# Patient Record
Sex: FEMALE | Race: WHITE | Employment: OTHER | URBAN - METROPOLITAN AREA
[De-identification: names, ages, dates, MRNs, and addresses within clinical notes are randomized per-mention and may not be internally consistent; named-entity substitution may affect disease eponyms.]

---

## 2017-01-05 ENCOUNTER — OFFICE VISIT (OUTPATIENT)
Dept: PHARMACY | Facility: CLINIC | Age: 82
End: 2017-01-05
Payer: COMMERCIAL

## 2017-01-05 VITALS
DIASTOLIC BLOOD PRESSURE: 70 MMHG | BODY MASS INDEX: 30.45 KG/M2 | WEIGHT: 185.9 LBS | SYSTOLIC BLOOD PRESSURE: 154 MMHG | OXYGEN SATURATION: 94 % | HEART RATE: 71 BPM

## 2017-01-05 DIAGNOSIS — E55.9 VITAMIN D DEFICIENCY: ICD-10-CM

## 2017-01-05 DIAGNOSIS — Z23 ENCOUNTER FOR IMMUNIZATION: ICD-10-CM

## 2017-01-05 DIAGNOSIS — E53.8 VITAMIN B12 DEFICIENCY (NON ANEMIC): ICD-10-CM

## 2017-01-05 DIAGNOSIS — J44.9 CHRONIC OBSTRUCTIVE PULMONARY DISEASE, UNSPECIFIED COPD TYPE (H): ICD-10-CM

## 2017-01-05 DIAGNOSIS — I10 ESSENTIAL HYPERTENSION: ICD-10-CM

## 2017-01-05 DIAGNOSIS — E03.9 HYPOTHYROIDISM, UNSPECIFIED TYPE: ICD-10-CM

## 2017-01-05 DIAGNOSIS — E11.65 TYPE 2 DIABETES MELLITUS WITH HYPERGLYCEMIA, WITHOUT LONG-TERM CURRENT USE OF INSULIN (H): ICD-10-CM

## 2017-01-05 DIAGNOSIS — D50.9 IRON DEFICIENCY ANEMIA, UNSPECIFIED IRON DEFICIENCY ANEMIA TYPE: ICD-10-CM

## 2017-01-05 DIAGNOSIS — I10 ESSENTIAL HYPERTENSION: Primary | ICD-10-CM

## 2017-01-05 PROCEDURE — 99607 MTMS BY PHARM ADDL 15 MIN: CPT | Performed by: PHARMACIST

## 2017-01-05 PROCEDURE — 80048 BASIC METABOLIC PNL TOTAL CA: CPT | Performed by: PHYSICIAN ASSISTANT

## 2017-01-05 PROCEDURE — 99605 MTMS BY PHARM NP 15 MIN: CPT | Performed by: PHARMACIST

## 2017-01-05 PROCEDURE — 36415 COLL VENOUS BLD VENIPUNCTURE: CPT | Performed by: PHYSICIAN ASSISTANT

## 2017-01-05 RX ORDER — HYDROCHLOROTHIAZIDE 12.5 MG/1
12.5 TABLET ORAL DAILY
Qty: 30 TABLET | Refills: 1 | Status: SHIPPED | OUTPATIENT
Start: 2017-01-05 | End: 2017-01-19 | Stop reason: DRUGHIGH

## 2017-01-05 NOTE — PROGRESS NOTES
SUBJECTIVE/OBJECTIVE:                                                    Maryalice Rebecca Wachter is a 83 year old female coming in for a follow up visit (12-19-16)  for Medication Therapy Management.  She was referred to me from her PCP- Venu Maria.     Chief Complaint: here for BP recheck -and bmp lab .   States still has swollen ankles --scared a bit to take a water pill like lasix --see allergies.     She still feels cold daily -we had previously discussed --most likely due to her blood thinner warfarin. She uses a nice double blanket to try and keep her warm. Temp at 72 in the house .       Personal Healthcare Goals: fix diarrhea issues, timing of meds    Allergies/ADRs: Reviewed in Epic  Tobacco: No tobacco use   Alcohol: not currently using--very rare   Caffeine: decaff in am 1-2 cups/day of coffee; pm -tea a little bit.   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 Cannot exercise due to diarrhea issues --would like to go on walks with her hubby but has to have BM within 1 block of leaving.   PMH: Reviewed in [employer]; [facility] stated she has sarcoidosis?    Medication Adherence: No issues found today    IBS-diarrhea: patient uses prn loperamide --works well. .     GERD:  Taking omeprazole 20 mg daily. Knows to take it 30-60 minutes BEFORE first meal of the day. Patient states it is working well for her.     Diabetes:  Pt currently taking Glipizide er 10mg qam, actos 15mg qday, Metformin-1 x500mg bid. Pt is experiencing the following side effects: None  SMBG: Readings from Glucometer--she forgot bs meter today at home (11- 28-16).      7 day ipvywsv=590 n=7; 14 day average=160, n=13; 30 day tovytkr=214, n=29  She notes any food after 7pm --results in elevated AM fasting bs's.   Date FBG/ 2hours post Lunch/2hours post Dinner /2hours post    11-28-16 177 644am -ate chocolate cake after dinner last night       11-27-16 167      11-26 147       11-25 176--ate pie night before      11-24-16 138 645am       11-23 166      11-22 119 643am.         Symptoms of low blood sugar? none. Frequency of hypoglycemia? never.  Recent symptoms of high blood sugar? fatigue  Eye exam: up to date  Foot exam: up to date  Microalbumin is < 30 mg/g. Pt is taking an ACEi/ARB.  Aspirin: Taking 81mg daily and denies side effects  Diet/Exercise: diet is ok but likes carbs , no exercise.     Vitamin D3: level was 21 on  --she takes otc daily dose of 2,000 iu's now.        Vitamin B12: level was 493 on 10-10-16. She takes daily otc dose now.       Hypothyroidism: Patient is taking levothyroxine 100 mcg daily. She knows to take it on an empty stomach with her omeprazole in the morning.    Hypertension: Current medications include : spironolactone 12.5mg bid-am and dinner , lisinopril 40mg-qam. And metoprolol 50mg bid-am and dinnertime, and she stopped 2.5mg hs amlodipine due to very swollen ankles. Left ankle was hot to touch.   Patient does  self-monitor BP.  Patient reports :  Red, swollen , painful ankles --feels like her skin is going to tear.     Home BP's       1-5-17 142/67 7am p=61      1-4-17 156/67 p-59 7am  156/70 p=68 10;15pm    1-3-17 155/79 p=64 7am  166/55 p=71 10;45pm    1-2-17 167/62 p=64 7am  148/59 p=105                                 Date Home bp's      12-19-16 140/66mhg p=68       12-18 153/81  Forgot bedtime bp    12-17 148/71 p=66  158/75 p=59    12-16 155/51 p=73  147/71 p=64    12-15 156/64 p=76  142/74 p=61    12-14 146/60 p=70  Forgot bedtime number    12-13 143/61 p=70  145/58 p=72            Date Home BP's.      11-28-16 158/69 rt. Arm 650am pulse 77 141-59 left arm 930am.     11-27-16 147/59 L arm 7am  157/59 L arm      11-26-16 139-62 L 745am  160-64 8pm Left arm    11-25-16 146-61 L p=72  7am    172-58 L 9pm  147-69 L pulse =85    11-24-16 160-55 L 7am  153-68 R arm 7am                        She did bring her home BP meter in today (11-28-16)--reading at 2:40pm = 137/56 p=74.   mtm checked own bp  120/82 p=71.   Very accurate reading.     COPD: Patient states she feels well controlled. Patient uses her inhalers more often when she gets sick and she always carries her rescue inhaler with her. Patient states that when she forgets it she becomes very worried and panicked.     Insomnia: Current medications include: trazodone 25-50mg. Pt reports trouble staying asleep and can only sleep 4-6 hours. She admits to a boring life right now -she gets sleepy early Pm --naps for a few hours , then goes to bed -gets up at 4-5 am - perhaps she is sleeping enough already.   She sleeps better she states with 1/2 tab =25mg .      Immunizations: Patient is up to date .      Current labs include:  BP Readings from Last 3 Encounters:   01/05/17 154/70   12/19/16 150/80   11/28/16 146/68     A1C      7.7   10/10/2016  A1C      8.3   7/1/2016  A1C      7.9   3/28/2016  A1C      7.1   11/19/2015  A1C      7.9   6/30/2015      CHOLESTEROL   Date Value Ref Range Status   07/01/2016 117 <200 mg/dL Final   06/30/2015 109 <200 mg/dL Final     Comment:     LDL Cholesterol is the primary guide to therapy.   The NCEP recommends further evaluation of: patients with cholesterol greater   than 200 mg/dL if additional risk factors are present, cholesterol greater   than   240 mg/dL, triglycerides greater than 150 mg/dL, or HDL less than 40 mg/dL.       HDL CHOLESTEROL   Date Value Ref Range Status   07/01/2016 40* >49 mg/dL Final   06/30/2015 36* >50 mg/dL Final     LDL CHOLESTEROL CALCULATED   Date Value Ref Range Status   07/01/2016 39 <100 mg/dL Final     Comment:     Desirable:       <100 mg/dl   06/30/2015 25 0 - 129 mg/dL Final     Comment:     LDL Cholesterol is the primary guide to therapy: LDL-cholesterol goal in high   risk patients is <100 mg/dL and in very high risk patients is <70 mg/dL.       LDL CHOLESTEROL DIRECT   Date Value Ref Range Status   12/12/2014 60 0 - 129 mg/dL Final     Comment:     Optimal:         <100 mg/dL   Near  Optimal:     100-129 mg/dL   Borderline High:  130-159 mg/dL   High:             160-189 mg/dL   Very high:  greater than or equal to 190 mg/dL   Cannot estimate LDL when triglyceride exceeds 400 mg/dL       TRIGLYCERIDES   Date Value Ref Range Status   07/01/2016 190* <150 mg/dL Final     Comment:     Borderline high:  150-199 mg/dl   High:             200-499 mg/dl   Very high:       >499 mg/dl   Fasting specimen     06/30/2015 240* 0 - 150 mg/dL Final     CHOLESTEROL/HDL RATIO   Date Value Ref Range Status   06/30/2015 3.0 0.0 - 5.0 Final     MICROL       33   10/27/2016  MICROALBUMIN    33.03   10/27/2016      Last Basic Metabolic Panel:  NA      138   10/21/2016   POTASSIUM      3.9   10/21/2016  CHLORIDE      103   10/21/2016  VASHTI      8.3   10/21/2016  CO2       27   10/21/2016  BUN       17   10/21/2016  CR     0.87   10/21/2016  GLC      144   10/21/2016        GFR ESTIMATE   Date Value Ref Range Status   10/21/2016 63 >60 mL/min/1.7m2 Final     Comment:     Non  GFR Calc   10/20/2016 63 >60 mL/min/1.7m2 Final     Comment:     Non  GFR Calc   11/19/2015 72 >60 mL/min/1.7m2 Final     Comment:     Non  GFR Calc     GFR ESTIMATE IF BLACK   Date Value Ref Range Status   10/21/2016 76 >60 mL/min/1.7m2 Final     Comment:      GFR Calc   10/20/2016 76 >60 mL/min/1.7m2 Final     Comment:      GFR Calc   11/19/2015 87 >60 mL/min/1.7m2 Final     Comment:      GFR Calc     TSH   Date Value Ref Range Status   12/19/2016 0.55 0.40 - 4.00 mU/L Final   ]  /70 mmHg  Pulse 71  Wt 185 lb 14.4 oz (84.324 kg)  SpO2 94%    Most Recent Immunizations   Administered Date(s) Administered     DTAP (<7y) 01/01/2010     Influenza (High Dose) 3 valent vaccine 10/10/2016     Influenza (IIV3) 09/01/2014     Pneumococcal (PCV 13) 10/10/2016     Pneumococcal (PCV 7) 09/09/2014     Zoster vaccine, live 01/01/2012     ASSESSMENT:                                                        Current medications were reviewed with her today.     Medication Adherence: No problems identified    IBS-Diarrhea: Stable.     GERD: Stable. Current treatment is effective. Chronic PPI use places patient at an increased risk of pneumonia, C. Diff, hypomagnesemia and lower bone mineral density, these risks  were  reviewed with the patient. Pt is taking the medication in the proper fashion (In AM on empty stomach before breakfast).     Diabetes: Stable. Patient is meeting A1c goal of < 8%.    Vitamin B12: is not at goal of 600-1000pg/mL. Pt would benefit from vitamin B12 lab recheck in 12 months.    Vitamin D3: is not at goal of 50-75ng/mL. Pt would benefit from vitamin D3 lab recheck in 12 months.    Hypothyroidism: recheck today --result is pending.    Hypertension: Needs Improvement. Patient is not meeting BP goal of < 140/90mmHg.  Pt would benefit from the following changes - addition of hctz, discontinuation of spironolactone, continued BP monitoring, watching diet, increasing exercise and weight loss and sodium restriction      COPD/Asthma: Stable.     Insomnia: Improved. Pt may benefit from no changes.      Immunizations: stable -up to date now.     PLAN:                                                          1. BP = 154/70mmhg. Today .  Goal BP is <140/90mmhg if possible.    Lets stop the spironolactone --it doesn't seem to be doing anything.     Lets  ADD in a small water pill (diuretic)  Called HCTZ 12.5mg./day -take in AM  Daily.   You may feel like urinating more frequently in morning while on this medication.     Keep checking blood pressure AM and PM  at home.       Call me if you get too lightheaded and feel faint or fall down.       We can consider NORY-compression socks if needed.       Next MTM visit: 2 weeks for BP recheck -- Thursday January 19th at 1pm.         I spent 30 minutes with the patient during this encounter.     To schedule another MTM  appointment, please call the clinic directly or you may call the MTM scheduling line at [phone] or toll-free at [phone].     My Clinical Pharmacist's contact information:                                                      It was a pleasure seeing you today!  Please feel free to contact me with any questions or concerns you have.      Janey Romero Rph.  Medication Therapy Management Provider  [phone]        You may receive a survey about the [facility] services you received.  I would appreciate your feedback to help me serve you better in the future. Please fill it out and return it when you can. Your comments will be anonymous.

## 2017-01-05 NOTE — MR AVS SNAPSHOT
After Visit Summary   1/5/2017    Maryalice Rebecca Wachter    MRN: [id number]           Patient Information     Date Of Birth          11/11/1933        Visit Information        Provider Department      1/5/2017 2:30 PM Janey Romero RPH [facility]        Today's Diagnoses     Essential hypertension    -  1       Care Instructions    Recommendations from today's MT visit:                                                        1. BP = 154/70mmhg. Today .  Goal BP is <140/90mmhg if possible.    Lets stop the spironolactone --it doesn't seem to be doing anything.     Lets  ADD in a small water pill (diuretic)  Called HCTZ 12.5mg./day -take in AM  Daily.   You may feel like urinating more frequently in morning while on this medication.     Keep checking blood pressure AM and PM  at home.       Call me if you get too lightheaded and feel faint or fall down.       We can consider NORY-compression socks if needed.       Next MTM visit: 2 weeks for BP recheck -- Thursday January 19th at 1pm.     To schedule another MTM appointment, please call the clinic directly or you may call the MTM scheduling line at [phone] or toll-free at [phone].     My Clinical Pharmacist's contact information:                                                      It was a pleasure seeing you today!  Please feel free to contact me with any questions or concerns you have.      Janey Romero Rph.  Medication Therapy Management Provider  [phone]      You may receive a survey about the MT services you received.  I would appreciate your feedback to help me serve you better in the future. Please fill it out and return it when you can. Your comments will be anonymous.      My healthcare goals:                                                                    Follow-ups after your visit        Your next 10 appointments already scheduled     Jan 06, 2017  9:00 AM   Anticoagulation Visit with FM RN  [facility]ITS   [facility]    [address], [address]  [place] [address] [phone]            Jan 19, 2017  1:00 PM   SHORT with Janey Romero RPH   [facility]    [address]  [place] [address] [phone]              Future tests that were ordered for you today     Open Future Orders        Priority Expected Expires Ordered    Basic metabolic panel Routine  9/5/2017 1/5/2017            Who to contact     If you have questions or need follow up information about today's clinic visit or your schedule please contact [facility] directly at [phone].  Normal or non-critical lab and imaging results will be communicated to you by MyChart, letter or phone within 4 business days after the clinic has received the results. If you do not hear from us within 7 days, please contact the clinic through MyChart or phone. If you have a critical or abnormal lab result, we will notify you by phone as soon as possible.  Submit refill requests through [employer] or call your pharmacy and they will forward the refill request to us. Please allow 3 business days for your refill to be completed.          Additional Information About Your Visit        [employer] Information     [employer] gives you secure access to your electronic health record. If you see a primary care provider, you can also send messages to your care team and make appointments. If you have questions, please call your primary care clinic.  If you do not have a primary care provider, please call [phone] and they will assist you.        Care EveryWhere ID     This is your Care EveryWhere ID. This could be used by other organizations to access your [place] medical records  [id number]        Your Vitals Were     Pulse Pulse Oximetry                71 94%           Blood Pressure from Last 3 Encounters:   01/05/17 154/70    12/19/16 150/80   11/28/16 146/68    Weight from Last 3 Encounters:   01/05/17 185 lb 14.4 oz (84.324 kg)   12/19/16 184 lb 4.8 oz (83.598 kg)   11/28/16 183 lb 9.6 oz (83.28 kg)                 Today's Medication Changes          These changes are accurate as of: 1/5/17  3:10 PM.  If you have any questions, ask your nurse or doctor.               Start taking these medicines.        Dose/Directions    hydrochlorothiazide 12.5 MG Tabs tablet   Used for:  Essential hypertension   Started by:  Janey Romero RPH        Dose:  12.5 mg   Take 1 tablet (12.5 mg) by mouth daily   Quantity:  30 tablet   Refills:  1         These medicines have changed or have updated prescriptions.        Dose/Directions    traZODone 50 MG tablet   Commonly known as:  DESYREL   This may have changed:  how much to take   Used for:  Insomnia, unspecified type        Dose:   mg   Take 1-2 tablets ( mg) by mouth At Bedtime   Quantity:  60 tablet   Refills:  5            Where to get your medicines      These medications were sent to [facility] - [place] [address]     Phone:  [phone]    - hydrochlorothiazide 12.5 MG Tabs tablet             Primary Care Provider Office Phone # Fax #    Venu Maria PA-C [phone]       [facility] [address]  [place] [address]        Thank you!     Thank you for choosing [facility]  for your care. Our goal is always to provide you with excellent care. Hearing back from our patients is one way we can continue to improve our services. Please take a few minutes to complete the written survey that you may receive in the mail after your visit with us. Thank you!             Your Updated Medication List - Protect others around you: Learn how to safely use, store and throw away your medicines at www.disposemymeds.org.          This list is accurate as  of: 1/5/17  3:10 PM.  Always use your most recent med list.                   Brand Name Dispense Instructions for use    * albuterol (2.5 MG/3ML) 0.083% neb solution      Take 1 vial by nebulization every 6 hours as needed for shortness of breath / dyspnea or wheezing       * albuterol 108 (90 BASE) MCG/ACT Inhaler   Generic drug:  albuterol      Inhale 2 puffs into the lungs every 6 hours       ascorbic acid 500 MG tablet    VITAMIN C     Take 500 mg by mouth daily.       ASPIRIN EC PO      Take 81 mg by mouth.       B-12 1000 MCG Caps      Take 1 capsule by mouth 2 times daily       * BLOOD GLUCOSE TEST STRIPS Strp     100 strip    Test blood glucose 1 time a day       * blood glucose monitoring test strip    ONE TOUCH ULTRA    100 each    Use to test blood sugar 1 time daily or as directed.       calcium carb 1250 mg (500 mg [place])/vitamin D 200 units 500-200 MG-UNIT per tablet    OSCAL with D     Take 1 tablet by mouth daily.       ferrous sulfate 325 (65 FE) MG tablet    IRON     Take 1 tablet by mouth daily (with breakfast).       FIBERCON 625 MG tablet   Generic drug:  calcium polycarbophil      Take 1 tablet by mouth 2 times daily.       glipiZIDE 10 MG 24 hr tablet    glipiZIDE XL    180 tablet    Take 1 tablet (10 mg) by mouth 2 times daily       hydrochlorothiazide 12.5 MG Tabs tablet     30 tablet    Take 1 tablet (12.5 mg) by mouth daily       * Lancets 30G Misc     100 lancet    Test blood glucose 1 time a day--Any generic lancet that insurance covers.       * TRUEPLUS LANCETS 30G Misc     100 each    1 strip daily       levothyroxine 100 MCG tablet    SYNTHROID/LEVOTHROID    30 tablet    Take 1 tablet (100 mcg) by mouth daily       lisinopril 40 MG tablet    PRINIVIL/ZESTRIL    90 tablet    Take 1 tablet (40 mg) by mouth daily       LOPERAMIDE HCL PO      1/2 to 1 tablet by mouth as needed.       metFORMIN 500 MG tablet    GLUCOPHAGE    360 tablet    Take 2 tablets (1,000 mg) by mouth 2 times daily  (with meals)       metoprolol 50 MG tablet    LOPRESSOR    180 tablet    Take 1 tablet (50 mg) by mouth 2 times daily       mometasone 220 MCG/INH Inhaler    ASMANEX     Inhale 2 puffs into the lungs daily.       omeprazole 20 MG CR capsule    priLOSEC    30 capsule    Take 1 capsule (20 mg) by mouth daily       pioglitazone 15 MG tablet    ACTOS    90 tablet    Take 1 tablet (15 mg) by mouth daily       simvastatin 20 MG tablet    ZOCOR    90 tablet    Take 1 tablet (20 mg) by mouth At Bedtime       spironolactone 25 MG tablet    ALDACTONE    90 tablet    Take 1 tablet (25 mg) by mouth daily       tiotropium 18 MCG capsule    SPIRIVA     Inhale 18 mcg into the lungs daily.       traZODone 50 MG tablet    DESYREL    60 tablet    Take 1-2 tablets ( mg) by mouth At Bedtime       VITAMIN D (CHOLECALCIFEROL) PO      Take 2,000 Units by mouth daily       warfarin 3 MG tablet    COUMADIN    90 tablet    Take 1 tablet (3mg) daily except 1/2 tablet (1.5mg) on Fridays       * Notice:  This list has 6 medication(s) that are the same as other medications prescribed for you. Read the directions carefully, and ask your doctor or other care provider to review them with you.

## 2017-01-05 NOTE — PATIENT INSTRUCTIONS
Recommendations from today's MTM visit:                                                        1. BP = 154/70mmhg. Today .  Goal BP is <140/90mmhg if possible.    Lets stop the spironolactone --it doesn't seem to be doing anything.     Lets  ADD in a small water pill (diuretic)  Called HCTZ 12.5mg./day -take in AM  Daily.   You may feel like urinating more frequently in morning while on this medication.     Keep checking blood pressure AM and PM  at home.       Call me if you get too lightheaded and feel faint or fall down.       We can consider NORY-compression socks if needed.       Next MTM visit: 2 weeks for BP recheck -- Thursday January 19th at 1pm.     To schedule another MTM appointment, please call the clinic directly or you may call the MTM scheduling line at [phone] or toll-free at [phone].     My Clinical Pharmacist's contact information:                                                      It was a pleasure seeing you today!  Please feel free to contact me with any questions or concerns you have.      Janey Romero Rph.  Medication Therapy Management Provider  [phone]      You may receive a survey about the MTM services you received.  I would appreciate your feedback to help me serve you better in the future. Please fill it out and return it when you can. Your comments will be anonymous.      My healthcare goals:

## 2017-01-05 NOTE — Clinical Note
Venu--erik--bp still too high and she has ankle swelling --i changed spironolactone to hctz --lets see how that goes in next 2 weeks.-hannah

## 2017-01-06 ENCOUNTER — ANTICOAGULATION THERAPY VISIT (OUTPATIENT)
Dept: NURSING | Facility: CLINIC | Age: 82
End: 2017-01-06
Payer: COMMERCIAL

## 2017-01-06 DIAGNOSIS — I25.2 PAST MYOCARDIAL INFARCTION: Primary | ICD-10-CM

## 2017-01-06 DIAGNOSIS — I48.91 ATRIAL FIBRILLATION (H): ICD-10-CM

## 2017-01-06 DIAGNOSIS — E03.9 HYPOTHYROIDISM, UNSPECIFIED TYPE: ICD-10-CM

## 2017-01-06 DIAGNOSIS — I47.19 ATRIAL TACHYCARDIA, PAROXYSMAL (H): ICD-10-CM

## 2017-01-06 DIAGNOSIS — Z79.01 LONG-TERM (CURRENT) USE OF ANTICOAGULANTS: Primary | ICD-10-CM

## 2017-01-06 DIAGNOSIS — I51.9 SYSTOLIC DYSFUNCTION, LEFT VENTRICLE: ICD-10-CM

## 2017-01-06 LAB
ANION GAP SERPL CALCULATED.3IONS-SCNC: 8 MMOL/L (ref 3–14)
BUN SERPL-MCNC: 17 MG/DL (ref 7–30)
CALCIUM SERPL-MCNC: 9.2 MG/DL (ref 8.5–10.1)
CHLORIDE SERPL-SCNC: 106 MMOL/L (ref 94–109)
CO2 SERPL-SCNC: 28 MMOL/L (ref 20–32)
CREAT SERPL-MCNC: 0.94 MG/DL (ref 0.52–1.04)
GFR SERPL CREATININE-BSD FRML MDRD: 57 ML/MIN/1.7M2
GLUCOSE SERPL-MCNC: 166 MG/DL (ref 70–99)
INR POINT OF CARE: 2.3 (ref 0.86–1.14)
POTASSIUM SERPL-SCNC: 4.3 MMOL/L (ref 3.4–5.3)
SODIUM SERPL-SCNC: 142 MMOL/L (ref 133–144)

## 2017-01-06 PROCEDURE — 36416 COLLJ CAPILLARY BLOOD SPEC: CPT

## 2017-01-06 PROCEDURE — 99207 ZZC NO CHARGE NURSE ONLY: CPT

## 2017-01-06 PROCEDURE — 85610 PROTHROMBIN TIME: CPT | Mod: QW

## 2017-01-06 NOTE — PROGRESS NOTES
ANTICOAGULATION FOLLOW-UP CLINIC VISIT    Patient Name:  Maryalice Rebecca Wachter  Date:  1/6/2017  Contact Type:  Face to Face    SUBJECTIVE:     Patient Findings     Positives No Problem Findings           OBJECTIVE    INR PROTIME   Date Value Ref Range Status   01/06/2017 2.3* 0.86 - 1.14 Final       ASSESSMENT / PLAN  INR assessment THER    Recheck INR In: 6 WEEKS    INR Location Clinic      Anticoagulation Summary as of 1/6/2017     INR goal 2.0-3.0   Selected INR 2.3 (1/6/2017)   Maintenance plan 4.5 mg (3 mg x 1.5) on Tue, Fri; 3 mg (3 mg x 1) all other days   Full instructions 4.5 mg on Tue, Fri; 3 mg all other days   Weekly total 24 mg   No change documented Cyndi Cabello RN   Plan last modified Cyndi Cabello RN (9/23/2016)   Next INR check 2/17/2017   Priority INR   Target end date     Indications   Long-term (current) use of anticoagulants [Z79.01] [Z79.01]  Atrial fibrillation (H) [I48.91]         Anticoagulation Episode Summary     INR check location     Preferred lab     Send INR reminders to  [employer] POOL    Comments       Anticoagulation Care Providers     Provider Role Specialty Phone number    Venu Maria PA-C Responsible Physician Assistant - Medical [phone]            See the Encounter Report to view Anticoagulation Flowsheet and Dosing Calendar (Go to Encounters tab in chart review, and find the Anticoagulation Therapy Visit)    Cyndi Cabello RN

## 2017-01-06 NOTE — MR AVS SNAPSHOT
Sindi Beachchter   1/6/2017 9:00 AM   Anticoagulation Therapy Visit    Description:  83 year old female   Provider:  FM RN VISITS   Department:  Fm Nurse           INR as of 1/6/2017     Selected INR 2.3 (1/6/2017)      Anticoagulation Summary as of 1/6/2017     INR goal 2.0-3.0   Selected INR 2.3 (1/6/2017)   Full instructions 4.5 mg on Tue, Fri; 3 mg all other days   Next INR check 2/17/2017    Indications   Long-term (current) use of anticoagulants [Z79.01] [Z79.01]  Atrial fibrillation (H) [I48.91]         Your next Anticoagulation Clinic appointment(s)     Feb 17, 2017  9:00 AM   Anticoagulation Visit with FM RN VISITS   [facility] ([facility]    [address] [place] [address], [facility] [address]  [facility] [address] [phone]              Contact Numbers     Clinic Number:         January 2017 Details    Sun Mon Tue Wed Thu Fri Sat     1               2               3               4               5               6      4.5 mg   See details      7      3 mg           8      3 mg         9      3 mg         10      4.5 mg         11      3 mg         12      3 mg         13      4.5 mg         14      3 mg           15      3 mg         16      3 mg         17      4.5 mg         18      3 mg         19      3 mg         20      4.5 mg         21      3 mg           22      3 mg         23      3 mg         24      4.5 mg         25      3 mg         26      3 mg         27      4.5 mg         28      3 mg           29      3 mg         30      3 mg         31      4.5 mg              Date Details   01/06 This INR check               How to take your warfarin dose     To take:  3 mg Take 1 of the 3 mg tablets.    To take:  4.5 mg Take 1.5 of the 3 mg tablets.           February 2017 Details    Sun Mon Tue Wed Thu Fri Sat        1      3 mg         2      3 mg         3      4.5 mg         4      3 mg           5      3 mg         6      3 mg         7      4.5 mg          8      3 mg         9      3 mg         10      4.5 mg         11      3 mg           12      3 mg         13      3 mg         14      4.5 mg         15      3 mg         16      3 mg         17            18                 19               20               21               22               23               24               25                 26               27               28                    Date Details   No additional details    Date of next INR:  2/17/2017         How to take your warfarin dose     To take:  3 mg Take 1 of the 3 mg tablets.    To take:  4.5 mg Take 1.5 of the 3 mg tablets.

## 2017-01-09 ENCOUNTER — TELEPHONE (OUTPATIENT)
Dept: PHARMACY | Facility: CLINIC | Age: 82
End: 2017-01-09

## 2017-01-09 RX ORDER — METOPROLOL TARTRATE 50 MG
50 TABLET ORAL 2 TIMES DAILY
Qty: 180 TABLET | Refills: 1 | Status: SHIPPED | OUTPATIENT
Start: 2017-01-09 | End: 2017-06-19

## 2017-01-09 RX ORDER — LEVOTHYROXINE SODIUM 100 UG/1
100 TABLET ORAL DAILY
Qty: 90 TABLET | Refills: 1 | Status: SHIPPED | OUTPATIENT
Start: 2017-01-09 | End: 2017-06-23

## 2017-01-09 NOTE — TELEPHONE ENCOUNTER
levothyroxine (SYNTHROID/LEVOTHROID) 100 MCG tablet     Last Written Prescription Date: 12/5/16  Last Quantity: 30, # refills: 0  Last Office Visit with DONALD, CHASTITY or  Health prescribing provider: 10/27/2016     Next 5 appointments (look out 90 days)     Jan 19, 2017  1:00 PM   SHORT with Janey Romero RPH   [facility] ([facility]    [address] [facility] [id number] [phone]                   TSH   Date Value Ref Range Status   12/19/2016 0.55 0.40 - 4.00 mU/L Final     ________________________________________________________________________________  metoprolol (LOPRESSOR) 50 MG tablet      Last Written Prescription Date: 7/13/16  Last Fill Quantity: 180, # refills: 1    Last Office Visit with DONALD, CHASTITY or [facility]ENN[facility] prescribing provider:  10/27/2016         BP Readings from Last 3 Encounters:   01/05/17 154/70   12/19/16 150/80   11/28/16 146/68       JOE Michelle

## 2017-01-09 NOTE — TELEPHONE ENCOUNTER
Prescription approved per [facility] Refill Protocol.  Cyndi Cabello RN

## 2017-01-09 NOTE — TELEPHONE ENCOUNTER
1-9-17      She states hctz -- 12.5mg ./day -- she reports redness in lower legs is disappearing , she reports no urge to go to bathroom -- during day --but during night q 1.5 hrs nocturia which is normal for her.    Her home BP's -- last night 148/63mmhg,  133/65 that morning.    1-7 -17 152/65 , night that day 153/74.  1-5-17 = 145/63 , night 159/79.      Plan:    1. Lets increase dose to 2 pills of 12.5mg qam hctz qam.    Check home bp's --see me in 10 days .    Janey Romero [facility].  Medication Therapy Management Provider  [phone]

## 2017-01-17 DIAGNOSIS — I25.2 PAST MYOCARDIAL INFARCTION: ICD-10-CM

## 2017-01-17 DIAGNOSIS — I51.9 SYSTOLIC DYSFUNCTION, LEFT VENTRICLE: Primary | ICD-10-CM

## 2017-01-17 RX ORDER — LISINOPRIL 40 MG/1
40 TABLET ORAL DAILY
Qty: 90 TABLET | Refills: 0 | Status: SHIPPED | OUTPATIENT
Start: 2017-01-17 | End: 2017-04-12

## 2017-01-17 NOTE — TELEPHONE ENCOUNTER
Prescription approved per [facility] Refill Protocol.  Cyndi Cabello RN

## 2017-01-17 NOTE — TELEPHONE ENCOUNTER
Pending Prescriptions:                       Disp   Refills    lisinopril (PRINIVIL/ZESTRIL) 40 MG echcfr25 tab*0            Sig: Take 1 tablet (40 mg) by mouth daily              Last Written Prescription Date: 10/10/2016  Last Fill Quantity: 90, # refills: 0  Last Office Visit with [facility], [facility] or [facility] prescribing provider: 10/27/2016   Next 5 appointments (look out 90 days)     Jan 19, 2017  1:00 PM   SHORT with Janey Romero RPH   [facility] ([facility])    [address] [facility] [id number] [phone]                   POTASSIUM   Date Value Ref Range Status   01/05/2017 4.3 3.4 - 5.3 mmol/L Final     CREATININE   Date Value Ref Range Status   01/05/2017 0.94 0.52 - 1.04 mg/dL Final     BP Readings from Last 3 Encounters:   01/05/17 154/70   12/19/16 150/80   11/28/16 146/68

## 2017-01-19 ENCOUNTER — OFFICE VISIT (OUTPATIENT)
Dept: PHARMACY | Facility: CLINIC | Age: 82
End: 2017-01-19
Payer: COMMERCIAL

## 2017-01-19 VITALS
SYSTOLIC BLOOD PRESSURE: 124 MMHG | WEIGHT: 182.2 LBS | DIASTOLIC BLOOD PRESSURE: 64 MMHG | HEART RATE: 65 BPM | BODY MASS INDEX: 29.85 KG/M2 | OXYGEN SATURATION: 97 %

## 2017-01-19 DIAGNOSIS — E53.8 VITAMIN B12 DEFICIENCY (NON ANEMIC): ICD-10-CM

## 2017-01-19 DIAGNOSIS — E55.9 VITAMIN D DEFICIENCY: ICD-10-CM

## 2017-01-19 DIAGNOSIS — I10 ESSENTIAL HYPERTENSION: Primary | ICD-10-CM

## 2017-01-19 DIAGNOSIS — D50.9 IRON DEFICIENCY ANEMIA, UNSPECIFIED IRON DEFICIENCY ANEMIA TYPE: ICD-10-CM

## 2017-01-19 DIAGNOSIS — K21.9 GASTROESOPHAGEAL REFLUX DISEASE WITHOUT ESOPHAGITIS: ICD-10-CM

## 2017-01-19 DIAGNOSIS — J44.9 CHRONIC OBSTRUCTIVE PULMONARY DISEASE, UNSPECIFIED COPD TYPE (H): ICD-10-CM

## 2017-01-19 DIAGNOSIS — R19.7 DIARRHEA, UNSPECIFIED TYPE: ICD-10-CM

## 2017-01-19 DIAGNOSIS — E11.65 TYPE 2 DIABETES MELLITUS WITH HYPERGLYCEMIA, WITHOUT LONG-TERM CURRENT USE OF INSULIN (H): ICD-10-CM

## 2017-01-19 DIAGNOSIS — Z23 ENCOUNTER FOR IMMUNIZATION: ICD-10-CM

## 2017-01-19 DIAGNOSIS — E03.9 HYPOTHYROIDISM, UNSPECIFIED TYPE: ICD-10-CM

## 2017-01-19 DIAGNOSIS — G47.00 INSOMNIA, UNSPECIFIED TYPE: ICD-10-CM

## 2017-01-19 DIAGNOSIS — K58.0 IRRITABLE BOWEL SYNDROME WITH DIARRHEA: ICD-10-CM

## 2017-01-19 PROCEDURE — 99606 MTMS BY PHARM EST 15 MIN: CPT | Performed by: PHARMACIST

## 2017-01-19 PROCEDURE — 99607 MTMS BY PHARM ADDL 15 MIN: CPT | Performed by: PHARMACIST

## 2017-01-19 RX ORDER — HYDROCHLOROTHIAZIDE 25 MG/1
25 TABLET ORAL DAILY
Qty: 90 TABLET | Refills: 1 | Status: SHIPPED | OUTPATIENT
Start: 2017-01-19 | End: 2017-06-19

## 2017-01-19 NOTE — PROGRESS NOTES
SUBJECTIVE/OBJECTIVE:                                                    Maryalice Rebecca Wachter is a 83 year old female coming in for a follow up visit (1-5-17)  for Medication Therapy Management.  She was referred to me from her PCP- Venu Maria.     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  She thinks about 5 days week --2 /day =25mg. Still has lower leg redness issues but much improved.       Personal Healthcare Goals: fix diarrhea issues, timing of meds, get BP wnl.     Allergies/ADRs: Reviewed in Epic  Tobacco: No tobacco use   Alcohol: not currently using--very rare   Caffeine: decaff in am 1-2 cups/day of coffee; pm -tea a little bit.   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 Cannot exercise due to diarrhea issues --would like to go on walks with her hubby but has to have BM within 1 block of leaving.   PMH: Reviewed in [employer]; [facility] stated she has sarcoidosis?    Medication Adherence: No issues found today    IBS-diarrhea: patient uses prn loperamide --works well.     GERD:  Taking omeprazole 20 mg daily. Knows to take it 30-60 minutes BEFORE first meal of the day. Patient states it is working well for her.     Diabetes:  Pt currently taking Glipizide er 10mg qam, actos 15mg qday, Metformin-1 x500mg bid. Pt is experiencing the following side effects: None  SMBG: Readings from Glucometer--she forgot bs meter today at home (11- 28-16).     30 days bs avg. On 1-19-17=147.       Symptoms of low blood sugar? none. Frequency of hypoglycemia? never.  Recent symptoms of high blood sugar? fatigue  Eye exam: up to date  Foot exam: up to date  Microalbumin is < 30 mg/g. Pt is taking an ACEi/ARB.  Aspirin: Taking 81mg daily and denies side effects  Diet/Exercise: diet is ok but likes carbs , no exercise.     Vitamin D3: level was 21 on  --she  takes otc daily dose of 2,000 iu's now.        Vitamin B12: level was 493 on 10-10-16. She takes daily otc dose now.       Hypothyroidism: Patient is taking levothyroxine 100 mcg daily. She knows to take it on an empty stomach with her omeprazole in the morning.    Hypertension: Current medications include : HCTZ 25mg. qam-5 days /week, 12.5mg 2 days /week  , lisinopril 40mg-qam. And metoprolol 50mg bid-am and dinnertime.   Patient does  self-monitor BP.  Yes see below:    Date Home bp's      1-19-17 137/65 p=66      1-18-17 148/69  139/57 p=69    1-17-17 144/65 p=65  132/62 p=71    1-16-17 143/67 p=59  136/67 p=67    1-15-17 132/68 p=66  128/89 p=69    1-14-17 133/70 p=64  112/67 p=65    1-13-17 133/70 p=68  145/61 p=65              Home BP's       1-5-17 142/67 7am p=61      1-4-17 156/67 p-59 7am  156/70 p=68 10;15pm    1-3-17 155/79 p=64 7am  166/55 p=71 10;45pm    1-2-17 167/62 p=64 7am  148/59 p=105                             She did bring her home BP meter in today (11-28-16)--reading at 2:40pm = 137/56 p=74.   mtm checked own bp 120/82 p=71.   Very accurate reading.     COPD: Patient states she feels well controlled. Patient uses her inhalers more often when she gets sick and she always carries her rescue inhaler with her. Patient states that when she forgets it she becomes very worried and panicked.     Insomnia: Current medications include: trazodone 25-50mg. Pt reports trouble staying asleep and can only sleep 4-6 hours. She admits to a boring life right now -she gets sleepy early Pm --naps for a few hours , then goes to bed -gets up at 4-5 am - perhaps she is sleeping enough already.   She sleeps better she states with 1/2 tab =25mg .      Immunizations: Patient is up to date .      Current labs include:  BP Readings from Last 3 Encounters:   01/19/17 124/64   01/05/17 154/70   12/19/16 150/80     A1C      7.7   10/10/2016  A1C      8.3   7/1/2016  A1C      7.9   3/28/2016  A1C      7.1    11/19/2015  A1C      7.9   6/30/2015      CHOLESTEROL   Date Value Ref Range Status   07/01/2016 117 <200 mg/dL Final   06/30/2015 109 <200 mg/dL Final     Comment:     LDL Cholesterol is the primary guide to therapy.   The NCEP recommends further evaluation of: patients with cholesterol greater   than 200 mg/dL if additional risk factors are present, cholesterol greater   than   240 mg/dL, triglycerides greater than 150 mg/dL, or HDL less than 40 mg/dL.       HDL CHOLESTEROL   Date Value Ref Range Status   07/01/2016 40* >49 mg/dL Final   06/30/2015 36* >50 mg/dL Final     LDL CHOLESTEROL CALCULATED   Date Value Ref Range Status   07/01/2016 39 <100 mg/dL Final     Comment:     Desirable:       <100 mg/dl   06/30/2015 25 0 - 129 mg/dL Final     Comment:     LDL Cholesterol is the primary guide to therapy: LDL-cholesterol goal in high   risk patients is <100 mg/dL and in very high risk patients is <70 mg/dL.       LDL CHOLESTEROL DIRECT   Date Value Ref Range Status   12/12/2014 60 0 - 129 mg/dL Final     Comment:     Optimal:         <100 mg/dL   Near Optimal:     100-129 mg/dL   Borderline High:  130-159 mg/dL   High:             160-189 mg/dL   Very high:  greater than or equal to 190 mg/dL   Cannot estimate LDL when triglyceride exceeds 400 mg/dL       TRIGLYCERIDES   Date Value Ref Range Status   07/01/2016 190* <150 mg/dL Final     Comment:     Borderline high:  150-199 mg/dl   High:             200-499 mg/dl   Very high:       >499 mg/dl   Fasting specimen     06/30/2015 240* 0 - 150 mg/dL Final     CHOLESTEROL/HDL RATIO   Date Value Ref Range Status   06/30/2015 3.0 0.0 - 5.0 Final     MICROL       33   10/27/2016  MICROALBUMIN    33.03   10/27/2016    Last Basic Metabolic Panel:  NA      142   1/5/2017   POTASSIUM      4.3   1/5/2017  CHLORIDE      106   1/5/2017  VASHTI      9.2   1/5/2017  CO2       28   1/5/2017  BUN       17   1/5/2017  CR     0.94   1/5/2017  GLC      166   1/5/2017          GFR ESTIMATE    Date Value Ref Range Status   01/05/2017 57* >60 mL/min/1.7m2 Final     Comment:     Non  GFR Calc   10/21/2016 63 >60 mL/min/1.7m2 Final     Comment:     Non  GFR Calc   10/20/2016 63 >60 mL/min/1.7m2 Final     Comment:     Non  GFR Calc     GFR ESTIMATE IF BLACK   Date Value Ref Range Status   01/05/2017 69 >60 mL/min/1.7m2 Final     Comment:      GFR Calc   10/21/2016 76 >60 mL/min/1.7m2 Final     Comment:      GFR Calc   10/20/2016 76 >60 mL/min/1.7m2 Final     Comment:      GFR Calc     TSH   Date Value Ref Range Status   12/19/2016 0.55 0.40 - 4.00 mU/L Final   ]  /64 mmHg  Pulse 65  Wt 182 lb 3.2 oz (82.645 kg)  SpO2 97%    Most Recent Immunizations   Administered Date(s) Administered     DTAP (<7y) 01/01/2010     Influenza (High Dose) 3 valent vaccine 10/10/2016     Influenza (IIV3) 09/01/2014     Pneumococcal (PCV 13) 10/10/2016     Pneumococcal (PCV 7) 09/09/2014     Zoster vaccine, live 01/01/2012     ASSESSMENT:                                                       Current medications were reviewed with her today.     Medication Adherence: No problems identified    IBS-Diarrhea: Stable.     GERD: Stable. Current treatment is effective. Chronic PPI use places patient at an increased risk of pneumonia, C. Diff, hypomagnesemia and lower bone mineral density, these risks  were  reviewed with the patient. Pt is taking the medication in the proper fashion (In AM on empty stomach before breakfast).     Diabetes: Stable. Patient is meeting A1c goal of < 8%.    Vitamin B12: is not at goal of 600-1000pg/mL. Pt would benefit from vitamin B12 lab recheck in 12 months.    Vitamin D3: is not at goal of 50-75ng/mL. Pt would benefit from vitamin D3 lab recheck in 12 months.    Hypothyroidism: TSH wnl.     Hypertension: Stable. Patient is not meeting BP goal of < 140/90mmHg.  Pt would benefit from the following  changes - continued BP monitoring and watching diet, increasing exercise and weight loss      COPD/Asthma: Stable.     Insomnia: Improved. Pt may benefit from no changes.      Immunizations: stable -up to date now.     PLAN:                                                        1. FYI--clinic BP today = 124/64mmhg --excellent.    Lets order you a 25mg. Tablet HCTZ  --take 1 tablet daily most days of week --can take 1/2 tablet daily if needed, also if going out to lunch etc. -ok to wait until your home later in afternoon to take this medication.         Next MTM visit:  Thursday march 2nd at 2pm.      I spent 30 minutes with this patient today .     To schedule another MTM appointment, please call the clinic directly or you may call the MTM scheduling line at [phone] or toll-free at [phone].     My Clinical Pharmacist's contact information:                                                      It was a pleasure seeing you today!  Please feel free to contact me with any questions or concerns you have.      Janey Romero [facility].  Medication Therapy Management Provider  [phone]        You may receive a survey about the MTM services you received.  I would appreciate your feedback to help me serve you better in the future. Please fill it out and return it when you can. Your comments will be anonymous.

## 2017-01-19 NOTE — MR AVS SNAPSHOT
After Visit Summary   1/19/2017    Maryalice Rebecca Wachter    MRN: [id number]           Patient Information     Date Of Birth          11/11/1933        Visit Information        Provider Department      1/19/2017 1:00 PM Janey Romero RPH [facility]        Today's Diagnoses     Essential hypertension    -  1       Care Instructions    Recommendations from today's MT visit:                                                        1. FYI--clinic BP today = 124/64mmhg --excellent.    Lets order you a 25mg. Tablet HCTZ  --take 1 tablet daily most days of week --can take 1/2 tablet daily if needed, also if going out to lunch etc. -ok to wait until your home later in afternoon to take this medication.         Next MTM visit:  Thursday march 2nd at 2pm.      To schedule another MT appointment, please call the clinic directly or you may call the MTM scheduling line at [phone] or toll-free at [phone].     My Clinical Pharmacist's contact information:                                                      It was a pleasure seeing you today!  Please feel free to contact me with any questions or concerns you have.      Janey Romero [facility].  Medication Therapy Management Provider  [phone]      You may receive a survey about the MT services you received.  I would appreciate your feedback to help me serve you better in the future. Please fill it out and return it when you can. Your comments will be anonymous.      My healthcare goals:                                                                  Follow-ups after your visit        Your next 10 appointments already scheduled     Feb 17, 2017  9:00 AM   Anticoagulation Visit with FM RN VISITS   [facility]    [address] [place] [address], [address]  [facility] [id number] [phone]            Mar 02, 2017  2:00 PM   SHORT with Janey Romero RPH   [facility]  ([facility])    [address]  [facility] [address] [phone]              Who to contact     If you have questions or need follow up information about today's clinic visit or your schedule please contact [facility] MTM directly at [phone].  Normal or non-critical lab and imaging results will be communicated to you by MyChart, letter or phone within 4 business days after the clinic has received the results. If you do not hear from us within 7 days, please contact the clinic through MyChart or phone. If you have a critical or abnormal lab result, we will notify you by phone as soon as possible.  Submit refill requests through [employer] or call your pharmacy and they will forward the refill request to us. Please allow 3 business days for your refill to be completed.          Additional Information About Your Visit        MyChart Information     [employer] gives you secure access to your electronic health record. If you see a primary care provider, you can also send messages to your care team and make appointments. If you have questions, please call your primary care clinic.  If you do not have a primary care provider, please call [phone] and they will assist you.        Care EveryWhere ID     This is your Care EveryWhere ID. This could be used by other organizations to access your [place] medical records  [id number]        Your Vitals Were     Pulse Pulse Oximetry                65 97%           Blood Pressure from Last 3 Encounters:   01/19/17 124/64   01/05/17 154/70   12/19/16 150/80    Weight from Last 3 Encounters:   01/19/17 182 lb 3.2 oz (82.645 kg)   01/05/17 185 lb 14.4 oz (84.324 kg)   12/19/16 184 lb 4.8 oz (83.598 kg)              Today, you had the following     No orders found for display         Today's Medication Changes          These changes are accurate as of: 1/19/17  1:31 PM.  If you have any questions, ask your nurse or doctor.                These medicines have changed or have updated prescriptions.        Dose/Directions    hydrochlorothiazide 25 MG tablet   Commonly known as:  HYDRODIURIL   This may have changed:    - medication strength  - how much to take   Used for:  Essential hypertension        Dose:  25 mg   Take 1 tablet (25 mg) by mouth daily   Quantity:  90 tablet   Refills:  1       traZODone 50 MG tablet   Commonly known as:  DESYREL   This may have changed:  how much to take   Used for:  Insomnia, unspecified type        Dose:   mg   Take 1-2 tablets ( mg) by mouth At Bedtime   Quantity:  60 tablet   Refills:  5            Where to get your medicines      These medications were sent to [facility] [address] [facility]  [address] [facility] [address]     Phone:  [phone]    - hydrochlorothiazide 25 MG tablet             Primary Care Provider Office Phone # Fax #    Venu Maria PA-C [phone]       [facility] [address]  [facility] [address]        Thank you!     Thank you for choosing [facility]  for your care. Our goal is always to provide you with excellent care. Hearing back from our patients is one way we can continue to improve our services. Please take a few minutes to complete the written survey that you may receive in the mail after your visit with us. Thank you!             Your Updated Medication List - Protect others around you: Learn how to safely use, store and throw away your medicines at www.disposemymeds.org.          This list is accurate as of: 1/19/17  1:31 PM.  Always use your most recent med list.                   Brand Name Dispense Instructions for use    * albuterol (2.5 MG/3ML) 0.083% neb solution      Take 1 vial by nebulization every 6 hours as needed for shortness of breath / dyspnea or wheezing       * albuterol 108 (90 BASE) MCG/ACT Inhaler   Generic drug:  albuterol       Inhale 2 puffs into the lungs every 6 hours       ascorbic acid 500 MG tablet    VITAMIN C     Take 500 mg by mouth daily.       ASPIRIN EC PO      Take 81 mg by mouth.       B-12 1000 MCG Caps      Take 1 capsule by mouth 2 times daily       * BLOOD GLUCOSE TEST STRIPS Strp     100 strip    Test blood glucose 1 time a day       * blood glucose monitoring test strip    ONE TOUCH ULTRA    100 each    Use to test blood sugar 1 time daily or as directed.       calcium carb 1250 mg (500 mg [place])/vitamin D 200 units 500-200 MG-UNIT per tablet    OSCAL with D     Take 1 tablet by mouth daily.       ferrous sulfate 325 (65 FE) MG tablet    IRON     Take 1 tablet by mouth daily (with breakfast).       FIBERCON 625 MG tablet   Generic drug:  calcium polycarbophil      Take 1 tablet by mouth 2 times daily.       glipiZIDE 10 MG 24 hr tablet    glipiZIDE XL    180 tablet    Take 1 tablet (10 mg) by mouth 2 times daily       hydrochlorothiazide 25 MG tablet    HYDRODIURIL    90 tablet    Take 1 tablet (25 mg) by mouth daily       * Lancets 30G Misc     100 lancet    Test blood glucose 1 time a day--Any generic lancet that insurance covers.       * TRUEPLUS LANCETS 30G Misc     100 each    1 strip daily       levothyroxine 100 MCG tablet    SYNTHROID/LEVOTHROID    90 tablet    Take 1 tablet (100 mcg) by mouth daily       lisinopril 40 MG tablet    PRINIVIL/ZESTRIL    90 tablet    Take 1 tablet (40 mg) by mouth daily       LOPERAMIDE HCL PO      1/2 to 1 tablet by mouth as needed.       metFORMIN 500 MG tablet    GLUCOPHAGE    360 tablet    Take 2 tablets (1,000 mg) by mouth 2 times daily (with meals)       metoprolol 50 MG tablet    LOPRESSOR    180 tablet    Take 1 tablet (50 mg) by mouth 2 times daily       mometasone 220 MCG/INH Inhaler    ASMANEX     Inhale 2 puffs into the lungs daily.       omeprazole 20 MG CR capsule    priLOSEC    30 capsule    Take 1 capsule (20 mg) by mouth daily       pioglitazone 15 MG tablet     ACTOS    90 tablet    Take 1 tablet (15 mg) by mouth daily       simvastatin 20 MG tablet    ZOCOR    90 tablet    Take 1 tablet (20 mg) by mouth At Bedtime       tiotropium 18 MCG capsule    SPIRIVA     Inhale 18 mcg into the lungs daily.       traZODone 50 MG tablet    DESYREL    60 tablet    Take 1-2 tablets ( mg) by mouth At Bedtime       VITAMIN D (CHOLECALCIFEROL) PO      Take 2,000 Units by mouth daily       warfarin 3 MG tablet    COUMADIN    90 tablet    Take 1 tablet (3mg) daily except 1/2 tablet (1.5mg) on Fridays       * Notice:  This list has 6 medication(s) that are the same as other medications prescribed for you. Read the directions carefully, and ask your doctor or other care provider to review them with you.

## 2017-01-19 NOTE — PATIENT INSTRUCTIONS
Recommendations from today's MTM visit:                                                        1. FYI--clinic BP today = 124/64mmhg --excellent.    Lets order you a 25mg. Tablet HCTZ  --take 1 tablet daily most days of week --can take 1/2 tablet daily if needed, also if going out to lunch etc. -ok to wait until your home later in afternoon to take this medication.         Next MTM visit:  Thursday march 2nd at 2pm.      To schedule another MTM appointment, please call the clinic directly or you may call the MTM scheduling line at [phone] or toll-free at [phone].     My Clinical Pharmacist's contact information:                                                      It was a pleasure seeing you today!  Please feel free to contact me with any questions or concerns you have.      Janey Romero Rph.  Medication Therapy Management Provider  [phone]      You may receive a survey about the MTM services you received.  I would appreciate your feedback to help me serve you better in the future. Please fill it out and return it when you can. Your comments will be anonymous.      My healthcare goals:

## 2017-01-25 ENCOUNTER — TELEPHONE (OUTPATIENT)
Dept: FAMILY MEDICINE | Facility: CLINIC | Age: 82
End: 2017-01-25

## 2017-01-25 NOTE — TELEPHONE ENCOUNTER
Panel Management Review      Patient has the following on her problem list:     Diabetes    ASA: Passed    Last A1C  A1C      7.7   10/10/2016  A1C      8.3   7/1/2016  A1C      7.9   3/28/2016  A1C      7.1   11/19/2015  A1C      7.9   6/30/2015  A1C tested: Failed    Last LDL:    CHOL      117   7/1/2016  HDL       40   7/1/2016  LDL       39   7/1/2016  LDL       60   12/12/2014  TRIG      190   7/1/2016  CHOLHDLRATIO      3.0   6/30/2015  NHDL       77   7/1/2016    Is the patient on a Statin? YES             Is the patient on Aspirin? YES    Medications     HMG CoA Reductase Inhibitors    simvastatin (ZOCOR) 20 MG tablet    Salicylates    ASPIRIN EC PO          Last three blood pressure readings:  BP Readings from Last 3 Encounters:   01/19/17 124/64   01/05/17 154/70   12/19/16 150/80       Date of last diabetes office visit: 10/27/16     Tobacco History:     History   Smoking status     Never Smoker    Smokeless tobacco     Never Used             Composite cancer screening  Chart review shows that this patient is due/due soon for the following None  Summary:    Patient is due/failing the following:   BP CHECK    Action needed:   NONE - patient is being seen by Pharm D for medication management    Type of outreach:    NONE    Questions for provider review:    None                                                                                                                                    Laisha Hernández MA

## 2017-02-06 ENCOUNTER — TRANSFERRED RECORDS (OUTPATIENT)
Dept: HEALTH INFORMATION MANAGEMENT | Facility: CLINIC | Age: 82
End: 2017-02-06

## 2017-02-15 ENCOUNTER — TRANSFERRED RECORDS (OUTPATIENT)
Dept: HEALTH INFORMATION MANAGEMENT | Facility: CLINIC | Age: 82
End: 2017-02-15

## 2017-02-16 ENCOUNTER — ANTICOAGULATION THERAPY VISIT (OUTPATIENT)
Dept: NURSING | Facility: CLINIC | Age: 82
End: 2017-02-16
Payer: COMMERCIAL

## 2017-02-16 DIAGNOSIS — Z79.01 LONG-TERM (CURRENT) USE OF ANTICOAGULANTS: ICD-10-CM

## 2017-02-16 DIAGNOSIS — I48.91 ATRIAL FIBRILLATION (H): ICD-10-CM

## 2017-02-16 LAB — INR POINT OF CARE: 3.2 (ref 0.86–1.14)

## 2017-02-16 PROCEDURE — 36416 COLLJ CAPILLARY BLOOD SPEC: CPT

## 2017-02-16 PROCEDURE — 85610 PROTHROMBIN TIME: CPT | Mod: QW

## 2017-02-16 PROCEDURE — 99207 ZZC NO CHARGE NURSE ONLY: CPT

## 2017-02-16 NOTE — MR AVS SNAPSHOT
Maryalice Rebecca Wachter   2/16/2017 8:45 AM   Anticoagulation Therapy Visit    Description:  83 year old female   Provider:  FM RN VISITS   Department:  Fm Nurse           INR as of 2/16/2017     Today's INR 3.2!      Anticoagulation Summary as of 2/16/2017     INR goal 2.0-3.0   Today's INR 3.2!   Full instructions 4.5 mg on Tue, Fri; 3 mg all other days   Next INR check 3/31/2017    Indications   Long-term (current) use of anticoagulants [Z79.01] [Z79.01]  Atrial fibrillation (H) [I48.91]         Your next Anticoagulation Clinic appointment(s)     Mar 31, 2017  9:00 AM CDT   Anticoagulation Visit with FM RN VISITS   [facility]    [address] [place] [address], [facility] [address]  [facility] [id number] [phone]              Contact Numbers     Clinic Number:         February 2017 Details    Sun Mon Tue Wed Thu Fri Sat        1               2               3               4                 5               6               7               8               9               10               11                 12               13               14               15               16      3 mg   See details      17      4.5 mg         18      3 mg           19      3 mg         20      3 mg         21      4.5 mg         22      3 mg         23      3 mg         24      4.5 mg         25      3 mg           26      3 mg         27      3 mg         28      4.5 mg              Date Details   02/16 This INR check               How to take your warfarin dose     To take:  3 mg Take 1 of the 3 mg tablets.    To take:  4.5 mg Take 1.5 of the 3 mg tablets.           March 2017 Details    Sun Mon Tue Wed Thu Fri Sat        1      3 mg         2      3 mg         3      4.5 mg         4      3 mg           5      3 mg         6      3 mg         7      4.5 mg         8      3 mg         9      3 mg         10      4.5 mg         11      3 mg           12      3 mg         13      3 mg          14      4.5 mg         15      3 mg         16      3 mg         17      4.5 mg         18      3 mg           19      3 mg         20      3 mg         21      4.5 mg         22      3 mg         23      3 mg         24      4.5 mg         25      3 mg           26      3 mg         27      3 mg         28      4.5 mg         29      3 mg         30      3 mg         31              Date Details   No additional details    Date of next INR:  3/31/2017         How to take your warfarin dose     To take:  3 mg Take 1 of the 3 mg tablets.    To take:  4.5 mg Take 1.5 of the 3 mg tablets.

## 2017-02-16 NOTE — PROGRESS NOTES
ANTICOAGULATION FOLLOW-UP CLINIC VISIT    Patient Name:  Maryalice Rebecca Wachter  Date:  2/16/2017  Contact Type:  Face to Face    SUBJECTIVE:     Patient Findings     Positives No Problem Findings           OBJECTIVE    INR Protime   Date Value Ref Range Status   02/16/2017 3.2 (A) 0.86 - 1.14 Final       ASSESSMENT / PLAN  INR assessment SUPRA    Recheck INR In: 6 WEEKS    INR Location Clinic      Anticoagulation Summary as of 2/16/2017     INR goal 2.0-3.0   Today's INR 3.2!   Maintenance plan 4.5 mg (3 mg x 1.5) on Tue, Fri; 3 mg (3 mg x 1) all other days   Full instructions 4.5 mg on Tue, Fri; 3 mg all other days   Weekly total 24 mg   No change documented Cyndi Cabello RN   Plan last modified Cyndi Cabello RN (9/23/2016)   Next INR check 3/31/2017   Priority INR   Target end date     Indications   Long-term (current) use of anticoagulants [Z79.01] [Z79.01]  Atrial fibrillation (H) [I48.91]         Anticoagulation Episode Summary     INR check location     Preferred lab     Send INR reminders to  [facility]    Comments       Anticoagulation Care Providers     Provider Role Specialty Phone number    Venu Maria PA-C Responsible Physician Assistant - Medical [phone]            See the Encounter Report to view Anticoagulation Flowsheet and Dosing Calendar (Go to Encounters tab in chart review, and find the Anticoagulation Therapy Visit)      Cyndi Cabello RN

## 2017-03-02 ENCOUNTER — OFFICE VISIT (OUTPATIENT)
Dept: PHARMACY | Facility: CLINIC | Age: 82
End: 2017-03-02
Payer: COMMERCIAL

## 2017-03-02 VITALS
HEART RATE: 92 BPM | WEIGHT: 181.3 LBS | SYSTOLIC BLOOD PRESSURE: 138 MMHG | DIASTOLIC BLOOD PRESSURE: 70 MMHG | OXYGEN SATURATION: 97 % | BODY MASS INDEX: 29.71 KG/M2

## 2017-03-02 DIAGNOSIS — E03.9 HYPOTHYROIDISM, UNSPECIFIED TYPE: ICD-10-CM

## 2017-03-02 DIAGNOSIS — E53.8 VITAMIN B12 DEFICIENCY (NON ANEMIC): ICD-10-CM

## 2017-03-02 DIAGNOSIS — E55.9 VITAMIN D DEFICIENCY: ICD-10-CM

## 2017-03-02 DIAGNOSIS — E11.65 TYPE 2 DIABETES MELLITUS WITH HYPERGLYCEMIA, WITHOUT LONG-TERM CURRENT USE OF INSULIN (H): Primary | ICD-10-CM

## 2017-03-02 DIAGNOSIS — K58.0 IRRITABLE BOWEL SYNDROME WITH DIARRHEA: ICD-10-CM

## 2017-03-02 DIAGNOSIS — I10 ESSENTIAL HYPERTENSION: ICD-10-CM

## 2017-03-02 DIAGNOSIS — G47.00 INSOMNIA, UNSPECIFIED TYPE: ICD-10-CM

## 2017-03-02 DIAGNOSIS — K21.9 GASTROESOPHAGEAL REFLUX DISEASE WITHOUT ESOPHAGITIS: ICD-10-CM

## 2017-03-02 DIAGNOSIS — J44.9 CHRONIC OBSTRUCTIVE PULMONARY DISEASE, UNSPECIFIED COPD TYPE (H): ICD-10-CM

## 2017-03-02 DIAGNOSIS — Z23 ENCOUNTER FOR IMMUNIZATION: ICD-10-CM

## 2017-03-02 DIAGNOSIS — E11.65 TYPE 2 DIABETES MELLITUS WITH HYPERGLYCEMIA, WITHOUT LONG-TERM CURRENT USE OF INSULIN (H): ICD-10-CM

## 2017-03-02 DIAGNOSIS — D50.9 IRON DEFICIENCY ANEMIA, UNSPECIFIED IRON DEFICIENCY ANEMIA TYPE: ICD-10-CM

## 2017-03-02 LAB
ANION GAP SERPL CALCULATED.3IONS-SCNC: 11 MMOL/L (ref 3–14)
BUN SERPL-MCNC: 26 MG/DL (ref 7–30)
CALCIUM SERPL-MCNC: 9.2 MG/DL (ref 8.5–10.1)
CHLORIDE SERPL-SCNC: 104 MMOL/L (ref 94–109)
CO2 SERPL-SCNC: 26 MMOL/L (ref 20–32)
CREAT SERPL-MCNC: 1.11 MG/DL (ref 0.52–1.04)
GFR SERPL CREATININE-BSD FRML MDRD: 47 ML/MIN/1.7M2
GLUCOSE SERPL-MCNC: 212 MG/DL (ref 70–99)
HBA1C MFR BLD: 9.4 % (ref 4.3–6)
POTASSIUM SERPL-SCNC: 4.2 MMOL/L (ref 3.4–5.3)
SODIUM SERPL-SCNC: 141 MMOL/L (ref 133–144)

## 2017-03-02 PROCEDURE — 99606 MTMS BY PHARM EST 15 MIN: CPT | Performed by: PHARMACIST

## 2017-03-02 PROCEDURE — 80048 BASIC METABOLIC PNL TOTAL CA: CPT | Performed by: PHYSICIAN ASSISTANT

## 2017-03-02 PROCEDURE — 99607 MTMS BY PHARM ADDL 15 MIN: CPT | Performed by: PHARMACIST

## 2017-03-02 PROCEDURE — 83036 HEMOGLOBIN GLYCOSYLATED A1C: CPT | Performed by: PHYSICIAN ASSISTANT

## 2017-03-02 PROCEDURE — 36415 COLL VENOUS BLD VENIPUNCTURE: CPT | Performed by: PHYSICIAN ASSISTANT

## 2017-03-02 RX ORDER — PIOGLITAZONEHYDROCHLORIDE 30 MG/1
30 TABLET ORAL DAILY
Qty: 90 TABLET | Refills: 1 | Status: SHIPPED | OUTPATIENT
Start: 2017-03-02 | End: 2017-09-06

## 2017-03-02 NOTE — PATIENT INSTRUCTIONS
Recommendations from today's MTM visit:                                                        1.  BP today is excellent at 138/70mmhg.  Pulse =92.   Stay on all same blood pressures medications as is.     2.  A1c today = 9.4%. (Goal is <8%).   Please stay on glipizide 10mg -2 x day and Metformin 500mg -1 tab 2 x day , lets increase your pioglitazone to 30mg./day now.    Check blood sugars 2 x day - fasting in AM before you eat (Goal is ), then check 2 hours after any main meal -Goal is <180.     Please reduce your carbs/sugars in your diet now ---and start daily walking.      3. Your BMP lab result is pending -watch my-chart for my note with your result.       Next MTM visit:  6 weeks -bring blood sugar meter - Thursday April 13th at 1pm.     To schedule another MTM appointment, please call the clinic directly or you may call the MTM scheduling line at [phone] or toll-free at [phone].     My Clinical Pharmacist's contact information:                                                      It was a pleasure seeing you today!  Please feel free to contact me with any questions or concerns you have.      Janey Romero [facility].  Medication Therapy Management Provider  [phone]      You may receive a survey about the MTM services you received.  I would appreciate your feedback to help me serve you better in the future. Please fill it out and return it when you can. Your comments will be anonymous.      My healthcare goals:

## 2017-03-02 NOTE — PROGRESS NOTES
SUBJECTIVE/OBJECTIVE:                                                    Maryalice Rebecca Wachter is a 83 year old female coming in for a follow up visit (1-19-17)  for Medication Therapy Management.  She was referred to me from her PCP- Venu Maria.     Chief Complaint: Here for Bp recheck , a1c and BMP lab rechecks.     She just had a sleep study -results pending.  She is worried about price of c-pap or b-pap.       Personal Healthcare Goals: fix diarrhea issues, timing of meds, get BP wnl.     Allergies/ADRs: Reviewed in Epic  Tobacco: No tobacco use   Alcohol: not currently using--very rare   Caffeine: decaff in am 1-2 cups/day of coffee; pm -tea a little bit.   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 Cannot exercise due to diarrhea issues --would like to go on walks with her hubby but has to have BM within 1 block of leaving.   PMH: Reviewed in [facility]; [facility] stated she has sarcoidosis?    Medication Adherence: No issues found today    IBS-diarrhea: patient uses prn loperamide --works well.     GERD:  Taking omeprazole 20 mg daily. Knows to take it 30-60 minutes BEFORE first meal of the day. Patient states it is working well for her.     Diabetes:  Pt currently taking Glipizide er 10mg. bid, actos 15mg qday, Metformin-1 x500mg bid. Pt is experiencing the following side effects: None  SMBG: one touch ultra- 7 days avg.= 155 n=6, 14 days = 166 n=12, 30 days =166 n=27    Date FBG/ 2hours post Lunch/2hours post Dinner /2hours post    3-2-17 145      3-1-17 143      2-28-17 131      2-26-17 174      2-25-17 176      2-24-17 159      2-23-17 173            Symptoms of low blood sugar? none. Frequency of hypoglycemia? never.  Recent symptoms of high blood sugar? fatigue  Eye exam: up to date  Foot exam: up to date  Microalbumin is < 30 mg/g. Pt is taking an ACEi/ARB.  Aspirin: Taking 81mg daily and denies side effects  Diet/Exercise: diet is ok but likes carbs, rice , chinese , no exercise.      Vitamin D3: level was 21 on  --she takes otc daily dose of 2,000 iu's now.        Vitamin B12: level was 493 on 10-10-16. She takes daily otc dose now.       Hypothyroidism: Patient is taking levothyroxine 100 mcg daily. She knows to take it on an empty stomach with her omeprazole in the morning.    Hypertension: Current medications include : HCTZ 25mg. qam-5 days /week, 12.5mg 2 days /week  , lisinopril 40mg-qam. And metoprolol 50mg bid-am and dinnertime.   Patient does  self-monitor BP.  Yes see below:        Date Home bp's      3-2-17 143/60 p=64      3-1-17 141/71 p=64  135/61 p=66    2-28-17 131/59 p=65  141/64 p=60    2-27-17 133/57 p=73  120/62 p=64    2-26-17 123/62 p=71      2-25-17 134/70 p=63  139/68 p=67    2-24-17 129/61 p=66  140/68 p=63              She did bring her home BP meter in today (11-28-16)--reading at 2:40pm = 137/56 p=74.   mtm checked own bp 120/82 p=71.   Very accurate reading.     COPD: Patient states she feels well controlled. Patient uses her inhalers more often when she gets sick and she always carries her rescue inhaler with her. Patient states that when she forgets it she becomes very worried and panicked.     Insomnia: Current medications include: trazodone 25-50mg. Pt reports trouble staying asleep and can only sleep 4-6 hours. She admits to a boring life right now -she gets sleepy early Pm --naps for a few hours , then goes to bed -gets up at 4-5 am - perhaps she is sleeping enough already.   She sleeps better she states with 1/2 tab =25mg .      Immunizations: Patient is up to date .      Current labs include:  BP Readings from Last 3 Encounters:   03/02/17 138/70   01/19/17 124/64   01/05/17 154/70     Lab Results   A1c =9.4% 3-2-17.   Lab Results   Component Value Date    A1C 7.7 10/10/2016    A1C 8.3 07/01/2016    A1C 7.9 03/28/2016    A1C 7.1 11/19/2015    A1C 7.9 06/30/2015         Cholesterol   Date Value Ref Range Status   07/01/2016 117 <200 mg/dL Final    06/30/2015 109 <200 mg/dL Final     Comment:     LDL Cholesterol is the primary guide to therapy.   The NCEP recommends further evaluation of: patients with cholesterol greater   than 200 mg/dL if additional risk factors are present, cholesterol greater   than   240 mg/dL, triglycerides greater than 150 mg/dL, or HDL less than 40 mg/dL.       HDL Cholesterol   Date Value Ref Range Status   07/01/2016 40 (L) >49 mg/dL Final   06/30/2015 36 (L) >50 mg/dL Final     LDL Cholesterol Calculated   Date Value Ref Range Status   07/01/2016 39 <100 mg/dL Final     Comment:     Desirable:       <100 mg/dl   06/30/2015 25 0 - 129 mg/dL Final     Comment:     LDL Cholesterol is the primary guide to therapy: LDL-cholesterol goal in high   risk patients is <100 mg/dL and in very high risk patients is <70 mg/dL.       LDL Cholesterol Direct   Date Value Ref Range Status   12/12/2014 60 0 - 129 mg/dL Final     Comment:     Optimal:         <100 mg/dL   Near Optimal:     100-129 mg/dL   Borderline High:  130-159 mg/dL   High:             160-189 mg/dL   Very high:  greater than or equal to 190 mg/dL   Cannot estimate LDL when triglyceride exceeds 400 mg/dL       Triglycerides   Date Value Ref Range Status   07/01/2016 190 (H) <150 mg/dL Final     Comment:     Borderline high:  150-199 mg/dl   High:             200-499 mg/dl   Very high:       >499 mg/dl   Fasting specimen     06/30/2015 240 (H) 0 - 150 mg/dL Final     Cholesterol/HDL Ratio   Date Value Ref Range Status   06/30/2015 3.0 0.0 - 5.0 Final     MICROL       33   10/27/2016  MICROALBUMIN    33.03   10/27/2016    Last Basic Metabolic Panel:  NA      142   1/5/2017   POTASSIUM      4.3   1/5/2017  CHLORIDE      106   1/5/2017  VASHTI      9.2   1/5/2017  CO2       28   1/5/2017  BUN       17   1/5/2017  CR     0.94   1/5/2017  GLC      166   1/5/2017          GFR Estimate   Date Value Ref Range Status   01/05/2017 57 (L) >60 mL/min/1.7m2 Final     Comment:     Non   American GFR Calc   10/21/2016 63 >60 mL/min/1.7m2 Final     Comment:     Non  GFR Calc   10/20/2016 63 >60 mL/min/1.7m2 Final     Comment:     Non  GFR Calc     GFR Estimate If Black   Date Value Ref Range Status   01/05/2017 69 >60 mL/min/1.7m2 Final     Comment:      GFR Calc   10/21/2016 76 >60 mL/min/1.7m2 Final     Comment:      GFR Calc   10/20/2016 76 >60 mL/min/1.7m2 Final     Comment:      GFR Calc     TSH   Date Value Ref Range Status   12/19/2016 0.55 0.40 - 4.00 mU/L Final   ]  /70  Pulse 92  Wt 181 lb 4.8 oz (82.2 kg)  SpO2 97%  BMI 29.71 kg/m2    Most Recent Immunizations   Administered Date(s) Administered     DTAP (<7y) 01/01/2010     Influenza (High Dose) 3 valent vaccine 10/10/2016     Influenza (IIV3) 09/01/2014     Pneumococcal (PCV 13) 10/10/2016     Pneumococcal (PCV 7) 09/09/2014     Zoster vaccine, live 01/01/2012     ASSESSMENT:                                                       Current medications were reviewed with her today.     Medication Adherence: No problems identified    IBS-Diarrhea: Stable.     GERD: Stable. Current treatment is effective. Chronic PPI use places patient at an increased risk of pneumonia, C. Diff, hypomagnesemia and lower bone mineral density, these risks  were  reviewed with the patient. Pt is taking the medication in the proper fashion (In AM on empty stomach before breakfast).     Diabetes: Needs Improvement. Patient is not meeting A1c goal of < 8%. Self monitoring of blood glucose is not at goal of fasting  mg/dL and post prandial < 180 mg/dL. Pt would benefit from minimum SMBG: Check blood sugars fasting, and occasionally 2 hours after starting a meal.  Metformin :  stay on the same dose.  SFU (Glipizide) :  stay on the same dose.  Actos:  increase dose to 30mg . qday  Increased exercise--walk daily.  Updated Hemoglobin A1c-9.4% today .   Increase in home glucose  monitoring-see plan.   Weight loss recommended--see plan for details.       Vitamin B12: is not at goal of 600-1000pg/mL. Pt would benefit from vitamin B12 lab recheck in 12 months.    Vitamin D3: is not at goal of 50-75ng/mL. Pt would benefit from vitamin D3 lab recheck in 12 months.    Hypothyroidism: TSH wnl.     Hypertension: Stable. Patient is not meeting BP goal of < 140/90mmHg.  Pt would benefit from the following changes - continued BP monitoring and watching diet, increasing exercise and weight loss      COPD/Asthma: Stable.     Insomnia: Improved. Pt may benefit from no changes.      Immunizations: stable -up to date now.     PLAN:                                                        1.  BP today is excellent at 138/70mmhg.  Pulse =92.   Stay on all same blood pressures medications as is.     2.  A1c today = 9.4%. (Goal is <8%).   Please stay on glipizide 10mg -2 x day and Metformin 500mg -1 tab 2 x day , lets increase your pioglitazone to 30mg./day now.    Check blood sugars 2 x day - fasting in AM before you eat (Goal is ), then check 2 hours after any main meal -Goal is <180.     Please reduce your carbs/sugars in your diet now ---and start daily walking.      3. Your BMP lab result is pending -watch my-chart for my note with your result.       Next MTM visit:  6 weeks -bring blood sugar meter - Thursday April 13th at 1pm.       I spent 30 minutes with this patient today .     To schedule another MTM appointment, please call the clinic directly or you may call the MTM scheduling line at [phone] or toll-free at [phone].     My Clinical Pharmacist's contact information:                                                      It was a pleasure seeing you today!  Please feel free to contact me with any questions or concerns you have.      Janey Romero [facility].  Medication Therapy Management Provider  [phone]        You may receive a survey about the MTM services you received.  I would  appreciate your feedback to help me serve you better in the future. Please fill it out and return it when you can. Your comments will be anonymous.

## 2017-03-02 NOTE — MR AVS SNAPSHOT
After Visit Summary   3/2/2017    Maryalice Rebecca Wachter    MRN: [id number]           Patient Information     Date Of Birth          11/11/1933        Visit Information        Provider Department      3/2/2017 2:00 PM Janey Romero RPH [facility]        Today's Diagnoses     Type 2 diabetes mellitus with hyperglycemia, without long-term current use of insulin (H)    -  1      Care Instructions    Recommendations from today's MT visit:                                                        1.  BP today is excellent at 138/70mmhg.  Pulse =92.   Stay on all same blood pressures medications as is.     2.  A1c today = 9.4%. (Goal is <8%).   Please stay on glipizide 10mg -2 x day and Metformin 500mg -1 tab 2 x day , lets increase your pioglitazone to 30mg./day now.    Check blood sugars 2 x day - fasting in AM before you eat (Goal is ), then check 2 hours after any main meal -Goal is <180.     Please reduce your carbs/sugars in your diet now ---and start daily walking.      3. Your BMP lab result is pending -watch my-chart for my note with your result.       Next MTM visit:  6 weeks -bring blood sugar meter - Thursday April 13th at 1pm.     To schedule another MTM appointment, please call the clinic directly or you may call the MTM scheduling line at [phone] or toll-free at [phone].     My Clinical Pharmacist's contact information:                                                      It was a pleasure seeing you today!  Please feel free to contact me with any questions or concerns you have.      Janey Romero Rph.  Medication Therapy Management Provider  [phone]      You may receive a survey about the MT services you received.  I would appreciate your feedback to help me serve you better in the future. Please fill it out and return it when you can. Your comments will be anonymous.      My healthcare goals:                                                                     Follow-ups after your visit        Your next 10 appointments already scheduled     Mar 31, 2017  9:00 AM CDT   Anticoagulation Visit with FM RN VISITS   [facility]    [address] [place] [address], [address]  [facility] [address] [phone]            Apr 13, 2017  1:00 PM CDT   SHORT with Janey Romero RPH   [facility] ([facility])    [address]  [facility] [address] [phone]              Who to contact     If you have questions or need follow up information about today's clinic visit or your schedule please contact [facility] MTM directly at [phone].  Normal or non-critical lab and imaging results will be communicated to you by MyChart, letter or phone within 4 business days after the clinic has received the results. If you do not hear from us within 7 days, please contact the clinic through MyChart or phone. If you have a critical or abnormal lab result, we will notify you by phone as soon as possible.  Submit refill requests through [employer] or call your pharmacy and they will forward the refill request to us. Please allow 3 business days for your refill to be completed.          Additional Information About Your Visit        [employer] Information     [employer] gives you secure access to your electronic health record. If you see a primary care provider, you can also send messages to your care team and make appointments. If you have questions, please call your primary care clinic.  If you do not have a primary care provider, please call [phone] and they will assist you.        Care EveryWhere ID     This is your Care EveryWhere ID. This could be used by other organizations to access your [facility] medical records  [id number]        Your Vitals Were     Pulse Pulse Oximetry BMI (Body Mass Index)             92 97% 29.71 kg/m2          Blood Pressure from Last 3  Encounters:   03/02/17 138/70   01/19/17 124/64   01/05/17 154/70    Weight from Last 3 Encounters:   03/02/17 181 lb 4.8 oz (82.2 kg)   01/19/17 182 lb 3.2 oz (82.6 kg)   01/05/17 185 lb 14.4 oz (84.3 kg)                 Today's Medication Changes          These changes are accurate as of: 3/2/17  2:45 PM.  If you have any questions, ask your nurse or doctor.               These medicines have changed or have updated prescriptions.        Dose/Directions    metFORMIN 500 MG tablet   Commonly known as:  GLUCOPHAGE   This may have changed:  how much to take   Used for:  Type 2 diabetes mellitus with hyperglycemia, without long-term current use of insulin (H)        Dose:  1000 mg   Take 2 tablets (1,000 mg) by mouth 2 times daily (with meals)   Quantity:  360 tablet   Refills:  0       * pioglitazone 15 MG tablet   Commonly known as:  ACTOS   This may have changed:  Another medication with the same name was added. Make sure you understand how and when to take each.   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 Make sure you understand how and when to take each.   Used for:  Type 2 diabetes mellitus with hyperglycemia, without long-term current use of insulin (H)   Changed by:  Janey Romero RPH        Dose:  30 mg   Take 1 tablet (30 mg) by mouth daily   Quantity:  90 tablet   Refills:  1       traZODone 50 MG tablet   Commonly known as:  DESYREL   This may have changed:  how much to take   Used for:  Insomnia, unspecified type        Dose:   mg   Take 1-2 tablets ( mg) by mouth At Bedtime   Quantity:  60 tablet   Refills:  5       * Notice:  This list has 2  medication(s) that are the same as other medications prescribed for you. Read the directions carefully, and ask your doctor or other care provider to review them with you.         Where to get your medicines      These medications were sent to [facility] - [place], [place] - [address]  [address], [facility] [address]     Phone:  [phone]     pioglitazone 30 MG tablet                Primary Care Provider Office Phone # Fax #    Venu Maria PA-C [phone]       [facility] [address]  [facility] [address]        Thank you!     Thank you for choosing [facility]  for your care. Our goal is always to provide you with excellent care. Hearing back from our patients is one way we can continue to improve our services. Please take a few minutes to complete the written survey that you may receive in the mail after your visit with us. Thank you!             Your Updated Medication List - Protect others around you: Learn how to safely use, store and throw away your medicines at www.disposemymeds.org.          This list is accurate as of: 3/2/17  2:45 PM.  Always use your most recent med list.                   Brand Name Dispense Instructions for use    * albuterol (2.5 MG/3ML) 0.083% neb solution      Take 1 vial by nebulization every 6 hours as needed for shortness of breath / dyspnea or wheezing Reported on 3/2/2017       * albuterol 108 (90 BASE) MCG/ACT Inhaler   Generic drug:  albuterol      Inhale 2 puffs into the lungs every 6 hours Reported on 3/2/2017       ascorbic acid 500 MG tablet    VITAMIN C     Take 500 mg by mouth daily.       ASPIRIN EC PO      Take 81 mg by mouth.       B-12 1000 MCG Caps      Take 1 capsule by mouth 2 times daily       blood glucose monitoring test strip    ONE TOUCH ULTRA    100 each    Use to test blood sugar 1 time daily or as directed.       calcium carb 1250 mg (500 mg [place])/vitamin  D 200 units 500-200 MG-UNIT per tablet    OSCAL with D     Take 1 tablet by mouth daily.       ferrous sulfate 325 (65 FE) MG tablet    IRON     Take 1 tablet by mouth daily (with breakfast).       FIBERCON 625 MG tablet   Generic drug:  calcium polycarbophil      Take 1 tablet by mouth 2 times daily.       glipiZIDE 10 MG 24 hr tablet    glipiZIDE XL    180 tablet    Take 1 tablet (10 mg) by mouth 2 times daily       hydrochlorothiazide 25 MG tablet    HYDRODIURIL    90 tablet    Take 1 tablet (25 mg) by mouth daily       * Lancets 30G Misc     100 lancet    Test blood glucose 1 time a day--Any generic lancet that insurance covers.       * TRUEPLUS LANCETS 30G Misc     100 each    1 strip daily       levothyroxine 100 MCG tablet    SYNTHROID/LEVOTHROID    90 tablet    Take 1 tablet (100 mcg) by mouth daily       lisinopril 40 MG tablet    PRINIVIL/ZESTRIL    90 tablet    Take 1 tablet (40 mg) by mouth daily       LOPERAMIDE HCL PO      1/2 to 1 tablet by mouth as needed.       metFORMIN 500 MG tablet    GLUCOPHAGE    360 tablet    Take 2 tablets (1,000 mg) by mouth 2 times daily (with meals)       metoprolol 50 MG tablet    LOPRESSOR    180 tablet    Take 1 tablet (50 mg) by mouth 2 times daily       mometasone 220 MCG/INH Inhaler    ASMANEX     Inhale 2 puffs into the lungs daily.       omeprazole 20 MG CR capsule    priLOSEC    30 capsule    Take 1 capsule (20 mg) by mouth daily       * pioglitazone 15 MG tablet    ACTOS    90 tablet    Take 1 tablet (15 mg) by mouth daily       * pioglitazone 30 MG tablet    ACTOS    90 tablet    Take 1 tablet (30 mg) by mouth daily       simvastatin 20 MG tablet    ZOCOR    90 tablet    Take 1 tablet (20 mg) by mouth At Bedtime       tiotropium 18 MCG capsule    SPIRIVA     Inhale 18 mcg into the lungs daily.       traZODone 50 MG tablet    DESYREL    60 tablet    Take 1-2 tablets ( mg) by mouth At Bedtime       VITAMIN D (CHOLECALCIFEROL) PO      Take 2,000 Units by  mouth daily       warfarin 3 MG tablet    COUMADIN    90 tablet    Take 1 tablet (3mg) daily except 1/2 tablet (1.5mg) on Fridays       * Notice:  This list has 6 medication(s) that are the same as other medications prescribed for you. Read the directions carefully, and ask your doctor or other care provider to review them with you.

## 2017-03-09 DIAGNOSIS — E11.65 TYPE 2 DIABETES MELLITUS WITH HYPERGLYCEMIA, WITHOUT LONG-TERM CURRENT USE OF INSULIN (H): Primary | ICD-10-CM

## 2017-03-09 RX ORDER — GLIPIZIDE 10 MG/1
10 TABLET, FILM COATED, EXTENDED RELEASE ORAL
Qty: 180 TABLET | Refills: 0 | Status: SHIPPED | OUTPATIENT
Start: 2017-03-09 | End: 2017-05-31

## 2017-03-09 NOTE — TELEPHONE ENCOUNTER
glipiZIDE (GLIPIZIDE XL) 10 MG 24 hr tablet         Last Written Prescription Date: 9/8/16  Last Fill Quantity: 180, # refills: 1  Last Office Visit with [facility], [facility] or [facility] prescribing provider:  10/27/2016     Next 5 appointments (look out 90 days)     Apr 13, 2017  1:00 PM CDT   SHORT with Janey Romero RPH   [facility] ([facility])    [address] [facility] [address] [phone]                   BP Readings from Last 3 Encounters:   03/02/17 138/70   01/19/17 124/64   01/05/17 154/70     Lab Results   Component Value Date    MICROL 33 10/27/2016     No results found for: MICROALBUMIN  Creatinine   Date Value Ref Range Status   03/02/2017 1.11 (H) 0.52 - 1.04 mg/dL Final   ]  GFR Estimate   Date Value Ref Range Status   03/02/2017 47 (L) >60 mL/min/1.7m2 Final     Comment:     Non  GFR Calc   01/05/2017 57 (L) >60 mL/min/1.7m2 Final     Comment:     Non  GFR Calc   10/21/2016 63 >60 mL/min/1.7m2 Final     Comment:     Non  GFR Calc     GFR Estimate If Black   Date Value Ref Range Status   03/02/2017 57 (L) >60 mL/min/1.7m2 Final     Comment:      GFR Calc   01/05/2017 69 >60 mL/min/1.7m2 Final     Comment:      GFR Calc   10/21/2016 76 >60 mL/min/1.7m2 Final     Comment:      GFR Calc     Lab Results   Component Value Date    CHOL 117 07/01/2016     Lab Results   Component Value Date    HDL 40 07/01/2016     Lab Results   Component Value Date    LDL 39 07/01/2016    LDL 60 12/12/2014     Lab Results   Component Value Date    TRIG 190 07/01/2016     Lab Results   Component Value Date    CHOLHDLRATIO 3.0 06/30/2015     Lab Results   Component Value Date    AST 24 10/21/2016     Lab Results   Component Value Date    ALT 22 10/21/2016     Lab Results   Component Value Date    A1C 9.4 03/02/2017    A1C 7.7 10/10/2016    A1C 8.3 07/01/2016    A1C 7.9 03/28/2016     A1C 7.1 11/19/2015     Potassium   Date Value Ref Range Status   03/02/2017 4.2 3.4 - 5.3 mmol/L Final     Nita Pan, ASHLEIGHT

## 2017-03-15 ENCOUNTER — TELEPHONE (OUTPATIENT)
Dept: FAMILY MEDICINE | Facility: CLINIC | Age: 82
End: 2017-03-15

## 2017-03-23 DIAGNOSIS — E11.65 TYPE 2 DIABETES MELLITUS WITH HYPERGLYCEMIA, WITHOUT LONG-TERM CURRENT USE OF INSULIN (H): Primary | ICD-10-CM

## 2017-03-23 NOTE — TELEPHONE ENCOUNTER
Prescription approved per [facility] Refill Protocol.  Cyndi Cabello RN

## 2017-03-23 NOTE — TELEPHONE ENCOUNTER
Message from Pharm- Pt states that she has been testing 2 times a day now, please send new RX.      blood glucose monitoring (ONE TOUCH ULTRA) test strip  Last Written Prescription Date: 6/9/16  Last Fill Quantity: 100,  # refills: 3   Last Office Visit with [facility], [facility] or [facility] prescribing provider:  10/27/2016                                          Next 5 appointments (look out 90 days)     Apr 13, 2017  1:00 PM CDT   SHORT with Janey Romero RPH   [facility] ([facility])    [address] [facility] [address]   [phone]                Nita Pan, JOE  March 23, 2017  9:12 AM

## 2017-03-31 ENCOUNTER — ANTICOAGULATION THERAPY VISIT (OUTPATIENT)
Dept: NURSING | Facility: CLINIC | Age: 82
End: 2017-03-31
Payer: COMMERCIAL

## 2017-03-31 DIAGNOSIS — Z79.01 LONG-TERM (CURRENT) USE OF ANTICOAGULANTS: ICD-10-CM

## 2017-03-31 DIAGNOSIS — I48.91 ATRIAL FIBRILLATION (H): ICD-10-CM

## 2017-03-31 LAB — INR POINT OF CARE: 2.6 (ref 0.86–1.14)

## 2017-03-31 PROCEDURE — 85610 PROTHROMBIN TIME: CPT | Mod: QW

## 2017-03-31 PROCEDURE — 36416 COLLJ CAPILLARY BLOOD SPEC: CPT

## 2017-03-31 PROCEDURE — 99207 ZZC NO CHARGE NURSE ONLY: CPT

## 2017-03-31 NOTE — MR AVS SNAPSHOT
Maryalice Rebecca Wachter   3/31/2017 9:00 AM   Anticoagulation Therapy Visit    Description:  83 year old female   Provider:  FM RN VISITS   Department:  Fm Nurse           INR as of 3/31/2017     Today's INR 2.6      Anticoagulation Summary as of 3/31/2017     INR goal 2.0-3.0   Today's INR 2.6   Full instructions 4.5 mg on Tue, Fri; 3 mg all other days   Next INR check 5/12/2017    Indications   Long-term (current) use of anticoagulants [Z79.01] [Z79.01]  Atrial fibrillation (H) [I48.91]         Your next Anticoagulation Clinic appointment(s)     May 12, 2017  1:30 PM CDT   Anticoagulation Visit with FM RN VISITS   [facility] ([facility])    [address] [place] [address], [facility] [address]  [facility] [address] [phone]              Contact Numbers     Clinic Number:         March 2017 Details    Sun Mon Tue Wed Thu Fri Sat        1               2               3               4                 5               6               7               8               9               10               11                 12               13               14               15               16               17               18                 19               20               21               22               23               24               25                 26               27               28               29               30               31      4.5 mg   See details        Date Details   03/31 This INR check               How to take your warfarin dose     To take:  4.5 mg Take 1.5 of the 3 mg tablets.           April 2017 Details    Sun Mon Tue Wed Thu Fri Sat           1      3 mg           2      3 mg         3      3 mg         4      4.5 mg         5      3 mg         6      3 mg         7      4.5 mg         8      3 mg           9      3 mg         10      3 mg         11      4.5 mg         12      3 mg         13      3 mg         14      4.5 mg         15      3 mg           16       3 mg         17      3 mg         18      4.5 mg         19      3 mg         20      3 mg         21      4.5 mg         22      3 mg           23      3 mg         24      3 mg         25      4.5 mg         26      3 mg         27      3 mg         28      4.5 mg         29      3 mg           30      3 mg                Date Details   No additional details            How to take your warfarin dose     To take:  3 mg Take 1 of the 3 mg tablets.    To take:  4.5 mg Take 1.5 of the 3 mg tablets.           May 2017 Details    Sun Mon Tue Wed Thu Fri Sat      1      3 mg         2      4.5 mg         3      3 mg         4      3 mg         5      4.5 mg         6      3 mg           7      3 mg         8      3 mg         9      4.5 mg         10      3 mg         11      3 mg         12            13                 14               15               16               17               18               19               20                 21               22               23               24               25               26               27                 28               29               30               31                   Date Details   No additional details    Date of next INR:  5/12/2017         How to take your warfarin dose     To take:  3 mg Take 1 of the 3 mg tablets.    To take:  4.5 mg Take 1.5 of the 3 mg tablets.

## 2017-03-31 NOTE — PROGRESS NOTES
ANTICOAGULATION FOLLOW-UP CLINIC VISIT    Patient Name:  Maryalice Rebecca Wachter  Date:  3/31/2017  Contact Type:  Face to Face    SUBJECTIVE:     Patient Findings     Positives No Problem Findings           OBJECTIVE    INR Protime   Date Value Ref Range Status   03/31/2017 2.6 (A) 0.86 - 1.14 Final       ASSESSMENT / PLAN  INR assessment THER    Recheck INR In: 6 WEEKS    INR Location Clinic      Anticoagulation Summary as of 3/31/2017     INR goal 2.0-3.0   Today's INR 2.6   Maintenance plan 4.5 mg (3 mg x 1.5) on Tue, Fri; 3 mg (3 mg x 1) all other days   Full instructions 4.5 mg on Tue, Fri; 3 mg all other days   Weekly total 24 mg   No change documented Cyndi Cabello RN   Plan last modified Cyndi Cabello RN (9/23/2016)   Next INR check 5/12/2017   Priority INR   Target end date     Indications   Long-term (current) use of anticoagulants [Z79.01] [Z79.01]  Atrial fibrillation (H) [I48.91]         Anticoagulation Episode Summary     INR check location     Preferred lab     Send INR reminders to  TRIAGE POOL    Comments       Anticoagulation Care Providers     Provider Role Specialty Phone number    Venu Maria PA-C Responsible Physician Assistant - Medical [phone]            See the Encounter Report to view Anticoagulation Flowsheet and Dosing Calendar (Go to Encounters tab in chart review, and find the Anticoagulation Therapy Visit)      Cyndi Cabello RN

## 2017-04-04 ENCOUNTER — APPOINTMENT (OUTPATIENT)
Dept: CT IMAGING | Facility: CLINIC | Age: 82
End: 2017-04-04
Attending: EMERGENCY MEDICINE
Payer: COMMERCIAL

## 2017-04-04 ENCOUNTER — APPOINTMENT (OUTPATIENT)
Dept: ULTRASOUND IMAGING | Facility: CLINIC | Age: 82
End: 2017-04-04
Attending: EMERGENCY MEDICINE
Payer: COMMERCIAL

## 2017-04-04 ENCOUNTER — APPOINTMENT (OUTPATIENT)
Dept: GENERAL RADIOLOGY | Facility: CLINIC | Age: 82
End: 2017-04-04
Attending: EMERGENCY MEDICINE
Payer: COMMERCIAL

## 2017-04-04 ENCOUNTER — HOSPITAL ENCOUNTER (EMERGENCY)
Facility: CLINIC | Age: 82
Discharge: HOME | End: 2017-04-04
Attending: EMERGENCY MEDICINE | Admitting: EMERGENCY MEDICINE
Payer: COMMERCIAL

## 2017-04-04 VITALS — TEMPERATURE: 99.1 F | DIASTOLIC BLOOD PRESSURE: 63 MMHG | SYSTOLIC BLOOD PRESSURE: 115 MMHG | OXYGEN SATURATION: 93 %

## 2017-04-04 DIAGNOSIS — R07.89 ATYPICAL CHEST PAIN: ICD-10-CM

## 2017-04-04 DIAGNOSIS — K80.20 GALLSTONES: ICD-10-CM

## 2017-04-04 LAB
ALBUMIN SERPL-MCNC: 3.4 G/DL (ref 3.4–5)
ALBUMIN UR-MCNC: NEGATIVE MG/DL
ALP SERPL-CCNC: 74 U/L (ref 40–150)
ALT SERPL W P-5'-P-CCNC: 20 U/L (ref 0–50)
ANION GAP SERPL CALCULATED.3IONS-SCNC: 8 MMOL/L (ref 3–14)
APPEARANCE UR: ABNORMAL
AST SERPL W P-5'-P-CCNC: 16 U/L (ref 0–45)
BASOPHILS # BLD AUTO: 0 10E9/L (ref 0–0.2)
BASOPHILS NFR BLD AUTO: 0.3 %
BILIRUB DIRECT SERPL-MCNC: 0.1 MG/DL (ref 0–0.2)
BILIRUB SERPL-MCNC: 0.7 MG/DL (ref 0.2–1.3)
BILIRUB UR QL STRIP: NEGATIVE
BUN SERPL-MCNC: 22 MG/DL (ref 7–30)
CALCIUM SERPL-MCNC: 8.8 MG/DL (ref 8.5–10.1)
CHLORIDE SERPL-SCNC: 103 MMOL/L (ref 94–109)
CO2 SERPL-SCNC: 28 MMOL/L (ref 20–32)
COLOR UR AUTO: YELLOW
CREAT SERPL-MCNC: 0.88 MG/DL (ref 0.52–1.04)
D DIMER PPP FEU-MCNC: NORMAL UG/ML FEU (ref 0–0.5)
DIFFERENTIAL METHOD BLD: NORMAL
EOSINOPHIL # BLD AUTO: 0.1 10E9/L (ref 0–0.7)
EOSINOPHIL NFR BLD AUTO: 1.3 %
ERYTHROCYTE [DISTWIDTH] IN BLOOD BY AUTOMATED COUNT: 14.3 % (ref 10–15)
GFR SERPL CREATININE-BSD FRML MDRD: 61 ML/MIN/1.7M2
GLUCOSE SERPL-MCNC: 187 MG/DL (ref 70–99)
GLUCOSE UR STRIP-MCNC: NEGATIVE MG/DL
HCT VFR BLD AUTO: 38.1 % (ref 35–47)
HGB BLD-MCNC: 12.5 G/DL (ref 11.7–15.7)
HGB UR QL STRIP: ABNORMAL
HYALINE CASTS #/AREA URNS LPF: 1 /LPF (ref 0–2)
IMM GRANULOCYTES # BLD: 0 10E9/L (ref 0–0.4)
IMM GRANULOCYTES NFR BLD: 0.3 %
INR PPP: 2.31 (ref 0.86–1.14)
INTERPRETATION ECG - MUSE: NORMAL
KETONES UR STRIP-MCNC: NEGATIVE MG/DL
LEUKOCYTE ESTERASE UR QL STRIP: NEGATIVE
LYMPHOCYTES # BLD AUTO: 2.2 10E9/L (ref 0.8–5.3)
LYMPHOCYTES NFR BLD AUTO: 28.5 %
MCH RBC QN AUTO: 29.8 PG (ref 26.5–33)
MCHC RBC AUTO-ENTMCNC: 32.8 G/DL (ref 31.5–36.5)
MCV RBC AUTO: 91 FL (ref 78–100)
MONOCYTES # BLD AUTO: 0.6 10E9/L (ref 0–1.3)
MONOCYTES NFR BLD AUTO: 7.6 %
MUCOUS THREADS #/AREA URNS LPF: PRESENT /LPF
NEUTROPHILS # BLD AUTO: 4.9 10E9/L (ref 1.6–8.3)
NEUTROPHILS NFR BLD AUTO: 62 %
NITRATE UR QL: NEGATIVE
NRBC # BLD AUTO: 0 10*3/UL
NRBC BLD AUTO-RTO: 0 /100
NT-PROBNP SERPL-MCNC: 1242 PG/ML (ref 0–1800)
PH UR STRIP: 5 PH (ref 5–7)
PLATELET # BLD AUTO: 311 10E9/L (ref 150–450)
POTASSIUM SERPL-SCNC: 3.8 MMOL/L (ref 3.4–5.3)
PROT SERPL-MCNC: 6.4 G/DL (ref 6.8–8.8)
RBC # BLD AUTO: 4.19 10E12/L (ref 3.8–5.2)
RBC #/AREA URNS AUTO: 9 /HPF (ref 0–2)
SODIUM SERPL-SCNC: 139 MMOL/L (ref 133–144)
SP GR UR STRIP: 1.02 (ref 1–1.03)
TROPONIN I SERPL-MCNC: NORMAL UG/L (ref 0–0.04)
URN SPEC COLLECT METH UR: ABNORMAL
UROBILINOGEN UR STRIP-MCNC: 0 MG/DL (ref 0–2)
WBC # BLD AUTO: 7.9 10E9/L (ref 4–11)
WBC #/AREA URNS AUTO: 3 /HPF (ref 0–2)

## 2017-04-04 PROCEDURE — 80053 COMPREHEN METABOLIC PANEL: CPT | Performed by: EMERGENCY MEDICINE

## 2017-04-04 PROCEDURE — 85379 FIBRIN DEGRADATION QUANT: CPT | Performed by: EMERGENCY MEDICINE

## 2017-04-04 PROCEDURE — 80076 HEPATIC FUNCTION PANEL: CPT | Performed by: EMERGENCY MEDICINE

## 2017-04-04 PROCEDURE — 85610 PROTHROMBIN TIME: CPT | Performed by: EMERGENCY MEDICINE

## 2017-04-04 PROCEDURE — 25000125 ZZHC RX 250: Performed by: EMERGENCY MEDICINE

## 2017-04-04 PROCEDURE — 81001 URINALYSIS AUTO W/SCOPE: CPT | Performed by: EMERGENCY MEDICINE

## 2017-04-04 PROCEDURE — 83880 ASSAY OF NATRIURETIC PEPTIDE: CPT | Performed by: EMERGENCY MEDICINE

## 2017-04-04 PROCEDURE — 25000132 ZZH RX MED GY IP 250 OP 250 PS 637: Performed by: EMERGENCY MEDICINE

## 2017-04-04 PROCEDURE — 71020 XR CHEST 2 VW: CPT

## 2017-04-04 PROCEDURE — 99285 EMERGENCY DEPT VISIT HI MDM: CPT | Mod: 25

## 2017-04-04 PROCEDURE — 82248 BILIRUBIN DIRECT: CPT | Performed by: EMERGENCY MEDICINE

## 2017-04-04 PROCEDURE — 74177 CT ABD & PELVIS W/CONTRAST: CPT

## 2017-04-04 PROCEDURE — 84484 ASSAY OF TROPONIN QUANT: CPT | Performed by: EMERGENCY MEDICINE

## 2017-04-04 PROCEDURE — 85025 COMPLETE CBC W/AUTO DIFF WBC: CPT | Performed by: EMERGENCY MEDICINE

## 2017-04-04 PROCEDURE — 93005 ELECTROCARDIOGRAM TRACING: CPT

## 2017-04-04 PROCEDURE — 94640 AIRWAY INHALATION TREATMENT: CPT

## 2017-04-04 PROCEDURE — 76705 ECHO EXAM OF ABDOMEN: CPT

## 2017-04-04 PROCEDURE — 25500064 ZZH RX 255 OP 636: Performed by: EMERGENCY MEDICINE

## 2017-04-04 PROCEDURE — 96374 THER/PROPH/DIAG INJ IV PUSH: CPT | Mod: 59

## 2017-04-04 PROCEDURE — 40000275 ZZH STATISTIC RCP TIME EA 10 MIN

## 2017-04-04 PROCEDURE — 25000128 H RX IP 250 OP 636: Performed by: EMERGENCY MEDICINE

## 2017-04-04 RX ORDER — IOPAMIDOL 755 MG/ML
500 INJECTION, SOLUTION INTRAVASCULAR ONCE
Status: COMPLETED | OUTPATIENT
Start: 2017-04-04 | End: 2017-04-04

## 2017-04-04 RX ORDER — IPRATROPIUM BROMIDE AND ALBUTEROL SULFATE 2.5; .5 MG/3ML; MG/3ML
3 SOLUTION RESPIRATORY (INHALATION) ONCE
Status: COMPLETED | OUTPATIENT
Start: 2017-04-04 | End: 2017-04-04

## 2017-04-04 RX ORDER — MORPHINE SULFATE 2 MG/ML
2 INJECTION, SOLUTION INTRAMUSCULAR; INTRAVENOUS
Status: DISCONTINUED | OUTPATIENT
Start: 2017-04-04 | End: 2017-04-04 | Stop reason: HOSPADM

## 2017-04-04 RX ORDER — LIDOCAINE 40 MG/G
CREAM TOPICAL
Status: DISCONTINUED | OUTPATIENT
Start: 2017-04-04 | End: 2017-04-04 | Stop reason: HOSPADM

## 2017-04-04 RX ADMIN — IOPAMIDOL 91 ML: 755 INJECTION, SOLUTION INTRAVENOUS at 10:25

## 2017-04-04 RX ADMIN — LIDOCAINE HYDROCHLORIDE 30 ML: 20 SOLUTION ORAL; TOPICAL at 06:25

## 2017-04-04 RX ADMIN — MORPHINE SULFATE 2 MG: 2 INJECTION, SOLUTION INTRAMUSCULAR; INTRAVENOUS at 08:41

## 2017-04-04 RX ADMIN — SODIUM CHLORIDE 63 ML: 9 INJECTION, SOLUTION INTRAVENOUS at 10:26

## 2017-04-04 RX ADMIN — IPRATROPIUM BROMIDE AND ALBUTEROL SULFATE 3 ML: .5; 3 SOLUTION RESPIRATORY (INHALATION) at 06:32

## 2017-04-04 ASSESSMENT — ENCOUNTER SYMPTOMS
BACK PAIN: 1
CHILLS: 1
NAUSEA: 1
SHORTNESS OF BREATH: 0
DIAPHORESIS: 0
VOMITING: 0

## 2017-04-04 NOTE — ED NOTES
Patient arrives to ED via EMS due to CP . Per EMS report onset of CP developed at 4pm worse on inspiration. En route given 324mg ASA and .4mg Nitro without relief.   On arrival patient is A/O x4.   Reports pain is L side  worse when eating food and radiates towards back, increased SOB  Hx MI, DM II, AFIB,COPD

## 2017-04-04 NOTE — ED AVS SNAPSHOT
[facility] Emergency Department    [address]    [facility] [address]    Phone:  [phone]    Fax:  [phone]                                       Maryalice Rebecca Wachter   MRN: [id number]    Department:  [facility] Emergency Department   Date of Visit:  4/4/2017           After Visit Summary Signature Page     I have received my discharge instructions, and my questions have been answered. I have discussed any challenges I see with this plan with the nurse or doctor.    ..........................................................................................................................................  Patient/Patient Representative Signature      ..........................................................................................................................................  Patient Representative Print Name and Relationship to Patient    ..................................................               ................................................  Date                                            Time    ..........................................................................................................................................  Reviewed by Signature/Title    ...................................................              ..............................................  Date                                                            Time

## 2017-04-04 NOTE — ED AVS SNAPSHOT
[facility] Emergency Department    [address]    [facility] [address]    Phone:  [phone]    Fax:  [phone]                                       Maryalice Rebecca Wachter   MRN: [id number]    Department:  [facility] Emergency Department   Date of Visit:  4/4/2017           Patient Information     Date Of Birth          11/11/1933        Your diagnoses for this visit were:     Gallstones     Atypical chest pain        You were seen by Nguyen Houston MD.      Follow-up Information     Follow up with [facility]    Why:  make an appointment to discuss your gallbladder as the possible cause of your symptoms and whether or not surgery would be right for you    Contact information:    [address]  [facility] [address]  [phone]          Schedule an appointment as soon as possible for a visit with Venu Maria PA-C.    Specialty:  Physician Assistant - Medical    Contact information:    [facility]  [address]  [facility] [address]  [phone]          Follow up with [facility] Emergency Department.    Specialty:  EMERGENCY MEDICINE    Why:  As needed, If symptoms worsen    Contact information:    [address]  [facility] [address] [phone]        Discharge Instructions       Discharge Instructions  Chest Pain    You have been seen today for chest pain or discomfort.  At this time, your doctor has found no signs that your chest pain is due to a serious or life-threatening condition, (or you have declined more testing and/or admission to the hospital). However, sometimes there is a serious problem that does not show up right away. Your evaluation today may not be complete and you may need further testing and evaluation.     You need to follow-up with your regular doctor within 3 days.    Return to the Emergency Department if:    Your chest pain  changes, gets worse, starts to happen more often, or comes with less activity.    You are short of breath.    You get very weak or tired.    You pass out or faint.    You have any new symptoms, like fever, cough, numb legs, or you cough up blood.    You have anything else that worries you.    Until you follow-up with your regular doctor please do the following:    Take one aspirin daily unless you have an allergy or are told not to by your doctor.    If a stress test appointment has been made, go to the appointment.    If you have questions, contact your regular doctor.    If your doctor today has told you to follow-up with your regular doctor, it is very important that you make an appointment with your clinic and go to the appointment.  If you do not follow-up with your primary doctor, it may result in missing an important development which could result in permanent injury or disability and/or lasting pain.  If there is any problem keeping your appointment, call your doctor or return to the Emergency Department.    If you were given a prescription for medicine here today, be sure to read all of the information (including the package insert) that comes with your prescription.  This will include important information about the medicine, its side effects, and any warnings that you need to know about.  The pharmacist who fills the prescription can provide more information and answer questions you may have about the medicine.  If you have questions or concerns that the pharmacist cannot address, please call or return to the Emergency Department.         Discharge References/Attachments     GALLSTONES, DISCHARGE INSTRUCTIONS (ENGLISH)      Future Appointments        Provider Department Dept Phone Center    4/13/2017 1:00 PM Janey Romero RPH [facility] [phone] [facility]    5/12/2017 9:00 AM FM RN Visits [facility] [phone] [facility]      24 Hour Appointment Hotline       To  make an appointment at any [place] clinic, call [phone] ([phone]). If you don't have a family doctor or clinic, we will help you find one. [place] clinics are conveniently located to serve the needs of you and your family.             Review of your medicines      Our records show that you are taking the medicines listed below. If these are incorrect, please call your family doctor or clinic.        Dose / Directions Last dose taken    * albuterol (2.5 MG/3ML) 0.083% neb solution   Dose:  1 vial        Take 1 vial by nebulization every 6 hours as needed for shortness of breath / dyspnea or wheezing Reported on 3/2/2017   Refills:  0        * albuterol 108 (90 BASE) MCG/ACT Inhaler   Dose:  2 puff   Generic drug:  albuterol        Inhale 2 puffs into the lungs every 6 hours Reported on 3/2/2017   Refills:  0        ascorbic acid 500 MG tablet   Commonly known as:  VITAMIN C   Dose:  500 mg        Take 500 mg by mouth daily.   Refills:  0        ASPIRIN EC PO   Dose:  81 mg        Take 81 mg by mouth.   Refills:  0        B-12 1000 MCG Caps   Dose:  1 capsule        Take 1 capsule by mouth 2 times daily   Refills:  0        calcium carb 1250 mg (500 mg [place])/vitamin D 200 units 500-200 MG-UNIT per tablet   Commonly known as:  OSCAL with D   Dose:  1 tablet        Take 1 tablet by mouth daily.   Refills:  0        ferrous sulfate 325 (65 FE) MG tablet   Commonly known as:  IRON   Dose:  1 tablet        Take 1 tablet by mouth daily (with breakfast).   Refills:  0        FIBERCON 625 MG tablet   Dose:  1 tablet   Generic drug:  calcium polycarbophil        Take 1 tablet by mouth 2 times daily.   Refills:  0        glipiZIDE 10 MG 24 hr tablet   Commonly known as:  glipiZIDE XL   Dose:  10 mg   Quantity:  180 tablet        Take 1 tablet (10 mg) by mouth 2 times daily   Refills:  0        hydrochlorothiazide 25 MG tablet   Commonly known as:  HYDRODIURIL   Dose:  25 mg   Quantity:  90 tablet        Take 1  tablet (25 mg) by mouth daily   Refills:  1        levothyroxine 100 MCG tablet   Commonly known as:  SYNTHROID/LEVOTHROID   Dose:  100 mcg   Quantity:  90 tablet        Take 1 tablet (100 mcg) by mouth daily   Refills:  1        lisinopril 40 MG tablet   Commonly known as:  PRINIVIL/ZESTRIL   Dose:  40 mg   Quantity:  90 tablet        Take 1 tablet (40 mg) by mouth daily   Refills:  0        LOPERAMIDE HCL PO        1/2 to 1 tablet by mouth as needed.   Refills:  0        metFORMIN 500 MG tablet   Commonly known as:  GLUCOPHAGE   Dose:  1000 mg   Quantity:  360 tablet        Take 2 tablets (1,000 mg) by mouth 2 times daily (with meals)   Refills:  0        metoprolol 50 MG tablet   Commonly known as:  LOPRESSOR   Dose:  50 mg   Quantity:  180 tablet        Take 1 tablet (50 mg) by mouth 2 times daily   Refills:  1        mometasone 220 MCG/INH Inhaler   Commonly known as:  ASMANEX   Dose:  2 puff        Inhale 2 puffs into the lungs daily.   Refills:  0        omeprazole 20 MG CR capsule   Commonly known as:  priLOSEC   Dose:  20 mg   Quantity:  30 capsule        Take 1 capsule (20 mg) by mouth daily   Refills:  10        pioglitazone 30 MG tablet   Commonly known as:  ACTOS   Dose:  30 mg   Quantity:  90 tablet        Take 1 tablet (30 mg) by mouth daily   Refills:  1        simvastatin 20 MG tablet   Commonly known as:  ZOCOR   Dose:  20 mg   Quantity:  90 tablet        Take 1 tablet (20 mg) by mouth At Bedtime   Refills:  2        tiotropium 18 MCG capsule   Commonly known as:  SPIRIVA   Dose:  18 mcg        Inhale 18 mcg into the lungs daily.   Refills:  0        traZODone 50 MG tablet   Commonly known as:  DESYREL   Dose:   mg   Quantity:  60 tablet        Take 1-2 tablets ( mg) by mouth At Bedtime   Refills:  5        VITAMIN D (CHOLECALCIFEROL) PO   Dose:  2000 Units        Take 2,000 Units by mouth daily   Refills:  0        warfarin 3 MG tablet   Commonly known as:  COUMADIN   Quantity:  90  tablet        Take 1 tablet (3mg) daily except 1/2 tablet (1.5mg) on Fridays   Refills:  1        * Notice:  This list has 2 medication(s) that are the same as other medications prescribed for you. Read the directions carefully, and ask your doctor or other care provider to review them with you.            Procedures and tests performed during your visit     Bilirubin direct    CBC + differential    CT Abdomen Pelvis w Contrast    Cardiac Continuous Monitoring    Comprehensive metabolic panel    D dimer quantitative    EKG 12 lead    INR    Nt probnp inpatient    Peripheral IV: Standard    Pulse oximetry nursing    Troponin I (now)    US Abdomen Limited    XR Chest 2 Views      Orders Needing Specimen Collection     Ordered          04/04/17 0647  UA with Microscopic - STAT, Prio: STAT, Needs to be Collected     Scheduled Task Status   04/04/17 0648 Collect UA with Microscopic Open   Order Class:  PCU Collect                  Pending Results     No orders found from 4/2/2017 to 4/5/2017.            Pending Culture Results     No orders found from 4/2/2017 to 4/5/2017.            Test Results From Your Hospital Stay        4/4/2017  5:51 AM      Component Results     Component Value Ref Range & Units Status    WBC 7.9 4.0 - 11.0 10e9/L Final    RBC Count 4.19 3.8 - 5.2 10e12/L Final    Hemoglobin 12.5 11.7 - 15.7 g/dL Final    Hematocrit 38.1 35.0 - 47.0 % Final    MCV 91 78 - 100 fl Final    MCH 29.8 26.5 - 33.0 pg Final    MCHC 32.8 31.5 - 36.5 g/dL Final    RDW 14.3 10.0 - 15.0 % Final    Platelet Count 311 150 - 450 10e9/L Final    Diff Method Automated Method  Final    % Neutrophils 62.0 % Final    % Lymphocytes 28.5 % Final    % Monocytes 7.6 % Final    % Eosinophils 1.3 % Final    % Basophils 0.3 % Final    % Immature Granulocytes 0.3 % Final    Nucleated RBCs 0 0 /100 Final    Absolute Neutrophil 4.9 1.6 - 8.3 10e9/L Final    Absolute Lymphocytes 2.2 0.8 - 5.3 10e9/L Final    Absolute Monocytes 0.6 0.0 - 1.3  10e9/L Final    Absolute Eosinophils 0.1 0.0 - 0.7 10e9/L Final    Absolute Basophils 0.0 0.0 - 0.2 10e9/L Final    Abs Immature Granulocytes 0.0 0 - 0.4 10e9/L Final    Absolute Nucleated RBC 0.0  Final         4/4/2017  6:12 AM      Component Results     Component Value Ref Range & Units Status    Sodium 139 133 - 144 mmol/L Final    Potassium 3.8 3.4 - 5.3 mmol/L Final    Chloride 103 94 - 109 mmol/L Final    Carbon Dioxide 28 20 - 32 mmol/L Final    Anion Gap 8 3 - 14 mmol/L Final    Glucose 187 (H) 70 - 99 mg/dL Final    Urea Nitrogen 22 7 - 30 mg/dL Final    Creatinine 0.88 0.52 - 1.04 mg/dL Final    GFR Estimate 61 >60 mL/min/1.7m2 Final    Non  GFR Calc    GFR Estimate If Black 74 >60 mL/min/1.7m2 Final    African American GFR Calc    Calcium 8.8 8.5 - 10.1 mg/dL Final    Bilirubin Total 0.7 0.2 - 1.3 mg/dL Final    Albumin 3.4 3.4 - 5.0 g/dL Final    Protein Total 6.4 (L) 6.8 - 8.8 g/dL Final    Alkaline Phosphatase 74 40 - 150 U/L Final    ALT 20 0 - 50 U/L Final    AST 16 0 - 45 U/L Final         4/4/2017  6:12 AM      Component Results     Component Value Ref Range & Units Status    Troponin I ES  0.000 - 0.045 ug/L Final    <0.015  The 99th percentile for upper reference range is 0.045 ug/L.  Troponin values in   the range of 0.045 - 0.120 ug/L may be associated with risks of adverse   clinical events.           4/4/2017  6:40 AM      Component Results     Component Value Ref Range & Units Status    D Dimer  0.0 - 0.50 ug/ml FEU Final    <0.3  This D-dimer assay is intended for use in conjuntion with a clinical pretest   probability assessment model to exclude pulmonary embolism (PE) and as an aid   in the diagnosis of deep venous thrombosis (DVT) in outpatients suspected of PE   or DVT. The cut-off value is 0.5 g/mL FEU.           4/4/2017  6:38 AM      Component Results     Component Value Ref Range & Units Status    N-Terminal Pro BNP Inpatient 1242 0 - 1800 pg/mL Final    Reference  range shown and results flagged as abnormal are suggested inpatient   cut points for confirming diagnosis if CHF in an acute setting. Establishing   a   baseline value for each individual patient is useful for follow-up. An   inpatient or emergency department NT-proPBNP <300 pg/mL effectively rules out   acute CHF, with 99% negative predictive value.  The outpatient non-acute reference range for ruling out CHF is:   0-125 pg/mL (age 18 to less than 75)   0-450 pg/mL (age 75 yrs and older)           4/4/2017  6:40 AM      Component Results     Component Value Ref Range & Units Status    INR 2.31 (H) 0.86 - 1.14 Final         4/4/2017  7:21 AM      Narrative     CHEST TWO VIEWS 4/4/2017 7:12 AM     HISTORY: Chest Pain, Shortness of Breath    COMPARISON: 10/20/2016 chest x-ray        Impression     IMPRESSION: Redemonstrated large retrocardiac hiatal hernia and linear  fibrosis or discoid atelectasis right mid lung. Heart and pulmonary  vessels within normal limits. No acute appearing infiltrate or pleural  effusion.    DONNY FIGUEROA MD         4/4/2017  9:54 AM      Narrative     ULTRASOUND ABDOMEN LIMITED RIGHT UPPER QUADRANT   4/4/2017 9:16 AM     HISTORY: Abdominal pain.    COMPARISON: 11/29/2012 - CT chest.    FINDINGS: Moderate diffuse increased hepatic echogenicity, likely  representing fatty infiltration. No hepatic masses. A curvilinear  echogenicity with posterior acoustic shadowing is present in the  gallbladder fossa, likely relating to the gallbladder. No convincing  wall-echo-shadow sign. No focal tenderness over the region of the  gallbladder fossa. No intra or extrahepatic biliary dilatation. The  common duct measures 0.5 cm in diameter. The right kidney has normal  size and echogenicity measuring 9.8 cm in length. No right intrarenal  collecting system dilatation or calculi. A 2.4 x 2.4 x 1.9 cm cyst is  present in the interpolar region of the right kidney. No free fluid in  the upper right  hemiabdomen.        Impression     IMPRESSION:   1. A curvilinear echogenicity with posterior acoustic shadowing is  present in the gallbladder fossa. This could either represent a  calcified gallbladder wall, a gallbladder filled with gallstones, or  air within the wall of the gallbladder/emphysematous cholecystitis. If  clinically relevant, a CT scan of the abdomen could be considered for  further evaluation.  2. No biliary dilatation.  3. Moderate diffuse fatty infiltration of the liver.    PAOLA PUGH MD         4/4/2017  9:15 AM      Component Results     Component Value Ref Range & Units Status    Bilirubin Direct 0.1 0.0 - 0.2 mg/dL Final         4/4/2017 10:47 AM      Narrative     CT ABDOMEN AND PELVIS WITH CONTRAST   4/4/2017 10:30 AM     HISTORY: Abnormal right upper quadrant ultrasound.    COMPARISON: 4/4/2017 - Ultrasound right upper quadrant. The report  from this study describes a curvilinear echogenicity with posterior  acoustic shadowing in the gallbladder fossa.    TECHNIQUE: Following the uneventful administration of 91 mL Isovue-370  intravenous contrast, helical sections were acquired from the top of  the diaphragm through the pubic symphysis. Coronal reconstructions  were generated. Radiation dose for this scan was reduced using  automated exposure control, adjustment of the mA and/or kV according  to the patient's size, or iterative reconstruction technique.    FINDINGS:     Abdomen: The liver and spleen are unremarkable. Mild-to-moderate  diffuse pancreatic atrophy. The adrenal glands are unremarkable. A few  cysts in the kidneys. The largest is a 3.6 cm cyst in the inferior  pole of the left kidney. 0.3 cm nonobstructing calculus in the  inferior pole of the right kidney. A rim of dense calcification is  present in the gallbladder fundus, likely a calcified gallbladder  wall. Several gallstones are present in the gallbladder. Partial  visualization of a large hiatal hernia. No enlarged  lymph nodes or  free fluid in the upper abdomen. Atherosclerotic calcification in the  abdominal aorta.    Scan through the lower chest is unremarkable.    Pelvis: The small and large bowel are normal in caliber. Several  diverticula are present in the colon without evidence of  diverticulitis. The appendix is unremarkable. No bowel wall  thickening, pneumatosis or free intraperitoneal gas. The uterus is  present. No enlarged lymph nodes or free fluid in the pelvis.        Impression     IMPRESSION:   1. No cause of acute pain identified in the abdomen or pelvis.  2. A rim of calcification in the gallbladder fundus, likely a  calcified gallbladder wall. This accounts for the abnormality  visualized in the comparison ultrasound study. There are also several  gallstones in the gallbladder. No evidence of acute cholecystitis.  3. Colonic diverticulosis without evidence of diverticulitis.    PAOLA PUGH MD                Clinical Quality Measure: Blood Pressure Screening     Your blood pressure was checked while you were in the emergency department today. The last reading we obtained was  BP: 115/55 (Simultaneous filing. User may not have seen previous data.) . Please read the guidelines below about what these numbers mean and what you should do about them.  If your systolic blood pressure (the top number) is less than 120 and your diastolic blood pressure (the bottom number) is less than 80, then your blood pressure is normal. There is nothing more that you need to do about it.  If your systolic blood pressure (the top number) is 120-139 or your diastolic blood pressure (the bottom number) is 80-89, your blood pressure may be higher than it should be. You should have your blood pressure rechecked within a year by a primary care provider.  If your systolic blood pressure (the top number) is 140 or greater or your diastolic blood pressure (the bottom number) is 90 or greater, you may have high blood pressure. High  blood pressure is treatable, but if left untreated over time it can put you at risk for heart attack, stroke, or kidney failure. You should have your blood pressure rechecked by a primary care provider within the next 4 weeks.  If your provider in the emergency department today gave you specific instructions to follow-up with your doctor or provider even sooner than that, you should follow that instruction and not wait for up to 4 weeks for your follow-up visit.        Thank you for choosing [facility]       Thank you for choosing [facility] for your care. Our goal is always to provide you with excellent care. Hearing back from our patients is one way we can continue to improve our services. Please take a few minutes to complete the written survey that you may receive in the mail after you visit with us. Thank you!        [employer] Information     [employer] gives you secure access to your electronic health record. If you see a primary care provider, you can also send messages to your care team and make appointments. If you have questions, please call your primary care clinic.  If you do not have a primary care provider, please call [phone] and they will assist you.        Care EveryWhere ID     This is your Care EveryWhere ID. This could be used by other organizations to access your [facility] medical records  [id number]        After Visit Summary       This is your record. Keep this with you and show to your community pharmacist(s) and doctor(s) at your next visit.

## 2017-04-04 NOTE — ED PROVIDER NOTES
"  History     Chief Complaint:    Chest Pain      HPI   Maryalice Rebecca Wachter is a 83 year old female with a history of CAD, hypertension, hypercholesteremia, atrial fibrillation (on coumadin) and diabetes, who presents with chest pain. The patient states that she developed pain just below her left rib cage yesterday shortly after taking a nap while watching TV. This is a pressure type pain, and is worse with deep breathing and is worse after she eats. The patient comments that she was \"belching a lot\" and had \"horrible gas\", for this she took Gaviscon with minimal relief.  She states that she achieves a little pain relief when she is standing, but cannot find a comfortable position when sitting or lying flat. She reports that when she takes a deep breath, the pain wraps around her left side to her back. This pain has been constant since onset, and she reports to the emergency department for further evaluation. Of note, she states that she has been evaluated in the emergency department in the past for similar pain, with her last visit in October of 2016. Per chart review, her current constellation of symptoms are very similar to that time. The patient denies increased shortness of breath from her baseline, or abnormal sweating. She states that she is chilled and was briefly nauseas, but has not vomited.       Upon further evaluation, the patient corrected herself and states that she has the same pain frequently, especially after eating, but it normally does not last this long and usually is not this severe.       Cardiac Risk Factors   Sex: Female   Tobacco: Negative   Hypertension: Positive   Diabetes: Positive   Hyperlipidemia: Positive   Family History: Negative     PE/DVT Risk Factors   Personal History: Negative  Recent Travel: Negative   Recent Surgery/Hospitalization: Negative  Tobacco: Negative  Family History: Negative  Hormone Use: Negative   Cancer: Negative  Trauma: Negative  "     Allergies:  Penicillins  Amlodipine  Furosemide    Medications:    Glipizide (glipizide xl) 10 mg 24 hr tablet  Lisinopril (prinivil/zestril) 40 mg tablet  Metoprolol (lopressor) 50 mg tablet  Metformin (glucophage) 500 mg tablet  Warfarin (coumadin) 3 mg tablet  Omeprazole (prilosec) 20 mg capsule  Aspirin ec po  Ascorbic acid (vitamin c) 500 mg tablet  Pioglitazone (actos) 30 mg tablet  Hydrochlorothiazide (hydrodiuril) 25 mg tablet  Levothyroxine (synthroid/levothroid) 100 mcg tablet  [discontinued] pioglitazone (actos) 15 mg tablet  Vitamin d, cholecalciferol, po  Trazodone (desyrel) 50 mg tablet  Simvastatin (zocor) 20 mg tablet  Cyanocobalamin (b-12) 1000 mcg caps  Loperamide hcl po  Albuterol (albuterol) 108 (90 base) mcg/act inhaler  Albuterol (2.5 mg/3ml) 0.083% nebulizer solution  Tiotropium (spiriva) 18 mcg inhalation capsule  Mometasone (asmanex) 220 mcg/inh inhaler  Ferrous sulfate 325 (65 fe) mg tablet  Calcium carb 1250 mg, 500 mg [place],/vitamin d 200 units (oscal with d) 500-200 mg-unit per tablet  Polycarbophil (fibercon) 625 mg tablet    Past Medical History:    Atrial fibrillation (H)  CAD (coronary artery disease)  Congestive heart failure (CHF) (H)  COPD exacerbation (H)  Type 2 diabetes mellitus with hyperglycemia (H)  Hiatal hernia  Hypercholesterolemia  Hypertension  Hypothyroidism  Postmenopausal bleeding  Sarcoid (H)  Colonic polyps  Hypothyroidism    Past Surgical History:    Ankle surgery   Dilation and curettage, hysteroscopy diagnostic  ENT surgery      Family History:    Denies cardiac history     Social History:  Marital Status:   Presents to the ED alone  Tobacco Use: Never smoker  Alcohol Use: Yes  PCP: Venu Maria     Review of Systems   Constitutional: Positive for chills. Negative for diaphoresis.   Respiratory: Negative for shortness of breath.    Cardiovascular: Positive for chest pain.   Gastrointestinal: Positive for nausea. Negative for vomiting.    Musculoskeletal: Positive for back pain.   All other systems reviewed and are negative.        Physical Exam   First Vitals:  BP: 138/64  Heart Rate: 78  Temp: 99.1  F (37.3  C)  SpO2: 94 %    Physical Exam  General/Appearance: appears stated age, well-groomed, appears comfortable  Eyes: EOMI, no scleral injection, no icterus, L exotropion  ENT: MMM  Neck: supple, nl ROM, no stiffness  Cardiovascular: RRR, nl S1S2, no m/r/g, 2+ pulses in all 4 extremities, cap refill <2sec, pain not reproducible with palp  Respiratory: CTAB, moderate air movement throughout, no wheezes/rhonchi/rales, no increased WOB, no retractions  Back: no lesions  GI: abd soft, non-distended, nttp,  no HSM, no rebound, no guarding, nl BS  MSK: SARAVIA, good tone, no bony abnormality  Skin: warm and well-perfused, no rash, no edema, no ecchymosis, nl turgor  Neuro: GCS 15, alert and oriented, no gross focal neuro deficits  Psych: interacts appropriately  Heme: no petechia, no purpura, no active bleeding        Emergency Department Course   ECG:  @ 0534  Indication: chest pain   Vent. Rate 79 bpm. AK interval 192 ms. QRS duration 98 ms. QT/QTc 396/454 ms. P-R-T axis -23 2 -14.   Normal sinus rhythm with sinus arrhythmia. Nonspecific T wave abnormality. Abnormal ECG.  No significant change when compared to previous ECG from 10/20/16   Read @ 0605 by Dr. Houston.     Imaging:  CT Abdomen/Pelvis with contrast:   1. No cause of acute pain identified in the abdomen or pelvis.  2. A rim of calcification in the gallbladder fundus, likely a  calcified gallbladder wall. This accounts for the abnormality  visualized in the comparison ultrasound study. There are also several  gallstones in the gallbladder. No evidence of acute cholecystitis.  3. Colonic diverticulosis without evidence of diverticulitis.  Report per radiology.     Ultrasound abdomen, limited:  1. A curvilinear echogenicity with posterior acoustic shadowing is  present in the gallbladder fossa.  This could either represent a  calcified gallbladder wall, a gallbladder filled with gallstones, or  air within the wall of the gallbladder/emphysematous cholecystitis. If  clinically relevant, a CT scan of the abdomen could be considered for  further evaluation.  2. No biliary dilatation.  3. Moderate diffuse fatty infiltration of the liver.  Report per radiology.       X-ray Chest, 2 views:    Redemonstrated large retrocardiac hiatal hernia and linear  fibrosis or discoid atelectasis right mid lung. Heart and pulmonary  vessels within normal limits. No acute appearing infiltrate or pleural  effusion.  Report per radiology.   Radiographic findings were communicated with the patient who voiced understanding of the findings.    Laboratory:  CBC:  WBC 7.9, HGB 12.5, , otherwise WNL    CMP: Glucose 187 (H), Protein total 6.4 (L), otherwise WNL (Creatinine 0.88)     (0534) Troponin I: <0.015      (0534) D-dimer: < 0.3    Nt probnp inpatient (BNP): 1242      INR: 2.31 (H)    UA: Clear yellow urine, blood small, WBC 3 (H), RBC 9 (H), mucous present otherwise WNL     Bilirubin direct: 0.1    Interventions:  (0625) GI Cocktail, 30 mL suspension, PO    (0632) Duoneb, 3 mL, Inhalation    (0841) Morphine, 2 mg, IV injection     Emergency Department Course:  Nursing notes and vitals reviewed.  I performed an exam of the patient as documented above.   EKG was done, interpretation as above.  Blood was drawn from the patient. This was sent for laboratory testing, findings above.   The patient was sent for a chest x-ray, abdomen ultrasound and CT of the abdomen and pelvis while in the emergency department, findings above.    (5369) I rechecked on the patient.   (6633) She did have some relief after the EVELIN cocktail.   (2687) Updated family and patient at bedside. Patient still unable to empty bladder, we will try in and out cath.   Findings and plan explained to the patient. Patient discharged home with instructions  regarding supportive care, medications, and reasons to return. The importance of close follow-up was reviewed. I personally reviewed the laboratory results with the patient and answered all related questions prior to discharge.     Impression & Plan    Medical Decision Making:  This patient is an 83 year old female who presents with left sided lower anterior chest well versus upper abdominal pain that also wraps around to her back. Of note, she had similar symptoms back in October and was brought to the hospital with a negative cardiac workup, ultimately thought that this was secondary to possible COPD exacerbation. Although at that time she had some wheezes this is one difference that she does not have today. I have low suspicion that this is contributed by her COPD given her negative pulmonary exam. She did have pleuritic pain and therefore PE was on my differential, despite her being therapeutic on coumadin. Thankfully her D-dimer was negative. Additionally, ACS was considered, however her troponin and EKG were unremarkable. This troponin was draw over 76 hours after the onset of her symptoms. Especially as she had a negative workup last year with similar symptoms, I think this is less likely ASC. Her pain is much more atypical and less consistent with ACS. There is no evidence on physical exam, labs, imaging for pleural effusion or pulmonary edema. Abdominal exam is relatively negative. I did consider intraabdominal process such as pancreatitis, atypical gallstones or liver dysfunction. She does have gallstones, however no significant tenderness over the area and nothing to suggest a cholecystitis. Kidney stones could cause this left sided pain that wraps around to the left flank. Her urine showed a few red blood cells, however the CT scan, albeit had contrast, showed no obvious stone, hydroureter, other evidence of kidney pathology. At this time, I still think we are at a point where we don't know exactly the  "cause of her symptoms. She has had complete resolution of her pain while eating here. I have asked her to follow up with her primary care provider for further workup needed. Of note, she did develop what felt like urinary retention here, and did have 500 cc's on in an and out catheterization, however notes that she frequently has difficulty \"on demand\", as this would be a very unlikely cause of her left upper abdominal pain, I do not think this is contributory. I do not think at this point she needs an indwelling heredia unless returns. All of this was discussed with her and her family who feel comfortable with discharge.     Diagnosis:    ICD-10-CM    1. Gallstones K80.20 UA with Microscopic   2. Atypical chest pain R07.89      Disposition:  Discharge to home.     I, Alfredo West, am serving as a scribe on 4/4/2017 at 6:01 AM to personally document services performed by Dr. Houston based on my observations and the provider's statements to me.     4/4/2017   [facility] EMERGENCY DEPARTMENT       Nguyen Houston MD  04/04/17 1725    "

## 2017-04-07 ENCOUNTER — TELEPHONE (OUTPATIENT)
Dept: FAMILY MEDICINE | Facility: CLINIC | Age: 82
End: 2017-04-07

## 2017-04-07 ENCOUNTER — OFFICE VISIT (OUTPATIENT)
Dept: FAMILY MEDICINE | Facility: CLINIC | Age: 82
End: 2017-04-07
Payer: COMMERCIAL

## 2017-04-07 VITALS
BODY MASS INDEX: 29.5 KG/M2 | SYSTOLIC BLOOD PRESSURE: 147 MMHG | HEART RATE: 68 BPM | OXYGEN SATURATION: 96 % | RESPIRATION RATE: 16 BRPM | TEMPERATURE: 98.4 F | WEIGHT: 180 LBS | DIASTOLIC BLOOD PRESSURE: 70 MMHG

## 2017-04-07 DIAGNOSIS — R07.89 OTHER CHEST PAIN: Primary | ICD-10-CM

## 2017-04-07 DIAGNOSIS — K44.9 HIATAL HERNIA: ICD-10-CM

## 2017-04-07 DIAGNOSIS — K80.20 GALLSTONES: ICD-10-CM

## 2017-04-07 PROCEDURE — 99214 OFFICE O/P EST MOD 30 MIN: CPT | Performed by: PHYSICIAN ASSISTANT

## 2017-04-07 RX ORDER — TRAMADOL HYDROCHLORIDE 50 MG/1
25 TABLET ORAL EVERY 6 HOURS PRN
Qty: 15 TABLET | Refills: 0 | Status: SHIPPED | OUTPATIENT
Start: 2017-04-07 | End: 2017-09-25

## 2017-04-07 RX ORDER — SUCRALFATE ORAL 1 G/10ML
1 SUSPENSION ORAL 4 TIMES DAILY
Qty: 420 ML | Refills: 1 | Status: SHIPPED | OUTPATIENT
Start: 2017-04-07 | End: 2017-04-10

## 2017-04-07 NOTE — MR AVS SNAPSHOT
After Visit Summary   4/7/2017    Maryalice Rebecca Wachter    MRN: [id number]           Patient Information     Date Of Birth          11/11/1933        Visit Information        Provider Department      4/7/2017 11:00 AM Venu Maria PA-C [facility]        Today's Diagnoses     Other chest pain    -  1    Gallstones        Hiatal hernia           Follow-ups after your visit        Additional Services     GENERAL SURG ADULT REFERRAL       Your provider has referred you to: [facility]: [facility] [phone]   [email or web address]    Please be aware that coverage of these services is subject to the terms and limitations of your health insurance plan.  Call member services at your health plan with any benefit or coverage questions.      Please bring the following with you to your appointment:    (1) Any X-Rays, CTs or MRIs which have been performed.  Contact the facility where they were done to arrange for  prior to your scheduled appointment.   (2) List of current medications   (3) This referral request   (4) Any documents/labs given to you for this referral                  Follow-up notes from your care team     Return for surgery appt.      Your next 10 appointments already scheduled     Apr 13, 2017 10:30 AM CDT   CONSULT with Niurka Briseno MD   [facility]    [address]  [facility] [id number] [phone]            Apr 13, 2017  1:00 PM CDT   SHORT with Janey Romero RPH   [facility] ([facility])    [address] [address] [phone]            May 12, 2017  9:00 AM CDT   Anticoagulation Visit with FM RN VISITS   [facility] ([facility]    [address], [address]  [facility] [address] [phone]               Who to contact     If you have questions or need follow up information about today's clinic visit or your schedule please contact [facility] directly at [phone].  Normal or non-critical lab and imaging results will be communicated to you by Paxatahart, letter or phone within 4 business days after the clinic has received the results. If you do not hear from us within 7 days, please contact the clinic through Paxatahart or phone. If you have a critical or abnormal lab result, we will notify you by phone as soon as possible.  Submit refill requests through [employer]culator or call your pharmacy and they will forward the refill request to us. Please allow 3 business days for your refill to be completed.          Additional Information About Your Visit        DutyCalculator Information     [employer]culator gives you secure access to your electronic health record. If you see a primary care provider, you can also send messages to your care team and make appointments. If you have questions, please call your primary care clinic.  If you do not have a primary care provider, please call [phone] and they will assist you.        Care EveryWhere ID     This is your Care EveryWhere ID. This could be used by other organizations to access your [place] medical records  [id number]        Your Vitals Were     Pulse Temperature Respirations Pulse Oximetry BMI (Body Mass Index)       68 98.4  F (36.9  C) (Oral) 16 96% 29.5 kg/m2        Blood Pressure from Last 3 Encounters:   04/07/17 147/70   04/04/17 115/63   03/02/17 138/70    Weight from Last 3 Encounters:   04/07/17 180 lb (81.6 kg)   03/02/17 181 lb 4.8 oz (82.2 kg)   01/19/17 182 lb 3.2 oz (82.6 kg)              We Performed the Following     GENERAL SURG ADULT REFERRAL          Today's Medication Changes          These changes are accurate as of: 4/7/17 11:58 AM.  If you have any questions, ask your nurse or doctor.               Start taking these medicines.         Dose/Directions    sucralfate 1 GM/10ML suspension   Commonly known as:  CARAFATE   Used for:  Hiatal hernia   Started by:  Venu Maria PA-C        Dose:  1 g   Take 10 mLs (1 g) by mouth 4 times daily   Quantity:  420 mL   Refills:  1       traMADol 50 MG tablet   Commonly known as:  ULTRAM   Used for:  Other chest pain   Started by:  Venu Maria PA-C        Dose:  25 mg   Take 0.5 tablets (25 mg) by mouth every 6 hours as needed for pain   Quantity:  15 tablet   Refills:  0         These medicines have changed or have updated prescriptions.        Dose/Directions    metFORMIN 500 MG tablet   Commonly known as:  GLUCOPHAGE   This may have changed:  how much to take   Used for:  Type 2 diabetes mellitus with hyperglycemia, without long-term current use of insulin (H)        Dose:  1000 mg   Take 2 tablets (1,000 mg) by mouth 2 times daily (with meals)   Quantity:  360 tablet   Refills:  0       traZODone 50 MG tablet   Commonly known as:  DESYREL   This may have changed:  how much to take   Used for:  Insomnia, unspecified type        Dose:   mg   Take 1-2 tablets ( mg) by mouth At Bedtime   Quantity:  60 tablet   Refills:  5            Where to get your medicines      These medications were sent to [address] [address]     Phone:  [phone]     sucralfate 1 GM/10ML suspension         Some of these will need a paper prescription and others can be bought over the counter.  Ask your nurse if you have questions.     Bring a paper prescription for each of these medications     traMADol 50 MG tablet                Primary Care Provider Office Phone # Fax #    Venu Maria PA-C [phone]       [facility] [address]  [facility] [address]        Thank you!     Thank you for choosing [facility]  for your care. Our goal is always to provide  you with excellent care. Hearing back from our patients is one way we can continue to improve our services. Please take a few minutes to complete the written survey that you may receive in the mail after your visit with us. Thank you!             Your Updated Medication List - Protect others around you: Learn how to safely use, store and throw away your medicines at www.disposemymeds.org.          This list is accurate as of: 4/7/17 11:58 AM.  Always use your most recent med list.                   Brand Name Dispense Instructions for use    * albuterol (2.5 MG/3ML) 0.083% neb solution      Take 1 vial by nebulization every 6 hours as needed for shortness of breath / dyspnea or wheezing Reported on 3/2/2017       * albuterol 108 (90 BASE) MCG/ACT Inhaler   Generic drug:  albuterol      Inhale 2 puffs into the lungs every 6 hours Reported on 3/2/2017       ascorbic acid 500 MG tablet    VITAMIN C     Take 500 mg by mouth daily.       ASPIRIN EC PO      Take 81 mg by mouth.       B-12 1000 MCG Caps      Take 1 capsule by mouth 2 times daily       calcium carb 1250 mg (500 mg [place])/vitamin D 200 units 500-200 MG-UNIT per tablet    OSCAL with D     Take 1 tablet by mouth daily.       ferrous sulfate 325 (65 FE) MG tablet    IRON     Take 1 tablet by mouth daily (with breakfast).       FIBERCON 625 MG tablet   Generic drug:  calcium polycarbophil      Take 1 tablet by mouth 2 times daily.       glipiZIDE 10 MG 24 hr tablet    glipiZIDE XL    180 tablet    Take 1 tablet (10 mg) by mouth 2 times daily       hydrochlorothiazide 25 MG tablet    HYDRODIURIL    90 tablet    Take 1 tablet (25 mg) by mouth daily       levothyroxine 100 MCG tablet    SYNTHROID/LEVOTHROID    90 tablet    Take 1 tablet (100 mcg) by mouth daily       lisinopril 40 MG tablet    PRINIVIL/ZESTRIL    90 tablet    Take 1 tablet (40 mg) by mouth daily       LOPERAMIDE HCL PO      1/2 to 1 tablet by mouth as needed.       metFORMIN 500 MG tablet     GLUCOPHAGE    360 tablet    Take 2 tablets (1,000 mg) by mouth 2 times daily (with meals)       metoprolol 50 MG tablet    LOPRESSOR    180 tablet    Take 1 tablet (50 mg) by mouth 2 times daily       mometasone 220 MCG/INH Inhaler    ASMANEX     Inhale 2 puffs into the lungs daily.       omeprazole 20 MG CR capsule    priLOSEC    30 capsule    Take 1 capsule (20 mg) by mouth daily       pioglitazone 30 MG tablet    ACTOS    90 tablet    Take 1 tablet (30 mg) by mouth daily       simvastatin 20 MG tablet    ZOCOR    90 tablet    Take 1 tablet (20 mg) by mouth At Bedtime       sucralfate 1 GM/10ML suspension    CARAFATE    420 mL    Take 10 mLs (1 g) by mouth 4 times daily       tiotropium 18 MCG capsule    SPIRIVA     Inhale 18 mcg into the lungs daily.       traMADol 50 MG tablet    ULTRAM    15 tablet    Take 0.5 tablets (25 mg) by mouth every 6 hours as needed for pain       traZODone 50 MG tablet    DESYREL    60 tablet    Take 1-2 tablets ( mg) by mouth At Bedtime       VITAMIN D (CHOLECALCIFEROL) PO      Take 2,000 Units by mouth daily       warfarin 3 MG tablet    COUMADIN    90 tablet    Take 1 tablet (3mg) daily except 1/2 tablet (1.5mg) on Fridays       * Notice:  This list has 2 medication(s) that are the same as other medications prescribed for you. Read the directions carefully, and ask your doctor or other care provider to review them with you.

## 2017-04-07 NOTE — NURSING NOTE
"Chief Complaint   Patient presents with     ER F/U       Initial /70 (BP Location: Right arm, Patient Position: Chair, Cuff Size: Adult Regular)  Pulse 68  Temp 98.4  F (36.9  C) (Oral)  Resp 16  Wt 180 lb (81.6 kg)  SpO2 96%  BMI 29.5 kg/m2 Estimated body mass index is 29.5 kg/(m^2) as calculated from the following:    Height as of 10/21/16: 5' 5.5\" (1.664 m).    Weight as of this encounter: 180 lb (81.6 kg).  Medication Reconciliation: complete   Laisha Hernández MA      "

## 2017-04-07 NOTE — TELEPHONE ENCOUNTER
Message from Pharm- Insurance will not cover Sucralfate (Carafate), non formulary medication.  Please do PA or send alternative RX.      Associated Diagnoses   Hiatal hernia [K44.9]             JOE Michelle  April 7, 2017  2:31 PM    M

## 2017-04-07 NOTE — PROGRESS NOTES
SUBJECTIVE:                                                    Maryalice Rebecca Wachter is a 83 year old female who presents to clinic today for the following health issues:      ED/UC Followup:    Facility:  [facility]  Date of visit: 4/4/17  Reason for visit: chest pain, left side, center around left side  Current Status: better Wednesday, Thursday 4pm pain came back, has been taking 2 tylenol every 4 hours, it dulls the pain but does not take it away. Was nauseous yesterday evening, no vomiting but dry heaves.     Went to [facility] ED for chest and abd pain.  Yesterday started having this pain and again this AM.  Tylenol may take the edge off but does not make it go away.  Cannot sleep.  Knew she has had gallstones for years.  CT scan showed this in the ED.  Also showed a large hiatal hernia.  Takes Prilosec.  Mentions belching a lot after meals.  Wearing a bra is bothersome to her symptoms, usually takes it off due to it putting pressure on her chest.      Problem list and histories reviewed & adjusted, as indicated.  Additional history: as documented    Labs reviewed in EPIC    Reviewed and updated as needed this visit by clinical staff  Tobacco  Allergies  Meds  Problems  Soc Hx      Reviewed and updated as needed this visit by Provider         ROS:  Constitutional, HEENT, cardiovascular, pulmonary, gi and gu systems are negative, except as otherwise noted.    OBJECTIVE:                                                    /70 (BP Location: Right arm, Patient Position: Chair, Cuff Size: Adult Regular)  Pulse 68  Temp 98.4  F (36.9  C) (Oral)  Resp 16  Wt 180 lb (81.6 kg)  SpO2 96%  BMI 29.5 kg/m2  Body mass index is 29.5 kg/(m^2).  GENERAL: healthy, alert and no distress  RESP: lungs clear to auscultation - no rales, rhonchi or wheezes  CV: regular rate and rhythm, normal S1 S2, no S3 or S4, no murmur, click or rub, no peripheral edema and peripheral pulses strong  MS: tenderness to anterior  chest/sternum and right ribs anterior  SKIN: no suspicious lesions or rashes  PSYCH: mentation appears normal and affect flat    Diagnostic Test Results:  none      ASSESSMENT/PLAN:                                                    1. Other chest pain  She is wanting something stronger than Tylenol for pain.  Recommended also heating pad for discomfort.  - traMADol (ULTRAM) 50 MG tablet; Take 0.5 tablets (25 mg) by mouth every 6 hours as needed for pain  Dispense: 15 tablet; Refill: 0    2. Gallstones  Not entirely sure that this pain is from gallstones, will refer to Gen Surg to eval for hiatal hernia.  See below.  - GENERAL SURG ADULT REFERRAL    3. Hiatal hernia  See above.  - GENERAL SURG ADULT REFERRAL        Venu Maria PA-C  [facility]

## 2017-04-08 ENCOUNTER — TELEPHONE (OUTPATIENT)
Dept: NURSING | Facility: CLINIC | Age: 82
End: 2017-04-08

## 2017-04-08 ENCOUNTER — TELEPHONE (OUTPATIENT)
Dept: PHARMACY | Facility: CLINIC | Age: 82
End: 2017-04-08

## 2017-04-08 ENCOUNTER — HOSPITAL ENCOUNTER (EMERGENCY)
Facility: CLINIC | Age: 82
Discharge: HOME | End: 2017-04-08
Attending: EMERGENCY MEDICINE | Admitting: EMERGENCY MEDICINE
Payer: COMMERCIAL

## 2017-04-08 VITALS
RESPIRATION RATE: 16 BRPM | TEMPERATURE: 97.2 F | SYSTOLIC BLOOD PRESSURE: 128 MMHG | HEART RATE: 79 BPM | OXYGEN SATURATION: 95 % | DIASTOLIC BLOOD PRESSURE: 77 MMHG

## 2017-04-08 DIAGNOSIS — R07.89 ATYPICAL CHEST PAIN: ICD-10-CM

## 2017-04-08 DIAGNOSIS — K44.9 HIATAL HERNIA: ICD-10-CM

## 2017-04-08 LAB
ALBUMIN SERPL-MCNC: 3.7 G/DL (ref 3.4–5)
ALP SERPL-CCNC: 67 U/L (ref 40–150)
ALT SERPL W P-5'-P-CCNC: 19 U/L (ref 0–50)
ANION GAP SERPL CALCULATED.3IONS-SCNC: 8 MMOL/L (ref 3–14)
AST SERPL W P-5'-P-CCNC: 19 U/L (ref 0–45)
BASOPHILS # BLD AUTO: 0 10E9/L (ref 0–0.2)
BASOPHILS NFR BLD AUTO: 0.2 %
BILIRUB SERPL-MCNC: 0.8 MG/DL (ref 0.2–1.3)
BUN SERPL-MCNC: 20 MG/DL (ref 7–30)
CALCIUM SERPL-MCNC: 9.5 MG/DL (ref 8.5–10.1)
CHLORIDE SERPL-SCNC: 97 MMOL/L (ref 94–109)
CO2 SERPL-SCNC: 29 MMOL/L (ref 20–32)
CREAT SERPL-MCNC: 0.95 MG/DL (ref 0.52–1.04)
DIFFERENTIAL METHOD BLD: NORMAL
EOSINOPHIL # BLD AUTO: 0.1 10E9/L (ref 0–0.7)
EOSINOPHIL NFR BLD AUTO: 1.1 %
ERYTHROCYTE [DISTWIDTH] IN BLOOD BY AUTOMATED COUNT: 14.2 % (ref 10–15)
GFR SERPL CREATININE-BSD FRML MDRD: 56 ML/MIN/1.7M2
GLUCOSE SERPL-MCNC: 148 MG/DL (ref 70–99)
HCT VFR BLD AUTO: 42.2 % (ref 35–47)
HGB BLD-MCNC: 13.6 G/DL (ref 11.7–15.7)
IMM GRANULOCYTES # BLD: 0 10E9/L (ref 0–0.4)
IMM GRANULOCYTES NFR BLD: 0.2 %
INR PPP: 2.19 (ref 0.86–1.14)
LIPASE SERPL-CCNC: 97 U/L (ref 73–393)
LYMPHOCYTES # BLD AUTO: 1.8 10E9/L (ref 0.8–5.3)
LYMPHOCYTES NFR BLD AUTO: 19.6 %
MCH RBC QN AUTO: 29.8 PG (ref 26.5–33)
MCHC RBC AUTO-ENTMCNC: 32.2 G/DL (ref 31.5–36.5)
MCV RBC AUTO: 92 FL (ref 78–100)
MONOCYTES # BLD AUTO: 0.6 10E9/L (ref 0–1.3)
MONOCYTES NFR BLD AUTO: 7.2 %
NEUTROPHILS # BLD AUTO: 6.4 10E9/L (ref 1.6–8.3)
NEUTROPHILS NFR BLD AUTO: 71.7 %
NRBC # BLD AUTO: 0 10*3/UL
NRBC BLD AUTO-RTO: 0 /100
PLATELET # BLD AUTO: 355 10E9/L (ref 150–450)
POTASSIUM SERPL-SCNC: 3.6 MMOL/L (ref 3.4–5.3)
PROT SERPL-MCNC: 7.3 G/DL (ref 6.8–8.8)
RBC # BLD AUTO: 4.57 10E12/L (ref 3.8–5.2)
SODIUM SERPL-SCNC: 134 MMOL/L (ref 133–144)
TROPONIN I SERPL-MCNC: NORMAL UG/L (ref 0–0.04)
WBC # BLD AUTO: 8.9 10E9/L (ref 4–11)

## 2017-04-08 PROCEDURE — 25000132 ZZH RX MED GY IP 250 OP 250 PS 637: Performed by: EMERGENCY MEDICINE

## 2017-04-08 PROCEDURE — 93005 ELECTROCARDIOGRAM TRACING: CPT

## 2017-04-08 PROCEDURE — 84484 ASSAY OF TROPONIN QUANT: CPT | Performed by: EMERGENCY MEDICINE

## 2017-04-08 PROCEDURE — 25000125 ZZHC RX 250: Performed by: EMERGENCY MEDICINE

## 2017-04-08 PROCEDURE — 85025 COMPLETE CBC W/AUTO DIFF WBC: CPT | Performed by: EMERGENCY MEDICINE

## 2017-04-08 PROCEDURE — 83690 ASSAY OF LIPASE: CPT | Performed by: EMERGENCY MEDICINE

## 2017-04-08 PROCEDURE — 80053 COMPREHEN METABOLIC PANEL: CPT | Performed by: EMERGENCY MEDICINE

## 2017-04-08 PROCEDURE — 85610 PROTHROMBIN TIME: CPT | Performed by: EMERGENCY MEDICINE

## 2017-04-08 PROCEDURE — 99284 EMERGENCY DEPT VISIT MOD MDM: CPT

## 2017-04-08 RX ORDER — OXYCODONE HCL 5 MG/5 ML
2.5-5 SOLUTION, ORAL ORAL EVERY 6 HOURS PRN
Qty: 90 ML | Refills: 0 | Status: SHIPPED | OUTPATIENT
Start: 2017-04-08 | End: 2017-09-25

## 2017-04-08 RX ORDER — OXYCODONE HCL 5 MG/5 ML
3 SOLUTION, ORAL ORAL ONCE
Status: COMPLETED | OUTPATIENT
Start: 2017-04-08 | End: 2017-04-08

## 2017-04-08 RX ADMIN — OXYCODONE HYDROCHLORIDE 3 MG: 5 SOLUTION ORAL at 08:56

## 2017-04-08 RX ADMIN — LIDOCAINE HYDROCHLORIDE 30 ML: 20 SOLUTION ORAL; TOPICAL at 08:56

## 2017-04-08 ASSESSMENT — ENCOUNTER SYMPTOMS
FEVER: 0
ABDOMINAL PAIN: 0
COUGH: 0
NAUSEA: 0
VOMITING: 0
BACK PAIN: 0
SHORTNESS OF BREATH: 0
CHILLS: 0

## 2017-04-08 NOTE — ED PROVIDER NOTES
History     Chief Complaint:  Chest Pain    HPI   Maryalice Rebecca Wachter is a 83 year old female with a history of a-fib on Coumadin, COPD, hypothyroidism, hypertension, Type 2 diabetes, CHF, hiatal hernia, and CAD who presents with left rib margin pain. The patient states she has had ongoing post prandial left lower rib margin pain over the past 5 days. She was seen on 4/4 (after 1 day of pain) and had a comprehensive work up here which returned negative. She notes that while in the ED she received a dose of Morphine and following this she had improvement in her pain for one day following this but this returned the following day around 1600. She called her clinic and was seen yesterday where she was prescribed Tramadol as well as Carafate, as her pain had a post prandial component to it. Despite Tramadol and Tylenol, she has had ongoing persistent pain and this prompted her to come here for evaluation. She is unable to characterize the pain but states that it is constant. She otherwise denies the pain radiating into her back. She denies any lower abdominal pain or upper abdominal pain. She denies any fevers, chills, nausea, vomiting, shortness of breath, cough, back pain. She has no leg swelling.    Allergies:  Penicillins  Amlodipine  Furosemide      Medications:    Carafate  Tramadol  Glipizide  Hydrodiuril  Lisinopril  Lopressor  Synthroid  Metformin  Coumadin  Desyrel  Zocor  Albuterol inhaler  Fibercon    Past Medical History:    COPD   Type 2 diabetes  Hypothyroidism  Hypertension  Hyperlipidemia  CAD  Atrial fibrillation on Coumadin  Hiatal hernia  CHF  Sarcoid     Past Surgical History:    Ankle surgery  D&C   ENT surgery     Family History:    Hiatal hernia    Social History:  Smoking status: Never smoker  Alcohol use: Yes    Marital Status:        Review of Systems   Constitutional: Negative for chills and fever.   Respiratory: Negative for cough and shortness of breath.    Cardiovascular:  Positive for chest pain. Negative for leg swelling.   Gastrointestinal: Negative for abdominal pain, nausea and vomiting.   Musculoskeletal: Negative for back pain.   Skin: Negative for rash.   All other systems reviewed and are negative.      Physical Exam   Patient Vitals for the past 24 hrs:   BP Temp Temp src Pulse Resp SpO2   04/08/17 1030 133/80 - - - - 92 %   04/08/17 1000 148/78 - - - - 93 %   04/08/17 0930 143/70 - - - - 95 %   04/08/17 0900 149/63 - - - - 95 %   04/08/17 0830 143/61 - - - - 96 %   04/08/17 0807 171/87 97.2  F (36.2  C) Oral 79 18 95 %      Physical Exam   HENT:   Right Ear: External ear normal.   Left Ear: External ear normal.   Nose: Nose normal.   Eyes: Conjunctivae and lids are normal.   Neck: Neck supple. No tracheal deviation present.   Cardiovascular: Regular rhythm and intact distal pulses.    Pulmonary/Chest: Breath sounds normal. No respiratory distress. She exhibits tenderness.       Abdominal: Soft. There is no rebound and no guarding.   See above   Musculoskeletal:   No peripheral edema   Neurological:   MAEE, no gross focal motor or sensory deficit   Skin: Skin is warm and dry. She is not diaphoretic.   Psychiatric: She has a normal mood and affect.   Nursing note and vitals reviewed.        Emergency Department Course     ECG (8:11:49):  Rate 81 bpm. MD interval 198. QRS duration 96. QT/QTc 402/466. P-R-T axes -14 9 -3. Normal sinus rhythm. Normal ECG. Interpreted at 0823 by Carroll Armenta MD.     Laboratory:  0813 - Troponin: <0.015   CBC: WNL (WBC 8.9, HGB 13.6, )   CMP: Glucose 148 (H), GFR 56 (L), o/w WNL (Creatinine 0.95)   Lipase: 97  INR: 2.19 (H)    Interventions:  0856 - GI Cocktail - Maalox 15 mL, Viscous Lidocaine 15 mL, 30 mL suspension PO   0856 - Oxycodone 3 mg PO    Emergency Department Course:  Past medical records, nursing notes, and vitals reviewed.  0828: I performed an exam of the patient and obtained history, as documented above.   IV  inserted and blood drawn.     1043: I rechecked the patient and explained findings.    1125: I rechecked the patient. Findings and plan explained to the Patient. Patient discharged home with instructions regarding supportive care, medications, and reasons to return. The importance of close follow-up was reviewed.      Impression & Plan      Medical Decision Making:  Maryalice Rebecca Wachter is a 83 year old female who is here with complaints of left lower chest pain/left  upper quadrant abdominal pain. She was seen here a few days ago for similar and had a negative work up which was very well done and thorough, including ultrasound, CT-scan, and X-ray, and blood tests. Work up was unremarkable for obvious etiology at that time. Incidental note was of cholelithiasis. She had been feeling well but had return of her pain since yesterday evening. Concerns here would be for peptic ulcer disease, gastritis, esophageal spasm especially in light of her known large hiatal hernia. Differential includes ACS, AMI, less likely to be PE given that she is anticoagulated. There is no preceding trauma and it is not reproducible with chest wall contraction/stretching here. There is no respiratory tract infectious review of systems. There are no ongoing urinary symptoms suggesting pyelonephritis or ureteral stone. Plan will be to repeat her blood work here, EKG. Unless red flags by examination or blood testing, I do not need to repeat any advanced imaging as CT, ultrasound, and chest X-ray were done on 4/4/2017. She very well may benefit from GI consultation and endoscopy to see if symptoms are related to her peptic ulcer disease, reflux, and her hiatal hernia.    She remains stable here in the ED. She had some improvement in her pain with the above interventions. Work up showed no remarkable abnormalities on CBC, CMP, and lipase. Troponin is undetectable which is reassuring given the duration of her symptoms going back a few days as  well as time of symptoms. Today her INR is therapeutic. I do not think she needs advanced imaging of the chest or abdomen given that it was done recently, but we had a long discussion what is known versus unknown. There is no apparent significant cardiopulmonary or intraabdominal process at this time. It will be symptomatic related to gastritis, peptic ulcer disease, and her known hiatal hernia versus musculoskeletal. She would probably benefit from a repeat stress test. We discussed observation admission versus discharge home, and two diagnostic studies of endoscopy and stress test. She prefers to go home which I believe is reasonable, and these were ordered as discharge instructions.    Diagnosis:    ICD-10-CM   1. Hiatal hernia K44.9   2. Atypical chest pain R07.89         Discharge Medications:  New Prescriptions    OXYCODONE (ROXICODONE) 5 MG/5ML SOLUTION    Take 2.5-5 mLs (2.5-5 mg) by mouth every 6 hours as needed for moderate to severe pain or pain    RANITIDINE (ZANTAC) 150 MG TABLET    Take 1 tablet (150 mg) by mouth 2 times daily for 3 days       Maury Mcclendon  4/8/2017   [facility] EMERGENCY DEPARTMENT  IMaury, am serving as a scribe at 8:28 AM on 4/8/2017 to document services personally performed by Carroll Armenta MD based on my observations and the provider's statements to me.       Carroll Armenta MD  04/08/17 1958

## 2017-04-08 NOTE — ED AVS SNAPSHOT
[facility] Emergency Department    [address]    [facility] [address]    Phone:  [phone]    Fax:  [phone]                                       Maryalice Rebecca Wachter   MRN: [id number]    Department:  [facility] Emergency Department   Date of Visit:  4/8/2017           After Visit Summary Signature Page     I have received my discharge instructions, and my questions have been answered. I have discussed any challenges I see with this plan with the nurse or doctor.    ..........................................................................................................................................  Patient/Patient Representative Signature      ..........................................................................................................................................  Patient Representative Print Name and Relationship to Patient    ..................................................               ................................................  Date                                            Time    ..........................................................................................................................................  Reviewed by Signature/Title    ...................................................              ..............................................  Date                                                            Time

## 2017-04-08 NOTE — ED NOTES
Patient presents to the ED with ongoing intermittent chest pain. Patient seen in the ED on Tuesday for symptoms and states was diagnosed with possible gallbladder issue. Followed up with PMD yesterday and has appointment scheduled with surgeon on Thursday. Pain not managed at home. Currently rates 9/10. States pain is worse after eating.

## 2017-04-08 NOTE — ED NOTES
Discharge instructions reviewed with patient.  Patient verbalizes her understanding.   Followup care as well as discharge prescriptions also reviewed.  DC to home per order,.  All questions answered.

## 2017-04-08 NOTE — TELEPHONE ENCOUNTER
"Call Type: Triage Call    Presenting Problem:  \"I have a lot of pain.\"  Patient states she was  seen in ER on 04/04/17 for \"Chest pain\" and followed up with her  primary provider yesterday and prescribed Tramadol for pain. Patient  states pain in (L) chest area has increased and is unrelieved with  Tylenol and  prescribed Tramadol.  Declined triage. Patient  will  have her daughter  her to [facility] ER now.  Triage Note:  Guideline Title: No Guideline - Advice Per Reference (Adult)  Recommended Disposition: See ED Immediately  Original Inclination: Would have called ED  Override Disposition:  Intended Action: Go to Hospital / ED  Physician Contacted: No  SEE ED IMMEDIATELY ?  YES  ACTIVATE  ? NO  Physician Instructions:  Care Advice:  "

## 2017-04-08 NOTE — DISCHARGE INSTRUCTIONS
Please make an appointment to follow up with your primary care provider in 2-3 days .    Return to ER immediately if you develop: worsening symptoms, Fever > 101, persistent nausea or vomiting OR you have any other concerns about your health.    You will be contacted by Jeanne to setup endoscopy and Stress Test    Discharge Instructions  Chest Pain    You have been seen today for chest pain or discomfort.  At this time, your doctor has found no signs that your chest pain is due to a serious or life-threatening condition, (or you have declined more testing and/or admission to the hospital). However, sometimes there is a serious problem that does not show up right away. Your evaluation today may not be complete and you may need further testing and evaluation.     You need to follow-up with your regular doctor within 3 days.    Return to the Emergency Department if:    Your chest pain changes, gets worse, starts to happen more often, or comes with less activity.    You are short of breath.    You get very weak or tired.    You pass out or faint.    You have any new symptoms, like fever, cough, numb legs, or you cough up blood.    You have anything else that worries you.    Until you follow-up with your regular doctor please do the following:    Take one aspirin daily unless you have an allergy or are told not to by your doctor.    If a stress test appointment has been made, go to the appointment.    If you have questions, contact your regular doctor.    If your doctor today has told you to follow-up with your regular doctor, it is very important that you make an appointment with your clinic and go to the appointment.  If you do not follow-up with your primary doctor, it may result in missing an important development which could result in permanent injury or disability and/or lasting pain.  If there is any problem keeping your appointment, call your doctor or return to the Emergency Department.    If you were given a  prescription for medicine here today, be sure to read all of the information (including the package insert) that comes with your prescription.  This will include important information about the medicine, its side effects, and any warnings that you need to know about.  The pharmacist who fills the prescription can provide more information and answer questions you may have about the medicine.  If you have questions or concerns that the pharmacist cannot address, please call or return to the Emergency Department.     Opioid Medication Information    Pain medications are among the most commonly prescribed medicines, so we are including this information for all our patients. If you did not receive pain medication or get a prescription for pain medicine, you can ignore it.     You may have been given a prescription for an opioid (narcotic) pain medicine and/or have received a pain medicine while here in the Emergency Department. These medicines can make you drowsy or impaired. You must not drive, operate dangerous equipment, or engage in any other dangerous activities while taking these medications. If you drive while taking these medications, you could be arrested for DUI, or driving under the influence. Do not drink any alcohol while you are taking these medications.     Opioid pain medications can cause addiction. If you have a history of chemical dependency of any type, you are at a higher risk of becoming addicted to pain medications.  Only take these prescribed medications to treat your pain when all other options have been tried. Take it for as short a time and as few doses as possible. Store your pain pills in a secure place, as they are frequently stolen and provide a dangerous opportunity for children or visitors in your house to start abusing these powerful medications. We will not replace any lost or stolen medicine.  As soon as your pain is better, you should flush all your remaining medication.     Many  prescription pain medications contain Tylenol  (acetaminophen), including Vicodin , Tylenol #3 , Norco , Lortab , and Percocet .  You should not take any extra pills of Tylenol  if you are using these prescription medications or you can get very sick.  Do not ever take more than 3000 mg of acetaminophen in any 24 hour period.    All opioids tend to cause constipation. Drink plenty of water and eat foods that have a lot of fiber, such as fruits, vegetables, prune juice, apple juice and high fiber cereal.  Take a laxative if you don t move your bowels at least every other day. Miralax , Milk of Magnesia, Colace , or Senna  can be used to keep you regular.      Remember that you can always come back to the Emergency Department if you are not able to see your regular doctor in the amount of time listed above, if you get any new symptoms, or if there is anything that worries you.

## 2017-04-08 NOTE — ED AVS SNAPSHOT
[facility] Emergency Department    [address] [place] [address]    Phone:  [phone]    Fax:  [phone]                                       Maryalice Rebecca Wachter   MRN: [id number]    Department:  [facility] Emergency Department   Date of Visit:  4/8/2017           Patient Information     Date Of Birth          11/11/1933        Your diagnoses for this visit were:     Hiatal hernia     Atypical chest pain        You were seen by Carroll Armenta MD.        Discharge Instructions       Please make an appointment to follow up with your primary care provider in 2-3 days .    Return to ER immediately if you develop: worsening symptoms, Fever > 101, persistent nausea or vomiting OR you have any other concerns about your health.    You will be contacted by Jeanne to setup endoscopy and Stress Test    Discharge Instructions  Chest Pain    You have been seen today for chest pain or discomfort.  At this time, your doctor has found no signs that your chest pain is due to a serious or life-threatening condition, (or you have declined more testing and/or admission to the hospital). However, sometimes there is a serious problem that does not show up right away. Your evaluation today may not be complete and you may need further testing and evaluation.     You need to follow-up with your regular doctor within 3 days.    Return to the Emergency Department if:    Your chest pain changes, gets worse, starts to happen more often, or comes with less activity.    You are short of breath.    You get very weak or tired.    You pass out or faint.    You have any new symptoms, like fever, cough, numb legs, or you cough up blood.    You have anything else that worries you.    Until you follow-up with your regular doctor please do the following:    Take one aspirin daily unless you have an allergy or are told not to by your doctor.    If a stress test appointment has been made,  go to the appointment.    If you have questions, contact your regular doctor.    If your doctor today has told you to follow-up with your regular doctor, it is very important that you make an appointment with your clinic and go to the appointment.  If you do not follow-up with your primary doctor, it may result in missing an important development which could result in permanent injury or disability and/or lasting pain.  If there is any problem keeping your appointment, call your doctor or return to the Emergency Department.    If you were given a prescription for medicine here today, be sure to read all of the information (including the package insert) that comes with your prescription.  This will include important information about the medicine, its side effects, and any warnings that you need to know about.  The pharmacist who fills the prescription can provide more information and answer questions you may have about the medicine.  If you have questions or concerns that the pharmacist cannot address, please call or return to the Emergency Department.     Opioid Medication Information    Pain medications are among the most commonly prescribed medicines, so we are including this information for all our patients. If you did not receive pain medication or get a prescription for pain medicine, you can ignore it.     You may have been given a prescription for an opioid (narcotic) pain medicine and/or have received a pain medicine while here in the Emergency Department. These medicines can make you drowsy or impaired. You must not drive, operate dangerous equipment, or engage in any other dangerous activities while taking these medications. If you drive while taking these medications, you could be arrested for DUI, or driving under the influence. Do not drink any alcohol while you are taking these medications.     Opioid pain medications can cause addiction. If you have a history of chemical dependency of any type, you  are at a higher risk of becoming addicted to pain medications.  Only take these prescribed medications to treat your pain when all other options have been tried. Take it for as short a time and as few doses as possible. Store your pain pills in a secure place, as they are frequently stolen and provide a dangerous opportunity for children or visitors in your house to start abusing these powerful medications. We will not replace any lost or stolen medicine.  As soon as your pain is better, you should flush all your remaining medication.     Many prescription pain medications contain Tylenol  (acetaminophen), including Vicodin , Tylenol #3 , Norco , Lortab , and Percocet .  You should not take any extra pills of Tylenol  if you are using these prescription medications or you can get very sick.  Do not ever take more than 3000 mg of acetaminophen in any 24 hour period.    All opioids tend to cause constipation. Drink plenty of water and eat foods that have a lot of fiber, such as fruits, vegetables, prune juice, apple juice and high fiber cereal.  Take a laxative if you don t move your bowels at least every other day. Miralax , Milk of Magnesia, Colace , or Senna  can be used to keep you regular.      Remember that you can always come back to the Emergency Department if you are not able to see your regular doctor in the amount of time listed above, if you get any new symptoms, or if there is anything that worries you.            Future Appointments        Provider Department Dept Phone Center    4/13/2017 10:30 AM Niurka Briseno MD [facility] [phone] [facility]    4/13/2017 1:00 PM Janey Romero [facility] [phone] [facility]    5/12/2017 9:00 AM FM RN Visits [facility] [phone] [facility]      24 Hour Appointment Hotline       To make an appointment at any [facility], call [phone] ([phone]). If you don't have a family doctor or  clinic, we will help you find one. [facility] are conveniently located to serve the needs of you and your family.          ED Discharge Orders     GASTROENTEROLOGY ADULT REF PROCEDURE ONLY       Last Lab Result: Creatinine (mg/dL)       Date                     Value                 04/08/2017               0.95             ----------  There is no height or weight on file to calculate BMI.     Needed:  No  Language:  English    Patient will be contacted to schedule procedure.     Please be aware that coverage of these services is subject to the terms and limitations of your health insurance plan.  Call member services at your health plan with any benefit or coverage questions.  Any procedures must be performed at a [facility] facility OR coordinated by your clinic's referral office.    Please bring the following with you to your appointment:    (1) Any X-Rays, CTs or MRIs which have been performed.  Contact the facility where they were done to arrange for  prior to your scheduled appointment.    (2) List of current medications   (3) This referral request   (4) Any documents/labs given to you for this referral            NM Lexiscan stress test (nuc card)       The type of stress to be performed (pharmacologic or physical) will be at the discretion of the supervising physician as per department protocol.                     Review of your medicines      START taking        Dose / Directions Last dose taken    oxyCODONE 5 MG/5ML solution   Commonly known as:  ROXICODONE   Dose:  2.5-5 mg   Quantity:  90 mL        Take 2.5-5 mLs (2.5-5 mg) by mouth every 6 hours as needed for moderate to severe pain or pain   Refills:  0        ranitidine 150 MG tablet   Commonly known as:  ZANTAC   Dose:  150 mg   Quantity:  6 tablet        Take 1 tablet (150 mg) by mouth 2 times daily for 3 days   Refills:  0          Our records show that you are taking the medicines listed below. If these are incorrect, please  call your family doctor or clinic.        Dose / Directions Last dose taken    * albuterol (2.5 MG/3ML) 0.083% neb solution   Dose:  1 vial        Take 1 vial by nebulization every 6 hours as needed for shortness of breath / dyspnea or wheezing Reported on 3/2/2017   Refills:  0        * albuterol 108 (90 BASE) MCG/ACT Inhaler   Dose:  2 puff   Generic drug:  albuterol        Inhale 2 puffs into the lungs every 6 hours Reported on 3/2/2017   Refills:  0        ascorbic acid 500 MG tablet   Commonly known as:  VITAMIN C   Dose:  500 mg        Take 500 mg by mouth daily.   Refills:  0        ASPIRIN EC PO   Dose:  81 mg        Take 81 mg by mouth.   Refills:  0        B-12 1000 MCG Caps   Dose:  1 capsule        Take 1 capsule by mouth 2 times daily   Refills:  0        calcium carb 1250 mg (500 mg [place])/vitamin D 200 units 500-200 MG-UNIT per tablet   Commonly known as:  OSCAL with D   Dose:  1 tablet        Take 1 tablet by mouth daily.   Refills:  0        ferrous sulfate 325 (65 FE) MG tablet   Commonly known as:  IRON   Dose:  1 tablet        Take 1 tablet by mouth daily (with breakfast).   Refills:  0        FIBERCON 625 MG tablet   Dose:  1 tablet   Generic drug:  calcium polycarbophil        Take 1 tablet by mouth 2 times daily.   Refills:  0        glipiZIDE 10 MG 24 hr tablet   Commonly known as:  glipiZIDE XL   Dose:  10 mg   Quantity:  180 tablet        Take 1 tablet (10 mg) by mouth 2 times daily   Refills:  0        hydrochlorothiazide 25 MG tablet   Commonly known as:  HYDRODIURIL   Dose:  25 mg   Quantity:  90 tablet        Take 1 tablet (25 mg) by mouth daily   Refills:  1        levothyroxine 100 MCG tablet   Commonly known as:  SYNTHROID/LEVOTHROID   Dose:  100 mcg   Quantity:  90 tablet        Take 1 tablet (100 mcg) by mouth daily   Refills:  1        lisinopril 40 MG tablet   Commonly known as:  PRINIVIL/ZESTRIL   Dose:  40 mg   Quantity:  90 tablet        Take 1 tablet (40 mg) by mouth daily    Refills:  0        LOPERAMIDE HCL PO        1/2 to 1 tablet by mouth as needed.   Refills:  0        metFORMIN 500 MG tablet   Commonly known as:  GLUCOPHAGE   Dose:  1000 mg   Quantity:  360 tablet        Take 2 tablets (1,000 mg) by mouth 2 times daily (with meals)   Refills:  0        metoprolol 50 MG tablet   Commonly known as:  LOPRESSOR   Dose:  50 mg   Quantity:  180 tablet        Take 1 tablet (50 mg) by mouth 2 times daily   Refills:  1        mometasone 220 MCG/INH Inhaler   Commonly known as:  ASMANEX   Dose:  2 puff        Inhale 2 puffs into the lungs daily.   Refills:  0        omeprazole 20 MG CR capsule   Commonly known as:  priLOSEC   Dose:  20 mg   Quantity:  30 capsule        Take 1 capsule (20 mg) by mouth daily   Refills:  10        pioglitazone 30 MG tablet   Commonly known as:  ACTOS   Dose:  30 mg   Quantity:  90 tablet        Take 1 tablet (30 mg) by mouth daily   Refills:  1        simvastatin 20 MG tablet   Commonly known as:  ZOCOR   Dose:  20 mg   Quantity:  90 tablet        Take 1 tablet (20 mg) by mouth At Bedtime   Refills:  2        sucralfate 1 GM/10ML suspension   Commonly known as:  CARAFATE   Dose:  1 g   Quantity:  420 mL        Take 10 mLs (1 g) by mouth 4 times daily   Refills:  1        tiotropium 18 MCG capsule   Commonly known as:  SPIRIVA   Dose:  18 mcg        Inhale 18 mcg into the lungs daily.   Refills:  0        traMADol 50 MG tablet   Commonly known as:  ULTRAM   Dose:  25 mg   Quantity:  15 tablet        Take 0.5 tablets (25 mg) by mouth every 6 hours as needed for pain   Refills:  0        traZODone 50 MG tablet   Commonly known as:  DESYREL   Dose:   mg   Quantity:  60 tablet        Take 1-2 tablets ( mg) by mouth At Bedtime   Refills:  5        VITAMIN D (CHOLECALCIFEROL) PO   Dose:  2000 Units        Take 2,000 Units by mouth daily   Refills:  0        warfarin 3 MG tablet   Commonly known as:  COUMADIN   Quantity:  90 tablet        Take 1 tablet  (3mg) daily except 1/2 tablet (1.5mg) on Fridays   Refills:  1        * Notice:  This list has 2 medication(s) that are the same as other medications prescribed for you. Read the directions carefully, and ask your doctor or other care provider to review them with you.            Prescriptions were sent or printed at these locations (2 Prescriptions)                   Other Prescriptions                Printed at Department/Unit printer (2 of 2)         ranitidine (ZANTAC) 150 MG tablet               oxyCODONE (ROXICODONE) 5 MG/5ML solution                Procedures and tests performed during your visit     CBC with platelets differential    Comprehensive metabolic panel    EKG 12 lead    INR    Lipase    Troponin I      Orders Needing Specimen Collection     None      Pending Results     Date and Time Order Name Status Description    4/8/2017 0917 EKG 12 lead Preliminary             Pending Culture Results     No orders found from 4/6/2017 to 4/9/2017.            Test Results From Your Hospital Stay        4/8/2017  9:04 AM      Component Results     Component Value Ref Range & Units Status    WBC 8.9 4.0 - 11.0 10e9/L Final    RBC Count 4.57 3.8 - 5.2 10e12/L Final    Hemoglobin 13.6 11.7 - 15.7 g/dL Final    Hematocrit 42.2 35.0 - 47.0 % Final    MCV 92 78 - 100 fl Final    MCH 29.8 26.5 - 33.0 pg Final    MCHC 32.2 31.5 - 36.5 g/dL Final    RDW 14.2 10.0 - 15.0 % Final    Platelet Count 355 150 - 450 10e9/L Final    Diff Method Automated Method  Final    % Neutrophils 71.7 % Final    % Lymphocytes 19.6 % Final    % Monocytes 7.2 % Final    % Eosinophils 1.1 % Final    % Basophils 0.2 % Final    % Immature Granulocytes 0.2 % Final    Nucleated RBCs 0 0 /100 Final    Absolute Neutrophil 6.4 1.6 - 8.3 10e9/L Final    Absolute Lymphocytes 1.8 0.8 - 5.3 10e9/L Final    Absolute Monocytes 0.6 0.0 - 1.3 10e9/L Final    Absolute Eosinophils 0.1 0.0 - 0.7 10e9/L Final    Absolute Basophils 0.0 0.0 - 0.2 10e9/L Final     Abs Immature Granulocytes 0.0 0 - 0.4 10e9/L Final    Absolute Nucleated RBC 0.0  Final         4/8/2017  9:25 AM      Component Results     Component Value Ref Range & Units Status    Sodium 134 133 - 144 mmol/L Final    Potassium 3.6 3.4 - 5.3 mmol/L Final    Chloride 97 94 - 109 mmol/L Final    Carbon Dioxide 29 20 - 32 mmol/L Final    Anion Gap 8 3 - 14 mmol/L Final    Glucose 148 (H) 70 - 99 mg/dL Final    Urea Nitrogen 20 7 - 30 mg/dL Final    Creatinine 0.95 0.52 - 1.04 mg/dL Final    GFR Estimate 56 (L) >60 mL/min/1.7m2 Final    Non  GFR Calc    GFR Estimate If Black 68 >60 mL/min/1.7m2 Final    African American GFR Calc    Calcium 9.5 8.5 - 10.1 mg/dL Final    Bilirubin Total 0.8 0.2 - 1.3 mg/dL Final    Albumin 3.7 3.4 - 5.0 g/dL Final    Protein Total 7.3 6.8 - 8.8 g/dL Final    Alkaline Phosphatase 67 40 - 150 U/L Final    ALT 19 0 - 50 U/L Final    AST 19 0 - 45 U/L Final         4/8/2017  9:25 AM      Component Results     Component Value Ref Range & Units Status    Lipase 97 73 - 393 U/L Final         4/8/2017  9:26 AM      Component Results     Component Value Ref Range & Units Status    Troponin I ES  0.000 - 0.045 ug/L Final    <0.015  The 99th percentile for upper reference range is 0.045 ug/L.  Troponin values in   the range of 0.045 - 0.120 ug/L may be associated with risks of adverse   clinical events.           4/8/2017 10:25 AM      Component Results     Component Value Ref Range & Units Status    INR 2.19 (H) 0.86 - 1.14 Final                Clinical Quality Measure: Blood Pressure Screening     Your blood pressure was checked while you were in the emergency department today. The last reading we obtained was  BP: 133/80 . Please read the guidelines below about what these numbers mean and what you should do about them.  If your systolic blood pressure (the top number) is less than 120 and your diastolic blood pressure (the bottom number) is less than 80, then your blood  pressure is normal. There is nothing more that you need to do about it.  If your systolic blood pressure (the top number) is 120-139 or your diastolic blood pressure (the bottom number) is 80-89, your blood pressure may be higher than it should be. You should have your blood pressure rechecked within a year by a primary care provider.  If your systolic blood pressure (the top number) is 140 or greater or your diastolic blood pressure (the bottom number) is 90 or greater, you may have high blood pressure. High blood pressure is treatable, but if left untreated over time it can put you at risk for heart attack, stroke, or kidney failure. You should have your blood pressure rechecked by a primary care provider within the next 4 weeks.  If your provider in the emergency department today gave you specific instructions to follow-up with your doctor or provider even sooner than that, you should follow that instruction and not wait for up to 4 weeks for your follow-up visit.        Thank you for choosing [facility]       Thank you for choosing [facility] for your care. Our goal is always to provide you with excellent care. Hearing back from our patients is one way we can continue to improve our services. Please take a few minutes to complete the written survey that you may receive in the mail after you visit with us. Thank you!        [employer] Information     [email or web address] gives you secure access to your electronic health record. If you see a primary care provider, you can also send messages to your care team and make appointments. If you have questions, please call your primary care clinic.  If you do not have a primary care provider, please call [phone] and they will assist you.        Care EveryWhere ID     This is your Care EveryWhere ID. This could be used by other organizations to access your [facility] medical records  [id number]        After Visit Summary       This is your record. Keep this with you and show to your  community pharmacist(s) and doctor(s) at your next visit.

## 2017-04-08 NOTE — TELEPHONE ENCOUNTER
4-8-17      Patient calls --she has been ill- having issues with her large hiatal hernia --she did call 911--felt chest tightness--all her test came out normal.    Doctor things all this pain is from hiatal hernia. She is not filling the zantac because she is on omeprazole 20mg qam before food.  She had been taking tylenol and tramadol.    Last week --pain all day/all night. They dont think its gallbladder related? Though she did have gallstones decades ago.     Her main symptom is belching after eating anything and also gas.       Today from ER --gave her oxycodone pills and carafate liquid --did not receive due to ins. doesnt cover it.            Plan:    1.  mtm will have take her omeprazole 20mg. Bid --before b-fast and before supper.   Then see mtm Thursday next week.     She will try this and let me know how it goes.       Janey Romero [facility].  Medication Therapy Management Provider  [phone]

## 2017-04-09 LAB — INTERPRETATION ECG - MUSE: NORMAL

## 2017-04-10 ENCOUNTER — TELEPHONE (OUTPATIENT)
Dept: PHARMACY | Facility: CLINIC | Age: 82
End: 2017-04-10

## 2017-04-10 NOTE — TELEPHONE ENCOUNTER
mtkusum notes pt. Has endoscopy test 10 am Thursday morning .  She has been taking her ppi drug bid now --doesn't have any gerd pain.    She would like to change her Thursday appt. To the following week Monday April 17th at 2;30pm.       Janey Romero [facility].  Medication Therapy Management Provider  [phone]

## 2017-04-10 NOTE — TELEPHONE ENCOUNTER
Thanks for this.  She was still having pain when I saw her here.  Thought she could see the surgeons re: gallstones or hernia.  I didn't really think it was gallbladder related.  I thought maybe the carafate would help but looks like insurance wouldn't cover.  GI consult might not hurt as well?    Let me know what you think?    Twyla

## 2017-04-12 DIAGNOSIS — I51.9 SYSTOLIC DYSFUNCTION, LEFT VENTRICLE: ICD-10-CM

## 2017-04-12 DIAGNOSIS — R07.9 CHEST PAIN: ICD-10-CM

## 2017-04-12 DIAGNOSIS — I25.2 PAST MYOCARDIAL INFARCTION: ICD-10-CM

## 2017-04-12 RX ORDER — LISINOPRIL 40 MG/1
40 TABLET ORAL DAILY
Qty: 90 TABLET | Refills: 1 | Status: SHIPPED | OUTPATIENT
Start: 2017-04-12 | End: 2017-10-11

## 2017-04-12 NOTE — PROGRESS NOTES
SUBJECTIVE:                                                    Maryalice Rebecca Wachter is a 83 year old female who presents to clinic today for the following health issues:  ED/UC Followup:    Facility:  [facility]   Date of visit: 4/8/17  Reason for visit: left lower rib pain  Current Status: just having gas pain, fullness; had Endoscopy today      Sindi is here for a ED follow up. Notes that she went back to the ED on 4/8/17 as she was in so much pain. Was given tramadol on 4/7/17 as this does not help. Had a rough night so she went back into ED, was given oxycodone. Notes that over the weekend used oxycodone and this eventually helped with pain. The ED doctor scheduled her endoscopy and stress test. Will be having stress test tomorrow. Her endoscopy was earlier today, it went very well. Denies any pain with procedure. Results show mild gastritis and hiatal hernia.  Has been off of warfarin since Sunday. Notes that she has been taking tylenol twice a day for pain. Today she is feeling okay, some of gas and bloating. Christopher had told her to increase her omeprazole to twice a day. Thinks pain may be improving. Sindi notes that she does not want to have stress test done tomorrow. Also reports she does not want to do cardic rehab again. Does not think her issues are related to heart. Denies any increased pain with exercise or activity. Thinks it is related to GI issues due to belching and gas.          Problem list and histories reviewed & adjusted, as indicated.  Additional history: as documented    Recent Labs   Lab Test  04/08/17   0813  04/04/17   0534  03/02/17   1407   12/19/16   1343  10/21/16   0520   10/10/16   1404  07/01/16   0829   11/19/15   1011  06/30/15   0952   12/12/14   0954   A1C   --    --   9.4*   --    --    --    --   7.7*  8.3*   < >  7.1*  7.9*   < >  7.7*   LDL   --    --    --    --    --    --    --    --   39   --    --   25   --   60   HDL   --    --    --    --     --    --    --    --   40*   --    --   36*   --    --    TRIG   --    --    --    --    --    --    --    --   190*   --    --   240*   --    --    ALT  19  20   --    --    --   22   --    --    --    --    --    --    < >  19   CR  0.95  0.88  1.11*   < >   --   0.87   < >   --    --    --   0.76   --    < >  0.82   GFRESTIMATED  56*  61  47*   < >   --   63   < >   --    --    --   72   --    < >  67   GFRESTBLACK  68  74  57*   < >   --   76   < >   --    --    --   87   --    < >  81   POTASSIUM  3.6  3.8  4.2   < >   --   3.9   < >   --    --    --   4.2   --    < >  4.5   TSH   --    --    --    --   0.55   --    --    --    --    --   0.63  0.38*   < >  0.37*    < > = values in this interval not displayed.      BP Readings from Last 3 Encounters:   04/13/17 100/46   04/13/17 119/81   04/08/17 128/77    Wt Readings from Last 3 Encounters:   04/13/17 81.6 kg (180 lb)   04/13/17 81.6 kg (180 lb)   04/07/17 81.6 kg (180 lb)               Labs reviewed in EPIC    Reviewed and updated as needed this visit by clinical staff  Tobacco  Allergies  Meds  Problems  Med Hx  Surg Hx  Fam Hx  Soc Hx        Reviewed and updated as needed this visit by Provider         ROS:  CONSTITUTIONAL:NEGATIVE for fever, chills, change in weight  INTEGUMENTARY/SKIN: NEGATIVE for worrisome rashes, moles or lesions  CV: NEGATIVE for chest pain, palpitations or peripheral edema  GI: POSITIVE for abdominal pain generalized, gas or bloating and Hx GERD  MUSCULOSKELETAL: NEGATIVE for significant arthralgias or myalgia  PSYCHIATRIC: NEGATIVE for changes in mood or affect    This document serves as a record of the services and decisions personally performed and made by Venu Maria PA-C. It was created on her behalf by Madison Stratton, a trained medical scribe. The creation of this document is based the provider's statements to the medical scribe.  Madison Stratton April 13, 2017 3:28 PM    OBJECTIVE:                                                     /46 (BP Location: Right arm, Patient Position: Chair, Cuff Size: Adult Regular)  Pulse 72  Temp 98.1  F (36.7  C) (Oral)  Resp 20  Wt 81.6 kg (180 lb)  SpO2 95%  BMI 29.95 kg/m2  Body mass index is 29.95 kg/(m^2).  GENERAL: healthy, alert and no distress  MS: no gross musculoskeletal defects noted, no edema  SKIN: no suspicious lesions or rashes  NEURO: Normal strength and tone, mentation intact and speech normal  PSYCH: mentation appears normal, affect normal/bright    Diagnostic Test Results:  none      ASSESSMENT/PLAN:                                                    1. Hiatal hernia  Endoscopy completed today. Gastritis noted and biopsied. Follow up per results.      2. Atypical chest pain  Discussed importance of stress test tomorrow to make sure everything ok and strongly recommended patient attend. Will call as see which medications she can not take and call her back by tomorrow morning.  Follow up as needed per stress test.     3. Chest pain, unspecified type  See above. Continue with omeprazole twice a day as this seems to be helping with symptoms. Stress test tomorrow with cardiology. Follow up as needed.   - omeprazole (PRILOSEC) 20 MG CR capsule; Take 1 capsule (20 mg) by mouth 2 times daily  Dispense: 90 capsule; Refill: 1      The information in this document, created by the medical scribe for me, accurately reflects the services I personally performed and the decisions made by me. I have reviewed and approved this document for accuracy prior to leaving the patient care area.  3:28 PM 4/13/2017          Venu Maria PA-C  [facility]

## 2017-04-12 NOTE — TELEPHONE ENCOUNTER
lisinopril (PRINIVIL/ZESTRIL) 40 MG tablet      Last Written Prescription Date: 1/17/17  Last Fill Quantity: 90, # refills: 0  Last Office Visit with [facility] or  Health prescribing provider: 4/7/17    Next 5 appointments (look out 90 days)     Apr 13, 2017  3:00 PM CDT   SHORT with Venu Maria PA-C   [facility]    [address] [place] [address], [address]  [facility] [address] [phone]            Apr 17, 2017  2:30 PM CDT   SHORT with Janey Romero RPH   [facility]    [address] [facility] [id number] [phone]                   Potassium   Date Value Ref Range Status   04/08/2017 3.6 3.4 - 5.3 mmol/L Final     Creatinine   Date Value Ref Range Status   04/08/2017 0.95 0.52 - 1.04 mg/dL Final     BP Readings from Last 3 Encounters:   04/08/17 128/77   04/07/17 147/70   04/04/17 115/63     ________________________________________________________________________________  omeprazole (PRILOSEC) 20 MG capsule      Last Written Prescription Date: 5/9/16  Last Fill Quantity: 30,  # refills: 10   Last Office Visit with [facility] or  Health prescribing provider: 4/7/17        JOE Michelle  April 12, 2017  10:22 AM

## 2017-04-13 ENCOUNTER — OFFICE VISIT (OUTPATIENT)
Dept: FAMILY MEDICINE | Facility: CLINIC | Age: 82
End: 2017-04-13
Payer: COMMERCIAL

## 2017-04-13 ENCOUNTER — HOSPITAL ENCOUNTER (OUTPATIENT)
Facility: CLINIC | Age: 82
Discharge: HOME | End: 2017-04-13
Attending: INTERNAL MEDICINE | Admitting: INTERNAL MEDICINE
Payer: COMMERCIAL

## 2017-04-13 ENCOUNTER — TELEPHONE (OUTPATIENT)
Dept: FAMILY MEDICINE | Facility: CLINIC | Age: 82
End: 2017-04-13

## 2017-04-13 VITALS
TEMPERATURE: 98.1 F | SYSTOLIC BLOOD PRESSURE: 100 MMHG | OXYGEN SATURATION: 95 % | BODY MASS INDEX: 29.95 KG/M2 | RESPIRATION RATE: 20 BRPM | DIASTOLIC BLOOD PRESSURE: 46 MMHG | HEART RATE: 72 BPM | WEIGHT: 180 LBS

## 2017-04-13 VITALS
SYSTOLIC BLOOD PRESSURE: 119 MMHG | BODY MASS INDEX: 29.99 KG/M2 | RESPIRATION RATE: 14 BRPM | DIASTOLIC BLOOD PRESSURE: 81 MMHG | WEIGHT: 180 LBS | OXYGEN SATURATION: 97 % | HEIGHT: 65 IN

## 2017-04-13 DIAGNOSIS — R07.89 ATYPICAL CHEST PAIN: ICD-10-CM

## 2017-04-13 DIAGNOSIS — K44.9 HIATAL HERNIA: Primary | ICD-10-CM

## 2017-04-13 DIAGNOSIS — R07.9 CHEST PAIN, UNSPECIFIED TYPE: ICD-10-CM

## 2017-04-13 LAB
GLUCOSE BLDC GLUCOMTR-MCNC: 109 MG/DL (ref 70–99)
INR PPP: 1.54 (ref 0.86–1.14)
UPPER GI ENDOSCOPY: NORMAL

## 2017-04-13 PROCEDURE — 25000125 ZZHC RX 250: Performed by: INTERNAL MEDICINE

## 2017-04-13 PROCEDURE — 88305 TISSUE EXAM BY PATHOLOGIST: CPT | Mod: 26 | Performed by: INTERNAL MEDICINE

## 2017-04-13 PROCEDURE — 43239 EGD BIOPSY SINGLE/MULTIPLE: CPT | Performed by: INTERNAL MEDICINE

## 2017-04-13 PROCEDURE — 99214 OFFICE O/P EST MOD 30 MIN: CPT | Performed by: PHYSICIAN ASSISTANT

## 2017-04-13 PROCEDURE — 85610 PROTHROMBIN TIME: CPT | Performed by: INTERNAL MEDICINE

## 2017-04-13 PROCEDURE — G0500 MOD SEDAT ENDO SERVICE >5YRS: HCPCS | Performed by: INTERNAL MEDICINE

## 2017-04-13 PROCEDURE — 25000128 H RX IP 250 OP 636: Performed by: INTERNAL MEDICINE

## 2017-04-13 PROCEDURE — 43235 EGD DIAGNOSTIC BRUSH WASH: CPT | Performed by: INTERNAL MEDICINE

## 2017-04-13 PROCEDURE — 82962 GLUCOSE BLOOD TEST: CPT

## 2017-04-13 PROCEDURE — 25000132 ZZH RX MED GY IP 250 OP 250 PS 637: Performed by: INTERNAL MEDICINE

## 2017-04-13 PROCEDURE — 88305 TISSUE EXAM BY PATHOLOGIST: CPT | Performed by: INTERNAL MEDICINE

## 2017-04-13 PROCEDURE — 36415 COLL VENOUS BLD VENIPUNCTURE: CPT | Performed by: INTERNAL MEDICINE

## 2017-04-13 RX ORDER — FLUMAZENIL 0.1 MG/ML
0.2 INJECTION, SOLUTION INTRAVENOUS
Status: DISCONTINUED | OUTPATIENT
Start: 2017-04-13 | End: 2017-04-13 | Stop reason: HOSPADM

## 2017-04-13 RX ORDER — ONDANSETRON 4 MG/1
4 TABLET, ORALLY DISINTEGRATING ORAL EVERY 6 HOURS PRN
Status: DISCONTINUED | OUTPATIENT
Start: 2017-04-13 | End: 2017-04-13 | Stop reason: HOSPADM

## 2017-04-13 RX ORDER — ONDANSETRON 2 MG/ML
4 INJECTION INTRAMUSCULAR; INTRAVENOUS
Status: DISCONTINUED | OUTPATIENT
Start: 2017-04-13 | End: 2017-04-13 | Stop reason: HOSPADM

## 2017-04-13 RX ORDER — LIDOCAINE 40 MG/G
CREAM TOPICAL
Status: DISCONTINUED | OUTPATIENT
Start: 2017-04-13 | End: 2017-04-13 | Stop reason: HOSPADM

## 2017-04-13 RX ORDER — ONDANSETRON 2 MG/ML
4 INJECTION INTRAMUSCULAR; INTRAVENOUS EVERY 6 HOURS PRN
Status: DISCONTINUED | OUTPATIENT
Start: 2017-04-13 | End: 2017-04-13 | Stop reason: HOSPADM

## 2017-04-13 RX ORDER — NALOXONE HYDROCHLORIDE 0.4 MG/ML
.1-.4 INJECTION, SOLUTION INTRAMUSCULAR; INTRAVENOUS; SUBCUTANEOUS
Status: DISCONTINUED | OUTPATIENT
Start: 2017-04-13 | End: 2017-04-13 | Stop reason: HOSPADM

## 2017-04-13 RX ORDER — FENTANYL CITRATE 50 UG/ML
INJECTION, SOLUTION INTRAMUSCULAR; INTRAVENOUS PRN
Status: DISCONTINUED | OUTPATIENT
Start: 2017-04-13 | End: 2017-04-13 | Stop reason: HOSPADM

## 2017-04-13 NOTE — IP AVS SNAPSHOT
MRN:[id number]                      After Visit Summary   4/13/2017    Maryalice Rebecca Wachter    MRN: [id number]           Thank you!     Thank you for choosing [facility] for your care. Our goal is always to provide you with excellent care. Hearing back from our patients is one way we can continue to improve our services. Please take a few minutes to complete the written survey that you may receive in the mail after you visit. If you would like to speak to someone directly about your visit please contact Patient Relations at [phone]. Thank you!          Patient Information     Date Of Birth          11/11/1933        About your hospital stay     You were admitted on:  April 13, 2017 You last received care in the:  [facility]    You were discharged on:  April 13, 2017       Who to Call     For medical emergencies, please call 911.  For non-urgent questions about your medical care, please call your primary care provider or clinic, [phone]  For questions related to your surgery, please call your surgery clinic        Attending Provider     Provider Specialty    Johnny Stratton MD Gastroenterology       Primary Care Provider Office Phone # Fax #    Venu Maria PA-C [phone]       [address] [address]        Your next 10 appointments already scheduled     Apr 13, 2017  3:00 PM CDT   SHORT with Venu Maria PA-C   [facility]    [address] [place] [address], [address]  [facility] [id number] [phone]            Apr 14, 2017 10:00 AM CDT   NM MPI WITH LEXISCAN with RHNM1   [facility] ([facility])    [address] [facility] [address]   [phone]           For a ONE day exam: Allow 3-4 hours for test. For a TWO day exam: Allow 2 hours PER day for test.  You may need to stop some  medicines before the test. Follow your doctor s orders. - If you take a beta blocker: Follow your doctor s specific instructions on taking it prior to and on the day of your exam. - If you take Aggrenox or dipyridamole (Persantine, Permole), stop taking it 48 hours before your test. - If you take Viagra, Cialis or Levitra, stop taking it 48 hours before your test. - If you take theophylline or aminophylline, stop taking it 12 hours before your test.  For patients with diabetes: - If you take insulin, call your diabetes care team. Ask if you should take a 1/2 dose the morning of your test. - If you take diabetes medicine by mouth, don t take it on the morning of your test. Bring it with you to take after the test. (If you have questions, call your diabetes care team.)  Do not take nitrates on the day of your test. Do not wear your Nitro-Patch.  Stop all caffeine 12 hours before the test. This includes coffee, tea, soda pop, chocolate and certain medicines (such as Anacin, Excedrin and NoDoz). Also avoid decaf coffee and tea, as these contain small amounts of caffeine.  No alcohol, smoking or other tobacco for 12 hours before the test.  Stop eating 3 hours before the test. You may drink water.  Please wear a loose two-piece outfit. If you will have an exercise test, bring rubber-soled walking shoes.  When you arrive, please tell us if you: - Have diabetes - Are breastfeeding - May be pregnant - Have a pacemaker of ICD (implantable defibrillator).  Please call your Imaging Department at your exam site with any questions.            Apr 14, 2017 11:30 AM CDT   Ekg Stress Nm Lexiscan with RESRM3   [facility] ([facility])    [address]  [facility] [id number] [phone]            Apr 17, 2017  2:30 PM CDT   [facility]    [address] [facility] [address] [phone]        "     May 12, 2017  9:00 AM CDT   Anticoagulation Visit with FM RN VISITS   [facility]    [address] [facility], [address]  [facility] [address] [phone]              Further instructions from your care team         Tips to Control Acid Reflux  To control acid reflux, you ll need to make some basic diet and lifestyle changes. The simple steps outlined below may be all you ll need to relieve discomfort.   Watch What You Eat      Avoid fatty foods and spicy foods.    Eat fewer acidic foods, such as citrus and tomato-based foods. These can increase symptoms.     Limit drinking alcohol, caffeine, and fizzy beverages. All increase acid reflux.    Try limiting chocolate, peppermint, and spearmint. These can worsen acid reflux in some people.    Watch When You Eat    Avoid lying down for 3 hours after eating.    Do not snack before going to bed.    Raise Your Head  Raising your head and upper body by 4\" to 6\" helps limit reflux when you re lying down. Put blocks under the head of the bed frame to raise it.                    [address] [place] [address]. All rights reserved. This information is not intended as a substitute for professional medical care. Always follow your healthcare professional's instructions..rh    Pending Results     Date and Time Order Name Status Description    4/13/2017 1054 Surgical pathology exam In process             Admission Information     Date & Time Provider Department Dept. Phone    4/13/2017 Johnny Stratton MD [facility] [phone]      Your Vitals Were     Blood Pressure Respirations Height Weight Pulse Oximetry BMI (Body Mass Index)    182/99 19 1.651 m (5' 5\") 81.6 kg (180 lb) 90% 29.95 kg/m2      MyChart Information     [employer] gives you secure access to your electronic health record. If you see a primary care provider, you can also send messages to your care team and " make appointments. If you have questions, please call your primary care clinic.  If you do not have a primary care provider, please call [phone] and they will assist you.        Care EveryWhere ID     This is your Care EveryWhere ID. This could be used by other organizations to access your [facility] medical records  [id number]           Review of your medicines      CONTINUE these medicines which may have CHANGED, or have new prescriptions. If we are uncertain of the size of tablets/capsules you have at home, strength may be listed as something that might have changed.        Dose / Directions    metFORMIN 500 MG tablet   Commonly known as:  GLUCOPHAGE   This may have changed:  how much to take   Used for:  Type 2 diabetes mellitus with hyperglycemia, without long-term current use of insulin (H)        Dose:  1000 mg   Take 2 tablets (1,000 mg) by mouth 2 times daily (with meals)   Quantity:  360 tablet   Refills:  0       traZODone 50 MG tablet   Commonly known as:  DESYREL   This may have changed:  how much to take   Used for:  Insomnia, unspecified type        Dose:   mg   Take 1-2 tablets ( mg) by mouth At Bedtime   Quantity:  60 tablet   Refills:  5         CONTINUE these medicines which have NOT CHANGED        Dose / Directions    * albuterol (2.5 MG/3ML) 0.083% neb solution        Dose:  1 vial   Take 1 vial by nebulization every 6 hours as needed for shortness of breath / dyspnea or wheezing Reported on 3/2/2017   Refills:  0       * albuterol 108 (90 BASE) MCG/ACT Inhaler   Generic drug:  albuterol        Dose:  2 puff   Inhale 2 puffs into the lungs every 6 hours Reported on 3/2/2017   Refills:  0       ascorbic acid 500 MG tablet   Commonly known as:  VITAMIN C        Dose:  500 mg   Take 500 mg by mouth daily.   Refills:  0       ASPIRIN EC PO        Dose:  81 mg   Take 81 mg by mouth.   Refills:  0       B-12 1000 MCG Caps        Dose:  1 capsule   Take 1 capsule by mouth 2 times daily    Refills:  0       calcium carb 1250 mg (500 mg [place])/vitamin D 200 units 500-200 MG-UNIT per tablet   Commonly known as:  OSCAL with D        Dose:  1 tablet   Take 1 tablet by mouth daily.   Refills:  0       ferrous sulfate 325 (65 FE) MG tablet   Commonly known as:  IRON        Dose:  1 tablet   Take 1 tablet by mouth daily (with breakfast).   Refills:  0       FIBERCON 625 MG tablet   Generic drug:  calcium polycarbophil        Dose:  1 tablet   Take 1 tablet by mouth 2 times daily.   Refills:  0       glipiZIDE 10 MG 24 hr tablet   Commonly known as:  glipiZIDE XL   Used for:  Type 2 diabetes mellitus with hyperglycemia, without long-term current use of insulin (H)        Dose:  10 mg   Take 1 tablet (10 mg) by mouth 2 times daily   Quantity:  180 tablet   Refills:  0       hydrochlorothiazide 25 MG tablet   Commonly known as:  HYDRODIURIL   Used for:  Essential hypertension        Dose:  25 mg   Take 1 tablet (25 mg) by mouth daily   Quantity:  90 tablet   Refills:  1       levothyroxine 100 MCG tablet   Commonly known as:  SYNTHROID/LEVOTHROID   Used for:  Hypothyroidism, unspecified type        Dose:  100 mcg   Take 1 tablet (100 mcg) by mouth daily   Quantity:  90 tablet   Refills:  1       lisinopril 40 MG tablet   Commonly known as:  PRINIVIL/ZESTRIL   Used for:  Systolic dysfunction, left ventricle, Past myocardial infarction        Dose:  40 mg   Take 1 tablet (40 mg) by mouth daily   Quantity:  90 tablet   Refills:  1       LOPERAMIDE HCL PO        1/2 to 1 tablet by mouth as needed.   Refills:  0       metoprolol 50 MG tablet   Commonly known as:  LOPRESSOR   Used for:  Past myocardial infarction, Systolic dysfunction, left ventricle, Atrial tachycardia, paroxysmal (H)        Dose:  50 mg   Take 1 tablet (50 mg) by mouth 2 times daily   Quantity:  180 tablet   Refills:  1       mometasone 220 MCG/INH Inhaler   Commonly known as:  ASMANEX        Dose:  2 puff   Inhale 2 puffs into the lungs daily.    Refills:  0       omeprazole 20 MG CR capsule   Commonly known as:  priLOSEC   Used for:  Chest pain        Dose:  20 mg   Take 1 capsule (20 mg) by mouth daily   Quantity:  90 capsule   Refills:  1       oxyCODONE 5 MG/5ML solution   Commonly known as:  ROXICODONE        Dose:  2.5-5 mg   Take 2.5-5 mLs (2.5-5 mg) by mouth every 6 hours as needed for moderate to severe pain or pain   Quantity:  90 mL   Refills:  0       pioglitazone 30 MG tablet   Commonly known as:  ACTOS   Used for:  Type 2 diabetes mellitus with hyperglycemia, without long-term current use of insulin (H)        Dose:  30 mg   Take 1 tablet (30 mg) by mouth daily   Quantity:  90 tablet   Refills:  1       simvastatin 20 MG tablet   Commonly known as:  ZOCOR   Used for:  Hyperlipidemia LDL goal <100        Dose:  20 mg   Take 1 tablet (20 mg) by mouth At Bedtime   Quantity:  90 tablet   Refills:  2       tiotropium 18 MCG capsule   Commonly known as:  SPIRIVA        Dose:  18 mcg   Inhale 18 mcg into the lungs daily.   Refills:  0       traMADol 50 MG tablet   Commonly known as:  ULTRAM   Used for:  Other chest pain        Dose:  25 mg   Take 0.5 tablets (25 mg) by mouth every 6 hours as needed for pain   Quantity:  15 tablet   Refills:  0       VITAMIN D (CHOLECALCIFEROL) PO        Dose:  2000 Units   Take 2,000 Units by mouth daily   Refills:  0       warfarin 3 MG tablet   Commonly known as:  COUMADIN   Used for:  Chronic atrial fibrillation (H)        Take 1 tablet (3mg) daily except 1/2 tablet (1.5mg) on Fridays   Quantity:  90 tablet   Refills:  1       * Notice:  This list has 2 medication(s) that are the same as other medications prescribed for you. Read the directions carefully, and ask your doctor or other care provider to review them with you.             Protect others around you: Learn how to safely use, store and throw away your medicines at www.disposemymeds.org.             Medication List: This is a list of all your medications  and when to take them. Check marks below indicate your daily home schedule. Keep this list as a reference.      Medications           Morning Afternoon Evening Bedtime As Needed    * albuterol (2.5 MG/3ML) 0.083% neb solution   Take 1 vial by nebulization every 6 hours as needed for shortness of breath / dyspnea or wheezing Reported on 3/2/2017                                * albuterol 108 (90 BASE) MCG/ACT Inhaler   Inhale 2 puffs into the lungs every 6 hours Reported on 3/2/2017   Generic drug:  albuterol                                ascorbic acid 500 MG tablet   Commonly known as:  VITAMIN C   Take 500 mg by mouth daily.                                ASPIRIN EC PO   Take 81 mg by mouth.                                B-12 1000 MCG Caps   Take 1 capsule by mouth 2 times daily                                calcium carb 1250 mg (500 mg [place])/vitamin D 200 units 500-200 MG-UNIT per tablet   Commonly known as:  OSCAL with D   Take 1 tablet by mouth daily.                                ferrous sulfate 325 (65 FE) MG tablet   Commonly known as:  IRON   Take 1 tablet by mouth daily (with breakfast).                                FIBERCON 625 MG tablet   Take 1 tablet by mouth 2 times daily.   Generic drug:  calcium polycarbophil                                glipiZIDE 10 MG 24 hr tablet   Commonly known as:  glipiZIDE XL   Take 1 tablet (10 mg) by mouth 2 times daily                                hydrochlorothiazide 25 MG tablet   Commonly known as:  HYDRODIURIL   Take 1 tablet (25 mg) by mouth daily                                levothyroxine 100 MCG tablet   Commonly known as:  SYNTHROID/LEVOTHROID   Take 1 tablet (100 mcg) by mouth daily                                lisinopril 40 MG tablet   Commonly known as:  PRINIVIL/ZESTRIL   Take 1 tablet (40 mg) by mouth daily                                LOPERAMIDE HCL PO   1/2 to 1 tablet by mouth as needed.                                metFORMIN 500 MG tablet    Commonly known as:  GLUCOPHAGE   Take 2 tablets (1,000 mg) by mouth 2 times daily (with meals)                                metoprolol 50 MG tablet   Commonly known as:  LOPRESSOR   Take 1 tablet (50 mg) by mouth 2 times daily                                mometasone 220 MCG/INH Inhaler   Commonly known as:  ASMANEX   Inhale 2 puffs into the lungs daily.                                omeprazole 20 MG CR capsule   Commonly known as:  priLOSEC   Take 1 capsule (20 mg) by mouth daily                                oxyCODONE 5 MG/5ML solution   Commonly known as:  ROXICODONE   Take 2.5-5 mLs (2.5-5 mg) by mouth every 6 hours as needed for moderate to severe pain or pain                                pioglitazone 30 MG tablet   Commonly known as:  ACTOS   Take 1 tablet (30 mg) by mouth daily                                simvastatin 20 MG tablet   Commonly known as:  ZOCOR   Take 1 tablet (20 mg) by mouth At Bedtime                                tiotropium 18 MCG capsule   Commonly known as:  SPIRIVA   Inhale 18 mcg into the lungs daily.                                traMADol 50 MG tablet   Commonly known as:  ULTRAM   Take 0.5 tablets (25 mg) by mouth every 6 hours as needed for pain                                traZODone 50 MG tablet   Commonly known as:  DESYREL   Take 1-2 tablets ( mg) by mouth At Bedtime                                VITAMIN D (CHOLECALCIFEROL) PO   Take 2,000 Units by mouth daily                                warfarin 3 MG tablet   Commonly known as:  COUMADIN   Take 1 tablet (3mg) daily except 1/2 tablet (1.5mg) on Fridays                                * Notice:  This list has 2 medication(s) that are the same as other medications prescribed for you. Read the directions carefully, and ask your doctor or other care provider to review them with you.

## 2017-04-13 NOTE — TELEPHONE ENCOUNTER
Per [facility] imaging staff:    No beta blockers, caffeine, no eating 2 hours prior of test, can drink water.     Triage informed pt. Of this. She also reported that on her ov instructions she will not take her diabetes medication.    Breana Humphries, RN, BSN, PHN

## 2017-04-13 NOTE — OR NURSING
Pt denies pain patient instructed to hold warfarin for 3 days as per dr medrano ,pt instructed to report and call her cardiologist if she has issues with stopping warfarin for 3 days or if she get symptomatic pt and daughter verbalized understanding

## 2017-04-13 NOTE — NURSING NOTE
"Chief Complaint   Patient presents with     ER F/U       Initial /46 (BP Location: Right arm, Patient Position: Chair, Cuff Size: Adult Regular)  Pulse 72  Temp 98.1  F (36.7  C) (Oral)  Resp 20  Wt 180 lb (81.6 kg)  SpO2 95%  BMI 29.95 kg/m2 Estimated body mass index is 29.95 kg/(m^2) as calculated from the following:    Height as of an earlier encounter on 4/13/17: 5' 5\" (1.651 m).    Weight as of this encounter: 180 lb (81.6 kg).  Medication Reconciliation: complete   Laisha Hernández MA      "

## 2017-04-13 NOTE — DISCHARGE INSTRUCTIONS
"  Tips to Control Acid Reflux  To control acid reflux, you ll need to make some basic diet and lifestyle changes. The simple steps outlined below may be all you ll need to relieve discomfort.   Watch What You Eat      Avoid fatty foods and spicy foods.    Eat fewer acidic foods, such as citrus and tomato-based foods. These can increase symptoms.     Limit drinking alcohol, caffeine, and fizzy beverages. All increase acid reflux.    Try limiting chocolate, peppermint, and spearmint. These can worsen acid reflux in some people.    Watch When You Eat    Avoid lying down for 3 hours after eating.    Do not snack before going to bed.    Raise Your Head  Raising your head and upper body by 4\" to 6\" helps limit reflux when you re lying down. Put blocks under the head of the bed frame to raise it.                    6440-1286 [facility], [address] [place] [address], [place] [address]. All rights reserved. This information is not intended as a substitute for professional medical care. Always follow your healthcare professional's instructions..rh  "

## 2017-04-13 NOTE — MR AVS SNAPSHOT
After Visit Summary   4/13/2017    Maryalice Rebecca Wachter    MRN: [id number]           Patient Information     Date Of Birth          11/11/1933        Visit Information        Provider Department      4/13/2017 3:00 PM Venu Maria PA-C [facility]        Today's Diagnoses     Hiatal hernia    -  1    Atypical chest pain        Chest pain, unspecified type           Follow-ups after your visit        Follow-up notes from your care team     Return in about 1 day (around 4/14/2017) for stress test, next week for results.      Your next 10 appointments already scheduled     Apr 14, 2017 10:00 AM CDT   NM MPI WITH LEXISCAN with RHNM1   [facility] ([facility])    [address] [facility] [id number] [phone]           For a ONE day exam: Allow 3-4 hours for test. For a TWO day exam: Allow 2 hours PER day for test.  You may need to stop some medicines before the test. Follow your doctor s orders. - If you take a beta blocker: Follow your doctor s specific instructions on taking it prior to and on the day of your exam. - If you take Aggrenox or dipyridamole (Persantine, Permole), stop taking it 48 hours before your test. - If you take Viagra, Cialis or Levitra, stop taking it 48 hours before your test. - If you take theophylline or aminophylline, stop taking it 12 hours before your test.  For patients with diabetes: - If you take insulin, call your diabetes care team. Ask if you should take a 1/2 dose the morning of your test. - If you take diabetes medicine by mouth, don t take it on the morning of your test. Bring it with you to take after the test. (If you have questions, call your diabetes care team.)  Do not take nitrates on the day of your test. Do not wear your Nitro-Patch.  Stop all caffeine 12 hours before the test. This includes coffee, tea, soda pop, chocolate and certain medicines (such as Anacin, Excedrin and NoDoz).  Also avoid decaf coffee and tea, as these contain small amounts of caffeine.  No alcohol, smoking or other tobacco for 12 hours before the test.  Stop eating 3 hours before the test. You may drink water.  Please wear a loose two-piece outfit. If you will have an exercise test, bring rubber-soled walking shoes.  When you arrive, please tell us if you: - Have diabetes - Are breastfeeding - May be pregnant - Have a pacemaker of ICD (implantable defibrillator).  Please call your Imaging Department at your exam site with any questions.            Apr 14, 2017 11:30 AM CDT   Ekg Stress Nm Lexiscan with RESRM3   [facility] ([facility])    [address] [facility] [id number] [phone]            Apr 17, 2017  2:30 PM CDT   SHORT with Janey Romero RPH   [facility] ([facility])    [address] [facility] [address] [phone]            May 12, 2017  9:00 AM CDT   Anticoagulation Visit with FM RN VISITS   [facility] ([facility])    [address] [facility] [address]  [facility] [address] [phone]              Who to contact     If you have questions or need follow up information about today's clinic visit or your schedule please contact [facility] directly at [phone].  Normal or non-critical lab and imaging results will be communicated to you by MyChart, letter or phone within 4 business days after the clinic has received the results. If you do not hear from us within 7 days, please contact the clinic through [facility] or phone. If you have a critical or abnormal lab result, we will notify you by phone as soon as possible.  Submit refill requests through [employer] or call your pharmacy and they will forward the refill request to us. Please allow 3 business days for your refill to be completed.          Additional Information About Your Visit        [facility]  Information     [employer] gives you secure access to your electronic health record. If you see a primary care provider, you can also send messages to your care team and make appointments. If you have questions, please call your primary care clinic.  If you do not have a primary care provider, please call [phone] and they will assist you.        Care EveryWhere ID     This is your Care EveryWhere ID. This could be used by other organizations to access your [place] medical records  [id number]        Your Vitals Were     Pulse Temperature Respirations Pulse Oximetry BMI (Body Mass Index)       72 98.1  F (36.7  C) (Oral) 20 95% 29.95 kg/m2        Blood Pressure from Last 3 Encounters:   04/13/17 100/46   04/13/17 119/81   04/08/17 128/77    Weight from Last 3 Encounters:   04/13/17 180 lb (81.6 kg)   04/13/17 180 lb (81.6 kg)   04/07/17 180 lb (81.6 kg)              Today, you had the following     No orders found for display         Today's Medication Changes          These changes are accurate as of: 4/13/17  3:50 PM.  If you have any questions, ask your nurse or doctor.               These medicines have changed or have updated prescriptions.        Dose/Directions    metFORMIN 500 MG tablet   Commonly known as:  GLUCOPHAGE   This may have changed:  how much to take   Used for:  Type 2 diabetes mellitus with hyperglycemia, without long-term current use of insulin (H)        Dose:  1000 mg   Take 2 tablets (1,000 mg) by mouth 2 times daily (with meals)   Quantity:  360 tablet   Refills:  0       omeprazole 20 MG CR capsule   Commonly known as:  priLOSEC   This may have changed:  when to take this   Used for:  Chest pain, unspecified type        Dose:  20 mg   Take 1 capsule (20 mg) by mouth 2 times daily   Quantity:  90 capsule   Refills:  1       traZODone 50 MG tablet   Commonly known as:  DESYREL   This may have changed:  how much to take   Used for:  Insomnia, unspecified type        Dose:   mg   Take  1-2 tablets ( mg) by mouth At Bedtime   Quantity:  60 tablet   Refills:  5                Primary Care Provider Office Phone # Fax #    Venu Maria PA-C [phone]       [facility] [address]  [facility] [address]        Thank you!     Thank you for choosing [facility]  for your care. Our goal is always to provide you with excellent care. Hearing back from our patients is one way we can continue to improve our services. Please take a few minutes to complete the written survey that you may receive in the mail after your visit with us. Thank you!             Your Updated Medication List - Protect others around you: Learn how to safely use, store and throw away your medicines at www.disposemymeds.org.          This list is accurate as of: 4/13/17  3:50 PM.  Always use your most recent med list.                   Brand Name Dispense Instructions for use    * albuterol (2.5 MG/3ML) 0.083% neb solution      Take 1 vial by nebulization every 6 hours as needed for shortness of breath / dyspnea or wheezing Reported on 3/2/2017       * albuterol 108 (90 BASE) MCG/ACT Inhaler   Generic drug:  albuterol      Inhale 2 puffs into the lungs every 6 hours Reported on 3/2/2017       ascorbic acid 500 MG tablet    VITAMIN C     Take 500 mg by mouth daily.       ASPIRIN EC PO      Take 81 mg by mouth.       B-12 1000 MCG Caps      Take 1 capsule by mouth 2 times daily       calcium carb 1250 mg (500 mg Robinson)/vitamin D 200 units 500-200 MG-UNIT per tablet    OSCAL with D     Take 1 tablet by mouth daily.       ferrous sulfate 325 (65 FE) MG tablet    IRON     Take 1 tablet by mouth daily (with breakfast).       FIBERCON 625 MG tablet   Generic drug:  calcium polycarbophil      Take 1 tablet by mouth 2 times daily.       glipiZIDE 10 MG 24 hr tablet    glipiZIDE XL    180 tablet    Take 1 tablet (10 mg) by mouth 2 times daily       hydrochlorothiazide 25 MG tablet     HYDRODIURIL    90 tablet    Take 1 tablet (25 mg) by mouth daily       levothyroxine 100 MCG tablet    SYNTHROID/LEVOTHROID    90 tablet    Take 1 tablet (100 mcg) by mouth daily       lisinopril 40 MG tablet    PRINIVIL/ZESTRIL    90 tablet    Take 1 tablet (40 mg) by mouth daily       LOPERAMIDE HCL PO      1/2 to 1 tablet by mouth as needed.       metFORMIN 500 MG tablet    GLUCOPHAGE    360 tablet    Take 2 tablets (1,000 mg) by mouth 2 times daily (with meals)       metoprolol 50 MG tablet    LOPRESSOR    180 tablet    Take 1 tablet (50 mg) by mouth 2 times daily       mometasone 220 MCG/INH Inhaler    ASMANEX     Inhale 2 puffs into the lungs daily.       omeprazole 20 MG CR capsule    priLOSEC    90 capsule    Take 1 capsule (20 mg) by mouth 2 times daily       oxyCODONE 5 MG/5ML solution    ROXICODONE    90 mL    Take 2.5-5 mLs (2.5-5 mg) by mouth every 6 hours as needed for moderate to severe pain or pain       pioglitazone 30 MG tablet    ACTOS    90 tablet    Take 1 tablet (30 mg) by mouth daily       simvastatin 20 MG tablet    ZOCOR    90 tablet    Take 1 tablet (20 mg) by mouth At Bedtime       tiotropium 18 MCG capsule    SPIRIVA     Inhale 18 mcg into the lungs daily.       traMADol 50 MG tablet    ULTRAM    15 tablet    Take 0.5 tablets (25 mg) by mouth every 6 hours as needed for pain       traZODone 50 MG tablet    DESYREL    60 tablet    Take 1-2 tablets ( mg) by mouth At Bedtime       VITAMIN D (CHOLECALCIFEROL) PO      Take 2,000 Units by mouth daily       warfarin 3 MG tablet    COUMADIN    90 tablet    Take 1 tablet (3mg) daily except 1/2 tablet (1.5mg) on Fridays       * Notice:  This list has 2 medication(s) that are the same as other medications prescribed for you. Read the directions carefully, and ask your doctor or other care provider to review them with you.

## 2017-04-13 NOTE — H&P
Pre-Endoscopy History and Physical     Maryalice Rebecca Wachter MRN# [id number]   YOB: 1933 Age: 83 year old     Date of Procedure: 4/13/2017  Primary care provider: Venu Maria  Type of Endoscopy: Gastroscopy with possible biopsy, possible dilation  Reason for Procedure: pain  Type of Anesthesia Anticipated: Conscious Sedation    HPI:    Sindi is a 83 year old female who will be undergoing the above procedure.      A history and physical has been performed. The patient's medications and allergies have been reviewed. The risks and benefits of the procedure and the sedation options and risks were discussed with the patient.  All questions were answered and informed consent was obtained.      She denies a personal or family history of anesthesia complications or bleeding disorders.     Patient Active Problem List   Diagnosis     Chest pain     SOB (shortness of breath)     ACP (advance care planning)     CAD (coronary artery disease)     Atrial fibrillation (H)     Hyperlipidemia LDL goal <100     HTN (hypertension)     History of colonic polyps     Fever     Health Care Home     Hypothyroidism     Long-term (current) use of anticoagulants [Z79.01]     Type 2 diabetes mellitus with hyperglycemia (H)     COPD exacerbation (H)        Past Medical History:   Diagnosis Date     Atrial fibrillation (H)     history of atrial fibrillation     CAD (coronary artery disease)      Congestive heart failure (CHF) (H)      COPD (chronic obstructive pulmonary disease) (H)      Diabetes mellitus (H)     Oral medication treated.     Hiatal hernia     Documented quite large by CT Fall 2011.     Hypercholesterolemia     Per mention in notes, but no recent chol panel noted.  She does take simvastatin.     Hypertension      Hypothyroidism      Postmenopausal bleeding 8/23/2012     Sarcoid (H)         Past Surgical History:   Procedure Laterality Date     ANKLE SURGERY       DILATION AND CURETTAGE,  HYSTEROSCOPY DIAGNOSTIC, COMBINED  8/23/2012    Procedure: COMBINED DILATION AND CURETTAGE, HYSTEROSCOPY DIAGNOSTIC;   DILATION AND CURETTAGE, Operative  HYSTEROSCOPY ;  Surgeon: Justa Francois MD;  Location: [facility] OR     ENT SURGERY         Social History   Substance Use Topics     Smoking status: Never Smoker     Smokeless tobacco: Never Used     Alcohol use Yes      Comment: Extremely rare use in small amounts.       Family History   Problem Relation Age of Onset     Other - See Comments Other      Denies cardiac.       Prior to Admission medications    Medication Sig Start Date End Date Taking? Authorizing Provider   warfarin (COUMADIN) 3 MG tablet Take 1 tablet (3mg) daily except 1/2 tablet (1.5mg) on Fridays 11/16/16  Yes Venu Maria PA-C   lisinopril (PRINIVIL/ZESTRIL) 40 MG tablet Take 1 tablet (40 mg) by mouth daily 4/12/17   Venu Maria PA-C   omeprazole (PRILOSEC) 20 MG CR capsule Take 1 capsule (20 mg) by mouth daily 4/12/17   Venu Maria PA-C   oxyCODONE (ROXICODONE) 5 MG/5ML solution Take 2.5-5 mLs (2.5-5 mg) by mouth every 6 hours as needed for moderate to severe pain or pain 4/8/17   Carroll Armenta MD   traMADol (ULTRAM) 50 MG tablet Take 0.5 tablets (25 mg) by mouth every 6 hours as needed for pain 4/7/17   Venu Maria PA-C   glipiZIDE (GLIPIZIDE XL) 10 MG 24 hr tablet Take 1 tablet (10 mg) by mouth 2 times daily 3/9/17   Venu Maria PA-C   pioglitazone (ACTOS) 30 MG tablet Take 1 tablet (30 mg) by mouth daily 3/2/17   Venu Maria PA-C   hydrochlorothiazide (HYDRODIURIL) 25 MG tablet Take 1 tablet (25 mg) by mouth daily 1/19/17   Venu Maria PA-C   metoprolol (LOPRESSOR) 50 MG tablet Take 1 tablet (50 mg) by mouth 2 times daily 1/9/17   Venu Maria PA-C   levothyroxine (SYNTHROID/LEVOTHROID) 100 MCG tablet Take 1 tablet (100 mcg) by mouth daily 1/9/17   Venu Maria PA-C   metFORMIN  (GLUCOPHAGE) 500 MG tablet Take 2 tablets (1,000 mg) by mouth 2 times daily (with meals)  Patient taking differently: Take 500 mg by mouth 2 times daily (with meals)  11/21/16   Venu Maria PA-C   VITAMIN D, CHOLECALCIFEROL, PO Take 2,000 Units by mouth daily    Reported, Patient   traZODone (DESYREL) 50 MG tablet Take 1-2 tablets ( mg) by mouth At Bedtime  Patient taking differently: Take 25 mg by mouth At Bedtime  10/10/16   Venu Maria PA-C   simvastatin (ZOCOR) 20 MG tablet Take 1 tablet (20 mg) by mouth At Bedtime 9/21/16   Venu Maria PA-C   Cyanocobalamin (B-12) 1000 MCG CAPS Take 1 capsule by mouth 2 times daily     Reported, Patient   LOPERAMIDE HCL PO 1/2 to 1 tablet by mouth as needed.    Reported, Patient   albuterol (ALBUTEROL) 108 (90 BASE) MCG/ACT inhaler Inhale 2 puffs into the lungs every 6 hours Reported on 3/2/2017    Reported, Patient   albuterol (2.5 MG/3ML) 0.083% nebulizer solution Take 1 vial by nebulization every 6 hours as needed for shortness of breath / dyspnea or wheezing Reported on 3/2/2017    Unknown, Entered By History   ASPIRIN EC PO Take 81 mg by mouth.      Reported, Patient   tiotropium (SPIRIVA) 18 MCG inhalation capsule Inhale 18 mcg into the lungs daily.    Reported, Patient   mometasone (ASMANEX) 220 MCG/INH inhaler Inhale 2 puffs into the lungs daily.    Reported, Patient   ferrous sulfate 325 (65 FE) MG tablet Take 1 tablet by mouth daily (with breakfast).    Reported, Patient   ascorbic acid (VITAMIN C) 500 MG tablet Take 500 mg by mouth daily.    Reported, Patient   calcium carb 1250 mg, 500 mg [place],/vitamin D 200 units (OSCAL WITH D) 500-200 MG-UNIT per tablet Take 1 tablet by mouth daily.    Reported, Patient   polycarbophil (FIBERCON) 625 MG tablet Take 1 tablet by mouth 2 times daily.    Reported, Patient       Allergies   Allergen Reactions     Penicillins Hives     Amlodipine Other (See Comments)     Too much ankle edema and  "red itchy spots.      Furosemide Other (See Comments)     Fainted with first dose.         REVIEW OF SYSTEMS:   5 point ROS negative except as noted above in HPI, including Gen., Resp., CV, GI &  system review.    PHYSICAL EXAM:   There were no vitals taken for this visit. Estimated body mass index is 29.5 kg/(m^2) as calculated from the following:    Height as of 10/21/16: 1.664 m (5' 5.5\").    Weight as of 4/7/17: 81.6 kg (180 lb).   GENERAL APPEARANCE: alert, and oriented  MENTAL STATUS: alert  AIRWAY EXAM: Mallampatti Class II (visualization of the soft palate, fauces, and uvula)  RESP: lungs clear to auscultation - no rales, rhonchi or wheezes  CV: regular rates and rhythm  DIAGNOSTICS:    Not indicated    IMPRESSION   ASA Class 2 - Mild systemic disease    PLAN:   Plan for Gastroscopy with possible biopsy, possible dilation. We discussed the risks, benefits and alternatives and the patient wished to proceed.    The above has been forwarded to the consulting provider.      Signed Electronically by: Johnny Stratton  April 13, 2017          "

## 2017-04-13 NOTE — LETTER
April 10, 2017      Maryalice Rebecca Wachter  [address] [facility] [address]              Dear Maryalice Rebecca Wachter,        Thank you for choosing [facility].  You are scheduled for the following service (s).   Date:    4/13/2017 Thursday    Procedure:   EGD  (Esophagogastroduonenoscopy)  Doctor: Dr. Johnny Stratton              Arrival Time:  10:00 AM   *check in at Emergency/Endoscopy desk*  *You will have your INR blood test done upon arrival.  Procedure Time:  11:00 AM     Location:   [facility]      Endoscopy Department, First Floor (Enter through ER Doors) *       [address] [place], [place] [address]    [phone]   UPPER ENDOSCOPY         PRE-PROCEDURE CHECKLIST    If you have diabetes, ask your regular doctor for diet and medication restrictions.  If you take a medication to thin your blood (such as Coumadin or Lovenox) and have not already discussed this please call your primary doctor to advice on holding this medication  If you take aspirin or Plavix, you may continue to do so.  If you are or may be pregnant, please discuss the risks and benefits of this procedure with your doctor.  You must arrange for a ride for the day of your exam. If you fail to arrange transportation with a responsible adult, your procedure will need to be cancelled and rescheduled. Taxi ,Bus and Medical transport are not acceptable unless you have a responsible adult that you know & trust with you.  Please arrange for this  to be able to pick you up in our department, approximately one hour after your scheduled procedure, if they are not able to stay with you.  *If you must cancel or reschedule your appointment, please call Endoscopy  at [phone].*      PREPARATION  To ensure a successful exam, please follow all instructions carefully. Failure to accurately and completely prepare for your exam may result in the need for an additional procedure  .      The night before your exam:    Stop eating solid foods at 11:45 pm.     Clear liquids are okay to drink (examples: water, Gatorade, clear broth and apple juice).     Do not drink red liquids or alcoholic beverages.  The day of your exam:    Stop drinking clear liquids 4 hours before your exam     You may take your usual medications with 4 oz. of water at least 4 hours prior to your procedure.  When you leave for the procedure:    Bring a list of all of your current medications, including any allergy or over-the-counter medications.    Bring a photo ID as well as up-to-date insurance information, such as your insurance card and any referral forms that might be required by your payer.   What is an upper endoscopy?  An upper endoscopy is a test performed to evaluate symptoms of persistent upper abdominal pain, bleeding, nausea, vomiting or difficulty swallowing. During the procedure, a doctor examines the lining of your esophagus, stomach and the first part of your small intestine through a thin, flexible tube called an endoscope. If growths or other abnormalities are found during the procedure, the doctor may remove the abnormal tissue for further examination, or biopsy. An upper endoscopy may also be used to treat various conditions of the upper gastrointestinal (GI) tract, such as narrowing, abnormal growths or bleeding.  What happens during an upper endoscopy?  Plan to spend up to two hours at the endoscopy center the day of your procedure. The exam itself takes about 15 minutes to complete.   Before the exam:    You will change into a gown.    Your medical history will be reviewed with you and you will be given a consent form to sign.     A nurse will insert an intravenous (IV) line into your hand or arm.  During the exam:    Medicine will be given through the IV line to help you relax and feel drowsy.     Your heart rate and oxygen levels will be monitored. If your blood pressure is low, you may be given  fluids through the IV line.     The doctor will insert a flexible, hollow tube - called an endoscope - into your mouth and will advance it slowly through the esophagus, stomach and duodenum (the first part of your small intestine).     You may have a feeling of pressure or fullness.     If you have difficulty swallowing, and the doctor finds a narrowing in your esophagus, it may be possible for the area to be expanded during the exam.     If abnormal tissue is found, the doctor may remove it through the endoscope for closer examination, or biopsy. Tissue removal is painless.  What happens after the exam?    The doctor will talk with you about the initial results of your exam.     The doctor will prepare a full report for the physician who referred you for the upper endoscopy.     You may feel bloated after the procedure. This is normal.     Your throat may feel sore for a short time.     Medication given during the exam will prohibit you from driving for the rest of the day.     Following the exam, you may resume your normal diet. Avoid alcohol until the next day.     You may resume your regular activities the day after the procedure.     A nurse will provide you with complete discharge instructions before you leave the endoscopy center. Be sure to ask the nurse for specific instructions if you take blood thinners such as aspirin, Coumadin or Plavix.     Any tissue samples removed during the exam will be sent to a lab for evaluation. It may take 5-7 working days for you to be notified of the results.       DIRECTIONS  From the north ([facility] [place])  Take [address] south, exit to [place] [address]. Get into the left hand tawny, turn left (east), go one-half mile to [address]. Turn left (north) on [address]. Go north to first stoplight, take a right on the newly constructed road, [address] and follow it to the Emergency entrance.    From the south ([facility])  Take 35 north to  the east split, [address], and exit to [address]. Turn left (west) on [address]  to [address]. Turn right (north) on [address]. Go north to first stoplight, take a right on the newly constructed road, [address] and follow it to the Emergency entrance.    From the east via 35E ([facility])  Take [address] south to [place] [address] exit. Turn right on [address] . Go west to [address]. Turn right (north) on [address], go to the first stoplight, take a right and follow the newly constructed road, [address], to the Emergency entrance.    From the east via [address] ([facility])  Take [address] west to [address]. Turn left (south) on [address] to [address]. Turn left (east) on [address] and follow the newly constructed road to the Emergency entrance.    From the west via [address] ([place])  Take [address] east to [address]. Turn right (south) on [address] to [address].  Turn left (east) on [address] and follow the newly constructed road to the Emergency entrance.

## 2017-04-14 ENCOUNTER — HOSPITAL ENCOUNTER (OUTPATIENT)
Dept: CARDIOLOGY | Facility: CLINIC | Age: 82
Discharge: HOME | End: 2017-04-14
Attending: EMERGENCY MEDICINE | Admitting: EMERGENCY MEDICINE
Payer: COMMERCIAL

## 2017-04-14 ENCOUNTER — HOSPITAL ENCOUNTER (OUTPATIENT)
Dept: NUCLEAR MEDICINE | Facility: CLINIC | Age: 82
Setting detail: NUCLEAR MEDICINE
End: 2017-04-14
Attending: EMERGENCY MEDICINE
Payer: COMMERCIAL

## 2017-04-14 DIAGNOSIS — R07.89 ATYPICAL CHEST PAIN: ICD-10-CM

## 2017-04-14 LAB — COPATH REPORT: NORMAL

## 2017-04-14 PROCEDURE — 78452 HT MUSCLE IMAGE SPECT MULT: CPT | Mod: 26 | Performed by: INTERNAL MEDICINE

## 2017-04-14 PROCEDURE — 25000128 H RX IP 250 OP 636

## 2017-04-14 PROCEDURE — A9502 TC99M TETROFOSMIN: HCPCS | Performed by: EMERGENCY MEDICINE

## 2017-04-14 PROCEDURE — 93017 CV STRESS TEST TRACING ONLY: CPT

## 2017-04-14 PROCEDURE — 78452 HT MUSCLE IMAGE SPECT MULT: CPT

## 2017-04-14 PROCEDURE — 93018 CV STRESS TEST I&R ONLY: CPT | Performed by: INTERNAL MEDICINE

## 2017-04-14 PROCEDURE — 34300033 ZZH RX 343: Performed by: EMERGENCY MEDICINE

## 2017-04-14 PROCEDURE — 93016 CV STRESS TEST SUPVJ ONLY: CPT | Performed by: INTERNAL MEDICINE

## 2017-04-14 RX ORDER — REGADENOSON 0.08 MG/ML
INJECTION, SOLUTION INTRAVENOUS
Status: COMPLETED
Start: 2017-04-14 | End: 2017-04-14

## 2017-04-14 RX ADMIN — REGADENOSON 0.4 MG: 0.08 INJECTION, SOLUTION INTRAVENOUS at 11:37

## 2017-04-14 RX ADMIN — TETROFOSMIN 31.9 MCI.: 0.23 INJECTION, POWDER, LYOPHILIZED, FOR SOLUTION INTRAVENOUS at 11:54

## 2017-04-14 RX ADMIN — TETROFOSMIN 10.9 MCI.: 0.23 INJECTION, POWDER, LYOPHILIZED, FOR SOLUTION INTRAVENOUS at 10:16

## 2017-04-14 NOTE — PROGRESS NOTES
Pre-procedure:    Initial vital signs: /72, HR 95, RR 13  Allergies: reviewed   Rhythm:   Medications taken within 48 hours of procedure:  Last Caffeine:  Lung sounds: CTA  Health History (COPD, Asthma, etc):     Procedure: Lexiscan  Reaction/symptoms after receiving Fina injection:     Vital Signs:/70, , RR 15    Reversal agent:     Post:   Resolution of symptoms?:  Vital signs: /70, , RR 14  Walk:  Return to Radiology

## 2017-04-17 ENCOUNTER — OFFICE VISIT (OUTPATIENT)
Dept: PHARMACY | Facility: CLINIC | Age: 82
End: 2017-04-17
Payer: COMMERCIAL

## 2017-04-17 VITALS
BODY MASS INDEX: 29.87 KG/M2 | WEIGHT: 179.5 LBS | OXYGEN SATURATION: 94 % | DIASTOLIC BLOOD PRESSURE: 60 MMHG | HEART RATE: 68 BPM | SYSTOLIC BLOOD PRESSURE: 118 MMHG

## 2017-04-17 DIAGNOSIS — E55.9 VITAMIN D DEFICIENCY: ICD-10-CM

## 2017-04-17 DIAGNOSIS — E11.65 TYPE 2 DIABETES MELLITUS WITH HYPERGLYCEMIA, WITHOUT LONG-TERM CURRENT USE OF INSULIN (H): Primary | ICD-10-CM

## 2017-04-17 DIAGNOSIS — Z23 ENCOUNTER FOR IMMUNIZATION: ICD-10-CM

## 2017-04-17 DIAGNOSIS — E53.8 VITAMIN B12 DEFICIENCY (NON ANEMIC): ICD-10-CM

## 2017-04-17 DIAGNOSIS — D50.9 IRON DEFICIENCY ANEMIA, UNSPECIFIED IRON DEFICIENCY ANEMIA TYPE: ICD-10-CM

## 2017-04-17 DIAGNOSIS — G47.00 INSOMNIA, UNSPECIFIED TYPE: ICD-10-CM

## 2017-04-17 DIAGNOSIS — I10 ESSENTIAL HYPERTENSION: ICD-10-CM

## 2017-04-17 DIAGNOSIS — J44.9 CHRONIC OBSTRUCTIVE PULMONARY DISEASE, UNSPECIFIED COPD TYPE (H): ICD-10-CM

## 2017-04-17 DIAGNOSIS — E03.9 HYPOTHYROIDISM, UNSPECIFIED TYPE: ICD-10-CM

## 2017-04-17 PROCEDURE — 99606 MTMS BY PHARM EST 15 MIN: CPT | Performed by: PHARMACIST

## 2017-04-17 PROCEDURE — 99607 MTMS BY PHARM ADDL 15 MIN: CPT | Performed by: PHARMACIST

## 2017-04-17 NOTE — PATIENT INSTRUCTIONS
Recommendations from today's MTM visit:                                                        1.  BP - 118/60mmhg today - excellent BP. Stay on current medication.     2. Blood sugars improved - stay on same medications for now -- do your best with low carbohydrate diet and daily exercise.     3. Your stomach seems better --lets reduce your Omeprazole dose back to 20mg. Once daily in AM .       Next MTM visit: see me Monday June 5th at 1 pm.       To schedule another MTM appointment, please call the clinic directly or you may call the MTM scheduling line at [phone] or toll-free at [phone].     My Clinical Pharmacist's contact information:                                                      It was a pleasure seeing you today!  Please feel free to contact me with any questions or concerns you have.      Janey Romero [facility].  Medication Therapy Management Provider  [phone]      You may receive a survey about the MTM services you received.  I would appreciate your feedback to help me serve you better in the future. Please fill it out and return it when you can. Your comments will be anonymous.      My healthcare goals:

## 2017-04-17 NOTE — MR AVS SNAPSHOT
After Visit Summary   4/17/2017    Maryalice Rebecca Wachter    MRN: [id number]           Patient Information     Date Of Birth          11/11/1933        Visit Information        Provider Department      4/17/2017 2:30 PM Janey Romero RPH [facility] MTM        Care Instructions    Recommendations from today's MTM visit:                                                        1.  BP - 118/60mmhg today - excellent BP. Stay on current medication.     2. Blood sugars improved - stay on same medications for now -- do your best with low carbohydrate diet and daily exercise.     3. Your stomach seems better --lets reduce your Omeprazole dose back to 20mg. Once daily in AM .       Next MTM visit: see me Monday June 5th at 1 pm.       To schedule another MTM appointment, please call the clinic directly or you may call the MTM scheduling line at [phone] or toll-free at [phone].     My Clinical Pharmacist's contact information:                                                      It was a pleasure seeing you today!  Please feel free to contact me with any questions or concerns you have.      Janey Rmoero [facility].  Medication Therapy Management Provider  [phone]      You may receive a survey about the MTM services you received.  I would appreciate your feedback to help me serve you better in the future. Please fill it out and return it when you can. Your comments will be anonymous.      My healthcare goals:                                                                    Follow-ups after your visit        Your next 10 appointments already scheduled     May 12, 2017  9:00 AM CDT   [facility]ticoagulation [facility] with FM RN [facility]ISITS   [facility]    [address] [place] [address], [address]  [facility] [address] [phone]            Jun 05, 2017  1:00 PM CDT   SHORT with Janey Romero RPH   [facility] MTM ([facility]  [place])    [address]  [facility] [id number] [phone]              Who to contact     If you have questions or need follow up information about today's clinic visit or your schedule please contact [facility] MTM directly at [phone].  Normal or non-critical lab and imaging results will be communicated to you by MyChart, letter or phone within 4 business days after the clinic has received the results. If you do not hear from us within 7 days, please contact the clinic through [employer] or phone. If you have a critical or abnormal lab result, we will notify you by phone as soon as possible.  Submit refill requests through [email or web address] or call your pharmacy and they will forward the refill request to us. Please allow 3 business days for your refill to be completed.          Additional Information About Your Visit        MyChart Information     [email or web address] gives you secure access to your electronic health record. If you see a primary care provider, you can also send messages to your care team and make appointments. If you have questions, please call your primary care clinic.  If you do not have a primary care provider, please call [phone] and they will assist you.        Care EveryWhere ID     This is your Care EveryWhere ID. This could be used by other organizations to access your [place] medical records  [id number]        Your Vitals Were     Pulse Pulse Oximetry BMI (Body Mass Index)             68 94% 29.87 kg/m2          Blood Pressure from Last 3 Encounters:   04/17/17 118/60   04/13/17 100/46   04/13/17 119/81    Weight from Last 3 Encounters:   04/17/17 179 lb 8 oz (81.4 kg)   04/13/17 180 lb (81.6 kg)   04/13/17 180 lb (81.6 kg)              Today, you had the following     No orders found for display         Today's Medication Changes          These changes are accurate as of: 4/17/17  3:10 PM.  If you have any questions, ask your nurse or doctor.               These  medicines have changed or have updated prescriptions.        Dose/Directions    metFORMIN 500 MG tablet   Commonly known as:  GLUCOPHAGE   This may have changed:  how much to take   Used for:  Type 2 diabetes mellitus with hyperglycemia, without long-term current use of insulin (H)        Dose:  1000 mg   Take 2 tablets (1,000 mg) by mouth 2 times daily (with meals)   Quantity:  360 tablet   Refills:  0       traZODone 50 MG tablet   Commonly known as:  DESYREL   This may have changed:  how much to take   Used for:  Insomnia, unspecified type        Dose:   mg   Take 1-2 tablets ( mg) by mouth At Bedtime   Quantity:  60 tablet   Refills:  5                Primary Care Provider Office Phone # Fax #    Venu Maria PA-C [phone]       [facility] [address]  [facility] [address]        Thank you!     Thank you for choosing [facility]  for your care. Our goal is always to provide you with excellent care. Hearing back from our patients is one way we can continue to improve our services. Please take a few minutes to complete the written survey that you may receive in the mail after your visit with us. Thank you!             Your Updated Medication List - Protect others around you: Learn how to safely use, store and throw away your medicines at www.disposemymeds.org.          This list is accurate as of: 4/17/17  3:10 PM.  Always use your most recent med list.                   Brand Name Dispense Instructions for use    * albuterol (2.5 MG/3ML) 0.083% neb solution      Take 1 vial by nebulization every 6 hours as needed for shortness of breath / dyspnea or wheezing Reported on 3/2/2017       * albuterol 108 (90 BASE) MCG/ACT Inhaler   Generic drug:  albuterol      Inhale 2 puffs into the lungs every 6 hours Reported on 3/2/2017       ascorbic acid 500 MG tablet    VITAMIN C     Take 500 mg by mouth daily.       ASPIRIN EC PO      Take 81  mg by mouth.       B-12 1000 MCG Caps      Take 1 capsule by mouth 2 times daily       calcium carb 1250 mg (500 mg [place])/vitamin D 200 units 500-200 MG-UNIT per tablet    OSCAL with D     Take 1 tablet by mouth daily.       ferrous sulfate 325 (65 FE) MG tablet    IRON     Take 1 tablet by mouth daily (with breakfast).       FIBERCON 625 MG tablet   Generic drug:  calcium polycarbophil      Take 1 tablet by mouth 2 times daily.       glipiZIDE 10 MG 24 hr tablet    glipiZIDE XL    180 tablet    Take 1 tablet (10 mg) by mouth 2 times daily       hydrochlorothiazide 25 MG tablet    HYDRODIURIL    90 tablet    Take 1 tablet (25 mg) by mouth daily       levothyroxine 100 MCG tablet    SYNTHROID/LEVOTHROID    90 tablet    Take 1 tablet (100 mcg) by mouth daily       lisinopril 40 MG tablet    PRINIVIL/ZESTRIL    90 tablet    Take 1 tablet (40 mg) by mouth daily       LOPERAMIDE HCL PO      1/2 to 1 tablet by mouth as needed.       metFORMIN 500 MG tablet    GLUCOPHAGE    360 tablet    Take 2 tablets (1,000 mg) by mouth 2 times daily (with meals)       metoprolol 50 MG tablet    LOPRESSOR    180 tablet    Take 1 tablet (50 mg) by mouth 2 times daily       mometasone 220 MCG/INH Inhaler    ASMANEX     Inhale 2 puffs into the lungs daily.       omeprazole 20 MG CR capsule    priLOSEC    90 capsule    Take 1 capsule (20 mg) by mouth 2 times daily       oxyCODONE 5 MG/5ML solution    ROXICODONE    90 mL    Take 2.5-5 mLs (2.5-5 mg) by mouth every 6 hours as needed for moderate to severe pain or pain       pioglitazone 30 MG tablet    ACTOS    90 tablet    Take 1 tablet (30 mg) by mouth daily       simvastatin 20 MG tablet    ZOCOR    90 tablet    Take 1 tablet (20 mg) by mouth At Bedtime       tiotropium 18 MCG capsule    SPIRIVA     Inhale 18 mcg into the lungs daily.       traMADol 50 MG tablet    ULTRAM    15 tablet    Take 0.5 tablets (25 mg) by mouth every 6 hours as needed for pain       traZODone 50 MG tablet     DESYREL    60 tablet    Take 1-2 tablets ( mg) by mouth At Bedtime       VITAMIN D (CHOLECALCIFEROL) PO      Take 2,000 Units by mouth daily       warfarin 3 MG tablet    COUMADIN    90 tablet    Take 1 tablet (3mg) daily except 1/2 tablet (1.5mg) on Fridays       * Notice:  This list has 2 medication(s) that are the same as other medications prescribed for you. Read the directions carefully, and ask your doctor or other care provider to review them with you.

## 2017-04-17 NOTE — Clinical Note
Venu--erik--i saw michelle whitmore yesterday --bs's improved but she struggles with low carb diet --lets see how next a1c is before any more DM med changes. gerd issues better --endoscopy she reports didn't reveal anything major other than hiatal hernia --will trial her back oempraspeedy qakusum only now. BP excellent .  Janey Romero, [facility]. Medication Therapy Management Provider [phone]

## 2017-04-17 NOTE — PROGRESS NOTES
SUBJECTIVE/OBJECTIVE:                                                    Maryalice Rebecca Wachter is a 83 year old female coming in for a follow up visit (3-2-17)  for Medication Therapy Management.  She was referred to me from her PCP- Venu Maria.     Chief Complaint: Here for bs and bp review. She is frustrated with bs's with fact she has eaten little sugar and still has high bs's -why ?  She has no desire to eat anything that is good for her.       Personal Healthcare Goals: fix diarrhea issues, timing of meds, get BP wnl.     Allergies/ADRs: Reviewed in Epic  Tobacco: No tobacco use   Alcohol: not currently using--very rare   Caffeine: decaff in am 1-2 cups/day of coffee; pm -tea a little bit.   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 Cannot exercise due to diarrhea issues --would like to go on walks with her hubby but has to have BM within 1 block of leaving.   PMH: Reviewed in [facility]; [facility] stated she has sarcoidosis?    Medication Adherence: No issues found today    IBS-diarrhea: patient uses prn loperamide --works well.     GERD:  She has a hiatal hernia -surgery not recommended.  Taking omeprazole 20 mg. BID x 1 week - she is unsure if she feels any better but no pain now --does she need to take this bid ? She is unsure now?    Diabetes:  Pt currently taking Glipizide er 10mg. bid, actos 30mg qday, Metformin-1 x500mg bid. Pt is experiencing the following side effects: None    SMB days avg. = 171 n=13, 14 days = 181 n=25 , 30 days = 190 n=57     Date FBG/ 2hours post Lunch/2hours post Dinner /2hours post    17 147 614am      4-16 127 6am /189 321pm     4-15 138 706am 174 215pm     14- 140 641am /210 418pm      4-13 100 607am  164 827pm    4-12 140 711am  325 757pm     17 112 740am  256  8pm              SMBG: one touch ultra- 7 days avg.= 155 n=6, 14 days = 166 n=12, 30 days =166 n=27    Date FBG/ 2hours post Lunch/2hours post Dinner /2hours post    3-2-17 145       3-1-17 143      2-28-17 131      2-26-17 174      2-25-17 176      2-24-17 159      2-23-17 173            Symptoms of low blood sugar? none. Frequency of hypoglycemia? never.  Recent symptoms of high blood sugar? fatigue  Eye exam: up to date  Foot exam: up to date  Microalbumin is < 30 mg/g. Pt is taking an ACEi/ARB.  Aspirin: Taking 81mg daily and denies side effects  Diet/Exercise: diet is ok but likes carbs, rice , chinese , no exercise.   She is eating low carb but not enjoying foods and is struggling with lack of bs control with no sugar in diet.      Vitamin D3: level was 21 on  --she takes otc daily dose of 2,000 iu's now.        Vitamin B12: level was 493 on 10-10-16. She takes daily otc dose now.       Hypothyroidism: Patient is taking levothyroxine 100 mcg daily. She knows to take it on an empty stomach with her omeprazole in the morning.    Hypertension: Current medications include : HCTZ 25mg. qam-5 days /week, 12.5mg 2 days /week  , lisinopril 40mg-qam. And metoprolol 50mg bid-am and dinnertime.   Patient does  self-monitor BP.  Yes see below:        Date Home bp's      4-14-17 130/85 p=96  130/61 p-78    4-15-17 107/66 p=76      4-16-17 126/58 p=72  138/58 p=85    4-17-17 134/59 p=70                                    Date Home bp's      3-2-17 143/60 p=64      3-1-17 141/71 p=64  135/61 p=66    2-28-17 131/59 p=65  141/64 p=60    2-27-17 133/57 p=73  120/62 p=64    2-26-17 123/62 p=71      2-25-17 134/70 p=63  139/68 p=67    2-24-17 129/61 p=66  140/68 p=63          COPD: Patient states she feels well controlled. Patient uses her inhalers more often when she gets sick and she always carries her rescue inhaler with her. Patient states that when she forgets it she becomes very worried and panicked.     Insomnia: Current medications include: trazodone 25-50mg. Pt reports trouble staying asleep and can only sleep 4-6 hours. She admits to a boring life right now -she gets sleepy early Pm --naps  for a few hours , then goes to bed -gets up at 4-5 am - perhaps she is sleeping enough already.   She sleeps better she states with 1/2 tab =25mg .      Immunizations: Patient is up to date .      Current labs include:  BP Readings from Last 3 Encounters:   04/17/17 118/60   04/13/17 100/46   04/13/17 119/81     Lab Results   Component Value Date    A1C 9.4 03/02/2017    A1C 7.7 10/10/2016    A1C 8.3 07/01/2016    A1C 7.9 03/28/2016    A1C 7.1 11/19/2015           Cholesterol   Date Value Ref Range Status   07/01/2016 117 <200 mg/dL Final   06/30/2015 109 <200 mg/dL Final     Comment:     LDL Cholesterol is the primary guide to therapy.   The NCEP recommends further evaluation of: patients with cholesterol greater   than 200 mg/dL if additional risk factors are present, cholesterol greater   than   240 mg/dL, triglycerides greater than 150 mg/dL, or HDL less than 40 mg/dL.       HDL Cholesterol   Date Value Ref Range Status   07/01/2016 40 (L) >49 mg/dL Final   06/30/2015 36 (L) >50 mg/dL Final     LDL Cholesterol Calculated   Date Value Ref Range Status   07/01/2016 39 <100 mg/dL Final     Comment:     Desirable:       <100 mg/dl   06/30/2015 25 0 - 129 mg/dL Final     Comment:     LDL Cholesterol is the primary guide to therapy: LDL-cholesterol goal in high   risk patients is <100 mg/dL and in very high risk patients is <70 mg/dL.       Triglycerides   Date Value Ref Range Status   07/01/2016 190 (H) <150 mg/dL Final     Comment:     Borderline high:  150-199 mg/dl   High:             200-499 mg/dl   Very high:       >499 mg/dl   Fasting specimen     06/30/2015 240 (H) 0 - 150 mg/dL Final     Cholesterol/HDL Ratio   Date Value Ref Range Status   06/30/2015 3.0 0.0 - 5.0 Final     MICROL       33   10/27/2016  MICROALBUMIN    33.03   10/27/2016    Last Basic Metabolic Panel:  NA      142   1/5/2017   POTASSIUM      4.3   1/5/2017  CHLORIDE      106   1/5/2017  VASHTI      9.2   1/5/2017  CO2       28   1/5/2017  BUN        17   1/5/2017  CR     0.94   1/5/2017  GLC      166   1/5/2017          GFR Estimate   Date Value Ref Range Status   04/08/2017 56 (L) >60 mL/min/1.7m2 Final     Comment:     Non  GFR Calc   04/04/2017 61 >60 mL/min/1.7m2 Final     Comment:     Non  GFR Calc   03/02/2017 47 (L) >60 mL/min/1.7m2 Final     Comment:     Non  GFR Calc     GFR Estimate If Black   Date Value Ref Range Status   04/08/2017 68 >60 mL/min/1.7m2 Final     Comment:      GFR Calc   04/04/2017 74 >60 mL/min/1.7m2 Final     Comment:      GFR Calc   03/02/2017 57 (L) >60 mL/min/1.7m2 Final     Comment:      GFR Calc     TSH   Date Value Ref Range Status   12/19/2016 0.55 0.40 - 4.00 mU/L Final   ]  /60  Pulse 68  Wt 179 lb 8 oz (81.4 kg)  SpO2 94%  BMI 29.87 kg/m2    Most Recent Immunizations   Administered Date(s) Administered     DTAP (<7y) 01/01/2010     Influenza (High Dose) 3 valent vaccine 10/10/2016     Influenza (IIV3) 09/01/2014     Pneumococcal (PCV 13) 10/10/2016     Pneumococcal (PCV 7) 09/09/2014     Zoster vaccine, live 01/01/2012     ASSESSMENT:                                                       Current medications were reviewed with her today.     Medication Adherence: No problems identified    IBS-Diarrhea: Stable.     GERD: Improved. Current treatment is effective. Chronic PPI use places patient at an increased risk of C. Diff, hypomagnesemia and lower bone mineral density, these risks were reviewed with the patient.  Pt would benefit from the following changes: taper off proton pump inhibitor back to 1 pill/day --see if stomach pain recurs? Or not ?    Diabetes: Needs Improvement. Patient is not meeting A1c goal of < 8%. Self monitoring of blood glucose is not at goal of fasting  mg/dL and post prandial < 180 mg/dL. Pt would benefit from minimum SMBG: Check blood sugars fasting, and occasionally 2 hours after  starting a meal.  Metformin :  stay on the same dose.  SFU (Glipizide) :  stay on the same dose.  Actos: stay on 30mg . qday  Increased exercise--walk daily.    Increase in home glucose monitoring-see plan.   Weight loss recommended--see plan for details.       Vitamin B12: is not at goal of 600-1000pg/mL. Pt would benefit from vitamin B12 lab recheck in 12 months.    Vitamin D3: is not at goal of 50-75ng/mL. Pt would benefit from vitamin D3 lab recheck in 12 months.    Hypothyroidism: TSH wnl.     Hypertension: Stable. Patient is not meeting BP goal of < 140/90mmHg.  Pt would benefit from the following changes - continued BP monitoring and watching diet, increasing exercise and weight loss      COPD/Asthma: Stable.     Insomnia: Improved. Pt may benefit from no changes.      Immunizations: stable -up to date now.     PLAN:                                                        1.  BP - 118/60mmhg today - excellent BP. Stay on current medication.     2. Blood sugars improved - stay on same medications for now -- do your best with low carbohydrate diet and daily exercise.     3. Your stomach seems better --lets reduce your Omeprazole dose back to 20mg. Once daily in AM .       Next MTM visit: see me Monday June 5th at 1 pm.       I spent 30 minutes with this patient today .     To schedule another MTM appointment, please call the clinic directly or you may call the MTM scheduling line at [phone] or toll-free at [phone].     My Clinical Pharmacist's contact information:                                                      It was a pleasure seeing you today!  Please feel free to contact me with any questions or concerns you have.      Janey Romero Rp.  Medication Therapy Management Provider  [phone]        You may receive a survey about the MTM services you received.  I would appreciate your feedback to help me serve you better in the future. Please fill it out and return it when you can. Your  comments will be anonymous.

## 2017-04-29 DIAGNOSIS — I48.20 CHRONIC ATRIAL FIBRILLATION (H): ICD-10-CM

## 2017-05-01 NOTE — TELEPHONE ENCOUNTER
warfarin (COUMADIN) 3 MG tablet    Last Written Prescription Date: 11/16/16  Last Fill Qty: 90, # refills: 1  Last Office Visit with [facility], [facility] or [facility] prescribing provider: 4/13/2017    Next 5 appointments (look out 90 days)     Jun 05, 2017  1:00 PM CDT   SHORT with Janey Romero RPH   [facility] ([facility])    [address] [address]   [phone]                   Date and Result of Last PT/INR:   Lab Results   Component Value Date    INR 1.54 04/13/2017    INR 2.19 04/08/2017          JOE Michelle  May 1, 2017  10:53 AM

## 2017-05-02 RX ORDER — WARFARIN SODIUM 3 MG/1
TABLET ORAL
Qty: 90 TABLET | Refills: 1 | Status: SHIPPED | OUTPATIENT
Start: 2017-05-02 | End: 2017-10-04

## 2017-05-02 NOTE — TELEPHONE ENCOUNTER
Prescription approved per [facility] Refill Protocol.  Cyndi Cabello RN

## 2017-05-12 ENCOUNTER — ANTICOAGULATION THERAPY VISIT (OUTPATIENT)
Dept: NURSING | Facility: CLINIC | Age: 82
End: 2017-05-12
Payer: COMMERCIAL

## 2017-05-12 DIAGNOSIS — I48.91 ATRIAL FIBRILLATION (H): ICD-10-CM

## 2017-05-12 DIAGNOSIS — Z79.01 LONG-TERM (CURRENT) USE OF ANTICOAGULANTS: ICD-10-CM

## 2017-05-12 LAB — INR POINT OF CARE: 3.5 (ref 0.86–1.14)

## 2017-05-12 PROCEDURE — 36416 COLLJ CAPILLARY BLOOD SPEC: CPT

## 2017-05-12 PROCEDURE — 99207 ZZC NO CHARGE NURSE ONLY: CPT

## 2017-05-12 PROCEDURE — 85610 PROTHROMBIN TIME: CPT | Mod: QW

## 2017-05-12 NOTE — MR AVS SNAPSHOT
Maryalice Rebecca Wachter   5/12/2017 9:00 AM   Anticoagulation Therapy Visit    Description:  83 year old female   Provider:  FM RN VISITS   Department:  Fm Nurse           INR as of 5/12/2017     Today's INR 3.5!      Anticoagulation Summary as of 5/12/2017     INR goal 2.0-3.0   Today's INR 3.5!   Full instructions 5/12: 3 mg; Otherwise 4.5 mg on Tue, Fri; 3 mg all other days   Next INR check 6/23/2017    Indications   Long-term (current) use of anticoagulants [Z79.01] [Z79.01]  Atrial fibrillation (H) [I48.91]         Your next Anticoagulation Clinic appointment(s)     Jun 23, 2017  9:00 AM CDT   Anticoagulation Visit with FM RN VISITS   [facility]    [address] [place] [address], [address]  [facility] [address] [phone]              Contact Numbers     Clinic Number:         May 2017 Details    Sun Mon Tue Wed Thu Fri Sat      1               2               3               4               5               6                 7               8               9               10               11               12      3 mg   See details      13      3 mg           14      3 mg         15      3 mg         16      4.5 mg         17      3 mg         18      3 mg         19      4.5 mg         20      3 mg           21      3 mg         22      3 mg         23      4.5 mg         24      3 mg         25      3 mg         26      4.5 mg         27      3 mg           28      3 mg         29      3 mg         30      4.5 mg         31      3 mg             Date Details   05/12 This INR check               How to take your warfarin dose     To take:  3 mg Take 1 of the 3 mg tablets.    To take:  4.5 mg Take 1.5 of the 3 mg tablets.           June 2017 Details    Sun Mon Tue Wed Thu Fri Sat         1      3 mg         2      4.5 mg         3      3 mg           4      3 mg         5      3 mg         6      4.5 mg         7      3 mg         8      3 mg         9       4.5 mg         10      3 mg           11      3 mg         12      3 mg         13      4.5 mg         14      3 mg         15      3 mg         16      4.5 mg         17      3 mg           18      3 mg         19      3 mg         20      4.5 mg         21      3 mg         22      3 mg         23            24                 25               26               27               28               29               30                 Date Details   No additional details    Date of next INR:  6/23/2017         How to take your warfarin dose     To take:  3 mg Take 1 of the 3 mg tablets.    To take:  4.5 mg Take 1.5 of the 3 mg tablets.

## 2017-05-12 NOTE — PROGRESS NOTES
ANTICOAGULATION FOLLOW-UP CLINIC VISIT    Patient Name:  Maryalice Rebecca Wachter  Date:  5/12/2017  Contact Type:  Face to Face    SUBJECTIVE:     Patient Findings     Positives No Problem Findings           OBJECTIVE    INR Protime   Date Value Ref Range Status   05/12/2017 3.5 (A) 0.86 - 1.14 Final       ASSESSMENT / PLAN  INR assessment SUPRA    Recheck INR In: 6 WEEKS    INR Location Clinic      Anticoagulation Summary as of 5/12/2017     INR goal 2.0-3.0   Today's INR 3.5!   Maintenance plan 4.5 mg (3 mg x 1.5) on Tue, Fri; 3 mg (3 mg x 1) all other days   Full instructions 5/12: 3 mg; Otherwise 4.5 mg on Tue, Fri; 3 mg all other days   Weekly total 24 mg   Plan last modified Cyndi Cabello RN (9/23/2016)   Next INR check 6/23/2017   Priority INR   Target end date     Indications   Long-term (current) use of anticoagulants [Z79.01] [Z79.01]  Atrial fibrillation (H) [I48.91]         Anticoagulation Episode Summary     INR check location     Preferred lab     Send INR reminders to  TRIAGE POOL    Comments       Anticoagulation Care Providers     Provider Role Specialty Phone number    Venu Maria PA-C Responsible Physician Assistant - Medical [phone]            See the Encounter Report to view Anticoagulation Flowsheet and Dosing Calendar (Go to Encounters tab in chart review, and find the Anticoagulation Therapy Visit)      Cyndi Cabello RN

## 2017-05-15 DIAGNOSIS — E11.9 TYPE 2 DIABETES, HBA1C GOAL < 8% (H): ICD-10-CM

## 2017-05-15 RX ORDER — GLUCOSAM/CHON-MSM1/C/MANG/BOSW 500-416.6
2-3 TABLET ORAL DAILY
Qty: 100 EACH | Refills: 3 | Status: SHIPPED | OUTPATIENT
Start: 2017-05-15 | End: 2018-06-07

## 2017-05-15 NOTE — TELEPHONE ENCOUNTER
TRUEPLUS LANCETS 30G MISC    (Not on med list)  Last Written Prescription Date:  Unknown  Last Fill Quantity: Unknown,   # refills: Unknown  Last Office Visit with G, [facility] or [facility] prescribing provider: 4/13/2017    Future Office visit:    Next 5 appointments (look out 90 days)     Jun 05, 2017  1:00 PM CDT   SHORT with Janey Romero RPH   [facility] ([facility])    [address] [address]   [phone]                   Routing refill request to provider for review/approval because:  Drug not active on patient's medication list      JOE Michelle  May 15, 2017  8:44 AM

## 2017-05-18 DIAGNOSIS — E11.65 TYPE 2 DIABETES MELLITUS WITH HYPERGLYCEMIA, WITHOUT LONG-TERM CURRENT USE OF INSULIN (H): ICD-10-CM

## 2017-05-18 NOTE — TELEPHONE ENCOUNTER
metFORMIN (GLUCOPHAGE) 500 MG tablet     Sig: Take 2 tablets (1,000 mg) by mouth 2 times daily (with meals)      Last Written Prescription Date: 11/21/16  Last Fill Quantity: 360, # refills: 0  Last Office Visit with [facility], [facility] or [facility] prescribing provider:  4/13/2017     Next 5 appointments (look out 90 days)     Jun 05, 2017  1:00 PM CDT   SHORT with Janey Romero RPH   [facility] ([facility])    [address] [place] [address]   [phone]                   BP Readings from Last 3 Encounters:   04/17/17 118/60   04/13/17 100/46   04/13/17 119/81     Lab Results   Component Value Date    MICROL 33 10/27/2016     Lab Results   Component Value Date    UMALCR 33.03 10/27/2016     Creatinine   Date Value Ref Range Status   04/08/2017 0.95 0.52 - 1.04 mg/dL Final   ]  GFR Estimate   Date Value Ref Range Status   04/08/2017 56 (L) >60 mL/min/1.7m2 Final     Comment:     Non  GFR Calc   04/04/2017 61 >60 mL/min/1.7m2 Final     Comment:     Non  GFR Calc   03/02/2017 47 (L) >60 mL/min/1.7m2 Final     Comment:     Non  GFR Calc     GFR Estimate If Black   Date Value Ref Range Status   04/08/2017 68 >60 mL/min/1.7m2 Final     Comment:      GFR Calc   04/04/2017 74 >60 mL/min/1.7m2 Final     Comment:      GFR Calc   03/02/2017 57 (L) >60 mL/min/1.7m2 Final     Comment:      GFR Calc     Lab Results   Component Value Date    CHOL 117 07/01/2016     Lab Results   Component Value Date    HDL 40 07/01/2016     Lab Results   Component Value Date    LDL 39 07/01/2016     Lab Results   Component Value Date    TRIG 190 07/01/2016     Lab Results   Component Value Date    CHOLHDLRATIO 3.0 06/30/2015     Lab Results   Component Value Date    AST 19 04/08/2017     Lab Results   Component Value Date    ALT 19 04/08/2017     Lab Results   Component Value Date    A1C 9.4 03/02/2017     A1C 7.7 10/10/2016    A1C 8.3 07/01/2016    A1C 7.9 03/28/2016    A1C 7.1 11/19/2015     Potassium   Date Value Ref Range Status   04/08/2017 3.6 3.4 - 5.3 mmol/L Final     JOE Michelle  May 18, 2017  11:48 AM

## 2017-05-19 NOTE — TELEPHONE ENCOUNTER
Prescription approved per [facility] Refill Protocol.  Cyndi Cabello RN

## 2017-05-31 DIAGNOSIS — E11.65 TYPE 2 DIABETES MELLITUS WITH HYPERGLYCEMIA, WITHOUT LONG-TERM CURRENT USE OF INSULIN (H): ICD-10-CM

## 2017-05-31 RX ORDER — GLIPIZIDE 10 MG/1
TABLET, FILM COATED, EXTENDED RELEASE ORAL
Qty: 180 TABLET | Refills: 0 | Status: SHIPPED | OUTPATIENT
Start: 2017-05-31 | End: 2017-06-19

## 2017-05-31 NOTE — TELEPHONE ENCOUNTER
glipiZIDE (GLUCOTROL XL) 10 MG 24 hr tablet         Last Written Prescription Date: 3/9/17  Last Fill Quantity: 180, # refills: 0  Last Office Visit with [facility], [facility] or [facility] prescribing provider:  4/13/2017     Next 5 appointments (look out 90 days)     Jun 05, 2017  1:00 PM CDT   SHORT with Janey Romero RPH   [facility] ([facility])    [address] [address] [phone]                   BP Readings from Last 3 Encounters:   04/17/17 118/60   04/13/17 100/46   04/13/17 119/81     Lab Results   Component Value Date    MICROL 33 10/27/2016     Lab Results   Component Value Date    UMALCR 33.03 10/27/2016     Creatinine   Date Value Ref Range Status   04/08/2017 0.95 0.52 - 1.04 mg/dL Final   ]  GFR Estimate   Date Value Ref Range Status   04/08/2017 56 (L) >60 mL/min/1.7m2 Final     Comment:     Non  GFR Calc   04/04/2017 61 >60 mL/min/1.7m2 Final     Comment:     Non  GFR Calc   03/02/2017 47 (L) >60 mL/min/1.7m2 Final     Comment:     Non  GFR Calc     GFR Estimate If Black   Date Value Ref Range Status   04/08/2017 68 >60 mL/min/1.7m2 Final     Comment:      GFR Calc   04/04/2017 74 >60 mL/min/1.7m2 Final     Comment:      GFR Calc   03/02/2017 57 (L) >60 mL/min/1.7m2 Final     Comment:      GFR Calc     Lab Results   Component Value Date    CHOL 117 07/01/2016     Lab Results   Component Value Date    HDL 40 07/01/2016     Lab Results   Component Value Date    LDL 39 07/01/2016     Lab Results   Component Value Date    TRIG 190 07/01/2016     Lab Results   Component Value Date    CHOLHDLRATIO 3.0 06/30/2015     Lab Results   Component Value Date    AST 19 04/08/2017     Lab Results   Component Value Date    ALT 19 04/08/2017     Lab Results   Component Value Date    A1C 9.4 03/02/2017    A1C 7.7 10/10/2016    A1C 8.3 07/01/2016    A1C 7.9 03/28/2016     A1C 7.1 11/19/2015     Potassium   Date Value Ref Range Status   04/08/2017 3.6 3.4 - 5.3 mmol/L Final     ASHLEIGH MichelleT  May 31, 2017  10:03 AM

## 2017-06-05 ENCOUNTER — OFFICE VISIT (OUTPATIENT)
Dept: PHARMACY | Facility: CLINIC | Age: 82
End: 2017-06-05
Payer: COMMERCIAL

## 2017-06-05 VITALS
DIASTOLIC BLOOD PRESSURE: 56 MMHG | WEIGHT: 176.2 LBS | HEART RATE: 66 BPM | OXYGEN SATURATION: 95 % | BODY MASS INDEX: 29.32 KG/M2 | SYSTOLIC BLOOD PRESSURE: 134 MMHG

## 2017-06-05 DIAGNOSIS — I10 ESSENTIAL HYPERTENSION: ICD-10-CM

## 2017-06-05 DIAGNOSIS — E53.8 VITAMIN B12 DEFICIENCY (NON ANEMIC): ICD-10-CM

## 2017-06-05 DIAGNOSIS — E11.65 TYPE 2 DIABETES MELLITUS WITH HYPERGLYCEMIA, WITHOUT LONG-TERM CURRENT USE OF INSULIN (H): ICD-10-CM

## 2017-06-05 DIAGNOSIS — K58.0 IRRITABLE BOWEL SYNDROME WITH DIARRHEA: ICD-10-CM

## 2017-06-05 DIAGNOSIS — E11.65 TYPE 2 DIABETES MELLITUS WITH HYPERGLYCEMIA, WITHOUT LONG-TERM CURRENT USE OF INSULIN (H): Primary | ICD-10-CM

## 2017-06-05 DIAGNOSIS — E03.9 HYPOTHYROIDISM, UNSPECIFIED TYPE: ICD-10-CM

## 2017-06-05 DIAGNOSIS — J44.9 CHRONIC OBSTRUCTIVE PULMONARY DISEASE, UNSPECIFIED COPD TYPE (H): ICD-10-CM

## 2017-06-05 DIAGNOSIS — G47.00 INSOMNIA, UNSPECIFIED TYPE: ICD-10-CM

## 2017-06-05 DIAGNOSIS — Z23 ENCOUNTER FOR IMMUNIZATION: ICD-10-CM

## 2017-06-05 DIAGNOSIS — E55.9 VITAMIN D DEFICIENCY: ICD-10-CM

## 2017-06-05 DIAGNOSIS — K21.9 GASTROESOPHAGEAL REFLUX DISEASE WITHOUT ESOPHAGITIS: ICD-10-CM

## 2017-06-05 LAB — HBA1C MFR BLD: 7.8 % (ref 4.3–6)

## 2017-06-05 PROCEDURE — 36415 COLL VENOUS BLD VENIPUNCTURE: CPT | Performed by: PHYSICIAN ASSISTANT

## 2017-06-05 PROCEDURE — 99606 MTMS BY PHARM EST 15 MIN: CPT | Performed by: PHARMACIST

## 2017-06-05 PROCEDURE — 99607 MTMS BY PHARM ADDL 15 MIN: CPT | Performed by: PHARMACIST

## 2017-06-05 PROCEDURE — 83036 HEMOGLOBIN GLYCOSYLATED A1C: CPT | Performed by: PHYSICIAN ASSISTANT

## 2017-06-05 NOTE — MR AVS SNAPSHOT
After Visit Summary   6/5/2017    Maryalice Rebecca Wachter    MRN: [id number]           Patient Information     Date Of Birth          11/11/1933        Visit Information        Provider Department      6/5/2017 1:00 PM Janey Romero RPH [facility] MTM        Today's Diagnoses     Type 2 diabetes mellitus with hyperglycemia, without long-term current use of insulin (H)    -  1    Vitamin D deficiency        Essential hypertension        Vitamin B12 deficiency (non anemic)        Chronic obstructive pulmonary disease, unspecified COPD type (H)        Encounter for immunization        Hypothyroidism, unspecified type        Insomnia, unspecified type        Gastroesophageal reflux disease without esophagitis        Irritable bowel syndrome with diarrhea          Care Instructions    Recommendations from today's MT visit:                                                        1. A1c today = 7.8% --much improved over previous A1c of 9.4% .    PLease decrease blood sugar testing to once daily --but rotate the testing times between fasting in am one day and 2 hrs post any main meal the next .    BP was excellent --just test at home 3-4 days /week now-once day --Goal is < 140/90mmhg.     Stay on same medications as is , keep up the good work.         Next MTM visit:  Monday September 25th at 1pm.     To schedule another MTM appointment, please call the clinic directly or you may call the MTM scheduling line at [phone] or toll-free at [phone].     My Clinical Pharmacist's contact information:                                                      It was a pleasure seeing you today!  Please feel free to contact me with any questions or concerns you have.      Janey Romero Rph.  Medication Therapy Management Provider  [phone]    You may receive a survey about the MT services you received.  I would appreciate your feedback to help me serve you better in the future. Please  fill it out and return it when you can. Your comments will be anonymous.      My healthcare goals:                                                                      Follow-ups after your visit        Your next 10 appointments already scheduled     Jun 23, 2017  9:00 AM CDT   Anticoagulation Visit with FM RN VISITS   [facility]    [address] [place] [address], [address]  [facility] [id number] [phone]            Sep 25, 2017  1:00 PM CDT   SHORT with Janey Romero RPH   [facility] ([facility])    [address] [facility] [address] [phone]              Who to contact     If you have questions or need follow up information about today's clinic visit or your schedule please contact [facility] directly at [phone].  Normal or non-critical lab and imaging results will be communicated to you by MyChart, letter or phone within 4 business days after the clinic has received the results. If you do not hear from us within 7 days, please contact the clinic through MyChart or phone. If you have a critical or abnormal lab result, we will notify you by phone as soon as possible.  Submit refill requests through [employer] or call your pharmacy and they will forward the refill request to us. Please allow 3 business days for your refill to be completed.          Additional Information About Your Visit        MyChart Information     [employer] gives you secure access to your electronic health record. If you see a primary care provider, you can also send messages to your care team and make appointments. If you have questions, please call your primary care clinic.  If you do not have a primary care provider, please call [phone] and they will assist you.        Care EveryWhere ID     This is your Care EveryWhere ID. This could be used by other organizations to access your [facility]  records  [id number]        Your Vitals Were     Pulse Pulse Oximetry BMI (Body Mass Index)             66 95% 29.32 kg/m2          Blood Pressure from Last 3 Encounters:   06/05/17 134/56   04/17/17 118/60   04/13/17 100/46    Weight from Last 3 Encounters:   06/05/17 176 lb 3.2 oz (79.9 kg)   04/17/17 179 lb 8 oz (81.4 kg)   04/13/17 180 lb (81.6 kg)                 Today's Medication Changes          These changes are accurate as of: 6/5/17  4:14 PM.  If you have any questions, ask your nurse or doctor.               These medicines have changed or have updated prescriptions.        Dose/Directions    traZODone 50 MG tablet   Commonly known as:  DESYREL   This may have changed:  how much to take   Used for:  Insomnia, unspecified type        Dose:   mg   Take 1-2 tablets ( mg) by mouth At Bedtime   Quantity:  60 tablet   Refills:  5                Primary Care Provider Office Phone # Fax #    Venu Maria PA-C [phone]       [facility] [id number]  [facility] [address]        Thank you!     Thank you for choosing [facility]  for your care. Our goal is always to provide you with excellent care. Hearing back from our patients is one way we can continue to improve our services. Please take a few minutes to complete the written survey that you may receive in the mail after your visit with us. Thank you!             Your Updated Medication List - Protect others around you: Learn how to safely use, store and throw away your medicines at www.disposemymeds.org.          This list is accurate as of: 6/5/17  4:14 PM.  Always use your most recent med list.                   Brand Name Dispense Instructions for use    * albuterol (2.5 MG/3ML) 0.083% neb solution      Take 1 vial by nebulization every 6 hours as needed for shortness of breath / dyspnea or wheezing Reported on 3/2/2017       * albuterol 108 (90 BASE) MCG/ACT Inhaler   Generic  drug:  albuterol      Inhale 2 puffs into the lungs every 6 hours Reported on 3/2/2017       ascorbic acid 500 MG tablet    VITAMIN C     Take 500 mg by mouth daily.       ASPIRIN EC PO      Take 81 mg by mouth.       B-12 1000 MCG Caps      Take 1 capsule by mouth 2 times daily       calcium carb 1250 mg (500 mg [place])/vitamin D 200 units 500-200 MG-UNIT per tablet    OSCAL with D     Take 1 tablet by mouth daily.       ferrous sulfate 325 (65 FE) MG tablet    IRON     Take 1 tablet by mouth daily (with breakfast).       FIBERCON 625 MG tablet   Generic drug:  calcium polycarbophil      Take 1 tablet by mouth 2 times daily.       glipiZIDE 10 MG 24 hr tablet    GLUCOTROL XL    180 tablet    TAKE ONE TABLET BY MOUTH TWICE A DAY       hydrochlorothiazide 25 MG tablet    HYDRODIURIL    90 tablet    Take 1 tablet (25 mg) by mouth daily       levothyroxine 100 MCG tablet    SYNTHROID/LEVOTHROID    90 tablet    Take 1 tablet (100 mcg) by mouth daily       lisinopril 40 MG tablet    PRINIVIL/ZESTRIL    90 tablet    Take 1 tablet (40 mg) by mouth daily       LOPERAMIDE HCL PO      1/2 to 1 tablet by mouth as needed.       metFORMIN 500 MG tablet    GLUCOPHAGE    360 tablet    TAKE 2 TABLETS (1,000 MG) BY MOUTH 2 TIMES DAILY (WITH MEALS)       metoprolol 50 MG tablet    LOPRESSOR    180 tablet    Take 1 tablet (50 mg) by mouth 2 times daily       mometasone 220 MCG/INH Inhaler    ASMANEX     Inhale 2 puffs into the lungs daily.       omeprazole 20 MG CR capsule    priLOSEC    90 capsule    Take 1 capsule (20 mg) by mouth 2 times daily       oxyCODONE 5 MG/5ML solution    ROXICODONE    90 mL    Take 2.5-5 mLs (2.5-5 mg) by mouth every 6 hours as needed for moderate to severe pain or pain       pioglitazone 30 MG tablet    ACTOS    90 tablet    Take 1 tablet (30 mg) by mouth daily       simvastatin 20 MG tablet    ZOCOR    90 tablet    Take 1 tablet (20 mg) by mouth At Bedtime       tiotropium 18 MCG capsule    SPIRIVA      Inhale 18 mcg into the lungs daily.       traMADol 50 MG tablet    ULTRAM    15 tablet    Take 0.5 tablets (25 mg) by mouth every 6 hours as needed for pain       traZODone 50 MG tablet    DESYREL    60 tablet    Take 1-2 tablets ( mg) by mouth At Bedtime       TRUEPLUS LANCETS 30G Misc     100 each    2-3 lancets daily Use to test blood sugars 2-3 times daily.       VITAMIN D (CHOLECALCIFEROL) PO      Take 2,000 Units by mouth daily       warfarin 3 MG tablet    COUMADIN    90 tablet    TAKE ONE TABLET BY MOUTH EVERY DAY EXCEPT TAKE 1/2 TABLET (1.5MG) ON FRIDAYS.       * Notice:  This list has 2 medication(s) that are the same as other medications prescribed for you. Read the directions carefully, and ask your doctor or other care provider to review them with you.

## 2017-06-05 NOTE — PROGRESS NOTES
SUBJECTIVE/OBJECTIVE:                                                    Maryalice Rebecca Wachter is a 83 year old female coming in for a follow up visit (17)  for Medication Therapy Management.  She was referred to me from her PCP- Venu Maria.     Chief Complaint:  Here for a1c .     Personal Healthcare Goals: fix diarrhea issues, timing of meds, get BP wnl.     Allergies/ADRs: Reviewed in Epic  Tobacco: No tobacco use   Alcohol: not currently using--very rare   Caffeine: decaff in am 1-2 cups/day of coffee; pm -tea a little bit.   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 Cannot exercise due to diarrhea issues --would like to go on walks with her hubby but has to have BM within 1 block of leaving.   PMH: Reviewed in [facility]; [facility] stated she has sarcoidosis?    Medication Adherence: No issues found today    IBS-diarrhea: patient uses prn loperamide --works well.     GERD:  She has a hiatal hernia -surgery not recommended.  Taking omeprazole 20 mg. qam .     Diabetes:  Pt currently taking Glipizide er 10mg. bid, actos 30mg qday, Metformin-1 x500mg bid. Pt is experiencing the following side effects: None                    SMB days avg. = 171 n=13, 14 days = 181 n=25 , 30 days = 190 n=57     Date FBG/ 2hours post Lunch/2hours post Dinner /2hours post    17 147 614am      16 127 6am /189 321pm     15 138 706am 174 215pm      140 641am /210 418pm       100 607am  164 827pm     140 711am  325 757pm     17 112 740am  256  8pm        Symptoms of low blood sugar? none. Frequency of hypoglycemia? never.  Recent symptoms of high blood sugar? fatigue  Eye exam: up to date  Foot exam: up to date  Microalbumin is < 30 mg/g. Pt is taking an ACEi/ARB.  Aspirin: Taking 81mg daily and denies side effects  Diet/Exercise: diet is ok but likes carbs, rice , chinese , no exercise.   She is eating low carb but not enjoying foods and is struggling with lack of bs control with no  sugar in diet.But she is determined to get that a1c back down.       Vitamin D3: level was 21 on  --she takes otc daily dose of 2,000 iu's now.        Vitamin B12: level was 493 on 10-10-16. She takes daily otc dose now.       Hypothyroidism: Patient is taking levothyroxine 100 mcg daily. She knows to take it on an empty stomach with her omeprazole in the morning.    Hypertension: Current medications include : HCTZ 25mg. Qam,  lisinopril 40mg-qam. And metoprolol 50mg bid-am and dinnertime.   Patient does  self-monitor BP.  Yes see below:  Patient side effects :  She denies any ortho statin hypotension.         Date Home bp's      6-5-17 135/55   P=64      6-4-17 127/58   P=63  128/55  P=75    6-3-17 Missed am   107/62   P=68    6-2-17 131/63 p=75  104/58 p=67    6-1-17 122/50 p=63  126/52 p=77    5-31-17 132/61 p=71  119/61 p=70                       COPD: Patient states she feels well controlled. Patient uses her inhalers more often when she gets sick and she always carries her rescue inhaler with her. Patient states that when she forgets it she becomes very worried and panicked.     Insomnia: Current medications include: trazodone 25-50mg. Pt reports trouble staying asleep and can only sleep 4-6 hours. She admits to a boring life right now -she gets sleepy early Pm --naps for a few hours , then goes to bed -gets up at 4-5 am - perhaps she is sleeping enough already.   She sleeps better she states with 1/2 tab =25mg .    She takes prn tylenol-pm. Sleeps better on occasion with that but doesn't want to use it regularly.       Immunizations: Patient is up to date .      Current labs include:  BP Readings from Last 3 Encounters:   06/05/17 134/56   04/17/17 118/60   04/13/17 100/46     Lab Results   Component Value Date    A1C 7.8 06/05/2017    A1C 9.4 03/02/2017    A1C 7.7 10/10/2016    A1C 8.3 07/01/2016    A1C 7.9 03/28/2016           Cholesterol   Date Value Ref Range Status   07/01/2016 117 <200 mg/dL  Final   06/30/2015 109 <200 mg/dL Final     Comment:     LDL Cholesterol is the primary guide to therapy.   The NCEP recommends further evaluation of: patients with cholesterol greater   than 200 mg/dL if additional risk factors are present, cholesterol greater   than   240 mg/dL, triglycerides greater than 150 mg/dL, or HDL less than 40 mg/dL.       HDL Cholesterol   Date Value Ref Range Status   07/01/2016 40 (L) >49 mg/dL Final   06/30/2015 36 (L) >50 mg/dL Final     LDL Cholesterol Calculated   Date Value Ref Range Status   07/01/2016 39 <100 mg/dL Final     Comment:     Desirable:       <100 mg/dl   06/30/2015 25 0 - 129 mg/dL Final     Comment:     LDL Cholesterol is the primary guide to therapy: LDL-cholesterol goal in high   risk patients is <100 mg/dL and in very high risk patients is <70 mg/dL.       Triglycerides   Date Value Ref Range Status   07/01/2016 190 (H) <150 mg/dL Final     Comment:     Borderline high:  150-199 mg/dl   High:             200-499 mg/dl   Very high:       >499 mg/dl   Fasting specimen     06/30/2015 240 (H) 0 - 150 mg/dL Final     Cholesterol/HDL Ratio   Date Value Ref Range Status   06/30/2015 3.0 0.0 - 5.0 Final     MICROL       33   10/27/2016  MICROALBUMIN    33.03   10/27/2016    Last Basic Metabolic Panel:  NA      142   1/5/2017   POTASSIUM      4.3   1/5/2017  CHLORIDE      106   1/5/2017  VASHTI      9.2   1/5/2017  CO2       28   1/5/2017  BUN       17   1/5/2017  CR     0.94   1/5/2017  GLC      166   1/5/2017          GFR Estimate   Date Value Ref Range Status   04/08/2017 56 (L) >60 mL/min/1.7m2 Final     Comment:     Non  GFR Calc   04/04/2017 61 >60 mL/min/1.7m2 Final     Comment:     Non  GFR Calc   03/02/2017 47 (L) >60 mL/min/1.7m2 Final     Comment:     Non  GFR Calc     GFR Estimate If Black   Date Value Ref Range Status   04/08/2017 68 >60 mL/min/1.7m2 Final     Comment:      GFR Calc   04/04/2017 74  >60 mL/min/1.7m2 Final     Comment:      GFR Calc   03/02/2017 57 (L) >60 mL/min/1.7m2 Final     Comment:      GFR Calc     TSH   Date Value Ref Range Status   12/19/2016 0.55 0.40 - 4.00 mU/L Final   ]  /56  Pulse 66  Wt 176 lb 3.2 oz (79.9 kg)  SpO2 95%  BMI 29.32 kg/m2    Most Recent Immunizations   Administered Date(s) Administered     DTAP (<7y) 01/01/2010     Influenza (High Dose) 3 valent vaccine 10/10/2016     Influenza (IIV3) 09/01/2014     Pneumococcal (PCV 13) 10/10/2016     Pneumococcal (PCV 7) 09/09/2014     Zoster vaccine, live 01/01/2012     ASSESSMENT:                                                       Current medications were reviewed with her today.     Medication Adherence: No problems identified    IBS-Diarrhea: Stable.     GERD: Improved. Current treatment is effective. Chronic PPI use places patient at an increased risk of C. Diff, hypomagnesemia and lower bone mineral density, these risks were reviewed with the patient.        Diabetes: Stable. Patient is meeting A1c goal of < 8%. Self monitoring of blood glucose is at goal of fasting  mg/dL. Pt would benefit from minimum SMBG: Check blood sugars fasting, and occasionally 2 hours after starting a meal.  Metformin :  stay on the same dose.  SFU (Glipizide) :  stay on the same dose.  Actos:  stay on the same dose.  Increased exercise- as tolerated.   Updated Hemoglobin A1c- 7.8% today .  Weight loss recommended--keep low carb diet .         Vitamin B12: is not at goal of 600-1000pg/mL. Pt would benefit from vitamin B12 lab recheck in 12 months.    Vitamin D3: is not at goal of 50-75ng/mL. Pt would benefit from vitamin D3 lab recheck in 12 months.    Hypothyroidism: TSH wnl.     Hypertension: Stable. Patient is not meeting BP goal of < 140/90mmHg.  Pt would benefit from the following changes - continued BP monitoring and watching diet, increasing exercise and weight loss      COPD/Asthma: Stable.      Insomnia: Improved. Pt may benefit from no changes.      Immunizations: stable -up to date now.     PLAN:                                                      1. A1c today = 7.8% --much improved over previous A1c of 9.4% .    PLease decrease blood sugar testing to once daily --but rotate the testing times between fasting in am one day and 2 hrs post any main meal the next .    BP was excellent --just test at home 3-4 days /week now-once day --Goal is < 140/90mmhg.     Stay on same medications as is , keep up the good work.         Next MTM visit:  Monday September 25th at 1pm.       I spent 30 minutes with this patient today .     To schedule another MTM appointment, please call the clinic directly or you may call the MTM scheduling line at [phone] or toll-free at [phone].     My Clinical Pharmacist's contact information:                                                      It was a pleasure seeing you today!  Please feel free to contact me with any questions or concerns you have.      Janey Romero Rph.  Medication Therapy Management Provider  [phone]        You may receive a survey about the MTM services you received.  I would appreciate your feedback to help me serve you better in the future. Please fill it out and return it when you can. Your comments will be anonymous.

## 2017-06-05 NOTE — PATIENT INSTRUCTIONS
Recommendations from today's MTM visit:                                                        1. A1c today = 7.8% --much improved over previous A1c of 9.4% .    PLease decrease blood sugar testing to once daily --but rotate the testing times between fasting in am one day and 2 hrs post any main meal the next .    BP was excellent --just test at home 3-4 days /week now-once day --Goal is < 140/90mmhg.     Stay on same medications as is , keep up the good work.         Next MTM visit:  Monday September 25th at 1pm.     To schedule another MTM appointment, please call the clinic directly or you may call the MTM scheduling line at [phone] or toll-free at [phone].     My Clinical Pharmacist's contact information:                                                      It was a pleasure seeing you today!  Please feel free to contact me with any questions or concerns you have.      Janey Romero Rph.  Medication Therapy Management Provider  [phone]    You may receive a survey about the MTM services you received.  I would appreciate your feedback to help me serve you better in the future. Please fill it out and return it when you can. Your comments will be anonymous.      My healthcare goals:

## 2017-06-14 DIAGNOSIS — E78.5 HYPERLIPIDEMIA LDL GOAL <100: ICD-10-CM

## 2017-06-14 NOTE — LETTER
[address], [address]  [facility] [address]  Phone: [phone]  Fax: [phone]    06/15/17    Maryalice Rebecca Wachter  [address] [facility] [address]      To whom it may concern:     We recently received a call from your pharmacy requesting a refill of your medication.    A review of your chart indicates that an appointment is required with your provider for an Annual Physical with fasting labs. Please call the clinic at [phone] to schedule your appointment.    We have authorized one refill of your medication to allow time for you to schedule your appointment.    Taking care of your health is important to us, and ongoing visits with your provider are vital to your care.  We look forward to seeing you in the near future.        Sincerely,      Venu Maria PA-C/  [place], [facility] JANNETTE

## 2017-06-15 RX ORDER — SIMVASTATIN 20 MG
TABLET ORAL
Qty: 90 TABLET | Refills: 0 | Status: SHIPPED | OUTPATIENT
Start: 2017-06-15 | End: 2017-09-13

## 2017-06-15 NOTE — TELEPHONE ENCOUNTER
simvastatin (ZOCOR) 20 MG tablet     Last Written Prescription Date: 9/21/16  Last Fill Quantity: 90, # refills: 2  [place] [facility] [place] [facility] prescribing provider: 4/13/2017       Lab Results   Component Value Date    CHOL 117 07/01/2016     Lab Results   Component Value Date    HDL 40 07/01/2016     Lab Results   Component Value Date    LDL 39 07/01/2016     Lab Results   Component Value Date    TRIG 190 07/01/2016     Lab Results   Component Value Date    CHOLHDLRATIO 3.0 06/30/2015     JOE Michelle  La 15, 2017  8:10 AM

## 2017-06-15 NOTE — TELEPHONE ENCOUNTER
Medication is being filled for 1 time refill only due to:  Patient needs labs FLP.    Prescription approved per [facility] Refill Protocol.    Breana LARES RN, BSN, PHN  [place] [facility] RN

## 2017-06-16 NOTE — PROGRESS NOTES
SUBJECTIVE:                                                            Maryalice Rebecca Wachter is a 83 year old female who presents for Preventive Visit.    Are you in the first 12 months of your Medicare Part B coverage?  No     Healthy Habits:    Do you get at least three servings of calcium containing foods daily (dairy, green leafy vegetables, etc.)? no, taking calcium and/or vitamin D supplement: yes -     Amount of exercise or daily activities, outside of work: 0    Problems taking medications regularly No    Medication side effects: No    Have you had an eye exam in the past two years? yes    Do you see a dentist twice per year? NA    Do you have sleep apnea, excessive snoring or daytime drowsiness?no    COGNITIVE SCREEN  1) Repeat 3 items (Banana, Sunrise, Chair)    2) Clock draw: NORMAL  3) 3 item recall: Recalls 3 objects  Results: 3 items recalled: COGNITIVE IMPAIRMENT LESS LIKELY    Mini-CogTM Copyright S Reji. Licensed by the author for use in [facility]; reprinted with permission ([email or web address]). All rights reserved.      PROBLEMS TO ADD ON...  Mentions that her voice is not as strong as it used to be.  Just had endoscopy done recently.      Diabetes Follow-up    Patient is checking blood sugars: twice daily.    Blood sugar testing frequency justification: Adjustment of medication(s)  Results are as follows:         120-140 in AM    Diabetic concerns: None     Symptoms of hypoglycemia (low blood sugar): none     Paresthesias (numbness or burning in feet) or sores: No     Date of last diabetic eye exam: has appointment coming up July 7, 2017     Has been seeing Janey for this lately.  Last A1C was 7.4 but previous was at about 9.  Will be seeing him again in Sept.      Hyperlipidemia Follow-Up      Rate your low fat/cholesterol diet?: good    Taking statin?  Yes, no muscle aches from statin    Other lipid medications/supplements?:  none     Hypertension Follow-up      Outpatient blood  pressures are not being checked.    Low Salt Diet: no added salt       COPD Follow-Up    Symptoms are currently: stable    Current fatigue or dyspnea with ambulation: stable     Shortness of breath: stable    Increased or change in Cough/Sputum: No    Fever(s): No    Baseline ambulation without stopping to rest = 1/2  blocks.     Any ER/UC or hospital admissions since your last visit? Yes - ER/UC     History   Smoking Status     Never Smoker   Smokeless Tobacco     Never Used     No results found for: FEV1, [id number]     See's Pulmonary for COPD.  [facility], Dr. Nava.        Reviewed and updated as needed this visit by clinical staff  Tobacco  Allergies  Problems  Med Hx  Surg Hx  Fam Hx  Soc Hx        Reviewed and updated as needed this visit by Provider        Social History   Substance Use Topics     Smoking status: Never Smoker     Smokeless tobacco: Never Used     Alcohol use Yes      Comment: Extremely rare use in small amounts.       The patient does not drink >3 drinks per day nor >7 drinks per week.    Today's PHQ-2 Score:   PHQ-2 ( 1999 Pfizer) 6/19/2017 4/13/2017   Q1: Little interest or pleasure in doing things 0 0   Q2: Feeling down, depressed or hopeless 0 0   PHQ-2 Score 0 0       Do you feel safe in your environment - Yes    Do you have a Health Care Directive?: Yes: Patient states has Advance Directive and will bring in a copy to clinic.    Current providers sharing in care for this patient include:   Patient Care Team:  Venu Maria PA-C as PCP - General (Physician Assistant - Medical)      Hearing impairment: Yes, wears hearing aids    Ability to successfully perform activities of daily living: Yes, no assistance needed     Fall risk:  Fallen 2 or more times in the past year?: No  Any fall with injury in the past year?: No    Home safety:  none identified      The following health maintenance items are reviewed in Epic and correct as of today:  Health Maintenance   Topic Date Due      TETANUS IMMUNIZATION (SYSTEM ASSIGNED)  11/11/1951     DEXA SCAN SCREENING (SYSTEM ASSIGNED)  11/11/1998     EYE EXAM Q1 YEAR  05/26/2017     LIPID MONITORING Q1 YEAR  07/01/2017     ADVANCE DIRECTIVE PLANNING Q5 YRS  08/23/2017     INFLUENZA VACCINE (SYSTEM ASSIGNED)  09/01/2017     PNEUMOCOCCAL (2 of 2 - PPSV23) 10/10/2017     FOOT EXAM Q1 YEAR  10/27/2017     FALL RISK ASSESSMENT  10/27/2017     MICROALBUMIN Q1 YEAR  10/27/2017     A1C Q6 MO  12/05/2017     CREATININE Q1 YEAR  04/08/2018     TSH W/ FREE T4 REFLEX Q2 YEAR  12/19/2018         Pneumonia Vaccine: Adults age 65+ who have not received previous Pneumovax (PPSV23) or PCV13 as an adult: Should first be given PCV13 AND then should be given PPSV23 6-12 months after PCV13     ROS:  Constitutional, HEENT, cardiovascular, pulmonary, gi and gu systems are negative, except as otherwise noted.    Problem list, Medication list, Allergies, and Medical/Social/Surgical histories reviewed in [facility] and updated as appropriate.  Labs reviewed in EPIC  BP Readings from Last 3 Encounters:   06/19/17 128/65   06/05/17 134/56   04/17/17 118/60    Wt Readings from Last 3 Encounters:   06/19/17 178 lb (80.7 kg)   06/05/17 176 lb 3.2 oz (79.9 kg)   04/17/17 179 lb 8 oz (81.4 kg)                  Recent Labs   Lab Test  06/05/17   1337  04/08/17   0813  04/04/17   0534  03/02/17   1407   12/19/16   1343  10/21/16   0520   10/10/16   1404  07/01/16   0829   11/19/15   1011  06/30/15   0952   12/12/14   0954   A1C  7.8*   --    --   9.4*   --    --    --    --   7.7*  8.3*   < >  7.1*  7.9*   < >  7.7*   LDL   --    --    --    --    --    --    --    --    --   39   --    --   25   --   60   HDL   --    --    --    --    --    --    --    --    --   40*   --    --   36*   --    --    TRIG   --    --    --    --    --    --    --    --    --   190*   --    --   240*   --    --    ALT   --   19  20   --    --    --   22   --    --    --    --    --    --    < >  19   CR   --   0.95   "0.88  1.11*   < >   --   0.87   < >   --    --    --   0.76   --    < >  0.82   GFRESTIMATED   --   56*  61  47*   < >   --   63   < >   --    --    --   72   --    < >  67   GFRESTBLACK   --   68  74  57*   < >   --   76   < >   --    --    --   87   --    < >  81   POTASSIUM   --   3.6  3.8  4.2   < >   --   3.9   < >   --    --    --   4.2   --    < >  4.5   TSH   --    --    --    --    --   0.55   --    --    --    --    --   0.63  0.38*   < >  0.37*    < > = values in this interval not displayed.      OBJECTIVE:                                                            /65 (BP Location: Right arm, Patient Position: Chair, Cuff Size: Adult Regular)  Pulse 64  Temp 97.9  F (36.6  C) (Oral)  Ht 5' 4.5\" (1.638 m)  Wt 178 lb (80.7 kg)  BMI 30.08 kg/m2 Estimated body mass index is 30.08 kg/(m^2) as calculated from the following:    Height as of this encounter: 5' 4.5\" (1.638 m).    Weight as of this encounter: 178 lb (80.7 kg).  EXAM:   GENERAL: healthy, alert and no distress  EYES: Eyes grossly normal to inspection, PERRL and conjunctivae and sclerae normal  HENT: ear canals and TM's normal, nose and mouth without ulcers or lesions  NECK: no adenopathy, no asymmetry, masses, or scars and thyroid normal to palpation  RESP: lungs clear to auscultation - no rales, rhonchi or wheezes  CV: regular rate and rhythm, normal S1 S2, no S3 or S4, no murmur, click or rub, no peripheral edema and peripheral pulses strong  ABDOMEN: soft, nontender, no hepatosplenomegaly, no masses and bowel sounds normal  MS: no gross musculoskeletal defects noted, no edema  SKIN: no suspicious lesions or rashes  NEURO: Normal strength and tone, mentation intact and speech normal  PSYCH: mentation appears normal, affect normal/bright    ASSESSMENT / PLAN:                                                            1. Medicare annual wellness visit, subsequent      2. Type 2 diabetes mellitus with hyperglycemia, without long-term " "current use of insulin (H)    - Basic metabolic panel  - metFORMIN (GLUCOPHAGE) 500 MG tablet; TAKE 2 TABLETS (1,000 MG) BY MOUTH 2 TIMES DAILY (WITH MEALS)  Dispense: 360 tablet; Refill: 0  - glipiZIDE (GLUCOTROL XL) 10 MG 24 hr tablet; Take 1 tablet (10 mg) by mouth 2 times daily  Dispense: 180 tablet; Refill: 0    3. Atrial tachycardia, paroxysmal (H)    - metoprolol (LOPRESSOR) 50 MG tablet; Take 1 tablet (50 mg) by mouth 2 times daily  Dispense: 180 tablet; Refill: 1    4. Essential hypertension    - Basic metabolic panel  - CBC with platelets  - TSH with free T4 reflex  - hydrochlorothiazide (HYDRODIURIL) 25 MG tablet; Take 1 tablet (25 mg) by mouth daily  Dispense: 90 tablet; Refill: 1    5. Hyperlipidemia LDL goal <100    - Lipid Profile (Chol, Trig, HDL, LDL calc)    6. Past myocardial infarction    - metoprolol (LOPRESSOR) 50 MG tablet; Take 1 tablet (50 mg) by mouth 2 times daily  Dispense: 180 tablet; Refill: 1    7. Systolic dysfunction, left ventricle    - metoprolol (LOPRESSOR) 50 MG tablet; Take 1 tablet (50 mg) by mouth 2 times daily  Dispense: 180 tablet; Refill: 1    End of Life Planning:  Patient currently has an advanced directive: Yes.  Practitioner is supportive of decision.    COUNSELING:  Reviewed preventive health counseling, as reflected in patient instructions        Estimated body mass index is 30.08 kg/(m^2) as calculated from the following:    Height as of this encounter: 5' 4.5\" (1.638 m).    Weight as of this encounter: 178 lb (80.7 kg).  Weight management plan: Discussed healthy diet and exercise guidelines and patient will follow up in 6 months in clinic to re-evaluate.   reports that she has never smoked. She has never used smokeless tobacco.      Appropriate preventive services were discussed with this patient, including applicable screening as appropriate for cardiovascular disease, diabetes, osteopenia/osteoporosis, and glaucoma.  As appropriate for age/gender, discussed " screening for colorectal cancer, prostate cancer, breast cancer, and cervical cancer. Checklist reviewing preventive services available has been given to the patient.    Reviewed patients plan of care and provided an AVS. The Complex Care Plan (for patients with higher acuity and needing more deliberate coordination of services) for Sindi meets the Care Plan requirement. This Care Plan has been established and reviewed with the Patient.    Counseling Resources:  ATP IV Guidelines  Pooled Cohorts Equation Calculator  Breast Cancer Risk Calculator  FRAX Risk Assessment  ICSI Preventive Guidelines  Dietary Guidelines for Americans, 2010  USDA's MyPlate  ASA Prophylaxis  Lung CA Screening    Venu Maria PA-C  [facility]

## 2017-06-19 ENCOUNTER — OFFICE VISIT (OUTPATIENT)
Dept: FAMILY MEDICINE | Facility: CLINIC | Age: 82
End: 2017-06-19
Payer: COMMERCIAL

## 2017-06-19 VITALS
HEART RATE: 64 BPM | BODY MASS INDEX: 29.66 KG/M2 | DIASTOLIC BLOOD PRESSURE: 65 MMHG | TEMPERATURE: 97.9 F | WEIGHT: 178 LBS | HEIGHT: 65 IN | OXYGEN SATURATION: 94 % | SYSTOLIC BLOOD PRESSURE: 128 MMHG | RESPIRATION RATE: 16 BRPM

## 2017-06-19 DIAGNOSIS — I25.2 PAST MYOCARDIAL INFARCTION: ICD-10-CM

## 2017-06-19 DIAGNOSIS — I51.9 SYSTOLIC DYSFUNCTION, LEFT VENTRICLE: ICD-10-CM

## 2017-06-19 DIAGNOSIS — E11.65 TYPE 2 DIABETES MELLITUS WITH HYPERGLYCEMIA, WITHOUT LONG-TERM CURRENT USE OF INSULIN (H): ICD-10-CM

## 2017-06-19 DIAGNOSIS — E03.9 HYPOTHYROIDISM, UNSPECIFIED TYPE: ICD-10-CM

## 2017-06-19 DIAGNOSIS — I47.19 ATRIAL TACHYCARDIA, PAROXYSMAL (H): ICD-10-CM

## 2017-06-19 DIAGNOSIS — E78.5 HYPERLIPIDEMIA LDL GOAL <100: ICD-10-CM

## 2017-06-19 DIAGNOSIS — Z00.00 MEDICARE ANNUAL WELLNESS VISIT, SUBSEQUENT: Primary | ICD-10-CM

## 2017-06-19 DIAGNOSIS — I10 ESSENTIAL HYPERTENSION: ICD-10-CM

## 2017-06-19 LAB
ANION GAP SERPL CALCULATED.3IONS-SCNC: 14 MMOL/L (ref 3–14)
BUN SERPL-MCNC: 24 MG/DL (ref 7–30)
CALCIUM SERPL-MCNC: 9.6 MG/DL (ref 8.5–10.1)
CHLORIDE SERPL-SCNC: 102 MMOL/L (ref 94–109)
CHOLEST SERPL-MCNC: 134 MG/DL
CO2 SERPL-SCNC: 25 MMOL/L (ref 20–32)
CREAT SERPL-MCNC: 1.05 MG/DL (ref 0.52–1.04)
ERYTHROCYTE [DISTWIDTH] IN BLOOD BY AUTOMATED COUNT: 14 % (ref 10–15)
GFR SERPL CREATININE-BSD FRML MDRD: 50 ML/MIN/1.7M2
GLUCOSE SERPL-MCNC: 126 MG/DL (ref 70–99)
HCT VFR BLD AUTO: 37.6 % (ref 35–47)
HDLC SERPL-MCNC: 43 MG/DL
HGB BLD-MCNC: 12.7 G/DL (ref 11.7–15.7)
LDLC SERPL CALC-MCNC: 49 MG/DL
MCH RBC QN AUTO: 32.1 PG (ref 26.5–33)
MCHC RBC AUTO-ENTMCNC: 33.8 G/DL (ref 31.5–36.5)
MCV RBC AUTO: 95 FL (ref 78–100)
NONHDLC SERPL-MCNC: 91 MG/DL
PLATELET # BLD AUTO: 311 10E9/L (ref 150–450)
POTASSIUM SERPL-SCNC: 4 MMOL/L (ref 3.4–5.3)
RBC # BLD AUTO: 3.96 10E12/L (ref 3.8–5.2)
SODIUM SERPL-SCNC: 141 MMOL/L (ref 133–144)
T4 FREE SERPL-MCNC: 1.74 NG/DL (ref 0.76–1.46)
TRIGL SERPL-MCNC: 208 MG/DL
TSH SERPL DL<=0.005 MIU/L-ACNC: 0.09 MU/L (ref 0.4–4)
WBC # BLD AUTO: 6.9 10E9/L (ref 4–11)

## 2017-06-19 PROCEDURE — 80048 BASIC METABOLIC PNL TOTAL CA: CPT | Performed by: PHYSICIAN ASSISTANT

## 2017-06-19 PROCEDURE — 36415 COLL VENOUS BLD VENIPUNCTURE: CPT | Performed by: PHYSICIAN ASSISTANT

## 2017-06-19 PROCEDURE — 85027 COMPLETE CBC AUTOMATED: CPT | Performed by: PHYSICIAN ASSISTANT

## 2017-06-19 PROCEDURE — 84439 ASSAY OF FREE THYROXINE: CPT | Performed by: PHYSICIAN ASSISTANT

## 2017-06-19 PROCEDURE — G0439 PPPS, SUBSEQ VISIT: HCPCS | Performed by: PHYSICIAN ASSISTANT

## 2017-06-19 PROCEDURE — 80061 LIPID PANEL: CPT | Performed by: PHYSICIAN ASSISTANT

## 2017-06-19 PROCEDURE — 84443 ASSAY THYROID STIM HORMONE: CPT | Performed by: PHYSICIAN ASSISTANT

## 2017-06-19 RX ORDER — HYDROCHLOROTHIAZIDE 25 MG/1
25 TABLET ORAL DAILY
Qty: 90 TABLET | Refills: 1 | Status: SHIPPED | OUTPATIENT
Start: 2017-06-19 | End: 2018-01-18

## 2017-06-19 RX ORDER — GLIPIZIDE 10 MG/1
10 TABLET, FILM COATED, EXTENDED RELEASE ORAL 2 TIMES DAILY
Qty: 180 TABLET | Refills: 0 | Status: SHIPPED | OUTPATIENT
Start: 2017-06-19 | End: 2018-01-11

## 2017-06-19 RX ORDER — METOPROLOL TARTRATE 50 MG
50 TABLET ORAL 2 TIMES DAILY
Qty: 180 TABLET | Refills: 1 | Status: SHIPPED | OUTPATIENT
Start: 2017-06-19 | End: 2018-01-11

## 2017-06-19 NOTE — NURSING NOTE
"Chief Complaint   Patient presents with     Medicare Visit       Initial /65 (BP Location: Right arm, Patient Position: Chair, Cuff Size: Adult Regular)  Pulse 64  Temp 97.9  F (36.6  C) (Oral)  Resp 16  Ht 5' 4.5\" (1.638 m)  Wt 178 lb (80.7 kg)  SpO2 94%  BMI 30.08 kg/m2 Estimated body mass index is 30.08 kg/(m^2) as calculated from the following:    Height as of this encounter: 5' 4.5\" (1.638 m).    Weight as of this encounter: 178 lb (80.7 kg).  Medication Reconciliation: complete   Laisha Hernández MA      "

## 2017-06-19 NOTE — MR AVS SNAPSHOT
After Visit Summary   6/19/2017    Maryalice Rebecca Wachter    MRN: [id number]           Patient Information     Date Of Birth          11/11/1933        Visit Information        Provider Department      6/19/2017 8:40 AM Venu Maria PA-C [facility]        Today's Diagnoses     Medicare annual wellness visit, subsequent    -  1    Type 2 diabetes mellitus with hyperglycemia, without long-term current use of insulin (H)        Atrial tachycardia, paroxysmal (H)        Essential hypertension        Hyperlipidemia LDL goal <100        Past myocardial infarction        Systolic dysfunction, left ventricle          Care Instructions      Preventive Health Recommendations    Female Ages 65 +    Yearly exam:     See your health care provider every year in order to  o Review health changes.   o Discuss preventive care.    o Review your medicines if your doctor has prescribed any.      You no longer need a yearly Pap test unless you've had an abnormal Pap test in the past 10 years. If you have vaginal symptoms, such as bleeding or discharge, be sure to talk with your provider about a Pap test.      Every 1 to 2 years, have a mammogram.  If you are over 69, talk with your health care provider about whether or not you want to continue having screening mammograms.      Every 10 years, have a colonoscopy. Or, have a yearly FIT test (stool test). These exams will check for colon cancer.       Have a cholesterol test every 5 years, or more often if your doctor advises it.       Have a diabetes test (fasting glucose) every three years. If you are at risk for diabetes, you should have this test more often.       At age 65, have a bone density scan (DEXA) to check for osteoporosis (brittle bone disease).    Shots:    Get a flu shot each year.    Get a tetanus shot every 10 years.    Talk to your doctor about your pneumonia vaccines. There are now two you should receive - Pneumovax (PPSV 23)  and Prevnar (PCV 13).    Talk to your doctor about the shingles vaccine.    Talk to your doctor about the hepatitis B vaccine.    Nutrition:     Eat at least 5 servings of fruits and vegetables each day.      Eat whole-grain bread, whole-wheat pasta and brown rice instead of white grains and rice.      Talk to your provider about Calcium and Vitamin D.     Lifestyle    Exercise at least 150 minutes a week (30 minutes a day, 5 days a week). This will help you control your weight and prevent disease.      Limit alcohol to one drink per day.      No smoking.       Wear sunscreen to prevent skin cancer.       See your dentist twice a year for an exam and cleaning.      See your eye doctor every 1 to 2 years to screen for conditions such as glaucoma, macular degeneration and cataracts.          Follow-ups after your visit        Follow-up notes from your care team     Return in about 1 year (around 6/19/2018) for Physical Exam.      Your next 10 appointments already scheduled     Jun 23, 2017  9:00 AM CDT   Anticoagulation Visit with FM RN VISITS   [facility]    [address] [place] [address], [address]  [facility] [address] [phone]            Sep 25, 2017  1:00 PM CDT   SHORT with Janey Romreo RPH   [facility] ([facility])    [address] [facility] [address] [phone]              Who to contact     If you have questions or need follow up information about today's clinic visit or your schedule please contact [facility] directly at [phone].  Normal or non-critical lab and imaging results will be communicated to you by MyChart, letter or phone within 4 business days after the clinic has received the results. If you do not hear from us within 7 days, please contact the clinic through MyChart or phone. If you have a critical or abnormal lab result, we will notify you by phone as soon as  "possible.  Submit refill requests through [employer] or call your pharmacy and they will forward the refill request to us. Please allow 3 business days for your refill to be completed.          Additional Information About Your Visit        [facility] [employer] Information     [employer] gives you secure access to your electronic health record. If you see a primary care provider, you can also send messages to your care team and make appointments. If you have questions, please call your primary care clinic.  If you do not have a primary care provider, please call [phone] and they will assist you.        Care EveryWhere ID     This is your Care EveryWhere ID. This could be used by other organizations to access your [place] medical records  [id number]        Your Vitals Were     Pulse Temperature Respirations Height Pulse Oximetry BMI (Body Mass Index)    64 97.9  F (36.6  C) (Oral) 16 5' 4.5\" (1.638 m) 94% 30.08 kg/m2       Blood Pressure from Last 3 Encounters:   06/19/17 128/65   06/05/17 134/56   04/17/17 118/60    Weight from Last 3 Encounters:   06/19/17 178 lb (80.7 kg)   06/05/17 176 lb 3.2 oz (79.9 kg)   04/17/17 179 lb 8 oz (81.4 kg)              We Performed the Following     Basic metabolic panel     CBC with platelets     Lipid Profile (Chol, Trig, HDL, LDL calc)     TSH with free T4 reflex          Today's Medication Changes          These changes are accurate as of: 6/19/17  9:30 AM.  If you have any questions, ask your nurse or doctor.               These medicines have changed or have updated prescriptions.        Dose/Directions    glipiZIDE 10 MG 24 hr tablet   Commonly known as:  GLUCOTROL XL   This may have changed:  See the new instructions.   Used for:  Type 2 diabetes mellitus with hyperglycemia, without long-term current use of insulin (H)   Changed by:  Venu Maria PA-C        Dose:  10 mg   Take 1 tablet (10 mg) by mouth 2 times daily   Quantity:  180 tablet   Refills:  0       metFORMIN 500 " MG tablet   Commonly known as:  GLUCOPHAGE   This may have changed:  See the new instructions.   Used for:  Type 2 diabetes mellitus with hyperglycemia, without long-term current use of insulin (H)   Changed by:  Venu Maria PA-C        TAKE 2 TABLETS (1,000 MG) BY MOUTH 2 TIMES DAILY (WITH MEALS)   Quantity:  360 tablet   Refills:  0       traZODone 50 MG tablet   Commonly known as:  DESYREL   This may have changed:  how much to take   Used for:  Insomnia, unspecified type        Dose:   mg   Take 1-2 tablets ( mg) by mouth At Bedtime   Quantity:  60 tablet   Refills:  5            Where to get your medicines      These medications were sent to [address] [address]     Phone:  [phone]     glipiZIDE 10 MG 24 hr tablet    hydrochlorothiazide 25 MG tablet    metFORMIN 500 MG tablet    metoprolol 50 MG tablet                Primary Care Provider Office Phone # Fax #    Venu Maria PA-C [phone]       [facility] [address]  [facility] [address]        Thank you!     Thank you for choosing [facility]  for your care. Our goal is always to provide you with excellent care. Hearing back from our patients is one way we can continue to improve our services. Please take a few minutes to complete the written survey that you may receive in the mail after your visit with us. Thank you!             Your Updated Medication List - Protect others around you: Learn how to safely use, store and throw away your medicines at www.disposemymeds.org.          This list is accurate as of: 6/19/17  9:30 AM.  Always use your most recent med list.                   Brand Name Dispense Instructions for use    * albuterol (2.5 MG/3ML) 0.083% neb solution      Take 1 vial by nebulization every 6 hours as needed for shortness of breath / dyspnea or wheezing Reported on  3/2/2017       * albuterol 108 (90 BASE) MCG/ACT Inhaler   Generic drug:  albuterol      Inhale 2 puffs into the lungs every 6 hours Reported on 3/2/2017       ascorbic acid 500 MG tablet    VITAMIN C     Take 500 mg by mouth daily.       ASPIRIN EC PO      Take 81 mg by mouth.       B-12 1000 MCG Caps      Take 1 capsule by mouth 2 times daily       calcium carb 1250 mg (500 mg [place])/vitamin D 200 units 500-200 MG-UNIT per tablet    OSCAL with D     Take 1 tablet by mouth daily.       ferrous sulfate 325 (65 FE) MG tablet    IRON     Take 1 tablet by mouth daily (with breakfast).       FIBERCON 625 MG tablet   Generic drug:  calcium polycarbophil      Take 1 tablet by mouth 2 times daily.       glipiZIDE 10 MG 24 hr tablet    GLUCOTROL XL    180 tablet    Take 1 tablet (10 mg) by mouth 2 times daily       hydrochlorothiazide 25 MG tablet    HYDRODIURIL    90 tablet    Take 1 tablet (25 mg) by mouth daily       levothyroxine 100 MCG tablet    SYNTHROID/LEVOTHROID    90 tablet    Take 1 tablet (100 mcg) by mouth daily       lisinopril 40 MG tablet    PRINIVIL/ZESTRIL    90 tablet    Take 1 tablet (40 mg) by mouth daily       LOPERAMIDE HCL PO      1/2 to 1 tablet by mouth as needed.       metFORMIN 500 MG tablet    GLUCOPHAGE    360 tablet    TAKE 2 TABLETS (1,000 MG) BY MOUTH 2 TIMES DAILY (WITH MEALS)       metoprolol 50 MG tablet    LOPRESSOR    180 tablet    Take 1 tablet (50 mg) by mouth 2 times daily       mometasone 220 MCG/INH Inhaler    ASMANEX     Inhale 2 puffs into the lungs daily.       omeprazole 20 MG CR capsule    priLOSEC    90 capsule    Take 1 capsule (20 mg) by mouth 2 times daily       oxyCODONE 5 MG/5ML solution    ROXICODONE    90 mL    Take 2.5-5 mLs (2.5-5 mg) by mouth every 6 hours as needed for moderate to severe pain or pain       pioglitazone 30 MG tablet    ACTOS    90 tablet    Take 1 tablet (30 mg) by mouth daily       simvastatin 20 MG tablet    ZOCOR    90 tablet    TAKE ONE TABLET  BY MOUTH AT BEDTIME       tiotropium 18 MCG capsule    SPIRIVA     Inhale 18 mcg into the lungs daily.       traMADol 50 MG tablet    ULTRAM    15 tablet    Take 0.5 tablets (25 mg) by mouth every 6 hours as needed for pain       traZODone 50 MG tablet    DESYREL    60 tablet    Take 1-2 tablets ( mg) by mouth At Bedtime       TRUEPLUS LANCETS 30G Misc     100 each    2-3 lancets daily Use to test blood sugars 2-3 times daily.       VITAMIN D (CHOLECALCIFEROL) PO      Take 2,000 Units by mouth daily       warfarin 3 MG tablet    COUMADIN    90 tablet    TAKE ONE TABLET BY MOUTH EVERY DAY EXCEPT TAKE 1/2 TABLET (1.5MG) ON FRIDAYS.       * Notice:  This list has 2 medication(s) that are the same as other medications prescribed for you. Read the directions carefully, and ask your doctor or other care provider to review them with you.

## 2017-06-23 RX ORDER — LEVOTHYROXINE SODIUM 88 UG/1
88 TABLET ORAL DAILY
Qty: 30 TABLET | Refills: 1 | Status: SHIPPED | OUTPATIENT
Start: 2017-06-23 | End: 2017-08-16

## 2017-06-26 ENCOUNTER — TELEPHONE (OUTPATIENT)
Dept: FAMILY MEDICINE | Facility: CLINIC | Age: 82
End: 2017-06-26

## 2017-06-26 NOTE — TELEPHONE ENCOUNTER
Notes Recorded by Venu Maria PA-C on 6/23/2017 at 12:52 PM  Hi- we need to adjust Sindi's thyroid medication.  She is on too much right now.  I will send in a new Rx for her and then we need to recheck in about 6-8 weeks.  Please call to let her know.  I can send a [employer] message but I think we should also give her a call.  Rest of her labs are OK.  Kidney functions need to be watched closely still  I will send these off to Janey with [facility] as well.    Thanks!  Venu HIRSCH for patient to call clinic back.  Cyndi Cabello RN

## 2017-06-27 NOTE — TELEPHONE ENCOUNTER
Pt returned call, message given  Detailed explanation given  Will start new dose today    Amanda Bob RN, BS  Clinical Nurse Triage.

## 2017-06-30 ENCOUNTER — ANTICOAGULATION THERAPY VISIT (OUTPATIENT)
Dept: NURSING | Facility: CLINIC | Age: 82
End: 2017-06-30
Payer: COMMERCIAL

## 2017-06-30 DIAGNOSIS — I48.91 ATRIAL FIBRILLATION (H): ICD-10-CM

## 2017-06-30 DIAGNOSIS — Z79.01 LONG-TERM (CURRENT) USE OF ANTICOAGULANTS: ICD-10-CM

## 2017-06-30 LAB — INR POINT OF CARE: 2.2 (ref 0.86–1.14)

## 2017-06-30 PROCEDURE — 85610 PROTHROMBIN TIME: CPT | Mod: QW

## 2017-06-30 PROCEDURE — 36416 COLLJ CAPILLARY BLOOD SPEC: CPT

## 2017-06-30 PROCEDURE — 99207 ZZC NO CHARGE NURSE ONLY: CPT

## 2017-06-30 NOTE — MR AVS SNAPSHOT
Maryalice Rebecca Wachter   6/30/2017 9:00 AM   Anticoagulation Therapy Visit    Description:  83 year old female   Provider:  FM RN VISITS   Department:  Fm Nurse           INR as of 6/30/2017     Today's INR 2.2      Anticoagulation Summary as of 6/30/2017     INR goal 2.0-3.0   Today's INR 2.2   Full instructions 4.5 mg on Tue, Fri; 3 mg all other days   Next INR check 8/11/2017    Indications   Long-term (current) use of anticoagulants [Z79.01] [Z79.01]  Atrial fibrillation (H) [I48.91]         Your next Anticoagulation Clinic appointment(s)     Aug 11, 2017  9:00 AM CDT   [facility]ticoagulation Visit with FM RN VISITS   [facility]    [address] [place] [address], [facility] [address]  [facility] [address] [phone]              Contact Numbers     Clinic Number:         June 2017 Details    Sun Mon Tue Wed Thu Fri Sat         1               2               3                 4               5               6               7               8               9               10                 11               12               13               14               15               16               17                 18               19               20               21               22               23               24                 25               26               27               28               29               30      4.5 mg   See details        Date Details   06/30 This INR check               How to take your warfarin dose     To take:  4.5 mg Take 1.5 of the 3 mg tablets.           July 2017 Details    Sun Mon Tue Wed Thu Fri Sat           1      3 mg           2      3 mg         3      3 mg         4      4.5 mg         5      3 mg         6      3 mg         7      4.5 mg         8      3 mg           9      3 mg         10      3 mg         11      4.5 mg         12      3 mg         13      3 mg         14      4.5 mg         15      3 mg           16      3 mg          17      3 mg         18      4.5 mg         19      3 mg         20      3 mg         21      4.5 mg         22      3 mg           23      3 mg         24      3 mg         25      4.5 mg         26      3 mg         27      3 mg         28      4.5 mg         29      3 mg           30      3 mg         31      3 mg               Date Details   No additional details            How to take your warfarin dose     To take:  3 mg Take 1 of the 3 mg tablets.    To take:  4.5 mg Take 1.5 of the 3 mg tablets.           August 2017 Details    Sun Mon Tue Wed Thu Fri Sat       1      4.5 mg         2      3 mg         3      3 mg         4      4.5 mg         5      3 mg           6      3 mg         7      3 mg         8      4.5 mg         9      3 mg         10      3 mg         11            12                 13               14               15               16               17               18               19                 20               21               22               23               24               25               26                 27               28               29               30               31                  Date Details   No additional details    Date of next INR:  8/11/2017         How to take your warfarin dose     To take:  3 mg Take 1 of the 3 mg tablets.    To take:  4.5 mg Take 1.5 of the 3 mg tablets.

## 2017-06-30 NOTE — PROGRESS NOTES
ANTICOAGULATION FOLLOW-UP CLINIC VISIT    Patient Name:  Maryalice Rebecca Wachter  Date:  6/30/2017  Contact Type:  Face to Face    SUBJECTIVE:     Patient Findings     Positives No Problem Findings           OBJECTIVE    INR Protime   Date Value Ref Range Status   06/30/2017 2.2 (A) 0.86 - 1.14 Final       ASSESSMENT / PLAN  INR assessment THER    Recheck INR In: 6 WEEKS    INR Location Clinic      Anticoagulation Summary as of 6/30/2017     INR goal 2.0-3.0   Today's INR 2.2   Maintenance plan 4.5 mg (3 mg x 1.5) on Tue, Fri; 3 mg (3 mg x 1) all other days   Full instructions 4.5 mg on Tue, Fri; 3 mg all other days   Weekly total 24 mg   No change documented Cyndi Cabello RN   Plan last modified Cydni Cabello RN (9/23/2016)   Next INR check 8/11/2017   Priority INR   Target end date     Indications   Long-term (current) use of anticoagulants [Z79.01] [Z79.01]  Atrial fibrillation (H) [I48.91]         Anticoagulation Episode Summary     INR check location     Preferred lab     Send INR reminders to  TRIAGE POOL    Comments       Anticoagulation Care Providers     Provider Role Specialty Phone number    Venu Maria PA-C Responsible Physician Assistant - Medical [phone]            See the Encounter Report to view Anticoagulation Flowsheet and Dosing Calendar (Go to Encounters tab in chart review, and find the Anticoagulation Therapy Visit)      Cyndi Cabello RN

## 2017-07-07 ENCOUNTER — TRANSFERRED RECORDS (OUTPATIENT)
Dept: HEALTH INFORMATION MANAGEMENT | Facility: CLINIC | Age: 82
End: 2017-07-07

## 2017-07-12 DIAGNOSIS — I51.9 SYSTOLIC DYSFUNCTION, LEFT VENTRICLE: ICD-10-CM

## 2017-07-12 DIAGNOSIS — I10 ESSENTIAL HYPERTENSION: ICD-10-CM

## 2017-07-12 DIAGNOSIS — I47.19 ATRIAL TACHYCARDIA, PAROXYSMAL (H): ICD-10-CM

## 2017-07-12 DIAGNOSIS — I25.2 PAST MYOCARDIAL INFARCTION: ICD-10-CM

## 2017-07-12 RX ORDER — HYDROCHLOROTHIAZIDE 25 MG/1
TABLET ORAL
Qty: 90 TABLET | Refills: 1 | OUTPATIENT
Start: 2017-07-12

## 2017-07-12 RX ORDER — METOPROLOL TARTRATE 50 MG
TABLET ORAL
Qty: 180 TABLET | Refills: 1 | OUTPATIENT
Start: 2017-07-12

## 2017-07-12 NOTE — TELEPHONE ENCOUNTER
3 month Supply with 1 RF sent 6/19/17 for Both RX's.    hydrochlorothiazide (HYDRODIURIL) 25 MG tablet  Last Written Prescription Date: 6/19/17  Last Fill Quantity: 90,  # refills: 1   Last Office Visit with [facility] or [facility] prescribing provider:  6/5/2017                                          Next 5 appointments (look out 90 days)     Sep 25, 2017  1:00 PM CDT   SHORT with Janey Romero RPH   [facility] ([facility])    [address] [facility] [address]   [phone]                  metoprolol (LOPRESSOR) 50 MG tablet  Last Written Prescription Date: 6/19/17  Last Fill Quantity: 180,  # refills: 1   Last Office Visit with [facility] or [facility] prescribing provider:  6/5/2017            Fax sent to pharm informing above.  Please disregard request.    JOE Michelle  July 12, 2017  8:26 AM

## 2017-07-18 ENCOUNTER — TELEPHONE (OUTPATIENT)
Dept: PHARMACY | Facility: CLINIC | Age: 82
End: 2017-07-18

## 2017-07-18 NOTE — TELEPHONE ENCOUNTER
7-18-17      Pt. Calls and reports that her bs's all of a sudden have decreased -am fastings 120,107, 115, 100, 118, 108.    Only med change is a decrease in synthroid dose from 100mcg. To 88mcg. /day .      She states she is sleeping better since her dose was decreased.    Continue with same plan --call mtm if am fasting bs;s fall to <75.      Janey Romero Rph.  Medication Therapy Management Provider  [phone]

## 2017-08-11 ENCOUNTER — ANTICOAGULATION THERAPY VISIT (OUTPATIENT)
Dept: NURSING | Facility: CLINIC | Age: 82
End: 2017-08-11
Payer: COMMERCIAL

## 2017-08-11 DIAGNOSIS — Z79.01 LONG-TERM (CURRENT) USE OF ANTICOAGULANTS: ICD-10-CM

## 2017-08-11 DIAGNOSIS — I48.91 ATRIAL FIBRILLATION (H): ICD-10-CM

## 2017-08-11 LAB — INR POINT OF CARE: 2.5 (ref 0.86–1.14)

## 2017-08-11 PROCEDURE — 85610 PROTHROMBIN TIME: CPT | Mod: QW

## 2017-08-11 PROCEDURE — 36416 COLLJ CAPILLARY BLOOD SPEC: CPT

## 2017-08-11 PROCEDURE — 99207 ZZC NO CHARGE NURSE ONLY: CPT

## 2017-08-11 NOTE — MR AVS SNAPSHOT
Maryalice Rebecca Wachter   8/11/2017 9:00 AM   Anticoagulation Therapy Visit    Description:  83 year old female   Provider:  FM RN VISITS   Department:  Fm Nurse           INR as of 8/11/2017     Today's INR 2.5      Anticoagulation Summary as of 8/11/2017     INR goal 2.0-3.0   Today's INR 2.5   Full instructions 4.5 mg on Tue, Fri; 3 mg all other days   Next INR check 9/22/2017    Indications   Long-term (current) use of anticoagulants [Z79.01] [Z79.01]  Atrial fibrillation (H) [I48.91]         Your next Anticoagulation Clinic appointment(s)     Sep 22, 2017  9:00 AM CDT   Anticoagulation Visit with FM RN VISITS   [facility]    [address] [place] [address], [facility] [address]  [facility] [address] [phone]              Contact Numbers     Clinic Number:         August 2017 Details    Sun Mon Tue Wed Thu Fri Sat       1               2               3               4               5                 6               7               8               9               10               11      4.5 mg   See details      12      3 mg           13      3 mg         14      3 mg         15      4.5 mg         16      3 mg         17      3 mg         18      4.5 mg         19      3 mg           20      3 mg         21      3 mg         22      4.5 mg         23      3 mg         24      3 mg         25      4.5 mg         26      3 mg           27      3 mg         28      3 mg         29      4.5 mg         30      3 mg         31      3 mg            Date Details   08/11 This INR check               How to take your warfarin dose     To take:  3 mg Take 1 of the 3 mg tablets.    To take:  4.5 mg Take 1.5 of the 3 mg tablets.           September 2017 Details    Sun Mon Tue Wed Thu Fri Sat          1      4.5 mg         2      3 mg           3      3 mg         4      3 mg         5      4.5 mg         6      3 mg         7      3 mg         8      4.5 mg         9      3 mg            10      3 mg         11      3 mg         12      4.5 mg         13      3 mg         14      3 mg         15      4.5 mg         16      3 mg           17      3 mg         18      3 mg         19      4.5 mg         20      3 mg         21      3 mg         22            23                 24               25               26               27               28               29               30                Date Details   No additional details    Date of next INR:  9/22/2017         How to take your warfarin dose     To take:  3 mg Take 1 of the 3 mg tablets.    To take:  4.5 mg Take 1.5 of the 3 mg tablets.

## 2017-08-11 NOTE — PROGRESS NOTES
ANTICOAGULATION FOLLOW-UP CLINIC VISIT    Patient Name:  Maryalice Rebecca Wachter  Date:  8/11/2017  Contact Type:  Face to Face    SUBJECTIVE:     Patient Findings     Positives No Problem Findings           OBJECTIVE    INR Protime   Date Value Ref Range Status   08/11/2017 2.5 (A) 0.86 - 1.14 Final       ASSESSMENT / PLAN  INR assessment THER    Recheck INR In: 6 WEEKS    INR Location Clinic      Anticoagulation Summary as of 8/11/2017     INR goal 2.0-3.0   Today's INR 2.5   Maintenance plan 4.5 mg (3 mg x 1.5) on Tue, Fri; 3 mg (3 mg x 1) all other days   Full instructions 4.5 mg on Tue, Fri; 3 mg all other days   Weekly total 24 mg   No change documented Cyndi Cabello RN   Plan last modified Cyndi Cabello RN (9/23/2016)   Next INR check 9/22/2017   Priority INR   Target end date     Indications   Long-term (current) use of anticoagulants [Z79.01] [Z79.01]  Atrial fibrillation (H) [I48.91]         Anticoagulation Episode Summary     INR check location     Preferred lab     Send INR reminders to  TRIAGE POOL    Comments       Anticoagulation Care Providers     Provider Role Specialty Phone number    Venu Maria PA-C Responsible Physician Assistant - Medical [phone]            See the Encounter Report to view Anticoagulation Flowsheet and Dosing Calendar (Go to Encounters tab in chart review, and find the Anticoagulation Therapy Visit)    Cyndi Cabello RN

## 2017-08-16 DIAGNOSIS — E03.9 HYPOTHYROIDISM, UNSPECIFIED TYPE: ICD-10-CM

## 2017-08-17 RX ORDER — LEVOTHYROXINE SODIUM 88 UG/1
TABLET ORAL
Qty: 30 TABLET | Refills: 1 | Status: SHIPPED | OUTPATIENT
Start: 2017-08-17 | End: 2017-10-18

## 2017-08-17 NOTE — TELEPHONE ENCOUNTER
Prescription approved per [facility] Refill Protocol.    Patient has an upcoming appointment and can get labs drawn then.    Cyndi Cabello RN

## 2017-08-17 NOTE — TELEPHONE ENCOUNTER
levothyroxine (SYNTHROID/LEVOTHROID) 88 MCG tablet     Last Written Prescription Date: 6/23/17  Last Quantity: 30, # refills: 1  Last Office Visit with [facility], [facility] or [facility] prescribing provider: 6/19/2017     Next 5 appointments (look out 90 days)     Sep 25, 2017  1:00 PM CDT   SHORT with Janey Romero RPH   [facility] ([facility])    [address] [facility] [id number] [phone]                   TSH   Date Value Ref Range Status   06/19/2017 0.09 (L) 0.40 - 4.00 mU/L Final     From Lab:  Notes Recorded by Venu Maria PA-C on 6/23/2017 at 12:52 PM  Hi- we need to adjust Sindi's thyroid medication.  She is on too much right now.  I will send in a new Rx for her and then we need to recheck in about 6-8 weeks.     Nita Pan, JOE  August 17, 2017  8:56 AM

## 2017-09-06 DIAGNOSIS — E11.65 TYPE 2 DIABETES MELLITUS WITH HYPERGLYCEMIA, WITHOUT LONG-TERM CURRENT USE OF INSULIN (H): ICD-10-CM

## 2017-09-07 NOTE — TELEPHONE ENCOUNTER
pioglitazone (ACTOS) 30 MG tablet         Last Written Prescription Date: 3/2/17  Last Fill Quantity: 90, # refills: 1  Last Office Visit with [facility], [facility] or  [facility] prescribing provider:  6/5/2017     Next 5 appointments (look out 90 days)     Sep 25, 2017  1:00 PM CDT   SHORT with Janey Romero RPH   [facility] ([facility])    [address] [facility] [id number] [phone]                   BP Readings from Last 3 Encounters:   06/19/17 128/65   06/05/17 134/56   04/17/17 118/60     Lab Results   Component Value Date    MICROL 33 10/27/2016     Lab Results   Component Value Date    UMALCR 33.03 10/27/2016     Creatinine   Date Value Ref Range Status   06/19/2017 1.05 (H) 0.52 - 1.04 mg/dL Final   ]  GFR Estimate   Date Value Ref Range Status   06/19/2017 50 (L) >60 mL/min/1.7m2 Final     Comment:     Non  GFR Calc   04/08/2017 56 (L) >60 mL/min/1.7m2 Final     Comment:     Non  GFR Calc   04/04/2017 61 >60 mL/min/1.7m2 Final     Comment:     Non  GFR Calc     GFR Estimate If Black   Date Value Ref Range Status   06/19/2017 60 (L) >60 mL/min/1.7m2 Final     Comment:      GFR Calc   04/08/2017 68 >60 mL/min/1.7m2 Final     Comment:      GFR Calc   04/04/2017 74 >60 mL/min/1.7m2 Final     Comment:      GFR Calc     Lab Results   Component Value Date    CHOL 134 06/19/2017     Lab Results   Component Value Date    HDL 43 06/19/2017     Lab Results   Component Value Date    LDL 49 06/19/2017     Lab Results   Component Value Date    TRIG 208 06/19/2017     Lab Results   Component Value Date    CHOLHDLRATIO 3.0 06/30/2015     Lab Results   Component Value Date    AST 19 04/08/2017     Lab Results   Component Value Date    ALT 19 04/08/2017     Lab Results   Component Value Date    A1C 7.8 06/05/2017    A1C 9.4 03/02/2017    A1C 7.7 10/10/2016    A1C 8.3 07/01/2016    A1C  7.9 03/28/2016     Potassium   Date Value Ref Range Status   06/19/2017 4.0 3.4 - 5.3 mmol/L Final     Nita Pan, XRT  September 7, 2017  8:48 AM

## 2017-09-08 RX ORDER — PIOGLITAZONEHYDROCHLORIDE 30 MG/1
TABLET ORAL
Qty: 90 TABLET | Refills: 0 | Status: SHIPPED | OUTPATIENT
Start: 2017-09-08 | End: 2017-12-06

## 2017-09-08 NOTE — TELEPHONE ENCOUNTER
Prescription approved per [facility] Refill Protocol.  Cayla Ozuna RN, BSN

## 2017-09-13 DIAGNOSIS — E78.5 HYPERLIPIDEMIA LDL GOAL <100: ICD-10-CM

## 2017-09-14 NOTE — TELEPHONE ENCOUNTER
simvastatin (ZOCOR) 20 MG tablet     Last Written Prescription Date: 6/15/17  Last Fill Quantity: 90, # refills: 0  Last Office Visit with [facility], [facility] or [facility] prescribing provider: 6/19/2017    Next 5 appointments (look out 90 days)     Sep 25, 2017  1:00 PM CDT   SHORT with Janey Romero RPH   [facility] ([facility])    [address] [facility] [address]   [phone]                   Lab Results   Component Value Date    CHOL 134 06/19/2017     Lab Results   Component Value Date    HDL 43 06/19/2017     Lab Results   Component Value Date    LDL 49 06/19/2017     Lab Results   Component Value Date    TRIG 208 06/19/2017     Lab Results   Component Value Date    CHOLHDLRATIO 3.0 06/30/2015     JOE Michelle  September 14, 2017  9:21 AM

## 2017-09-15 RX ORDER — SIMVASTATIN 20 MG
TABLET ORAL
Qty: 90 TABLET | Refills: 2 | Status: SHIPPED | OUTPATIENT
Start: 2017-09-15 | End: 2018-06-07

## 2017-09-22 ENCOUNTER — ANTICOAGULATION THERAPY VISIT (OUTPATIENT)
Dept: NURSING | Facility: CLINIC | Age: 82
End: 2017-09-22
Payer: COMMERCIAL

## 2017-09-22 DIAGNOSIS — Z79.01 LONG-TERM (CURRENT) USE OF ANTICOAGULANTS: ICD-10-CM

## 2017-09-22 DIAGNOSIS — I48.91 ATRIAL FIBRILLATION (H): ICD-10-CM

## 2017-09-22 LAB — INR POINT OF CARE: 2.9 (ref 0.86–1.14)

## 2017-09-22 PROCEDURE — 99207 ZZC NO CHARGE NURSE ONLY: CPT

## 2017-09-22 PROCEDURE — 85610 PROTHROMBIN TIME: CPT | Mod: QW

## 2017-09-22 PROCEDURE — 36416 COLLJ CAPILLARY BLOOD SPEC: CPT

## 2017-09-22 NOTE — PROGRESS NOTES
ANTICOAGULATION FOLLOW-UP CLINIC VISIT    Patient Name:  Maryalice Rebecca Wachter  Date:  9/22/2017  Contact Type:  Face to Face    SUBJECTIVE:     Patient Findings     Positives No Problem Findings           OBJECTIVE    INR Protime   Date Value Ref Range Status   09/22/2017 2.9 (A) 0.86 - 1.14 Final       ASSESSMENT / PLAN  INR assessment THER    Recheck INR In: 6 WEEKS    INR Location Clinic      Anticoagulation Summary as of 9/22/2017     INR goal 2.0-3.0   Today's INR 2.9   Maintenance plan 4.5 mg (3 mg x 1.5) on Tue, Fri; 3 mg (3 mg x 1) all other days   Full instructions 4.5 mg on Tue, Fri; 3 mg all other days   Weekly total 24 mg   No change documented Cyndi Cabello RN   Plan last modified Cyndi Cabello RN (9/23/2016)   Next INR check 11/3/2017   Priority INR   Target end date     Indications   Long-term (current) use of anticoagulants [Z79.01] [Z79.01]  Atrial fibrillation (H) [I48.91]         Anticoagulation Episode Summary     INR check location     Preferred lab     Send INR reminders to  TRIAGE POOL    Comments       Anticoagulation Care Providers     Provider Role Specialty Phone number    Venu Maria PA-C Responsible Physician Assistant - Medical [phone]            See the Encounter Report to view Anticoagulation Flowsheet and Dosing Calendar (Go to Encounters tab in chart review, and find the Anticoagulation Therapy Visit)        Cyndi Cabello RN

## 2017-09-22 NOTE — MR AVS SNAPSHOT
Sindi Beachchter   9/22/2017 9:00 AM   Anticoagulation Therapy Visit    Description:  83 year old female   Provider:  FM RN VISITS   Department:  Fm Nurse           INR as of 9/22/2017     Today's INR 2.9      Anticoagulation Summary as of 9/22/2017     INR goal 2.0-3.0   Today's INR 2.9   Full instructions 4.5 mg on Tue, Fri; 3 mg all other days   Next INR check 11/3/2017    Indications   Long-term (current) use of anticoagulants [Z79.01] [Z79.01]  Atrial fibrillation (H) [I48.91]         Your next Anticoagulation Clinic appointment(s)     Nov 03, 2017  9:00 AM CDT   Anticoagulation Visit with FM RN VISITS   [facility] ([facility]    [address] [place] [address], [facility] [address]  [facility] [address] [phone]              Contact Numbers     Clinic Number:         September 2017 Details    Sun Mon Tue Wed Thu Fri Sat          1               2                 3               4               5               6               7               8               9                 10               11               12               13               14               15               16                 17               18               19               20               21               22      4.5 mg   See details      23      3 mg           24      3 mg         25      3 mg         26      4.5 mg         27      3 mg         28      3 mg         29      4.5 mg         30      3 mg          Date Details   09/22 This INR check               How to take your warfarin dose     To take:  3 mg Take 1 of the 3 mg tablets.    To take:  4.5 mg Take 1.5 of the 3 mg tablets.           October 2017 Details    Sun Mon Tue Wed Thu Fri Sat     1      3 mg         2      3 mg         3      4.5 mg         4      3 mg         5      3 mg         6      4.5 mg         7      3 mg           8      3 mg         9      3 mg         10      4.5 mg         11      3 mg         12      3 mg         13       4.5 mg         14      3 mg           15      3 mg         16      3 mg         17      4.5 mg         18      3 mg         19      3 mg         20      4.5 mg         21      3 mg           22      3 mg         23      3 mg         24      4.5 mg         25      3 mg         26      3 mg         27      4.5 mg         28      3 mg           29      3 mg         30      3 mg         31      4.5 mg              Date Details   No additional details            How to take your warfarin dose     To take:  3 mg Take 1 of the 3 mg tablets.    To take:  4.5 mg Take 1.5 of the 3 mg tablets.           November 2017 Details    Sun Mon Tue Wed Thu Fri Sat        1      3 mg         2      3 mg         3            4                 5               6               7               8               9               10               11                 12               13               14               15               16               17               18                 19               20               21               22               23               24               25                 26               27               28               29               30                  Date Details   No additional details    Date of next INR:  11/3/2017         How to take your warfarin dose     To take:  3 mg Take 1 of the 3 mg tablets.    To take:  4.5 mg Take 1.5 of the 3 mg tablets.

## 2017-09-22 NOTE — MR AVS SNAPSHOT
After Visit Summary   9/22/2017    Maryalice Rebecca Wachter    MRN: [id number]           Patient Information     Date Of Birth          11/11/1933        Visit Information        Provider Department      9/22/2017 9:00 AM FM RN VISITS [facility]        Today's Diagnoses     Long-term (current) use of anticoagulants        Atrial fibrillation (H)           Follow-ups after your visit        Your next 10 appointments already scheduled     Sep 25, 2017  1:00 PM CDT   SHORT with Janey Romero RPH   [facility] ([facility])    [address] [facility] [address]   [phone]            Oct 23, 2017 10:00 AM CDT   Pre-Op physical with Venu Maria PA-C   [facility]    [address] [place] [address] [phone]            Nov 03, 2017  9:00 AM CDT   Anticoagulation Visit with FM RN VISITS   [facility] ([address] [address] [phone]              Who to contact     If you have questions or need follow up information about today's clinic visit or your schedule please contact [facility] directly at [phone].  Normal or non-critical lab and imaging results will be communicated to you by [employer], letter or phone within 4 business days after the clinic has received the results. If you do not hear from us within 7 days, please contact the clinic through MyChart or phone. If you have a critical or abnormal lab result, we will notify you by phone as soon as possible.  Submit refill requests through [employer] or call your pharmacy and they will forward the refill request to us. Please allow 3 business days for your refill to be completed.          Additional Information About Your Visit        [employer] Information     [employer] gives you secure access to  your electronic health record. If you see a primary care provider, you can also send messages to your care team and make appointments. If you have questions, please call your primary care clinic.  If you do not have a primary care provider, please call [phone] and they will assist you.        Care EveryWhere ID     This is your Care EveryWhere ID. This could be used by other organizations to access your [place] medical records  [id number]         Blood Pressure from Last 3 Encounters:   06/19/17 128/65   06/05/17 134/56   04/17/17 118/60    Weight from Last 3 Encounters:   06/19/17 178 lb (80.7 kg)   06/05/17 176 lb 3.2 oz (79.9 kg)   04/17/17 179 lb 8 oz (81.4 kg)              We Performed the Following     INR point of care          Today's Medication Changes          These changes are accurate as of: 9/22/17  9:20 AM.  If you have any questions, ask your nurse or doctor.               These medicines have changed or have updated prescriptions.        Dose/Directions    traZODone 50 MG tablet   Commonly known as:  DESYREL   This may have changed:  how much to take   Used for:  Insomnia, unspecified type        Dose:   mg   Take 1-2 tablets ( mg) by mouth At Bedtime   Quantity:  60 tablet   Refills:  5                Primary Care Provider Office Phone # Fax #    Venu Maria PA-C [phone]       [address] [facility] [address]        Equal Access to Services     MARIA ESTHER ANDRES AH: Hadii carson asencioo Sosabine, waaxda luqadaha, qaybta kaalmada adeegyada, katrin elizabeth. So [facility] [phone].    ATENCIÓN: Si habla español, tiene a talamantes disposición servicios gratuitos de asistencia lingüística. Llame al [phone].    We comply with applicable federal civil rights laws and [place] laws. We do not discriminate on the basis of race, color, national origin, age, disability sex, sexual orientation or gender identity.            Thank you!      Thank you for choosing [facility]  for your care. Our goal is always to provide you with excellent care. Hearing back from our patients is one way we can continue to improve our services. Please take a few minutes to complete the written survey that you may receive in the mail after your visit with us. Thank you!             Your Updated Medication List - Protect others around you: Learn how to safely use, store and throw away your medicines at www.disposemymeds.org.          This list is accurate as of: 9/22/17  9:20 AM.  Always use your most recent med list.                   Brand Name Dispense Instructions for use Diagnosis    * albuterol (2.5 MG/3ML) 0.083% neb solution      Take 1 vial by nebulization every 6 hours as needed for shortness of breath / dyspnea or wheezing Reported on 3/2/2017        * PROAIR  (90 BASE) MCG/ACT Inhaler   Generic drug:  albuterol      Inhale 2 puffs into the lungs every 6 hours Reported on 3/2/2017        ascorbic acid 500 MG tablet    VITAMIN C     Take 500 mg by mouth daily.        ASPIRIN EC PO      Take 81 mg by mouth.        B-12 1000 MCG Caps      Take 1 capsule by mouth 2 times daily        calcium carb 1250 mg (500 mg [place])/vitamin D 200 units 500-200 MG-UNIT per tablet    OSCAL with D     Take 1 tablet by mouth daily.        ferrous sulfate 325 (65 FE) MG tablet    IRON     Take 1 tablet by mouth daily (with breakfast).        FIBERCON 625 MG tablet   Generic drug:  calcium polycarbophil      Take 1 tablet by mouth 2 times daily.        glipiZIDE 10 MG 24 hr tablet    GLUCOTROL XL    180 tablet    Take 1 tablet (10 mg) by mouth 2 times daily    Type 2 diabetes mellitus with hyperglycemia, without long-term current use of insulin (H)       hydrochlorothiazide 25 MG tablet    HYDRODIURIL    90 tablet    Take 1 tablet (25 mg) by mouth daily    Essential hypertension       levothyroxine 88 MCG tablet    SYNTHROID/LEVOTHROID    30 tablet    TAKE ONE  TABLET BY MOUTH EVERY DAY    Hypothyroidism, unspecified type       lisinopril 40 MG tablet    PRINIVIL/ZESTRIL    90 tablet    Take 1 tablet (40 mg) by mouth daily    Systolic dysfunction, left ventricle, Past myocardial infarction       LOPERAMIDE HCL PO      1/2 to 1 tablet by mouth as needed.        metFORMIN 500 MG tablet    GLUCOPHAGE    360 tablet    TAKE 2 TABLETS (1,000 MG) BY MOUTH 2 TIMES DAILY (WITH MEALS)    Type 2 diabetes mellitus with hyperglycemia, without long-term current use of insulin (H)       metoprolol 50 MG tablet    LOPRESSOR    180 tablet    Take 1 tablet (50 mg) by mouth 2 times daily    Past myocardial infarction, Systolic dysfunction, left ventricle, Atrial tachycardia, paroxysmal (H)       mometasone 220 MCG/INH Inhaler    ASMANEX     Inhale 2 puffs into the lungs daily.        omeprazole 20 MG CR capsule    priLOSEC    90 capsule    Take 1 capsule (20 mg) by mouth 2 times daily    Chest pain, unspecified type       oxyCODONE 5 MG/5ML solution    ROXICODONE    90 mL    Take 2.5-5 mLs (2.5-5 mg) by mouth every 6 hours as needed for moderate to severe pain or pain        pioglitazone 30 MG tablet    ACTOS    90 tablet    TAKE ONE TABLET BY MOUTH EVERY DAY    Type 2 diabetes mellitus with hyperglycemia, without long-term current use of insulin (H)       simvastatin 20 MG tablet    ZOCOR    90 tablet    TAKE ONE TABLET BY MOUTH AT BEDTIME . NEEDS APPOINTMENT    Hyperlipidemia LDL goal <100       tiotropium 18 MCG capsule    SPIRIVA     Inhale 18 mcg into the lungs daily.        traMADol 50 MG tablet    ULTRAM    15 tablet    Take 0.5 tablets (25 mg) by mouth every 6 hours as needed for pain    Other chest pain       traZODone 50 MG tablet    DESYREL    60 tablet    Take 1-2 tablets ( mg) by mouth At Bedtime    Insomnia, unspecified type       TRUEPLUS LANCETS 30G Misc     100 each    2-3 lancets daily Use to test blood sugars 2-3 times daily.    Type 2 diabetes, HbA1C goal < 8% (H)        VITAMIN D (CHOLECALCIFEROL) PO      Take 2,000 Units by mouth daily        warfarin 3 MG tablet    COUMADIN    90 tablet    TAKE ONE TABLET BY MOUTH EVERY DAY EXCEPT TAKE 1/2 TABLET (1.5MG) ON FRIDAYS.    Chronic atrial fibrillation (H)       * Notice:  This list has 2 medication(s) that are the same as other medications prescribed for you. Read the directions carefully, and ask your doctor or other care provider to review them with you.

## 2017-09-25 ENCOUNTER — OFFICE VISIT (OUTPATIENT)
Dept: PHARMACY | Facility: CLINIC | Age: 82
End: 2017-09-25
Payer: COMMERCIAL

## 2017-09-25 VITALS
WEIGHT: 182.3 LBS | BODY MASS INDEX: 30.81 KG/M2 | SYSTOLIC BLOOD PRESSURE: 134 MMHG | HEART RATE: 62 BPM | DIASTOLIC BLOOD PRESSURE: 70 MMHG | OXYGEN SATURATION: 93 %

## 2017-09-25 DIAGNOSIS — F51.01 PRIMARY INSOMNIA: ICD-10-CM

## 2017-09-25 DIAGNOSIS — J44.1 COPD EXACERBATION (H): ICD-10-CM

## 2017-09-25 DIAGNOSIS — E11.65 TYPE 2 DIABETES MELLITUS WITH HYPERGLYCEMIA, WITHOUT LONG-TERM CURRENT USE OF INSULIN (H): Primary | ICD-10-CM

## 2017-09-25 DIAGNOSIS — I48.20 CHRONIC ATRIAL FIBRILLATION (H): ICD-10-CM

## 2017-09-25 DIAGNOSIS — E78.5 HYPERLIPIDEMIA LDL GOAL <100: ICD-10-CM

## 2017-09-25 DIAGNOSIS — Z23 ENCOUNTER FOR IMMUNIZATION: ICD-10-CM

## 2017-09-25 DIAGNOSIS — E03.9 HYPOTHYROIDISM, UNSPECIFIED TYPE: ICD-10-CM

## 2017-09-25 DIAGNOSIS — E11.65 TYPE 2 DIABETES MELLITUS WITH HYPERGLYCEMIA, WITHOUT LONG-TERM CURRENT USE OF INSULIN (H): ICD-10-CM

## 2017-09-25 DIAGNOSIS — I10 ESSENTIAL HYPERTENSION: ICD-10-CM

## 2017-09-25 LAB — HBA1C MFR BLD: 7.4 % (ref 4.3–6)

## 2017-09-25 PROCEDURE — 84443 ASSAY THYROID STIM HORMONE: CPT | Performed by: PHYSICIAN ASSISTANT

## 2017-09-25 PROCEDURE — 99607 MTMS BY PHARM ADDL 15 MIN: CPT | Performed by: PHARMACIST

## 2017-09-25 PROCEDURE — 83036 HEMOGLOBIN GLYCOSYLATED A1C: CPT | Performed by: PHYSICIAN ASSISTANT

## 2017-09-25 PROCEDURE — 36415 COLL VENOUS BLD VENIPUNCTURE: CPT | Performed by: PHYSICIAN ASSISTANT

## 2017-09-25 PROCEDURE — 99606 MTMS BY PHARM EST 15 MIN: CPT | Performed by: PHARMACIST

## 2017-09-25 NOTE — MR AVS SNAPSHOT
After Visit Summary   9/25/2017    Maryalice Rebecca Wachter    MRN: [id number]           Patient Information     Date Of Birth          11/11/1933        Visit Information        Provider Department      9/25/2017 1:00 PM Janey Romero RPH [facility] MTM        Today's Diagnoses     Type 2 diabetes mellitus with hyperglycemia, without long-term current use of insulin (H)    -  1      Care Instructions    Recommendations from today's MT visit:                                                        1. A1c today = 7.4% today --congratulations --excellent result !    2. We are also repeating your thyroid lab today as well. Watch my chart for result and note from Venu or BRISEYDA .     3. Please see tala in our pharmacy today for your yearly high dose flu shot.     4., FYI--please wean off Omeprazole as follows:  1 pill every other day x 2 weeks , then 1 every 3rd day x 2 weeks , then stop if ok and only use on as needed basis.             Next MTM visit:  March 19th -2018 at 1;30pm.   Bring blood sugar meter.     To schedule another MTM appointment, please call the clinic directly or you may call the MTM scheduling line at [phone] or toll-free at [phone].     My Clinical Pharmacist's contact information:                                                      It was a pleasure seeing you today!  Please feel free to contact me with any questions or concerns you have.      Janey Romero Rph.  Medication Therapy Management Provider  [phone]      You may receive a survey about the MTM services you received.  I would appreciate your feedback to help me serve you better in the future. Please fill it out and return it when you can. Your comments will be anonymous.      My healthcare goals:                                                                      Follow-ups after your visit        Your next 10 appointments already scheduled     Oct 23, 2017 10:00 AM CDT   Pre-Op  physical with Venu Maria PA-C   [facility]    [address], [address]  [facility] [address] [phone]            Nov 03, 2017  9:00 AM CDT   Anticoagulation Visit with FM RN VISITS   [facility] ([facility])    [address] [place] [address], [address]  [facility] [address] [phone]            Mar 19, 2018  1:30 PM CDT   SHORT with Janey Romero RPH   [facility] ([facility])    [address]  [facility] [address] [phone]              Who to contact     If you have questions or need follow up information about today's clinic visit or your schedule please contact [facility] directly at [phone].  Normal or non-critical lab and imaging results will be communicated to you by MyChart, letter or phone within 4 business days after the clinic has received the results. If you do not hear from us within 7 days, please contact the clinic through [email or web address] or phone. If you have a critical or abnormal lab result, we will notify you by phone as soon as possible.  Submit refill requests through [employer] or call your pharmacy and they will forward the refill request to us. Please allow 3 business days for your refill to be completed.          Additional Information About Your Visit        MyChart Information     [employer] gives you secure access to your electronic health record. If you see a primary care provider, you can also send messages to your care team and make appointments. If you have questions, please call your primary care clinic.  If you do not have a primary care provider, please call [phone] and they will assist you.        Care EveryWhere ID     This is your Care EveryWhere ID. This could be used by other organizations to access your [place] medical records  [id number]        Your Vitals Were     Pulse Pulse Oximetry BMI  (Body Mass Index)             62 93% 30.81 kg/m2          Blood Pressure from Last 3 Encounters:   09/25/17 134/70   06/19/17 128/65   06/05/17 134/56    Weight from Last 3 Encounters:   09/25/17 182 lb 4.8 oz (82.7 kg)   06/19/17 178 lb (80.7 kg)   06/05/17 176 lb 3.2 oz (79.9 kg)                 Today's Medication Changes          These changes are accurate as of: 9/25/17  1:41 PM.  If you have any questions, ask your nurse or doctor.               These medicines have changed or have updated prescriptions.        Dose/Directions    metFORMIN 500 MG tablet   Commonly known as:  GLUCOPHAGE   This may have changed:    - how much to take  - when to take this  - additional instructions   Used for:  Type 2 diabetes mellitus with hyperglycemia, without long-term current use of insulin (H)        TAKE 2 TABLETS (1,000 MG) BY MOUTH 2 TIMES DAILY (WITH MEALS)   Quantity:  360 tablet   Refills:  0         Stop taking these medicines if you haven't already. Please contact your care team if you have questions.     traZODone 50 MG tablet   Commonly known as:  DESYREL   Stopped by:  Janey Romero [facility]                    Primary Care Provider Office Phone # Fax #    Venu Maria PA-C [phone]       [address] [facility] [address]        Equal Access to Services     MARIA ESTHER ANDRES AH: Hadii carson izaguirre hadasho Sosabine, waaxda luqadaha, qaybta kaalmada jamarcus, katrin elizabeth. So [facility] [phone].    ATENCIÓN: Si habla español, tiene a talamantes disposición servicios gratuitos de asistencia lingüística. Hansel al [phone].    We comply with applicable federal civil rights laws and [place] laws. We do not discriminate on the basis of race, color, national origin, age, disability sex, sexual orientation or gender identity.            Thank you!     Thank you for choosing [facility]  for your care. Our goal is always to provide you with excellent care.  Hearing back from our patients is one way we can continue to improve our services. Please take a few minutes to complete the written survey that you may receive in the mail after your visit with us. Thank you!             Your Updated Medication List - Protect others around you: Learn how to safely use, store and throw away your medicines at www.disposemymeds.org.          This list is accurate as of: 9/25/17  1:41 PM.  Always use your most recent med list.                   Brand Name Dispense Instructions for use Diagnosis    * albuterol (2.5 MG/3ML) 0.083% neb solution      Take 1 vial by nebulization every 6 hours as needed for shortness of breath / dyspnea or wheezing Reported on 3/2/2017        * PROAIR  (90 BASE) MCG/ACT Inhaler   Generic drug:  albuterol      Inhale 2 puffs into the lungs every 6 hours Reported on 3/2/2017        ascorbic acid 500 MG tablet    VITAMIN C     Take 500 mg by mouth daily.        ASPIRIN EC PO      Take 81 mg by mouth.        B-12 1000 MCG Caps      Take 1 capsule by mouth 2 times daily        calcium carb 1250 mg (500 mg [place])/vitamin D 200 units 500-200 MG-UNIT per tablet    OSCAL with D     Take 1 tablet by mouth daily.        ferrous sulfate 325 (65 FE) MG tablet    IRON     Take 1 tablet by mouth daily (with breakfast).        FIBERCON 625 MG tablet   Generic drug:  calcium polycarbophil      Take 1 tablet by mouth 2 times daily.        glipiZIDE 10 MG 24 hr tablet    GLUCOTROL XL    180 tablet    Take 1 tablet (10 mg) by mouth 2 times daily    Type 2 diabetes mellitus with hyperglycemia, without long-term current use of insulin (H)       hydrochlorothiazide 25 MG tablet    HYDRODIURIL    90 tablet    Take 1 tablet (25 mg) by mouth daily    Essential hypertension       levothyroxine 88 MCG tablet    SYNTHROID/LEVOTHROID    30 tablet    TAKE ONE TABLET BY MOUTH EVERY DAY    Hypothyroidism, unspecified type       lisinopril 40 MG tablet    PRINIVIL/ZESTRIL    90 tablet     Take 1 tablet (40 mg) by mouth daily    Systolic dysfunction, left ventricle, Past myocardial infarction       LOPERAMIDE HCL PO      1/2 to 1 tablet by mouth as needed.        metFORMIN 500 MG tablet    GLUCOPHAGE    360 tablet    TAKE 2 TABLETS (1,000 MG) BY MOUTH 2 TIMES DAILY (WITH MEALS)    Type 2 diabetes mellitus with hyperglycemia, without long-term current use of insulin (H)       metoprolol 50 MG tablet    LOPRESSOR    180 tablet    Take 1 tablet (50 mg) by mouth 2 times daily    Past myocardial infarction, Systolic dysfunction, left ventricle, Atrial tachycardia, paroxysmal (H)       mometasone 220 MCG/INH Inhaler    ASMANEX     Inhale 2 puffs into the lungs daily.        omeprazole 20 MG CR capsule    priLOSEC    90 capsule    Take 20 mg by mouth daily    Chest pain, unspecified type       pioglitazone 30 MG tablet    ACTOS    90 tablet    TAKE ONE TABLET BY MOUTH EVERY DAY    Type 2 diabetes mellitus with hyperglycemia, without long-term current use of insulin (H)       simvastatin 20 MG tablet    ZOCOR    90 tablet    TAKE ONE TABLET BY MOUTH AT BEDTIME . NEEDS APPOINTMENT    Hyperlipidemia LDL goal <100       tiotropium 18 MCG capsule    SPIRIVA     Inhale 18 mcg into the lungs daily.        TRUEPLUS LANCETS 30G Misc     100 each    2-3 lancets daily Use to test blood sugars 2-3 times daily.    Type 2 diabetes, HbA1C goal < 8% (H)       TYLENOL PM EXTRA STRENGTH  MG tablet   Generic drug:  diphenhydrAMINE-acetaminophen      Take 2 tablets by mouth At Bedtime        VITAMIN D (CHOLECALCIFEROL) PO      Take 2,000 Units by mouth daily        warfarin 3 MG tablet    COUMADIN    90 tablet    TAKE ONE TABLET BY MOUTH EVERY DAY EXCEPT TAKE 1/2 TABLET (1.5MG) ON FRIDAYS.    Chronic atrial fibrillation (H)       * Notice:  This list has 2 medication(s) that are the same as other medications prescribed for you. Read the directions carefully, and ask your doctor or other care provider to review them  with you.

## 2017-09-25 NOTE — PROGRESS NOTES
SUBJECTIVE/OBJECTIVE:                                                    Maryalice Rebecca Wachter is a 83 year old female coming in for a follow up visit (6-5-17)  for Medication Therapy Management.  She was referred to me from her PCP- Venu Maria.     Chief Complaint:  Here for a1c/TSH labs.      Having left eyelid surgery --and then cataract surgeries on both eyes nov 14th .        Personal Healthcare Goals: fix diarrhea issues, timing of meds, get BP wnl.     Allergies/ADRs: Reviewed in Epic  Tobacco: No tobacco use   Alcohol: not currently using--very rare   Caffeine: decaff in am 1-2 cups/day of coffee; pm -tea a little bit.   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 Cannot exercise due to diarrhea issues --would like to go on walks with her hubby but has to have BM within 1 block of leaving.   PMH: Reviewed in [facility]; [facility] stated she has sarcoidosis?    Medication Adherence: No issues found today    IBS-diarrhea: patient uses prn loperamide --works well.     GERD:  She has a hiatal hernia -surgery not recommended.  Taking omeprazole 20 mg. qam .   Has never tried going off ppi --interested in doing so.     Diabetes:  Pt currently taking Glipizide er 10mg. bid, actos 30mg qday, Metformin-1 x500mg bid. Pt is experiencing the following side effects: mild loose stools from metformin --controlled with loperamide prn quite well.                                 Symptoms of low blood sugar? none. Frequency of hypoglycemia? never.  Recent symptoms of high blood sugar? fatigue  Eye exam: up to date  Foot exam: up to date  Microalbumin is < 30 mg/g. Pt is taking an ACEi/ARB.  Aspirin: Taking 81mg daily and denies side effects  Diet/Exercise: diet is ok but likes carbs, rice , chinese , no exercise.   She is eating low carb but not enjoying foods and is struggling with lack of bs control with no sugar in diet.But she is determined to get that a1c back down.       Vitamin D3: level was 21 on   --she takes otc daily dose of 2,000 iu's now.        Vitamin B12: level was 493 on 10-10-16. She takes daily otc dose now.       Hypothyroidism: Patient is taking levothyroxine 100 mcg daily. She knows to take it on an empty stomach with her omeprazole in the morning.    Hypertension: Current medications include : HCTZ 25mg. Qam,  lisinopril 40mg-qam. And metoprolol 50mg bid-am and dinnertime.   Patient does  self-monitor BP.  Yes see below:  Patient side effects :  She denies any orthostatic hypotension.       Range of home bp's = 115-143 --95% of readings <140/70.    Look good . She recognizes when in afib--bp erratic and pulse increases to 90+ .        COPD: Patient states she feels well controlled. Patient uses her inhalers more often when she gets sick and she always carries her rescue inhaler with her. Patient states that when she forgets it she becomes very worried and panicked.   She carries inhalers albuterol with her , uses the albuterol in nebulizer bid most days of the week.   asmanex daily at hs . spiriva qam--takes everyother day due to cost.     Insomnia: Current medications include: she admits trazodone not working for her for sleep --she took acetaminophen-benadryl --2 at night sleeps better.  Pt reports trouble staying asleep and can only sleep 4-6 hours. She admits to a boring life right now -she gets sleepy early Pm --naps for a few hours , then goes to bed -gets up at 4-5 am - perhaps she is sleeping enough already.       Immunizations: Patient needs 2017 high dose flu shot.      Current labs include:  BP Readings from Last 3 Encounters:   09/25/17 134/70   06/19/17 128/65   06/05/17 134/56     Today's Vitals: /70  Pulse 62  Wt 182 lb 4.8 oz (82.7 kg)  SpO2 93%  BMI 30.81 kg/m2  Lab Results   Component Value Date    A1C 7.4 09/25/2017   .  Lab Results   Component Value Date    CHOL 134 06/19/2017     Lab Results   Component Value Date    TRIG 208 06/19/2017     Lab Results   Component  Value Date    HDL 43 06/19/2017     Lab Results   Component Value Date    LDL 49 06/19/2017       Liver Function Studies -   Recent Labs   Lab Test  04/08/17   0813   PROTTOTAL  7.3   ALBUMIN  3.7   BILITOTAL  0.8   ALKPHOS  67   AST  19   ALT  19       Lab Results   Component Value Date    UCRR 99 10/27/2016    MICROL 33 10/27/2016    UMALCR 33.03 (H) 10/27/2016       Last Basic Metabolic Panel:  Lab Results   Component Value Date     06/19/2017      Lab Results   Component Value Date    POTASSIUM 4.0 06/19/2017     Lab Results   Component Value Date    CHLORIDE 102 06/19/2017     Lab Results   Component Value Date    BUN 24 06/19/2017     Lab Results   Component Value Date    CR 1.05 06/19/2017     GFR Estimate   Date Value Ref Range Status   06/19/2017 50 (L) >60 mL/min/1.7m2 Final     Comment:     Non  GFR Calc   04/08/2017 56 (L) >60 mL/min/1.7m2 Final     Comment:     Non  GFR Calc   04/04/2017 61 >60 mL/min/1.7m2 Final     Comment:     Non  GFR Calc     GFR Estimate If Black   Date Value Ref Range Status   06/19/2017 60 (L) >60 mL/min/1.7m2 Final     Comment:      GFR Calc   04/08/2017 68 >60 mL/min/1.7m2 Final     Comment:      GFR Calc   04/04/2017 74 >60 mL/min/1.7m2 Final     Comment:      GFR Calc     TSH   Date Value Ref Range Status   06/19/2017 0.09 (L) 0.40 - 4.00 mU/L Final   ]    Most Recent Immunizations   Administered Date(s) Administered     DTAP (<7y) 01/01/2010     Influenza (High Dose) 3 valent vaccine 09/25/2017     Influenza (IIV3) 09/01/2014     Pneumococcal (PCV 13) 10/10/2016     Pneumococcal (PCV 7) 09/09/2014     Zoster vaccine, live 01/01/2012       ASSESSMENT:                                                       Current medications were reviewed with her today.     Medication Adherence: No problems identified    IBS-Diarrhea: Stable.     GERD: Stable.  Current treatment is  effective. Chronic PPI use places patient at an increased risk of C. Diff, hypomagnesemia and lower bone mineral density, these risks were reviewed with the patient.  Pt would benefit from the following changes: taper off proton pump inhibitor.(see plan for details).      Diabetes: Stable. Patient is meeting A1c goal of < 8%. Self monitoring of blood glucose is at goal of fasting  mg/dL. Pt would benefit from minimum SMBG: Check blood sugars fasting, and occasionally 2 hours after starting a meal.  Metformin :  stay on the same dose.  SFU (Glipizide) :  stay on the same dose.  Actos:  stay on the same dose.  Increased exercise- as tolerated.   Updated Hemoglobin A1c- 7.4% today .  Weight loss recommended--keep low carb diet .         Vitamin B12: is not at goal of 600-1000pg/mL. Pt would benefit from vitamin B12 lab recheck in 12 months.    Vitamin D3: is not at goal of 50-75ng/mL. Pt would benefit from vitamin D3 lab recheck in 12 months.    Hypothyroidism: TSH wnl.     Hypertension: Stable. Patient is not meeting BP goal of < 140/90mmHg.  Pt would benefit from the following changes - continued BP monitoring and watching diet, increasing exercise and weight loss      COPD/Asthma: Stable.     Insomnia: Improved. Pt may benefit from no changes.      Immunizations: due for 2017 high dose flu shot today .     PLAN:                                                        1. A1c today = 7.4% today --congratulations --excellent result !    2. We are also repeating your thyroid lab today as well. Watch my chart for result and note from Venu or BRISEYDA .     3. Please see tala in our pharmacy today for your yearly high dose flu shot.     4., FYI--please wean off Omeprazole as follows:  1 pill every other day x 2 weeks , then 1 every 3rd day x 2 weeks , then stop if ok and only use on as needed basis.             Next MTM visit:  March 19th -2018 at 1;30pm.   Bring blood sugar meter.     I spent 30 minutes with this patient  today .     To schedule another MTM appointment, please call the clinic directly or you may call the MTM scheduling line at [phone] or toll-free at [phone].     My Clinical Pharmacist's contact information:                                                      It was a pleasure seeing you today!  Please feel free to contact me with any questions or concerns you have.      Janey Romero Rph.  Medication Therapy Management Provider  [phone]        You may receive a survey about the MTM services you received.  I would appreciate your feedback to help me serve you better in the future. Please fill it out and return it when you can. Your comments will be anonymous.

## 2017-09-26 LAB — TSH SERPL DL<=0.005 MIU/L-ACNC: 0.74 MU/L (ref 0.4–4)

## 2017-10-04 DIAGNOSIS — I48.20 CHRONIC ATRIAL FIBRILLATION (H): ICD-10-CM

## 2017-10-05 NOTE — TELEPHONE ENCOUNTER
Pending Prescriptions:                       Disp   Refills    warfarin (COUMADIN) 3 MG tablet [Pharmacy*90 tab*1            Sig: TAKE ONE TABLET ONCE DAILY, EXCEPT TAKE 1/2           TABLET (1.5MG) ON FRIDAYS    warfarin (COUMADIN) 3 MG tablet    Last Written Prescription Date: 05/02/17  Last Fill Qty: 90, # refills: 1  Last Office Visit with [facility], [facility] or [facility] prescribing provider: 06/19/17    Next 5 appointments (look out 90 days)     Oct 23, 2017 10:00 AM CDT   Pre-Op physical with Venu Maria PA-C   [facility]    [address] [place] [address], [address]  [facility] [address] [phone]                   Date and Result of Last PT/INR:   Lab Results   Component Value Date    INR 2.9 09/22/2017    INR 2.5 08/11/2017    INR 1.54 04/13/2017    INR 2.19 04/08/2017

## 2017-10-06 RX ORDER — WARFARIN SODIUM 3 MG/1
TABLET ORAL
Qty: 90 TABLET | Refills: 1 | Status: SHIPPED | OUTPATIENT
Start: 2017-10-06 | End: 2018-03-15

## 2017-10-11 DIAGNOSIS — I51.9 SYSTOLIC DYSFUNCTION, LEFT VENTRICLE: ICD-10-CM

## 2017-10-11 DIAGNOSIS — I25.2 PAST MYOCARDIAL INFARCTION: ICD-10-CM

## 2017-10-12 RX ORDER — LISINOPRIL 40 MG/1
TABLET ORAL
Qty: 90 TABLET | Refills: 2 | Status: SHIPPED | OUTPATIENT
Start: 2017-10-12 | End: 2018-07-11

## 2017-10-12 NOTE — TELEPHONE ENCOUNTER
lisinopril (PRINIVIL/ZESTRIL) 40 MG tablet      Last Written Prescription Date: 4/12/17  Last Fill Quantity: 90, # refills: 1  Last Office Visit with [facility], [facility] or [facility] prescribing provider: 6/19/2017    Next 5 appointments (look out 90 days)     Oct 23, 2017 10:00 AM CDT   Pre-Op physical with Venu Maria PA-C   [facility]    [address] [place] [address], [address]  [facility] [address] [phone]                   Potassium   Date Value Ref Range Status   06/19/2017 4.0 3.4 - 5.3 mmol/L Final     Creatinine   Date Value Ref Range Status   06/19/2017 1.05 (H) 0.52 - 1.04 mg/dL Final     BP Readings from Last 3 Encounters:   09/25/17 134/70   06/19/17 128/65   06/05/17 134/56         Nita Pan, ASHLEIGHT  October 12, 2017  8:44 AM

## 2017-10-18 DIAGNOSIS — E03.9 HYPOTHYROIDISM, UNSPECIFIED TYPE: ICD-10-CM

## 2017-10-19 RX ORDER — LEVOTHYROXINE SODIUM 88 UG/1
TABLET ORAL
Qty: 90 TABLET | Refills: 3 | Status: ON HOLD | OUTPATIENT
Start: 2017-10-19 | End: 2018-07-23

## 2017-10-19 NOTE — TELEPHONE ENCOUNTER
Last Office Visit with [facility], [facility] or [facility] prescribing provider: 6/19/2017    Next 5 appointments (look out 90 days)     Oct 23, 2017 10:00 AM CDT   Pre-Op physical with Venu Maria PA-C   [facility]    [address] [place] [address], [address] [address] [phone]

## 2017-10-23 ENCOUNTER — OFFICE VISIT (OUTPATIENT)
Dept: FAMILY MEDICINE | Facility: CLINIC | Age: 82
End: 2017-10-23
Payer: COMMERCIAL

## 2017-10-23 VITALS
HEART RATE: 72 BPM | SYSTOLIC BLOOD PRESSURE: 120 MMHG | TEMPERATURE: 98.2 F | BODY MASS INDEX: 30.49 KG/M2 | RESPIRATION RATE: 12 BRPM | HEIGHT: 65 IN | WEIGHT: 183 LBS | DIASTOLIC BLOOD PRESSURE: 50 MMHG

## 2017-10-23 DIAGNOSIS — H02.109 ECTROPION OF LOWER EYELID: ICD-10-CM

## 2017-10-23 DIAGNOSIS — H25.9 SENILE CATARACT, UNSPECIFIED AGE-RELATED CATARACT TYPE, UNSPECIFIED LATERALITY: ICD-10-CM

## 2017-10-23 DIAGNOSIS — Z01.818 PREOP GENERAL PHYSICAL EXAM: Primary | ICD-10-CM

## 2017-10-23 DIAGNOSIS — E11.65 TYPE 2 DIABETES MELLITUS WITH HYPERGLYCEMIA, WITHOUT LONG-TERM CURRENT USE OF INSULIN (H): ICD-10-CM

## 2017-10-23 DIAGNOSIS — Z23 NEED FOR PROPHYLACTIC VACCINATION AGAINST STREPTOCOCCUS PNEUMONIAE (PNEUMOCOCCUS): ICD-10-CM

## 2017-10-23 DIAGNOSIS — K44.9 HIATAL HERNIA: ICD-10-CM

## 2017-10-23 DIAGNOSIS — Z79.01 LONG-TERM (CURRENT) USE OF ANTICOAGULANTS: ICD-10-CM

## 2017-10-23 LAB — INR PPP: 3.26 (ref 0.86–1.14)

## 2017-10-23 PROCEDURE — G0009 ADMIN PNEUMOCOCCAL VACCINE: HCPCS | Performed by: PHYSICIAN ASSISTANT

## 2017-10-23 PROCEDURE — 82043 UR ALBUMIN QUANTITATIVE: CPT | Performed by: PHYSICIAN ASSISTANT

## 2017-10-23 PROCEDURE — 90732 PPSV23 VACC 2 YRS+ SUBQ/IM: CPT | Performed by: PHYSICIAN ASSISTANT

## 2017-10-23 PROCEDURE — 99215 OFFICE O/P EST HI 40 MIN: CPT | Mod: 25 | Performed by: PHYSICIAN ASSISTANT

## 2017-10-23 PROCEDURE — 36415 COLL VENOUS BLD VENIPUNCTURE: CPT | Performed by: PHYSICIAN ASSISTANT

## 2017-10-23 PROCEDURE — 85610 PROTHROMBIN TIME: CPT | Performed by: PHYSICIAN ASSISTANT

## 2017-10-23 PROCEDURE — 80048 BASIC METABOLIC PNL TOTAL CA: CPT | Performed by: PHYSICIAN ASSISTANT

## 2017-10-23 NOTE — PROGRESS NOTES
[address], [address]  [facility] [address]  [phone]  Dept: [phone]    PRE-OP EVALUATION:  Today's date: 10/23/2017    Maryalice Rebecca Wachter (: 1933) presents for pre-operative evaluation assessment as requested by Dr. Spencer Freeman.  She requires evaluation and anesthesia risk assessment prior to undergoing surgery/procedure for treatment of eyelid .  Proposed procedure: Ecropion    Date of Surgery/ Procedure: 10/31/17 and 17  Time of Surgery/ Procedure: [facility]  Hospital/Surgical Facility: [facility]  Fax number for surgical facility: [phone]  Primary Physician: Venu Maria  Type of Anesthesia Anticipated: to be determined    Patient has a Health Care Directive or Living Will:  YES     1. NO - Do you have a history of heart attack, stroke, stent, bypass or surgery on an artery in the head, neck, heart or legs?  2. NO - Do you ever have any pain or discomfort in your chest?  3. NO - Do you have a history of  Heart Failure?  4. YES - ARE YOUR TROUBLED BY SHORTNESS OF BREATH WHEN WALKING ON THE LEVEL, UP A SLIGHT HILL OR AT NIGHT? COPD  5. NO - Do you currently have a cold, bronchitis or other respiratory infection?  6. NO - Do you have a cough, shortness of breath or wheezing?  7. YES - DO YOU SOMETIMES GET PAINS IN THE CALVES OF YOUR LEGS WHEN YOU WALK? At night, wake her up  8. NO - Do you or anyone in your family have previous history of blood clots?  9. NO - Do you or does anyone in your family have a serious bleeding problem such as prolonged bleeding following surgeries or cuts?  10. YES - HAVE YOU EVER HAD PROBLEMS WITH ANEMIA OR BEEN TOLD TO TAKE IRON PILLS? Currently takes daily  11. NO - Have you had any abnormal blood loss such as black, tarry or bloody stools, or abnormal vaginal bleeding?  12. NO - Have you ever had a blood transfusion?  13. NO - Have you or any of your relatives ever had problems with  anesthesia?  14. YES - DO YOU HAVE SLEEP APNEA, EXCESSIVE SNORING OR DAYTIME DROWSINESS? Daytime drowsiness, takes naps daily  15. NO - Do you have any prosthetic heart valves?  16. NO - Do you have prosthetic joints?  17. NO - Is there any chance that you may be pregnant?        HPI:                                                      Brief HPI related to upcoming procedure: Oct 31st having lower lid surgery and on Nov 14th will be having cataract surgery on the right eye.      See problem list for active medical problems.  Problems all longstanding and stable, except as noted/documented.  See ROS for pertinent symptoms related to these conditions.   Currently medical problems are under control.  Mentions specifically today that she has weaned off Omeprazole successfully and wonders about having something around for if her stomach bothers her.  She is on coumadin and will stop this 5 days prior to first surgery.  Last INR was 9/22.  Can check today.  Next due on 11/3.                                                                                               .    MEDICAL HISTORY:                                                    Patient Active Problem List    Diagnosis Date Noted     COPD exacerbation (H) 10/21/2016     Priority: Medium     Type 2 diabetes mellitus with hyperglycemia (H) 09/02/2016     Priority: Medium     Long-term (current) use of anticoagulants [Z79.01] 04/15/2016     Priority: Medium     Hypothyroidism 03/31/2015     Priority: Medium     Health Care Home 03/30/2015     Priority: Medium       Status:  NA - no active care coordination   Care Coordinator:  Grisel Martinez RN   See Letters for [facility] Emergency Care Plan  Date:  March 30, 2015           Fever 03/27/2015     Priority: Medium     HTN (hypertension) 01/09/2015     Priority: Medium     Hyperlipidemia LDL goal <100 12/15/2014     Priority: Medium     CAD (coronary artery disease) 12/12/2014     Priority: Medium     Atrial fibrillation  (H) 12/12/2014     Priority: Medium     ACP (advance care planning) 08/23/2012     Priority: Medium     Received outside advance directive.  Previously signed by patient and witnessed with two signatures (cannot be the HCA).  scanned into EMR as Advance Directive/Living Will document. View document and details in Code Status History Report. Please see advance directive for specifics.   Rev. Sangeeta Acosta M.Div.  Staff   Pager [phone]         Chest pain 05/03/2012     Priority: Medium     SOB (shortness of breath) 05/03/2012     Priority: Medium     History of colonic polyps 01/09/2009     Priority: Medium     Adenomatous polyp        Past Medical History:   Diagnosis Date     Atrial fibrillation (H)     history of atrial fibrillation     CAD (coronary artery disease)      Congestive heart failure (CHF) (H)      COPD (chronic obstructive pulmonary disease) (H)      Diabetes mellitus (H)     Oral medication treated.     Hiatal hernia     Documented quite large by CT Fall 2011.     Hypercholesterolemia     Per mention in notes, but no recent chol panel noted.  She does take simvastatin.     Hypertension      Hypothyroidism      Postmenopausal bleeding 8/23/2012     Sarcoid      Past Surgical History:   Procedure Laterality Date     ANKLE SURGERY       DILATION AND CURETTAGE, HYSTEROSCOPY DIAGNOSTIC, COMBINED  8/23/2012    Procedure: COMBINED DILATION AND CURETTAGE, HYSTEROSCOPY DIAGNOSTIC;   DILATION AND CURETTAGE, Operative  HYSTEROSCOPY ;  Surgeon: Justa Francois MD;  Location:  OR     ENT SURGERY       ESOPHAGOSCOPY, GASTROSCOPY, DUODENOSCOPY (EGD), COMBINED N/A 4/13/2017    Procedure: COMBINED ESOPHAGOSCOPY, GASTROSCOPY, DUODENOSCOPY (EGD);  Surgeon: Johnny Stratton MD;  Location:  GI     ESOPHAGOSCOPY, GASTROSCOPY, DUODENOSCOPY (EGD), COMBINED N/A 4/13/2017    Procedure: COMBINED ESOPHAGOSCOPY, GASTROSCOPY, DUODENOSCOPY (EGD), BIOPSY SINGLE OR MULTIPLE;  Surgeon: Viral  Johnny HOFFMAN MD;  Location:  GI     Current Outpatient Prescriptions   Medication Sig Dispense Refill     levothyroxine (SYNTHROID/LEVOTHROID) 88 MCG tablet TAKE ONE TABLET BY MOUTH EVERY DAY 90 tablet 3     lisinopril (PRINIVIL/ZESTRIL) 40 MG tablet TAKE ONE TABLET BY MOUTH EVERY DAY 90 tablet 2     warfarin (COUMADIN) 3 MG tablet TAKE ONE TABLET ONCE DAILY, EXCEPT TAKE 1/2 TABLET (1.5MG) ON FRIDAYS 90 tablet 1     diphenhydrAMINE-acetaminophen (TYLENOL PM EXTRA STRENGTH)  MG tablet Take 2 tablets by mouth At Bedtime       simvastatin (ZOCOR) 20 MG tablet TAKE ONE TABLET BY MOUTH AT BEDTIME . NEEDS APPOINTMENT 90 tablet 2     pioglitazone (ACTOS) 30 MG tablet TAKE ONE TABLET BY MOUTH EVERY DAY 90 tablet 0     hydrochlorothiazide (HYDRODIURIL) 25 MG tablet Take 1 tablet (25 mg) by mouth daily 90 tablet 1     metoprolol (LOPRESSOR) 50 MG tablet Take 1 tablet (50 mg) by mouth 2 times daily 180 tablet 1     metFORMIN (GLUCOPHAGE) 500 MG tablet TAKE 2 TABLETS (1,000 MG) BY MOUTH 2 TIMES DAILY (WITH MEALS) (Patient taking differently: 500 mg 2 times daily (with meals) ) 360 tablet 0     glipiZIDE (GLUCOTROL XL) 10 MG 24 hr tablet Take 1 tablet (10 mg) by mouth 2 times daily 180 tablet 0     TRUEPLUS LANCETS 30G MISC 2-3 lancets daily Use to test blood sugars 2-3 times daily. 100 each 3     omeprazole (PRILOSEC) 20 MG CR capsule Take 20 mg by mouth daily  90 capsule 1     VITAMIN D, CHOLECALCIFEROL, PO Take 2,000 Units by mouth daily       Cyanocobalamin (B-12) 1000 MCG CAPS Take 1 capsule by mouth 2 times daily        LOPERAMIDE HCL PO 1/2 to 1 tablet by mouth as needed.       albuterol (ALBUTEROL) 108 (90 BASE) MCG/ACT inhaler Inhale 2 puffs into the lungs every 6 hours Reported on 3/2/2017       albuterol (2.5 MG/3ML) 0.083% nebulizer solution Take 1 vial by nebulization every 6 hours as needed for shortness of breath / dyspnea or wheezing Reported on 3/2/2017       ASPIRIN EC PO Take 81 mg by mouth.          "tiotropium (SPIRIVA) 18 MCG inhalation capsule Inhale 18 mcg into the lungs daily.       mometasone (ASMANEX) 220 MCG/INH inhaler Inhale 2 puffs into the lungs daily.       ferrous sulfate 325 (65 FE) MG tablet Take 1 tablet by mouth daily (with breakfast).       ascorbic acid (VITAMIN C) 500 MG tablet Take 500 mg by mouth daily.       calcium carb 1250 mg, 500 mg [place],/vitamin D 200 units (OSCAL WITH D) 500-200 MG-UNIT per tablet Take 1 tablet by mouth daily.       polycarbophil (FIBERCON) 625 MG tablet Take 1 tablet by mouth 2 times daily.       OTC products: None, except as noted above    Allergies   Allergen Reactions     Penicillins Hives     Amlodipine Other (See Comments)     Too much ankle edema and red itchy spots.      Furosemide Other (See Comments)     Fainted with first dose.       Latex Allergy: NO    Social History   Substance Use Topics     Smoking status: Never Smoker     Smokeless tobacco: Never Used     Alcohol use Yes      Comment: Extremely rare use in small amounts.     History   Drug Use No       REVIEW OF SYSTEMS:                                                    Constitutional, HEENT, cardiovascular, pulmonary, gi and gu systems are negative, except as otherwise noted.      EXAM:                                                    /50 (BP Location: Right arm, Patient Position: Sitting, Cuff Size: Adult Regular)  Pulse 72  Temp 98.2  F (36.8  C) (Oral)  Resp 12  Ht 5' 4.5\" (1.638 m)  Wt 183 lb (83 kg)  BMI 30.93 kg/m2    GENERAL APPEARANCE: healthy, alert and no distress     EYES: Eyes grossly normal to inspection and eyelids- ectropion OS     HENT: ear canals and TM's normal and nose and mouth without ulcers or lesions     NECK: no adenopathy, no asymmetry, masses, or scars and thyroid normal to palpation     RESP: lungs clear to auscultation - no rales, rhonchi or wheezes     CV: regular rates and rhythm, normal S1 S2, no S3 or S4 and no murmur, click or rub     ABDOMEN:  soft, " nontender, no HSM or masses and bowel sounds normal     MS: extremities normal- no gross deformities noted, no evidence of inflammation in joints, FROM in all extremities.     SKIN: no suspicious lesions or rashes     NEURO: Normal strength and tone, sensory exam grossly normal, mentation intact and speech normal     PSYCH: mentation appears normal. and affect normal/bright     LYMPHATICS: No axillary, cervical, or supraclavicular nodes    DIAGNOSTICS:                                                      Labs Drawn and in Process:   Unresulted Labs Ordered in the Past 30 Days of this Admission     Date and Time Order Name Status Description    10/23/2017 1343 ALBUMIN RANDOM URINE QUANTITATIVE In process     10/23/2017 1103 BASIC METABOLIC PANEL In process           Recent Labs   Lab Test  09/25/17   1307 09/22/17 08/11/17 06/19/17   0935  06/05/17   1337   04/08/17   0813   HGB   --    --    --    --   12.7   --    --   13.6   PLT   --    --    --    --   311   --    --   355   INR   --   2.9*  2.5*   < >   --    --    < >  2.19*   NA   --    --    --    --   141   --    --   134   POTASSIUM   --    --    --    --   4.0   --    --   3.6   CR   --    --    --    --   1.05*   --    --   0.95   A1C  7.4*   --    --    --    --   7.8*   --    --     < > = values in this interval not displayed.        ICD-10-CM    1. Preop general physical exam Z01.818 Basic metabolic panel   2. 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 Senile cataract, unspecified age-related cataract type, unspecified laterality H25.9    4. Need for prophylactic vaccination against Streptococcus pneumoniae (pneumococcus) Z23 Pneumococcal vaccine 23 valent PPSV23  (Pneumovax) [60173]   5. Long-term (current) use of anticoagulants [Z79.01] Z79.01 INR   6. Type 2 diabetes mellitus with hyperglycemia, without long-term current use of insulin (H) E11.65 Albumin Random Urine Quantitative with Creat Ratio     Albumin Random Urine Quantitative with Creat Ratio      CANCELED: Albumin Random Urine Quantitative with Creat Ratio     CANCELED: Albumin Random Urine Quantitative with Creat Ratio     Will likely send in an Rx for zantac for use of occasional GERD like symptoms for her.  IMPRESSION:                                                    Reason for surgery/procedure: cataract/ectropion  Diagnosis/reason for consult: pre op exam    The proposed surgical procedure is considered LOW risk.    REVISED CARDIAC RISK INDEX  The patient has the following serious cardiovascular risks for perioperative complications such as (MI, PE, VFib and 3  AV Block):  No serious cardiac risks  INTERPRETATION: 0 risks: Class I (very low risk - 0.4% complication rate)    The patient has the following additional risks for perioperative complications:  No identified additional risks        RECOMMENDATIONS:                                                    --Patient is to take all scheduled medications on the day of surgery EXCEPT for modifications listed below.    Diabetes Medication Use  -----Hold usual  oral diabetic meds (e.g. Metformin, Actos, Glipizide) while NPO.       Anticoagulant or Antiplatelet Medication Use  WARFARIN: Bleeding risk is low for this procedure and warfarin should be continued in the perioperative period-- this pertains to cataract surgery.  *they have asked to have her stop her Coumadin 5 days prior to the first surgery.        ACE Inhibitor or Angiotensin Receptor Blocker (ARB) Use  Ace inhibitor or Angiotensin Receptor Blocker (ARB) and should HOLD this medication for the 24 hours prior to surgery.      APPROVAL GIVEN to proceed with proposed procedure, without further diagnostic evaluation       Signed Electronically by: Venu Maria PA-C    Copy of this evaluation report is provided to requesting physician.    [place] Preop Guidelines

## 2017-10-23 NOTE — NURSING NOTE
"Chief Complaint   Patient presents with     Pre-Op Exam     eye surgery       Initial /50 (BP Location: Right arm, Patient Position: Sitting, Cuff Size: Adult Regular)  Pulse 72  Temp 98.2  F (36.8  C) (Oral)  Resp 12  Ht 5' 4.5\" (1.638 m)  Wt 183 lb (83 kg)  BMI 30.93 kg/m2 Estimated body mass index is 30.93 kg/(m^2) as calculated from the following:    Height as of this encounter: 5' 4.5\" (1.638 m).    Weight as of this encounter: 183 lb (83 kg).  Medication Reconciliation: complete   Laisha Hernández MA      "

## 2017-10-23 NOTE — MR AVS SNAPSHOT
After Visit Summary   10/23/2017    Maryalice Rebecca Wachter    MRN: [id number]           Patient Information     Date Of Birth          11/11/1933        Visit Information        Provider Department      10/23/2017 10:00 AM Venu Maria PA-C [facility]        Today's Diagnoses     Preop general physical exam    -  1    Ectropion of lower eyelid        Senile cataract, unspecified age-related cataract type, unspecified laterality        Need for prophylactic vaccination against Streptococcus pneumoniae (pneumococcus)        Long-term (current) use of anticoagulants [Z79.01]        Type 2 diabetes mellitus with hyperglycemia, without long-term current use of insulin (H)          Care Instructions      Before Your Surgery      Call your surgeon if there is any change in your health. This includes signs of a cold or flu (such as a sore throat, runny nose, cough, rash or fever).    Do not smoke, drink alcohol or take over the counter medicine (unless your surgeon or primary care doctor tells you to) for the 24 hours before and after surgery.    If you take prescribed drugs: Follow your doctor s orders about which medicines to take and which to stop until after surgery.    Eating and drinking prior to surgery: follow the instructions from your surgeon    Take a shower or bath the night before surgery. Use the soap your surgeon gave you to gently clean your skin. If you do not have soap from your surgeon, use your regular soap. Do not shave or scrub the surgery site.  Wear clean pajamas and have clean sheets on your bed.           TAKE YOUR LAST COUMADIN DOSE ON Thursday October 26TH   (for cataract surgery you can stay on this)  ONLY TAKE YOUR METOPROLOL on the day of surgery            Follow-ups after your visit        Follow-up notes from your care team     Return if symptoms worsen or fail to improve.      Your next 10 appointments already scheduled     Nov 03, 2017  9:00 AM  "CDT   Anticoagulation Visit with FM RN VISITS   [facility]    [address], [address]  [facility] [address] [phone]            Mar 19, 2018  1:30 PM CDT   SHORT with Janey Romero RPH   [facility] ([facility])    [address]  [facility] [address] [phone]              Who to contact     If you have questions or need follow up information about today's clinic visit or your schedule please contact [facility] directly at [phone].  Normal or non-critical lab and imaging results will be communicated to you by MyChart, letter or phone within 4 business days after the clinic has received the results. If you do not hear from us within 7 days, please contact the clinic through [employer] or phone. If you have a critical or abnormal lab result, we will notify you by phone as soon as possible.  Submit refill requests through [employer] or call your pharmacy and they will forward the refill request to us. Please allow 3 business days for your refill to be completed.          Additional Information About Your Visit        MyChart Information     [employer] gives you secure access to your electronic health record. If you see a primary care provider, you can also send messages to your care team and make appointments. If you have questions, please call your primary care clinic.  If you do not have a primary care provider, please call [phone] and they will assist you.        Care EveryWhere ID     This is your Care EveryWhere ID. This could be used by other organizations to access your [place] medical records  [id number]        Your Vitals Were     Pulse Temperature Respirations Height BMI (Body Mass Index)       72 98.2  F (36.8  C) (Oral) 12 5' 4.5\" (1.638 m) 30.93 kg/m2        Blood Pressure from Last 3 Encounters:   10/23/17 120/50   09/25/17 134/70   06/19/17 128/65    Weight from " Last 3 Encounters:   10/23/17 183 lb (83 kg)   09/25/17 182 lb 4.8 oz (82.7 kg)   06/19/17 178 lb (80.7 kg)              We Performed the Following     Albumin Random Urine Quantitative with Creat Ratio     Basic metabolic panel     INR     Pneumococcal vaccine 23 valent PPSV23  (Pneumovax) [54011]          Today's Medication Changes          These changes are accurate as of: 10/23/17 11:07 AM.  If you have any questions, ask your nurse or doctor.               These medicines have changed or have updated prescriptions.        Dose/Directions    metFORMIN 500 MG tablet   Commonly known as:  GLUCOPHAGE   This may have changed:    - how much to take  - when to take this  - additional instructions   Used for:  Type 2 diabetes mellitus with hyperglycemia, without long-term current use of insulin (H)        TAKE 2 TABLETS (1,000 MG) BY MOUTH 2 TIMES DAILY (WITH MEALS)   Quantity:  360 tablet   Refills:  0                Primary Care Provider Office Phone # Fax #    Venu Maria PA-C [phone]       [address] [facility] [address]        Equal Access to Services     [facility] DMITRY : Hadii aad ku hadasho Sosabine, waaxda luqadaha, qaybta kaalmada adeblake, katrin lin . So [facility] [phone].    ATENCIÓN: Si habla español, tiene a talamantes disposición servicios gratuitos de asistencia lingüística. Llame al [phone].    We comply with applicable federal civil rights laws and [place] laws. We do not discriminate on the basis of race, color, national origin, age, disability, sex, sexual orientation, or gender identity.            Thank you!     Thank you for choosing [facility]  for your care. Our goal is always to provide you with excellent care. Hearing back from our patients is one way we can continue to improve our services. Please take a few minutes to complete the written survey that you may receive in the mail after your visit with us. Thank  you!             Your Updated Medication List - Protect others around you: Learn how to safely use, store and throw away your medicines at www.disposemymeds.org.          This list is accurate as of: 10/23/17 11:07 AM.  Always use your most recent med list.                   Brand Name Dispense Instructions for use Diagnosis    * albuterol (2.5 MG/3ML) 0.083% neb solution      Take 1 vial by nebulization every 6 hours as needed for shortness of breath / dyspnea or wheezing Reported on 3/2/2017        * PROAIR  (90 BASE) MCG/ACT Inhaler   Generic drug:  albuterol      Inhale 2 puffs into the lungs every 6 hours Reported on 3/2/2017        ascorbic acid 500 MG tablet    VITAMIN C     Take 500 mg by mouth daily.        ASPIRIN EC PO      Take 81 mg by mouth.        B-12 1000 MCG Caps      Take 1 capsule by mouth 2 times daily        calcium carb 1250 mg (500 mg [place])/vitamin D 200 units 500-200 MG-UNIT per tablet    OSCAL with D     Take 1 tablet by mouth daily.        ferrous sulfate 325 (65 FE) MG tablet    IRON     Take 1 tablet by mouth daily (with breakfast).        FIBERCON 625 MG tablet   Generic drug:  calcium polycarbophil      Take 1 tablet by mouth 2 times daily.        glipiZIDE 10 MG 24 hr tablet    GLUCOTROL XL    180 tablet    Take 1 tablet (10 mg) by mouth 2 times daily    Type 2 diabetes mellitus with hyperglycemia, without long-term current use of insulin (H)       hydrochlorothiazide 25 MG tablet    HYDRODIURIL    90 tablet    Take 1 tablet (25 mg) by mouth daily    Essential hypertension       levothyroxine 88 MCG tablet    SYNTHROID/LEVOTHROID    90 tablet    TAKE ONE TABLET BY MOUTH EVERY DAY    Hypothyroidism, unspecified type       lisinopril 40 MG tablet    PRINIVIL/ZESTRIL    90 tablet    TAKE ONE TABLET BY MOUTH EVERY DAY    Systolic dysfunction, left ventricle, Past myocardial infarction       LOPERAMIDE HCL PO      1/2 to 1 tablet by mouth as needed.        metFORMIN 500 MG tablet     GLUCOPHAGE    360 tablet    TAKE 2 TABLETS (1,000 MG) BY MOUTH 2 TIMES DAILY (WITH MEALS)    Type 2 diabetes mellitus with hyperglycemia, without long-term current use of insulin (H)       metoprolol 50 MG tablet    LOPRESSOR    180 tablet    Take 1 tablet (50 mg) by mouth 2 times daily    Past myocardial infarction, Systolic dysfunction, left ventricle, Atrial tachycardia, paroxysmal (H)       mometasone 220 MCG/INH Inhaler    ASMANEX     Inhale 2 puffs into the lungs daily.        omeprazole 20 MG CR capsule    priLOSEC    90 capsule    Take 20 mg by mouth daily    Chest pain, unspecified type       pioglitazone 30 MG tablet    ACTOS    90 tablet    TAKE ONE TABLET BY MOUTH EVERY DAY    Type 2 diabetes mellitus with hyperglycemia, without long-term current use of insulin (H)       simvastatin 20 MG tablet    ZOCOR    90 tablet    TAKE ONE TABLET BY MOUTH AT BEDTIME . NEEDS APPOINTMENT    Hyperlipidemia LDL goal <100       tiotropium 18 MCG capsule    SPIRIVA     Inhale 18 mcg into the lungs daily.        TRUEPLUS LANCETS 30G Misc     100 each    2-3 lancets daily Use to test blood sugars 2-3 times daily.    Type 2 diabetes, HbA1C goal < 8% (H)       TYLENOL PM EXTRA STRENGTH  MG tablet   Generic drug:  diphenhydrAMINE-acetaminophen      Take 2 tablets by mouth At Bedtime        VITAMIN D (CHOLECALCIFEROL) PO      Take 2,000 Units by mouth daily        warfarin 3 MG tablet    COUMADIN    90 tablet    TAKE ONE TABLET ONCE DAILY, EXCEPT TAKE 1/2 TABLET (1.5MG) ON FRIDAYS    Chronic atrial fibrillation (H)       * Notice:  This list has 2 medication(s) that are the same as other medications prescribed for you. Read the directions carefully, and ask your doctor or other care provider to review them with you.

## 2017-10-23 NOTE — PATIENT INSTRUCTIONS
Before Your Surgery      Call your surgeon if there is any change in your health. This includes signs of a cold or flu (such as a sore throat, runny nose, cough, rash or fever).    Do not smoke, drink alcohol or take over the counter medicine (unless your surgeon or primary care doctor tells you to) for the 24 hours before and after surgery.    If you take prescribed drugs: Follow your doctor s orders about which medicines to take and which to stop until after surgery.    Eating and drinking prior to surgery: follow the instructions from your surgeon    Take a shower or bath the night before surgery. Use the soap your surgeon gave you to gently clean your skin. If you do not have soap from your surgeon, use your regular soap. Do not shave or scrub the surgery site.  Wear clean pajamas and have clean sheets on your bed.           TAKE YOUR LAST COUMADIN DOSE ON Thursday October 26TH   (for cataract surgery you can stay on this)  ONLY TAKE YOUR METOPROLOL on the day of surgery

## 2017-10-24 LAB
ANION GAP SERPL CALCULATED.3IONS-SCNC: 8 MMOL/L (ref 3–14)
BUN SERPL-MCNC: 26 MG/DL (ref 7–30)
CALCIUM SERPL-MCNC: 9.2 MG/DL (ref 8.5–10.1)
CHLORIDE SERPL-SCNC: 103 MMOL/L (ref 94–109)
CO2 SERPL-SCNC: 27 MMOL/L (ref 20–32)
CREAT SERPL-MCNC: 1.02 MG/DL (ref 0.52–1.04)
GFR SERPL CREATININE-BSD FRML MDRD: 52 ML/MIN/1.7M2
GLUCOSE SERPL-MCNC: 174 MG/DL (ref 70–99)
POTASSIUM SERPL-SCNC: 4.2 MMOL/L (ref 3.4–5.3)
SODIUM SERPL-SCNC: 138 MMOL/L (ref 133–144)

## 2017-10-25 LAB
CREAT UR-MCNC: 109 MG/DL
MICROALBUMIN UR-MCNC: 22 MG/L
MICROALBUMIN/CREAT UR: 19.91 MG/G CR (ref 0–25)

## 2017-11-03 ENCOUNTER — ANTICOAGULATION THERAPY VISIT (OUTPATIENT)
Dept: NURSING | Facility: CLINIC | Age: 82
End: 2017-11-03
Payer: COMMERCIAL

## 2017-11-03 DIAGNOSIS — I48.20 CHRONIC ATRIAL FIBRILLATION (H): ICD-10-CM

## 2017-11-03 DIAGNOSIS — Z79.01 LONG-TERM (CURRENT) USE OF ANTICOAGULANTS: ICD-10-CM

## 2017-11-03 LAB — INR POINT OF CARE: 1 (ref 0.86–1.14)

## 2017-11-03 PROCEDURE — 36416 COLLJ CAPILLARY BLOOD SPEC: CPT

## 2017-11-03 PROCEDURE — 85610 PROTHROMBIN TIME: CPT | Mod: QW

## 2017-11-03 PROCEDURE — 99207 ZZC NO CHARGE NURSE ONLY: CPT

## 2017-11-03 NOTE — MR AVS SNAPSHOT
Maryalice Rebecca Wachter   11/3/2017 10:30 AM   Anticoagulation Therapy Visit    Description:  83 year old female   Provider:  FM RN VISITS   Department:  Fm Nurse           INR as of 11/3/2017     Today's INR 1.0!      Anticoagulation Summary as of 11/3/2017     INR goal 2.0-3.0   Today's INR 1.0!   Full instructions 4.5 mg on Tue, Fri; 3 mg all other days   Next INR check 11/17/2017    Indications   Long-term (current) use of anticoagulants [Z79.01] [Z79.01]  Atrial fibrillation (H) [I48.91]         Your next Anticoagulation Clinic appointment(s)     Nov 17, 2017  9:30 AM CST   [facility]    [address] [place] [address], [facility] [address]  [facility] [address]   [phone]              Contact Numbers     Clinic Number:         November 2017 Details    Sun Mon Tue Wed Thu Fri Sat        1               2               3      4.5 mg   See details      4      3 mg           5      3 mg         6      3 mg         7      4.5 mg         8      3 mg         9      3 mg         10      4.5 mg         11      3 mg           12      3 mg         13      3 mg         14      4.5 mg         15      3 mg         16      3 mg         17            18                 19               20               21               22               23               24               25                 26               27               28               29               30                  Date Details   11/03 This INR check       Date of next INR:  11/17/2017         How to take your warfarin dose     To take:  3 mg Take 1 of the 3 mg tablets.    To take:  4.5 mg Take 1.5 of the 3 mg tablets.

## 2017-11-03 NOTE — PROGRESS NOTES
ANTICOAGULATION FOLLOW-UP CLINIC VISIT    Patient Name:  Maryalice Rebecca Wachter  Date:  11/3/2017  Contact Type:  Face to Face    SUBJECTIVE:     Patient Findings     Positives Intentional hold of therapy    Comments Had eye surgery on 10/31/2017. Held coumadin x 5 days.           OBJECTIVE    INR Protime   Date Value Ref Range Status   11/03/2017 1.0 0.86 - 1.14 Final       ASSESSMENT / PLAN  INR assessment SUB    Recheck INR In: 2 WEEKS    INR Location Clinic      Anticoagulation Summary as of 11/3/2017     INR goal 2.0-3.0   Today's INR 1.0!   Maintenance plan 4.5 mg (3 mg x 1.5) on Tue, Fri; 3 mg (3 mg x 1) all other days   Full instructions 4.5 mg on Tue, Fri; 3 mg all other days   Weekly total 24 mg   No change documented Cyndi Cabello, JANNETTE   Plan last modified Cyndi Cabello RN (9/23/2016)   Next INR check 11/17/2017   Priority INR   Target end date     Indications   Long-term (current) use of anticoagulants [Z79.01] [Z79.01]  Atrial fibrillation (H) [I48.91]         Anticoagulation Episode Summary     INR check location     Preferred lab     Send INR reminders to  TRIAGE POOL    Comments       Anticoagulation Care Providers     Provider Role Specialty Phone number    Venu Maria PA-C Responsible Physician Assistant - Medical [phone]            See the Encounter Report to view Anticoagulation Flowsheet and Dosing Calendar (Go to Encounters tab in chart review, and find the Anticoagulation Therapy Visit)    Cyndi Cabello RN

## 2017-11-15 DIAGNOSIS — E11.65 TYPE 2 DIABETES MELLITUS WITH HYPERGLYCEMIA, WITHOUT LONG-TERM CURRENT USE OF INSULIN (H): ICD-10-CM

## 2017-11-16 NOTE — TELEPHONE ENCOUNTER
Medication Detail      Disp Refills Start End ROM   metoprolol (LOPRESSOR) 50 MG tablet 180 tablet 1 6/19/2017  No   Sig: Take 1 tablet (50 mg) by mouth 2 times daily   Class: E-Prescribe   Route: Oral   Order: [id number]   E-Prescribing Status: Receipt confirmed by pharmacy (6/19/2017  9:23 AM CDT)     Last Office Visit with [facility], [facility] or [facility] prescribing provider: 10/23/2017

## 2017-11-16 NOTE — TELEPHONE ENCOUNTER
Prescription approved per [facility] Refill Protocol.  Cyndi Cabello RN

## 2017-11-17 ENCOUNTER — ANTICOAGULATION THERAPY VISIT (OUTPATIENT)
Dept: NURSING | Facility: CLINIC | Age: 82
End: 2017-11-17
Payer: COMMERCIAL

## 2017-11-17 DIAGNOSIS — I48.20 CHRONIC ATRIAL FIBRILLATION (H): ICD-10-CM

## 2017-11-17 DIAGNOSIS — Z79.01 LONG-TERM (CURRENT) USE OF ANTICOAGULANTS: ICD-10-CM

## 2017-11-17 LAB — INR POINT OF CARE: 2.4 (ref 0.86–1.14)

## 2017-11-17 PROCEDURE — 85610 PROTHROMBIN TIME: CPT | Mod: QW

## 2017-11-17 PROCEDURE — 99207 ZZC NO CHARGE NURSE ONLY: CPT

## 2017-11-17 PROCEDURE — 36416 COLLJ CAPILLARY BLOOD SPEC: CPT

## 2017-11-17 NOTE — PROGRESS NOTES
ANTICOAGULATION FOLLOW-UP CLINIC VISIT    Patient Name:  Maryalice Rebecca Wachter  Date:  11/17/2017  Contact Type:  Face to Face    SUBJECTIVE:     Patient Findings     Positives No Problem Findings           OBJECTIVE    INR Protime   Date Value Ref Range Status   11/17/2017 2.4 (A) 0.86 - 1.14 Final       ASSESSMENT / PLAN  INR assessment THER    Recheck INR In: 6 WEEKS    INR Location Clinic      Anticoagulation Summary as of 11/17/2017     INR goal 2.0-3.0   Today's INR 2.4   Maintenance plan 4.5 mg (3 mg x 1.5) on Tue, Fri; 3 mg (3 mg x 1) all other days   Full instructions 4.5 mg on Tue, Fri; 3 mg all other days   Weekly total 24 mg   No change documented Cyndi Cabello RN   Plan last modified Cyndi Cabello RN (9/23/2016)   Next INR check 12/29/2017   Priority INR   Target end date     Indications   Long-term (current) use of anticoagulants [Z79.01] [Z79.01]  Atrial fibrillation (H) [I48.91]         Anticoagulation Episode Summary     INR check location     Preferred lab     Send INR reminders to  TRIAGE POOL    Comments       Anticoagulation Care Providers     Provider Role Specialty Phone number    Venu Maria PA-C Responsible Physician Assistant - Medical [phone]            See the Encounter Report to view Anticoagulation Flowsheet and Dosing Calendar (Go to Encounters tab in chart review, and find the Anticoagulation Therapy Visit)    Cyndi Cabello RN

## 2017-11-17 NOTE — MR AVS SNAPSHOT
Sindi Beachchter   11/17/2017 2:45 PM   Anticoagulation Therapy Visit    Description:  84 year old female   Provider:  FM RN VISITS   Department:  Fm Nurse           INR as of 11/17/2017     Today's INR 2.4      Anticoagulation Summary as of 11/17/2017     INR goal 2.0-3.0   Today's INR 2.4   Full instructions 4.5 mg on Tue, Fri; 3 mg all other days   Next INR check 12/29/2017    Indications   Long-term (current) use of anticoagulants [Z79.01] [Z79.01]  Atrial fibrillation (H) [I48.91]         Your next Anticoagulation Clinic appointment(s)     Dec 29, 2017  9:30 AM CST   Anticoagulation Visit with FM RN VISITS   [facility]    [address] [place] [address], [facility] [address]  [facility] [address] [phone]              Contact Numbers     Clinic Number:         November 2017 Details    Sun Mon Tue Wed Thu Fri Sat        1               2               3               4                 5               6               7               8               9               10               11                 12               13               14               15               16               17      4.5 mg   See details      18      3 mg           19      3 mg         20      3 mg         21      4.5 mg         22      3 mg         23      3 mg         24      4.5 mg         25      3 mg           26      3 mg         27      3 mg         28      4.5 mg         29      3 mg         30      3 mg            Date Details   11/17 This INR check               How to take your warfarin dose     To take:  3 mg Take 1 of the 3 mg tablets.    To take:  4.5 mg Take 1.5 of the 3 mg tablets.           December 2017 Details    Sun Mon Tue Wed Thu Fri Sat          1      4.5 mg         2      3 mg           3      3 mg         4      3 mg         5      4.5 mg         6      3 mg         7      3 mg         8      4.5 mg         9      3 mg           10      3 mg         11      3 mg          12      4.5 mg         13      3 mg         14      3 mg         15      4.5 mg         16      3 mg           17      3 mg         18      3 mg         19      4.5 mg         20      3 mg         21      3 mg         22      4.5 mg         23      3 mg           24      3 mg         25      3 mg         26      4.5 mg         27      3 mg         28      3 mg         29            30                 31                      Date Details   No additional details    Date of next INR:  12/29/2017         How to take your warfarin dose     To take:  3 mg Take 1 of the 3 mg tablets.    To take:  4.5 mg Take 1.5 of the 3 mg tablets.

## 2017-12-06 DIAGNOSIS — E11.65 TYPE 2 DIABETES MELLITUS WITH HYPERGLYCEMIA, WITHOUT LONG-TERM CURRENT USE OF INSULIN (H): ICD-10-CM

## 2017-12-06 NOTE — LETTER
[facility]  [address] [facility] [address]  Phone: [phone]    12/08/17    Maryalice Rebecca Wachter  [address] [facility] [address]      Dear Sindi:     We recently received a call from your pharmacy requesting a refill of your medication - pioglitazone (ACTOS) 30 MG tablet.    A review of your chart indicates that an appointment is required with your provider for office visit and labs. Please call the clinic at [phone] to schedule your appointment.    We have authorized one refill of your medication to allow time for you to schedule your appointment.    Taking care of your health is important to us, and ongoing visits with your provider are vital to your care.  We look forward to seeing you in the near future.          Sincerely,      Venu Maria PA-C/Amanda DICK RN

## 2017-12-08 RX ORDER — PIOGLITAZONEHYDROCHLORIDE 30 MG/1
30 TABLET ORAL DAILY
Qty: 90 TABLET | Refills: 0 | Status: SHIPPED | OUTPATIENT
Start: 2017-12-08 | End: 2018-03-08

## 2017-12-08 NOTE — TELEPHONE ENCOUNTER
Lab Results   Component Value Date    A1C 7.4 09/25/2017    A1C 7.8 06/05/2017    A1C 9.4 03/02/2017    A1C 7.7 10/10/2016    A1C 8.3 07/01/2016     Medication is being filled for 1 time refill only due to:  due for visit and labs soon, letter sent to schedule appt     Amanda Bob RN, BS  Clinical Nurse Triage.

## 2018-01-04 ENCOUNTER — ANTICOAGULATION THERAPY VISIT (OUTPATIENT)
Dept: NURSING | Facility: CLINIC | Age: 83
End: 2018-01-04
Payer: COMMERCIAL

## 2018-01-04 DIAGNOSIS — Z79.01 LONG-TERM (CURRENT) USE OF ANTICOAGULANTS: ICD-10-CM

## 2018-01-04 DIAGNOSIS — I48.20 CHRONIC ATRIAL FIBRILLATION (H): ICD-10-CM

## 2018-01-04 LAB — INR POINT OF CARE: 2.6 (ref 0.86–1.14)

## 2018-01-04 PROCEDURE — 85610 PROTHROMBIN TIME: CPT | Mod: QW

## 2018-01-04 PROCEDURE — 36416 COLLJ CAPILLARY BLOOD SPEC: CPT

## 2018-01-04 PROCEDURE — 99207 ZZC NO CHARGE NURSE ONLY: CPT

## 2018-01-04 NOTE — PROGRESS NOTES
ANTICOAGULATION FOLLOW-UP CLINIC VISIT    Patient Name:  Maryalice Rebecca Wachter  Date:  1/4/2018  Contact Type:  Face to Face    SUBJECTIVE:     Patient Findings     Positives No Problem Findings           OBJECTIVE    INR Protime   Date Value Ref Range Status   01/04/2018 2.6 (A) 0.86 - 1.14 Final       ASSESSMENT / PLAN  INR assessment THER    Recheck INR In: 6 WEEKS    INR Location Clinic      Anticoagulation Summary as of 1/4/2018     INR goal 2.0-3.0   Today's INR 2.6   Maintenance plan 4.5 mg (3 mg x 1.5) on Tue, Fri; 3 mg (3 mg x 1) all other days   Full instructions 4.5 mg on Tue, Fri; 3 mg all other days   Weekly total 24 mg   No change documented Cyndi Cabello RN   Plan last modified Cyndi Cabello RN (9/23/2016)   Next INR check 2/16/2018   Priority INR   Target end date     Indications   Long-term (current) use of anticoagulants [Z79.01] [Z79.01]  Atrial fibrillation (H) [I48.91]         Anticoagulation Episode Summary     INR check location     Preferred lab     Send INR reminders to  TRIAGE POOL    Comments       Anticoagulation Care Providers     Provider Role Specialty Phone number    Venu Maria PA-C Responsible Physician Assistant - Medical [phone]            See the Encounter Report to view Anticoagulation Flowsheet and Dosing Calendar (Go to Encounters tab in chart review, and find the Anticoagulation Therapy Visit)        Cyndi Cabello RN

## 2018-01-04 NOTE — MR AVS SNAPSHOT
Maryalice Rebecca Wachter   1/4/2018 1:15 PM   Anticoagulation Therapy Visit    Description:  84 year old female   Provider:  FM RN VISITS   Department:  Fm Nurse           INR as of 1/4/2018     Today's INR 2.6      Anticoagulation Summary as of 1/4/2018     INR goal 2.0-3.0   Today's INR 2.6   Full instructions 4.5 mg on Tue, Fri; 3 mg all other days   Next INR check 2/16/2018    Indications   Long-term (current) use of anticoagulants [Z79.01] [Z79.01]  Atrial fibrillation (H) [I48.91]         Your next Anticoagulation Clinic appointment(s)     Feb 16, 2018  9:30 AM CST   Anticoagulation Visit with FM RN VISITS   [facility]    [address] [place] [address], [facility] [address]  [facility] [address]   [phone]              Contact Numbers     Clinic Number:         January 2018 Details    Sun Mon Tue Wed Thu Fri Sat      1               2               3               4      3 mg   See details      5      4.5 mg         6      3 mg           7      3 mg         8      3 mg         9      4.5 mg         10      3 mg         11      3 mg         12      4.5 mg         13      3 mg           14      3 mg         15      3 mg         16      4.5 mg         17      3 mg         18      3 mg         19      4.5 mg         20      3 mg           21      3 mg         22      3 mg         23      4.5 mg         24      3 mg         25      3 mg         26      4.5 mg         27      3 mg           28      3 mg         29      3 mg         30      4.5 mg         31      3 mg             Date Details   01/04 This INR check               How to take your warfarin dose     To take:  3 mg Take 1 of the 3 mg tablets.    To take:  4.5 mg Take 1.5 of the 3 mg tablets.           February 2018 Details    Sun Mon Tue Wed Thu Fri Sat         1      3 mg         2      4.5 mg         3      3 mg           4      3 mg         5      3 mg         6      4.5 mg         7      3 mg         8      3  mg         9      4.5 mg         10      3 mg           11      3 mg         12      3 mg         13      4.5 mg         14      3 mg         15      3 mg         16            17                 18               19               20               21               22               23               24                 25               26               27               28                   Date Details   No additional details    Date of next INR:  2/16/2018         How to take your warfarin dose     To take:  3 mg Take 1 of the 3 mg tablets.    To take:  4.5 mg Take 1.5 of the 3 mg tablets.

## 2018-01-11 ENCOUNTER — TRANSFERRED RECORDS (OUTPATIENT)
Dept: HEALTH INFORMATION MANAGEMENT | Facility: CLINIC | Age: 83
End: 2018-01-11

## 2018-01-11 DIAGNOSIS — I51.9 SYSTOLIC DYSFUNCTION, LEFT VENTRICLE: ICD-10-CM

## 2018-01-11 DIAGNOSIS — I25.2 PAST MYOCARDIAL INFARCTION: ICD-10-CM

## 2018-01-11 DIAGNOSIS — E11.65 TYPE 2 DIABETES MELLITUS WITH HYPERGLYCEMIA, WITHOUT LONG-TERM CURRENT USE OF INSULIN (H): ICD-10-CM

## 2018-01-11 DIAGNOSIS — I47.19 ATRIAL TACHYCARDIA, PAROXYSMAL (H): ICD-10-CM

## 2018-01-11 RX ORDER — METOPROLOL TARTRATE 50 MG
TABLET ORAL
Qty: 180 TABLET | Refills: 1 | Status: SHIPPED | OUTPATIENT
Start: 2018-01-11 | End: 2018-07-05

## 2018-01-11 RX ORDER — GLIPIZIDE 10 MG/1
TABLET, FILM COATED, EXTENDED RELEASE ORAL
Qty: 180 TABLET | Refills: 0 | Status: SHIPPED | OUTPATIENT
Start: 2018-01-11 | End: 2018-04-12

## 2018-01-11 NOTE — TELEPHONE ENCOUNTER
"Requested Prescriptions   Pending Prescriptions Disp Refills     metoprolol tartrate (LOPRESSOR) 50 MG tablet [Pharmacy Med Name: METOPROLOL TARTRATE 50MG TABS] 180 tablet 1    Last Written Prescription Date:  6/19/17  Last Fill Quantity: 180,  # refills: 1   Last Office Visit with [facility], YASMIN or  Health prescribing provider:  10/23/2017   Future Office Visit:    Next 5 appointments (look out 90 days)     Mar 19, 2018  1:30 PM CDT   SHORT with Janey Romero [facility])    [address] [facility] [address] [phone]                  Sig: TAKE ONE TABLET BY MOUTH TWICE A DAY    Beta-Blockers Protocol Passed    1/11/2018  9:45 AM       Passed - Blood pressure under 140/90    BP Readings from Last 3 Encounters:   10/23/17 120/50   09/25/17 134/70   06/19/17 128/65          Passed - Patient is age 6 or older       Passed - Recent or future visit with authorizing provider's specialty    Patient had office visit in the last year or has a visit in the next 30 days with authorizing provider.  See \"Patient Info\" tab in inbasket, or \"Choose Columns\" in Meds & Orders section of the refill encounter.    ________________________________________________________________________________       glipiZIDE (GLUCOTROL XL) 10 MG 24 hr tablet [Pharmacy Med Name: GLIPIZIDE ER 10MG TB24] 180 tablet 0    Last Written Prescription Date:  6/19/17  Last Fill Quantity: 180,  # refills: 0   Last Office Visit with [facility] YASMIN or  Health prescribing provider:  10/23/2017   Future Office Visit:    Next 5 appointments (look out 90 days)     Mar 19, 2018  1:30 PM CDT   [facility]    [address] [facility] [address]   [phone]                Sig: TAKE ONE TABLET BY MOUTH TWICE A DAY    Sulfonylurea Agents Passed    1/11/2018  9:45 AM       Passed - Patient's BP is less than 140/90 " "   BP Readings from Last 3 Encounters:   10/23/17 120/50   09/25/17 134/70   06/19/17 128/65          Passed - Patient has documented LDL within the past 12 mos.    Recent Labs   Lab Test  06/19/17   0935   LDL  49          Passed - Patient has had a Microalbumin in the past 12 mos.    Recent Labs   Lab Test  10/23/17   1341   MICROL  22   UMALCR  19.91          Passed - Patient has documented A1c within the specified period of time.    Recent Labs   Lab Test  09/25/17   1307   A1C  7.4*          Passed - Patient is age 18 or older       Passed - No active pregnancy on record       Passed - Patient has a recent creatinine (normal) within the past 12 mos.    Recent Labs   Lab Test  10/23/17   1111   CR  1.02          Passed - Patient has not had a positive pregnancy test within the past 12 mos.       Passed - Patient has had an appointment with authorizing provider within the past 6 mos. or  within next 30 days    Patient had office visit in the last 6 months or has a visit in the next 30 days with authorizing provider.  See \"Patient Info\" tab in inbasket, or \"Choose Columns\" in Meds & Orders section of the refill encounter.             "

## 2018-01-11 NOTE — TELEPHONE ENCOUNTER
Prescription approved per [facility] Refill Protocol.  Cyndi Cabello RN

## 2018-01-18 DIAGNOSIS — I10 ESSENTIAL HYPERTENSION: ICD-10-CM

## 2018-01-19 NOTE — TELEPHONE ENCOUNTER
"Requested Prescriptions   Pending Prescriptions Disp Refills     hydrochlorothiazide (HYDRODIURIL) 25 MG tablet [Pharmacy Med Name: HYDROCHLOROTHIAZIDE 25MG TABS] 90 tablet 1    Last Written Prescription Date:  6/19/17  Last Fill Quantity: 90,  # refills: 1   Last Office Visit with [facility], [facility] or [facility] prescribing provider:  10/23/2017   Future Office Visit:    Next 5 appointments (look out 90 days)     Mar 19, 2018  1:30 PM CDT   SHORT with Janey Romero RPH   [facility] ([facility])    [address] [facility] [id number] [phone]                  Sig: TAKE ONE TABLET BY MOUTH EVERY DAY    Diuretics (Including Combos) Protocol Passed    1/18/2018  4:39 PM       Passed - Blood pressure under 140/90    BP Readings from Last 3 Encounters:   10/23/17 120/50   09/25/17 134/70   06/19/17 128/65          Passed - Recent or future visit with authorizing provider's specialty    Patient had office visit in the last year or has a visit in the next 30 days with authorizing provider.  See \"Patient Info\" tab in inbasket, or \"Choose Columns\" in Meds & Orders section of the refill encounter.          Passed - Patient is age 18 or older       Passed - No active pregancy on record       Passed - Normal serum creatinine on file in past 12 months    Recent Labs   Lab Test  10/23/17   1111   CR  1.02          Passed - Normal serum potassium on file in past 12 months    Recent Labs   Lab Test  10/23/17   1111   POTASSIUM  4.2          Passed - Normal serum sodium on file in past 12 months    Recent Labs   Lab Test  10/23/17   1111   NA  138          Passed - No positive pregnancy test in past 12 months          "

## 2018-01-22 RX ORDER — HYDROCHLOROTHIAZIDE 25 MG/1
TABLET ORAL
Qty: 90 TABLET | Refills: 1 | Status: SHIPPED | OUTPATIENT
Start: 2018-01-22 | End: 2018-03-19 | Stop reason: SINTOL

## 2018-01-23 DIAGNOSIS — E11.65 TYPE 2 DIABETES MELLITUS WITH HYPERGLYCEMIA, WITHOUT LONG-TERM CURRENT USE OF INSULIN (H): ICD-10-CM

## 2018-01-23 NOTE — TELEPHONE ENCOUNTER
Disp Refills Start End ROM   blood glucose monitoring (ONE TOUCH ULTRA) test strip (Discontinued) 100 each 3 3/23/2017 4/4/2017 No   Sig: Use to test blood sugar 2 times daily or as directed.     Prescription approved per [facility] Refill Protocol  Amanda Bob RN BS

## 2018-01-23 NOTE — TELEPHONE ENCOUNTER
Requested Prescriptions   Pending Prescriptions Disp Refills     ONETOUCH ULTRA test strip [Pharmacy Med Name: ONETOUCH ULTRA BLUE  STRP] 100 each 3     (Not in chart)        Last Office Visit with [facility], [facility] or [facility] prescribing provider: 10/23/2017  Future Office Visit:    Next 5 appointments (look out 90 days)     Mar 19, 2018  1:30 PM CDT   SHORT with Janey Romero RPH   [facility]    [address] [facility] [address] [phone]               Sig: USE TO TEST BLOOD SUGAR TWICE A DAY OR AS DIRECTED    Diabetic Supplies Protocol Passed    1/23/2018  2:08 PM       Passed - Patient is 18 years of age or older       Passed - Patient has had appt within past 6 mos    IV to PO - Antibiotics     None

## 2018-02-09 ENCOUNTER — OFFICE VISIT (OUTPATIENT)
Dept: FAMILY MEDICINE | Facility: CLINIC | Age: 83
End: 2018-02-09
Payer: COMMERCIAL

## 2018-02-09 VITALS
TEMPERATURE: 98.2 F | RESPIRATION RATE: 14 BRPM | WEIGHT: 188 LBS | BODY MASS INDEX: 31.32 KG/M2 | SYSTOLIC BLOOD PRESSURE: 100 MMHG | HEIGHT: 65 IN | OXYGEN SATURATION: 95 % | DIASTOLIC BLOOD PRESSURE: 58 MMHG | HEART RATE: 84 BPM

## 2018-02-09 DIAGNOSIS — I10 ESSENTIAL HYPERTENSION: ICD-10-CM

## 2018-02-09 DIAGNOSIS — E03.9 HYPOTHYROIDISM, UNSPECIFIED TYPE: ICD-10-CM

## 2018-02-09 DIAGNOSIS — E11.65 TYPE 2 DIABETES MELLITUS WITH HYPERGLYCEMIA, WITHOUT LONG-TERM CURRENT USE OF INSULIN (H): Primary | ICD-10-CM

## 2018-02-09 DIAGNOSIS — J44.1 COPD EXACERBATION (H): ICD-10-CM

## 2018-02-09 LAB — HBA1C MFR BLD: 7.4 % (ref 4.3–6)

## 2018-02-09 PROCEDURE — 99214 OFFICE O/P EST MOD 30 MIN: CPT | Performed by: PHYSICIAN ASSISTANT

## 2018-02-09 PROCEDURE — 80053 COMPREHEN METABOLIC PANEL: CPT | Performed by: PHYSICIAN ASSISTANT

## 2018-02-09 PROCEDURE — 84443 ASSAY THYROID STIM HORMONE: CPT | Performed by: PHYSICIAN ASSISTANT

## 2018-02-09 PROCEDURE — 83036 HEMOGLOBIN GLYCOSYLATED A1C: CPT | Performed by: PHYSICIAN ASSISTANT

## 2018-02-09 PROCEDURE — 36415 COLL VENOUS BLD VENIPUNCTURE: CPT | Performed by: PHYSICIAN ASSISTANT

## 2018-02-09 RX ORDER — ALBUTEROL SULFATE 0.83 MG/ML
1 SOLUTION RESPIRATORY (INHALATION) EVERY 6 HOURS PRN
Qty: 1 BOX | Refills: 0 | Status: SHIPPED | OUTPATIENT
Start: 2018-02-09 | End: 2018-07-24

## 2018-02-09 NOTE — PROGRESS NOTES
SUBJECTIVE:   Maryalice Rebecca Wachter is a 84 year old female who presents to clinic today for the following health issues:      Diabetes     Diabetes:     Frequency of checking blood sugars::  1 time a day    Diabetic concerns::  Low blood sugar, several less than 70 in the past few weeks    Hypoglycemia symptoms::  None    Paraesthesia present::  No    Eye Exam in the last year::  Yes     feb3rd    Diet:  Regular (no restrictions), Low salt, Low fat/cholesterol and Diabetic  Frequency of exercise:  None  Medication side effects:  Other  Additional concerns today:  YES  History of Present Illness     Diabetes:     Frequency of checking blood sugars::  1 time a day    Diabetic concerns::  Low blood sugar, several less than 70 in the past few weeks    Hypoglycemia symptoms::  None    Paraesthesia present::  No    Eye Exam in the last year::  Yes     feb3rd    Diet:  Regular (no restrictions), Low salt, Low fat/cholesterol and Diabetic  Frequency of exercise:  None  Medication side effects:  Other  Additional concerns today:  YES    Having some low BS in the mornings now.  Cutting out potato but mentions also that she's not watching her diet as much as usual.  Has had some sugars around 70 but did not feel symptomatic.  If she eats a good lunch out at times, then won't be hungry for dinner and does not eat as much.  A1C today is same as in Sept so things overall are stable.  Still might be having highs and lows throughout the day.  She checked one day after eating and sugar was 275.  She doesn't like doing this however to see that high number.  See's JADEN Toth in March.  Says she feeling just a little fatigued.  Maybe winter, watches too much TV, politics and stock market get her down.    Hypertension Follow-up      Outpatient blood pressures are not being checked.    Low Salt Diet: no added salt    COPD Follow-Up    Symptoms are currently: slightly worsened    Current fatigue or dyspnea with ambulation: stable -  " At night when getting ready for bed.  Also heart racing.    Shortness of breath: slightly worsened    Increased or change in Cough/Sputum: No    Fever(s): No    Able to walk up 1/2 flights of stairs without stopping to rest.    Any ER/UC or hospital admissions since your last visit? No     History   Smoking Status     Never Smoker   Smokeless Tobacco     Never Used     No results found for: FEV1, ERK7SUF    Has been feeling SOB more in the evening.    Recovered from two eye surgeries recently- and lid surgery.      Problem list and histories reviewed & adjusted, as indicated.  Additional history: as documented    BP Readings from Last 3 Encounters:   02/09/18 100/58   10/23/17 120/50   09/25/17 134/70    Wt Readings from Last 3 Encounters:   02/09/18 188 lb (85.3 kg)   10/23/17 183 lb (83 kg)   09/25/17 182 lb 4.8 oz (82.7 kg)           Labs reviewed in EPIC    ROS:  Constitutional, HEENT, cardiovascular, pulmonary, gi and gu systems are negative, except as otherwise noted.    OBJECTIVE:     /58 (BP Location: Right arm, Patient Position: Sitting, Cuff Size: Adult Regular)  Pulse 84  Temp 98.2  F (36.8  C) (Oral)  Resp 14  Ht 5' 5\" (1.651 m)  Wt 188 lb (85.3 kg)  SpO2 95%  BMI 31.28 kg/m2  Body mass index is 31.28 kg/(m^2).  GENERAL: healthy, alert and no distress  NECK: no adenopathy, no asymmetry, masses, or scars and thyroid normal to palpation  RESP: lungs clear to auscultation - no rales, rhonchi or wheezes  CV: irregularly irregular rhythm, no murmur, click or rub and no peripheral edema  MS: no gross musculoskeletal defects noted, no edema  SKIN: no suspicious lesions or rashes  PSYCH: mentation appears normal, affect normal/bright    Diagnostic Test Results:  Results for orders placed or performed in visit on 02/09/18   Hemoglobin A1c   Result Value Ref Range    Hemoglobin A1C 7.4 (H) 4.3 - 6.0 %   Comprehensive metabolic panel   Result Value Ref Range    Sodium 139 133 - 144 mmol/L    Potassium " 4.2 3.4 - 5.3 mmol/L    Chloride 103 94 - 109 mmol/L    Carbon Dioxide 28 20 - 32 mmol/L    Anion Gap 8 3 - 14 mmol/L    Glucose 147 (H) 70 - 99 mg/dL    Urea Nitrogen 38 (H) 7 - 30 mg/dL    Creatinine 1.21 (H) 0.52 - 1.04 mg/dL    GFR Estimate 42 (L) >60 mL/min/1.7m2    GFR Estimate If Black 51 (L) >60 mL/min/1.7m2    Calcium 9.4 8.5 - 10.1 mg/dL    Bilirubin Total 0.5 0.2 - 1.3 mg/dL    Albumin 3.5 3.4 - 5.0 g/dL    Protein Total 6.5 (L) 6.8 - 8.8 g/dL    Alkaline Phosphatase 61 40 - 150 U/L    ALT 17 0 - 50 U/L    AST 13 0 - 45 U/L   TSH with free T4 reflex   Result Value Ref Range    TSH 0.44 0.40 - 4.00 mU/L       ASSESSMENT/PLAN:   1. Type 2 diabetes mellitus with hyperglycemia, without long-term current use of insulin (H)  A1C is 7.4% which is same as last check.  I will not make changes now to her medications.  I did have concerns initially about her glipizide Rx possibly causing lows.  She does not have her monitor in clinic today to review her daily numbers.  She visits with MTM next month and those can be reviewed.  For now A1C is satisfactory so I don't think changes are needed.  - Hemoglobin A1c  - JUST IN CASE    2. COPD exacerbation (H)  Refilled her neb solution for her.  She has been using this a little more than usual.  Follow up prn.  - albuterol (2.5 MG/3ML) 0.083% neb solution; Take 1 vial (2.5 mg) by nebulization every 6 hours as needed for shortness of breath / dyspnea or wheezing Reported on 3/2/2017  Dispense: 1 Box; Refill: 0    3. Essential hypertension  Check labs.  - Comprehensive metabolic panel    4. Hypothyroidism, unspecified type  Check labs.  - TSH with free T4 reflex      Follow up with me in 6 months, sooner if needed.    Venu Maria PA-C  [facility]

## 2018-02-09 NOTE — MR AVS SNAPSHOT
After Visit Summary   2/9/2018    Maryalice Rebecca Wachter    MRN: [id number]           Patient Information     Date Of Birth          11/11/1933        Visit Information        Provider Department      2/9/2018 1:00 PM Venu Maria PA-C [facility]        Today's Diagnoses     Type 2 diabetes mellitus with hyperglycemia, without long-term current use of insulin (H)    -  1    COPD exacerbation (H)        Essential hypertension        Hypothyroidism, unspecified type           Follow-ups after your visit        Follow-up notes from your care team     Return in about 1 month (around 3/9/2018) for Routine Visit- with Janey .      Your next 10 appointments already scheduled     Feb 16, 2018  9:30 AM CST   Anticoagulation Visit with FM RN VISITS   [facility]    [address] [place] [address], [address]  [facility] [address] [phone]            Mar 19, 2018  1:30 PM CDT   SHORT with Janey Romero RPH   [facility] ([facility])    [id number] [place] [address] [phone]              Who to contact     If you have questions or need follow up information about today's clinic visit or your schedule please contact [facility] directly at [phone].  Normal or non-critical lab and imaging results will be communicated to you by MyChart, letter or phone within 4 business days after the clinic has received the results. If you do not hear from us within 7 days, please contact the clinic through MyChart or phone. If you have a critical or abnormal lab result, we will notify you by phone as soon as possible.  Submit refill requests through [employer] or call your pharmacy and they will forward the refill request to us. Please allow 3 business days for your refill to be completed.          Additional Information About Your Visit        MyChart Information     [employer]  "gives you secure access to your electronic health record. If you see a primary care provider, you can also send messages to your care team and make appointments. If you have questions, please call your primary care clinic.  If you do not have a primary care provider, please call [phone] and they will assist you.        Care EveryWhere ID     This is your Care EveryWhere ID. This could be used by other organizations to access your [facility] medical records  [id number]        Your Vitals Were     Pulse Temperature Respirations Height Pulse Oximetry BMI (Body Mass Index)    84 98.2  F (36.8  C) (Oral) 14 5' 5\" (1.651 m) 95% 31.28 kg/m2       Blood Pressure from Last 3 Encounters:   02/09/18 100/58   10/23/17 120/50   09/25/17 134/70    Weight from Last 3 Encounters:   02/09/18 188 lb (85.3 kg)   10/23/17 183 lb (83 kg)   09/25/17 182 lb 4.8 oz (82.7 kg)              We Performed the Following     Comprehensive metabolic panel     Hemoglobin A1c     JUST IN CASE     TSH with free T4 reflex          Where to get your medicines      These medications were sent to [place], [place] - [address] [place], [place] [address]     Phone:  [phone]     albuterol (2.5 MG/3ML) 0.083% neb solution          Primary Care Provider Office Phone # Fax #    Venu Maria PA-C [phone]       [address] [place] [address] [facility] [address]        Equal Access to Services     MARIA ESTHER ANDRES : Hadii aad ku hadasho Soduali, waaxda luqadaha, qaybta kaalmada ademeliyaperlita, katrin elizabeth. So [facility] [phone].    ATENCIÓN: Si habla español, tiene a talamantes disposición servicios gratuitos de asistencia lingüística. Llame al [phone].    We comply with applicable federal civil rights laws and [place] laws. We do not discriminate on the basis of race, color, national origin, age, disability, sex, sexual orientation, or gender identity.          "   Thank you!     Thank you for choosing [facility]  for your care. Our goal is always to provide you with excellent care. Hearing back from our patients is one way we can continue to improve our services. Please take a few minutes to complete the written survey that you may receive in the mail after your visit with us. Thank you!             Your Updated Medication List - Protect others around you: Learn how to safely use, store and throw away your medicines at www.disposemymeds.org.          This list is accurate as of 2/9/18  1:58 PM.  Always use your most recent med list.                   Brand Name Dispense Instructions for use Diagnosis    ascorbic acid 500 MG tablet    VITAMIN C     Take 500 mg by mouth daily.        ASPIRIN EC PO      Take 81 mg by mouth.        B-12 1000 MCG Caps      Take 1 capsule by mouth 2 times daily        Calcium carb-Vitamin D 500 mg [place]-200 units 500-200 MG-UNIT per tablet    OSCAL with D;Oyster Shell Calcium     Take 1 tablet by mouth daily.        ferrous sulfate 325 (65 FE) MG tablet    IRON     Take 1 tablet by mouth daily (with breakfast).        FIBERCON 625 MG tablet   Generic drug:  calcium polycarbophil      Take 1 tablet by mouth 2 times daily.        glipiZIDE 10 MG 24 hr tablet    GLUCOTROL XL    180 tablet    TAKE ONE TABLET BY MOUTH TWICE A DAY    Type 2 diabetes mellitus with hyperglycemia, without long-term current use of insulin (H)       hydrochlorothiazide 25 MG tablet    HYDRODIURIL    90 tablet    TAKE ONE TABLET BY MOUTH EVERY DAY    Essential hypertension       levothyroxine 88 MCG tablet    SYNTHROID/LEVOTHROID    90 tablet    TAKE ONE TABLET BY MOUTH EVERY DAY    Hypothyroidism, unspecified type       lisinopril 40 MG tablet    PRINIVIL/ZESTRIL    90 tablet    TAKE ONE TABLET BY MOUTH EVERY DAY    Systolic dysfunction, left ventricle, Past myocardial infarction       LOPERAMIDE HCL PO      1/2 to 1 tablet by mouth as needed.         metFORMIN 500 MG tablet    GLUCOPHAGE    360 tablet    TAKE TWO TABLETS BY MOUTH TWICE A DAY WITH MEALS    Type 2 diabetes mellitus with hyperglycemia, without long-term current use of insulin (H)       metoprolol tartrate 50 MG tablet    LOPRESSOR    180 tablet    TAKE ONE TABLET BY MOUTH TWICE A DAY    Past myocardial infarction, Systolic dysfunction, left ventricle, Atrial tachycardia, paroxysmal (H)       mometasone 220 MCG/INH Inhaler    ASMANEX     Inhale 2 puffs into the lungs daily.        ONETOUCH ULTRA test strip   Generic drug:  blood glucose monitoring     100 each    USE TO TEST BLOOD SUGAR TWICE A DAY OR AS DIRECTED    Type 2 diabetes mellitus with hyperglycemia, without long-term current use of insulin (H)       pioglitazone 30 MG tablet    ACTOS    90 tablet    Take 1 tablet (30 mg) by mouth daily    Type 2 diabetes mellitus with hyperglycemia, without long-term current use of insulin (H)       * PROAIR  (90 BASE) MCG/ACT Inhaler   Generic drug:  albuterol      Inhale 2 puffs into the lungs every 6 hours Reported on 3/2/2017        * albuterol (2.5 MG/3ML) 0.083% neb solution     1 Box    Take 1 vial (2.5 mg) by nebulization every 6 hours as needed for shortness of breath / dyspnea or wheezing Reported on 3/2/2017    COPD exacerbation (H)       ranitidine 150 MG tablet    ZANTAC    60 tablet    Take 1 tablet (150 mg) by mouth nightly as needed for heartburn    Hiatal hernia       simvastatin 20 MG tablet    ZOCOR    90 tablet    TAKE ONE TABLET BY MOUTH AT BEDTIME . NEEDS APPOINTMENT    Hyperlipidemia LDL goal <100       tiotropium 18 MCG capsule    SPIRIVA     Inhale 18 mcg into the lungs daily.        TRUEPLUS LANCETS 30G Misc     100 each    2-3 lancets daily Use to test blood sugars 2-3 times daily.    Type 2 diabetes, HbA1C goal < 8% (H)       TYLENOL PM EXTRA STRENGTH  MG tablet   Generic drug:  diphenhydrAMINE-acetaminophen      Take 2 tablets by mouth At Bedtime        VITAMIN D  (CHOLECALCIFEROL) PO      Take 2,000 Units by mouth daily        warfarin 3 MG tablet    COUMADIN    90 tablet    TAKE ONE TABLET ONCE DAILY, EXCEPT TAKE 1/2 TABLET (1.5MG) ON FRIDAYS    Chronic atrial fibrillation (H)       * Notice:  This list has 2 medication(s) that are the same as other medications prescribed for you. Read the directions carefully, and ask your doctor or other care provider to review them with you.

## 2018-02-10 LAB
ALBUMIN SERPL-MCNC: 3.5 G/DL (ref 3.4–5)
ALP SERPL-CCNC: 61 U/L (ref 40–150)
ALT SERPL W P-5'-P-CCNC: 17 U/L (ref 0–50)
ANION GAP SERPL CALCULATED.3IONS-SCNC: 8 MMOL/L (ref 3–14)
AST SERPL W P-5'-P-CCNC: 13 U/L (ref 0–45)
BILIRUB SERPL-MCNC: 0.5 MG/DL (ref 0.2–1.3)
BUN SERPL-MCNC: 38 MG/DL (ref 7–30)
CALCIUM SERPL-MCNC: 9.4 MG/DL (ref 8.5–10.1)
CHLORIDE SERPL-SCNC: 103 MMOL/L (ref 94–109)
CO2 SERPL-SCNC: 28 MMOL/L (ref 20–32)
CREAT SERPL-MCNC: 1.21 MG/DL (ref 0.52–1.04)
GFR SERPL CREATININE-BSD FRML MDRD: 42 ML/MIN/1.7M2
GLUCOSE SERPL-MCNC: 147 MG/DL (ref 70–99)
POTASSIUM SERPL-SCNC: 4.2 MMOL/L (ref 3.4–5.3)
PROT SERPL-MCNC: 6.5 G/DL (ref 6.8–8.8)
SODIUM SERPL-SCNC: 139 MMOL/L (ref 133–144)
TSH SERPL DL<=0.005 MIU/L-ACNC: 0.44 MU/L (ref 0.4–4)

## 2018-02-16 ENCOUNTER — ANTICOAGULATION THERAPY VISIT (OUTPATIENT)
Dept: NURSING | Facility: CLINIC | Age: 83
End: 2018-02-16
Payer: COMMERCIAL

## 2018-02-16 DIAGNOSIS — Z79.01 LONG-TERM (CURRENT) USE OF ANTICOAGULANTS: ICD-10-CM

## 2018-02-16 DIAGNOSIS — I48.20 CHRONIC ATRIAL FIBRILLATION (H): ICD-10-CM

## 2018-02-16 LAB — INR POINT OF CARE: 2.5 (ref 0.86–1.14)

## 2018-02-16 PROCEDURE — 36416 COLLJ CAPILLARY BLOOD SPEC: CPT

## 2018-02-16 PROCEDURE — 99207 ZZC NO CHARGE NURSE ONLY: CPT

## 2018-02-16 PROCEDURE — 85610 PROTHROMBIN TIME: CPT | Mod: QW

## 2018-02-16 NOTE — MR AVS SNAPSHOT
Maryalice Rebecca Wachter   2/16/2018 9:30 AM   Anticoagulation Therapy Visit    Description:  84 year old female   Provider:  FM RN VISITS   Department:  Fm Nurse           INR as of 2/16/2018     Today's INR 2.5      Anticoagulation Summary as of 2/16/2018     INR goal 2.0-3.0   Today's INR 2.5   Full instructions 4.5 mg on Tue, Fri; 3 mg all other days   Next INR check 3/30/2018    Indications   Long-term (current) use of anticoagulants [Z79.01] [Z79.01]  Atrial fibrillation (H) [I48.91]         Your next Anticoagulation Clinic appointment(s)     Mar 30, 2018  9:30 AM CDT   Anticoagulation Visit with FM RN VISITS   [facility] ([facility]    [address] [place] [address], [facility] [address]  [facility] [address]   [phone]              Contact Numbers     Clinic Number:         February 2018 Details    Sun Mon Tue Wed Thu Fri Sat         1               2               3                 4               5               6               7               8               9               10                 11               12               13               14               15               16      4.5 mg   See details      17      3 mg           18      3 mg         19      3 mg         20      4.5 mg         21      3 mg         22      3 mg         23      4.5 mg         24      3 mg           25      3 mg         26      3 mg         27      4.5 mg         28      3 mg             Date Details   02/16 This INR check               How to take your warfarin dose     To take:  3 mg Take 1 of the 3 mg tablets.    To take:  4.5 mg Take 1.5 of the 3 mg tablets.           March 2018 Details    Sun Mon Tue Wed Thu Fri Sat         1      3 mg         2      4.5 mg         3      3 mg           4      3 mg         5      3 mg         6      4.5 mg         7      3 mg         8      3 mg         9      4.5 mg         10      3 mg           11      3 mg         12      3 mg         13      4.5 mg          14      3 mg         15      3 mg         16      4.5 mg         17      3 mg           18      3 mg         19      3 mg         20      4.5 mg         21      3 mg         22      3 mg         23      4.5 mg         24      3 mg           25      3 mg         26      3 mg         27      4.5 mg         28      3 mg         29      3 mg         30            31                Date Details   No additional details    Date of next INR:  3/30/2018         How to take your warfarin dose     To take:  3 mg Take 1 of the 3 mg tablets.    To take:  4.5 mg Take 1.5 of the 3 mg tablets.

## 2018-02-16 NOTE — PROGRESS NOTES
ANTICOAGULATION FOLLOW-UP CLINIC VISIT    Patient Name:  Maryalice Rebecca Wachter  Date:  2/16/2018  Contact Type:  Face to Face    SUBJECTIVE:     Patient Findings     Positives No Problem Findings           OBJECTIVE    INR Protime   Date Value Ref Range Status   02/16/2018 2.5 (A) 0.86 - 1.14 Final       ASSESSMENT / PLAN  INR assessment THER    Recheck INR In: 6 WEEKS    INR Location Clinic      Anticoagulation Summary as of 2/16/2018     INR goal 2.0-3.0   Today's INR 2.5   Maintenance plan 4.5 mg (3 mg x 1.5) on Tue, Fri; 3 mg (3 mg x 1) all other days   Full instructions 4.5 mg on Tue, Fri; 3 mg all other days   Weekly total 24 mg   No change documented Cyndi Cabello RN   Plan last modified Cyndi Cabello RN (9/23/2016)   Next INR check 3/30/2018   Priority INR   Target end date     Indications   Long-term (current) use of anticoagulants [Z79.01] [Z79.01]  Atrial fibrillation (H) [I48.91]         Anticoagulation Episode Summary     INR check location     Preferred lab     Send INR reminders to  TRIAGE POOL    Comments       Anticoagulation Care Providers     Provider Role Specialty Phone number    Venu Maria PA-C Responsible Physician Assistant - Medical [phone]            See the Encounter Report to view Anticoagulation Flowsheet and Dosing Calendar (Go to Encounters tab in chart review, and find the Anticoagulation Therapy Visit)      Cyndi Cabello RN

## 2018-03-19 ENCOUNTER — OFFICE VISIT (OUTPATIENT)
Dept: PHARMACY | Facility: CLINIC | Age: 83
End: 2018-03-19
Payer: COMMERCIAL

## 2018-03-19 VITALS
OXYGEN SATURATION: 93 % | HEART RATE: 63 BPM | DIASTOLIC BLOOD PRESSURE: 60 MMHG | SYSTOLIC BLOOD PRESSURE: 138 MMHG | BODY MASS INDEX: 31.45 KG/M2 | WEIGHT: 189 LBS

## 2018-03-19 DIAGNOSIS — F51.01 PRIMARY INSOMNIA: ICD-10-CM

## 2018-03-19 DIAGNOSIS — E03.9 HYPOTHYROIDISM, UNSPECIFIED TYPE: ICD-10-CM

## 2018-03-19 DIAGNOSIS — E11.65 TYPE 2 DIABETES MELLITUS WITH HYPERGLYCEMIA, WITHOUT LONG-TERM CURRENT USE OF INSULIN (H): ICD-10-CM

## 2018-03-19 DIAGNOSIS — Z23 ENCOUNTER FOR IMMUNIZATION: ICD-10-CM

## 2018-03-19 DIAGNOSIS — F34.1 DYSTHYMIA: Primary | ICD-10-CM

## 2018-03-19 DIAGNOSIS — I48.20 CHRONIC ATRIAL FIBRILLATION (H): ICD-10-CM

## 2018-03-19 DIAGNOSIS — J44.1 COPD EXACERBATION (H): ICD-10-CM

## 2018-03-19 DIAGNOSIS — I10 ESSENTIAL HYPERTENSION: ICD-10-CM

## 2018-03-19 DIAGNOSIS — E78.5 HYPERLIPIDEMIA LDL GOAL <100: ICD-10-CM

## 2018-03-19 PROCEDURE — 99607 MTMS BY PHARM ADDL 15 MIN: CPT | Performed by: PHARMACIST

## 2018-03-19 PROCEDURE — 99606 MTMS BY PHARM EST 15 MIN: CPT | Performed by: PHARMACIST

## 2018-03-19 RX ORDER — FLUOXETINE 10 MG/1
10 CAPSULE ORAL DAILY
Qty: 30 CAPSULE | Refills: 0 | Status: SHIPPED | OUTPATIENT
Start: 2018-03-19 | End: 2018-04-03 | Stop reason: SINTOL

## 2018-03-19 NOTE — Clinical Note
Venu -- erik--bp -diastolic too low -- due to frequent urination --d/c her hctz, also added 10mg prozac today --she is depressed though she denies it .... F/up 1 month    -Janey/Coreen

## 2018-03-19 NOTE — PROGRESS NOTES
SUBJECTIVE/OBJECTIVE:                                                    Maryalice Rebecca Wachter is a 84 year old female coming in for a follow up visit (9-25-17)  for Medication Therapy Management.  She was referred to me from her PCP- Venu Maria.     Chief Complaint:  BS review  Had right eye surgery and has stiches in eye, difficult to read. Now it seems like left eye may have some issues too. Also had lower lip surgery recently. Will go on in this week to get stiches out because they have warped her vision.    She lacks motivation to do anything --seems depressed but denies it , then not sure ?    Personal Healthcare Goals: fix diarrhea issues, timing of meds, get BP wnl.     Allergies/ADRs: Reviewed in Epic  Tobacco: No tobacco use   Alcohol: not currently using--very rare   Caffeine: decaff in am 1-2 cups/day of coffee; pm -tea a little bit.   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 Cannot exercise due to diarrhea issues --would like to go on walks with her hubby but has to have BM within 1 block of leaving.   PMH: Reviewed in [facility]; [facility] stated she has sarcoidosis?    Medication Adherence: No issues found today    IBS-diarrhea: patient uses prn loperamide --works well.     GERD:  She has a hiatal hernia -surgery not recommended.  Taking omeprazole 20 mg. qam .   Has never tried going off ppi --interested in doing so.     Diabetes:  Pt currently taking Glipizide er 10mg. bid, actos 30mg qday, Metformin-1 x500mg bid. Pt is experiencing the following side effects: mild loose stools from metformin --controlled with loperamide prn quite well.             Symptoms of low blood sugar? none. Frequency of hypoglycemia? never.  Recent symptoms of high blood sugar? fatigue  Eye exam: up to date  Foot exam: up to date  Microalbumin is < 30 mg/g. Pt is taking an ACEi/ARB.  Aspirin: Taking 81mg daily and denies side effects  Diet/Exercise: diet is ok but likes carbs, rice , chinese , no exercise.    She is eating low carb but not enjoying foods and is struggling with lack of bs control with no sugar in diet.But she is determined to get that a1c back down.     Depression:  Current medications include: None. Pt reports that depression symptoms are worsened. Current depression symptoms include: lacks motivation or energy to do anything . Patient reports the following stressors: chronic health condition Therapies tried and response: Bupropion once daily-did nothing for her   Declines phq-9 today --states she doesn't want to talk about it --then became teary eyed .      Vitamin D3: level was 21 on  --she takes otc daily dose of 2,000 iu's now.      Vitamin B12: level was 493 on 10-10-16. She takes daily otc dose now.     Hypothyroidism: Patient is taking levothyroxine 100 mcg daily. She knows to take it on an empty stomach with her omeprazole in the morning.    Hypertension: Current medications include : HCTZ 25mg. Qam,  lisinopril 40mg-qam. And metoprolol 50mg bid-am and dinnertime.  Patient does  self-monitor BP.  Yes see below:  Patient side effects :  Frequent urination that affects where she goes during day --needs to know bathrooms on route.    Range of home bp's = 115-143 --95% of readings <140/60-70.    Look good . She recognizes when in afib--bp erratic and pulse increases to 90+ .      COPD: Patient states she feels well controlled. Patient uses her inhalers more often when she gets sick and she always carries her rescue inhaler with her. Patient states that when she forgets it she becomes very worried and panicked.   She carries inhalers albuterol with her , uses the albuterol in nebulizer bid most days of the week.   asmanex daily at hs . spiriva qam--takes everyother day due to cost.     Insomnia: Current medications include: she admits trazodone not working for her for sleep --she took acetaminophen-benadryl --2 at night sleeps better.  Pt reports trouble staying asleep and can only sleep 4-6  hours. She admits to a boring life right now -she gets sleepy early Pm --naps for a few hours , then goes to bed -gets up at 4-5 am - perhaps she is sleeping enough already.        Current labs include:  BP Readings from Last 3 Encounters:   03/19/18 138/60   02/09/18 100/58   10/23/17 120/50     Today's Vitals: /60  Pulse 63  Wt 189 lb (85.7 kg)  SpO2 93%  BMI 31.45 kg/m2  Lab Results   Component Value Date    A1C 7.4 09/25/2017   .  Lab Results   Component Value Date    CHOL 134 06/19/2017     Lab Results   Component Value Date    TRIG 208 06/19/2017     Lab Results   Component Value Date    HDL 43 06/19/2017     Lab Results   Component Value Date    LDL 49 06/19/2017       Liver Function Studies -   Recent Labs   Lab Test  04/08/17   0813   PROTTOTAL  7.3   ALBUMIN  3.7   BILITOTAL  0.8   ALKPHOS  67   AST  19   ALT  19       Lab Results   Component Value Date    UCRR 109 10/23/2017    MICROL 22 10/23/2017    UMALCR 19.91 10/23/2017       Last Basic Metabolic Panel:  Lab Results   Component Value Date     06/19/2017      Lab Results   Component Value Date    POTASSIUM 4.0 06/19/2017     Lab Results   Component Value Date    CHLORIDE 102 06/19/2017     Lab Results   Component Value Date    BUN 24 06/19/2017     Lab Results   Component Value Date    CR 1.05 06/19/2017     GFR Estimate   Date Value Ref Range Status   02/09/2018 42 (L) >60 mL/min/1.7m2 Final     Comment:     Non  GFR Calc   10/23/2017 52 (L) >60 mL/min/1.7m2 Final     Comment:     Non  GFR Calc   06/19/2017 50 (L) >60 mL/min/1.7m2 Final     Comment:     Non  GFR Calc     GFR Estimate If Black   Date Value Ref Range Status   02/09/2018 51 (L) >60 mL/min/1.7m2 Final     Comment:      GFR Calc   10/23/2017 62 >60 mL/min/1.7m2 Final     Comment:      GFR Calc   06/19/2017 60 (L) >60 mL/min/1.7m2 Final     Comment:      GFR Calc     TSH   Date  Value Ref Range Status   02/09/2018 0.44 0.40 - 4.00 mU/L Final   ]    Most Recent Immunizations   Administered Date(s) Administered     DTAP (<7y) 01/01/2010     Influenza (High Dose) 3 valent vaccine 09/25/2017     Influenza (IIV3) PF 09/01/2014     Pneumo Conj 13-V (2010&after) 10/10/2016     Pneumococcal (PCV 7) 09/09/2014     Pneumococcal 23 valent 10/23/2017     Zoster vaccine, live 01/01/2012       ASSESSMENT:                                                       Current medications were reviewed with her today.     Medication Adherence: No problems identified    IBS-Diarrhea: Stable.     GERD: Stable.  Current treatment is effective. Chronic PPI use places patient at an increased risk of C. Diff, hypomagnesemia and lower bone mineral density, these risks were reviewed with the patient.  Pt would benefit from the following changes: taper off proton pump inhibitor.(see plan for details).      Diabetes: Stable. Patient is meeting A1c goal of < 8%. Self monitoring of blood glucose is at goal of fasting  mg/dL. Pt would benefit from minimum SMBG: Check blood sugars fasting, and occasionally 2 hours after starting a meal.  Metformin :  stay on the same dose.  SFU (Glipizide) :  stay on the same dose.  Actos:  stay on the same dose.  Increased exercise- as tolerated.   Updated Hemoglobin A1c- 7.4% today .  Weight loss recommended--keep low carb diet .     Depression: Needs Improvement. Patient would benefit from starting prozac to pep her up. See plan .    Vitamin B12: is not at goal of 600-1000pg/mL. Pt would benefit from vitamin B12 lab recheck in 12 months.    Vitamin D3: is not at goal of 50-75ng/mL. Pt would benefit from vitamin D3 lab recheck in 12 months.    Hypothyroidism: TSH wnl.    Hypertension: Stable and Needs Improvement. Patient is meeting BP goal of < 140/90mmHg.   Pt would benefit from the following changes - discontinuation of HCTZ-see plan, continued BP monitoring and watching diet,  increasing exercise and weight loss      COPD/Asthma: Stable.     Insomnia: Improved. Pt may benefit from no changes.      PLAN:                                                        1. When you get home this afternoon, call the eye doctors office and ask them if you can wait until Thursday with your left eye issues or if they think you should go in right away so that they can look at it.    2. Start fluoxetine 10mg -- 1 tablet one time a day. This pill with help with your mood.     3. FYI--Your blood pressure today is 138/60mmg --lets STOP your water pill HCTZ for 1 month .  Check home blood pressures.       Next MTM visit:  1 month on Monday April 16th at 1pm.     I spent 30 minutes with this patient today .     To schedule another MTM appointment, please call the clinic directly or you may call the MTM scheduling line at [phone] or toll-free at [phone].     My Clinical Pharmacist's contact information:                                                      It was a pleasure seeing you today!  Please feel free to contact me with any questions or concerns you have.      Janey Romero [facility].  Medication Therapy Management Provider  [phone]  Coreen Aden, Pharm-D4 student

## 2018-03-19 NOTE — MR AVS SNAPSHOT
After Visit Summary   3/19/2018    Maryalice Rebecca Wachter    MRN: [id number]           Patient Information     Date Of Birth          11/11/1933        Visit Information        Provider Department      3/19/2018 1:30 PM Janey Romero RPH [facility] [place]        Care Instructions    Recommendations from today's MTM visit:                                                        1. When you get home this afternoon, call the eye doctors office and ask them if you can wait until Thursday with your left eye issues or if they think you should go in right away so that they can look at it.    2. Start fluoxetine 10mg -- 1 tablet one time a day. This pill with help with your mood.     3. FYI--Your blood pressure today is 138/60mmg --lets STOP your water pill HCTZ for 1 month .  Check home blood pressures.       Next MTM visit:  1 month on Monday April 16th at 1pm.     To schedule another MTM appointment, please call the clinic directly or you may call the MTM scheduling line at [phone] or toll-free at [phone].     My Clinical Pharmacist's contact information:                                                      It was a pleasure seeing you today!  Please feel free to contact me with any questions or concerns you have.      Janey Romero [facility].  Medication Therapy Management Provider  [phone]  Coreen Aden, Pharm-D4 student    You may receive a survey about the MTM services you received.  I would appreciate your feedback to help me serve you better in the future. Please fill it out and return it when you can. Your comments will be anonymous.            Follow-ups after your visit        Your next 10 appointments already scheduled     Mar 30, 2018  9:30 AM CDT   [facility] with FM RN [facility]ISITS   [facility]    [id number] [place] [address], [address]  [facility] [address] [phone]            Apr 16, 2018  1:00 PM CDT    SHORT with Janey Romero [facility] [place] ([facility])    [address]  [facility] [address] [phone]              Who to contact     If you have questions or need follow up information about today's clinic visit or your schedule please contact [facility] directly at [phone].  Normal or non-critical lab and imaging results will be communicated to you by [employer], letter or phone within 4 business days after the clinic has received the results. If you do not hear from us within 7 days, please contact the clinic through [employer] or phone. If you have a critical or abnormal lab result, we will notify you by phone as soon as possible.  Submit refill requests through [employer] or call your pharmacy and they will forward the refill request to us. Please allow 3 business days for your refill to be completed.          Additional Information About Your Visit        [employer] Information     [employer] gives you secure access to your electronic health record. If you see a primary care provider, you can also send messages to your care team and make appointments. If you have questions, please call your primary care clinic.  If you do not have a primary care provider, please call [phone] and they will assist you.        Care EveryWhere ID     This is your Care EveryWhere ID. This could be used by other organizations to access your [facility] medical records  [id number]        Your Vitals Were     Pulse Pulse Oximetry BMI (Body Mass Index)             63 93% 31.45 kg/m2          Blood Pressure from Last 3 Encounters:   03/19/18 138/60   02/09/18 100/58   10/23/17 120/50    Weight from Last 3 Encounters:   03/19/18 189 lb (85.7 kg)   02/09/18 188 lb (85.3 kg)   10/23/17 183 lb (83 kg)              Today, you had the following     No orders found for display       Primary Care Provider Office Phone # Fax #    Venu Maria PA-C [phone]  [phone]       [id number]  [facility] [address]        Equal Access to Services     MARIA ESTHER ANDRES : Hadii carson izaguirre jere Sosabine, waaxda luqadaha, qaybta kaalmada jamarcus, katrin jesus nevinalin hensleymeli brinkforest elizabeth. So [facility] [phone].    ATENCIÓN: Si habla español, tiene a tlaamantes disposición servicios gratuitos de asistencia lingüística. Llame al [phone].    We comply with applicable federal civil rights laws and [place] laws. We do not discriminate on the basis of race, color, national origin, age, disability, sex, sexual orientation, or gender identity.            Thank you!     Thank you for choosing [facility]  for your care. Our goal is always to provide you with excellent care. Hearing back from our patients is one way we can continue to improve our services. Please take a few minutes to complete the written survey that you may receive in the mail after your visit with us. Thank you!             Your Updated Medication List - Protect others around you: Learn how to safely use, store and throw away your medicines at www.disposemymeds.org.          This list is accurate as of 3/19/18  2:19 PM.  Always use your most recent med list.                   Brand Name Dispense Instructions for use Diagnosis    ascorbic acid 500 MG tablet    VITAMIN C     Take 500 mg by mouth daily.        ASPIRIN EC PO      Take 81 mg by mouth.        B-12 1000 MCG Caps      Take 1 capsule by mouth 2 times daily        Calcium carb-Vitamin D 500 mg Rampart-200 units 500-200 MG-UNIT per tablet    OSCAL with D;Oyster Shell Calcium     Take 1 tablet by mouth daily.        ferrous sulfate 325 (65 FE) MG tablet    IRON     Take 1 tablet by mouth daily (with breakfast).        FIBERCON 625 MG tablet   Generic drug:  calcium polycarbophil      Take 1 tablet by mouth 2 times daily.        glipiZIDE 10 MG 24 hr tablet    GLUCOTROL XL    180 tablet    TAKE ONE TABLET BY MOUTH TWICE A DAY    Type 2 diabetes  mellitus with hyperglycemia, without long-term current use of insulin (H)       hydrochlorothiazide 25 MG tablet    HYDRODIURIL    90 tablet    TAKE ONE TABLET BY MOUTH EVERY DAY    Essential hypertension       levothyroxine 88 MCG tablet    SYNTHROID/LEVOTHROID    90 tablet    TAKE ONE TABLET BY MOUTH EVERY DAY    Hypothyroidism, unspecified type       lisinopril 40 MG tablet    PRINIVIL/ZESTRIL    90 tablet    TAKE ONE TABLET BY MOUTH EVERY DAY    Systolic dysfunction, left ventricle, Past myocardial infarction       LOPERAMIDE HCL PO      1/2 to 1 tablet by mouth as needed.        metFORMIN 500 MG tablet    GLUCOPHAGE    360 tablet    TAKE TWO TABLETS BY MOUTH TWICE A DAY WITH MEALS    Type 2 diabetes mellitus with hyperglycemia, without long-term current use of insulin (H)       metoprolol tartrate 50 MG tablet    LOPRESSOR    180 tablet    TAKE ONE TABLET BY MOUTH TWICE A DAY    Past myocardial infarction, Systolic dysfunction, left ventricle, Atrial tachycardia, paroxysmal (H)       mometasone 220 MCG/INH Inhaler    ASMANEX     Inhale 2 puffs into the lungs daily.        ONETOUCH ULTRA test strip   Generic drug:  blood glucose monitoring     100 each    USE TO TEST BLOOD SUGAR TWICE A DAY OR AS DIRECTED    Type 2 diabetes mellitus with hyperglycemia, without long-term current use of insulin (H)       pioglitazone 30 MG tablet    ACTOS    90 tablet    TAKE ONE TABLET BY MOUTH EVERY DAY    Type 2 diabetes mellitus with hyperglycemia, without long-term current use of insulin (H)       * PROAIR  (90 BASE) MCG/ACT Inhaler   Generic drug:  albuterol      Inhale 2 puffs into the lungs every 6 hours Reported on 3/2/2017        * albuterol (2.5 MG/3ML) 0.083% neb solution     1 Box    Take 1 vial (2.5 mg) by nebulization every 6 hours as needed for shortness of breath / dyspnea or wheezing Reported on 3/2/2017    COPD exacerbation (H)       ranitidine 150 MG tablet    ZANTAC    60 tablet    Take 1 tablet (150  mg) by mouth nightly as needed for heartburn    Hiatal hernia       simvastatin 20 MG tablet    ZOCOR    90 tablet    TAKE ONE TABLET BY MOUTH AT BEDTIME . NEEDS APPOINTMENT    Hyperlipidemia LDL goal <100       tiotropium 18 MCG capsule    SPIRIVA     Inhale 18 mcg into the lungs daily.        TRUEPLUS LANCETS 30G Misc     100 each    2-3 lancets daily Use to test blood sugars 2-3 times daily.    Type 2 diabetes, HbA1C goal < 8% (H)       TYLENOL PM EXTRA STRENGTH  MG tablet   Generic drug:  diphenhydrAMINE-acetaminophen      Take 2 tablets by mouth At Bedtime        VITAMIN D (CHOLECALCIFEROL) PO      Take 2,000 Units by mouth daily        warfarin 3 MG tablet    COUMADIN    90 tablet    TAKE ONE TABLET BY MOUTH EVERY DAY, EXCEPT 1/2 TABLET ON FRIDAYS    Chronic atrial fibrillation (H)       * Notice:  This list has 2 medication(s) that are the same as other medications prescribed for you. Read the directions carefully, and ask your doctor or other care provider to review them with you.

## 2018-03-19 NOTE — PATIENT INSTRUCTIONS
Recommendations from today's MTM visit:                                                        1. When you get home this afternoon, call the eye doctors office and ask them if you can wait until Thursday with your left eye issues or if they think you should go in right away so that they can look at it.    2. Start fluoxetine 10mg -- 1 tablet one time a day. This pill with help with your mood.     3. FYI--Your blood pressure today is 138/60mmg --lets STOP your water pill HCTZ for 1 month .  Check home blood pressures.       Next MTM visit:  1 month on Monday April 16th at 1pm.     To schedule another MTM appointment, please call the clinic directly or you may call the MTM scheduling line at [phone] or toll-free at [phone].     My Clinical Pharmacist's contact information:                                                      It was a pleasure seeing you today!  Please feel free to contact me with any questions or concerns you have.      Janey Romero [facility].  Medication Therapy Management Provider  [phone]  Coreen Aden, Pharm-D4 student    You may receive a survey about the MTM services you received.  I would appreciate your feedback to help me serve you better in the future. Please fill it out and return it when you can. Your comments will be anonymous.

## 2018-03-30 ENCOUNTER — ANTICOAGULATION THERAPY VISIT (OUTPATIENT)
Dept: NURSING | Facility: CLINIC | Age: 83
End: 2018-03-30
Payer: COMMERCIAL

## 2018-03-30 DIAGNOSIS — Z79.01 LONG-TERM (CURRENT) USE OF ANTICOAGULANTS: ICD-10-CM

## 2018-03-30 DIAGNOSIS — I48.20 CHRONIC ATRIAL FIBRILLATION (H): ICD-10-CM

## 2018-03-30 LAB — INR POINT OF CARE: 3.7 (ref 0.86–1.14)

## 2018-03-30 PROCEDURE — 85610 PROTHROMBIN TIME: CPT | Mod: QW

## 2018-03-30 PROCEDURE — 36416 COLLJ CAPILLARY BLOOD SPEC: CPT

## 2018-03-30 PROCEDURE — 99207 ZZC NO CHARGE NURSE ONLY: CPT

## 2018-03-30 NOTE — MR AVS SNAPSHOT
Sindi Beachchter   3/30/2018 9:30 AM   Anticoagulation Therapy Visit    Description:  84 year old female   Provider:  FM RN VISITS   Department:  Fm Nurse           INR as of 3/30/2018     Today's INR 3.7!      Anticoagulation Summary as of 3/30/2018     INR goal 2.0-3.0   Today's INR 3.7!   Full instructions 3/30: 1.5 mg; Otherwise 4.5 mg on Tue, Fri; 3 mg all other days   Next INR check 5/11/2018    Indications   Long-term (current) use of anticoagulants [Z79.01] [Z79.01]  Atrial fibrillation (H) [I48.91]         Your next Anticoagulation Clinic appointment(s)     May 11, 2018  9:30 AM CDT   [facility] with FM RN [facility]ISITS   [facility]    [address] [place] [address], [facility] [address]  [facility] [address] [phone]              Contact Numbers     Clinic Number:         March 2018 Details    Sun Mon Tue Wed Thu Fri Sat         1               2               3                 4               5               6               7               8               9               10                 11               12               13               14               15               16               17                 18               19               20               21               22               23               24                 25               26               27               28               29               30      1.5 mg   See details      31      3 mg          Date Details   03/30 This INR check               How to take your warfarin dose     To take:  1.5 mg Take 0.5 of a 3 mg tablet.    To take:  3 mg Take 1 of the 3 mg tablets.           April 2018 Details    Sun Mon Tue Wed Thu Fri Sat     1      3 mg         2      3 mg         3      4.5 mg         4      3 mg         5      3 mg         6      4.5 mg         7      3 mg           8      3 mg         9      3 mg         10      4.5 mg         11      3 mg         12      3 mg         13       4.5 mg         14      3 mg           15      3 mg         16      3 mg         17      4.5 mg         18      3 mg         19      3 mg         20      4.5 mg         21      3 mg           22      3 mg         23      3 mg         24      4.5 mg         25      3 mg         26      3 mg         27      4.5 mg         28      3 mg           29      3 mg         30      3 mg               Date Details   No additional details            How to take your warfarin dose     To take:  3 mg Take 1 of the 3 mg tablets.    To take:  4.5 mg Take 1.5 of the 3 mg tablets.           May 2018 Details    Sun Mon Tue Wed Thu Fri Sat       1      4.5 mg         2      3 mg         3      3 mg         4      4.5 mg         5      3 mg           6      3 mg         7      3 mg         8      4.5 mg         9      3 mg         10      3 mg         11            12                 13               14               15               16               17               18               19                 20               21               22               23               24               25               26                 27               28               29               30               31                  Date Details   No additional details    Date of next INR:  5/11/2018         How to take your warfarin dose     To take:  3 mg Take 1 of the 3 mg tablets.    To take:  4.5 mg Take 1.5 of the 3 mg tablets.

## 2018-03-30 NOTE — PROGRESS NOTES
ANTICOAGULATION FOLLOW-UP CLINIC VISIT    Patient Name:  Maryalice Rebecca Wachter  Date:  3/30/2018  Contact Type:  Face to Face    SUBJECTIVE:     Patient Findings     Positives No Problem Findings        ENCOURAGE MORE GREENS.     OBJECTIVE    INR Protime   Date Value Ref Range Status   03/30/2018 3.7 (A) 0.86 - 1.14 Final       ASSESSMENT / PLAN  INR assessment SUPRA    Recheck INR In: 6 WEEKS    INR Location Clinic      Anticoagulation Summary as of 3/30/2018     INR goal 2.0-3.0   Today's INR 3.7!   Maintenance plan 4.5 mg (3 mg x 1.5) on Tue, Fri; 3 mg (3 mg x 1) all other days   Full instructions 3/30: 1.5 mg; Otherwise 4.5 mg on Tue, Fri; 3 mg all other days   Weekly total 24 mg   Plan last modified Cyndi Cabello RN (9/23/2016)   Next INR check 5/11/2018   Priority INR   Target end date     Indications   Long-term (current) use of anticoagulants [Z79.01] [Z79.01]  Atrial fibrillation (H) [I48.91]         Anticoagulation Episode Summary     INR check location     Preferred lab     Send INR reminders to  TRIAGE POOL    Comments       Anticoagulation Care Providers     Provider Role Specialty Phone number    Venu Maria PA-C Responsible Physician Assistant - Medical [phone]            See the Encounter Report to view Anticoagulation Flowsheet and Dosing Calendar (Go to Encounters tab in chart review, and find the Anticoagulation Therapy Visit)    Dosage adjustment made based on physician directed care plan.    Cyndi Cabello RN

## 2018-04-03 ENCOUNTER — TELEPHONE (OUTPATIENT)
Dept: PHARMACY | Facility: CLINIC | Age: 83
End: 2018-04-03

## 2018-04-03 DIAGNOSIS — R60.9 EDEMA, UNSPECIFIED TYPE: Primary | ICD-10-CM

## 2018-04-03 RX ORDER — HYDROCHLOROTHIAZIDE 12.5 MG/1
12.5 CAPSULE ORAL EVERY MORNING
Qty: 90 CAPSULE | Refills: 1 | Status: SHIPPED | OUTPATIENT
Start: 2018-04-03 | End: 2018-10-15 | Stop reason: DRUGHIGH

## 2018-04-03 NOTE — TELEPHONE ENCOUNTER
Pt. Calls states off hctz --and swelling up --wants them back , also --feels more anxious on prozac and wants to go off as well.      mtm called her and we discussed she can try going back on hctz --but at 12.5mg ./day .    She also threw her back out a week ago --taking tylenol arthritis pills --helps with pain -- she is concerned about how long she can take it ?          PLan;    1. Stay on tylenol arthritis daily - until back pain has resolved.     2. Stop the prozac for now. She declines another antidepressant pill trial.     3. Go back on 1/2 tablet hctz daily again qam. I will order 12.5mg pill - 1 /day per pt. Preference.           Janey Romero [facility].  Medication Therapy Management Provider  [phone]

## 2018-04-05 NOTE — TELEPHONE ENCOUNTER
Thanks for the update.  Sorry to hear about her mood.  She mentioned it when I saw her in Feb.  When do you see her next?  I think I told her August for me.  Let's keep an eye on this for her.      Thanks for all your help with her!      Venu

## 2018-04-12 DIAGNOSIS — E11.65 TYPE 2 DIABETES MELLITUS WITH HYPERGLYCEMIA, WITHOUT LONG-TERM CURRENT USE OF INSULIN (H): ICD-10-CM

## 2018-04-12 RX ORDER — GLIPIZIDE 10 MG/1
TABLET, FILM COATED, EXTENDED RELEASE ORAL
Qty: 180 TABLET | Refills: 0 | Status: SHIPPED | OUTPATIENT
Start: 2018-04-12 | End: 2018-07-05

## 2018-04-12 NOTE — TELEPHONE ENCOUNTER
Routing refill request to provider for review/approval because:  Labs out of range:  Cr, A1C    Pt saw MTM for diabetes check on 3/19/18, will be following up with them on 4/16.     Med pended.     Radha Valle RN -- [facility]

## 2018-04-12 NOTE — TELEPHONE ENCOUNTER
"Requested Prescriptions   Pending Prescriptions Disp Refills     glipiZIDE (GLUCOTROL XL) 10 MG 24 hr tablet [Pharmacy Med Name: GLIPIZIDE ER 10MG TB24] 180 tablet 0     Sig: TAKE ONE TABLET BY MOUTH TWICE A DAY    Sulfonylurea Agents Failed    4/12/2018  9:06 AM       Failed - Patient has a recent creatinine (normal) within the past 12 mos.    Recent Labs   Lab Test  02/09/18   1350   CR  1.21*            Passed - Blood pressure less than 140/90 in past 6 months    BP Readings from Last 3 Encounters:   03/19/18 138/60   02/09/18 100/58   10/23/17 120/50                Passed - Patient has documented LDL within the past 12 mos.    Recent Labs   Lab Test  06/19/17   0935   LDL  49            Passed - Patient has had a Microalbumin in the past 12 mos.    Recent Labs   Lab Test  10/23/17   1341   MICROL  22   UMALCR  19.91            Passed - Patient has documented A1c within the specified period of time.    Recent Labs   Lab Test  02/09/18   1326   A1C  7.4*            Passed - Patient is age 18 or older       Passed - No active pregnancy on record       Passed - Patient has not had a positive pregnancy test within the past 12 mos.       Passed - Recent (6 mo) or future (30 days) visit within the authorizing provider's specialty    Patient had office visit in the last 6 months or has a visit in the next 30 days with authorizing provider or within the authorizing provider's specialty.  See \"Patient Info\" tab in inbasket, or \"Choose Columns\" in Meds & Orders section of the refill encounter.            Last Written Prescription Date:  1/11/18  Last Fill Quantity: 180,  # refills: 0   Last office visit: 2/9/2018   Future Office Visit:   Next 5 appointments (look out 90 days)     Apr 16, 2018  1:00 PM CDT   SHORT with Janey Romero RPH   [facility] ([facility])    [address] [facility] [address] [phone]                   "

## 2018-04-16 ENCOUNTER — OFFICE VISIT (OUTPATIENT)
Dept: PHARMACY | Facility: CLINIC | Age: 83
End: 2018-04-16
Payer: COMMERCIAL

## 2018-04-16 VITALS
WEIGHT: 188.6 LBS | HEART RATE: 64 BPM | SYSTOLIC BLOOD PRESSURE: 138 MMHG | BODY MASS INDEX: 31.38 KG/M2 | DIASTOLIC BLOOD PRESSURE: 78 MMHG | OXYGEN SATURATION: 92 %

## 2018-04-16 DIAGNOSIS — J44.1 COPD EXACERBATION (H): ICD-10-CM

## 2018-04-16 DIAGNOSIS — R60.9 EDEMA, UNSPECIFIED TYPE: ICD-10-CM

## 2018-04-16 DIAGNOSIS — I48.20 CHRONIC ATRIAL FIBRILLATION (H): ICD-10-CM

## 2018-04-16 DIAGNOSIS — F51.01 PRIMARY INSOMNIA: ICD-10-CM

## 2018-04-16 DIAGNOSIS — E03.9 HYPOTHYROIDISM, UNSPECIFIED TYPE: ICD-10-CM

## 2018-04-16 DIAGNOSIS — F34.1 DYSTHYMIA: Primary | ICD-10-CM

## 2018-04-16 DIAGNOSIS — I10 ESSENTIAL HYPERTENSION: ICD-10-CM

## 2018-04-16 DIAGNOSIS — E11.65 TYPE 2 DIABETES MELLITUS WITH HYPERGLYCEMIA, WITHOUT LONG-TERM CURRENT USE OF INSULIN (H): ICD-10-CM

## 2018-04-16 DIAGNOSIS — E78.5 HYPERLIPIDEMIA LDL GOAL <100: ICD-10-CM

## 2018-04-16 DIAGNOSIS — K21.9 GASTROESOPHAGEAL REFLUX DISEASE WITHOUT ESOPHAGITIS: ICD-10-CM

## 2018-04-16 PROCEDURE — 99606 MTMS BY PHARM EST 15 MIN: CPT | Performed by: PHARMACIST

## 2018-04-16 PROCEDURE — 99607 MTMS BY PHARM ADDL 15 MIN: CPT | Performed by: PHARMACIST

## 2018-04-16 NOTE — Clinical Note
Venu--erik-- hadley much better after she is forthcoming about how she feels about her hubby of 60+ years. She does not need antidepressant meds today --will review again with her in the fall..-hannah

## 2018-04-16 NOTE — PATIENT INSTRUCTIONS
Recommendations from today's MTM visit:                                                        1. FYI--- all looks good ! Stay on current medications as is .       Next MTM visit:   6  Months - Monday October 15th at 1pm.     To schedule another MTM appointment, please call the clinic directly or you may call the MTM scheduling line at [phone] or toll-free at [phone].     My Clinical Pharmacist's contact information:                                                      It was a pleasure seeing you today!  Please feel free to contact me with any questions or concerns you have.      Janey Romero [facility].  Medication Therapy Management Provider  [phone]      You may receive a survey about the MTM services you received.  I would appreciate your feedback to help me serve you better in the future. Please fill it out and return it when you can. Your comments will be anonymous.

## 2018-04-16 NOTE — MR AVS SNAPSHOT
After Visit Summary   4/16/2018    Maryalice Rebecca Wachter    MRN: [id number]           Patient Information     Date Of Birth          11/11/1933        Visit Information        Provider Department      4/16/2018 1:00 PM Janey Romero RPH [facility]        Care Instructions    Recommendations from today's MTM visit:                                                        1. FYI--- all looks good ! Stay on current medications as is .       Next MTM visit:   6  Months - Monday October 15th at 1pm.     To schedule another MTM appointment, please call the clinic directly or you may call the MTM scheduling line at [phone] or toll-free at [phone].     My Clinical Pharmacist's contact information:                                                      It was a pleasure seeing you today!  Please feel free to contact me with any questions or concerns you have.      Janey Romero [facility].  Medication Therapy Management Provider  [phone]      You may receive a survey about the MT services you received.  I would appreciate your feedback to help me serve you better in the future. Please fill it out and return it when you can. Your comments will be anonymous.                  Follow-ups after your visit        Your next 10 appointments already scheduled     May 11, 2018  9:30 AM CDT   Anticoagulation Visit with FM RN VISITS   [facility]    [address] [place] [address], [address]  [facility] [address] [phone]            Oct 15, 2018  1:00 PM CDT   SHORT with Janey Romero RPH   [facility] ([facility])    [address]  [facility] [address] [phone]              Who to contact     If you have questions or need follow up information about today's clinic visit or your schedule please contact [facility] directly at [phone].  Normal or non-critical  lab and imaging results will be communicated to you by MyChart, letter or phone within 4 business days after the clinic has received the results. If you do not hear from us within 7 days, please contact the clinic through [employer] or phone. If you have a critical or abnormal lab result, we will notify you by phone as soon as possible.  Submit refill requests through [employer] or call your pharmacy and they will forward the refill request to us. Please allow 3 business days for your refill to be completed.          Additional Information About Your Visit        iconDialhart Information     [employer] gives you secure access to your electronic health record. If you see a primary care provider, you can also send messages to your care team and make appointments. If you have questions, please call your primary care clinic.  If you do not have a primary care provider, please call [phone] and they will assist you.        Care EveryWhere ID     This is your Care EveryWhere ID. This could be used by other organizations to access your [facility] medical records  [id number]        Your Vitals Were     Pulse Pulse Oximetry BMI (Body Mass Index)             64 92% 31.38 kg/m2          Blood Pressure from Last 3 Encounters:   04/16/18 138/78   03/19/18 138/60   02/09/18 100/58    Weight from Last 3 Encounters:   04/16/18 188 lb 9.6 oz (85.5 kg)   03/19/18 189 lb (85.7 kg)   02/09/18 188 lb (85.3 kg)              Today, you had the following     No orders found for display       Primary Care Provider Office Phone # Fax #    Venu Shirley Maria PA-C [phone]       [id number]  [facility] [address]        Equal Access to Services     [facility] Center[facility] AH: Hadii carson izaguirre hadasho Soduali, waaxda luqadaha, qaybta kaalmada jamarcus, katrin elizabeth. So [facility] [phone].    ATENCIÓN: Si habla español, tiene a talamantes disposición servicios gratuitos de asistencia lingüística. Hansel mclean [phone].    We  comply with applicable federal civil rights laws and [place] laws. We do not discriminate on the basis of race, color, national origin, age, disability, sex, sexual orientation, or gender identity.            Thank you!     Thank you for choosing [facility]  for your care. Our goal is always to provide you with excellent care. Hearing back from our patients is one way we can continue to improve our services. Please take a few minutes to complete the written survey that you may receive in the mail after your visit with us. Thank you!             Your Updated Medication List - Protect others around you: Learn how to safely use, store and throw away your medicines at www.disposemymeds.org.          This list is accurate as of 4/16/18  1:37 PM.  Always use your most recent med list.                   Brand Name Dispense Instructions for use Diagnosis    ascorbic acid 500 MG tablet    VITAMIN C     Take 500 mg by mouth daily.        ASPIRIN EC PO      Take 81 mg by mouth.        B-12 1000 MCG Caps      Take 1 capsule by mouth 2 times daily        Calcium carb-Vitamin D 500 mg [place]-200 units 500-200 MG-UNIT per tablet    OSCAL with D;Oyster Shell Calcium     Take 1 tablet by mouth daily.        ferrous sulfate 325 (65 Fe) MG tablet    IRON     Take 1 tablet by mouth daily (with breakfast).        FIBERCON 625 MG tablet   Generic drug:  calcium polycarbophil      Take 1 tablet by mouth 2 times daily.        glipiZIDE 10 MG 24 hr tablet    GLUCOTROL XL    180 tablet    TAKE ONE TABLET BY MOUTH TWICE A DAY    Type 2 diabetes mellitus with hyperglycemia, without long-term current use of insulin (H)       hydrochlorothiazide 12.5 MG capsule    MICROZIDE    90 capsule    Take 1 capsule (12.5 mg) by mouth every morning    Edema, unspecified type       levothyroxine 88 MCG tablet    SYNTHROID/LEVOTHROID    90 tablet    TAKE ONE TABLET BY MOUTH EVERY DAY    Hypothyroidism, unspecified type       lisinopril  40 MG tablet    PRINIVIL/ZESTRIL    90 tablet    TAKE ONE TABLET BY MOUTH EVERY DAY    Systolic dysfunction, left ventricle, Past myocardial infarction       LOPERAMIDE HCL PO      1/2 to 1 tablet by mouth as needed.        metFORMIN 500 MG tablet    GLUCOPHAGE    360 tablet    TAKE TWO TABLETS BY MOUTH TWICE A DAY WITH MEALS    Type 2 diabetes mellitus with hyperglycemia, without long-term current use of insulin (H)       metoprolol tartrate 50 MG tablet    LOPRESSOR    180 tablet    TAKE ONE TABLET BY MOUTH TWICE A DAY    Past myocardial infarction, Systolic dysfunction, left ventricle, Atrial tachycardia, paroxysmal (H)       mometasone 220 MCG/INH Inhaler    ASMANEX     Inhale 2 puffs into the lungs daily.        ONETOUCH ULTRA test strip   Generic drug:  blood glucose monitoring     100 each    USE TO TEST BLOOD SUGAR TWICE A DAY OR AS DIRECTED    Type 2 diabetes mellitus with hyperglycemia, without long-term current use of insulin (H)       pioglitazone 30 MG tablet    ACTOS    90 tablet    TAKE ONE TABLET BY MOUTH EVERY DAY    Type 2 diabetes mellitus with hyperglycemia, without long-term current use of insulin (H)       * PROAIR  (90 Base) MCG/ACT Inhaler   Generic drug:  albuterol      Inhale 2 puffs into the lungs every 6 hours Reported on 3/2/2017        * albuterol (2.5 MG/3ML) 0.083% neb solution     1 Box    Take 1 vial (2.5 mg) by nebulization every 6 hours as needed for shortness of breath / dyspnea or wheezing Reported on 3/2/2017    COPD exacerbation (H)       ranitidine 150 MG tablet    ZANTAC    60 tablet    Take 1 tablet (150 mg) by mouth nightly as needed for heartburn    Hiatal hernia       simvastatin 20 MG tablet    ZOCOR    90 tablet    TAKE ONE TABLET BY MOUTH AT BEDTIME . NEEDS APPOINTMENT    Hyperlipidemia LDL goal <100       tiotropium 18 MCG capsule    SPIRIVA     Inhale 18 mcg into the lungs daily.        TRUEPLUS LANCETS 30G Misc     100 each    2-3 lancets daily Use to test  blood sugars 2-3 times daily.    Type 2 diabetes, HbA1C goal < 8% (H)       TYLENOL PM EXTRA STRENGTH  MG tablet   Generic drug:  diphenhydrAMINE-acetaminophen      Take 2 tablets by mouth At Bedtime        VITAMIN D (CHOLECALCIFEROL) PO      Take 2,000 Units by mouth daily        warfarin 3 MG tablet    COUMADIN    90 tablet    TAKE ONE TABLET BY MOUTH EVERY DAY, EXCEPT 1/2 TABLET ON FRIDAYS    Chronic atrial fibrillation (H)       * Notice:  This list has 2 medication(s) that are the same as other medications prescribed for you. Read the directions carefully, and ask your doctor or other care provider to review them with you.

## 2018-04-16 NOTE — PROGRESS NOTES
SUBJECTIVE/OBJECTIVE:                                                    Maryalice Rebecca Wachter is a 84 year old female coming in for a follow up visit (3-19-18)  for Medication Therapy Management.  She was referred to me from her PCP- Venu Maria.     Chief Complaint:  LLE better now -back on a little hctz.  She is in a better mood today --she stopped proZac  4-3-18 --didn't like how it made her feel--made her feel antsy. She thinks she doesn't really need antidepressant anymore.   Eyes are good now --got her stitches out.         Personal Healthcare Goals: fix diarrhea issues, timing of meds, get BP wnl.     Allergies/ADRs: Reviewed in Epic  Tobacco: No tobacco use   Alcohol: not currently using--very rare   Caffeine: decaff in am 1-2 cups/day of coffee; pm -tea a little bit.   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 Cannot exercise due to diarrhea issues --would like to go on walks with her hubby but has to have BM within 1 block of leaving.   PMH: Reviewed in [employer]; [facility] stated she has sarcoidosis?    Medication Adherence: No issues found today    IBS-diarrhea: patient uses prn loperamide --works well.     GERD:  She has a hiatal hernia -surgery not recommended.  Taking omeprazole 20 mg. qam .   Has never tried going off ppi --interested in doing so.     Diabetes:  Pt currently taking Glipizide er 10mg. bid, actos 30mg qday, Metformin-1 x500mg bid. Pt is experiencing the following side effects: mild loose stools from metformin --controlled with loperamide prn quite well.             Symptoms of low blood sugar? none. Frequency of hypoglycemia? never.  Recent symptoms of high blood sugar? fatigue  Eye exam: up to date  Foot exam: up to date  Microalbumin is < 30 mg/g. Pt is taking an ACEi/ARB.  Aspirin: Taking 81mg daily and denies side effects  Diet/Exercise: diet is ok but likes carbs, rice , chinese , no exercise.   She is eating low carb but not enjoying foods and is struggling with  lack of bs control with no sugar in diet.But she is determined to get that a1c back down.     Depression:  Current medications include: None--she stopped prozac-see above  . Pt reports that depression symptoms are worsened due to winter weather . Current depression symptoms include: lacks motivation or energy to do anything . Patient reports the following stressors: chronic health condition, lack of fun hubby to travel with . Therapies tried and response: Bupropion once daily-did nothing for her , prozac made her too antsy.  Notes cannot vacation due to hubby is a stick in the mud --no fun! He only wants to visit relatives.       Vitamin D3: level was 21 on  --she takes otc daily dose of 2,000 iu's now.      Vitamin B12: level was 493 on 10-10-16. She takes daily otc dose now.     Hypothyroidism: Patient is taking levothyroxine 100 mcg daily. She knows to take it on an empty stomach with her omeprazole in the morning.    Hypertension: Current medications include : HCTZ 12.5mg. Qam,  lisinopril 40mg-qam. And metoprolol 50mg bid-am and dinnertime.  Patient does  self-monitor BP.  Yes see below:  Patient side effects :  Low level hctz helpign edema w/out causing as much frequent urination.  Range of home bp's = 115-143 --95% of readings <140/60-70.    Look good . She recognizes when in afib--bp erratic and pulse increases to 90+ .      COPD: Patient states she feels well controlled. Patient uses her inhalers more often when she gets sick and she always carries her rescue inhaler with her. Patient states that when she forgets it she becomes very worried and panicked.   She carries inhalers albuterol with her , uses the albuterol in nebulizer bid most days of the week--she feels it works better than the inhalers.   asmanex daily at hs . spiriva qam--takes everyother day due to cost.     Insomnia: Current medications include: she admits trazodone not working for her for sleep --she took acetaminophen-benadryl --2  at night sleeps better.  Pt reports trouble staying asleep and can only sleep 4-6 hours. She admits to a boring life right now -she gets sleepy early Pm --naps for a few hours , then goes to bed -gets up at 4-5 am - perhaps she is sleeping enough already.        Current labs include:  Today's Vitals: /78  Pulse 64  Wt 188 lb 9.6 oz (85.5 kg)  SpO2 92%  BMI 31.38 kg/m2  BP Readings from Last 3 Encounters:   04/16/18 138/78   03/19/18 138/60   02/09/18 100/58     Lab Results   Component Value Date    A1C 7.4 02/09/2018   .  Lab Results   Component Value Date    CHOL 134 06/19/2017     Lab Results   Component Value Date    TRIG 208 06/19/2017     Lab Results   Component Value Date    HDL 43 06/19/2017     Lab Results   Component Value Date    LDL 49 06/19/2017       Liver Function Studies -   Recent Labs   Lab Test  02/09/18   1350   PROTTOTAL  6.5*   ALBUMIN  3.5   BILITOTAL  0.5   ALKPHOS  61   AST  13   ALT  17       Lab Results   Component Value Date    UCRR 109 10/23/2017    MICROL 22 10/23/2017    UMALCR 19.91 10/23/2017       Last Basic Metabolic Panel:  Lab Results   Component Value Date     02/09/2018      Lab Results   Component Value Date    POTASSIUM 4.2 02/09/2018     Lab Results   Component Value Date    CHLORIDE 103 02/09/2018     Lab Results   Component Value Date    BUN 38 02/09/2018     Lab Results   Component Value Date    CR 1.21 02/09/2018     GFR Estimate   Date Value Ref Range Status   02/09/2018 42 (L) >60 mL/min/1.7m2 Final     Comment:     Non  GFR Calc   10/23/2017 52 (L) >60 mL/min/1.7m2 Final     Comment:     Non  GFR Calc   06/19/2017 50 (L) >60 mL/min/1.7m2 Final     Comment:     Non  GFR Calc     GFR Estimate If Black   Date Value Ref Range Status   02/09/2018 51 (L) >60 mL/min/1.7m2 Final     Comment:      GFR Calc   10/23/2017 62 >60 mL/min/1.7m2 Final     Comment:      GFR Calc   06/19/2017  60 (L) >60 mL/min/1.7m2 Final     Comment:      GFR Calc     TSH   Date Value Ref Range Status   02/09/2018 0.44 0.40 - 4.00 mU/L Final   ]    Most Recent Immunizations   Administered Date(s) Administered     DTAP (<7y) (Quadracel) 01/01/2010     Influenza (High Dose) 3 valent vaccine 09/25/2017     Influenza (IIV3) PF 09/01/2014     Pneumo Conj 13-V (2010&after) 10/10/2016     Pneumococcal (PCV 7) 09/09/2014     Pneumococcal 23 valent 10/23/2017     Zoster vaccine, live 01/01/2012         ASSESSMENT:                                                       Current medications were reviewed with her today.     Medication Adherence: No problems identified    IBS-Diarrhea: Stable.     GERD: Stable.  Current treatment is effective. Chronic PPI use places patient at an increased risk of C. Diff, hypomagnesemia and lower bone mineral density, these risks were reviewed with the patient.  Pt would benefit from the following changes: taper off proton pump inhibitor.(see plan for details).      Diabetes: Stable. Patient is meeting A1c goal of < 8%. Self monitoring of blood glucose is at goal of fasting  mg/dL. Pt would benefit from minimum SMBG: Check blood sugars fasting, and occasionally 2 hours after starting a meal.  Metformin :  stay on the same dose.  SFU (Glipizide) :  stay on the same dose.  Actos:  stay on the same dose.  Increased exercise- as tolerated.   Updated Hemoglobin A1c- 7.4% .  Weight loss recommended--keep low carb diet .     Depression: Improved. Patient would benefit from no meds today --she will work on doing things that make her feel happy.       Vitamin B12: is not at goal of 600-1000pg/mL. Pt would benefit from vitamin B12 lab recheck in 12 months.    Vitamin D3: is not at goal of 50-75ng/mL. Pt would benefit from vitamin D3 lab recheck in 12 months.    Hypothyroidism: TSH wnl.    Hypertension: Stable. Patient is meeting BP goal of < 140/90mmHg.       COPD/Asthma: Stable.      Insomnia: Improved. Pt may benefit from no changes.      PLAN:                                                        1. FYI--- all looks good ! Stay on current medications as is .       Next MTM visit:   6  Months - Monday October 15th at 1pm.       I spent 30 minutes with this patient today .     To schedule another MTM appointment, please call the clinic directly or you may call the MTM scheduling line at [phone] or toll-free at [phone].     My Clinical Pharmacist's contact information:                                                      It was a pleasure seeing you today!  Please feel free to contact me with any questions or concerns you have.      Janey Romero [facility].  Medication Therapy Management Provider  [phone]  Coreen Aden, Pharm-D4 student

## 2018-05-11 ENCOUNTER — ANTICOAGULATION THERAPY VISIT (OUTPATIENT)
Dept: NURSING | Facility: CLINIC | Age: 83
End: 2018-05-11
Payer: COMMERCIAL

## 2018-05-11 DIAGNOSIS — I48.91 ATRIAL FIBRILLATION (H): ICD-10-CM

## 2018-05-11 DIAGNOSIS — Z79.01 LONG-TERM (CURRENT) USE OF ANTICOAGULANTS: ICD-10-CM

## 2018-05-11 LAB — INR POINT OF CARE: 2.3 (ref 0.86–1.14)

## 2018-05-11 PROCEDURE — 85610 PROTHROMBIN TIME: CPT | Mod: QW

## 2018-05-11 PROCEDURE — 36416 COLLJ CAPILLARY BLOOD SPEC: CPT

## 2018-05-11 PROCEDURE — 99207 ZZC NO CHARGE NURSE ONLY: CPT

## 2018-05-11 NOTE — MR AVS SNAPSHOT
Maryalice Rebecca Wachter   5/11/2018 9:30 AM   Anticoagulation Therapy Visit    Description:  84 year old female   Provider:  MINA RN VISITS   Department:   Nurse           INR as of 5/11/2018     Today's INR 2.3      Anticoagulation Summary as of 5/11/2018     INR goal 2.0-3.0   Today's INR 2.3   Full instructions 4.5 mg on Tue, Fri; 3 mg all other days   Next INR check 6/22/2018    Indications   Long-term (current) use of anticoagulants [Z79.01] [Z79.01]  Atrial fibrillation (H) [I48.91]         Contact Numbers     Clinic Number:         May 2018 Details    Sun Mon Tue Wed Thu Fri Sat       1               2               3               4               5                 6               7               8               9               10               11      4.5 mg   See details      12      3 mg           13      3 mg         14      3 mg         15      4.5 mg         16      3 mg         17      3 mg         18      4.5 mg         19      3 mg           20      3 mg         21      3 mg         22      4.5 mg         23      3 mg         24      3 mg         25      4.5 mg         26      3 mg           27      3 mg         28      3 mg         29      4.5 mg         30      3 mg         31      3 mg            Date Details   05/11 This INR check               How to take your warfarin dose     To take:  3 mg Take 1 of the 3 mg tablets.    To take:  4.5 mg Take 1.5 of the 3 mg tablets.           June 2018 Details    Sun Mon Tue Wed Thu Fri Sat          1      4.5 mg         2      3 mg           3      3 mg         4      3 mg         5      4.5 mg         6      3 mg         7      3 mg         8      4.5 mg         9      3 mg           10      3 mg         11      3 mg         12      4.5 mg         13      3 mg         14      3 mg         15      4.5 mg         16      3 mg           17      3 mg         18      3 mg         19      4.5 mg         20      3 mg         21      3 mg         22             23                 24               25               26               27               28               29               30                Date Details   No additional details    Date of next INR:  6/22/2018         How to take your warfarin dose     To take:  3 mg Take 1 of the 3 mg tablets.    To take:  4.5 mg Take 1.5 of the 3 mg tablets.

## 2018-06-22 ENCOUNTER — ANTICOAGULATION THERAPY VISIT (OUTPATIENT)
Dept: NURSING | Facility: CLINIC | Age: 83
End: 2018-06-22
Payer: COMMERCIAL

## 2018-06-22 DIAGNOSIS — I48.91 ATRIAL FIBRILLATION (H): ICD-10-CM

## 2018-06-22 DIAGNOSIS — Z79.01 LONG-TERM (CURRENT) USE OF ANTICOAGULANTS: ICD-10-CM

## 2018-06-22 LAB — INR POINT OF CARE: 3.4 (ref 0.86–1.14)

## 2018-06-22 PROCEDURE — 85610 PROTHROMBIN TIME: CPT | Mod: QW

## 2018-06-22 PROCEDURE — 99207 ZZC NO CHARGE NURSE ONLY: CPT

## 2018-06-22 PROCEDURE — 36416 COLLJ CAPILLARY BLOOD SPEC: CPT

## 2018-06-22 NOTE — PROGRESS NOTES
ANTICOAGULATION FOLLOW-UP CLINIC VISIT    Patient Name:  Maryalice Rebecca Wachter  Date:  6/22/2018  Contact Type:  Face to Face    SUBJECTIVE:     Patient Findings     Positives No Problem Findings           OBJECTIVE    INR Protime   Date Value Ref Range Status   06/22/2018 3.4 (A) 0.86 - 1.14 Final     ENCOURAGE MORE GREENS.    ASSESSMENT / PLAN  No question data found.  Anticoagulation Summary as of 6/22/2018     INR goal 2.0-3.0   Today's INR 3.4!   Warfarin maintenance plan 4.5 mg (3 mg x 1.5) on Tue, Fri; 3 mg (3 mg x 1) all other days   Full warfarin instructions 6/22: 3 mg; Otherwise 4.5 mg on Tue, Fri; 3 mg all other days   Weekly warfarin total 24 mg   Plan last modified Cyndi Cabello RN (9/23/2016)   Next INR check 8/3/2018   Priority INR   Target end date     Indications   Long-term (current) use of anticoagulants [Z79.01] [Z79.01]  Atrial fibrillation (H) [I48.91]         Anticoagulation Episode Summary     INR check location     Preferred lab     Send INR reminders to  TRIAGE POOL    Comments       Anticoagulation Care Providers     Provider Role Specialty Phone number    Venu Maria PA-C Responsible Physician Assistant - Medical [phone]            See the Encounter Report to view Anticoagulation Flowsheet and Dosing Calendar (Go to Encounters tab in chart review, and find the Anticoagulation Therapy Visit)    Dosage adjustment made based on physician directed care plan.    Cyndi Cabello RN

## 2018-06-22 NOTE — MR AVS SNAPSHOT
Sindi Beachchter   6/22/2018 9:30 AM   Anticoagulation Therapy Visit    Description:  84 year old female   Provider:  FM RN VISITS   Department:  Fm Nurse           INR as of 6/22/2018     Today's INR 3.4!      Anticoagulation Summary as of 6/22/2018     INR goal 2.0-3.0   Today's INR 3.4!   Full warfarin instructions 6/22: 3 mg; Otherwise 4.5 mg on Tue, Fri; 3 mg all other days   Next INR check 8/3/2018    Indications   Long-term (current) use of anticoagulants [Z79.01] [Z79.01]  Atrial fibrillation (H) [I48.91]         Description     Encourage more greens.      Your next Anticoagulation Clinic appointment(s)     Aug 03, 2018  9:30 AM CDT   [facility] with [facility] RN [facility]ISITS   [facility]    [address] [place] [address], [facility] [address]  [facility] [id number] [phone]              Contact Numbers     Clinic Number:         June 2018 Details    Sun Mon Tue Wed Thu Fri Sat          1               2                 3               4               5               6               7               8               9                 10               11               12               13               14               15               16                 17               18               19               20               21               22      3 mg   See details      23      3 mg           24      3 mg         25      3 mg         26      4.5 mg         27      3 mg         28      3 mg         29      4.5 mg         30      3 mg          Date Details   06/22 This INR check               How to take your warfarin dose     To take:  3 mg Take 1 of the 3 mg tablets.    To take:  4.5 mg Take 1.5 of the 3 mg tablets.           July 2018 Details    Sun Mon Tue Wed Thu Fri Sat     1      3 mg         2      3 mg         3      4.5 mg         4      3 mg         5      3 mg         6      4.5 mg         7      3 mg           8      3 mg         9      3 mg         10       4.5 mg         11      3 mg         12      3 mg         13      4.5 mg         14      3 mg           15      3 mg         16      3 mg         17      4.5 mg         18      3 mg         19      3 mg         20      4.5 mg         21      3 mg           22      3 mg         23      3 mg         24      4.5 mg         25      3 mg         26      3 mg         27      4.5 mg         28      3 mg           29      3 mg         30      3 mg         31      4.5 mg              Date Details   No additional details            How to take your warfarin dose     To take:  3 mg Take 1 of the 3 mg tablets.    To take:  4.5 mg Take 1.5 of the 3 mg tablets.           August 2018 Details    Sun Mon Tue Wed Thu Fri Sat        1      3 mg         2      3 mg         3            4                 5               6               7               8               9               10               11                 12               13               14               15               16               17               18                 19               20               21               22               23               24               25                 26               27               28               29               30               31                 Date Details   No additional details    Date of next INR:  8/3/2018         How to take your warfarin dose     To take:  3 mg Take 1 of the 3 mg tablets.    To take:  4.5 mg Take 1.5 of the 3 mg tablets.

## 2018-06-25 ENCOUNTER — OFFICE VISIT (OUTPATIENT)
Dept: FAMILY MEDICINE | Facility: CLINIC | Age: 83
End: 2018-06-25
Payer: COMMERCIAL

## 2018-06-25 ENCOUNTER — NURSE TRIAGE (OUTPATIENT)
Dept: NURSING | Facility: CLINIC | Age: 83
End: 2018-06-25

## 2018-06-25 VITALS
OXYGEN SATURATION: 96 % | WEIGHT: 189 LBS | SYSTOLIC BLOOD PRESSURE: 110 MMHG | BODY MASS INDEX: 32.27 KG/M2 | TEMPERATURE: 98.4 F | RESPIRATION RATE: 22 BRPM | DIASTOLIC BLOOD PRESSURE: 64 MMHG | HEART RATE: 104 BPM | HEIGHT: 64 IN

## 2018-06-25 DIAGNOSIS — N30.00 ACUTE CYSTITIS WITHOUT HEMATURIA: ICD-10-CM

## 2018-06-25 DIAGNOSIS — N81.10 FEMALE BLADDER PROLAPSE: Primary | ICD-10-CM

## 2018-06-25 LAB
ALBUMIN UR-MCNC: 100 MG/DL
APPEARANCE UR: CLEAR
BACTERIA #/AREA URNS HPF: ABNORMAL /HPF
BILIRUB UR QL STRIP: NEGATIVE
COLOR UR AUTO: YELLOW
GLUCOSE UR STRIP-MCNC: NEGATIVE MG/DL
HGB UR QL STRIP: ABNORMAL
KETONES UR STRIP-MCNC: NEGATIVE MG/DL
LEUKOCYTE ESTERASE UR QL STRIP: ABNORMAL
NITRATE UR QL: NEGATIVE
PH UR STRIP: 5.5 PH (ref 5–7)
RBC #/AREA URNS AUTO: ABNORMAL /HPF
SOURCE: ABNORMAL
SP GR UR STRIP: 1.02 (ref 1–1.03)
UROBILINOGEN UR STRIP-ACNC: 0.2 EU/DL (ref 0.2–1)
WBC #/AREA URNS AUTO: >100 /HPF

## 2018-06-25 PROCEDURE — 87086 URINE CULTURE/COLONY COUNT: CPT | Performed by: PHYSICIAN ASSISTANT

## 2018-06-25 PROCEDURE — 99213 OFFICE O/P EST LOW 20 MIN: CPT | Performed by: PHYSICIAN ASSISTANT

## 2018-06-25 PROCEDURE — 81001 URINALYSIS AUTO W/SCOPE: CPT | Performed by: PHYSICIAN ASSISTANT

## 2018-06-25 ASSESSMENT — ANXIETY QUESTIONNAIRES
1. FEELING NERVOUS, ANXIOUS, OR ON EDGE: NOT AT ALL
6. BECOMING EASILY ANNOYED OR IRRITABLE: SEVERAL DAYS
IF YOU CHECKED OFF ANY PROBLEMS ON THIS QUESTIONNAIRE, HOW DIFFICULT HAVE THESE PROBLEMS MADE IT FOR YOU TO DO YOUR WORK, TAKE CARE OF THINGS AT HOME, OR GET ALONG WITH OTHER PEOPLE: SOMEWHAT DIFFICULT
5. BEING SO RESTLESS THAT IT IS HARD TO SIT STILL: NOT AT ALL
3. WORRYING TOO MUCH ABOUT DIFFERENT THINGS: NOT AT ALL
7. FEELING AFRAID AS IF SOMETHING AWFUL MIGHT HAPPEN: NOT AT ALL
2. NOT BEING ABLE TO STOP OR CONTROL WORRYING: NOT AT ALL
GAD7 TOTAL SCORE: 1

## 2018-06-25 ASSESSMENT — PATIENT HEALTH QUESTIONNAIRE - PHQ9: 5. POOR APPETITE OR OVEREATING: NOT AT ALL

## 2018-06-25 NOTE — PROGRESS NOTES
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 Randomized trials suggest a 79 to 92 percent clinical cure rate with a five- to seven-day regimen, with minimal resistance promotion [47-49]. Higher rates of failure occurred with shorter courses [50]. It has minimal propensity to select for resistant organisms. Nitrofurantoin should be avoided if there is suspicion for early pyelonephritis or if the creatinine clearance is <30 mL/minute. Observational studies have suggested that the agent is effective and safe with mild renal impairment, even in older women    Choice 2:  Fosfomycin - Dosed as 3 grams of powder mixed in water as a single oral dose. No renal dose adjustments needed.   One randomized trial reported an early clinical cure rate of 91 percent and a bacterial cure rate similar to nitrofurantoin [59], and a meta-analysis demonstrated no difference in cure rates between fosfomycin and other agents, including fluoroquinolones, trimethoprim-sulfamethoxazole, and nitrofurantoin [60]. However, in a subsequent open-label trial, a single dose of fosfomycin resulted in lower clinical (58 versus 70 percent) and microbiologic (63 versus 74 percent) success rates at 28 days compared with nitrofurantoin given three times daily for five days [61]. Lower than expected clinical success rates in both groups may be a result of the definition used in this study (complete resolution of UTI signs and symptoms rather than improvement of signs and symptoms, as used in other trials). It is not clear whether the open-label trial design influenced the findings [62]. One benefit of fosfomycin is that it appears to have minimal propensity to select for resistant organisms, although susceptibility testing for  fosfomycin is not routinely available in most clinical laboratories. Fosfomycin should be avoided if there is suspicion for early pyelonephritis.    Choice 3: third generation cephalosporin  Acceptable beta-lactam agents include amoxicillin-clavulanate (500 mg twice daily), cefpodoxime (100 mg twice daily), cefdinir (300 mg twice daily), and cefadroxil (500 mg twice daily), each given for five to seven days [57,67,68]. A shorter course is not adequate; in one trial, cefpodoxime did not meet criteria for noninferiority to ciprofloxacin for clinical cure when each was given for three days [69]. Other beta-lactams, such as cephalexin (500 mg twice daily), are less well studied but may be acceptable. Ampicillin or amoxicillin should not be used for empiric treatment given the high prevalence of resistance to these agents [16-19,58]. In general, beta-lactams are second-line agents because they are less effective and have more potential adverse effects than other UTI antimicrobials     Bactrim and quinolones require different dosing or drug interaction with warfarin.       Janey Romero Rp.  Medication Therapy Management Provider  [phone]

## 2018-06-25 NOTE — PROGRESS NOTES
"  SUBJECTIVE:   Maryalice Rebecca Wachter is a 84 year old female who presents to clinic today for the following health issues:      URINARY TRACT SYMPTOMS  Onset: Ongoing for two weeks.    Description:   Painful urination (Dysuria): YES- Slight  Blood in urine (Hematuria): no    Delay in urine (Hesitency): YES    Intensity: 8/10    Progression of Symptoms:  worsening    Accompanying Signs & Symptoms:  Fever/chills: no   Flank pain no   Nausea and vomiting: no   Any vaginal symptoms: none  Abdominal/Pelvic Pain: no     History:   History of frequent UTI's: YES- Pt states she had them when she was younger.  History of kidney stones: no   Sexually Active: no   Possibility of pregnancy: No    Precipitating factors:   See Bottom Note.    Therapies Tried and outcome: No therapies tried.      Sindi is having discomfort for the last 2-3 weeks relating to her bladder.  She feels that her bladder may be falling down.  In 2012 she saw her OBGYN, had a D&C and tried a pessary which did not work out for her.  Getting up and down from standing to sitting is uncomfortable for her.      Problem list and histories reviewed & adjusted, as indicated.  Additional history: as documented    Labs reviewed in EPIC    Reviewed and updated as needed this visit by clinical staff  Tobacco  Allergies  Meds  Med Hx  Surg Hx  Fam Hx  Soc Hx      Reviewed and updated as needed this visit by Provider         ROS:  Constitutional, HEENT, cardiovascular, pulmonary, gi and gu systems are negative, except as otherwise noted.    OBJECTIVE:     /64 (BP Location: Right arm, Patient Position: Chair, Cuff Size: Adult Regular)  Pulse 104  Temp 98.4  F (36.9  C) (Oral)  Resp 22  Ht 5' 4\" (1.626 m)  Wt 189 lb (85.7 kg)  SpO2 96%  BMI 32.44 kg/m2  Body mass index is 32.44 kg/(m^2).  GENERAL: healthy, alert and no distress  MS: no gross musculoskeletal defects noted, no edema  SKIN: no suspicious lesions or rashes  PSYCH: mentation " appears normal, affect normal/bright    Diagnostic Test Results:  UA pending    ASSESSMENT/PLAN:   1. Female bladder prolapse  She preferred no exam today, supplies sent home for her to collect for UA today.  Will refer to GYN for assessment and hope for ASAP appointment due to her discomfort.  - OB/GYN REFERRAL  - *UA reflex to Microscopic and Culture ([place] [facility] (except [place] and [place])        Venu Maria PA-C  [facility]

## 2018-06-25 NOTE — MR AVS SNAPSHOT
After Visit Summary   6/25/2018    Maryalice Rebecca Wachter    MRN: [id number]           Patient Information     Date Of Birth          11/11/1933        Visit Information        Provider Department      6/25/2018 9:40 AM Venu Maria PA-C [facility]        Today's Diagnoses     Female bladder prolapse    -  1       Follow-ups after your visit        Additional Services     OB/GYN REFERRAL       Your provider has referred you to:  [facility]: [facility] [phone]   [email or web address]   Dr. Sinha    Please be aware that coverage of these services is subject to the terms and limitations of your health insurance plan.  Call member services at your health plan with any benefit or coverage questions.      Please bring the following with you to your appointment:    (1) Any X-Rays, CTs or MRIs which have been performed.  Contact the facility where they were done to arrange for  prior to your scheduled appointment.   (2) List of current medications   (3) This referral request   (4) Any documents/labs given to you for this referral                  Follow-up notes from your care team     Return in about 1 week (around 7/2/2018) for with Specialist.      Your next 10 appointments already scheduled     Aug 03, 2018  9:30 AM CDT   Anticoagulation Visit with FM RN VISITS   [facility]    [address] [place] [address], [address]  [facility] [address] [phone]            Oct 15, 2018  1:00 PM CDT   SHORT with Janey Romero RPH   [facility] ([facility])    [address] [facility] [address] [phone]              Who to contact     If you have questions or need follow up information about today's clinic visit or your schedule please contact [facility] directly at [phone].  Normal or non-critical lab and  "imaging results will be communicated to you by MyChart, letter or phone within 4 business days after the clinic has received the results. If you do not hear from us within 7 days, please contact the clinic through amcuret or phone. If you have a critical or abnormal lab result, we will notify you by phone as soon as possible.  Submit refill requests through [employer] or call your pharmacy and they will forward the refill request to us. Please allow 3 business days for your refill to be completed.          Additional Information About Your Visit        [employer] Information     [employer] gives you secure access to your electronic health record. If you see a primary care provider, you can also send messages to your care team and make appointments. If you have questions, please call your primary care clinic.  If you do not have a primary care provider, please call [phone] and they will assist you.        Care EveryWhere ID     This is your Care EveryWhere ID. This could be used by other organizations to access your [place] medical records  [id number]        Your Vitals Were     Pulse Temperature Respirations Height Pulse Oximetry BMI (Body Mass Index)    104 98.4  F (36.9  C) (Oral) 22 5' 4\" (1.626 m) 96% 32.44 kg/m2       Blood Pressure from Last 3 Encounters:   06/25/18 110/64   04/16/18 138/78   03/19/18 138/60    Weight from Last 3 Encounters:   06/25/18 189 lb (85.7 kg)   04/16/18 188 lb 9.6 oz (85.5 kg)   03/19/18 189 lb (85.7 kg)              We Performed the Following     *UA reflex to Microscopic and Culture ([place] and [facility] (except [place] and [place])     OB/GYN REFERRAL        Primary Care Provider Office Phone # Fax #    Venu Maria PA-C [phone]       [id number]  [facility] [address]        Equal Access to Services     MARIA ESTHER ANDRES AH: Hadii aad ku hadasho Soomaali, waaxda luqadaha, qaybta kaalmada adeegyada, katrin elizabeth. So " [facility] [phone].    ATENCIÓN: Si jordon kwan, tiene a talamantes disposición servicios gratuitos de asistencia lingüística. Hansel mclean [phone].    We comply with applicable federal civil rights laws and [place] laws. We do not discriminate on the basis of race, color, national origin, age, disability, sex, sexual orientation, or gender identity.            Thank you!     Thank you for choosing [facility]  for your care. Our goal is always to provide you with excellent care. Hearing back from our patients is one way we can continue to improve our services. Please take a few minutes to complete the written survey that you may receive in the mail after your visit with us. Thank you!             Your Updated Medication List - Protect others around you: Learn how to safely use, store and throw away your medicines at www.disposemymeds.org.          This list is accurate as of 6/25/18 10:14 AM.  Always use your most recent med list.                   Brand Name Dispense Instructions for use Diagnosis    ascorbic acid 500 MG tablet    VITAMIN C     Take 500 mg by mouth daily.        ASPIRIN EC PO      Take 81 mg by mouth.        B-12 1000 MCG Caps      Take 1 capsule by mouth 2 times daily        Calcium carb-Vitamin D 500 mg [place]-200 units 500-200 MG-UNIT per tablet    OSCAL with D;Oyster Shell Calcium     Take 1 tablet by mouth daily.        ferrous sulfate 325 (65 Fe) MG tablet    IRON     Take 1 tablet by mouth daily (with breakfast).        FIBERCON 625 MG tablet   Generic drug:  calcium polycarbophil      Take 1 tablet by mouth 2 times daily.        glipiZIDE 10 MG 24 hr tablet    GLUCOTROL XL    180 tablet    TAKE ONE TABLET BY MOUTH TWICE A DAY    Type 2 diabetes mellitus with hyperglycemia, without long-term current use of insulin (H)       hydrochlorothiazide 12.5 MG capsule    MICROZIDE    90 capsule    Take 1 capsule (12.5 mg) by mouth every morning    Edema, unspecified type       levothyroxine  88 MCG tablet    SYNTHROID/LEVOTHROID    90 tablet    TAKE ONE TABLET BY MOUTH EVERY DAY    Hypothyroidism, unspecified type       lisinopril 40 MG tablet    PRINIVIL/ZESTRIL    90 tablet    TAKE ONE TABLET BY MOUTH EVERY DAY    Systolic dysfunction, left ventricle, Past myocardial infarction       LOPERAMIDE HCL PO      1/2 to 1 tablet by mouth as needed.        metFORMIN 500 MG tablet    GLUCOPHAGE    360 tablet    TAKE TWO TABLETS BY MOUTH TWICE A DAY WITH MEALS    Type 2 diabetes mellitus with hyperglycemia, without long-term current use of insulin (H)       metoprolol tartrate 50 MG tablet    LOPRESSOR    180 tablet    TAKE ONE TABLET BY MOUTH TWICE A DAY    Past myocardial infarction, Systolic dysfunction, left ventricle, Atrial tachycardia, paroxysmal (H)       mometasone 220 MCG/INH Inhaler    ASMANEX     Inhale 2 puffs into the lungs daily.        ONETOUCH ULTRA test strip   Generic drug:  blood glucose monitoring     100 each    USE TO TEST BLOOD SUGAR TWICE A DAY OR AS DIRECTED    Type 2 diabetes mellitus with hyperglycemia, without long-term current use of insulin (H)       pioglitazone 30 MG tablet    ACTOS    90 tablet    TAKE ONE TABLET BY MOUTH EVERY DAY    Type 2 diabetes mellitus with hyperglycemia, without long-term current use of insulin (H)       * PROAIR  (90 Base) MCG/ACT Inhaler   Generic drug:  albuterol      Inhale 2 puffs into the lungs every 6 hours Reported on 3/2/2017        * albuterol (2.5 MG/3ML) 0.083% neb solution     1 Box    Take 1 vial (2.5 mg) by nebulization every 6 hours as needed for shortness of breath / dyspnea or wheezing Reported on 3/2/2017    COPD exacerbation (H)       ranitidine 150 MG tablet    ZANTAC    60 tablet    Take 1 tablet (150 mg) by mouth nightly as needed for heartburn    Hiatal hernia       simvastatin 20 MG tablet    ZOCOR    90 tablet    TAKE ONE TABLET BY MOUTH AT BEDTIME. NEEDS APPOINTMENT    Hyperlipidemia LDL goal <100       tiotropium 18  MCG capsule    SPIRIVA     Inhale 18 mcg into the lungs daily.        TRUEPLUS LANCETS 30G Misc     100 each    USE TO TEST BLOOD SUGAR 2 TO 3 TIMES A DAY.    Type 2 diabetes, HbA1C goal < 8% (H)       TYLENOL PM EXTRA STRENGTH  MG tablet   Generic drug:  diphenhydrAMINE-acetaminophen      Take 2 tablets by mouth At Bedtime        VITAMIN D (CHOLECALCIFEROL) PO      Take 2,000 Units by mouth daily        warfarin 3 MG tablet    COUMADIN    90 tablet    TAKE ONE TABLET BY MOUTH EVERY DAY, EXCEPT 1/2 TABLET ON FRIDAYS    Chronic atrial fibrillation (H)       * Notice:  This list has 2 medication(s) that are the same as other medications prescribed for you. Read the directions carefully, and ask your doctor or other care provider to review them with you.

## 2018-06-25 NOTE — Clinical Note
Hi- I need someone to help call Ernestine's office and get Sindi an appointment ASAP for this bladder prolapse.  She is pretty uncomfortable.  I cannot route charts to Triage pool for some reason so if needed please forward on to RN's.  Thanks!

## 2018-06-26 ENCOUNTER — TELEPHONE (OUTPATIENT)
Dept: FAMILY MEDICINE | Facility: CLINIC | Age: 83
End: 2018-06-26

## 2018-06-26 RX ORDER — NITROFURANTOIN 25; 75 MG/1; MG/1
100 CAPSULE ORAL 2 TIMES DAILY
Qty: 14 CAPSULE | Refills: 0 | Status: SHIPPED | OUTPATIENT
Start: 2018-06-26 | End: 2018-07-12

## 2018-06-26 ASSESSMENT — PATIENT HEALTH QUESTIONNAIRE - PHQ9: SUM OF ALL RESPONSES TO PHQ QUESTIONS 1-9: 3

## 2018-06-26 ASSESSMENT — ANXIETY QUESTIONNAIRES: GAD7 TOTAL SCORE: 1

## 2018-06-26 NOTE — TELEPHONE ENCOUNTER
Additional Information    Negative: [1] Caller is not with the adult (patient) AND [2] reporting urgent symptoms    Negative: Lab result questions    Negative: Medication questions    Negative: Caller cannot be reached by phone    Negative: Caller has already spoken to PCP or another triager    Negative: RN needs further essential information from caller in order to complete triage    Negative: Requesting regular office appointment    Negative: [1] Caller requesting NON-URGENT health information AND [2] PCP's office is the best resource    Negative: Health Information question, no triage required and triager able to answer question    Negative: General information question, no triage required and triager able to answer question    Negative: Question about upcoming scheduled test, no triage required and triager able to answer question    Negative: [1] Caller is not with the adult (patient) AND [2] probable NON-URGENT symptoms    [1] Follow-up call to recent contact AND [2] information only call, no triage required    Protocols used: INFORMATION ONLY CALL-ADULT-RO Pascual (pharmacist-@ [facility] [place]) calls and says that pt. Wants to know if she had an antibiotic ordered today. RN then checked EPIC and saw that no antibiotic was ordered today. RN told this to Samara and Samara says that she will let pt. Know this.

## 2018-06-26 NOTE — TELEPHONE ENCOUNTER
Patient needing an appointment with Dr. Sinha for prolapse bladder.  Appointment scheduled for 7/12/2018 at 9:15am at [facility].  [address]  [phone].  Patient notified and information mailed to patient's home address.    Cyndi Cabello RN

## 2018-06-27 LAB
BACTERIA SPEC CULT: NO GROWTH
SPECIMEN SOURCE: NORMAL

## 2018-07-05 DIAGNOSIS — E11.65 TYPE 2 DIABETES MELLITUS WITH HYPERGLYCEMIA, WITHOUT LONG-TERM CURRENT USE OF INSULIN (H): ICD-10-CM

## 2018-07-05 DIAGNOSIS — I51.9 SYSTOLIC DYSFUNCTION, LEFT VENTRICLE: ICD-10-CM

## 2018-07-05 DIAGNOSIS — I25.2 PAST MYOCARDIAL INFARCTION: ICD-10-CM

## 2018-07-05 DIAGNOSIS — I47.19 ATRIAL TACHYCARDIA, PAROXYSMAL (H): ICD-10-CM

## 2018-07-06 RX ORDER — GLIPIZIDE 10 MG/1
10 TABLET, FILM COATED, EXTENDED RELEASE ORAL 2 TIMES DAILY
Qty: 180 TABLET | Refills: 0 | Status: SHIPPED | OUTPATIENT
Start: 2018-07-06 | End: 2018-10-11

## 2018-07-06 RX ORDER — METOPROLOL TARTRATE 50 MG
TABLET ORAL
Qty: 180 TABLET | Refills: 0 | Status: SHIPPED | OUTPATIENT
Start: 2018-07-06 | End: 2018-10-04

## 2018-07-06 NOTE — TELEPHONE ENCOUNTER
Due for visit in August, reminder sent    Prescription approved per [facility] Refill Protocol  Amanda Bob RN BS

## 2018-07-06 NOTE — TELEPHONE ENCOUNTER
Requested Prescriptions   Pending Prescriptions Disp Refills     glipiZIDE (GLUCOTROL XL) 10 MG 24 hr tablet [Pharmacy Med Name: GLIPIZIDE ER 10MG TB24] 180 tablet 0    Last Written Prescription Date:  4/12/18  Last Fill Quantity: 180,  # refills: 0   Last Office Visit: 6/25/2018   Future Office Visit:    Next 5 appointments (look out 90 days)     Jul 12, 2018  9:15 AM CDT   Office Visit with Long Sinha MD   [facility]    [address] [id number] [phone]                  Sig: TAKE ONE TABLET BY MOUTH TWICE A DAY    Sulfonylurea Agents Failed    7/5/2018  5:25 PM       Failed - Patient has documented LDL within the past 12 mos.    Recent Labs   Lab Test  06/19/17   0935   LDL  49            Failed - Patient has a recent creatinine (normal) within the past 12 mos.    Recent Labs   Lab Test  02/09/18   1350   CR  1.21*            Passed - Blood pressure less than 140/90 in past 6 months    BP Readings from Last 3 Encounters:   06/25/18 110/64   04/16/18 138/78   03/19/18 138/60                Passed - Patient has had a Microalbumin in the past 12 mos.    Recent Labs   Lab Test  10/23/17   1341   MICROL  22   UMALCR  19.91            Passed - Patient has documented A1c within the specified period of time.    If HgbA1C is 8 or greater, it needs to be on file within the past 3 months.  If less than 8, must be on file within the past 6 months.     Recent Labs   Lab Test  02/09/18   1326   A1C  7.4*            Passed - Patient is age 18 or older       Passed - No active pregnancy on record       Passed - Patient has not had a positive pregnancy test within the past 12 mos.       Passed - Recent (6 mo) or future (30 days) visit within the authorizing provider's specialty    Patient had office visit in the last 6 months or has a visit in the next 30 days with authorizing provider or within the authorizing provider's specialty.   "See \"Patient Info\" tab in inbasket, or \"Choose Columns\" in Meds & Orders section of the refill encounter.       _________________________________________________________________________________________________________________________       metoprolol tartrate (LOPRESSOR) 50 MG tablet [Pharmacy Med Name: METOPROLOL TARTRATE 50MG TABS] 180 tablet 1    Last Written Prescription Date:  1/11/18  Last Fill Quantity: 180,  # refills: 1   Last Office Visit: 6/25/2018   Future Office Visit:    Next 5 appointments (look out 90 days)     Jul 12, 2018  9:15 AM CDT   Office Visit with Long Sinha MD   [facility]    [address] [place] [address] [phone]                  Sig: TAKE ONE TABLET BY MOUTH TWICE A DAY    Beta-Blockers Protocol Passed    7/5/2018  5:25 PM       Passed - Blood pressure under 140/90 in past 12 months    BP Readings from Last 3 Encounters:   06/25/18 110/64   04/16/18 138/78   03/19/18 138/60                Passed - Patient is age 6 or older       Passed - Recent (12 mo) or future (30 days) visit within the authorizing provider's specialty    Patient had office visit in the last 12 months or has a visit in the next 30 days with authorizing provider or within the authorizing provider's specialty.  See \"Patient Info\" tab in inbasket, or \"Choose Columns\" in Meds & Orders section of the refill encounter.              "

## 2018-07-11 DIAGNOSIS — I51.9 SYSTOLIC DYSFUNCTION, LEFT VENTRICLE: ICD-10-CM

## 2018-07-11 DIAGNOSIS — I25.2 PAST MYOCARDIAL INFARCTION: ICD-10-CM

## 2018-07-12 ENCOUNTER — OFFICE VISIT (OUTPATIENT)
Dept: OBGYN | Facility: CLINIC | Age: 83
End: 2018-07-12
Payer: COMMERCIAL

## 2018-07-12 VITALS
DIASTOLIC BLOOD PRESSURE: 60 MMHG | WEIGHT: 188.8 LBS | HEART RATE: 76 BPM | BODY MASS INDEX: 32.41 KG/M2 | SYSTOLIC BLOOD PRESSURE: 128 MMHG

## 2018-07-12 DIAGNOSIS — N81.4 UTERINE PROLAPSE: ICD-10-CM

## 2018-07-12 DIAGNOSIS — N39.46 MIXED INCONTINENCE URGE AND STRESS (MALE)(FEMALE): ICD-10-CM

## 2018-07-12 DIAGNOSIS — R30.0 DYSURIA: Primary | ICD-10-CM

## 2018-07-12 DIAGNOSIS — N81.11 CYSTOCELE, MIDLINE: ICD-10-CM

## 2018-07-12 DIAGNOSIS — R33.9 INCOMPLETE BLADDER EMPTYING: ICD-10-CM

## 2018-07-12 LAB
ALBUMIN UR-MCNC: NEGATIVE MG/DL
APPEARANCE UR: CLEAR
BILIRUB UR QL STRIP: NEGATIVE
COLOR UR AUTO: YELLOW
GLUCOSE UR STRIP-MCNC: 100 MG/DL
HGB UR QL STRIP: NEGATIVE
KETONES UR STRIP-MCNC: NEGATIVE MG/DL
LEUKOCYTE ESTERASE UR QL STRIP: NEGATIVE
NITRATE UR QL: NEGATIVE
PH UR STRIP: 6 PH (ref 5–7)
SOURCE: ABNORMAL
SP GR UR STRIP: 1.01 (ref 1–1.03)
UROBILINOGEN UR STRIP-ACNC: 0.2 EU/DL (ref 0.2–1)

## 2018-07-12 PROCEDURE — 87086 URINE CULTURE/COLONY COUNT: CPT | Performed by: OBSTETRICS & GYNECOLOGY

## 2018-07-12 PROCEDURE — 99204 OFFICE O/P NEW MOD 45 MIN: CPT | Performed by: OBSTETRICS & GYNECOLOGY

## 2018-07-12 PROCEDURE — 81003 URINALYSIS AUTO W/O SCOPE: CPT | Performed by: OBSTETRICS & GYNECOLOGY

## 2018-07-12 NOTE — NURSING NOTE
"Chief Complaint   Patient presents with     Urinary Problem       Initial /60  Pulse 76  Wt 188 lb 12.8 oz (85.6 kg)  BMI 32.41 kg/m2 Estimated body mass index is 32.41 kg/(m^2) as calculated from the following:    Height as of 6/25/18: 5' 4\" (1.626 m).    Weight as of this encounter: 188 lb 12.8 oz (85.6 kg).  BP completed using cuff size: large    No obstetric history on file.    The following HM Due: NONE    Jessica Kramer CMA             "

## 2018-07-12 NOTE — TELEPHONE ENCOUNTER
"Requested Prescriptions   Pending Prescriptions Disp Refills     lisinopril (PRINIVIL/ZESTRIL) 40 MG tablet [Pharmacy Med Name: LISINOPRIL 40MG TABS] 90 tablet 2    Last Written Prescription Date:  10/12/17  Last Fill Quantity: 90,  # refills: 2   Last Office Visit: 6/25/2018   Future Office Visit:    Next 5 appointments (look out 90 days)     Jul 12, 2018  9:15 AM CDT   Office Visit with Long Sinha MD   [facility] ([facility]    [address] [address] [phone]                  Sig: TAKE ONE TABLET BY MOUTH EVERY DAY    ACE Inhibitors (Including Combos) Protocol Failed    7/11/2018 10:31 PM       Failed - Normal serum creatinine on file in past 12 months    Recent Labs   Lab Test  02/09/18   1350   CR  1.21*            Passed - Blood pressure under 140/90 in past 12 months    BP Readings from Last 3 Encounters:   06/25/18 110/64   04/16/18 138/78   03/19/18 138/60                Passed - Recent (12 mo) or future (30 days) visit within the authorizing provider's specialty    Patient had office visit in the last 12 months or has a visit in the next 30 days with authorizing provider or within the authorizing provider's specialty.  See \"Patient Info\" tab in inbasket, or \"Choose Columns\" in Meds & Orders section of the refill encounter.           Passed - Patient is age 18 or older       Passed - No active pregnancy on record       Passed - Normal serum potassium on file in past 12 months    Recent Labs   Lab Test  02/09/18   1350   POTASSIUM  4.2            Passed - No positive pregnancy test in past 12 months          "

## 2018-07-12 NOTE — TELEPHONE ENCOUNTER
"Routing refill request to provider for review/approval because:  Labs out of range:  Cr. Per 2/9/2018 lab result note: \"kidney test worsening\". Would you like to recheck this before refilling?    Breana LARES RN, BSN, PHN  Ashley Luis RN        "

## 2018-07-12 NOTE — PATIENT INSTRUCTIONS
You can reach your [facility] Team any time of the day by calling [phone]. This number will put you in touch with the 24 hour nurse line if the clinic is closed.    To contact your OB/GYN Surgery Scheduler please call [phone]. This is a direct number for your care team between 8 a.m. and 4 p.m. Monday through Friday.    [facility] is open for your convenience: [phone]  Monday through Friday 8 a.m. to 8:30 p.m.  Saturday 9 a.m. to 6 p.m.  Sunday Noon to 6 p.m.    They are closed on all major holidays.

## 2018-07-12 NOTE — MR AVS SNAPSHOT
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 This number will put you in touch with the 24 hour nurse line if the clinic is closed.    To contact your OB/GYN Surgery Scheduler please call [phone]. This is a direct number for your care team between 8 a.m. and 4 p.m. Monday through Friday.    [facility] is open for your convenience: [phone]  Monday through Friday 8 a.m. to 8:30 p.m.  Saturday 9 a.m. to 6 p.m.  Sunday Noon to 6 p.m.    They are closed on all major holidays.              Follow-ups after your visit        Follow-up notes from your care team     Return in about 1 week (around 7/19/2018).      Your next 10 appointments already scheduled     Jul 16, 2018  1:30 PM CDT   Office Visit with Long Sinha MD   [facility]    [address]  [facility] [id number] [phone]           Bring a current list of meds and any records pertaining to this visit. For Physicals, please bring immunization records and any forms needing to be filled out. Please arrive 10 minutes early to complete paperwork.            Jul 17, 2018  3:15 PM CDT   Office Visit with Long Sinha MD   [facility]    [address]  [facility] [id number] [phone]           Bring a current list of meds and any records pertaining to this visit. For Physicals, please bring  immunization records and any forms needing to be filled out. Please arrive 10 minutes early to complete paperwork.            Aug 03, 2018  9:30 AM CDT   Anticoagulation Visit with FM RN VISITS   [facility]    [address]  [facility] [address] [phone]            Oct 15, 2018  1:00 PM CDT   SHORT with Janey Romero RPH   [facility] ([facility]    [address] [facility] [address] [phone]              Who to contact     If you have questions or need follow up information about today's clinic visit or your schedule please contact [facility] directly at [phone].  Normal or non-critical lab and imaging results will be communicated to you by MyChart, letter or phone within 4 business days after the clinic has received the results. If you do not hear from us within 7 days, please contact the clinic through [employer] or phone. If you have a critical or abnormal lab result, we will notify you by phone as soon as possible.  Submit refill requests through [employer] or call your pharmacy and they will forward the refill request to us. Please allow 3 business days for your refill to be completed.          Additional Information About Your Visit        [employer] Information     [employer] gives you secure access to your electronic health record. If you see a primary care provider, you can also send messages to your care team and make appointments. If you have questions, please call your primary care clinic.  If you do not have a primary care provider, please call [phone] and they will assist you.        Care EveryWhere ID     This is your Care EveryWhere ID. This could be used by other organizations to access your [facility] medical records  [id number]        Your Vitals Were     Pulse BMI (Body Mass Index)                76 32.41 kg/m2           Blood Pressure from  Last 3 Encounters:   07/12/18 128/60   06/25/18 110/64   04/16/18 138/78    Weight from Last 3 Encounters:   07/12/18 188 lb 12.8 oz (85.6 kg)   06/25/18 189 lb (85.7 kg)   04/16/18 188 lb 9.6 oz (85.5 kg)              We Performed the Following     UA reflex to Microscopic     Urine Culture Aerobic Bacterial        Primary Care Provider Office Phone # Fax #    Venu Maria PA-C [phone]       [id number]  [facility] [id number]        Equal Access to Services     [facility]: Hadii aad ku hadasho Soomaali, waaxda luqadaha, qaybta kaalmada adeegyada, katrin lee haylatha lin . So [facility] [phone].    ATENCIÓN: Si habla español, tiene a talamantes disposición servicios gratuitos de asistencia lingüística. Ll[facility] [phone].    We comply with applicable federal civil rights laws and [place] laws. We do not discriminate on the basis of race, color, national origin, age, disability, sex, sexual orientation, or gender identity.            Thank you!     Thank you for choosing [facility]  for your care. Our goal is always to provide you with excellent care. Hearing back from our patients is one way we can continue to improve our services. Please take a few minutes to complete the written survey that you may receive in the mail after your visit with us. Thank you!             Your Updated Medication List - Protect others around you: Learn how to safely use, store and throw away your medicines at www.disposemymeds.org.          This list is accurate as of 7/12/18 12:03 PM.  Always use your most recent med list.                   Brand Name Dispense Instructions for use Diagnosis    ascorbic acid 500 MG tablet    VITAMIN C     Take 500 mg by mouth daily.        ASPIRIN EC PO      Take 81 mg by mouth.        B-12 1000 MCG Caps      Take 1 capsule by mouth 2 times daily        Calcium carb-Vitamin D 500 mg [place]k-200 units 500-200 MG-UNIT per tablet     OSCAL with D;Oyster Shell Calcium     Take 1 tablet by mouth daily.        ferrous sulfate 325 (65 Fe) MG tablet    IRON     Take 1 tablet by mouth daily (with breakfast).        FIBERCON 625 MG tablet   Generic drug:  calcium polycarbophil      Take 1 tablet by mouth 2 times daily.        glipiZIDE 10 MG 24 hr tablet    GLUCOTROL XL    180 tablet    Take 1 tablet (10 mg) by mouth 2 times daily Due to see provider in August    Type 2 diabetes mellitus with hyperglycemia, without long-term current use of insulin (H)       hydrochlorothiazide 12.5 MG capsule    MICROZIDE    90 capsule    Take 1 capsule (12.5 mg) by mouth every morning    Edema, unspecified type       levothyroxine 88 MCG tablet    SYNTHROID/LEVOTHROID    90 tablet    TAKE ONE TABLET BY MOUTH EVERY DAY    Hypothyroidism, unspecified type       lisinopril 40 MG tablet    PRINIVIL/ZESTRIL    90 tablet    TAKE ONE TABLET BY MOUTH EVERY DAY    Systolic dysfunction, left ventricle, Past myocardial infarction       LOPERAMIDE HCL PO      1/2 to 1 tablet by mouth as needed.        metFORMIN 500 MG tablet    GLUCOPHAGE    360 tablet    TAKE TWO TABLETS BY MOUTH TWICE A DAY WITH MEALS    Type 2 diabetes mellitus with hyperglycemia, without long-term current use of insulin (H)       metoprolol tartrate 50 MG tablet    LOPRESSOR    180 tablet    TAKE ONE TABLET BY MOUTH TWICE A DAY    Past myocardial infarction, Systolic dysfunction, left ventricle, Atrial tachycardia, paroxysmal (H)       mometasone 220 MCG/INH Inhaler    ASMANEX     Inhale 2 puffs into the lungs daily.        ONETOUCH ULTRA test strip   Generic drug:  blood glucose monitoring     100 each    USE TO TEST BLOOD SUGAR TWICE A DAY OR AS DIRECTED    Type 2 diabetes mellitus with hyperglycemia, without long-term current use of insulin (H)       pioglitazone 30 MG tablet    ACTOS    90 tablet    TAKE ONE TABLET BY MOUTH EVERY DAY    Type 2 diabetes mellitus with hyperglycemia, without long-term  current use of insulin (H)       * PROAIR  (90 Base) MCG/ACT Inhaler   Generic drug:  albuterol      Inhale 2 puffs into the lungs every 6 hours Reported on 3/2/2017        * albuterol (2.5 MG/3ML) 0.083% neb solution     1 Box    Take 1 vial (2.5 mg) by nebulization every 6 hours as needed for shortness of breath / dyspnea or wheezing Reported on 3/2/2017    COPD exacerbation (H)       ranitidine 150 MG tablet    ZANTAC    60 tablet    Take 1 tablet (150 mg) by mouth nightly as needed for heartburn    Hiatal hernia       simvastatin 20 MG tablet    ZOCOR    90 tablet    TAKE ONE TABLET BY MOUTH AT BEDTIME. NEEDS APPOINTMENT    Hyperlipidemia LDL goal <100       tiotropium 18 MCG capsule    SPIRIVA     Inhale 18 mcg into the lungs daily.        TRUEPLUS LANCETS 30G Misc     100 each    USE TO TEST BLOOD SUGAR 2 TO 3 TIMES A DAY.    Type 2 diabetes, HbA1C goal < 8% (H)       TYLENOL PM EXTRA STRENGTH  MG tablet   Generic drug:  diphenhydrAMINE-acetaminophen      Take 2 tablets by mouth At Bedtime        VITAMIN D (CHOLECALCIFEROL) PO      Take 2,000 Units by mouth daily        warfarin 3 MG tablet    COUMADIN    90 tablet    TAKE ONE TABLET BY MOUTH EVERY DAY, EXCEPT 1/2 TABLET ON FRIDAYS    Chronic atrial fibrillation (H)       * Notice:  This list has 2 medication(s) that are the same as other medications prescribed for you. Read the directions carefully, and ask your doctor or other care provider to review them with you.

## 2018-07-12 NOTE — PROGRESS NOTES
"The patient is a. 84 year old  white female  postmenopausal not sexually active , not on HRT whom I am asked to see by Venu TAYLOR for evaluation of pelvic floor prolapse and inability to adequately empty her bladder as well as urinary tract infection symptoms.  Pregnancy history includes vaginal delivery ×5 the largest 9 lbs. 2 oz., 1 stillborn at \"8 months gestation\" and 1 spontaneous AB, no instrumentation, no known history of bladder or bowel injury. Patient complains of occasional urinary leakage with coughing straining and sneezing as well as incontinence when she gets up from a chair.  Patient has urge incontinence in the morning on getting up from bed and sometimes does not make it to the bathroom.  The patient states that she always wears constant protection.  She was recently seen in the office for \"bladder pain\" had a urine analysis done showing pyuria with a negative culture.  The patient had been started on antibiotics and her symptoms got better.  On further questioning the patient states that her symptoms were more of an overdistended bladder rather than actual true UTI.  The patient states that she often has a sensation of not being able to completely empty particularly if she is constipated.  The patient saw Dr. Francois in the past and reportedly was treated with a pessary but that the pessary fell out.  The patient states that in the past she is only had intercourse a couple of times a year.  He states that her  had an affair with a male and has never been truly interested in intercourse.  She and her  have had separate bedrooms for years.  She is not interested in divorce or separation    Asthma: History of COPD sarcoid and bronchospasm uses an inhaler as chronic cough  Smoking: Denies until the recent UTI states that she has had  Recurrent UTIs: 1 or 2 in her lifetime  Hematuria: Denies  Diabetes: Type II diabetic treated with oral medication  Related medication usage: "   Current Outpatient Prescriptions   Medication     metFORMIN (GLUCOPHAGE) 500 MG tablet     albuterol (2.5 MG/3ML) 0.083% neb solution     albuterol (ALBUTEROL) 108 (90 BASE) MCG/ACT inhaler     ascorbic acid (VITAMIN C) 500 MG tablet     ASPIRIN EC PO     calcium carb 1250 mg, 500 mg [place],/vitamin D 200 units (OSCAL WITH D) 500-200 MG-UNIT per tablet     Cyanocobalamin (B-12) 1000 MCG CAPS     diphenhydrAMINE-acetaminophen (TYLENOL PM EXTRA STRENGTH)  MG tablet     ferrous sulfate 325 (65 FE) MG tablet     glipiZIDE (GLUCOTROL XL) 10 MG 24 hr tablet     hydrochlorothiazide (MICROZIDE) 12.5 MG capsule     levothyroxine (SYNTHROID/LEVOTHROID) 88 MCG tablet     lisinopril (PRINIVIL/ZESTRIL) 40 MG tablet     LOPERAMIDE HCL PO     metoprolol tartrate (LOPRESSOR) 50 MG tablet     mometasone (ASMANEX) 220 MCG/INH inhaler     ONETOUCH ULTRA test strip     pioglitazone (ACTOS) 30 MG tablet     polycarbophil (FIBERCON) 625 MG tablet     ranitidine (ZANTAC) 150 MG tablet     simvastatin (ZOCOR) 20 MG tablet     tiotropium (SPIRIVA) 18 MCG inhalation capsule     TRUEPLUS LANCETS 30G MISC     VITAMIN D, CHOLECALCIFEROL, PO     warfarin (COUMADIN) 3 MG tablet     No current facility-administered medications for this visit.        Incontinence of stool or flatus: Had incontinence of stool when she had diarrhea with taking metformin  Symptoms of pelvic relaxation/prolapse: Has been noted to have a grade 3 cystocele grade 2 uterine prolapse dating back at least as early as 2013.  Patient states that it has been present for many years in reviewing the records from unable to see exactly what type of pessaries were used in the past  Voiding or defacatory dysfunction: Inability to empty occasionally does use a finger and repositions.  Notices with constipation is more difficult to empty  Previous evaluation: As above  Kegel exercises: Did not help  Nocturia: 3  Fluids: 1/2 cups of coffee in the morning, 4 ounces of soda pop a  week, no alcohol, no excess water  Urgency: Yes with leakage as well as involuntary loss with getting up out of a chair    Past Medical History:   Diagnosis Date     Atrial fibrillation (H)     history of atrial fibrillation     CAD (coronary artery disease)      Congestive heart failure (CHF) (H)      COPD (chronic obstructive pulmonary disease) (H)      Diabetes mellitus (H)     Oral medication treated.     Hiatal hernia     Documented quite large by CT 2011.     Hypercholesterolemia     Per mention in notes, but no recent chol panel noted.  She does take simvastatin.     Hypertension      Hypothyroidism      Postmenopausal bleeding 2012     Sarcoid      Past Surgical History:   Procedure Laterality Date     ANKLE SURGERY       DILATION AND CURETTAGE, HYSTEROSCOPY DIAGNOSTIC, COMBINED  2012    Procedure: COMBINED DILATION AND CURETTAGE, HYSTEROSCOPY DIAGNOSTIC;   DILATION AND CURETTAGE, Operative  HYSTEROSCOPY ;  Surgeon: Justa Francois MD;  Location: [facility] OR     ENT SURGERY       ESOPHAGOSCOPY, GASTROSCOPY, DUODENOSCOPY (EGD), COMBINED N/A 2017    Procedure: COMBINED ESOPHAGOSCOPY, GASTROSCOPY, DUODENOSCOPY (EGD);  Surgeon: Johnny Stratton MD;  Location:  GI     ESOPHAGOSCOPY, GASTROSCOPY, DUODENOSCOPY (EGD), COMBINED N/A 2017    Procedure: COMBINED ESOPHAGOSCOPY, GASTROSCOPY, DUODENOSCOPY (EGD), BIOPSY SINGLE OR MULTIPLE;  Surgeon: Johnny tSratton MD;  Location:  GI     Family History   Problem Relation Age of Onset     Asthma Mother 42     smoker  age 42 of compications of asthma     Diabetes Father       in 70's     Other - See Comments Other      Denies cardiac.     Social History     Social History     Marital status:      Spouse name: N/A     Number of children: N/A     Years of education: N/A     Occupational History     retired      Social History Main Topics     Smoking status: Never Smoker     Smokeless tobacco: Never Used     Alcohol use Yes       Comment: Extremely rare use in small amounts.     Drug use: No     Sexual activity: No     Other Topics Concern     Parent/Sibling W/ Cabg, Mi Or Angioplasty Before 65f 55m? No     Social History Narrative    , lives with her .  Had 5 children, one is .  2 children live locally, one lives in [place], one lives in [place].  Volunteers actively including [employer].     Vitals: /60  Pulse 76  Wt 188 lb 12.8 oz (85.6 kg)  BMI 32.41 kg/m2  BMI= Body mass index is 32.41 kg/(m^2).    Constitutional: healthy, alert and no distress  Head: Normocephalic. No masses, lesions, tenderness or abnormalities  Neck: Neck supple. No adenopathy. Thyroid symmetric, normal size,, Carotids without bruits.  ENT: NEGATIVE for ear, mouth and throat problems  Cardiovascular: negative, PMI normal. No lifts, heaves, or thrills. RRR. No murmurs, clicks gallops or rub  Respiratory: negative, Percussion normal. Good diaphragmatic excursion. Lungs clear  Gastrointestinal: Abdomen soft, non-tender. BS normal. No masses, organomegaly truncal obesity  Genitourinary: Normal external genitalia without lesions and greater than 30  of UV angle hypermobility, grade 3 cystocele with bilateral paravaginal defect.  Grade 2 uterine descent, no leakage with coughing or straining, speculum mildly atrophic vaginal epithelium no lesions seen multipara cervix no lesions bimanual exam the uterus is parous mobile firm adnexa without masses enlargement or tenderness, normal anal wink normal sphincter tone, mild grade 1 rectocele, the patient was able to void only a few drops of urine and had a postvoid residual of 220 cc specimen was sent for urinalysis and culture  Musculoskeletalextremities normal- no gross deformities noted, gait normal, normal muscle tone and grade 2 ankle edema  Integument: no suspicious lesions or rashes  Neurologic: Gait normal. Reflexes normal and symmetric. Sensation grossly WNL.  Psychiatric: mentation  appears normal and affect normal/bright    (R30.0) Dysuria  (primary encounter diagnosis)  Comment: Urine specimen sent for analysis and culture  Plan: UA reflex to Microscopic, Urine Culture Aerobic        Bacterial        As above    (R33.9) Incomplete bladder emptying  Comment: I feel the incomplete emptying is related to her grade 3 cystocele and uterine prolapse with resulting urethral obstruction and bladder neck obstruction.  This leads to over distention and becomes a risk factor for UTIs.  I had a lengthy discussion with the patient today regarding these findings and the risks benefits and alternative forms of therapy.  We discussed medical management versus surgical management.  I reviewed her medical comorbidities including anticoagulant therapy and my hesitancy for surgical intervention  Plan: I discussed double voiding every 2 hours while awake and using a finger to reduce the cystocele and uterine prolapse as well as eliminating bladder irritants.  I did ask her to return to consider an alternative Gellhorn pessary to reduce the prolapse and hopefully improve bladder emptying.  The patient states that she is amenable to try this.  I went through a detailed written outline today with her and answered all her questions    (N39.46) Mixed incontinence urge and stress (male)(female)  Comment: I feel that if we can improve bladder emptying her urge and stress incontinence may improve to an acceptable level were no additional therapy is necessary  Plan: Patient is amenable to try this plan    (N81.11) Cystocele, midline  Comment: As above  Plan: As above    (N81.4) Uterine prolapse  Comment: If the pessary does not provide adequate relief of symptoms may need to consider colpocleisis.  In light Of her medical comorbidities I am hesitant to proceed with sacral colpopexy a more lengthy surgical intervention in light  Plan: Detailed written plan given.  All questions answered informed consent  obtained        Long Sinha M.D.

## 2018-07-13 LAB
BACTERIA SPEC CULT: NO GROWTH
SPECIMEN SOURCE: NORMAL

## 2018-07-16 ENCOUNTER — OFFICE VISIT (OUTPATIENT)
Dept: OBGYN | Facility: CLINIC | Age: 83
End: 2018-07-16
Payer: COMMERCIAL

## 2018-07-16 VITALS — WEIGHT: 190 LBS | DIASTOLIC BLOOD PRESSURE: 64 MMHG | SYSTOLIC BLOOD PRESSURE: 126 MMHG | BODY MASS INDEX: 32.61 KG/M2

## 2018-07-16 DIAGNOSIS — N81.4 UTERINE PROLAPSE: Primary | ICD-10-CM

## 2018-07-16 DIAGNOSIS — N81.11 CYSTOCELE, MIDLINE: ICD-10-CM

## 2018-07-16 LAB
ALBUMIN UR-MCNC: NEGATIVE MG/DL
APPEARANCE UR: CLEAR
BILIRUB UR QL STRIP: NEGATIVE
COLOR UR AUTO: YELLOW
GLUCOSE UR STRIP-MCNC: NEGATIVE MG/DL
HGB UR QL STRIP: ABNORMAL
KETONES UR STRIP-MCNC: NEGATIVE MG/DL
LEUKOCYTE ESTERASE UR QL STRIP: NEGATIVE
NITRATE UR QL: NEGATIVE
NON-SQ EPI CELLS #/AREA URNS LPF: NORMAL /LPF
PH UR STRIP: 7 PH (ref 5–7)
RBC #/AREA URNS AUTO: NORMAL /HPF
SOURCE: ABNORMAL
SP GR UR STRIP: 1.01 (ref 1–1.03)
UROBILINOGEN UR STRIP-ACNC: 0.2 EU/DL (ref 0.2–1)
WBC #/AREA URNS AUTO: NORMAL /HPF

## 2018-07-16 PROCEDURE — 81001 URINALYSIS AUTO W/SCOPE: CPT | Performed by: OBSTETRICS & GYNECOLOGY

## 2018-07-16 PROCEDURE — 99213 OFFICE O/P EST LOW 20 MIN: CPT | Performed by: OBSTETRICS & GYNECOLOGY

## 2018-07-16 NOTE — NURSING NOTE
"Chief Complaint   Patient presents with     Pessary Check/Fit/Insert       Initial /64  Wt 190 lb (86.2 kg)  BMI 32.61 kg/m2 Estimated body mass index is 32.61 kg/(m^2) as calculated from the following:    Height as of 18: 5' 4\" (1.626 m).    Weight as of this encounter: 190 lb (86.2 kg).  BP completed using cuff size: regular        The following HM Due: NONE      The following patient reported/Care Every where data was sent to:  P ABSTRACT QUALITY INITIATIVES [[id number]]        Roxy Calvo CMA                 "

## 2018-07-16 NOTE — MR AVS SNAPSHOT
After Visit Summary   7/16/2018    Maryalice Rebecca Wachter    MRN: [id number]           Patient Information     Date Of Birth          11/11/1933        Visit Information        Provider Department      7/16/2018 1:30 PM Long Sinha MD [facility]        Today's Diagnoses     Uterine prolapse    -  1    Cystocele, midline          Care Instructions    You can reach your [place] Care Team any time of the day by calling [phone]. This number will put you in touch with the 24 hour nurse line if the clinic is closed.    To contact your OB/GYN Station Coordinator/Surgery Scheduler please call [phone]. This is a direct number for your care team between 8 a.m. and 4 p.m. Monday through Friday.    [facility] is open for your convenience:  Monday through Friday 8 a.m. to 6 p.m.  Closed weekends and all major holidays.            Follow-ups after your visit        Your next 10 appointments already scheduled     Jul 17, 2018  4:30 PM CDT   [facility]    [address] [place] [address] [phone]           Bring a current list of meds and any records pertaining to this visit. For Physicals, please bring immunization records and any forms needing to be filled out. Please arrive 10 minutes early to complete paperwork.            Aug 03, 2018  9:30 AM CDT   Anticoagulation Visit with FM RN VISITS   [facility]    [address] [place] [address], [address]  [facility] [address] [phone]            Oct 15, 2018  1:00 PM CDT   SHORT with Janey Romero RPH   [facility])    [address] [facility] [address] [phone]              Who to contact     If you have questions or need follow up information about today's clinic visit or your schedule please contact  [facility] directly at [phone].  Normal or non-critical lab and imaging results will be communicated to you by MyChart, letter or phone within 4 business days after the clinic has received the results. If you do not hear from us within 7 days, please contact the clinic through MyChart or phone. If you have a critical or abnormal lab result, we will notify you by phone as soon as possible.  Submit refill requests through [employer] or call your pharmacy and they will forward the refill request to us. Please allow 3 business days for your refill to be completed.          Additional Information About Your Visit        [employer] Information     [employer] gives you secure access to your electronic health record. If you see a primary care provider, you can also send messages to your care team and make appointments. If you have questions, please call your primary care clinic.  If you do not have a primary care provider, please call [phone] and they will assist you.        Care EveryWhere ID     This is your Care EveryWhere ID. This could be used by other organizations to access your [place] medical records  [id number]        Your Vitals Were     BMI (Body Mass Index)                   32.61 kg/m2            Blood Pressure from Last 3 Encounters:   07/16/18 126/64   07/12/18 128/60   06/25/18 110/64    Weight from Last 3 Encounters:   07/16/18 190 lb (86.2 kg)   07/12/18 188 lb 12.8 oz (85.6 kg)   06/25/18 189 lb (85.7 kg)              We Performed the Following     UA reflex to Microscopic and Culture     Urine Microscopic        Primary Care Provider Office Phone # Fax #    Venu Maria PA-C [phone]       [id number]  [facility] [facility] [address]        Equal Access to Services     [facility] DMITRY : Hadii carson Martinez, waaxda lukarenadaha, qaybta kaalmakatrin shepherd. So [facility] [phone].    ATENCIÓN: mg Lopez  talamantes disposición servicios gratuitos de asistencia lingüística. Hansel mclean [phone].    We comply with applicable federal civil rights laws and [place] laws. We do not discriminate on the basis of race, color, national origin, age, disability, sex, sexual orientation, or gender identity.            Thank you!     Thank you for choosing [facility]  for your care. Our goal is always to provide you with excellent care. Hearing back from our patients is one way we can continue to improve our services. Please take a few minutes to complete the written survey that you may receive in the mail after your visit with us. Thank you!             Your Updated Medication List - Protect others around you: Learn how to safely use, store and throw away your medicines at www.disposemymeds.org.          This list is accurate as of 7/16/18 11:59 PM.  Always use your most recent med list.                   Brand Name Dispense Instructions for use Diagnosis    ascorbic acid 500 MG tablet    VITAMIN C     Take 500 mg by mouth daily.        ASPIRIN EC PO      Take 81 mg by mouth.        B-12 1000 MCG Caps      Take 1 capsule by mouth 2 times daily        Calcium carb-Vitamin D 500 mg Ute-200 units 500-200 MG-UNIT per tablet    OSCAL with D;Oyster Shell Calcium     Take 1 tablet by mouth daily.        ferrous sulfate 325 (65 Fe) MG tablet    IRON     Take 1 tablet by mouth daily (with breakfast).        FIBERCON 625 MG tablet   Generic drug:  calcium polycarbophil      Take 1 tablet by mouth 2 times daily.        glipiZIDE 10 MG 24 hr tablet    GLUCOTROL XL    180 tablet    Take 1 tablet (10 mg) by mouth 2 times daily Due to see provider in August    Type 2 diabetes mellitus with hyperglycemia, without long-term current use of insulin (H)       hydrochlorothiazide 12.5 MG capsule    MICROZIDE    90 capsule    Take 1 capsule (12.5 mg) by mouth every morning    Edema, unspecified type       levothyroxine 88 MCG tablet     SYNTHROID/LEVOTHROID    90 tablet    TAKE ONE TABLET BY MOUTH EVERY DAY    Hypothyroidism, unspecified type       lisinopril 40 MG tablet    PRINIVIL/ZESTRIL    90 tablet    TAKE ONE TABLET BY MOUTH EVERY DAY    Systolic dysfunction, left ventricle, Past myocardial infarction       LOPERAMIDE HCL PO      1/2 to 1 tablet by mouth as needed.        metFORMIN 500 MG tablet    GLUCOPHAGE    360 tablet    TAKE TWO TABLETS BY MOUTH TWICE A DAY WITH MEALS    Type 2 diabetes mellitus with hyperglycemia, without long-term current use of insulin (H)       metoprolol tartrate 50 MG tablet    LOPRESSOR    180 tablet    TAKE ONE TABLET BY MOUTH TWICE A DAY    Past myocardial infarction, Systolic dysfunction, left ventricle, Atrial tachycardia, paroxysmal (H)       mometasone 220 MCG/INH Inhaler    ASMANEX     Inhale 2 puffs into the lungs daily.        ONETOUCH ULTRA test strip   Generic drug:  blood glucose monitoring     100 each    USE TO TEST BLOOD SUGAR TWICE A DAY OR AS DIRECTED    Type 2 diabetes mellitus with hyperglycemia, without long-term current use of insulin (H)       pioglitazone 30 MG tablet    ACTOS    90 tablet    TAKE ONE TABLET BY MOUTH EVERY DAY    Type 2 diabetes mellitus with hyperglycemia, without long-term current use of insulin (H)       * PROAIR  (90 Base) MCG/ACT Inhaler   Generic drug:  albuterol      Inhale 2 puffs into the lungs every 6 hours Reported on 3/2/2017        * albuterol (2.5 MG/3ML) 0.083% neb solution     1 Box    Take 1 vial (2.5 mg) by nebulization every 6 hours as needed for shortness of breath / dyspnea or wheezing Reported on 3/2/2017    COPD exacerbation (H)       ranitidine 150 MG tablet    ZANTAC    60 tablet    Take 1 tablet (150 mg) by mouth nightly as needed for heartburn    Hiatal hernia       simvastatin 20 MG tablet    ZOCOR    90 tablet    TAKE ONE TABLET BY MOUTH AT BEDTIME. NEEDS APPOINTMENT    Hyperlipidemia LDL goal <100       tiotropium 18 MCG capsule     SPIRIVA     Inhale 18 mcg into the lungs daily.        TRUEPLUS LANCETS 30G Misc     100 each    USE TO TEST BLOOD SUGAR 2 TO 3 TIMES A DAY.    Type 2 diabetes, HbA1C goal < 8% (H)       TYLENOL PM EXTRA STRENGTH  MG tablet   Generic drug:  diphenhydrAMINE-acetaminophen      Take 2 tablets by mouth At Bedtime        VITAMIN D (CHOLECALCIFEROL) PO      Take 2,000 Units by mouth daily        warfarin 3 MG tablet    COUMADIN    90 tablet    TAKE ONE TABLET BY MOUTH EVERY DAY, EXCEPT 1/2 TABLET ON FRIDAYS    Chronic atrial fibrillation (H)       * Notice:  This list has 2 medication(s) that are the same as other medications prescribed for you. Read the directions carefully, and ask your doctor or other care provider to review them with you.

## 2018-07-17 NOTE — PATIENT INSTRUCTIONS
You can reach your [place] Care Team any time of the day by calling [phone]. This number will put you in touch with the 24 hour nurse line if the clinic is closed.    To contact your OB/GYN Station Coordinator/Surgery Scheduler please call [phone]. This is a direct number for your care team between 8 a.m. and 4 p.m. Monday through Friday.    [facility] is open for your convenience:  Monday through Friday 8 a.m. to 6 p.m.  Closed weekends and all major holidays.

## 2018-07-17 NOTE — PROGRESS NOTES
"The patient is a. 84 year old  white female  postmenopausal not sexually active , not on HRT whom I am asked to see by Venu TAYLOR for evaluation of pelvic floor prolapse and inability to adequately empty her bladder as well as urinary tract infection symptoms.  Pregnancy history includes vaginal delivery ×5 the largest 9 lbs. 2 oz., 1 stillborn at \"8 months gestation\" and 1 spontaneous AB, no instrumentation, no known history of bladder or bowel injury. Patient complains of occasional urinary leakage with coughing straining and sneezing as well as incontinence when she gets up from a chair.  Patient has urge incontinence in the morning on getting up from bed and sometimes does not make it to the bathroom.  The patient states that she always wears constant protection.  On 18 she was seen in the office and noted to have the following    Normal external genitalia without lesions and greater than 30  of UV angle hypermobility, grade 3 cystocele with bilateral paravaginal defect.  Grade 2 uterine descent, no leakage with coughing or straining, speculum mildly atrophic vaginal epithelium no lesions seen multipara cervix no lesions bimanual exam the uterus is parous mobile firm adnexa without masses enlargement or tenderness, normal anal wink normal sphincter tone, mild grade 1 rectocele, the patient was able to void only a few drops of urine and had a postvoid residual of 220 cc specimen was sent for urinalysis and culture which other than glucosuria was neg.  We discussed alternative Rx options and because of medical co morbities the pt would prefer a non surgical approach.  We discussed a pessary and her past hx with these.  She presented today for consideration of a pessary placement.  In the interval since her last visit she has been double voiding q 2 hrs while awake and using a finger to reduce the cystocele and uterine prolapse.  She presents today for f/u.  She states that her urinary sx have " dramatically improved.  She states that she can make it to the BR in the am now without leakage, notes that her urgency and frequency are much better and finds her related pelvic floor relaxation sx much less of a bother    Past Medical History:   Diagnosis Date     Atrial fibrillation (H)     history of atrial fibrillation     CAD (coronary artery disease)      Congestive heart failure (CHF) (H)      COPD (chronic obstructive pulmonary disease) (H)      Diabetes mellitus (H)     Oral medication treated.     Hiatal hernia     Documented quite large by CT Fall 2011.     Hypercholesterolemia     Per mention in notes, but no recent chol panel noted.  She does take simvastatin.     Hypertension      Hypothyroidism      Postmenopausal bleeding 8/23/2012     Sarcoid      Past Surgical History:   Procedure Laterality Date     ANKLE SURGERY       DILATION AND CURETTAGE, HYSTEROSCOPY DIAGNOSTIC, COMBINED  8/23/2012    Procedure: COMBINED DILATION AND CURETTAGE, HYSTEROSCOPY DIAGNOSTIC;   DILATION AND CURETTAGE, Operative  HYSTEROSCOPY ;  Surgeon: Justa Francois MD;  Location:  OR     ENT SURGERY       ESOPHAGOSCOPY, GASTROSCOPY, DUODENOSCOPY (EGD), COMBINED N/A 4/13/2017    Procedure: COMBINED ESOPHAGOSCOPY, GASTROSCOPY, DUODENOSCOPY (EGD);  Surgeon: Johnny Stratton MD;  Location:  GI     ESOPHAGOSCOPY, GASTROSCOPY, DUODENOSCOPY (EGD), COMBINED N/A 4/13/2017    Procedure: COMBINED ESOPHAGOSCOPY, GASTROSCOPY, DUODENOSCOPY (EGD), BIOPSY SINGLE OR MULTIPLE;  Surgeon: Johnny Stratton MD;  Location:  GI      ROS: 10 point ROS neg other than the symptoms noted above in the HPI.  /64  Wt 190 lb (86.2 kg)  BMI 32.61 kg/m2  Constitutional: healthy, alert and no distress  Genitourinary: Normal external genitalia without lesions and the pt voided 190 cc and had a PVR of 40 cc    (N81.4) Uterine prolapse  (primary encounter diagnosis)  Comment: pt finds that voiding q 2hrs and USING A FINGER TO REDUCE her  cystocele and uterine descent have dramatically improved her urinary sx to the point that she is declining a pessary or further Rx at this time  Plan: UA reflex to Microscopic and Culture, Urine         Microscopic        Recheck done today and is neg    (N81.11) Cystocele, midline  Comment: as above  Pt states her urinary retention is dramatically improved  Plan: UA reflex to Microscopic and Culture        Will continue with frequent voids with using a finger and repositioning to aid in emptying  RTC prn questions or concerns

## 2018-07-18 RX ORDER — LISINOPRIL 40 MG/1
TABLET ORAL
Qty: 90 TABLET | Refills: 0 | Status: SHIPPED | OUTPATIENT
Start: 2018-07-18 | End: 2018-10-11

## 2018-07-19 ENCOUNTER — OFFICE VISIT (OUTPATIENT)
Dept: URGENT CARE | Facility: URGENT CARE | Age: 83
End: 2018-07-19
Payer: COMMERCIAL

## 2018-07-19 VITALS
HEART RATE: 78 BPM | OXYGEN SATURATION: 91 % | HEIGHT: 64 IN | TEMPERATURE: 100 F | WEIGHT: 191.2 LBS | RESPIRATION RATE: 18 BRPM | BODY MASS INDEX: 32.64 KG/M2 | SYSTOLIC BLOOD PRESSURE: 128 MMHG | DIASTOLIC BLOOD PRESSURE: 68 MMHG

## 2018-07-19 DIAGNOSIS — J01.90 ACUTE SINUSITIS WITH SYMPTOMS > 10 DAYS: Primary | ICD-10-CM

## 2018-07-19 PROCEDURE — 99214 OFFICE O/P EST MOD 30 MIN: CPT | Performed by: PHYSICIAN ASSISTANT

## 2018-07-19 RX ORDER — CEFUROXIME AXETIL 250 MG/1
250 TABLET ORAL 2 TIMES DAILY
Qty: 14 TABLET | Refills: 0 | Status: ON HOLD | OUTPATIENT
Start: 2018-07-19 | End: 2018-07-23

## 2018-07-19 NOTE — MR AVS SNAPSHOT
After Visit Summary   7/19/2018    Maryalice Rebecca Wachter    MRN: [id number]           Patient Information     Date Of Birth          11/11/1933        Visit Information        Provider Department      7/19/2018 3:10 PM Mira Snachez PA-C [facility]        Today's Diagnoses     Acute sinusitis with symptoms > 10 days    -  1       Follow-ups after your visit        Your next 10 appointments already scheduled     Aug 03, 2018  9:30 AM CDT   Anticoagulation Visit with FM RN VISITS   [facility]    [address] [place] [address], [address]  [facility] [address] [phone]            Oct 15, 2018  1:00 PM CDT   SHORT with Janey Romero RPH   [facility] ([facility])    [address] [facility] [address] [phone]              Who to contact     If you have questions or need follow up information about today's clinic visit or your schedule please contact [facility] directly at [phone].  Normal or non-critical lab and imaging results will be communicated to you by Taquahart, letter or phone within 4 business days after the clinic has received the results. If you do not hear from us within 7 days, please contact the clinic through Taquahart or phone. If you have a critical or abnormal lab result, we will notify you by phone as soon as possible.  Submit refill requests through CoachUp or call your pharmacy and they will forward the refill request to us. Please allow 3 business days for your refill to be completed.          Additional Information About Your Visit        MyChart Information     CoachUp gives you secure access to your electronic health record. If you see a primary care provider, you can also send messages to your care team and make appointments. If you have questions, please call your primary care clinic.  If you do not have a primary care  "provider, please call [phone] and they will assist you.        Care EveryWhere ID     This is your Care EveryWhere ID. This could be used by other organizations to access your [facility] medical records  [id number]        Your Vitals Were     Pulse Temperature Respirations Height Pulse Oximetry BMI (Body Mass Index)    78 100  F (37.8  C) (Oral) 18 5' 4\" (1.626 m) 91% 32.82 kg/m2       Blood Pressure from Last 3 Encounters:   07/19/18 128/68   07/16/18 126/64   07/12/18 128/60    Weight from Last 3 Encounters:   07/19/18 191 lb 3.2 oz (86.7 kg)   07/16/18 190 lb (86.2 kg)   07/12/18 188 lb 12.8 oz (85.6 kg)              Today, you had the following     No orders found for display         Today's Medication Changes          These changes are accurate as of 7/19/18 11:59 PM.  If you have any questions, ask your nurse or doctor.               Start taking these medicines.        Dose/Directions    cefuroxime 250 MG tablet   Commonly known as:  CEFTIN   Used for:  Acute sinusitis with symptoms > 10 days   Started by:  Mira Sanchez PA-C        Dose:  250 mg   Take 1 tablet (250 mg) by mouth 2 times daily for 7 days   Quantity:  14 tablet   Refills:  0            Where to get your medicines      These medications were sent to [id number]     Phone:  [phone]     cefuroxime 250 MG tablet                Primary Care Provider Office Phone # Fax #    Venu Maria PA-C [phone]       [id number]  [address] [facility] [address]        Equal Access to Services     [facility] HospitalOWEN AH: Maribel Martinez, wastephenda luqadaha, qaybta kaalmada jamarcus, katrin elizabeth. So [facility] [phone].    ATENCIÓN: Si habla español, tiene a talamantes disposición servicios gratuitos de asistencia lingüística. Llame al [phone].    We comply with applicable federal civil rights laws and " [place] laws. We do not discriminate on the basis of race, color, national origin, age, disability, sex, sexual orientation, or gender identity.            Thank you!     Thank you for choosing [facility]  for your care. Our goal is always to provide you with excellent care. Hearing back from our patients is one way we can continue to improve our services. Please take a few minutes to complete the written survey that you may receive in the mail after your visit with us. Thank you!             Your Updated Medication List - Protect others around you: Learn how to safely use, store and throw away your medicines at www.disposemymeds.org.          This list is accurate as of 7/19/18 11:59 PM.  Always use your most recent med list.                   Brand Name Dispense Instructions for use Diagnosis    ascorbic acid 500 MG tablet    VITAMIN C     Take 500 mg by mouth daily.        ASPIRIN EC PO      Take 81 mg by mouth.        B-12 1000 MCG Caps      Take 1 capsule by mouth 2 times daily        Calcium carb-Vitamin D 500 mg [place]-200 units 500-200 MG-UNIT per tablet    OSCAL with D;Oyster Shell Calcium     Take 1 tablet by mouth daily.        cefuroxime 250 MG tablet    CEFTIN    14 tablet    Take 1 tablet (250 mg) by mouth 2 times daily for 7 days    Acute sinusitis with symptoms > 10 days       ferrous sulfate 325 (65 Fe) MG tablet    IRON     Take 1 tablet by mouth daily (with breakfast).        FIBERCON 625 MG tablet   Generic drug:  calcium polycarbophil      Take 1 tablet by mouth 2 times daily.        glipiZIDE 10 MG 24 hr tablet    GLUCOTROL XL    180 tablet    Take 1 tablet (10 mg) by mouth 2 times daily Due to see provider in August    Type 2 diabetes mellitus with hyperglycemia, without long-term current use of insulin (H)       hydrochlorothiazide 12.5 MG capsule    MICROZIDE    90 capsule    Take 1 capsule (12.5 mg) by mouth every morning    Edema, unspecified type       levothyroxine 88  MCG tablet    SYNTHROID/LEVOTHROID    90 tablet    TAKE ONE TABLET BY MOUTH EVERY DAY    Hypothyroidism, unspecified type       lisinopril 40 MG tablet    PRINIVIL/ZESTRIL    90 tablet    TAKE ONE TABLET BY MOUTH EVERY DAY    Systolic dysfunction, left ventricle, Past myocardial infarction       LOPERAMIDE HCL PO      1/2 to 1 tablet by mouth as needed.        metFORMIN 500 MG tablet    GLUCOPHAGE    360 tablet    TAKE TWO TABLETS BY MOUTH TWICE A DAY WITH MEALS    Type 2 diabetes mellitus with hyperglycemia, without long-term current use of insulin (H)       metoprolol tartrate 50 MG tablet    LOPRESSOR    180 tablet    TAKE ONE TABLET BY MOUTH TWICE A DAY    Past myocardial infarction, Systolic dysfunction, left ventricle, Atrial tachycardia, paroxysmal (H)       mometasone 220 MCG/INH Inhaler    ASMANEX     Inhale 2 puffs into the lungs daily.        ONETOUCH ULTRA test strip   Generic drug:  blood glucose monitoring     100 each    USE TO TEST BLOOD SUGAR TWICE A DAY OR AS DIRECTED    Type 2 diabetes mellitus with hyperglycemia, without long-term current use of insulin (H)       pioglitazone 30 MG tablet    ACTOS    90 tablet    TAKE ONE TABLET BY MOUTH EVERY DAY    Type 2 diabetes mellitus with hyperglycemia, without long-term current use of insulin (H)       * PROAIR  (90 Base) MCG/ACT Inhaler   Generic drug:  albuterol      Inhale 2 puffs into the lungs every 6 hours Reported on 3/2/2017        * albuterol (2.5 MG/3ML) 0.083% neb solution     1 Box    Take 1 vial (2.5 mg) by nebulization every 6 hours as needed for shortness of breath / dyspnea or wheezing Reported on 3/2/2017    COPD exacerbation (H)       ranitidine 150 MG tablet    ZANTAC    60 tablet    Take 1 tablet (150 mg) by mouth nightly as needed for heartburn    Hiatal hernia       simvastatin 20 MG tablet    ZOCOR    90 tablet    TAKE ONE TABLET BY MOUTH AT BEDTIME. NEEDS APPOINTMENT    Hyperlipidemia LDL goal <100       tiotropium 18 MCG  capsule    SPIRIVA     Inhale 18 mcg into the lungs daily.        TRUEPLUS LANCETS 30G Misc     100 each    USE TO TEST BLOOD SUGAR 2 TO 3 TIMES A DAY.    Type 2 diabetes, HbA1C goal < 8% (H)       TYLENOL PM EXTRA STRENGTH  MG tablet   Generic drug:  diphenhydrAMINE-acetaminophen      Take 2 tablets by mouth At Bedtime        VITAMIN D (CHOLECALCIFEROL) PO      Take 2,000 Units by mouth daily        warfarin 3 MG tablet    COUMADIN    90 tablet    TAKE ONE TABLET BY MOUTH EVERY DAY, EXCEPT 1/2 TABLET ON FRIDAYS    Chronic atrial fibrillation (H)       * Notice:  This list has 2 medication(s) that are the same as other medications prescribed for you. Read the directions carefully, and ask your doctor or other care provider to review them with you.

## 2018-07-19 NOTE — TELEPHONE ENCOUNTER
Patient has an appointment on 8/3/2018 for INR.  Will get it done then.  Note put in appointment notes to get it done.  Cyndi Cabello RN

## 2018-07-20 ASSESSMENT — ENCOUNTER SYMPTOMS
SINUS PAIN: 1
DIARRHEA: 0
SHORTNESS OF BREATH: 1
HEADACHES: 0
SORE THROAT: 0
RHINORRHEA: 0
COUGH: 1
NAUSEA: 0
CHILLS: 0
SINUS PRESSURE: 1
VOMITING: 0
FEVER: 1

## 2018-07-21 ENCOUNTER — APPOINTMENT (OUTPATIENT)
Dept: GENERAL RADIOLOGY | Facility: CLINIC | Age: 83
DRG: 190 | End: 2018-07-21
Attending: EMERGENCY MEDICINE
Payer: COMMERCIAL

## 2018-07-21 ENCOUNTER — HOSPITAL ENCOUNTER (INPATIENT)
Facility: CLINIC | Age: 83
LOS: 2 days | Discharge: HOME | DRG: 190 | End: 2018-07-23
Attending: EMERGENCY MEDICINE | Admitting: INTERNAL MEDICINE
Payer: COMMERCIAL

## 2018-07-21 DIAGNOSIS — Z79.01 CHRONIC ANTICOAGULATION: ICD-10-CM

## 2018-07-21 DIAGNOSIS — R06.02 SOB (SHORTNESS OF BREATH): ICD-10-CM

## 2018-07-21 DIAGNOSIS — R50.9 FEBRILE ILLNESS, ACUTE: ICD-10-CM

## 2018-07-21 DIAGNOSIS — J01.00 ACUTE NON-RECURRENT MAXILLARY SINUSITIS: ICD-10-CM

## 2018-07-21 DIAGNOSIS — J20.9 ACUTE BRONCHITIS, UNSPECIFIED ORGANISM: Primary | ICD-10-CM

## 2018-07-21 DIAGNOSIS — G47.00 INSOMNIA, UNSPECIFIED TYPE: ICD-10-CM

## 2018-07-21 DIAGNOSIS — E03.9 HYPOTHYROIDISM, UNSPECIFIED TYPE: ICD-10-CM

## 2018-07-21 DIAGNOSIS — J44.1 COPD EXACERBATION (H): ICD-10-CM

## 2018-07-21 PROBLEM — R06.03 RESPIRATORY DISTRESS: Status: ACTIVE | Noted: 2018-07-21

## 2018-07-21 LAB
ANION GAP SERPL CALCULATED.3IONS-SCNC: 6 MMOL/L (ref 3–14)
BACTERIA SPEC CULT: NORMAL
BASE EXCESS BLDV CALC-SCNC: 5.9 MMOL/L
BASOPHILS # BLD AUTO: 0 10E9/L (ref 0–0.2)
BASOPHILS NFR BLD AUTO: 0.1 %
BUN SERPL-MCNC: 18 MG/DL (ref 7–30)
CALCIUM SERPL-MCNC: 8.9 MG/DL (ref 8.5–10.1)
CHLORIDE SERPL-SCNC: 99 MMOL/L (ref 94–109)
CO2 SERPL-SCNC: 31 MMOL/L (ref 20–32)
CREAT SERPL-MCNC: 1.09 MG/DL (ref 0.52–1.04)
DIFFERENTIAL METHOD BLD: ABNORMAL
EOSINOPHIL # BLD AUTO: 0.1 10E9/L (ref 0–0.7)
EOSINOPHIL NFR BLD AUTO: 0.6 %
ERYTHROCYTE [DISTWIDTH] IN BLOOD BY AUTOMATED COUNT: 14.6 % (ref 10–15)
GFR SERPL CREATININE-BSD FRML MDRD: 48 ML/MIN/1.7M2
GLUCOSE BLDC GLUCOMTR-MCNC: 210 MG/DL (ref 70–99)
GLUCOSE BLDC GLUCOMTR-MCNC: 215 MG/DL (ref 70–99)
GLUCOSE SERPL-MCNC: 196 MG/DL (ref 70–99)
HBA1C MFR BLD: 7.4 % (ref 0–5.6)
HCO3 BLDV-SCNC: 31 MMOL/L (ref 21–28)
HCT VFR BLD AUTO: 31.9 % (ref 35–47)
HGB BLD-MCNC: 10.2 G/DL (ref 11.7–15.7)
IMM GRANULOCYTES # BLD: 0 10E9/L (ref 0–0.4)
IMM GRANULOCYTES NFR BLD: 0.5 %
INR PPP: 2.44 (ref 0.86–1.14)
LACTATE BLD-SCNC: 1.1 MMOL/L (ref 0.7–2)
LYMPHOCYTES # BLD AUTO: 1.6 10E9/L (ref 0.8–5.3)
LYMPHOCYTES NFR BLD AUTO: 18.3 %
Lab: NORMAL
MCH RBC QN AUTO: 30.9 PG (ref 26.5–33)
MCHC RBC AUTO-ENTMCNC: 32 G/DL (ref 31.5–36.5)
MCV RBC AUTO: 97 FL (ref 78–100)
MONOCYTES # BLD AUTO: 0.9 10E9/L (ref 0–1.3)
MONOCYTES NFR BLD AUTO: 10.9 %
NEUTROPHILS # BLD AUTO: 5.9 10E9/L (ref 1.6–8.3)
NEUTROPHILS NFR BLD AUTO: 69.6 %
NRBC # BLD AUTO: 0 10*3/UL
NRBC BLD AUTO-RTO: 0 /100
O2/TOTAL GAS SETTING VFR VENT: ABNORMAL %
OXYHGB MFR BLDV: 90 %
PCO2 BLDV: 46 MM HG (ref 40–50)
PH BLDV: 7.44 PH (ref 7.32–7.43)
PLATELET # BLD AUTO: 268 10E9/L (ref 150–450)
PO2 BLDV: 59 MM HG (ref 25–47)
POTASSIUM SERPL-SCNC: 3.8 MMOL/L (ref 3.4–5.3)
RBC # BLD AUTO: 3.3 10E12/L (ref 3.8–5.2)
SODIUM SERPL-SCNC: 136 MMOL/L (ref 133–144)
SPECIMEN SOURCE: NORMAL
WBC # BLD AUTO: 8.5 10E9/L (ref 4–11)

## 2018-07-21 PROCEDURE — 25000131 ZZH RX MED GY IP 250 OP 636 PS 637: Performed by: INTERNAL MEDICINE

## 2018-07-21 PROCEDURE — 82805 BLOOD GASES W/O2 SATURATION: CPT | Performed by: EMERGENCY MEDICINE

## 2018-07-21 PROCEDURE — 94640 AIRWAY INHALATION TREATMENT: CPT

## 2018-07-21 PROCEDURE — 96365 THER/PROPH/DIAG IV INF INIT: CPT

## 2018-07-21 PROCEDURE — 93005 ELECTROCARDIOGRAM TRACING: CPT

## 2018-07-21 PROCEDURE — 25000128 H RX IP 250 OP 636: Performed by: EMERGENCY MEDICINE

## 2018-07-21 PROCEDURE — 83605 ASSAY OF LACTIC ACID: CPT | Performed by: EMERGENCY MEDICINE

## 2018-07-21 PROCEDURE — 40000275 ZZH STATISTIC RCP TIME EA 10 MIN

## 2018-07-21 PROCEDURE — 99285 EMERGENCY DEPT VISIT HI MDM: CPT | Mod: 25

## 2018-07-21 PROCEDURE — 25000125 ZZHC RX 250: Performed by: EMERGENCY MEDICINE

## 2018-07-21 PROCEDURE — 83036 HEMOGLOBIN GLYCOSYLATED A1C: CPT | Performed by: EMERGENCY MEDICINE

## 2018-07-21 PROCEDURE — 25000132 ZZH RX MED GY IP 250 OP 250 PS 637: Performed by: EMERGENCY MEDICINE

## 2018-07-21 PROCEDURE — 96361 HYDRATE IV INFUSION ADD-ON: CPT

## 2018-07-21 PROCEDURE — 00000146 ZZHCL STATISTIC GLUCOSE BY METER IP

## 2018-07-21 PROCEDURE — 85610 PROTHROMBIN TIME: CPT | Performed by: EMERGENCY MEDICINE

## 2018-07-21 PROCEDURE — 80048 BASIC METABOLIC PNL TOTAL CA: CPT | Performed by: EMERGENCY MEDICINE

## 2018-07-21 PROCEDURE — 12000007 ZZH R&B INTERMEDIATE

## 2018-07-21 PROCEDURE — 87040 BLOOD CULTURE FOR BACTERIA: CPT | Performed by: EMERGENCY MEDICINE

## 2018-07-21 PROCEDURE — 71046 X-RAY EXAM CHEST 2 VIEWS: CPT

## 2018-07-21 PROCEDURE — 25000132 ZZH RX MED GY IP 250 OP 250 PS 637: Performed by: INTERNAL MEDICINE

## 2018-07-21 PROCEDURE — 99223 1ST HOSP IP/OBS HIGH 75: CPT | Mod: AI | Performed by: INTERNAL MEDICINE

## 2018-07-21 PROCEDURE — 85025 COMPLETE CBC W/AUTO DIFF WBC: CPT | Performed by: EMERGENCY MEDICINE

## 2018-07-21 PROCEDURE — 40000274 ZZH STATISTIC RCP CONSULT EA 30 MIN

## 2018-07-21 PROCEDURE — 96375 TX/PRO/DX INJ NEW DRUG ADDON: CPT

## 2018-07-21 PROCEDURE — 25000125 ZZHC RX 250: Performed by: INTERNAL MEDICINE

## 2018-07-21 PROCEDURE — 94640 AIRWAY INHALATION TREATMENT: CPT | Mod: 76

## 2018-07-21 RX ORDER — LIDOCAINE 40 MG/G
CREAM TOPICAL
Status: DISCONTINUED | OUTPATIENT
Start: 2018-07-21 | End: 2018-07-23 | Stop reason: HOSPADM

## 2018-07-21 RX ORDER — TRAZODONE HYDROCHLORIDE 50 MG/1
50 TABLET, FILM COATED ORAL AT BEDTIME
Status: DISCONTINUED | OUTPATIENT
Start: 2018-07-21 | End: 2018-07-22

## 2018-07-21 RX ORDER — LISINOPRIL 40 MG/1
40 TABLET ORAL DAILY
Status: DISCONTINUED | OUTPATIENT
Start: 2018-07-22 | End: 2018-07-23 | Stop reason: HOSPADM

## 2018-07-21 RX ORDER — FUROSEMIDE 10 MG/ML
10 INJECTION INTRAMUSCULAR; INTRAVENOUS
Status: COMPLETED | OUTPATIENT
Start: 2018-07-22 | End: 2018-07-23

## 2018-07-21 RX ORDER — POTASSIUM CHLORIDE 1500 MG/1
20-40 TABLET, EXTENDED RELEASE ORAL
Status: DISCONTINUED | OUTPATIENT
Start: 2018-07-21 | End: 2018-07-23 | Stop reason: HOSPADM

## 2018-07-21 RX ORDER — POTASSIUM CHLORIDE 7.45 MG/ML
10 INJECTION INTRAVENOUS
Status: DISCONTINUED | OUTPATIENT
Start: 2018-07-21 | End: 2018-07-23 | Stop reason: HOSPADM

## 2018-07-21 RX ORDER — METHYLPREDNISOLONE SODIUM SUCCINATE 125 MG/2ML
125 INJECTION, POWDER, LYOPHILIZED, FOR SOLUTION INTRAMUSCULAR; INTRAVENOUS ONCE
Status: COMPLETED | OUTPATIENT
Start: 2018-07-21 | End: 2018-07-21

## 2018-07-21 RX ORDER — METOPROLOL TARTRATE 50 MG
50 TABLET ORAL 2 TIMES DAILY
Status: DISCONTINUED | OUTPATIENT
Start: 2018-07-21 | End: 2018-07-23 | Stop reason: HOSPADM

## 2018-07-21 RX ORDER — SIMVASTATIN 20 MG
20 TABLET ORAL AT BEDTIME
Status: DISCONTINUED | OUTPATIENT
Start: 2018-07-21 | End: 2018-07-23 | Stop reason: HOSPADM

## 2018-07-21 RX ORDER — LEVOTHYROXINE SODIUM 88 UG/1
88 TABLET ORAL DAILY
Status: DISCONTINUED | OUTPATIENT
Start: 2018-07-22 | End: 2018-07-22

## 2018-07-21 RX ORDER — ACETAMINOPHEN 325 MG/1
650 TABLET ORAL ONCE
Status: DISCONTINUED | OUTPATIENT
Start: 2018-07-21 | End: 2018-07-21

## 2018-07-21 RX ORDER — DEXTROSE MONOHYDRATE 25 G/50ML
25-50 INJECTION, SOLUTION INTRAVENOUS
Status: DISCONTINUED | OUTPATIENT
Start: 2018-07-21 | End: 2018-07-23 | Stop reason: HOSPADM

## 2018-07-21 RX ORDER — WARFARIN SODIUM 3 MG/1
3 TABLET ORAL ONCE
Status: COMPLETED | OUTPATIENT
Start: 2018-07-21 | End: 2018-07-21

## 2018-07-21 RX ORDER — LANOLIN ALCOHOL/MO/W.PET/CERES
1000 CREAM (GRAM) TOPICAL DAILY
Status: DISCONTINUED | OUTPATIENT
Start: 2018-07-22 | End: 2018-07-23 | Stop reason: HOSPADM

## 2018-07-21 RX ORDER — LEVOFLOXACIN 5 MG/ML
750 INJECTION, SOLUTION INTRAVENOUS ONCE
Status: DISCONTINUED | OUTPATIENT
Start: 2018-07-21 | End: 2018-07-21

## 2018-07-21 RX ORDER — MAGNESIUM SULFATE HEPTAHYDRATE 40 MG/ML
4 INJECTION, SOLUTION INTRAVENOUS EVERY 4 HOURS PRN
Status: DISCONTINUED | OUTPATIENT
Start: 2018-07-21 | End: 2018-07-23 | Stop reason: HOSPADM

## 2018-07-21 RX ORDER — IPRATROPIUM BROMIDE AND ALBUTEROL SULFATE 2.5; .5 MG/3ML; MG/3ML
3 SOLUTION RESPIRATORY (INHALATION)
Status: DISCONTINUED | OUTPATIENT
Start: 2018-07-21 | End: 2018-07-23 | Stop reason: HOSPADM

## 2018-07-21 RX ORDER — DOXYCYCLINE HYCLATE 100 MG
100 TABLET ORAL EVERY 12 HOURS SCHEDULED
Status: DISCONTINUED | OUTPATIENT
Start: 2018-07-21 | End: 2018-07-23 | Stop reason: HOSPADM

## 2018-07-21 RX ORDER — POTASSIUM CHLORIDE 29.8 MG/ML
20 INJECTION INTRAVENOUS
Status: DISCONTINUED | OUTPATIENT
Start: 2018-07-21 | End: 2018-07-23 | Stop reason: HOSPADM

## 2018-07-21 RX ORDER — CALCIUM POLYCARBOPHIL 625 MG 625 MG/1
625 TABLET ORAL 2 TIMES DAILY
Status: DISCONTINUED | OUTPATIENT
Start: 2018-07-21 | End: 2018-07-23 | Stop reason: HOSPADM

## 2018-07-21 RX ORDER — POTASSIUM CHLORIDE 1.5 G/1.58G
20-40 POWDER, FOR SOLUTION ORAL
Status: DISCONTINUED | OUTPATIENT
Start: 2018-07-21 | End: 2018-07-23 | Stop reason: HOSPADM

## 2018-07-21 RX ORDER — PREDNISONE 20 MG/1
40 TABLET ORAL DAILY
Status: DISCONTINUED | OUTPATIENT
Start: 2018-07-22 | End: 2018-07-23 | Stop reason: HOSPADM

## 2018-07-21 RX ORDER — ACETAMINOPHEN 325 MG/1
975 TABLET ORAL EVERY 6 HOURS PRN
Status: DISCONTINUED | OUTPATIENT
Start: 2018-07-21 | End: 2018-07-23 | Stop reason: HOSPADM

## 2018-07-21 RX ORDER — POTASSIUM CL/LIDO/0.9 % NACL 10MEQ/0.1L
10 INTRAVENOUS SOLUTION, PIGGYBACK (ML) INTRAVENOUS
Status: DISCONTINUED | OUTPATIENT
Start: 2018-07-21 | End: 2018-07-23 | Stop reason: HOSPADM

## 2018-07-21 RX ORDER — ALBUTEROL SULFATE 5 MG/ML
2.5 SOLUTION RESPIRATORY (INHALATION)
Status: DISCONTINUED | OUTPATIENT
Start: 2018-07-21 | End: 2018-07-23 | Stop reason: HOSPADM

## 2018-07-21 RX ORDER — NALOXONE HYDROCHLORIDE 0.4 MG/ML
.1-.4 INJECTION, SOLUTION INTRAMUSCULAR; INTRAVENOUS; SUBCUTANEOUS
Status: DISCONTINUED | OUTPATIENT
Start: 2018-07-21 | End: 2018-07-23 | Stop reason: HOSPADM

## 2018-07-21 RX ORDER — ASPIRIN 81 MG/1
81 TABLET ORAL DAILY
Status: DISCONTINUED | OUTPATIENT
Start: 2018-07-22 | End: 2018-07-23 | Stop reason: HOSPADM

## 2018-07-21 RX ORDER — ONDANSETRON 4 MG/1
4 TABLET, ORALLY DISINTEGRATING ORAL EVERY 6 HOURS PRN
Status: DISCONTINUED | OUTPATIENT
Start: 2018-07-21 | End: 2018-07-23 | Stop reason: HOSPADM

## 2018-07-21 RX ORDER — IPRATROPIUM BROMIDE AND ALBUTEROL SULFATE 2.5; .5 MG/3ML; MG/3ML
3 SOLUTION RESPIRATORY (INHALATION) ONCE
Status: COMPLETED | OUTPATIENT
Start: 2018-07-21 | End: 2018-07-21

## 2018-07-21 RX ORDER — NICOTINE POLACRILEX 4 MG
15-30 LOZENGE BUCCAL
Status: DISCONTINUED | OUTPATIENT
Start: 2018-07-21 | End: 2018-07-23 | Stop reason: HOSPADM

## 2018-07-21 RX ORDER — ONDANSETRON 2 MG/ML
4 INJECTION INTRAMUSCULAR; INTRAVENOUS EVERY 6 HOURS PRN
Status: DISCONTINUED | OUTPATIENT
Start: 2018-07-21 | End: 2018-07-23 | Stop reason: HOSPADM

## 2018-07-21 RX ORDER — ACETAMINOPHEN 500 MG
500 TABLET ORAL ONCE
Status: COMPLETED | OUTPATIENT
Start: 2018-07-21 | End: 2018-07-21

## 2018-07-21 RX ORDER — IPRATROPIUM BROMIDE AND ALBUTEROL SULFATE 2.5; .5 MG/3ML; MG/3ML
6 SOLUTION RESPIRATORY (INHALATION) ONCE
Status: COMPLETED | OUTPATIENT
Start: 2018-07-21 | End: 2018-07-21

## 2018-07-21 RX ADMIN — CALCIUM POLYCARBOPHIL 625 MG: 625 TABLET, FILM COATED ORAL at 20:03

## 2018-07-21 RX ADMIN — DOXYCYCLINE HYCLATE 100 MG: 100 TABLET, FILM COATED ORAL at 19:56

## 2018-07-21 RX ADMIN — METHYLPREDNISOLONE SODIUM SUCCINATE 125 MG: 125 INJECTION, POWDER, FOR SOLUTION INTRAMUSCULAR; INTRAVENOUS at 14:37

## 2018-07-21 RX ADMIN — LEVOFLOXACIN 750 MG: 5 INJECTION, SOLUTION INTRAVENOUS at 17:29

## 2018-07-21 RX ADMIN — WARFARIN SODIUM 3 MG: 3 TABLET ORAL at 19:56

## 2018-07-21 RX ADMIN — IPRATROPIUM BROMIDE AND ALBUTEROL SULFATE 6 ML: .5; 3 SOLUTION RESPIRATORY (INHALATION) at 14:24

## 2018-07-21 RX ADMIN — INSULIN ASPART 2 UNITS: 100 INJECTION, SOLUTION INTRAVENOUS; SUBCUTANEOUS at 20:03

## 2018-07-21 RX ADMIN — SODIUM CHLORIDE, POTASSIUM CHLORIDE, SODIUM LACTATE AND CALCIUM CHLORIDE 1000 ML: 600; 310; 30; 20 INJECTION, SOLUTION INTRAVENOUS at 14:42

## 2018-07-21 RX ADMIN — SIMVASTATIN 20 MG: 20 TABLET, FILM COATED ORAL at 21:44

## 2018-07-21 RX ADMIN — IPRATROPIUM BROMIDE AND ALBUTEROL SULFATE 3 ML: .5; 3 SOLUTION RESPIRATORY (INHALATION) at 19:44

## 2018-07-21 RX ADMIN — TRAZODONE HYDROCHLORIDE 50 MG: 50 TABLET ORAL at 21:44

## 2018-07-21 RX ADMIN — METFORMIN HYDROCHLORIDE 500 MG: 500 TABLET, FILM COATED ORAL at 19:56

## 2018-07-21 RX ADMIN — METOPROLOL TARTRATE 50 MG: 50 TABLET, FILM COATED ORAL at 20:38

## 2018-07-21 RX ADMIN — IPRATROPIUM BROMIDE AND ALBUTEROL SULFATE 3 ML: .5; 3 SOLUTION RESPIRATORY (INHALATION) at 16:29

## 2018-07-21 RX ADMIN — ACETAMINOPHEN 500 MG: 500 TABLET, FILM COATED ORAL at 15:17

## 2018-07-21 ASSESSMENT — ENCOUNTER SYMPTOMS
SINUS PAIN: 1
COUGH: 1
SHORTNESS OF BREATH: 1
SINUS PRESSURE: 1
BRUISES/BLEEDS EASILY: 1
FEVER: 1

## 2018-07-21 ASSESSMENT — ACTIVITIES OF DAILY LIVING (ADL): ADLS_ACUITY_SCORE: 9

## 2018-07-21 NOTE — ED NOTES
[facility]  ED Nurse Handoff Report    Maryalice Rebecca Wachter is a 84 year old female   ED Chief complaint: Shortness of Breath and Sinusitis  . ED Diagnosis:   Final diagnoses:   COPD exacerbation (H)   Febrile illness, acute   SOB (shortness of breath)   Chronic anticoagulation     Allergies:   Allergies   Allergen Reactions     Penicillins Hives     Amlodipine Other (See Comments)     Too much ankle edema and red itchy spots.      Furosemide Other (See Comments)     Fainted with first dose.        Code Status: Full Code  Activity level - Baseline/Home:  Independent. Activity Level - Current:   Independent. Lift room needed: No. Bariatric: No   Needed: No   Isolation: No. Infection: Not Applicable.     Vital Signs:   Vitals:    07/21/18 1519 07/21/18 1734   BP: 124/53 134/57   Pulse: 75 97   Resp: 20 20   Temp: 99.1  F (37.3  C)    SpO2: 95%        Cardiac Rhythm:  ,         Pain level: 0-10 Pain Scale: 0  Patient confused: No. Patient Falls Risk: Yes.   Elimination Status: Has voided   Patient Report - Initial Complaint: SOB Sinus Headache. Focused Assessment: Pt here feeling SOB with HX COPD and cough. Today c/o sinus infection and headache behind eyes. Pt received 500 tylenol x1, neb treatments x3 and currently antibiotics infusing. Pt A&O temp 99.1,   Tests Performed: xray and labs. Abnormal Results: see labs.   Treatments provided: antibiotics and tylenol for fever  Family Comments: none  OBS brochure/video discussed/provided to patient:  Yes  ED Medications:   Medications   levofloxacin (LEVAQUIN) infusion 750 mg (750 mg Intravenous New Bag 7/21/18 1729)   ipratropium - albuterol 0.5 mg/2.5 mg/3 mL (DUONEB) neb solution 6 mL (6 mLs Nebulization Given 7/21/18 1424)   methylPREDNISolone sodium succinate (solu-MEDROL) injection 125 mg (125 mg Intravenous Given 7/21/18 1437)   lactated ringers BOLUS 1,000 mL (0 mLs Intravenous Stopped 7/21/18 1648)   acetaminophen (TYLENOL) tablet 500  mg (500 mg Oral Given 7/21/18 1517)   ipratropium - albuterol 0.5 mg/2.5 mg/3 mL (DUONEB) neb solution 3 mL (3 mLs Nebulization Given 7/21/18 2541)     Drips infusing:  Yes  For the majority of the shift, the patient's behavior Green. Interventions performed were none.     Severe Sepsis OR Septic Shock Diagnosis Present: No      ED Nurse Name/Phone Number: Nga Jassoeney,   5:38 PM    RECEIVING UNIT ED HANDOFF REVIEW    Above ED Nurse Handoff Report was reviewed: Yes  Reviewed by: Silvia Esteves on July 21, 2018 at 5:53 PM

## 2018-07-21 NOTE — IP AVS SNAPSHOT
[facility] [address] [facility]    [address]    [facility] [address]    Phone:  [phone]    Fax:  [phone]                                       After Visit Summary   7/21/2018    Maryalice Rebecca Wachter    MRN: [id number]           After Visit Summary Signature Page     I have received my discharge instructions, and my questions have been answered. I have discussed any challenges I see with this plan with the nurse or doctor.    ..........................................................................................................................................  Patient/Patient Representative Signature      ..........................................................................................................................................  Patient Representative Print Name and Relationship to Patient    ..................................................               ................................................  Date                                            Time    ..........................................................................................................................................  Reviewed by Signature/Title    ...................................................              ..............................................  Date                                                            Time

## 2018-07-21 NOTE — LETTER
Transition Communication Hand-off for Care Transitions to Next Level of Care Provider    Name: Maryalice Rebecca Wachter  : 1933  MRN #: [id number]  Primary Care Provider: Venu Maria  Primary Care MD Name: Venu Maria PA-C  Primary Clinic:   [facility] [address]  Primary Care Clinic Name: [facility]  Reason for Hospitalization:  SOB (shortness of breath) [R06.02]  Chronic anticoagulation [Z79.01]  COPD exacerbation (H) [J44.1]  Febrile illness, acute [R50.9]  Admit Date/Time: 2018  1:40 PM  Discharge Date: 18  Payor Source: Payor: ARE / Plan: ARE SENIORS NON FPA / Product Type: HMO /     Readmission Assessment Measure (MARSHA) Risk Score/category: Average           Reason for Communication Hand-off Referral: Admission diagnoses: COPD    Discharge Plan: Home with Assist       Concern for non-adherence with plan of care:   No  Discharge Needs Assessment:  Needs       Most Recent Value    Equipment Currently Used at Home walker, rolling [Has a walker at home]    Transportation Available car, family or friend will provide    # of Referrals Placed by CTS External Care Coordination    Other Resources Other (see comment) [COPD Action Plan]          Already enrolled in Tele-monitoring program and name of program:  NA  Follow-up specialty is recommended: No    Follow-up plan:  Future Appointments  Date Time Provider Department Center   2018 6:00 AM Vale Garcia PTA [facility]   2018 1:40 PM [facility]   8/3/2018 9:30 AM FM RN VISITS [facility]   10/15/2018 1:00 PM Janey Romero RPH [facility]       Any outstanding tests or procedures:        Referrals     Future Labs/Procedures    Med Therapy Manage Referral     Comments:    Your provider has referred you to: **[facility] Scheduling (numerous locations) [phone]    "[email or web address]  [facility]: [facility] [phone]   [email or web address]    Reason for Referral: 10 + prescription medications with recent changes while hospitalized.     The [place] department will contact you to schedule an appointment.  You may also schedule the appointment by calling [phone].  For [place] [facility] [place] patients, please call [phone] to confirm/schedule your appointment on the next business day.    This service is designed to help you get the most from your medications.  A specially trained Pharmacist will work closely with you and your providers to solve any questions, concerns, issues or problems related to your medications.    Please bring all of your prescription and non-prescription medications (such as vitamins, over-the-counter medications, and herbals) or a detailed medication list to your appointment.    If you have a glucose meter or other home monitoring information, please also bring this to your appointment (i.e. blood glucose log, blood pressure log, pain log, etc.).            Key Recommendations:   She has multiple issues while hospitalized. BLE edema, SOB w/wheezing, fevers (at home), sinus pressure. Discussed COPD Action Plan with pt at bedside. Also discussed MTM referral for a medication review after discharge. She has done a MTM before with Janey Romero at [facility].  will request \"Dr. Toth\" again for her.     Patient was admitted with mild sinusitis, bronchitis, and COPD. Patient was treated with doxycycline, nebs, and prednisione. Patient is on RA O2 at day of discharge. New medications are doxycycline, melatonin, and po prednisone X 3 days. Patient should follow up with primary as scheduled.     Aneta Malone/ Chio Tamayo    AVS/Discharge Summary is the source of truth; this is a helpful " guide for improved communication of patient story

## 2018-07-21 NOTE — H&P
[facility] History and Physical    Maryalice Rebecca Wachter MRN# [id number]   Age: 84 year old YOB: 1933     Date of Admission:  7/21/2018    Home clinic: [facility]  Primary care provider: Venu Maria          Assessment and Plan:   Assessment:   Maryalice Rebecca Wachter is a very pleasant and somewhat tangential 84 year old woman who came to attention today with complaint of SOB and fever. It seems that she is bothered by difficulty sleeping as well as urinary and stool urgency/retention/incontinence which have been going on for a long time.    Prior medical history is significant for stable but severe COPD that is managed without supplemental oxygen.  For a long time the patient has been breathless with activity and this is led to relative deconditioning.  In addition on stress testing in April 2017, she was noted to have normal heart function which was an improvement from previous study in 2012.  Overall the patient is very limited by chronic urinary incontinence and stool urgency.  She also indicates that she is unable to sleep for more than a couple of hours at a time.  She has been offered evaluation and treatment for suspected sleep apnea but has refused that.    In the ED, she was noted to be wheezing and gave a history of having had fever at home despite receiving antibiotics a couple of days ago from an urgent care to cover sinusitis.  Patient certainly does not appear ill, but has peripheral edema as well as significant wheezing.  Chest x-ray shows a very enlarged heart with normal-appearing pulmonary vasculature and no obvious infiltrate.  She evidently was at least mildly hypoxic but on 1 L by nasal cannula, oxygen saturation was 95%.    Dx:  1.  Acute on chronic respiratory failure.  Suspect multifactorial with primary chronic component of COPD and probably at least a mild degree of congestive failure.  Question remains whether the patient really  has a significant component of infection at this time.  I do not believe she has very significant pneumonia for which she needs IV Levaquin.  2.  Possible sinusitis with possible component of bronchitis contributing to COPD exacerbation.  Patient was started on steroids and nebulizers in the emergency department.  She is quite comfortable at the time of my evaluation.  3.  Suspected congestive heart failure with at least some evidence of right-sided symptoms.  Question a component of diastolic failure in addition to possible pulmonary hypertension.  4.  Type 2 diabetes.  5.  Atrial fibrillation.  6.  Deconditioning undoubtedly is contributing to the patient's difficulties.  7.  Gastroesophageal reflux in the setting of an obvious hiatal hernia.  8.  Urinary incontinence as well as stool urgency.   9.  Insomnia with suspected low level depression.  10.  Hypothyroidism.  It appears that this has been irregularly managed in the past.  Unclear if the patient is appropriately compliant with medication dosing.      Plan:   1.  Admit inpatient on telemetry.  2.  Echocardiogram in the morning.  3.  Empirically cover with Lasix 10 mg twice daily starting tomorrow.  4.  Doxycycline 100 mg p.o. twice daily.  5.  Prednisone 40 mg p.o. daily.  Will start scheduled duo nebs 4 times daily with as needed albuterol nebs.  6.  Insulin sliding scale.  Will hold the glipizide and pioglitazone.  Continue with metformin.  7.  Continue with baseline lisinopril and metoprolol.  Hold hydrochlorothiazide.  8.  Pharmacy consult for management of warfarin.  9.  Check TSH and FT4.             Chief Complaint:   Malaise, dyspnea, cough, fever.     History is obtained from the patient, EMR and ED Physician.      Ms. Wachter describes feeling generally ill and uncomfortable at baseline.  Of course is limited by urinary retention and discomfort as well as urgency for stools.  But today she is primarily focused on what sounds like a respiratory  "infection.  She is complaining of cough and increased shortness of breath.    I noted this was a saw her that she had significant ankle edema and she was unable to tell me how new that is.  She seemed to not be concerned about that.  She apparently has not been treated for congestive heart failure in the past and admits that she is not particularly controlling about salt intake.  She is unable to tell me clearly whether she has orthopnea symptoms but gets into a rather complicated explanation about her sleep disruption.  I am not sure that the sleep disruption is related to shortness of breath.    Patient is quite limited by nonpulmonary symptoms and she does not specifically tell me about chest pain, palpitations or dramatic dyspnea with activity.  She denies nausea, vomiting.  She reports that she had a fever to 102 but denies shaking chills.  She describes occasionally having drenching sweats but does not correlate that at all with her current illness.    Ms. Wachter went to the [place] urgent care (in [place] I believe) on 7/19/2018 for cough that apparently been going on for 3-4 weeks.  She described a \"sinus headache\" but describes discomfort behind her eyes.  At that point she described a fever that had occurred on the previous day, but notably she only had a very modest temperature in clinic.        Past Medical History:     Past Medical History:   Diagnosis Date     Atrial fibrillation (H)     history of atrial fibrillation     CAD (coronary artery disease)      Congestive heart failure (CHF) (H)      COPD (chronic obstructive pulmonary disease) (H)      Diabetes mellitus (H)     Oral medication treated.     Hiatal hernia     Documented quite large by CT Fall 2011.     Hypercholesterolemia     Per mention in notes, but no recent chol panel noted.  She does take simvastatin.     Hypertension      Hypothyroidism      Postmenopausal bleeding 8/23/2012     Sarcoid              Past Surgical History:    "   Past Surgical History:   Procedure Laterality Date     ANKLE SURGERY       DILATION AND CURETTAGE, HYSTEROSCOPY DIAGNOSTIC, COMBINED  2012    Procedure: COMBINED DILATION AND CURETTAGE, HYSTEROSCOPY DIAGNOSTIC;   DILATION AND CURETTAGE, Operative  HYSTEROSCOPY ;  Surgeon: Justa Francois MD;  Location: [facility] OR     ENT SURGERY       ESOPHAGOSCOPY, GASTROSCOPY, DUODENOSCOPY (EGD), COMBINED N/A 2017    Procedure: COMBINED ESOPHAGOSCOPY, GASTROSCOPY, DUODENOSCOPY (EGD);  Surgeon: Johnny Stratton MD;  Location:  GI     ESOPHAGOSCOPY, GASTROSCOPY, DUODENOSCOPY (EGD), COMBINED N/A 2017    Procedure: COMBINED ESOPHAGOSCOPY, GASTROSCOPY, DUODENOSCOPY (EGD), BIOPSY SINGLE OR MULTIPLE;  Surgeon: Johnny Stratton MD;  Location:  GI             Social History:     Social History   Substance Use Topics     Smoking status: Never Smoker     Smokeless tobacco: Never Used     Alcohol use Yes      Comment: Extremely rare use in small amounts.      Resides with her . Is limited in terms of activity by urinary and stool incontinence.         Family History:     Family History   Problem Relation Age of Onset     Asthma Mother 42     smoker  age 42 of compications of asthma     Diabetes Father       in 70's     Other - See Comments Other      Denies cardiac.     Family history reviewed and considered non-contributory.            Allergies:     Allergies   Allergen Reactions     Penicillins Hives     Amlodipine Other (See Comments)     Too much ankle edema and red itchy spots.      Furosemide Other (See Comments)     Fainted with first dose. -- I clarified this with the patient who indicated that she is not clear as to what really happened with this medication. It was not an allergic reaction from her description.              Medications:     Prescriptions Prior to Admission   Medication Sig Dispense Refill Last Dose     albuterol (2.5 MG/3ML) 0.083% neb solution Take 1 vial (2.5 mg) by  nebulization every 6 hours as needed for shortness of breath / dyspnea or wheezing Reported on 3/2/2017 1 Box 0 7/20/2018 at Unknown time     albuterol (ALBUTEROL) 108 (90 BASE) MCG/ACT inhaler Inhale 2 puffs into the lungs every 6 hours as needed Reported on 3/2/2017   7/21/2018 at Unknown time     ascorbic acid (VITAMIN C) 500 MG tablet Take 500 mg by mouth daily.   7/20/2018 at Unknown time     ASPIRIN EC PO Take 81 mg by mouth daily    7/21/2018 at am     calcium carb 1250 mg, 500 mg [place],/vitamin D 200 units (OSCAL WITH D) 500-200 MG-UNIT per tablet Take 1 tablet by mouth daily.   7/20/2018 at Unknown time     cefuroxime (CEFTIN) 250 MG tablet Take 1 tablet (250 mg) by mouth 2 times daily for 7 days (Patient taking differently: Take 250 mg by mouth 2 times daily Started 7/19/18) 14 tablet 0 7/21/2018 at am     Cyanocobalamin (B-12) 1000 MCG CAPS Take 1 capsule by mouth daily    7/20/2018 at Unknown time     diphenhydrAMINE-acetaminophen (TYLENOL PM EXTRA STRENGTH)  MG tablet Take 2 tablets by mouth At Bedtime   7/20/2018 at Unknown time     ferrous sulfate 325 (65 FE) MG tablet Take 1 tablet by mouth daily (with breakfast).   7/20/2018 at Unknown time     glipiZIDE (GLUCOTROL XL) 10 MG 24 hr tablet Take 1 tablet (10 mg) by mouth 2 times daily Due to see provider in August 180 tablet 0 7/21/2018 at am     hydrochlorothiazide (MICROZIDE) 12.5 MG capsule Take 1 capsule (12.5 mg) by mouth every morning 90 capsule 1 7/21/2018 at Unknown time     levothyroxine (SYNTHROID/LEVOTHROID) 88 MCG tablet TAKE ONE TABLET BY MOUTH EVERY DAY 90 tablet 3 7/21/2018 at am     lisinopril (PRINIVIL/ZESTRIL) 40 MG tablet TAKE ONE TABLET BY MOUTH EVERY DAY 90 tablet 0 7/21/2018 at am     LOPERAMIDE HCL PO 1/2 to 1 tablet by mouth as needed.   Taking     METFORMIN HCL PO Take 500 mg by mouth 2 times daily (with meals)   7/21/2018 at am     metoprolol tartrate (LOPRESSOR) 50 MG tablet TAKE ONE TABLET BY MOUTH TWICE A   tablet 0 7/21/2018 at am     mometasone (ASMANEX) 220 MCG/INH inhaler Inhale 2 puffs into the lungs daily as needed    7/21/2018 at Unknown time     pioglitazone (ACTOS) 30 MG tablet TAKE ONE TABLET BY MOUTH EVERY DAY 90 tablet 1 7/21/2018 at am     polycarbophil (FIBERCON) 625 MG tablet Take 1 tablet by mouth 2 times daily.   7/20/2018 at Unknown time     ranitidine (ZANTAC) 150 MG tablet Take 1 tablet (150 mg) by mouth nightly as needed for heartburn 60 tablet 1 Not Taking     simvastatin (ZOCOR) 20 MG tablet TAKE ONE TABLET BY MOUTH AT BEDTIME. NEEDS APPOINTMENT 90 tablet 0 7/20/2018 at Unknown time     tiotropium (SPIRIVA) 18 MCG inhalation capsule Inhale 18 mcg into the lungs daily.   7/20/2018 at Unknown time     VITAMIN D, CHOLECALCIFEROL, PO Take 2,000 Units by mouth daily   7/20/2018 at Unknown time     WARFARIN SODIUM PO Take by mouth daily 4.5mg=Tue and Fri, 3mg=ROW   7/20/2018 at 4.5mg             Review of Systems:   A comprehensive review of systems was performed and found to be negative except as described in this note           Physical Exam:   Vitals were reviewed  Temp: 98.2  F (36.8  C) Temp src: Oral BP: 173/80 Pulse: 103   Resp: 18 SpO2: 93 % O2 Device: None (Room air) Oxygen Delivery: 2 LPM  Constitutional: Awake, alert, cooperative, no apparent distress, and appears stated age.  Eyes: Lids and lashes normal, pupils equal, round and reactive to light, extra ocular muscles intact, sclera clear, conjunctiva normal.  ENT: Normocephalic, without obvious abnormality, atraumatic, oral pharynx with moist mucus membranes, tonsils without erythema or exudates, gums normal and good dentition.  Geographic tongue.  Neck: Supple, symmetrical, trachea midline, no adenopathy, thyroid symmetric, not enlarged and no tenderness, skin normal.  Hematologic / Lymphatic: No cervical lymphadenopathy and no supraclavicular lymphadenopathy.  Back: Symmetric, no curvature, spinous processes are non-tender on palpation,  paraspinous muscles are non-tender on palpation, no costal vertebral tenderness.  Lungs: No increased work of breathing, good air exchange, clear to auscultation bilaterally, no crackles or wheezing.  Cardiovascular: Regular rate and rhythm, normal S1 and S2, no S3 or S4, and no murmur noted.  Abdomen: Normal bowel sounds, soft, non-distended, non-tender, no masses palpated, no hepatosplenomegaly.  Musculoskeletal: Soft, pitting ankle edema to the mid-calf bilat. No redness, warmth, or swelling of the joints.  Full range of motion noted.  Motor strength is 5 out of 5 all extremities bilaterally.  Tone is normal.  Neurologic: Awake, alert, oriented to name, place and time.  Cranial nerves II-XII are grossly intact.    Neuropsychiatric: Normal affect, mood, orientation. Tangential. ?Depressed?  Skin: No rashes, erythema, pallor, petechia or purpura.          Data:     Results for orders placed or performed during the hospital encounter of 07/21/18 (from the past 24 hour(s))   EKG 12 lead   Result Value Ref Range    Interpretation ECG Click View Image link to view waveform and result    Blood culture   Result Value Ref Range    Specimen Description Blood Right Arm     Special Requests Aerobic and anaerobic bottles received     Culture Micro No growth after 2 hours    Blood culture   Result Value Ref Range    Specimen Description Blood     Special Requests Right Arm     Culture Micro       Canceled, Test credited  Test canceled by physician  BY RHONDA     CBC + differential   Result Value Ref Range    WBC 8.5 4.0 - 11.0 10e9/L    RBC Count 3.30 (L) 3.8 - 5.2 10e12/L    Hemoglobin 10.2 (L) 11.7 - 15.7 g/dL    Hematocrit 31.9 (L) 35.0 - 47.0 %    MCV 97 78 - 100 fl    MCH 30.9 26.5 - 33.0 pg    MCHC 32.0 31.5 - 36.5 g/dL    RDW 14.6 10.0 - 15.0 %    Platelet Count 268 150 - 450 10e9/L    Diff Method Automated Method     % Neutrophils 69.6 %    % Lymphocytes 18.3 %    % Monocytes 10.9 %    % Eosinophils 0.6 %    % Basophils  0.1 %    % Immature Granulocytes 0.5 %    Nucleated RBCs 0 0 /100    Absolute Neutrophil 5.9 1.6 - 8.3 10e9/L    Absolute Lymphocytes 1.6 0.8 - 5.3 10e9/L    Absolute Monocytes 0.9 0.0 - 1.3 10e9/L    Absolute Eosinophils 0.1 0.0 - 0.7 10e9/L    Absolute Basophils 0.0 0.0 - 0.2 10e9/L    Abs Immature Granulocytes 0.0 0 - 0.4 10e9/L    Absolute Nucleated RBC 0.0    Basic metabolic panel (BMP)   Result Value Ref Range    Sodium 136 133 - 144 mmol/L    Potassium 3.8 3.4 - 5.3 mmol/L    Chloride 99 94 - 109 mmol/L    Carbon Dioxide 31 20 - 32 mmol/L    Anion Gap 6 3 - 14 mmol/L    Glucose 196 (H) 70 - 99 mg/dL    Urea Nitrogen 18 7 - 30 mg/dL    Creatinine 1.09 (H) 0.52 - 1.04 mg/dL    GFR Estimate 48 (L) >60 mL/min/1.7m2    GFR Estimate If Black 58 (L) >60 mL/min/1.7m2    Calcium 8.9 8.5 - 10.1 mg/dL   Blood gas venous and oxyhgb   Result Value Ref Range    Ph Venous 7.44 (H) 7.32 - 7.43 pH    PCO2 Venous 46 40 - 50 mm Hg    PO2 Venous 59 (H) 25 - 47 mm Hg    Bicarbonate Venous 31 (H) 21 - 28 mmol/L    FIO2 NC     Oxyhemoglobin Venous 90 %    Base Excess Venous 5.9 mmol/L   Lactic acid whole blood   Result Value Ref Range    Lactic Acid 1.1 0.7 - 2.0 mmol/L   Hemoglobin A1c   Result Value Ref Range    Hemoglobin A1C 7.4 (H) 0 - 5.6 %   INR   Result Value Ref Range    INR 2.44 (H) 0.86 - 1.14   Chest XR,  PA & LAT    Narrative    XR CHEST 2 VW   7/21/2018 2:59 PM     HISTORY: cough, fever;     COMPARISON: Film dated 4/4/2017    FINDINGS: Heart is at the upper limits of normal in size There is a  large hiatal hernia present..  The lungs are clear. The pulmonary  vasculature is normal.  The bones and soft tissues are unremarkable.      Impression    IMPRESSION:   1. Cardiac silhouette upper limits of normal in size.  2. Large hiatal hernia.  3. No active areas of infiltrate are identified.        CALVIN SUBRAMANIAN MD   Glucose by meter   Result Value Ref Range    Glucose 215 (H) 70 - 99 mg/dL   Glucose by meter   Result Value  Ref Range    Glucose 210 (H) 70 - 99 mg/dL      EKG results:   Performed on admission        Atrial fibrillation  Normal axis  Normal QRS interval  Normal QT interval  No evident ischemia      All imaging studies reviewed by me.      Attestation:  I have reviewed today's vital signs, notes, medications, labs and imaging.  Total time: 35 minutes     Ashok Eldridge MD

## 2018-07-21 NOTE — PHARMACY-ADMISSION MEDICATION HISTORY
Admission medication history interview status for this patient is complete. See [facility] admission navigator for allergy information, prior to admission medications and immunization status.     Medication history interview source(s):Patient  Medication history resources (including written lists, pill bottles, clinic record):None    Changes made to PTA medication list:  Added: ASA  Deleted: none  Changed: ventolin MDI, B12 tablet, metformin, asmanex, warfarin    Actions taken by pharmacist (provider contacted, etc):None     Additional medication history information:None    Medication reconciliation/reorder completed by provider prior to medication history? No    For patients on insulin therapy: no (Yes/No)   Lantus/levemir/NPH/Mix 70/30 dose: ___ in AM/PM or twice daily   Sliding scale Novolog Y/N   If Yes, do you have a baseline novolog pre-meal dose: ______units with meals   Patients eat three meals a day: Y/N ---  How many episodes of hypoglycemia (low blood glucose) do you have weekly: ---   How many missed doses do you have a week: ---  How many times do you check your blood glucose per day: ---  Any Barriers to therapy: cost of medications/comfortable with giving injections (if applicable)/ comfortable and confident with current diabetes regimen ---      Prior to Admission medications    Medication Sig Last Dose Taking? Auth Provider   albuterol (2.5 MG/3ML) 0.083% neb solution Take 1 vial (2.5 mg) by nebulization every 6 hours as needed for shortness of breath / dyspnea or wheezing Reported on 3/2/2017 7/20/2018 at Unknown time Yes Venu Maria PA-C   albuterol (ALBUTEROL) 108 (90 BASE) MCG/ACT inhaler Inhale 2 puffs into the lungs every 6 hours as needed Reported on 3/2/2017 7/21/2018 at Unknown time Yes Reported, Patient   ascorbic acid (VITAMIN C) 500 MG tablet Take 500 mg by mouth daily. 7/20/2018 at Unknown time Yes Reported, Patient   ASPIRIN EC PO Take 81 mg by mouth daily  7/21/2018 at am Yes  Reported, Patient   calcium carb 1250 mg, 500 mg [place],/vitamin D 200 units (OSCAL WITH D) 500-200 MG-UNIT per tablet Take 1 tablet by mouth daily. 7/20/2018 at Unknown time Yes Reported, Patient   cefuroxime (CEFTIN) 250 MG tablet Take 1 tablet (250 mg) by mouth 2 times daily for 7 days  Patient taking differently: Take 250 mg by mouth 2 times daily Started 7/19/18 7/21/2018 at am Yes Mira Sanchez PA-C   Cyanocobalamin (B-12) 1000 MCG CAPS Take 1 capsule by mouth daily  7/20/2018 at Unknown time Yes Reported, Patient   diphenhydrAMINE-acetaminophen (TYLENOL PM EXTRA STRENGTH)  MG tablet Take 2 tablets by mouth At Bedtime 7/20/2018 at Unknown time Yes Reported, Patient   ferrous sulfate 325 (65 FE) MG tablet Take 1 tablet by mouth daily (with breakfast). 7/20/2018 at Unknown time Yes Reported, Patient   glipiZIDE (GLUCOTROL XL) 10 MG 24 hr tablet Take 1 tablet (10 mg) by mouth 2 times daily Due to see provider in August 7/21/2018 at am Yes Venu Maria PA-C   hydrochlorothiazide (MICROZIDE) 12.5 MG capsule Take 1 capsule (12.5 mg) by mouth every morning 7/21/2018 at Unknown time Yes Venu Maria PA-C   levothyroxine (SYNTHROID/LEVOTHROID) 88 MCG tablet TAKE ONE TABLET BY MOUTH EVERY DAY 7/21/2018 at am Yes Venu Maria PA-C   lisinopril (PRINIVIL/ZESTRIL) 40 MG tablet TAKE ONE TABLET BY MOUTH EVERY DAY 7/21/2018 at am Yes Venu Maria PA-C   LOPERAMIDE HCL PO 1/2 to 1 tablet by mouth as needed.  Yes Reported, Patient   METFORMIN HCL PO Take 500 mg by mouth 2 times daily (with meals) 7/21/2018 at am Yes Unknown, Entered By History   metoprolol tartrate (LOPRESSOR) 50 MG tablet TAKE ONE TABLET BY MOUTH TWICE A DAY 7/21/2018 at am Yes Venu Maria PA-C   mometasone (ASMANEX) 220 MCG/INH inhaler Inhale 2 puffs into the lungs daily as needed  7/21/2018 at Unknown time Yes Reported, Patient   pioglitazone (ACTOS) 30 MG tablet TAKE ONE TABLET BY MOUTH EVERY  DAY 7/21/2018 at am Yes Venu Maria PA-C   polycarbophil (FIBERCON) 625 MG tablet Take 1 tablet by mouth 2 times daily. 7/20/2018 at Unknown time Yes Reported, Patient   ranitidine (ZANTAC) 150 MG tablet Take 1 tablet (150 mg) by mouth nightly as needed for heartburn  Yes Venu Maria PA-C   simvastatin (ZOCOR) 20 MG tablet TAKE ONE TABLET BY MOUTH AT BEDTIME. NEEDS APPOINTMENT 7/20/2018 at Unknown time Yes Venu Maria PA-C   tiotropium (SPIRIVA) 18 MCG inhalation capsule Inhale 18 mcg into the lungs daily. 7/20/2018 at Unknown time Yes Reported, Patient   VITAMIN D, CHOLECALCIFEROL, PO Take 2,000 Units by mouth daily 7/20/2018 at Unknown time Yes Reported, Patient   WARFARIN SODIUM PO Take by mouth daily 4.5mg=Tue and Fri, 3mg=ROW 7/20/2018 at 4.5mg Yes Unknown, Entered By History

## 2018-07-21 NOTE — IP AVS SNAPSHOT
MRN:[id number]                      After Visit Summary   7/21/2018    Maryalice Rebecca Wachter    MRN: [id number]           Thank you!     Thank you for choosing [facility] for your care. Our goal is always to provide you with excellent care. Hearing back from our patients is one way we can continue to improve our services. Please take a few minutes to complete the written survey that you may receive in the mail after you visit. If you would like to speak to someone directly about your visit please contact Patient Relations at [phone]. Thank you!          Patient Information     Date Of Birth          11/11/1933        Designated Caregiver       Most Recent Value    Caregiver    Will someone help with your care after discharge? yes    Name of designated caregiver Allen Wachter    Phone number of caregiver [phone]    Caregiver address same as address      About your hospital stay     You were admitted on:  July 21, 2018 You last received care in the:  [place] [address] [facility]    You were discharged on:  July 23, 2018        Reason for your hospital stay       Your hospitalized for suspected bronchitis/sinusitis and mild COPD exacerbation.  This improved with doxycycline and prednisone which you will take for a few more days.  You can try melatonin at night to help with sleep.  Your thyroid levels were high so we have reduced the dose of your levothyroxine and you will need follow-up with your primary care doctor for this.                  Who to Call     For medical emergencies, please call [phone].  For non-urgent questions about your medical care, please call your primary care provider or clinic, [phone]          Attending Provider     Provider Specialty    Jose Easley MD Emergency Medicine    [facility]       Primary Care Provider Office Phone # Fax #    Venu Maria PA-C [phone]      After Care  Instructions     Activity       Your activity upon discharge: activity as tolerated            Diet       Follow this diet upon discharge: Orders Placed This Encounter      Moderate Consistent CHO Diet                  Follow-up Appointments     Follow-up and recommended labs and tests        Follow up with primary care provider, Venu Maria, within 7 days to evaluate medication change and hospital follow-up.  The following labs/tests are recommended: TSH in 4 weeks.                  Your next 10 appointments already scheduled     Jul 30, 2018  1:40 PM CDT   [facility] Visit with Venu Maria PA-C   [facility]    [address] [place] [address], [address] [address] [phone]           Bring a current list of meds and any records pertaining to this visit. For Physicals, please bring immunization records and any forms needing to be filled out. Please arrive 10 minutes early to complete paperwork.            Aug 03, 2018  9:30 AM CDT   Anticoagulation Visit with FM RN VISITS   [facility] ([address] [address] [phone]            Oct 15, 2018  1:00 PM CDT   SHORT with Janey Romero RPH   [facility] ([facility])    [address] [facility] [address] [phone]              Additional Services     Med Therapy Manage Referral       Your provider has referred you to: **[facility] Scheduling (numerous locations) [phone]   [email or web address]  [facility]: [facility] [phone]   [email or web address]    Reason for Referral: 10 + prescription medications with recent changes while hospitalized.     The [facility] department will contact  you to schedule an appointment.  You may also schedule the appointment by calling [phone].  For [facility] patients, please call [phone] to confirm/schedule your appointment on the next business day.    This service is designed to help you get the most from your medications.  A specially trained Pharmacist will work closely with you and your providers to solve any questions, concerns, issues or problems related to your medications.    Please bring all of your prescription and non-prescription medications (such as vitamins, over-the-counter medications, and herbals) or a detailed medication list to your appointment.    If you have a glucose meter or other home monitoring information, please also bring this to your appointment (i.e. blood glucose log, blood pressure log, pain log, etc.).                  Warfarin Instruction     You have started taking a medicine called warfarin. This is a blood-thinning medicine (anticoagulant). It helps prevent and treat blood clots.      Before leaving the hospital, make sure you know how much to take and how long to take it.      You will need regular blood tests to make sure your blood is clotting safely. It is very important to see your doctor for regular blood tests.    Talk to your doctor before taking any new medicine (this includes over-the-counter drugs and herbal products). Many medicines can interact with warfarin. This may cause more bleeding or too much clotting.     Eating a lot of vitamin K--found in green, leafy vegetables--can change the way warfarin works in your body. Do NOT avoid these foods. Instead, try to eat the same amount each day.     Bleeding is the most common side-effect of warfarin. You may notice bleeding gums, a bloody nose, bruises and bleeding longer when you cut yourself. See a doctor at once if:   o You cough up blood  o You find blood in your stool (poop)  o You have a deep cut, or a cut that bleeds longer than 10  "minutes   o You have a bad cut, hard fall, accident or hit your head (go to urgent care or the emergency room).    For women who can get pregnant: This medicine can harm an unborn baby. Be very careful not to get pregnant while taking this medicine. If you think you might be pregnant, call your doctor right away.    For more information, read \"Guide to Warfarin Therapy,  the booklet you received in the hospital.        Pending Results     Date and Time Order Name Status Description    7/21/2018 1352 Blood culture Preliminary             Statement of Approval     Ordered          07/23/18 1153  I have reviewed and agree with all the recommendations and orders detailed in this document.  EFFECTIVE NOW     Approved and electronically signed by:  Tarun Jj MD             Admission Information     Date & Time Provider Department Dept. Phone    7/21/2018 Ashok Eldridge MD [place] [address] [facility] [phone]      Your Vitals Were     Blood Pressure Pulse Temperature Respirations Height Weight    135/50 (BP Location: Right arm) 77 98.4  F (36.9  C) (Oral) 20 1.651 m (5' 5\") 86.7 kg (191 lb 1.6 oz)    Pulse Oximetry BMI (Body Mass Index)                94% 31.8 kg/m2          [employer] Information     [employer] gives you secure access to your electronic health record. If you see a primary care provider, you can also send messages to your care team and make appointments. If you have questions, please call your primary care clinic.  If you do not have a primary care provider, please call [phone] and they will assist you.        Care EveryWhere ID     This is your Care EveryWhere ID. This could be used by other organizations to access your [facility] medical records  [id number]        Equal Access to Services     [facility] HospitalOWEN : Maribel Martinez, billie viera, katrin king. [facility] [phone].    ATENCIÓN: Si chilangola espmarli tiene " a talamantes disposición servicios gratuitos de asistencia lingüística. Hansel mclean [phone].    We comply with applicable federal civil rights laws and [place] laws. We do not discriminate on the basis of race, color, national origin, age, disability, sex, sexual orientation, or gender identity.               Review of your medicines      START taking        Dose / Directions    doxycycline monohydrate 100 MG capsule   Used for:  Acute bronchitis, unspecified organism, Acute non-recurrent maxillary sinusitis        Dose:  100 mg   Take 1 capsule (100 mg) by mouth 2 times daily for 3 days   Quantity:  6 capsule   Refills:  0       melatonin 3 MG tablet   Used for:  Insomnia, unspecified type        Dose:  3 mg   Take 1 tablet (3 mg) by mouth nightly as needed for sleep   Quantity:  30 tablet   Refills:  0       predniSONE 20 MG tablet   Commonly known as:  DELTASONE   Used for:  COPD exacerbation (H)        Dose:  40 mg   Start taking on:  7/24/2018   Take 2 tablets (40 mg) by mouth daily for 3 days   Quantity:  6 tablet   Refills:  0         CONTINUE these medicines which may have CHANGED, or have new prescriptions. If we are uncertain of the size of tablets/capsules you have at home, strength may be listed as something that might have changed.        Dose / Directions    levothyroxine 75 MCG tablet   Commonly known as:  SYNTHROID/LEVOTHROID   This may have changed:    - medication strength  - See the new instructions.   Used for:  Hypothyroidism, unspecified type        Dose:  75 mcg   Take 1 tablet (75 mcg) by mouth daily   Quantity:  30 tablet   Refills:  0         CONTINUE these medicines which have NOT CHANGED        Dose / Directions    ascorbic acid 500 MG tablet   Commonly known as:  VITAMIN C        Dose:  500 mg   Take 500 mg by mouth daily.   Refills:  0       ASPIRIN EC PO        Dose:  81 mg   Take 81 mg by mouth daily   Refills:  0       B-12 1000 MCG Caps        Dose:  1 capsule   Take 1 capsule by mouth  daily   Refills:  0       Calcium carb-Vitamin D 500 mg [place]-200 units 500-200 MG-UNIT per tablet   Commonly known as:  OSCAL with D;Oyster Shell Calcium        Dose:  1 tablet   Take 1 tablet by mouth daily.   Refills:  0       ferrous sulfate 325 (65 Fe) MG tablet   Commonly known as:  IRON        Dose:  1 tablet   Take 1 tablet by mouth daily (with breakfast).   Refills:  0       FIBERCON 625 MG tablet   Generic drug:  calcium polycarbophil        Dose:  1 tablet   Take 1 tablet by mouth 2 times daily.   Refills:  0       glipiZIDE 10 MG 24 hr tablet   Commonly known as:  GLUCOTROL XL   Used for:  Type 2 diabetes mellitus with hyperglycemia, without long-term current use of insulin (H)        Dose:  10 mg   Take 1 tablet (10 mg) by mouth 2 times daily Due to see provider in August   Quantity:  180 tablet   Refills:  0       hydrochlorothiazide 12.5 MG capsule   Commonly known as:  MICROZIDE   Used for:  Edema, unspecified type        Dose:  12.5 mg   Take 1 capsule (12.5 mg) by mouth every morning   Quantity:  90 capsule   Refills:  1       lisinopril 40 MG tablet   Commonly known as:  PRINIVIL/ZESTRIL   Used for:  Systolic dysfunction, left ventricle, Past myocardial infarction        TAKE ONE TABLET BY MOUTH EVERY DAY   Quantity:  90 tablet   Refills:  0       LOPERAMIDE HCL PO        1/2 to 1 tablet by mouth as needed.   Refills:  0       METFORMIN HCL PO        Dose:  500 mg   Take 500 mg by mouth 2 times daily (with meals)   Refills:  0       metoprolol tartrate 50 MG tablet   Commonly known as:  LOPRESSOR   Used for:  Past myocardial infarction, Systolic dysfunction, left ventricle, Atrial tachycardia, paroxysmal (H)        TAKE ONE TABLET BY MOUTH TWICE A DAY   Quantity:  180 tablet   Refills:  0       mometasone 220 MCG/INH Inhaler   Commonly known as:  ASMANEX        Dose:  2 puff   Inhale 2 puffs into the lungs daily as needed   Refills:  0       pioglitazone 30 MG tablet   Commonly known as:  ACTOS    Used for:  Type 2 diabetes mellitus with hyperglycemia, without long-term current use of insulin (H)        TAKE ONE TABLET BY MOUTH EVERY DAY   Quantity:  90 tablet   Refills:  1       * PROAIR  (90 Base) MCG/ACT Inhaler   Generic drug:  albuterol        Dose:  2 puff   Inhale 2 puffs into the lungs every 6 hours as needed Reported on 3/2/2017   Refills:  0       * albuterol (2.5 MG/3ML) 0.083% neb solution   Used for:  COPD exacerbation (H)        Dose:  1 vial   Take 1 vial (2.5 mg) by nebulization every 6 hours as needed for shortness of breath / dyspnea or wheezing Reported on 3/2/2017   Quantity:  1 Box   Refills:  0       ranitidine 150 MG tablet   Commonly known as:  ZANTAC   Used for:  Hiatal hernia        Dose:  150 mg   Take 1 tablet (150 mg) by mouth nightly as needed for heartburn   Quantity:  60 tablet   Refills:  1       simvastatin 20 MG tablet   Commonly known as:  ZOCOR   Used for:  Hyperlipidemia LDL goal <100        TAKE ONE TABLET BY MOUTH AT BEDTIME. NEEDS APPOINTMENT   Quantity:  90 tablet   Refills:  0       tiotropium 18 MCG capsule   Commonly known as:  SPIRIVA        Dose:  18 mcg   Inhale 18 mcg into the lungs daily.   Refills:  0       TYLENOL PM EXTRA STRENGTH  MG tablet   Generic drug:  diphenhydrAMINE-acetaminophen        Dose:  2 tablet   Take 2 tablets by mouth At Bedtime   Refills:  0       VITAMIN D (CHOLECALCIFEROL) PO        Dose:  2000 Units   Take 2,000 Units by mouth daily   Refills:  0       WARFARIN SODIUM PO        Take by mouth daily 4.5mg=Tue and Fri, 3mg=ROW   Refills:  0       * Notice:  This list has 2 medication(s) that are the same as other medications prescribed for you. Read the directions carefully, and ask your doctor or other care provider to review them with you.      STOP taking     cefuroxime 250 MG tablet   Commonly known as:  CEFTIN           ONETOUCH ULTRA test strip   Generic drug:  blood glucose monitoring           TRUEPLUS LANCETS 30G  Misc                Where to get your medicines      These medications were sent to [place], [place] - [address] [facility]  [address] [facility] [address]     Phone:  [phone]     doxycycline monohydrate 100 MG capsule    levothyroxine 75 MCG tablet    melatonin 3 MG tablet    predniSONE 20 MG tablet                Protect others around you: Learn how to safely use, store and throw away your medicines at www.disposemymeds.org.        ANTIBIOTIC INSTRUCTION     You've Been Prescribed an Antibiotic - Now What?  Your healthcare team thinks that you or your loved one might have an infection. Some infections can be treated with antibiotics, which are powerful, life-saving drugs. Like all medications, antibiotics have side effects and should only be used when necessary. There are some important things you should know about your antibiotic treatment.      Your healthcare team may run tests before you start taking an antibiotic.    Your team may take samples (e.g., from your blood, urine or other areas) to run tests to look for bacteria. These test can be important to determine if you need an antibiotic at all and, if you do, which antibiotic will work best.      Within a few days, your healthcare team might change or even stop your antibiotic.    Your team may start you on an antibiotic while they are working to find out what is making you sick.    Your team might change your antibiotic because test results show that a different antibiotic would be better to treat your infection.    In some cases, once your team has more information, they learn that you do not need an antibiotic at all. They may find out that you don't have an infection, or that the antibiotic you're taking won't work against your infection. For example, an infection caused by a virus can't be treated with antibiotics. Staying on an antibiotic when you don't need it is more likely to be harmful than  helpful.      You may experience side effects from your antibiotic.    Like all medications, antibiotics have side effects. Some of these can be serious.    Let you healthcare team know if you have any known allergies when you are admitted to the hospital.    One significant side effect of nearly all antibiotics is the risk of severe and sometimes deadly diarrhea caused by Clostridium difficile (C. Difficile). This occurs when a person takes antibiotics because some good germs are destroyed. Antibiotic use allows C. diificile to take over, putting patients at high risk for this serious infection.    As a patient or caregiver, it is important to understand your or your loved one's antibiotic treatment. It is especially important for caregivers to speak up when patients can't speak for themselves. Here are some important questions to ask your healthcare team.    What infection is this antibiotic treating and how do you know I have that infection?    What side effects might occur from this antibiotic?    How long will I need to take this antibiotic?    Is it safe to take this antibiotic with other medications or supplements (e.g., vitamins) that I am taking?     Are there any special directions I need to know about taking this antibiotic? For example, should I take it with food?    How will I be monitored to know whether my infection is responding to the antibiotic?    What tests may help to make sure the right antibiotic is prescribed for me?      Information provided by:  www.cdc.gov/getsmart  U.S. Department of Health and Human Services  Centers for disease Control and Prevention  National Center for Emerging and Zoonotic Infectious Diseases  Division of Healthcare Quality Promotion             Medication List: This is a list of all your medications and when to take them. Check marks below indicate your daily home schedule. Keep this list as a reference.      Medications           Morning Afternoon Evening Bedtime  As Needed    ascorbic acid 500 MG tablet   Commonly known as:  VITAMIN C   Take 500 mg by mouth daily.                                   ASPIRIN EC PO   Take 81 mg by mouth daily   Last time this was given:  81 mg on 7/23/2018  9:22 AM                                   B-12 1000 MCG Caps   Take 1 capsule by mouth daily                                   Calcium carb-Vitamin D 500 mg [place]-200 units 500-200 MG-UNIT per tablet   Commonly known as:  OSCAL with D;Oyster Shell Calcium   Take 1 tablet by mouth daily.                                   doxycycline monohydrate 100 MG capsule   Take 1 capsule (100 mg) by mouth 2 times daily for 3 days                                   ferrous sulfate 325 (65 Fe) MG tablet   Commonly known as:  IRON   Take 1 tablet by mouth daily (with breakfast).                                   FIBERCON 625 MG tablet   Take 1 tablet by mouth 2 times daily.   Last time this was given:  625 mg on 7/23/2018  9:22 AM   Generic drug:  calcium polycarbophil                                      glipiZIDE 10 MG 24 hr tablet   Commonly known as:  GLUCOTROL XL   Take 1 tablet (10 mg) by mouth 2 times daily Due to see provider in August   Last time this was given:  10 mg on 7/23/2018  9:23 AM                                      hydrochlorothiazide 12.5 MG capsule   Commonly known as:  MICROZIDE   Take 1 capsule (12.5 mg) by mouth every morning                                   levothyroxine 75 MCG tablet   Commonly known as:  SYNTHROID/LEVOTHROID   Take 1 tablet (75 mcg) by mouth daily   Last time this was given:  88 mcg on 7/22/2018  5:39 AM                                   lisinopril 40 MG tablet   Commonly known as:  PRINIVIL/ZESTRIL   TAKE ONE TABLET BY MOUTH EVERY DAY   Last time this was given:  40 mg on 7/23/2018  9:23 AM                                   LOPERAMIDE HCL PO   1/2 to 1 tablet by mouth as needed.                                   melatonin 3 MG tablet   Take 1 tablet (3 mg) by  mouth nightly as needed for sleep   Last time this was given:  1 mg on 7/22/2018  1:13 AM                                   METFORMIN HCL PO   Take 500 mg by mouth 2 times daily (with meals)   Last time this was given:  500 mg on 7/23/2018  9:23 AM                                      metoprolol tartrate 50 MG tablet   Commonly known as:  LOPRESSOR   TAKE ONE TABLET BY MOUTH TWICE A DAY   Last time this was given:  50 mg on 7/23/2018  9:22 AM                                      mometasone 220 MCG/INH Inhaler   Commonly known as:  ASMANEX   Inhale 2 puffs into the lungs daily as needed   Last time this was given:  2 puffs on 7/23/2018  9:03 AM                                   pioglitazone 30 MG tablet   Commonly known as:  ACTOS   TAKE ONE TABLET BY MOUTH EVERY DAY                                   predniSONE 20 MG tablet   Commonly known as:  DELTASONE   Take 2 tablets (40 mg) by mouth daily for 3 days   Start taking on:  7/24/2018   Last time this was given:  40 mg on 7/23/2018  9:23 AM                                   * PROAIR  (90 Base) MCG/ACT Inhaler   Inhale 2 puffs into the lungs every 6 hours as needed Reported on 3/2/2017   Generic drug:  albuterol                                   * albuterol (2.5 MG/3ML) 0.083% neb solution   Take 1 vial (2.5 mg) by nebulization every 6 hours as needed for shortness of breath / dyspnea or wheezing Reported on 3/2/2017                                   ranitidine 150 MG tablet   Commonly known as:  ZANTAC   Take 1 tablet (150 mg) by mouth nightly as needed for heartburn                                   simvastatin 20 MG tablet   Commonly known as:  ZOCOR   TAKE ONE TABLET BY MOUTH AT BEDTIME. NEEDS APPOINTMENT   Last time this was given:  20 mg on 7/22/2018 10:04 PM                                   tiotropium 18 MCG capsule   Commonly known as:  SPIRIVA   Inhale 18 mcg into the lungs daily.                                   TYLENOL PM EXTRA STRENGTH  MG  tablet   Take 2 tablets by mouth At Bedtime   Generic drug:  diphenhydrAMINE-acetaminophen                                   VITAMIN D (CHOLECALCIFEROL) PO   Take 2,000 Units by mouth daily   Last time this was given:  2,000 Units on 7/23/2018  9:22 AM                                   WARFARIN SODIUM PO   Take by mouth daily 4.5mg=Tue and Fri, 3mg=ROW   Last time this was given:  2 mg on 7/22/2018  4:55 PM                                * Notice:  This list has 2 medication(s) that are the same as other medications prescribed for you. Read the directions carefully, and ask your doctor or other care provider to review them with you.

## 2018-07-21 NOTE — LETTER
"Transition Communication Hand-off for Care Transitions to Next Level of Care Provider    Name: Maryalice Rebecca Wachter  : 1933  MRN #: [id number]  Primary Care Provider: Venu Maria  Primary Care MD Name: Venu Maria PA-C  Primary Clinic:   [address]  [facility] [address]  Primary Care Clinic Name: [facility]    Key Recommendations:   She has multiple issues while hospitalized. BLE edema, SOB w/wheezing, fevers (at home), sinus pressure. Discussed COPD Action Plan with pt at bedside. Also discussed MTM referral for a medication review after discharge. She has done a MTM before with Janey Romero at [facility].  will request \"Dr. Toth\" again for her.     Aneta Malone    AVS/Discharge Summary is the source of truth; this is a helpful guide for improved communication of patient story          "

## 2018-07-21 NOTE — ED PROVIDER NOTES
History     Chief Complaint:  Shortness of Breath and Cough    HPI   Maryalice Rebecca Wachter is a 84 year old female with a history of COPD, atrial fibrillation on Coumadin, CAD, CHF, hypertension, and hyperlipidemia who presents with shortness of breath and cough. The patient reports she has had a productive cough for the past month that has been progressively getting worse. She states recently she has had a harder time breathing, and she was started on antibiotics for a sinus infection 2 days ago by her primary care provider due to her symptoms of postnasal drip and sinus pressure/pain lasting longer than 10 days. She states her breathing has gotten worse, and her congestion and sinus symptoms have not improved, prompting her to come to the ED for evaluation today. She also notes she has had a fever and her legs have been more swollen than usual. She denies chest pain, recent illness exposure, or oxygen dependence at baseline.    Allergies:  Penicillins  Amlodipine  Furosemide     Medications:    Albuterol  Aspirin  Ceftin  Iron  Glipizide  Hydrochlorothiazide  Synthroid  Lisinopril  Loperamide  Metformin  Metoprolol  Mometasone  Actos  Zantac  Zocor  Spiriva  Coumadin    Past Medical History:    Atrial fibrillation  CAD  CHF  COPD  Diabetes  Hyperlipidemia  Hypertension  Hypothyroidism    Past Surgical History:    Ankle surgery  D&C  ENT surgery    Family History:    Asthma  Diabetes    Social History:  Smoking status: Never smoker  Alcohol use: Yes   Marital Status:   [2]     Review of Systems   Constitutional: Positive for fever.   HENT: Positive for congestion, postnasal drip, sinus pain and sinus pressure.    Respiratory: Positive for cough and shortness of breath.    Cardiovascular: Negative for chest pain.   Hematological: Bruises/bleeds easily.   All other systems reviewed and are negative.    Physical Exam     Patient Vitals for the past 24 hrs:   BP Temp Pulse Resp SpO2   07/21/18 1519 124/53  99.1  F (37.3  C) 75 20 95 %      Physical Exam  General:                        Well-nourished                        Speaking in full sentences                        Very hard of hearing  Eyes:                        Conjunctiva without injection or scleral icterus  ENT:                        Moist mucous membranes                        Nares patent                        Pinnae normal  Neck:                        Full ROM                        No stiffness appreciated  Resp:                        Faint expiratory wheezing                        No focal crackles  CV:                                        Irregularly irregular rate and rhythm                        S1 and S2 present                        No murmur, gallop or rub  GI:                        BS present                        Abdomen soft without distention                        Non-tender to light and deep palpation                        No guarding or rebound tenderness  Skin:                        Warm, dry, well perfused                        No rashes or open wounds on exposed skin  MSK:                        Moves all extremities                        1+ LE edema bilaterally  Neuro:                        Alert                        Answers questions appropriately                        Moves all extremities equally  Psych:                        Normal affect, normal mood    Emergency Department Course   ECG (14:04:53):  Rate 92 bpm. ND interval *. QRS duration 96. QT/QTc 382/472. P-R-T axes * 14 34. Atrial fibrillation. Cannot rule out anterior infarct, age undetermined. Abnormal ECG. Interpreted at 1410 by Jose Easley MD.     Imaging:  Radiographic findings were communicated with the patient who voiced understanding of the findings.    Chest XR, PA & LAT:  1. Cardiac silhouette upper limits of normal in size.  2. Large hiatal hernia.  3. No active areas of infiltrate are identified.      As read by Radiology.       Laboratory:  CBC: HGB 10.2 (L), o/w WNL (WBC 8.5, )  BMP: Glucose 196 (H), Creatinine 1.09 (H), GFR estimate 48 (L), o/w WNL   Blood gas venous and oxyhgb: pH 7.44 (H), PCO2 46, PO2 59 (H), Bicarbonate 31 (H), FIO2 NC 1 L/min, Oxyhgb 90, Base excess 5.9   Lactic acid: 1.1  INR: 2.44 (H)    Blood cultures: in process    Interventions:  1424: DuoNeb, 5mg/6 mL, Inhalation solution, Nebulizer  1437: Solu-medrol 125 mg IV  1442: Lactated ringers 1L IV Bolus    1517: Tylenol 500 mg PO  1629: Duoneb, 3 mL, nebulization    1729: Levaquin 750 mg IV    Emergency Department Course:  Past medical records, nursing notes, and vitals reviewed.  1405: I performed an exam of the patient and obtained history, as documented above.  IV inserted and blood drawn.  The patient was sent for a chest x-ray while in the emergency department, findings above.   EKG obtained, results above.    1619: I rechecked the patient. Explained findings to the patient.    1645: I spoke to Dr. Eldridge of the hospitalist service who accepts the patient for admission.     Findings and plan explained to the patient who consents to admission.     Discussed the patient with Dr. Eldridge, who will admit the patient to a medical bed for further monitoring, evaluation, and treatment.      Impression & Plan    Medical Decision Making:  Maryalice Rebecca Wachter is a 84 year old female with a history of COPD, presenting to the ER accompanied by her  for evaluation of shortness of breath and sinusitis. VS on presentation reveal SPO2 of 95% on 1 L, although are otherwise grossly unremarkable. History and exam are most consistent with COPD exacerbation. Patient notes antecedent URI symptoms and sinusitis, carrying a diagnosis from urgent care 2 days prior for which she has been on oral antibiotics. She notes progression of symptoms since that time. Her pulmonary exam reveals diffuse expiratory wheezing and diminished air movement throughout. This improved  following nebulizer treatments and IV solu-medrol. Chest x-ray does not reveal clear infiltrate, though note is made of hiatal hernia. EKG reveals atrial fibrillation, though no findings of acute ischemia. Her troponin is within normal limits. Labs are notable for anemia with a hemoglobin of 10.2 (down from previous, though most recent labs are from 2017). Renal function is baseline at 1.09. Her lactate is presently normal at 1.1. Blood cultures x 2 obtained and are presently pending. Patient will be started on IV antibiotics for COPD exacerbation. Given persistent wheezing, diminished air movement, as well as associated fever, and worsening symptoms, she will be admitted to the hospitalist service under the care of Dr. Eldridge. Questions answered prior to admission.      Diagnosis:    ICD-10-CM   1. COPD exacerbation (H) J44.1   2. Febrile illness, acute R50.9   3. SOB (shortness of breath) R06.02   4. Chronic anticoagulation Z79.01     Disposition:  Admitted to medicine in the care of Dr. Chente Riggs  7/21/2018   [facility] EMERGENCY DEPARTMENT    Ibeth RAIN, am serving as a scribe at 2:05 PM on 7/21/2018 to document services personally performed by Jose Easley MD based on my observations and the provider's statements to me.       Jose Easley MD  07/21/18 1730

## 2018-07-22 ENCOUNTER — APPOINTMENT (OUTPATIENT)
Dept: CARDIOLOGY | Facility: CLINIC | Age: 83
DRG: 190 | End: 2018-07-22
Attending: INTERNAL MEDICINE
Payer: COMMERCIAL

## 2018-07-22 LAB
ANION GAP SERPL CALCULATED.3IONS-SCNC: 8 MMOL/L (ref 3–14)
BUN SERPL-MCNC: 16 MG/DL (ref 7–30)
CALCIUM SERPL-MCNC: 9 MG/DL (ref 8.5–10.1)
CHLORIDE SERPL-SCNC: 98 MMOL/L (ref 94–109)
CO2 SERPL-SCNC: 29 MMOL/L (ref 20–32)
CREAT SERPL-MCNC: 1.16 MG/DL (ref 0.52–1.04)
GFR SERPL CREATININE-BSD FRML MDRD: 44 ML/MIN/1.7M2
GLUCOSE BLDC GLUCOMTR-MCNC: 246 MG/DL (ref 70–99)
GLUCOSE BLDC GLUCOMTR-MCNC: 296 MG/DL (ref 70–99)
GLUCOSE BLDC GLUCOMTR-MCNC: 325 MG/DL (ref 70–99)
GLUCOSE BLDC GLUCOMTR-MCNC: 80 MG/DL (ref 70–99)
GLUCOSE BLDC GLUCOMTR-MCNC: 92 MG/DL (ref 70–99)
GLUCOSE SERPL-MCNC: 87 MG/DL (ref 70–99)
INR PPP: 2.68 (ref 0.86–1.14)
INTERPRETATION ECG - MUSE: NORMAL
POTASSIUM SERPL-SCNC: 3.9 MMOL/L (ref 3.4–5.3)
SODIUM SERPL-SCNC: 135 MMOL/L (ref 133–144)
T4 FREE SERPL-MCNC: 1.71 NG/DL (ref 0.76–1.46)
TSH SERPL DL<=0.005 MIU/L-ACNC: 0.89 MU/L (ref 0.4–4)

## 2018-07-22 PROCEDURE — 25000132 ZZH RX MED GY IP 250 OP 250 PS 637: Performed by: INTERNAL MEDICINE

## 2018-07-22 PROCEDURE — 84443 ASSAY THYROID STIM HORMONE: CPT | Performed by: INTERNAL MEDICINE

## 2018-07-22 PROCEDURE — 99232 SBSQ HOSP IP/OBS MODERATE 35: CPT | Performed by: INTERNAL MEDICINE

## 2018-07-22 PROCEDURE — 94640 AIRWAY INHALATION TREATMENT: CPT | Mod: 76

## 2018-07-22 PROCEDURE — 12000007 ZZH R&B INTERMEDIATE

## 2018-07-22 PROCEDURE — 25000128 H RX IP 250 OP 636: Performed by: INTERNAL MEDICINE

## 2018-07-22 PROCEDURE — 93306 TTE W/DOPPLER COMPLETE: CPT

## 2018-07-22 PROCEDURE — 94640 AIRWAY INHALATION TREATMENT: CPT

## 2018-07-22 PROCEDURE — 00000146 ZZHCL STATISTIC GLUCOSE BY METER IP

## 2018-07-22 PROCEDURE — 84439 ASSAY OF FREE THYROXINE: CPT | Performed by: INTERNAL MEDICINE

## 2018-07-22 PROCEDURE — 80048 BASIC METABOLIC PNL TOTAL CA: CPT | Performed by: INTERNAL MEDICINE

## 2018-07-22 PROCEDURE — 85610 PROTHROMBIN TIME: CPT | Performed by: INTERNAL MEDICINE

## 2018-07-22 PROCEDURE — 36415 COLL VENOUS BLD VENIPUNCTURE: CPT | Performed by: INTERNAL MEDICINE

## 2018-07-22 PROCEDURE — 40000275 ZZH STATISTIC RCP TIME EA 10 MIN

## 2018-07-22 PROCEDURE — 93306 TTE W/DOPPLER COMPLETE: CPT | Mod: 26 | Performed by: INTERNAL MEDICINE

## 2018-07-22 PROCEDURE — 25000125 ZZHC RX 250: Performed by: INTERNAL MEDICINE

## 2018-07-22 RX ORDER — WARFARIN SODIUM 2 MG/1
2 TABLET ORAL
Status: COMPLETED | OUTPATIENT
Start: 2018-07-22 | End: 2018-07-22

## 2018-07-22 RX ORDER — TRAZODONE HYDROCHLORIDE 100 MG/1
100 TABLET ORAL AT BEDTIME
Status: DISCONTINUED | OUTPATIENT
Start: 2018-07-22 | End: 2018-07-23 | Stop reason: HOSPADM

## 2018-07-22 RX ADMIN — CYANOCOBALAMIN TAB 1000 MCG 1000 MCG: 1000 TAB at 08:32

## 2018-07-22 RX ADMIN — METFORMIN HYDROCHLORIDE 500 MG: 500 TABLET, FILM COATED ORAL at 16:55

## 2018-07-22 RX ADMIN — LISINOPRIL 40 MG: 40 TABLET ORAL at 08:32

## 2018-07-22 RX ADMIN — Medication 1 MG: at 01:13

## 2018-07-22 RX ADMIN — VITAMIN D, TAB 1000IU (100/BT) 2000 UNITS: 25 TAB at 08:32

## 2018-07-22 RX ADMIN — PREDNISONE 40 MG: 20 TABLET ORAL at 08:32

## 2018-07-22 RX ADMIN — SIMVASTATIN 20 MG: 20 TABLET, FILM COATED ORAL at 22:04

## 2018-07-22 RX ADMIN — ASPIRIN 81 MG: 81 TABLET, COATED ORAL at 08:32

## 2018-07-22 RX ADMIN — IPRATROPIUM BROMIDE AND ALBUTEROL SULFATE 3 ML: .5; 3 SOLUTION RESPIRATORY (INHALATION) at 08:38

## 2018-07-22 RX ADMIN — LEVOTHYROXINE SODIUM 88 MCG: 88 TABLET ORAL at 05:39

## 2018-07-22 RX ADMIN — WARFARIN SODIUM 2 MG: 2 TABLET ORAL at 16:55

## 2018-07-22 RX ADMIN — IPRATROPIUM BROMIDE AND ALBUTEROL SULFATE 3 ML: .5; 3 SOLUTION RESPIRATORY (INHALATION) at 15:22

## 2018-07-22 RX ADMIN — METOPROLOL TARTRATE 50 MG: 50 TABLET, FILM COATED ORAL at 08:32

## 2018-07-22 RX ADMIN — INSULIN ASPART 4 UNITS: 100 INJECTION, SOLUTION INTRAVENOUS; SUBCUTANEOUS at 16:57

## 2018-07-22 RX ADMIN — CALCIUM POLYCARBOPHIL 625 MG: 625 TABLET, FILM COATED ORAL at 22:03

## 2018-07-22 RX ADMIN — FUROSEMIDE 10 MG: 10 INJECTION, SOLUTION INTRAVENOUS at 16:55

## 2018-07-22 RX ADMIN — IPRATROPIUM BROMIDE AND ALBUTEROL SULFATE 3 ML: .5; 3 SOLUTION RESPIRATORY (INHALATION) at 11:33

## 2018-07-22 RX ADMIN — CALCIUM POLYCARBOPHIL 625 MG: 625 TABLET, FILM COATED ORAL at 08:32

## 2018-07-22 RX ADMIN — IPRATROPIUM BROMIDE AND ALBUTEROL SULFATE 3 ML: .5; 3 SOLUTION RESPIRATORY (INHALATION) at 20:26

## 2018-07-22 RX ADMIN — METOPROLOL TARTRATE 50 MG: 50 TABLET, FILM COATED ORAL at 22:04

## 2018-07-22 RX ADMIN — FUROSEMIDE 10 MG: 10 INJECTION, SOLUTION INTRAVENOUS at 05:39

## 2018-07-22 RX ADMIN — METFORMIN HYDROCHLORIDE 500 MG: 500 TABLET, FILM COATED ORAL at 08:32

## 2018-07-22 RX ADMIN — DOXYCYCLINE HYCLATE 100 MG: 100 TABLET, FILM COATED ORAL at 08:32

## 2018-07-22 RX ADMIN — DOXYCYCLINE HYCLATE 100 MG: 100 TABLET, FILM COATED ORAL at 22:02

## 2018-07-22 RX ADMIN — INSULIN ASPART 3 UNITS: 100 INJECTION, SOLUTION INTRAVENOUS; SUBCUTANEOUS at 12:50

## 2018-07-22 RX ADMIN — TRAZODONE HYDROCHLORIDE 100 MG: 100 TABLET ORAL at 22:04

## 2018-07-22 ASSESSMENT — ACTIVITIES OF DAILY LIVING (ADL)
ADLS_ACUITY_SCORE: 12
ADLS_ACUITY_SCORE: 15
ADLS_ACUITY_SCORE: 12
ADLS_ACUITY_SCORE: 15

## 2018-07-22 NOTE — CONSULTS
"Care Transition Initial Assessment - RN    Reason For Consult: care coordination/care conference, discharge planning   Met with: Patient.  DATA   Active Problems:    Respiratory distress     COPD Action Plan discussed with pt at bedside.   Cognitive Status: awake, alert and oriented.  Primary Care Clinic Name: [facility]  Primary Care MD Name: Venu Maria PA-C  Contact information and PCP information verified: Yes  Lives With: spouse  Living Arrangements: house     Description of Support System: Supportive, Involved   Who is your support system?: , Children       Insurance concerns: No Insurance issues identified  ASSESSMENT  Patient currently receives the following services:  None. Has previously had services through [facility]. She was very frustrated d/t staff had English as a 2nd language and she couldn't understand them.         Identified issues/concerns regarding health management:  She has multiple issues while hospitalized. BLE edema, SOB w/wheezing, fevers (at home), sinus pressure. Discussed COPD Action Plan with pt at bedside. Also discussed MTM referral for a medication review after discharge. She has done a MTM before with Janey Romero at [facility]. CM will request \"Dr. Toth\" again for her.      PLAN  Financial costs for the patient were not discussed.  Patient given options and choices for discharge.  Patient/family is agreeable to the plan?  Yes  Patient anticipates discharging back to home.     Other Resources: Other (see comment) (COPD Action Plan)  Patient anticipates needs for home equipment: No  Plan/Disposition: Home   Appointments: Venu Maria PA-C on Monday, 7/30/18 at 1:40pm.     CM will continue to follow patient until discharge for any additional needs.     Nuria Malone, RN, BSN, CTS  [facility]  [phone]              "

## 2018-07-22 NOTE — PLAN OF CARE
Problem: Patient Care Overview  Goal: Plan of Care/Patient Progress Review  Outcome: No Change  Pt A&O, up w/SBA, tele ST w/ST depression.  SHUKLA, echo 55-60%.  2+ BLE edema.  BG 80/246 - diet changed to mod cho.  IV lasix.  Pt makes needs known, will continue POC.

## 2018-07-22 NOTE — PLAN OF CARE
Problem: Chronic Obstructive Pulmonary Disease (Adult)  Goal: Signs and Symptoms of Listed Potential Problems Will be Absent, Minimized or Managed (Chronic Obstructive Pulmonary Disease)  Signs and symptoms of listed potential problems will be absent, minimized or managed by discharge/transition of care (reference Chronic Obstructive Pulmonary Disease (Adult) CPG).   Outcome: No Change  Summary: Pt arrived to floor @ 1830 and orientated to room 347. Pt came to ED today with c/o SOB, fever, BLE swelling and a cough that progressively gotten worse over the last month. Pt had gone into primary doctor a few days ago for a sinus infection and received oral abx. Pt reports this has not improved. Dx: Respiratory distress r/t COPD. Pt lives at home with .   Hx: COPD, A-fib, CAD, CHF, HTN, DM II & hyperlipidemia.   Tele: SA/ ST  Pain: Pt reports headache r/t to sinusitis - improving w/ tylenol.  Assessment:  Pt AOx4. Transfers SBA. SOB noted w/ exertion. LS= diminished. Productive cough noted. IV to right arm saline locked. 2+ edema to BLE. Pt reports urinary retention at baseline and dribbling. Pt resting.

## 2018-07-22 NOTE — PLAN OF CARE
Problem: Chronic Obstructive Pulmonary Disease (Adult)  Goal: Signs and Symptoms of Listed Potential Problems Will be Absent, Minimized or Managed (Chronic Obstructive Pulmonary Disease)  Signs and symptoms of listed potential problems will be absent, minimized or managed by discharge/transition of care (reference Chronic Obstructive Pulmonary Disease (Adult) CPG).   Outcome: No Change  A&Ox4, anxious  Up SBA, SHUKLA/SOB w/activity  LDA: PIV SL, IV lasix  BP elevated post activity, Tmax 99.4, recheck 98.7  Pain: denies  BG 92  Tele: SA/ST  Edema BLE   Voiding frequent small amounts, dribbles  Cardiac diet  Plan: ECHO this am  Will continue to monitor

## 2018-07-22 NOTE — PROGRESS NOTES
[facility]  Hospitalist Progress Note  Tarun Jj MD 07/22/18    Reason for Stay (Diagnosis): multiple symptoms         Assessment and Plan:      Summary of Stay: Maryalice Rebecca Wachter is a 84 year old female with a history of COPD not on oxygen, type II DM, A. fib on warfarin, GERD, urinary incontinence, insomnia, hypothyroidism, HTN, and CKD stage III who was admitted on 7/21/2018 with multiple symptoms including shortness of breath, fatigue, fever at home, and insomnia.  Placed on oxygen initially although no hypoxia documented.  Temperature 98 to mid 99.  Mildly tachycardic with A. fib on EKG.  Chest x-ray without infiltrate.  No leukocytosis.  Started on doxycycline in case of bronchitis or sinusitis.  Given prednisone for possible COPD exacerbation.  Also given some IV Lasix in case contributing to her shortness of breath with her bilateral 2+ lower extremity edema.  TTE unchanged from previous showing some mild LVH.  Free T4 is elevated so holding levothyroxine for a few days and then restart lower dose.  PT and OT consulted for fatigue and weakness.    Problem List/Assessment and Plan:   Shortness of breath, possible COPD exacerbation, bronchitis, sinusitis: Has been more short of breath the past few days and has history of COPD not on oxygen.  Has had wheezing here and was started on prednisone.  Chest x-ray does not show any infiltrate.  Does have sinus pressure and pain along with a 1 month cough with clear sputum.  T-max here 9 9.5 also reports a fever to 102 at home.  She was just prescribed cefuroxime on 7/19.  No leukocytosis.  Some concern for CHF diagnosis with lower extremity edema although TTE does not show any sign of this.  Does have a large hiatal hernia seen on chest x-ray and wonder if this could be contributing some to the shortness of breath.  -Continue doxycycline in case of bronchitis or sinusitis  -Prednisone 40 mg daily likely 5 day course  -Duo nebs  -Home  inhaler regimen    Fatigue, weakness: Again question bronchitis versus sinusitis as above.  This may account for her increased fatigue and general malaise.  She does have hypothyroidism and her free T4 is notably high which may be contributing.  -Hold levothyroxine for 2 days and then resume a low-dose 75 mcg daily  -PT/OT consult    Hypothyroidism: PTA on 88 mcg daily.  TSH is 0.89 and free T4 is 1.7.  -Hold levothyroxine for 2 days and then resume a low-dose 75 mcg daily    A. fib: PTA on warfarin with therapeutic INR.  Also metoprolol 50 mg twice daily for rate control.  Slightly tachycardic today to the 110s.  Unclear if this secondary to nebs or with the Lasix now on board.  We will continue to monitor on telemetry.  -Continue metoprolol  -warfarin with Pharm.D. to dose    Type II DM: PTA on glipizide XL 10 mg daily, pioglitazone 30 mg daily, and metformin 500 mg twice daily.  -Continue metformin and medium dose sliding scale insulin  -Holding glipizide and pioglitazone, but can resume if having elevated blood sugars    CKD stage III: Pending appears to be a patient of 1.1-1.2.    HTN: Continue PTA lisinopril 45 daily and metoprolol 50 mg twice daily.  Is also on HCTZ that is being held for now as we are giving some Lasix for her lower extremity edema.    Chronic lower extremity edema: PTA on HCTZ for this.  2+ edema on exam.  No sign of CHF on echocardiogram.  -Continue 10 mg IV Lasix twice daily today  -Daily weight and strict intake and output    Insomnia: Chronic problem for patient.  At home she takes Tylenol PM.  She was tried on trazodone 50 mg at bedtime and melatonin here.  She says she started having visual hallucinations after melatonin given last night.  -Increase trazodone to 100 mg at bedtime    Urinary incontinence: Chronic issue for patient.    HLD: Continue PTA simvastatin.    Anemia: Hemoglobin 10.2.  Trend.  Denies any bleeding.      DVT Prophylaxis: Warfarin  Code Status: Full Code  FEN:  "consis carb med  Discharge Dispo: home with  likely.  PT and OT consult  Estimated Disch Date / # of Days until Disch: 1-2 days        Interval History (Subjective):      Assume care today.  Patient admitted yesterday evening for a variety of symptoms including shortness of breath and subjective fever.  The patient is extremely tangential and difficult to get a clear history from her.  She says she feels the same as before which is not well.  Does endorse a chronic cough for the past month productive of clear sputum.  Feels fatigued.  Does note wheezing.  At first says she has not gotten any better and then asked to go home instead.                  Physical Exam:      Last Vital Signs:  /51 (BP Location: Left arm)  Pulse 107  Temp 98.3  F (36.8  C) (Oral)  Resp 18  Ht 1.651 m (5' 5\")  Wt 87.1 kg (192 lb)  SpO2 92%  BMI 31.95 kg/m2      Intake/Output Summary (Last 24 hours) at 07/22/18 1238  Last data filed at 07/22/18 0700   Gross per 24 hour   Intake              640 ml   Output                0 ml   Net              640 ml       Constitutional: Awake, NAD  Eyes: sclera white   HEENT: atraumatic, MMM  Respiratory: mild expiratory wheeze, no crackles   Cardiovascular: irregularly irregular rhythm, mild tachycardia.  No murmur  GI: non-tender, not distended, bowel sounds present  Skin: bruise to hand  Musculoskeletal/extremities:   2+ bilateral LE edema  Neurologic: A&O   Psychiatric: calm. Tangential thought         Medications:      All current medications were reviewed with changes reflected in problem list.         Data:      All new lab and imaging data was reviewed.   Labs:    Recent Labs  Lab 07/22/18  0656 07/21/18  1436    136   POTASSIUM 3.9 3.8   CHLORIDE 98 99   CO2 29 31   ANIONGAP 8 6   GLC 87 196*   BUN 16 18   CR 1.16* 1.09*   GFRESTIMATED 44* 48*   GFRESTBLACK 54* 58*   VASHTI 9.0 8.9       Recent Labs  Lab 07/21/18  1436   WBC 8.5   HGB 10.2*   HCT 31.9*   MCV 97    "       Recent Labs  Lab 07/22/18  0656 07/21/18  1438   INR 2.68* 2.44*      TSH 0.89, free t4 1.71    Imaging:   TTE:  Interpretation Summary     Left ventricular systolic function is normal.  The visual ejection fraction is estimated at 55-60%.  EF higher than in 2012. The study was technically difficult.  _____________________________________________________________________________  __        Left Ventricle  The left ventricle is normal in size. There is concentric remodeling present.  Left ventricular systolic function is normal. The visual ejection fraction is  estimated at 55-60%. Left ventricular diastolic function is indeterminate. No  regional wall motion abnormalities noted.     Right Ventricle  The right ventricle is normal size. The right ventricular systolic function is  normal.     Atria  Normal left atrial size. The left atrium is not well visualized. Right atrial  size is normal.     Mitral Valve  There is mild mitral annular calcification. The mitral valve leaflets are  mildly thickened. There is trace mitral regurgitation.        Tricuspid Valve  There is trace tricuspid regurgitation. The right ventricular systolic  pressure is approximated at 28.9 mmHg plus the right atrial pressure. Normal  IVC (1.5-2.5cm) with >50% respiratory collapse; right atrial pressure is  estimated at 5-10mmHg.     Aortic Valve  There is mild trileaflet aortic sclerosis. No aortic stenosis is present.     Pulmonic Valve  The pulmonic valve is not well visualized.     Vessels  The aortic root is normal size.     Pericardium  There is no pericardial effusion.        Rhythm  The rhythm was atrial fibrillation.      Tarun Jj MD

## 2018-07-22 NOTE — PROGRESS NOTES
"[facility] RCAT     Date: 7/21/18  Admission Dx: Respiratory Distress  Pulmonary History: COPD, LEYDI  Home Nebulizer/MDI Use: Albuterol Q6 prn, Asmanex, Spiriva Qd  Home Oxygen: NA  Acuity Level (RCAT flow sheet): 4- Pt scores a 4 but due to her dyspnea will keep her QID  Aerosol Therapy initiated: Duoneb QID, Asmanex every day, Albuterol Q2 prn      Pulmonary Hygiene initiated:Deep breathe and cough- BID      Volume Expansion initiated: IS- per nursing protocol      Current Oxygen Requirements: Room air  Current SpO2:94%    Re-evaluation date: 7/24/18    Patient Education: Explained RCAT process to patient      See \"RT Assessments\" flow sheet for patient assessment scoring and Acuity Level Details.             "

## 2018-07-22 NOTE — PHARMACY-ANTICOAGULATION SERVICE
...  Clinical Pharmacy - Warfarin Dosing Consult     Pharmacy has been consulted to manage this patient s warfarin therapy.  Indication: Atrial Fibrillation  Therapy Goal: INR 2-3  Warfarin Prior to Admission: Yes  Warfarin PTA Regimen: 4.5 mg on Tuesday & Friday, 3 mg ROW  Significant drug interactions: Tylenol, ASA, doxycycline, Synthroid, prednisone, Zocor  Recent documented change in oral intake/nutrition: Unknown    INR   Date Value Ref Range Status   07/21/2018 2.44 (H) 0.86 - 1.14 Final     INR Protime   Date Value Ref Range Status   06/22/2018 3.4 (A) 0.86 - 1.14 Final       Recommend warfarin 3 mg today.  Pharmacy will monitor Sindi David Wachter daily and order warfarin doses to achieve specified goal.      Please contact pharmacy as soon as possible if the warfarin needs to be held for a procedure or if the warfarin goals change.

## 2018-07-23 ENCOUNTER — APPOINTMENT (OUTPATIENT)
Dept: PHYSICAL THERAPY | Facility: CLINIC | Age: 83
DRG: 190 | End: 2018-07-23
Attending: INTERNAL MEDICINE
Payer: COMMERCIAL

## 2018-07-23 VITALS
DIASTOLIC BLOOD PRESSURE: 50 MMHG | RESPIRATION RATE: 20 BRPM | SYSTOLIC BLOOD PRESSURE: 135 MMHG | HEIGHT: 65 IN | HEART RATE: 77 BPM | BODY MASS INDEX: 31.84 KG/M2 | WEIGHT: 191.1 LBS | TEMPERATURE: 98.4 F | OXYGEN SATURATION: 94 %

## 2018-07-23 LAB
ANION GAP SERPL CALCULATED.3IONS-SCNC: 7 MMOL/L (ref 3–14)
BUN SERPL-MCNC: 26 MG/DL (ref 7–30)
CALCIUM SERPL-MCNC: 8.7 MG/DL (ref 8.5–10.1)
CHLORIDE SERPL-SCNC: 97 MMOL/L (ref 94–109)
CO2 SERPL-SCNC: 29 MMOL/L (ref 20–32)
CREAT SERPL-MCNC: 1.18 MG/DL (ref 0.52–1.04)
ERYTHROCYTE [DISTWIDTH] IN BLOOD BY AUTOMATED COUNT: 14.5 % (ref 10–15)
GFR SERPL CREATININE-BSD FRML MDRD: 44 ML/MIN/1.7M2
GLUCOSE BLDC GLUCOMTR-MCNC: 159 MG/DL (ref 70–99)
GLUCOSE BLDC GLUCOMTR-MCNC: 213 MG/DL (ref 70–99)
GLUCOSE SERPL-MCNC: 106 MG/DL (ref 70–99)
HCT VFR BLD AUTO: 32.9 % (ref 35–47)
HGB BLD-MCNC: 10.2 G/DL (ref 11.7–15.7)
INR PPP: 2.69 (ref 0.86–1.14)
MCH RBC QN AUTO: 30.3 PG (ref 26.5–33)
MCHC RBC AUTO-ENTMCNC: 31 G/DL (ref 31.5–36.5)
MCV RBC AUTO: 98 FL (ref 78–100)
PLATELET # BLD AUTO: 311 10E9/L (ref 150–450)
POTASSIUM SERPL-SCNC: 4 MMOL/L (ref 3.4–5.3)
RBC # BLD AUTO: 3.37 10E12/L (ref 3.8–5.2)
SODIUM SERPL-SCNC: 133 MMOL/L (ref 133–144)
WBC # BLD AUTO: 10 10E9/L (ref 4–11)

## 2018-07-23 PROCEDURE — 40000275 ZZH STATISTIC RCP TIME EA 10 MIN

## 2018-07-23 PROCEDURE — 25000132 ZZH RX MED GY IP 250 OP 250 PS 637: Performed by: INTERNAL MEDICINE

## 2018-07-23 PROCEDURE — 00000146 ZZHCL STATISTIC GLUCOSE BY METER IP

## 2018-07-23 PROCEDURE — 25000125 ZZHC RX 250: Performed by: INTERNAL MEDICINE

## 2018-07-23 PROCEDURE — 85027 COMPLETE CBC AUTOMATED: CPT | Performed by: INTERNAL MEDICINE

## 2018-07-23 PROCEDURE — 36415 COLL VENOUS BLD VENIPUNCTURE: CPT | Performed by: INTERNAL MEDICINE

## 2018-07-23 PROCEDURE — 94640 AIRWAY INHALATION TREATMENT: CPT | Mod: 76

## 2018-07-23 PROCEDURE — 40000193 ZZH STATISTIC PT WARD VISIT

## 2018-07-23 PROCEDURE — 80048 BASIC METABOLIC PNL TOTAL CA: CPT | Performed by: INTERNAL MEDICINE

## 2018-07-23 PROCEDURE — 97161 PT EVAL LOW COMPLEX 20 MIN: CPT | Mod: GP

## 2018-07-23 PROCEDURE — 85610 PROTHROMBIN TIME: CPT | Performed by: INTERNAL MEDICINE

## 2018-07-23 PROCEDURE — 25000128 H RX IP 250 OP 636: Performed by: INTERNAL MEDICINE

## 2018-07-23 PROCEDURE — 94640 AIRWAY INHALATION TREATMENT: CPT

## 2018-07-23 PROCEDURE — 99239 HOSP IP/OBS DSCHRG MGMT >30: CPT | Performed by: INTERNAL MEDICINE

## 2018-07-23 PROCEDURE — 97116 GAIT TRAINING THERAPY: CPT | Mod: GP

## 2018-07-23 RX ORDER — PREDNISONE 20 MG/1
40 TABLET ORAL DAILY
Qty: 6 TABLET | Refills: 0 | Status: SHIPPED | OUTPATIENT
Start: 2018-07-24 | End: 2018-07-27

## 2018-07-23 RX ORDER — LEVOTHYROXINE SODIUM 75 UG/1
75 TABLET ORAL DAILY
Qty: 30 TABLET | Refills: 0 | Status: SHIPPED | OUTPATIENT
Start: 2018-07-23 | End: 2018-08-10

## 2018-07-23 RX ORDER — WARFARIN SODIUM 2 MG/1
2 TABLET ORAL
Status: DISCONTINUED | OUTPATIENT
Start: 2018-07-23 | End: 2018-07-23 | Stop reason: HOSPADM

## 2018-07-23 RX ORDER — GLIPIZIDE 10 MG/1
10 TABLET, FILM COATED, EXTENDED RELEASE ORAL
Status: DISCONTINUED | OUTPATIENT
Start: 2018-07-23 | End: 2018-07-23 | Stop reason: HOSPADM

## 2018-07-23 RX ORDER — DOXYCYCLINE 100 MG/1
100 CAPSULE ORAL 2 TIMES DAILY
Qty: 6 CAPSULE | Refills: 0 | Status: SHIPPED | OUTPATIENT
Start: 2018-07-23 | End: 2018-07-26

## 2018-07-23 RX ORDER — LANOLIN ALCOHOL/MO/W.PET/CERES
3 CREAM (GRAM) TOPICAL
Qty: 30 TABLET | Refills: 0 | Status: SHIPPED | OUTPATIENT
Start: 2018-07-23 | End: 2019-09-09

## 2018-07-23 RX ADMIN — VITAMIN D, TAB 1000IU (100/BT) 2000 UNITS: 25 TAB at 09:22

## 2018-07-23 RX ADMIN — CALCIUM POLYCARBOPHIL 625 MG: 625 TABLET, FILM COATED ORAL at 09:22

## 2018-07-23 RX ADMIN — METOPROLOL TARTRATE 50 MG: 50 TABLET, FILM COATED ORAL at 09:22

## 2018-07-23 RX ADMIN — METFORMIN HYDROCHLORIDE 500 MG: 500 TABLET, FILM COATED ORAL at 09:23

## 2018-07-23 RX ADMIN — GLIPIZIDE 10 MG: 10 TABLET, FILM COATED, EXTENDED RELEASE ORAL at 09:23

## 2018-07-23 RX ADMIN — IPRATROPIUM BROMIDE AND ALBUTEROL SULFATE 3 ML: .5; 3 SOLUTION RESPIRATORY (INHALATION) at 08:59

## 2018-07-23 RX ADMIN — FUROSEMIDE 10 MG: 10 INJECTION, SOLUTION INTRAVENOUS at 06:15

## 2018-07-23 RX ADMIN — ASPIRIN 81 MG: 81 TABLET, COATED ORAL at 09:22

## 2018-07-23 RX ADMIN — MOMETASONE FUROATE 2 PUFF: 220 INHALANT RESPIRATORY (INHALATION) at 09:03

## 2018-07-23 RX ADMIN — DOXYCYCLINE HYCLATE 100 MG: 100 TABLET, FILM COATED ORAL at 09:23

## 2018-07-23 RX ADMIN — PREDNISONE 40 MG: 20 TABLET ORAL at 09:23

## 2018-07-23 RX ADMIN — CYANOCOBALAMIN TAB 1000 MCG 1000 MCG: 1000 TAB at 09:23

## 2018-07-23 RX ADMIN — IPRATROPIUM BROMIDE AND ALBUTEROL SULFATE 3 ML: .5; 3 SOLUTION RESPIRATORY (INHALATION) at 11:34

## 2018-07-23 RX ADMIN — LISINOPRIL 40 MG: 40 TABLET ORAL at 09:23

## 2018-07-23 ASSESSMENT — ACTIVITIES OF DAILY LIVING (ADL)
ADLS_ACUITY_SCORE: 11
ADLS_ACUITY_SCORE: 15
ADLS_ACUITY_SCORE: 11
ADLS_ACUITY_SCORE: 11

## 2018-07-23 NOTE — PROGRESS NOTES
Transition Communication Hand-off for Care Transitions to Next Level of Care Provider    Name: Maryalice Rebecca Wachter  : 1933  MRN #: [id number]  Primary Care Provider: Venu Maria  Primary Care MD Name: Venu Maria PA-C  Primary Clinic:   [facility] [id number]  Primary Care Clinic Name: [facility]  Reason for Hospitalization:  SOB (shortness of breath) [R06.02]  Chronic anticoagulation [Z79.01]  COPD exacerbation (H) [J44.1]  Febrile illness, acute [R50.9]  Admit Date/Time: 2018  1:40 PM  Discharge Date: 18  Payor Source: Payor: ARE / Plan: ARE SENIORS NON FPA / Product Type: HMO /     Readmission Assessment Measure (MARSHA) Risk Score/category: Average           Reason for Communication Hand-off Referral: Admission diagnoses: COPD    Discharge Plan: Home with Assist       Concern for non-adherence with plan of care:   No  Discharge Needs Assessment:  Needs       Most Recent Value    Equipment Currently Used at Home walker, rolling [Has a walker at home]    Transportation Available car, family or friend will provide    # of Referrals Placed by CTS External Care Coordination    Other Resources Other (see comment) [COPD Action Plan]          Already enrolled in Tele-monitoring program and name of program:  NA  Follow-up specialty is recommended: No    Follow-up plan:  Future Appointments  Date Time Provider Department Center   2018 6:00 AM Vale Garcia PTA [facility]   2018 1:40 PM [facility]   8/3/2018 9:30 AM FM RN [facility]   10/15/2018 1:00 PM Janey Romero RPH [facility]       Any outstanding tests or procedures:        Referrals     Future Labs/Procedures    Med Therapy Manage Referral     Comments:    Your provider has referred you to: **[facility] Scheduling (numerous locations) [phone]    [email or web address]  [facility]: [facility] [place] [phone]   [email or web address]    Reason for Referral: 10 + prescription medications with recent changes while hospitalized.     The [facility] department will contact you to schedule an appointment.  You may also schedule the appointment by calling [phone].  For [facility] patients, please call [phone] to confirm/schedule your appointment on the next business day.    This service is designed to help you get the most from your medications.  A specially trained Pharmacist will work closely with you and your providers to solve any questions, concerns, issues or problems related to your medications.    Please bring all of your prescription and non-prescription medications (such as vitamins, over-the-counter medications, and herbals) or a detailed medication list to your appointment.    If you have a glucose meter or other home monitoring information, please also bring this to your appointment (i.e. blood glucose log, blood pressure log, pain log, etc.).            Key Recommendations:   She has multiple issues while hospitalized. BLE edema, SOB w/wheezing, fevers (at home), sinus pressure. Discussed COPD Action Plan with pt at bedside. Also discussed MTM referral for a medication review after discharge. She has done a MTM before with Janey Romero at [facility]  will request \"Dr. Toth\" again for her.     Patient was admitted with mild sinusitis, bronchitis, and COPD. Patient was treated with doxycycline, nebs, and prednisione. Patient is on RA O2 at day of discharge. New medications are doxycycline, melatonin, and po prednisone X 3 days. Patient should follow up with primary as scheduled.     Aneta Malone/ Chio Tamayo    AVS/Discharge Summary is the source of truth; this is a helpful " guide for improved communication of patient story

## 2018-07-23 NOTE — PROGRESS NOTES
Md determined this pt was adequate for discharge.  PIV and tele removed.  Writer went through all discharge instructions, including all medications and follow up appointments.  Pt and SO expressed understanding of all discharge teaching and all questions were answered.  Pt was transferred to the main entrance via WC, and family transported home.

## 2018-07-23 NOTE — PHARMACY
Clinical Pharmacy- Warfarin Discharge Note  This patient is currently on warfarin for the treatment of Atrial fibrillation.  INR Goal= 2-3  Expected length of therapy: Unknown    Warfarin PTA Regimen: 4.5 mg on Tuesday & Friday, 3 mg ROW      Anticoagulation Dose History     Recent Dosing and Labs Latest Ref Rng & Units 2/16/2018 3/30/2018 5/11/2018 6/22/2018 7/21/2018 7/22/2018 7/23/2018    Warfarin 2 mg - - - - - - 2 mg -    Warfarin 3 mg - - - - - 3 mg - -    INR 0.86 - 1.14 - - - - 2.44(H) 2.68(H) 2.69(H)    INR 0.86 - 1.14 2.5(A) 3.7(A) 2.3(A) 3.4(A) - - -    INR Point of Care 0.86 - 1.14 - - - - - - -        Agree to recommend discharging the patient on PTA warfarin regimen    The patient should have an INR checked at next clinic visit (July 30, 2018)

## 2018-07-23 NOTE — PLAN OF CARE
"Problem: Patient Care Overview  Goal: Plan of Care/Patient Progress Review  PT: Orders received, evaluation completed and treatment initiated.84 year old female admitted with SOB and a COPD exacerbation. Pt reports IND with mobility at baseline. Limited community ambulator due to COPD. Has \"bad days\" with her endurance. Denies wearing O2 at home. Reports she does not self monitor O2 levels. [facility]. Does have a FWW at home.     Discharge Planner PT   Patient plan for discharge: Home.   Current status: Pt alert and oriented. However, mild confusion is noted. Pt tangential with thoughts and needs information repeated. Sit to/from stand with supervision. Ambulates 200' with device and SBA/supervision. Pt demonstrates decreased gait speed, lateral path deviations and reaching out for wall rail. Pt endorses \"feeling off\", but also states she has not been out of the room since admission. SpO2 on RA 96-8% prior to walking. Drops to 85%-88% for ~3 minutes post ambulation. Does recover to >90% consistently on RA at rest.   Barriers to return to prior living situation: None with FWW use. Does have impaired balance compared to baseline. Cognition?  Recommendations for discharge: Home with FWW use. May benefit from OP pulmonary rehab to address baseline endurance deficits. Pt reports she is not agreeable at this time.   Rationale for recommendations: Pt demonstrating impaired balance, but would be able to return home safely at a walker level. Mild confusion noted. Note OT is ordered.        Entered by: Rina Herrera 07/23/2018 8:34 AM           "

## 2018-07-23 NOTE — PROGRESS NOTES
07/23/18 0800   Quick Adds   Type of Visit Initial PT Evaluation   Living Environment   Lives With spouse   Living Arrangements house   Home Accessibility no concerns   Number of Stairs to Enter Home 0   Transportation Available car;family or friend will provide   Living Environment Comment Denies wearing O2 at home. Reports she does not self monitor O2 levels.    Self-Care   Usual Activity Tolerance moderate  (Difficulty with community distances at times d/t COPD)   Current Activity Tolerance fair   Equipment Currently Used at Home walker, rolling  (Has a walker at home)   Functional Level Prior   Ambulation 0-->independent   Transferring 0-->independent   Toileting 0-->independent   Bathing 0-->independent   Fall history within last six months no   General Information   Onset of Illness/Injury or Date of Surgery - Date 07/21/18   Referring Physician Parviz JEFF   Patient/Family Goals Statement Return home   Pertinent History of Current Problem (include personal factors and/or comorbidities that impact the POC) 84 year old female admitted with SOB and a COPD exacerbation.    Precautions/Limitations fall precautions   Cognitive Status Examination   Orientation orientation to person, place and time   Level of Consciousness alert;confused  (Needs information repeated; tangential with thoughts)   Follows Commands and Answers Questions able to follow single-step instructions   Personal Safety and Judgment impaired  (Declines PT recs)   Pain Assessment   Patient Currently in Pain No   Range of Motion (ROM)   ROM Comment UE/LE AROM WFLs   Strength   Strength Comments Decreased generally. Fatigues with short distance mobility   Transfer Skills   Transfer Comments Sit to/from stand with supervision   Gait   Gait Comments Ambulates 5' with supervision; reaches out for external support   Balance   Balance Comments Impaired standing dynamc balance   General Therapy Interventions   Planned Therapy Interventions gait  "training;progressive activity/exercise;home program guidelines   Clinical Impression   Criteria for Skilled Therapeutic Intervention yes, treatment indicated   PT Diagnosis Decreased activity tolerance   Influenced by the following impairments SOB/SHUKLA, decreased activity tolerance, impaired balance   Functional limitations due to impairments Decreased IND with gait   Clinical Presentation Stable/Uncomplicated   Clinical Presentation Rationale Stable.    Clinical Decision Making (Complexity) Low complexity   Therapy Frequency` 5 times/week   Predicted Duration of Therapy Intervention (days/wks) One week   Anticipated Discharge Disposition Home with Assist;Home with Outpatient Therapy   Risk & Benefits of therapy have been explained Yes   Patient, Family & other staff in agreement with plan of care Yes   [facility] TM \"6 Clicks\"   2016, Trustees of [facility], under license to [employer].  All rights reserved.   6 Clicks Short Forms Basic Mobility Inpatient Short Form   [facility]  \"6 Clicks\" V.2 Basic Mobility Inpatient Short Form   1. Turning from your back to your side while in a flat bed without using bedrails? 4 - None   2. Moving from lying on your back to sitting on the side of a flat bed without using bedrails? 4 - None   3. Moving to and from a bed to a chair (including a wheelchair)? 3 - A Little   4. Standing up from a chair using your arms (e.g., wheelchair, or bedside chair)? 3 - A Little   5. To walk in hospital room? 3 - A Little   6. Climbing 3-5 steps with a railing? 3 - A Little   Basic Mobility Raw Score (Score out of 24.Lower scores equate to lower levels of function) 20   Total Evaluation Time   Total Evaluation Time (Minutes) 8     "

## 2018-07-23 NOTE — DISCHARGE SUMMARY
[facility]  Discharge Summary  Name: Maryalice Rebecca Wachter    MRN: [id number]  YOB: 1933    Age: 84 year old  Date of Discharge:  7/23/2018  Date of Admission: 7/21/2018  Primary Care Provider: Venu Maria  Discharge Physician:  Tarun Jj MD  Discharging Service:  Hospitalist      Discharge Diagnoses:  Suspected bronchitis and sinusitis  Mild COPD exacerbation  Chronic insomnia  Hypothyroidism  Fatigue  Chronic lower extremity edema  A. Fib  Type II DM  CKD stage III  HTN  Urinary incontinence  HLD  Anemia     Hospital Course:  Summary of Stay: Maryalice Rebecca Wachter is a 84 year old female with a history of COPD not on oxygen, type II DM, A. fib on warfarin, GERD, urinary incontinence, insomnia, hypothyroidism, HTN, and CKD stage III who was admitted on 7/21/2018 with multiple symptoms including shortness of breath, fatigue, fever at home, and insomnia.  Placed on oxygen initially although no hypoxia documented.  Temperature 98 to mid 99.  Mildly tachycardic with A. fib on EKG.  Chest x-ray without infiltrate.  No leukocytosis.  Started on doxycycline in case of bronchitis or sinusitis.  Given prednisone for possible COPD exacerbation.  Also given some IV Lasix in case contributing to her shortness of breath with her bilateral 2+ lower extremity edema.  TTE unchanged from previous showing some mild LVH.  Free T4 is elevated so holding levothyroxine for a few days and then restart lower dose on discharge with follow-up TSH in 4 weeks.  PT consulted for fatigue and weakness.  Recommend home with FWW.  Shortness of breath much improved and not requiring any oxygen.  We will continue 3 more days of prednisone and doxycycline at discharge.  Tried trazodone at night here, but she was having some very vivid dreams and almost visual hallucinations.  She would like to try melatonin on discharge this is been prescribed as well.  Follow with primary care doctor within 1  week.     Problem List/Assessment and Plan:   Shortness of breath, possible COPD exacerbation, bronchitis, sinusitis: Has been more short of breath the past few days and has history of COPD not on oxygen.  Has had wheezing here and was started on prednisone.  Chest x-ray does not show any infiltrate.  Does have sinus pressure and pain along with a 1 month cough with clear sputum.  T-max here 9 9.5 also reports a fever to 102 at home.  She was just prescribed cefuroxime on 7/19.  No leukocytosis.  Some concern for CHF diagnosis with lower extremity edema although TTE does not show any sign of this.  Does have a large hiatal hernia seen on chest x-ray and wonder if this could be contributing some to the shortness of breath.  Treated with doxycycline in case of bronchitis or sinusitis and 40 mg daily prednisone with improvement in shortness of breath.  Not hypoxic when ambulating in the cintron.  Discharge with 3 more days of doxycycline and prednisone.  Resume home inhaler regimen.     Fatigue, weakness: Again question bronchitis versus sinusitis as above.  This may account for her increased fatigue and general malaise.  She does have hypothyroidism and her free T4 is notably high which may be contributing.  -Reduced levothyroxine dose  -PT consulted and recommended home with FWW     Hypothyroidism: PTA on 88 mcg daily.  TSH is 0.89 and free T4 is 1.7.  -Prescribed lower dose 75 mcg daily on discharge with follow-up TSH in 4 weeks     A. fib: PTA on warfarin with therapeutic INR.  Also metoprolol 50 mg twice daily for rate control.  Slightly tachycardic today to the 110s.  Unclear if this secondary to nebs or with the Lasix now on board.  We will continue to monitor on telemetry.  -Continue metoprolol  -warfarin with Pharm.D. to dose, resume home dose on discharge     Type II DM: PTA on glipizide XL 10 mg daily, pioglitazone 30 mg daily, and metformin 500 mg twice daily.  Slightly increased blood sugars on  prednisone.  -Resume home medications on discharge     CKD stage III: Pending appears to be a patient of 1.1-1.2.     HTN: Continue PTA lisinopril 45 daily and metoprolol 50 mg twice daily.  Is also on HCTZ that was held here as we gave some Lasix for her lower extremity edema.  Resume HCTZ on discharge.     Chronic lower extremity edema: PTA on HCTZ for this.  2+ edema on exam.  No sign of CHF on echocardiogram.  Received some IV Lasix here without much improvement edema.  Resume HCTZ.     Insomnia: Chronic problem for patient.  At home she takes Tylenol PM.    Tried trazodone while here, but she says she started having visual hallucinations.  She would like to try melatonin on discharge so this is been prescribed.     Urinary incontinence: Chronic issue for patient.     HLD: Continue PTA simvastatin.     Anemia: Hemoglobin 10.2.  Trend.  Denies any bleeding.     Discharge Disposition:  Discharged to home     Allergies:  Allergies   Allergen Reactions     Penicillins Hives     Amlodipine Other (See Comments)     Too much ankle edema and red itchy spots.      Furosemide Other (See Comments)     Fainted with first dose.         Discharge Medications:   Current Discharge Medication List      START taking these medications    Details   doxycycline monohydrate 100 MG capsule Take 1 capsule (100 mg) by mouth 2 times daily for 3 days  Qty: 6 capsule, Refills: 0    Associated Diagnoses: Acute bronchitis, unspecified organism; Acute non-recurrent maxillary sinusitis      melatonin 3 MG tablet Take 1 tablet (3 mg) by mouth nightly as needed for sleep  Qty: 30 tablet, Refills: 0    Associated Diagnoses: Insomnia, unspecified type      predniSONE (DELTASONE) 20 MG tablet Take 2 tablets (40 mg) by mouth daily for 3 days  Qty: 6 tablet, Refills: 0    Associated Diagnoses: COPD exacerbation (H)         CONTINUE these medications which have CHANGED    Details   levothyroxine (SYNTHROID/LEVOTHROID) 75 MCG tablet Take 1 tablet (75  mcg) by mouth daily  Qty: 30 tablet, Refills: 0    Associated Diagnoses: Hypothyroidism, unspecified type         CONTINUE these medications which have NOT CHANGED    Details   albuterol (2.5 MG/3ML) 0.083% neb solution Take 1 vial (2.5 mg) by nebulization every 6 hours as needed for shortness of breath / dyspnea or wheezing Reported on 3/2/2017  Qty: 1 Box, Refills: 0    Associated Diagnoses: COPD exacerbation (H)      albuterol (ALBUTEROL) 108 (90 BASE) MCG/ACT inhaler Inhale 2 puffs into the lungs every 6 hours as needed Reported on 3/2/2017      ascorbic acid (VITAMIN C) 500 MG tablet Take 500 mg by mouth daily.      ASPIRIN EC PO Take 81 mg by mouth daily       calcium carb 1250 mg, 500 mg [place],/vitamin D 200 units (OSCAL WITH D) 500-200 MG-UNIT per tablet Take 1 tablet by mouth daily.      Cyanocobalamin (B-12) 1000 MCG CAPS Take 1 capsule by mouth daily       diphenhydrAMINE-acetaminophen (TYLENOL PM EXTRA STRENGTH)  MG tablet Take 2 tablets by mouth At Bedtime      ferrous sulfate 325 (65 FE) MG tablet Take 1 tablet by mouth daily (with breakfast).      glipiZIDE (GLUCOTROL XL) 10 MG 24 hr tablet Take 1 tablet (10 mg) by mouth 2 times daily Due to see provider in August  Qty: 180 tablet, Refills: 0    Associated Diagnoses: Type 2 diabetes mellitus with hyperglycemia, without long-term current use of insulin (H)      hydrochlorothiazide (MICROZIDE) 12.5 MG capsule Take 1 capsule (12.5 mg) by mouth every morning  Qty: 90 capsule, Refills: 1    Comments: New dosage today .  Associated Diagnoses: Edema, unspecified type      lisinopril (PRINIVIL/ZESTRIL) 40 MG tablet TAKE ONE TABLET BY MOUTH EVERY DAY  Qty: 90 tablet, Refills: 0    Associated Diagnoses: Systolic dysfunction, left ventricle; Past myocardial infarction      LOPERAMIDE HCL PO 1/2 to 1 tablet by mouth as needed.      METFORMIN HCL PO Take 500 mg by mouth 2 times daily (with meals)      metoprolol tartrate (LOPRESSOR) 50 MG tablet TAKE ONE  "TABLET BY MOUTH TWICE A DAY  Qty: 180 tablet, Refills: 0    Associated Diagnoses: Past myocardial infarction; Systolic dysfunction, left ventricle; Atrial tachycardia, paroxysmal (H)      mometasone (ASMANEX) 220 MCG/INH inhaler Inhale 2 puffs into the lungs daily as needed       pioglitazone (ACTOS) 30 MG tablet TAKE ONE TABLET BY MOUTH EVERY DAY  Qty: 90 tablet, Refills: 1    Associated Diagnoses: Type 2 diabetes mellitus with hyperglycemia, without long-term current use of insulin (H)      polycarbophil (FIBERCON) 625 MG tablet Take 1 tablet by mouth 2 times daily.      ranitidine (ZANTAC) 150 MG tablet Take 1 tablet (150 mg) by mouth nightly as needed for heartburn  Qty: 60 tablet, Refills: 1    Associated Diagnoses: Hiatal hernia      simvastatin (ZOCOR) 20 MG tablet TAKE ONE TABLET BY MOUTH AT BEDTIME. NEEDS APPOINTMENT  Qty: 90 tablet, Refills: 0    Associated Diagnoses: Hyperlipidemia LDL goal <100      tiotropium (SPIRIVA) 18 MCG inhalation capsule Inhale 18 mcg into the lungs daily.      VITAMIN D, CHOLECALCIFEROL, PO Take 2,000 Units by mouth daily      WARFARIN SODIUM PO Take by mouth daily 4.5mg=Tue and Fri, 3mg=ROW         STOP taking these medications       cefuroxime (CEFTIN) 250 MG tablet Comments:   Reason for Stopping:         ONETOUCH ULTRA test strip Comments:   Reason for Stopping:         TRUEPLUS LANCETS 30G MISC Comments:   Reason for Stopping:                Condition on Discharge:  Discharge condition: Stable   Discharge vitals: Blood pressure 135/50, pulse 77, temperature 98.4  F (36.9  C), temperature source Oral, resp. rate 20, height 1.651 m (5' 5\"), weight 86.7 kg (191 lb 1.6 oz), SpO2 94 %, not currently breastfeeding.   Code status on discharge: Full Code     History of Illness:  See detailed admission note for full details.    Physical Exam:  Blood pressure 135/50, pulse 77, temperature 98.4  F (36.9  C), temperature source Oral, resp. rate 20, height 1.651 m (5' 5\"), weight 86.7 " kg (191 lb 1.6 oz), SpO2 94 %, not currently breastfeeding.  Wt Readings from Last 1 Encounters:   07/23/18 86.7 kg (191 lb 1.6 oz)     Constitutional: Awake, NAD  Eyes: sclera white  HEENT: MMM  Respiratory: no respiratory distress, lungs cta bilaterally, no crackles or wheeze  Cardiovascular: Irregularly, irregular rhythm.  Regular rate..  No murmur   GI: non-tender, not distended, bowel sounds present  Skin: no rash   Musculoskeletal/extremities:  1+ bilateral lower extremity edema  Neurologic: Alert and oriented.  Speech clear.  Less tangential today  Psychiatric: calm     Procedures other than Imaging:  None     Imaging:  Results for orders placed or performed during the hospital encounter of 07/21/18   Chest XR,  PA & LAT    Narrative    XR CHEST 2 VW   7/21/2018 2:59 PM     HISTORY: cough, fever;     COMPARISON: Film dated 4/4/2017    FINDINGS: Heart is at the upper limits of normal in size There is a  large hiatal hernia present..  The lungs are clear. The pulmonary  vasculature is normal.  The bones and soft tissues are unremarkable.      Impression    IMPRESSION:   1. Cardiac silhouette upper limits of normal in size.  2. Large hiatal hernia.  3. No active areas of infiltrate are identified.        CALVIN SUBRAMANIAN MD        Consultations:  No consultations were requested during this admission.       Recent Lab Results:    Recent Labs  Lab 07/21/18  1436   PHV 7.44*   PO2V 59*   PCO2V 46   HCO3V 31*       Recent Labs  Lab 07/23/18  0701 07/21/18  1436   WBC 10.0 8.5   HGB 10.2* 10.2*   HCT 32.9* 31.9*   MCV 98 97    268       Recent Labs  Lab 07/21/18  1435   CULT No growth after 2 days  Canceled, Test creditedTest canceled by Felipa ELLIS       Recent Labs  Lab 07/23/18  0701 07/22/18  0656 07/21/18  1436    135 136   POTASSIUM 4.0 3.9 3.8   CHLORIDE 97 98 99   CO2 29 29 31   ANIONGAP 7 8 6   * 87 196*   BUN 26 16 18   CR 1.18* 1.16* 1.09*   GFRESTIMATED 44* 44* 48*   GFRESTBLACK 53*  54* 58*   VASHTI 8.7 9.0 8.9       Recent Labs  Lab 07/23/18  0701 07/22/18  0656 07/21/18  1438   INR 2.69* 2.68* 2.44*       Recent Labs  Lab 07/21/18  1436   LACT 1.1       Recent Labs  Lab 07/22/18  0656   TSH 0.89     Free T4 1.71    Recent Labs  Lab 07/16/18  1400   COLOR Yellow   APPEARANCE Clear   URINEGLC Negative   URINEBILI Negative   URINEKETONE Negative   SG 1.015   UBLD Trace*   URINEPH 7.0   PROTEIN Negative   UROBILINOGEN 0.2   NITRITE Negative   LEUKEST Negative   RBCU O - 2   WBCU 0 - 5          Pending Results:    Unresulted Labs Ordered in the Past 30 Days of this Admission     Date and Time Order Name Status Description    7/21/2018 1352 Blood culture Preliminary          These results will be followed up by patient's primary care provider.    Discharge Instructions and Follow-Up:     Discharge Procedure Orders  Med Therapy Manage Referral     Reason for your hospital stay   Order Comments: Your hospitalized for suspected bronchitis/sinusitis and mild COPD exacerbation.  This improved with doxycycline and prednisone which you will take for a few more days.  You can try melatonin at night to help with sleep.  Your thyroid levels were high so we have reduced the dose of your levothyroxine and you will need follow-up with your primary care doctor for this.     Follow-up and recommended labs and tests    Order Comments: Follow up with primary care provider, Venu Maria, within 7 days to evaluate medication change and hospital follow-up.  The following labs/tests are recommended: TSH in 4 weeks.     Activity   Order Comments: Your activity upon discharge: activity as tolerated   Order Specific Question Answer Comments   Is discharge order? Yes      Full Code     Diet   Order Comments: Follow this diet upon discharge: Orders Placed This Encounter     Moderate Consistent CHO Diet   Order Specific Question Answer Comments   Is discharge order? Yes            Recommendations:  -Additional 3 days of  doxycycline and 40 mg prednisone daily  -Melatonin 3 mg at bedtime  -Decrease levothyroxine to 75 mcg daily and follow-up TSH in 4 weeks  -Follow-up PCP within the week    I, Tarun Jj, personally saw the patient today and spent greater than 30 minutes discharging this patient.    Tarun Jj MD

## 2018-07-23 NOTE — PLAN OF CARE
Problem: Patient Care Overview  Goal: Plan of Care/Patient Progress Review  Outcome: Improving  Blood sugars elevated and covered per sliding scale. Up to bathroom with assist. Small bm. Did fall asleep and woke up confused and disoriented, reoriented and given bedtime meds and tucked in. Bed alarm activated and explained to pt. Incontinent of urine, wears brief. Ate well. Up in chair for half of the shift.

## 2018-07-23 NOTE — PLAN OF CARE
Problem: Chronic Obstructive Pulmonary Disease (Adult)  Goal: Signs and Symptoms of Listed Potential Problems Will be Absent, Minimized or Managed (Chronic Obstructive Pulmonary Disease)  Signs and symptoms of listed potential problems will be absent, minimized or managed by discharge/transition of care (reference Chronic Obstructive Pulmonary Disease (Adult) CPG).   Outcome: No Change  A&Ox4, up SBA  LDA: PIV SL, IV lasix  Desat on RA w/activity, 2L/NC, SHUKLA  Tachy, poss anxiety & activity related  Pain: denies    Tele: SR/ST w/1*AVB  Edema BLE   CMS intact  Voiding   Mod carb diet  Followed by CC  Plan: Diurese, wean O2 as able  Will continue to monitor

## 2018-07-24 ENCOUNTER — TELEPHONE (OUTPATIENT)
Dept: FAMILY MEDICINE | Facility: CLINIC | Age: 83
End: 2018-07-24

## 2018-07-24 ENCOUNTER — PATIENT OUTREACH (OUTPATIENT)
Dept: CARE COORDINATION | Facility: CLINIC | Age: 83
End: 2018-07-24

## 2018-07-24 DIAGNOSIS — J44.1 COPD EXACERBATION (H): ICD-10-CM

## 2018-07-24 RX ORDER — ALBUTEROL SULFATE 0.83 MG/ML
2.5 SOLUTION RESPIRATORY (INHALATION) EVERY 6 HOURS PRN
Qty: 1 BOX | Refills: 0 | Status: SHIPPED | OUTPATIENT
Start: 2018-07-24 | End: 2018-10-01

## 2018-07-24 NOTE — LETTER
[facility]  Complex Care Plan  About Me  Patient Name:  Maryalice Rebecca Wachter    YOB: 1933  Age:     84 year old   [place]ley MRN:   [id number] Telephone Information:    Home Phone [phone]   Mobile [phone]       Address:    [address] [facility] [address] Email address:  [email or web address]      Emergency Contact(s)  Name Relationship Lgl Grd Work Phone Home Phone Mobile Phone   1. ALISHA CHRISTIANSEN Daughter  none [phone]   2. CHLOE MATHEWSPrecious Daughter  none [phone]   3. WACHTER, ALLEN Spouse No none [phone] none           Primary language:  English     needed? No   [employer]:  [phone] op. 1  Other communication barriers: None  Preferred Method of Communication:  Mail  Current living arrangement: I live in a private home with spouse  Mobility Status/ Medical Equipment: Independent w/Device    Health Maintenance  Health Maintenance Reviewed: Due/Overdue   Health Maintenance Due   Topic Date Due     COPD ACTION PLAN Q1 YR  12/11/1933     TETANUS IMMUNIZATION (SYSTEM ASSIGNED)  11/11/1951     DEPRESSION ACTION PLAN Q1 YR  11/11/1951     ADVANCE DIRECTIVE PLANNING Q5 YRS  08/23/2017     FOOT EXAM Q1 YEAR  10/27/2017     LIPID MONITORING Q1 YEAR  06/19/2018     EYE EXAM Q1 YEAR  07/07/2018     My Access Plan  Medical Emergency 911   Primary Clinic Line [facility] - [phone]   24 Hour Appointment Line [phone] or  [phone]) (toll-free)   24 Hour Nurse Line [phone] (toll-free)   Preferred Urgent Care [facility] [place], [phone]   Preferred Hospital [facility]  [phone]   Preferred Pharmacy [facility] - [place], [place] - [address]     Behavioral Health Crisis Line The National Suicide Prevention Lifeline at 1-161.864.3780 or 911     My Care Team Members    Patient Care Team        Relationship Specialty Notifications Start End    Venu Maria PA-C PCP - General Physician Assistant - Medical  6/13/16     Phone: [phone] Fax: [phone]         [id number]  [address] [facility] [id number]    Grisel Martinez, RN Lead Care Coordinator Primary Care - CC  7/24/18     Phone: [phone] Fax: [phone]                My Care Plans  Self Management and Treatment Plan  Goals and (Comments)  Goals        General    Medical     Notes - Note created  7/25/2018  9:21 AM by Grisel Martinez, RN    Use COPD action plan to monitor symptoms, If symptoms in the YELLOW zone appear call clinic for recommendations/appointment   As of today's date 7/25/2018 goal is met at 0 - 25%.   Goal Status:  Active               Action Plans on File: COPD     Advance Care Plans/Directives Type: Health Care Directive on file   Type Advanced Care Plans/Directives: Advanced Directive - On File    My Medical and Care Information  Problem List   Patient Active Problem List   Diagnosis     Chest pain     SOB (shortness of breath)     ACP (advance care planning)     CAD (coronary artery disease)     Atrial fibrillation (H)     Hyperlipidemia LDL goal <100     HTN (hypertension)     History of colonic polyps     Fever     Health Care Home     Hypothyroidism     Long-term (current) use of anticoagulants [Z79.01]     Type 2 diabetes mellitus with hyperglycemia (H)     COPD exacerbation (H)     Hiatal hernia     Uterine prolapse     Cystocele, midline     Mixed incontinence urge and stress (male)(female)     Incomplete bladder emptying     Respiratory distress      Current Medications and Allergies:  See printed Medication Report.    Care Coordination Start Date: 7/24/2018   Frequency of Care Coordination: weekly   Form Last Updated: 07/25/2018

## 2018-07-24 NOTE — PROGRESS NOTES
"Clinic Care Coordination Contact    Situation: Patient chart reviewed by RN care coordinator.    Background: inpatient care transitions report received     Assessment: patient admitted from 7/21 - 7/23 with COPD, febrile illness    Key Recommendations:   She has multiple issues while hospitalized. BLE edema, SOB w/wheezing, fevers (at home), sinus pressure. Discussed COPD Action Plan with pt at bedside. Also discussed MTM referral for a medication review after discharge. She has done a MTM before with Janey Romero at [facility].  will request \"Dr. Toth\" again for her.      Patient was admitted with mild sinusitis, bronchitis, and COPD. Patient was treated with doxycycline, nebs, and prednisione. Patient is on RA O2 at day of discharge. New medications are doxycycline, melatonin, and po prednisone X 3 days. Patient should follow up with primary as scheduled 7/30/18    Plan/Recommendations: Patient has been contacted today by triage RN for hospital follow up     CCRN will reach out next business day     Grisel Martinez Care Coordinator RN  [facility]  [phone]  July 24, 2018           " weight loss 5% in 1 month, swallow difficulty

## 2018-07-24 NOTE — PLAN OF CARE
Problem: Patient Care Overview  Goal: Plan of Care/Patient Progress Review  Physical Therapy Discharge Summary    Reason for therapy discharge:    Discharged to home.    Progress towards therapy goal(s). See goals on Care Plan in [facility] electronic health record for goal details.  Goals not met.  Barriers to achieving goals:   discharge from facility and discharge on same date as initial evaluation.    Therapy recommendation(s):    Continued therapy is recommended.  Rationale/Recommendations:  OP pulmonary rehab was recommended as well as FWW for all mobility.

## 2018-07-24 NOTE — TELEPHONE ENCOUNTER
"Date of Discharge:  7/23/2018  Date of Admission: 7/21/2018    Suspected bronchitis and sinusitis  Mild COPD exacerbation    Hospital/TCU/ED for chronic condition Discharge Protocol    \"Hi, my name is Amanda Bob, a registered nurse, and I am calling from [facility].  I am calling to follow up and see how things are going for you after your recent hospital stay.\"    Tell me how you are doing now that you are home?\" really weak, sleeping better, appetite better, more SOB due to increased activity      Discharge Instructions    \"Let's review your discharge instructions.  What is/are the follow-up recommendations?  Pt. Response: continue abx and steroids    \"Has an appointment with your primary care provider been scheduled?\"   Yes. (confirm)    \"When you see the provider, I would recommend that you bring your medications with you.\"    Medications    \"Tell me what changed about your medicines when you discharged?\"    Changes to chronic meds?    0-1    \"What questions do you have about your medications?\"    None     New diagnoses of heart failure, COPD, diabetes, or MI?    No        On warfarin: \"Were you given any recommendations for follow-up with the anticoagulation clinic?\" Yes - Anticoagulation clinic appointment is already scheduled at appropriate interval    Medication reconciliation completed? Yes  Was MTM referral placed (*Make sure to put transitions as reason for referral)?   No    Call Summary    \"What questions or concerns do you have about your recent visit and your follow-up care?\"     none    \"If you have questions or things don't continue to improve, we encourage you contact us through the main clinic number (give number).  Even if the clinic is not open, triage nurses are available 24/7 to help you.     We would like you to know that our clinic has extended hours (provide information).  We also have urgent care (provide details on closest location and hours/contact info)\"      \"Thank you for your " "time and take care!\"           Requests refill on Albuterol neb solution  Prescription approved per [facility] Refill Protocol  Amanda Bob RN BS      "

## 2018-07-24 NOTE — LETTER
[place] CARE COORDINATION  [facility]   [address] [place] [address]    July 25, 2018    Maryalice Rebecca Wachter  [address]  [facility] [address]      Dear Sindi,    I am a clinic care coordinator who works with Venu Maria PA-C at [facility]. I wanted to introduce myself and provide you with my contact information for you to be able to call me with any questions or concerns. I wanted to thank you for spending the time to talk with me.  I wanted to introduce myself and provide you with my contact information so that you can call me with questions or concerns about your health care. Below is a description of clinic care coordination and how I can further assist you.     The clinic care coordinator is a registered nurse and/or  who understand the health care system. The goal of clinic care coordination is to help you manage your health and improve access to the [facility] system in the most efficient manner. The registered nurse can assist you in meeting your health care goals by providing education, coordinating services, and strengthening the communication among your providers. The  can assist you with financial, behavioral, psychosocial, chemical dependency, counseling, and/or psychiatric resources.    Please feel free to contact me at [phone], with any questions or concerns. We at [facility] are focused on providing you with the highest-quality healthcare experience possible and that all starts with you.     Sincerely,     Grisel Martinez Care Coordinator RN  [facility] [place][note 2]  [phone]  July 25, 2018     Enclosed: I have enclosed a copy of the Complex Care Plan. This has helpful information and goals that we have talked about. Please keep this in an easy to access place to use as needed.

## 2018-07-25 NOTE — PROGRESS NOTES
Clinic Care Coordination Contact    Clinic Care Coordination Contact  OUTREACH    Referral Information:  Referral Source: IP Handoff    Primary Diagnosis: COPD    Chief Complaint   Patient presents with     Clinic Care Coordination - [facility]     SOB, COPD, CCRN         [place] Utilization:   Clinic Utilization  Difficulty keeping appointments:: No  Utilization    Last refreshed: 7/25/2018  9:19 AM:  No Show Count (past year) 1       Last refreshed: 7/25/2018  9:19 AM:  ED visits 0       Last refreshed: 7/25/2018  9:19 AM:  Hospital admissions 1          Current as of: 7/25/2018  9:19 AM           Clinical Concerns:  Current Medical Concerns:  Patient admitted from 7/21 - 7/23 with febrile illness, bronchitis, COPD     Clinical Pathway: Clinic Care Coordination COPD Assessment    What recommendations were made for follow up after your recent hospitalization? Follow up with MTM and primary care provider   Have the follow up appointments been scheduled? Yes  If not, can I help you set up these appointments? N/A  Transportation concerns (GOAL):: No  Is there a referral for Pulmonary Rehab? No     Symptom Review:    How have you been feeling now that you are home? Still getting pooped out and needing to stop during activities at times, did not sleep last night, slept from 10-2am and then up on computer for 1 1/2 hours   Are you having any increased shortness of breath? No  Symptoms  Chills: No  Cough: Yes  Is your cough:: Dry  Fever: No  Fatigue: Yes  Increased sputum: No  Wheezing: Yes  COPD symptoms limiting activities?: No  What COPD zone are you currently in?: Green  Taking COPD medications as prescribed: : Yes  Took steroids (by mouth) for COPD: Yes  Overall your COPD symptoms are (GOAL):: Improving    Do you have a COPD Action Plan? Yes     Given at hospital discharge   Is the COPD Action Plan on refrigerator or available: Yes    What number would you call if you were in the YELLOW zones:   "[phone]    Medications:    Were you started on any new medications?  Yes,   Were any of your previous medications changed? No  Do you have all of your medications? Yes,   Have you had trouble filling your prescriptions? No  Are you medications effective in controlling your symptoms? Yes, \"I used my neb when I heard some wheezing\"   Are you currently on Prednisone  (does pt understand the tapering instructions)?  No taper 40 mg x 3 days     Medication reconciliation completed? MTM ordered by hospital staff   Was MTM or Diabetic Education referral placed (*Make sure to put transitions as reason for referral)? MTM ordered by hospital staff     Oxygen/DME:    Are you currently on oxygen?  No     Review with patient how to use/maintain nebulizers and inhalers: yes - states compliance     Activity:    How much activity can you do before you are SOB? Able to do all activities, however stopping to rest due to fatigue   Have you had to reduce your activities because of dyspnea or other symptoms? Yes, fatigue at times   Today patient plans on going to the [facility] and tomorrow to get her hair cut      Diet:    Do you weigh yourself daily? No    Are there any current diet restrictions or changes per discharge instructions? No -    Emotions/Lifestyle:    Do you smoke?  reports that she has never smoked. She has never used smokeless tobacco.  Would you like to try to quit smoking? NA     Current Behavioral Concerns: No concerns, very pleasant patient - thankful for  Follow up call   PHQ-9 SCORE 6/25/2018   Total Score 3       Education Provided to patient: continue to use inhalers, nebulizer treatments as directed, keep COPD action plan in an easy to access place and call with symptoms in the YELLOW zone     Pain  Chronic pain (GOAL):: No  Health Maintenance Reviewed: Due/Overdue   Health Maintenance Due   Topic Date Due     COPD ACTION PLAN Q1 YR  12/11/1933     TETANUS IMMUNIZATION (SYSTEM ASSIGNED)  11/11/1951     " DEPRESSION ACTION PLAN Q1 YR  11/11/1951     ADVANCE DIRECTIVE PLANNING Q5 YRS  08/23/2017     FOOT EXAM Q1 YEAR  10/27/2017     LIPID MONITORING Q1 YEAR  06/19/2018     EYE EXAM Q1 YEAR  07/07/2018     Medication Management:  Current Outpatient Prescriptions   Medication     albuterol (2.5 MG/3ML) 0.083% neb solution     albuterol (ALBUTEROL) 108 (90 BASE) MCG/ACT inhaler     ascorbic acid (VITAMIN C) 500 MG tablet     ASPIRIN EC PO     calcium carb 1250 mg, 500 mg [place],/vitamin D 200 units (OSCAL WITH D) 500-200 MG-UNIT per tablet     Cyanocobalamin (B-12) 1000 MCG CAPS     diphenhydrAMINE-acetaminophen (TYLENOL PM EXTRA STRENGTH)  MG tablet     doxycycline monohydrate 100 MG capsule     ferrous sulfate 325 (65 FE) MG tablet     glipiZIDE (GLUCOTROL XL) 10 MG 24 hr tablet     hydrochlorothiazide (MICROZIDE) 12.5 MG capsule     levothyroxine (SYNTHROID/LEVOTHROID) 75 MCG tablet     lisinopril (PRINIVIL/ZESTRIL) 40 MG tablet     LOPERAMIDE HCL PO     melatonin 3 MG tablet     METFORMIN HCL PO     metoprolol tartrate (LOPRESSOR) 50 MG tablet     mometasone (ASMANEX) 220 MCG/INH inhaler     pioglitazone (ACTOS) 30 MG tablet     polycarbophil (FIBERCON) 625 MG tablet     predniSONE (DELTASONE) 20 MG tablet     ranitidine (ZANTAC) 150 MG tablet     simvastatin (ZOCOR) 20 MG tablet     tiotropium (SPIRIVA) 18 MCG inhalation capsule     VITAMIN D, CHOLECALCIFEROL, PO     WARFARIN SODIUM PO     No current facility-administered medications for this visit.      Functional Status:  Dependent ADLs:: Ambulation-walker  Bed or wheelchair confined:: No  Mobility Status: Independent w/Device    Living Situation:  Current living arrangement:: I live in a private home with spouse  Type of residence:: Private home - stairs    Diet/Exercise/Sleep:  Diet:: Regular  Food Insecurity: No  Tube Feeding: No  Did not sleep well last night     Transportation:  Transportation concerns (GOAL):: No  Transportation means:: Regular car      Psychosocial:  Mental health DX:: No  Informal Support system:: Spouse, Family (daughter)     Financial/Insurance:   Financial/Insurance concerns (GOAL):: No     Resources and Interventions:  Current Resources:  Community Resources:  (active with the [facility] )  Supplies used at home:: Other (nebulizer )  Equipment Currently Used at Home: walker, rolling (Has a walker at home)    Advance Care Plan/Directive  Advanced Care Plans/Directives on file:: Yes  Type Advanced Care Plans/Directives: Advanced Directive - On File    Referrals Placed: None     Goals:   Goals        General    Medical     Notes - Note created  7/25/2018  9:21 AM by Grisel Martinez, RN    Use COPD action plan to monitor symptoms, If symptoms in the YELLOW zone appear call clinic for recommendations/appointment   As of today's date 7/25/2018 goal is met at 0 - 25%.   Goal Status:  Active            Patient/Caregiver understanding: yes     Outreach Frequency: weekly  Future Appointments              In 5 days Venu Maria PA-C [facility]    In 1 week  RN VISITS [facility]    In 2 months Janey Romero RPH [facility] MTM, CHRISTIAN          Plan:   Patient will continue using inhalers and neb treatments with wheezing/sob and using COPD action plan to monitor symptoms, calling with symptoms in the yellow zone or if questions/concerns arise   Patient will f/u with pcp as scheduled 7/30/18  CCRN will mail care coordination introduction letter and care plan   CCRN will f/u with patient in 1 week     Grisel Martinez Care Coordinator RN  [facility]  [phone]  July 25, 2018

## 2018-07-27 LAB
BACTERIA SPEC CULT: NO GROWTH
Lab: NORMAL
SPECIMEN SOURCE: NORMAL

## 2018-07-30 ENCOUNTER — OFFICE VISIT (OUTPATIENT)
Dept: FAMILY MEDICINE | Facility: CLINIC | Age: 83
End: 2018-07-30
Payer: COMMERCIAL

## 2018-07-30 VITALS
HEART RATE: 77 BPM | WEIGHT: 190.2 LBS | BODY MASS INDEX: 31.65 KG/M2 | RESPIRATION RATE: 18 BRPM | DIASTOLIC BLOOD PRESSURE: 56 MMHG | TEMPERATURE: 98.4 F | OXYGEN SATURATION: 94 % | SYSTOLIC BLOOD PRESSURE: 116 MMHG

## 2018-07-30 DIAGNOSIS — G47.00 INSOMNIA, UNSPECIFIED TYPE: ICD-10-CM

## 2018-07-30 DIAGNOSIS — E11.65 TYPE 2 DIABETES MELLITUS WITH HYPERGLYCEMIA, WITHOUT LONG-TERM CURRENT USE OF INSULIN (H): ICD-10-CM

## 2018-07-30 DIAGNOSIS — R60.0 EDEMA OF BOTH LEGS: ICD-10-CM

## 2018-07-30 DIAGNOSIS — J44.1 COPD EXACERBATION (H): Primary | ICD-10-CM

## 2018-07-30 PROCEDURE — 99495 TRANSJ CARE MGMT MOD F2F 14D: CPT | Performed by: PHYSICIAN ASSISTANT

## 2018-07-30 PROCEDURE — 99207 C FOOT EXAM  NO CHARGE: CPT | Performed by: PHYSICIAN ASSISTANT

## 2018-07-30 NOTE — LETTER
My Depression Action Plan  Name: Maryalice Rebecca Wachter   Date of Birth 11/11/1933  Date: 7/30/2018    My doctor: Venu Maria   My clinic: [address], [address]  [facility] [address] [phone]          GREEN    ZONE   Good Control    What it looks like:     Things are going generally well. You have normal up s and down s. You may even feel depressed from time to time, but bad moods usually last less than a day.   What you need to do:  1. Continue to care for yourself (see self care plan)  2. Check your depression survival kit and update it as needed  3. Follow your physician s recommendations including any medication.  4. Do not stop taking medication unless you consult with your physician first.           YELLOW         ZONE Getting Worse    What it looks like:     Depression is starting to interfere with your life.     It may be hard to get out of bed; you may be starting to isolate yourself from others.    Symptoms of depression are starting to last most all day and this has happened for several days.     You may have suicidal thoughts but they are not constant.   What you need to do:     1. Call your care team, your response to treatment will improve if you keep your care team informed of your progress. Yellow periods are signs an adjustment may need to be made.     2. Continue your self-care, even if you have to fake it!    3. Talk to someone in your support network    4. Open up your depression survival kit           RED    ZONE Medical Alert - Get Help    What it looks like:     Depression is seriously interfering with your life.     You may experience these or other symptoms: You can t get out of bed most days, can t work or engage in other necessary activities, you have trouble taking care of basic hygiene, or basic responsibilities, thoughts of suicide or death that will not go away, self-injurious behavior.     What you need to  do:  1. Call your care team and request a same-day appointment. If they are not available (weekends or after hours) call your local crisis line, emergency room or 911.            Depression Self Care Plan / Survival Kit    Self-Care for Depression  Here s the deal. Your body and mind are really not as separate as most people think.  What you do and think affects how you feel and how you feel influences what you do and think. This means if you do things that people who feel good do, it will help you feel better.  Sometimes this is all it takes.  There is also a place for medication and therapy depending on how severe your depression is, so be sure to consult with your medical provider and/ or Behavioral Health Consultant if your symptoms are worsening or not improving.     In order to better manage my stress, I will:    Exercise  Get some form of exercise, every day. This will help reduce pain and release endorphins, the  feel good  chemicals in your brain. This is almost as good as taking antidepressants!  This is not the same as joining a gym and then never going! (they count on that by the way ) It can be as simple as just going for a walk or doing some gardening, anything that will get you moving.      Hygiene   Maintain good hygiene (Get out of bed in the morning, Make your bed, Brush your teeth, Take a shower, and Get dressed like you were going to work, even if you are unemployed).  If your clothes don't fit try to get ones that do.    Diet  I will strive to eat foods that are good for me, drink plenty of water, and avoid excessive sugar, caffeine, alcohol, and other mood-altering substances.  Some foods that are helpful in depression are: complex carbohydrates, B vitamins, flaxseed, fish or fish oil, fresh fruits and vegetables.    Psychotherapy  I agree to participate in Individual Therapy (if recommended).    Medication  If prescribed medications, I agree to take them.  Missing doses can result in serious  side effects.  I understand that drinking alcohol, or other illicit drug use, may cause potential side effects.  I will not stop my medication abruptly without first discussing it with my provider.    Staying Connected With Others  I will stay in touch with my friends, family members, and my primary care provider/team.    Use your imagination  Be creative.  We all have a creative side; it doesn t matter if it s oil painting, sand castles, or mud pies! This will also kick up the endorphins.    Witness Beauty  (AKA stop and smell the roses) Take a look outside, even in mid-winter. Notice colors, textures. Watch the squirrels and birds.     Service to others  Be of service to others.  There is always someone else in need.  By helping others we can  get out of ourselves  and remember the really important things.  This also provides opportunities for practicing all the other parts of the program.    Humor  Laugh and be silly!  Adjust your TV habits for less news and crime-drama and more comedy.    Control your stress  Try breathing deep, massage therapy, biofeedback, and meditation. Find time to relax each day.     My support system    Clinic Contact:  Phone number:    Contact 1:  Phone number:    Contact 2:  Phone number:    Latter day/:  Phone number:    Therapist:  Phone number:    Local crisis center:    Phone number:    Other community support:  Phone number:

## 2018-07-30 NOTE — MR AVS SNAPSHOT
After Visit Summary   7/30/2018    Maryalice Rebecca Wachter    MRN: [id number]           Patient Information     Date Of Birth          11/11/1933        Visit Information        Provider Department      7/30/2018 1:40 PM Venu Maria PA-C [facility]        Today's Diagnoses     COPD exacerbation (H)    -  1    Type 2 diabetes mellitus with hyperglycemia, without long-term current use of insulin (H)        Edema of both legs          Care Instructions    Look for compression stockings at the pharmacy.  Come in fasting Friday.  We will check Thyroid lab end of Sept.- see me again then.          Follow-ups after your visit        Follow-up notes from your care team     Return in about 6 weeks (around 9/10/2018) for Follow up, Med Check.      Your next 10 appointments already scheduled     Aug 03, 2018  9:30 AM CDT   Anticoagulation Visit with FM RN VISITS   [facility]    [address] [place] [address], [facility] [address]  [facility] [address]   [phone]            Aug 06, 2018 10:00 AM CDT   TELEMEDICINE with Janey Romero RPH   [facility] ([facility])    [id number] [place] [id number] [phone]           Note: this is not an onsite visit; there is no need to come to the facility.            Oct 15, 2018  1:00 PM CDT   SHORT with Janey Romero RPH   [facility] ([facility])    [address] [facility] [id number] [phone]              Future tests that were ordered for you today     Open Future Orders        Priority Expected Expires Ordered    Lipid panel reflex to direct LDL Fasting Routine  7/30/2019 7/30/2018            Who to contact     If you have questions or need follow up information about today's clinic visit or your schedule please contact [facility] directly at [phone].  Normal or  non-critical lab and imaging results will be communicated to you by MyChart, letter or phone within 4 business days after the clinic has received the results. If you do not hear from us within 7 days, please contact the clinic through ZYBt or phone. If you have a critical or abnormal lab result, we will notify you by phone as soon as possible.  Submit refill requests through [employer] or call your pharmacy and they will forward the refill request to us. Please allow 3 business days for your refill to be completed.          Additional Information About Your Visit        Massdrop Information     [employer] gives you secure access to your electronic health record. If you see a primary care provider, you can also send messages to your care team and make appointments. If you have questions, please call your primary care clinic.  If you do not have a primary care provider, please call [phone] and they will assist you.        Care EveryWhere ID     This is your Care EveryWhere ID. This could be used by other organizations to access your [place] medical records  [id number]        Your Vitals Were     Pulse Temperature Respirations Pulse Oximetry Breastfeeding? BMI (Body Mass Index)    77 98.4  F (36.9  C) (Oral) 18 94% No 31.65 kg/m2       Blood Pressure from Last 3 Encounters:   07/30/18 116/56   07/23/18 135/50   07/19/18 128/68    Weight from Last 3 Encounters:   07/30/18 190 lb 3.2 oz (86.3 kg)   07/23/18 191 lb 1.6 oz (86.7 kg)   07/19/18 191 lb 3.2 oz (86.7 kg)              We Performed the Following     FOOT EXAM  NO CHARGE [[id number]]        Primary Care Provider Office Phone # Fax #    Venu Maria PA-C [phone]       [id number]  [facility] [address]        Goals        General    Medical     Notes - Note created  7/25/2018  9:21 AM by Grisel Martinez, RN    Use COPD action plan to monitor symptoms, If symptoms in the YELLOW zone appear call clinic for  recommendations/appointment   As of today's date 7/25/2018 goal is met at 0 - 25%.   Goal Status:  Active          Equal Access to Services     MARIA ESTHER ANDRES : Hadii aad ku hadevonleta Michelle, billie angieepifanio, supa ricketts, katrin adanin hayaan shelliemeli winston laMaldonadolatha elizabeth. [facility] [phone].    ATENCIÓN: Si habla español, tiene a talamantes disposición servicios gratuitos de asistencia lingüística. Llame al [phone].    We comply with applicable federal civil rights laws and [place] laws. We do not discriminate on the basis of race, color, national origin, age, disability, sex, sexual orientation, or gender identity.            Thank you!     Thank you for choosing [facility]  for your care. Our goal is always to provide you with excellent care. Hearing back from our patients is one way we can continue to improve our services. Please take a few minutes to complete the written survey that you may receive in the mail after your visit with us. Thank you!             Your Updated Medication List - Protect others around you: Learn how to safely use, store and throw away your medicines at www.disposemymeds.org.          This list is accurate as of 7/30/18  2:29 PM.  Always use your most recent med list.                   Brand Name Dispense Instructions for use Diagnosis    ascorbic acid 500 MG tablet    VITAMIN C     Take 500 mg by mouth daily.        ASPIRIN EC PO      Take 81 mg by mouth daily        B-12 1000 MCG Caps      Take 1 capsule by mouth daily        Calcium carb-Vitamin D 500 mg [place]-200 units 500-200 MG-UNIT per tablet    OSCAL with D;Oyster Shell Calcium     Take 1 tablet by mouth daily.        ferrous sulfate 325 (65 Fe) MG tablet    IRON     Take 1 tablet by mouth daily (with breakfast).        FIBERCON 625 MG tablet   Generic drug:  calcium polycarbophil      Take 1 tablet by mouth 2 times daily.        glipiZIDE 10 MG 24 hr tablet    GLUCOTROL XL    180 tablet    Take 1 tablet (10  mg) by mouth 2 times daily Due to see provider in August    Type 2 diabetes mellitus with hyperglycemia, without long-term current use of insulin (H)       hydrochlorothiazide 12.5 MG capsule    MICROZIDE    90 capsule    Take 1 capsule (12.5 mg) by mouth every morning    Edema, unspecified type       levothyroxine 75 MCG tablet    SYNTHROID/LEVOTHROID    30 tablet    Take 1 tablet (75 mcg) by mouth daily    Hypothyroidism, unspecified type       lisinopril 40 MG tablet    PRINIVIL/ZESTRIL    90 tablet    TAKE ONE TABLET BY MOUTH EVERY DAY    Systolic dysfunction, left ventricle, Past myocardial infarction       LOPERAMIDE HCL PO      1/2 to 1 tablet by mouth as needed.        melatonin 3 MG tablet     30 tablet    Take 1 tablet (3 mg) by mouth nightly as needed for sleep    Insomnia, unspecified type       METFORMIN HCL PO      Take 500 mg by mouth 2 times daily (with meals)        metoprolol tartrate 50 MG tablet    LOPRESSOR    180 tablet    TAKE ONE TABLET BY MOUTH TWICE A DAY    Past myocardial infarction, Systolic dysfunction, left ventricle, Atrial tachycardia, paroxysmal (H)       mometasone 220 MCG/INH Inhaler    ASMANEX     Inhale 2 puffs into the lungs daily as needed        pioglitazone 30 MG tablet    ACTOS    90 tablet    TAKE ONE TABLET BY MOUTH EVERY DAY    Type 2 diabetes mellitus with hyperglycemia, without long-term current use of insulin (H)       * PROAIR  (90 Base) MCG/ACT Inhaler   Generic drug:  albuterol      Inhale 2 puffs into the lungs every 6 hours as needed Reported on 3/2/2017        * albuterol (2.5 MG/3ML) 0.083% neb solution     1 Box    Take 1 vial (2.5 mg) by nebulization every 6 hours as needed for shortness of breath / dyspnea or wheezing    COPD exacerbation (H)       ranitidine 150 MG tablet    ZANTAC    60 tablet    Take 1 tablet (150 mg) by mouth nightly as needed for heartburn    Hiatal hernia       simvastatin 20 MG tablet    ZOCOR    90 tablet    TAKE ONE TABLET BY  MOUTH AT BEDTIME. NEEDS APPOINTMENT    Hyperlipidemia LDL goal <100       tiotropium 18 MCG capsule    SPIRIVA     Inhale 18 mcg into the lungs daily.        TYLENOL PM EXTRA STRENGTH  MG tablet   Generic drug:  diphenhydrAMINE-acetaminophen      Take 2 tablets by mouth At Bedtime        VITAMIN D (CHOLECALCIFEROL) PO      Take 2,000 Units by mouth daily        WARFARIN SODIUM PO      Take by mouth daily 4.5mg=Tue and Fri, 3mg=ROW        * Notice:  This list has 2 medication(s) that are the same as other medications prescribed for you. Read the directions carefully, and ask your doctor or other care provider to review them with you.

## 2018-07-30 NOTE — PROGRESS NOTES
SUBJECTIVE:   Maryalice Rebecca Wachter is a 84 year old female who presents to clinic today for the following health issues:      Hospital Follow-up Visit:    Hospital/Nursing Home/IP Rehab Facility: [facility]  Date of Admission: 7/21/18  Date of Discharge: 7/23/18  Reason(s) for Admission: COPD, Fatigue, SOB, AFib            Problems taking medications regularly:  None       Medication changes since discharge: Prednisone (Done)  & Doxycycline Monohydrate (done)       Problems adhering to non-medication therapy:  None    Summary of hospitalization:  [facility] discharge summary reviewed  Diagnostic Tests/Treatments reviewed.  Follow up needed:   Other Healthcare Providers Involved in Patient s Care:         MTM  Update since discharge: fluctuating course.     Post Discharge Medication Reconciliation: discharge medications reconciled, continue medications without change.  Plan of care communicated with patient     Coding guidelines for this visit:  Type of Medical   Decision Making Face-to-Face Visit       within 7 Days of discharge Face-to-Face Visit        within 14 days of discharge   Moderate Complexity [id number]   High Complexity [id number]          Sindi is here for hospital follow up.  She is feeling better since leaving the hospital.  She stayed for two nights to get her breathing under control.  Doing her nebs twice daily.  States she is able to do some very light house work without too much effort.  She felt guilty about going to the hospital and having other people care for her while she was there.  She feels that she is breathing better now than when she came home.  See's Dr. Nava in pulmonology once yearly.  Last seen Jan 2018.    She had her thyroid medication changed due to abnormal labs while in the hospital.  Will need labs rechecked.      Notes that she is not sleeping well and feeling exhausted.  Tried trazodone in the hospital which made her hallucinate.   Melatonin helps her sleep for only about 3 hours then she will be up the rest of the night.  This is making her upset and feel worse about everything.  She is worried about the injection she is due to get in her eye in mid August.  This is really bothering her.  It is painful and will also fall on the same date as a luncheon at her [employer].  This is an ongoing issue that she is followed for by an outside ophthalmologist.    She had also just seen Dr. Sinha just prior to her hospitalization for her uterine prolapse issues.  She is feeling better about this, they decided on no pessary.  She has concerns over needing to manipulate the bladder and getting yeast infections.    Leg swelling.  Was given Lasix in the hospital.  Still on 12.5mg hydrochlorothiazide.  Sugars have been higher than normal.      Problem list and histories reviewed & adjusted, as indicated.  Additional history: as documented    Recent Labs   Lab Test  07/23/18   0701  07/22/18   0656  07/21/18   1436  02/09/18   1350  02/09/18   1326   09/25/17   1307  06/19/17   0935   04/08/17   0813  04/04/17   0534   07/01/16   0829   06/30/15   0952   A1C   --    --   7.4*   --   7.4*   --   7.4*   --    < >   --    --    < >  8.3*   < >  7.9*   LDL   --    --    --    --    --    --    --   49   --    --    --    --   39   --   25   HDL   --    --    --    --    --    --    --   43*   --    --    --    --   40*   --   36*   TRIG   --    --    --    --    --    --    --   208*   --    --    --    --   190*   --   240*   ALT   --    --    --   17   --    --    --    --    --   19  20   < >   --    --    --    CR  1.18*  1.16*  1.09*  1.21*   --    < >   --   1.05*   --   0.95  0.88   < >   --    < >   --    GFRESTIMATED  44*  44*  48*  42*   --    < >   --   50*   --   56*  61   < >   --    < >   --    GFRESTBLACK  53*  54*  58*  51*   --    < >   --   60*   --   68  74   < >   --    < >   --    POTASSIUM  4.0  3.9  3.8  4.2   --    < >   --   4.0   --   3.6   3.8   < >   --    < >   --    TSH   --   0.89   --   0.44   --    --   0.74  0.09*   --    --    --    < >   --    < >  0.38*    < > = values in this interval not displayed.      BP Readings from Last 3 Encounters:   07/30/18 116/56   07/23/18 135/50   07/19/18 128/68    Wt Readings from Last 3 Encounters:   07/30/18 190 lb 3.2 oz (86.3 kg)   07/23/18 191 lb 1.6 oz (86.7 kg)   07/19/18 191 lb 3.2 oz (86.7 kg)            Labs reviewed in EPIC    Reviewed and updated as needed this visit by clinical staff       Reviewed and updated as needed this visit by Provider         ROS:  Constitutional, HEENT, cardiovascular, pulmonary, gi and gu systems are negative, except as otherwise noted.    OBJECTIVE:     /56 (BP Location: Right arm, Patient Position: Chair, Cuff Size: Adult Regular)  Pulse 77  Temp 98.4  F (36.9  C) (Oral)  Resp 18  Wt 190 lb 3.2 oz (86.3 kg)  SpO2 94%  Breastfeeding? No  BMI 31.65 kg/m2  Body mass index is 31.65 kg/(m^2).  GENERAL: healthy, alert and no distress  RESP: lungs clear to auscultation - no rales, rhonchi or wheezes  CV: irregularly irregular rhythm, no murmur, click or rub and + bilateral lower extremity pitting edema to mid shin    MS: extremities normal- no gross deformities noted  SKIN: no suspicious lesions or rashes  PSYCH: mentation appears normal, affect flat, fatigued and teary, frustrated    Diagnostic Test Results:  none    ASSESSMENT/PLAN:   1. COPD exacerbation (H)  - continue with regular medications, she is feeling better with breathing right now.  Finished medications, continue nebs as needed.    2. Type 2 diabetes mellitus with hyperglycemia, without long-term current use of insulin (H)    - FOOT EXAM  NO CHARGE [37238.114]  - Lipid panel reflex to direct LDL Fasting; Future    3. Edema of both legs  - continue to monitor, we could have her try OTC compression stockings for this if it is becoming uncomfortable for her.  BP is controlled today.    4. Insomnia,  unspecified type  - she did not like trazodone in the hospital.  Can stick with melatonin if she wants to try again.  I think that her lack of sleep is affecting her mood.      Follow up with me in 6 weeks for med check and TSH.      Venu Maria PA-C  [facility]

## 2018-08-03 DIAGNOSIS — E11.65 TYPE 2 DIABETES MELLITUS WITH HYPERGLYCEMIA, WITHOUT LONG-TERM CURRENT USE OF INSULIN (H): ICD-10-CM

## 2018-08-03 DIAGNOSIS — I51.9 SYSTOLIC DYSFUNCTION, LEFT VENTRICLE: ICD-10-CM

## 2018-08-03 LAB — INR PPP: 4.8 (ref 0.86–1.14)

## 2018-08-03 PROCEDURE — 36415 COLL VENOUS BLD VENIPUNCTURE: CPT | Performed by: PHYSICIAN ASSISTANT

## 2018-08-03 PROCEDURE — 80061 LIPID PANEL: CPT | Performed by: PHYSICIAN ASSISTANT

## 2018-08-03 PROCEDURE — 85610 PROTHROMBIN TIME: CPT | Performed by: PHYSICIAN ASSISTANT

## 2018-08-03 PROCEDURE — 80048 BASIC METABOLIC PNL TOTAL CA: CPT | Performed by: PHYSICIAN ASSISTANT

## 2018-08-04 LAB
ANION GAP SERPL CALCULATED.3IONS-SCNC: 6 MMOL/L (ref 3–14)
BUN SERPL-MCNC: 16 MG/DL (ref 7–30)
CALCIUM SERPL-MCNC: 9.3 MG/DL (ref 8.5–10.1)
CHLORIDE SERPL-SCNC: 101 MMOL/L (ref 94–109)
CHOLEST SERPL-MCNC: 105 MG/DL
CO2 SERPL-SCNC: 33 MMOL/L (ref 20–32)
CREAT SERPL-MCNC: 1.06 MG/DL (ref 0.52–1.04)
GFR SERPL CREATININE-BSD FRML MDRD: 49 ML/MIN/1.7M2
GLUCOSE SERPL-MCNC: 93 MG/DL (ref 70–99)
HDLC SERPL-MCNC: 39 MG/DL
LDLC SERPL CALC-MCNC: 27 MG/DL
NONHDLC SERPL-MCNC: 66 MG/DL
POTASSIUM SERPL-SCNC: 4.3 MMOL/L (ref 3.4–5.3)
SODIUM SERPL-SCNC: 140 MMOL/L (ref 133–144)
TRIGL SERPL-MCNC: 195 MG/DL

## 2018-08-06 ENCOUNTER — ANTICOAGULATION THERAPY VISIT (OUTPATIENT)
Dept: NURSING | Facility: CLINIC | Age: 83
End: 2018-08-06
Payer: COMMERCIAL

## 2018-08-06 ENCOUNTER — ALLIED HEALTH/NURSE VISIT (OUTPATIENT)
Dept: PHARMACY | Facility: CLINIC | Age: 83
End: 2018-08-06
Attending: INTERNAL MEDICINE
Payer: COMMERCIAL

## 2018-08-06 DIAGNOSIS — J01.90 ACUTE NON-RECURRENT SINUSITIS, UNSPECIFIED LOCATION: ICD-10-CM

## 2018-08-06 DIAGNOSIS — I48.20 CHRONIC ATRIAL FIBRILLATION (H): ICD-10-CM

## 2018-08-06 DIAGNOSIS — G47.00 INSOMNIA, UNSPECIFIED TYPE: ICD-10-CM

## 2018-08-06 DIAGNOSIS — Z79.01 LONG-TERM (CURRENT) USE OF ANTICOAGULANTS: ICD-10-CM

## 2018-08-06 DIAGNOSIS — J20.9 ACUTE BRONCHITIS, UNSPECIFIED ORGANISM: Primary | ICD-10-CM

## 2018-08-06 PROCEDURE — 99207 ZZC NO CHARGE NURSE ONLY: CPT

## 2018-08-06 PROCEDURE — 99607 MTMS BY PHARM ADDL 15 MIN: CPT | Mod: U4 | Performed by: PHARMACIST

## 2018-08-06 PROCEDURE — 99605 MTMS BY PHARM NP 15 MIN: CPT | Mod: U4 | Performed by: PHARMACIST

## 2018-08-06 NOTE — MR AVS SNAPSHOT
After Visit Summary   8/6/2018    Sindice Rebecca Wachter    MRN: [id number]           Patient Information     Date Of Birth          11/11/1933        Visit Information        Provider Department      8/6/2018 10:00 AM Janey Romero RPH [facility]        Today's Diagnoses     Acute bronchitis, unspecified organism    -  1    Acute non-recurrent sinusitis, unspecified location        Insomnia, unspecified type        Chronic atrial fibrillation (H)          Care Instructions    Dear Sindi,     It was a pleasure talking with you today regarding your medications and medical concerns. The following is a summary of what we discussed.     1. FYI--Please try taking an over the counter pill called Alteril for sleep . It contains Melatonin, valerian root and Tryptophan. Stop the tylenol pm and plain melatonin for now.     2. FYI--your INR last Friday was 4.80.  Last Friday you took 3mg.Warfarin  --lets keep you on 3mg /day dose of Warfarin for now and recheck INR this Friday August 10th .  Have INR review results and change dose if necessary .     3. FYI--Please keep in mind that Albuterol nebs at bedtime can cause you to be more wired up and make sleep more difficult --perhaps try to use your albuterol earlier -say at suppertime . Change clothes and get ready for bed about 2 hours before you want to go to sleep.       Follow-up in  2 weeks per phone--10 am on  Monday August 20th .     Thank you for your time and please feel free to call ([phone] voicemail/pager or [phone] clinic main line) or e-mail ([email or web address]) with any questions.     Janey Romero Rph.  Medication Therapy Management Provider  [phone]              Follow-ups after your visit        Your next 10 appointments already scheduled     Aug 20, 2018 10:00 AM CDT   TELEMEDICINE with Janey Romero RPH   [facility] ([facility])    [address]  [facility] [address] [phone]           Note: this is not an onsite visit; there is no need to come to the facility.            Oct 15, 2018  1:00 PM CDT   SHORT with Janey Romero RPH   [facility] ([facility])    [address]  [facility] [address] [phone]              Who to contact     If you have questions or need follow up information about today's clinic visit or your schedule please contact [facility] [place] [facility] directly at [phone].  Normal or non-critical lab and imaging results will be communicated to you by MyChart, letter or phone within 4 business days after the clinic has received the results. If you do not hear from us within 7 days, please contact the clinic through [employer] or phone. If you have a critical or abnormal lab result, we will notify you by phone as soon as possible.  Submit refill requests through [facility] or call your pharmacy and they will forward the refill request to us. Please allow 3 business days for your refill to be completed.          Additional Information About Your Visit        MyChart Information     [facility] gives you secure access to your electronic health record. If you see a primary care provider, you can also send messages to your care team and make appointments. If you have questions, please call your primary care clinic.  If you do not have a primary care provider, please call [phone] and they will assist you.        Care EveryWhere ID     This is your Care EveryWhere ID. This could be used by other organizations to access your [place] medical records  [id number]         Blood Pressure from Last 3 Encounters:   07/30/18 116/56   07/23/18 135/50   07/19/18 128/68    Weight from Last 3 Encounters:   07/30/18 190 lb 3.2 oz (86.3 kg)   07/23/18 191 lb 1.6 oz (86.7 kg)   07/19/18 191 lb 3.2 oz (86.7 kg)              Today, you had the following     No orders found for display        Primary Care Provider Office Phone # Fax #    Venu Maria PA-C [phone]       [id number]  [facility] [address]        Goals        General    Medical     Notes - Note created  7/25/2018  9:21 AM by Grisel Martinez, RN    Use COPD action plan to monitor symptoms, If symptoms in the YELLOW zone appear call clinic for recommendations/appointment   As of today's date 7/25/2018 goal is met at 0 - 25%.   Goal Status:  Active          Equal Access to Services     MARIA ESTHER ANDRES : Hadii aad ku hadasho Soomaali, waaxda luqadaha, qaybta kaalmada adeegyada, waxay idiin hayaan adeeg kharash delia . So [facility] [phone].    ATENCIÓN: Si habla español, tiene a talamantes disposición servicios gratuitos de asistencia lingüística. Llame al [phone].    We comply with applicable federal civil rights laws and [place] laws. We do not discriminate on the basis of race, color, national origin, age, disability, sex, sexual orientation, or gender identity.            Thank you!     Thank you for choosing [facility]  for your care. Our goal is always to provide you with excellent care. Hearing back from our patients is one way we can continue to improve our services. Please take a few minutes to complete the written survey that you may receive in the mail after your visit with us. Thank you!             Your Updated Medication List - Protect others around you: Learn how to safely use, store and throw away your medicines at www.disposemymeds.org.          This list is accurate as of 8/6/18 10:56 AM.  Always use your most recent med list.                   Brand Name Dispense Instructions for use Diagnosis    ascorbic acid 500 MG tablet    VITAMIN C     Take 500 mg by mouth daily.        ASPIRIN EC PO      Take 81 mg by mouth daily        B-12 1000 MCG Caps      Take 1 capsule by mouth daily        Calcium carb-Vitamin D 500 mg [place]-200 units 500-200 MG-UNIT per tablet    OSCAL with  D;Oyster Shell Calcium     Take 1 tablet by mouth daily.        ferrous sulfate 325 (65 Fe) MG tablet    IRON     Take 1 tablet by mouth daily (with breakfast).        FIBERCON 625 MG tablet   Generic drug:  calcium polycarbophil      Take 1 tablet by mouth 2 times daily.        glipiZIDE 10 MG 24 hr tablet    GLUCOTROL XL    180 tablet    Take 1 tablet (10 mg) by mouth 2 times daily Due to see provider in August    Type 2 diabetes mellitus with hyperglycemia, without long-term current use of insulin (H)       hydrochlorothiazide 12.5 MG capsule    MICROZIDE    90 capsule    Take 1 capsule (12.5 mg) by mouth every morning    Edema, unspecified type       levothyroxine 75 MCG tablet    SYNTHROID/LEVOTHROID    30 tablet    Take 1 tablet (75 mcg) by mouth daily    Hypothyroidism, unspecified type       lisinopril 40 MG tablet    PRINIVIL/ZESTRIL    90 tablet    TAKE ONE TABLET BY MOUTH EVERY DAY    Systolic dysfunction, left ventricle, Past myocardial infarction       LOPERAMIDE HCL PO      1/2 to 1 tablet by mouth as needed.        melatonin 3 MG tablet     30 tablet    Take 1 tablet (3 mg) by mouth nightly as needed for sleep    Insomnia, unspecified type       METFORMIN HCL PO      Take 500 mg by mouth 2 times daily (with meals)        metoprolol tartrate 50 MG tablet    LOPRESSOR    180 tablet    TAKE ONE TABLET BY MOUTH TWICE A DAY    Past myocardial infarction, Systolic dysfunction, left ventricle, Atrial tachycardia, paroxysmal (H)       mometasone 220 MCG/INH Inhaler    ASMANEX     Inhale 2 puffs into the lungs daily as needed        pioglitazone 30 MG tablet    ACTOS    90 tablet    TAKE ONE TABLET BY MOUTH EVERY DAY    Type 2 diabetes mellitus with hyperglycemia, without long-term current use of insulin (H)       * PROAIR  (90 Base) MCG/ACT Inhaler   Generic drug:  albuterol      Inhale 2 puffs into the lungs every 6 hours as needed Reported on 3/2/2017        * albuterol (2.5 MG/3ML) 0.083% neb  solution     1 Box    Take 1 vial (2.5 mg) by nebulization every 6 hours as needed for shortness of breath / dyspnea or wheezing    COPD exacerbation (H)       ranitidine 150 MG tablet    ZANTAC    60 tablet    Take 1 tablet (150 mg) by mouth nightly as needed for heartburn    Hiatal hernia       simvastatin 20 MG tablet    ZOCOR    90 tablet    TAKE ONE TABLET BY MOUTH AT BEDTIME. NEEDS APPOINTMENT    Hyperlipidemia LDL goal <100       tiotropium 18 MCG capsule    SPIRIVA     Inhale 18 mcg into the lungs daily.        TYLENOL PM EXTRA STRENGTH  MG tablet   Generic drug:  diphenhydrAMINE-acetaminophen      Take 2 tablets by mouth At Bedtime        VITAMIN D (CHOLECALCIFEROL) PO      Take 2,000 Units by mouth daily        WARFARIN SODIUM PO      Take by mouth daily 4.5mg=Tue and Fri, 3mg=ROW        * Notice:  This list has 2 medication(s) that are the same as other medications prescribed for you. Read the directions carefully, and ask your doctor or other care provider to review them with you.

## 2018-08-06 NOTE — PATIENT INSTRUCTIONS
Dear Sindi,     It was a pleasure talking with you today regarding your medications and medical concerns. The following is a summary of what we discussed.     1. FYI--Please try taking an over the counter pill called Alteril for sleep . It contains Melatonin, valerian root and Tryptophan. Stop the tylenol pm and plain melatonin for now.     2. FYI--your INR last Friday was 4.80.  Last Friday you took 3mg.Warfarin  --lets keep you on 3mg /day dose of Warfarin for now and recheck INR this Friday August 10th .  Have INR review results and change dose if necessary .     3. FYI--Please keep in mind that Albuterol nebs at bedtime can cause you to be more wired up and make sleep more difficult --perhaps try to use your albuterol earlier -say at suppertime . Change clothes and get ready for bed about 2 hours before you want to go to sleep.       Follow-up in  2 weeks per phone--10 am on  Monday August 20th .     Thank you for your time and please feel free to call ([phone] voicemail/pager or [phone] clinic main line) or e-mail ([email or web address]) with any questions.     Janey Romero Rph.  Medication Therapy Management Provider  [phone]

## 2018-08-06 NOTE — PROGRESS NOTES
ANTICOAGULATION FOLLOW-UP CLINIC VISIT    Patient Name:  Maryalice Rebecca Wachter  Date:  8/6/2018  Contact Type:  Telephone    SUBJECTIVE:     Patient Findings     Positives Unexplained INR or factor level change    Comments Janey, Pharm-D noticed patient had her INR checked on 08/03 and it was supra at 4.8; however, patient did not receive a phone call and/or instructions on her Warfarin dosing.  Janey had sent patient a [employer] message informing her to continue with 3mg daily of Warfarin and recheck INR on 08/10/18.  I informed Janey and patient that Warfarin should be held today, 08/06/18.  Patient states she did reduce her Warfarin dose on 08/03--see calendar.    Patient was hospitalized 07/21-23 for bronchitis/sinusitis. Patient took Doxycycline and PDN 07/23-26 per hospital discharge summary.           OBJECTIVE    INR   Date Value Ref Range Status   08/03/2018 4.80 (H) 0.86 - 1.14 Final     Comment:     This test is intended for monitoring Coumadin therapy.  Results are not   accurate in patients with prolonged INR due to factor deficiency.         ASSESSMENT / PLAN  INR assessment SUPRA    Recheck INR In: 4 DAYS    INR Location Outside lab      Anticoagulation Summary as of 8/6/2018     INR goal 2.0-3.0   Today's INR 4.80! (8/3/2018)   Warfarin maintenance plan 4.5 mg (3 mg x 1.5) on Tue, Fri; 3 mg (3 mg x 1) all other days   Full warfarin instructions 8/6: Hold; Otherwise 4.5 mg on Tue, Fri; 3 mg all other days   Weekly warfarin total 24 mg   Plan last modified Cyndi Cabello RN (9/23/2016)   Next INR check 8/10/2018   Priority INR   Target end date     Indications   Long-term (current) use of anticoagulants [Z79.01] [Z79.01]  Atrial fibrillation (H) [I48.91]         Anticoagulation Episode Summary     INR check location     Preferred lab     Send INR reminders to  [employer]    Comments       Anticoagulation Care Providers     Provider Role Specialty Phone number    Venu Maria PA-C  Responsible Physician Assistant - Medical [phone]            See the Encounter Report to view Anticoagulation Flowsheet and Dosing Calendar (Go to Encounters tab in chart review, and find the Anticoagulation Therapy Visit)        Jacquelyn Hagen RN

## 2018-08-06 NOTE — PROGRESS NOTES
SUBJECTIVE/OBJECTIVE:                Maryalice Rebecca Wachter is a 84 year old female called for a transitions of care visit.  She was discharged from [facility] on 7-23-18 for suspected bronchitis and sinusitis.     Chief Complaint: 100% better now breathing wise but more pain in her shoulders now --she feels tylenol helps .  She is concerned about another injection -eye --every 5 weeks x 6 months --she doesn't feel well enough to go --we discussed she should call them and see if she can skip this months shot or delay it ?    Personal Healthcare Goals: stay ambulatory    Allergies/ADRs: Reviewed in Epic  Tobacco: No tobacco use   Alcohol: Less than 1 beverages / month-rare use.  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 Cannot exercise due to diarrhea issues --would like to go on walks with her hubby but has to have BM within 1 block of leaving.   PMH: Reviewed in [facility]; [facility] stated she has sarcoidosis?    Medication Adherence/Access:  Patient uses pill box(es).  Patient takes medications 0 time(s) per day.  Per patient, misses medication 0 times per week.   Medication barriers: none.   The patient fills medications at [place]: NO, fills medications at [facility] .    Bronchitis/sinusitis/Insomnia:    Pt. Had cxray --for copd exacerbation - started on antibiotics - and now discharged with doxycycline 100mg bid x 3 days --she has finished that as well 40mg /day prednisone x 3 days.   She had sleep issues --disliked the trazodone they gave her --then changed her to melatonin 3mg hs - it puts her to sleep and then she wakes up after a few hours.    She is using albuterol am and hs for breathing . She didn't realize nebs of albuterol can make her have insomnia? She states just doing all it takes to go to bed at night leaves her breathless .    She tried tylenol pm - didnt help?  , sleep clinic unable to sleep long enough to get assessment , she just isnt  sleeping much - lays awake all night long-- she explains eyes are closed but see's pictures like cartoon characters every night when she is on trazodone.    She feels like mind is racing and cant shut it off.     She declines any other rx sleep meds.     A-Fib:   Stable INR upon hospital discharge but last Friday 8-3-18 INR was 4.80. Weekly warfarin dose is 24mg.  She takes warfarin 3mg /day except 4.5mg on t/fridays --she states no one from clinic called her to give her directions on what to do with 4.80 INR for she just decided on her own to take 3 mg last Friday --what should she do now?            ASSESSMENT:                 Current medications were reviewed today.      Medication Adherence: excellent, no issues identified    Bronchitis/sinusitis/Insomnia: Improved,    Patient feels breathing better but lets change her albuterol timing in pm and also try a  Different otc sleep med --see plan for details.       A-Fib:   Needs improvement: current INR 4.80 --presumably from all her antibiotics which are now finished --lets keep her daily dose at 3mg./day this week and have her INR rechecked 8-10-18.          PLAN:                1. FYI--Please try taking an over the counter pill called Alteril for sleep . It contains Melatonin, valerian root and Tryptophan. Stop the tylenol pm and plain melatonin for now.     2. FYI--your INR last Friday was 4.80.  Last Friday you took 3mg.Warfarin  --lets keep you on 3mg /day dose of Warfarin for now and recheck INR this Friday August 10th .  Have INR review results and change dose if necessary .     3. FYI--Please keep in mind that Albuterol nebs at bedtime can cause you to be more wired up and make sleep more difficult --perhaps try to use your albuterol earlier -say at suppertime . Change clothes and get ready for bed about 2 hours before you want to go to sleep.       Follow-up in  2 weeks per phone--10 am on  Monday August 20th .       I spent 50 minutes with this patient  today. All changes were made via collaborative practice agreement with Venu Maria. A copy of the visit note was provided to the patient's primary care provider.        The patient was sent via [employer] a summary of these recommendations as an after visit summary.    Janey Romero Rph.  Medication Therapy Management Provider  [phone]

## 2018-08-06 NOTE — MR AVS SNAPSHOT
Maryalice Rebecca Wachter   8/6/2018 9:15 AM   Anticoagulation Therapy Visit    Description:  84 year old female   Provider:  [facility]   Department:  Cr Nurse           INR as of 8/6/2018     Today's INR 4.80! (8/3/2018)      Anticoagulation Summary as of 8/6/2018     INR goal 2.0-3.0   Today's INR 4.80! (8/3/2018)   Full warfarin instructions 8/6: Hold; Otherwise 4.5 mg on Tue, Fri; 3 mg all other days   Next INR check 8/10/2018    Indications   Long-term (current) use of anticoagulants [Z79.01] [Z79.01]  Atrial fibrillation (H) [I48.91]         Your next Anticoagulation Clinic appointment(s)     Aug 10, 2018  9:15 AM CDT   Anticoagulation Visit with FM RN VISITS   [facility]    [address] [place] [address], [facility] [address]  [facility] [id number] [phone]              Contact Numbers     Clinic Number:         August 2018 Details    Sun Mon Tue Wed Thu Fri Sat        1               2               3               4                 5               6      Hold   See details      7      4.5 mg         8      3 mg         9      3 mg         10            11                 12               13               14               15               16               17               18                 19               20               21               22               23               24               25                 26               27               28               29               30               31                 Date Details   08/06 This INR check       Date of next INR:  8/10/2018         How to take your warfarin dose     To take:  3 mg Take 1 of the 3 mg tablets.    To take:  4.5 mg Take 1.5 of the 3 mg tablets.    Hold Do not take your warfarin dose. See the Details table to the right for additional instructions.

## 2018-08-10 ENCOUNTER — ANTICOAGULATION THERAPY VISIT (OUTPATIENT)
Dept: NURSING | Facility: CLINIC | Age: 83
End: 2018-08-10
Payer: COMMERCIAL

## 2018-08-10 DIAGNOSIS — Z79.01 LONG-TERM (CURRENT) USE OF ANTICOAGULANTS: ICD-10-CM

## 2018-08-10 DIAGNOSIS — E03.9 HYPOTHYROIDISM, UNSPECIFIED TYPE: ICD-10-CM

## 2018-08-10 DIAGNOSIS — I48.20 CHRONIC ATRIAL FIBRILLATION (H): ICD-10-CM

## 2018-08-10 LAB — INR POINT OF CARE: 3.1 (ref 0.86–1.14)

## 2018-08-10 PROCEDURE — 85610 PROTHROMBIN TIME: CPT | Mod: QW

## 2018-08-10 PROCEDURE — 99207 ZZC NO CHARGE NURSE ONLY: CPT

## 2018-08-10 PROCEDURE — 36416 COLLJ CAPILLARY BLOOD SPEC: CPT

## 2018-08-10 RX ORDER — LEVOTHYROXINE SODIUM 75 UG/1
75 TABLET ORAL DAILY
Qty: 30 TABLET | Refills: 1 | Status: SHIPPED | OUTPATIENT
Start: 2018-08-10 | End: 2018-10-19

## 2018-08-10 NOTE — MR AVS SNAPSHOT
Maryalice Rebecca Wachter   8/10/2018 9:15 AM   Anticoagulation Therapy Visit    Description:  84 year old female   Provider:  FM RN VISITS   Department:  Fm Nurse           INR as of 8/10/2018     Today's INR 3.1!      Anticoagulation Summary as of 8/10/2018     INR goal 2.0-3.0   Today's INR 3.1!   Full warfarin instructions 8/10: 3 mg; Otherwise 4.5 mg on Tue, Fri; 3 mg all other days   Next INR check 8/24/2018    Indications   Long-term (current) use of anticoagulants [Z79.01] [Z79.01]  Atrial fibrillation (H) [I48.91]         Your next Anticoagulation Clinic appointment(s)     Aug 24, 2018  9:30 AM CDT   Anticoagulation Visit with FM RN VISITS   [facility] ([facility]    [address] [place] [address], [facility] [address]  [facility] [address] [phone]              Contact Numbers     Clinic Number:         August 2018 Details    Sun Mon Tue Wed Thu Fri Sat        1               2               3               4                 5               6               7               8               9               10      3 mg   See details      11      3 mg           12      3 mg         13      3 mg         14      4.5 mg         15      3 mg         16      3 mg         17      4.5 mg         18      3 mg           19      3 mg         20      3 mg         21      4.5 mg         22      3 mg         23      3 mg         24            25                 26               27               28               29               30               31                 Date Details   08/10 This INR check       Date of next INR:  8/24/2018         How to take your warfarin dose     To take:  3 mg Take 1 of the 3 mg tablets.    To take:  4.5 mg Take 1.5 of the 3 mg tablets.

## 2018-08-10 NOTE — PROGRESS NOTES
ANTICOAGULATION FOLLOW-UP CLINIC VISIT    Patient Name:  Maryalice Rebecca Wachter  Date:  8/10/2018  Contact Type:  Face to Face    SUBJECTIVE:     Patient Findings     Positives Hospital admission           OBJECTIVE    INR Protime   Date Value Ref Range Status   08/10/2018 3.1 (A) 0.86 - 1.14 Final       ASSESSMENT / PLAN  No question data found.  Anticoagulation Summary as of 8/10/2018     INR goal 2.0-3.0   Today's INR 3.1!   Warfarin maintenance plan 4.5 mg (3 mg x 1.5) on Tue, Fri; 3 mg (3 mg x 1) all other days   Full warfarin instructions 8/10: 3 mg; Otherwise 4.5 mg on Tue, Fri; 3 mg all other days   Weekly warfarin total 24 mg   Plan last modified Cyndi Cabello RN (9/23/2016)   Next INR check 8/24/2018   Priority INR   Target end date     Indications   Long-term (current) use of anticoagulants [Z79.01] [Z79.01]  Atrial fibrillation (H) [I48.91]         Anticoagulation Episode Summary     INR check location     Preferred lab     Send INR reminders to  TRIAGE POOL    Comments       Anticoagulation Care Providers     Provider Role Specialty Phone number    Venu Maria PA-C Responsible Physician Assistant - Medical [phone]            See the Encounter Report to view Anticoagulation Flowsheet and Dosing Calendar (Go to Encounters tab in chart review, and find the Anticoagulation Therapy Visit)    Dosage adjustment made based on physician directed care plan.    Cyndi Cabello RN

## 2018-08-13 ENCOUNTER — TELEPHONE (OUTPATIENT)
Dept: PHARMACY | Facility: CLINIC | Age: 83
End: 2018-08-13

## 2018-08-13 DIAGNOSIS — E11.65 TYPE 2 DIABETES MELLITUS WITH HYPERGLYCEMIA, WITHOUT LONG-TERM CURRENT USE OF INSULIN (H): Primary | ICD-10-CM

## 2018-08-13 RX ORDER — BLOOD-GLUCOSE METER
EACH MISCELLANEOUS
Qty: 1 KIT | Refills: 0 | Status: SHIPPED | OUTPATIENT
Start: 2018-08-13

## 2018-08-13 NOTE — TELEPHONE ENCOUNTER
8-13-18      I am not sure of going for a prescription drug yet. Doctors have refused to put me on one before, but I am concerned about is my Meter Testing Machine. I have suspected it is not right for quite a while, because when I test on my husbands rico, his shows a lower number. Yesterday when I went for INR the nurse gave me a machine to test against mine. I test after lunch on Friday was 181 and on my machine 265. Today (sat) morning tested 132 on my machine and 93 on the test machine. I am concerned that I am getting too much diabetes meds, perhaps could take less actos or something else.   Sindi ([birth year])     She forgot to mention her HGB is 10.2 --godfrey - INR nurse stated take 2 pills -325mg bid . Perhaps this is why she feels weak.    -hannah rouse calls patient 8-13-18 :    Lets get her a new blood sugar machine. Her current meter is (one touch ultra-2) is several years old and she test using her hubbys --always much lower on his.     The INR nurse gave her an accu-chek chris plus --but strips not covered .    Lets order you a new one touch ultra-2 machine ---try that one going forward .    Also--sleeping is still an issue , 1 tylenol pm + 1 benadryl and didn't go to sleep until 1 am - then she slept til 5am - then toss and turn and get up . She does nap most days /week - but never on tues/wed.     [facility] notes qt/qtc is 382/470--will review and see if we can try low dose quetiapine?    Hannah Romero [facility].  Medication Therapy Management Provider  [phone]                On Thu, Aug 9, 2018 at 6:21 PM, Hannah Romero [email or web address]> wrote:  sindi --im sorry to hear that alteril is not working.  im heading out of town for a few days tonight but will be back Monday .  im have a go to drug that never fails for sleep but it is a prescription drug and I would need to see you in clinic to prescribe it for you if your interested . it is called Quetiapine.  please make appt. to see me next  week if your interested in trying it ?  thank you -Janey Romero [facility].  [place] Medication Therapy Management Provider at [facility]  [address] [place] [address]  [email or web address]  [email or web address]   Pager: [phone]  Fax [phone]  Connect with [facility] on social media.    From: Maryalice Wachter [[email or web address]]  Sent: Thursday, August 09, 2018 8:44 AM  To: Janey Romero  Subject: Alteril sleep med.    From [birth year] Sindi   I really hate to tell you, but this sleep med does not work for me. I found it at [employer].   You are supposed to take 2 big pill one hour before you go to bed.   After 3 hours I had not slept (this has happened 3 nights in a row.)   Pain in shoulders, wrist and right leg last night made me take a regular Tylenol. I fell asleep between 2.30 and 4. Tossed till 5.00 and then sleep till 6.45.   This does not seem to make anything better   Maryalice Wachter

## 2018-08-16 ENCOUNTER — TELEPHONE (OUTPATIENT)
Dept: PHARMACY | Facility: CLINIC | Age: 83
End: 2018-08-16

## 2018-08-16 DIAGNOSIS — F99 INSOMNIA DUE TO OTHER MENTAL DISORDER: Primary | ICD-10-CM

## 2018-08-16 DIAGNOSIS — F51.05 INSOMNIA DUE TO OTHER MENTAL DISORDER: Primary | ICD-10-CM

## 2018-08-16 RX ORDER — QUETIAPINE FUMARATE 25 MG/1
TABLET, FILM COATED ORAL
Qty: 60 TABLET | Refills: 5 | Status: SHIPPED | OUTPATIENT
Start: 2018-08-16 | End: 2019-08-04

## 2018-08-16 NOTE — TELEPHONE ENCOUNTER
8-16-18      Pt. Struggles with falling and staying asleep --she has anxiety --cant shut her mind off.    We have tried her on several sleep meds including trazodone with no success.    Last night up again at 3 am --just cant sleep at night --awfully tired during the day ! Falls alseep siting in her chair.     mt has suggested Quetiapine low dose --and mtm reviewed her EKG- July -2018 :    WACHTER, MARYALICE ID:[id number] 21-JUL-2018 14:04:53 [facility]-R-ED ROUTINE RECORD  Atrial fibrillation  Cannot rule out Anterior infarct (cited on or before 14-APR-2017)  Abnormal ECG  When compared with ECG of 14-APR-2017 11:30,  Atrial fibrillation has replaced Sinus rhythm  T wave inversion no longer evident in Inferior leads  Nonspecific T wave abnormality no longer evident in Anterolateral leads  25mm/s 10mm/mV 150Hz 9.0.7 12SL 241 RAMONITA: 11  Unconfirmed  Vent. rate 92 BPM  GA interval * ms  QRS duration 96 ms  QT/QTc 382/472 ms  P-R-T axes * 14 34      Per pharmacist letter :    Long QT interval syndrome (LQTS) results when ion channels and proteins responsible for ventricular repolarization fail to work normally. A long QTc interval is considered to be> 470 ms for postpubertal males and >480 ms for postpubertal females.  Her QTC is 472 -- falls in high normal range.         mtm checked all her rx meds --she has no other qt prolonging drugs on her file.  Only drug interaction is moderate int. With warfarin. Check INR closely.      Plan:    1. Discussed with patient - she is willing to try low dose Quetiapine 12.5 mg . And ok to increase by 12.5mg increments every 2-3 days up to 50mg./night if needed.    Janey Romero [facility].  Medication Therapy Management Provider  [phone]

## 2018-08-20 ENCOUNTER — ALLIED HEALTH/NURSE VISIT (OUTPATIENT)
Dept: PHARMACY | Facility: CLINIC | Age: 83
End: 2018-08-20
Payer: COMMERCIAL

## 2018-08-20 DIAGNOSIS — F51.05 INSOMNIA DUE TO OTHER MENTAL DISORDER: Primary | ICD-10-CM

## 2018-08-20 DIAGNOSIS — F99 INSOMNIA DUE TO OTHER MENTAL DISORDER: Primary | ICD-10-CM

## 2018-08-20 PROCEDURE — 99607 MTMS BY PHARM ADDL 15 MIN: CPT | Performed by: PHARMACIST

## 2018-08-20 PROCEDURE — 99606 MTMS BY PHARM EST 15 MIN: CPT | Performed by: PHARMACIST

## 2018-08-20 NOTE — PROGRESS NOTES
SUBJECTIVE/OBJECTIVE:                                                    Maryalice Rebecca Wachter is a 84 year old female called for a follow up visit (4-16-18)  for Medication Therapy Management.  She was referred to me from her PCP- Venu Maria.     Chief Complaint:  .she has just started seroquel for sleep-- she feels a little 'dragged out' -sleeps thru the night now .         Personal Healthcare Goals: fix diarrhea issues, timing of meds, get BP wnl.     Allergies/ADRs: Reviewed in Epic  Tobacco: No tobacco use   Alcohol: not currently using--very rare   Caffeine: decaff in am 1-2 cups/day of coffee; pm -tea a little bit.   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 Cannot exercise due to diarrhea issues --would like to go on walks with her hubby but has to have BM within 1 block of leaving.   PMH: Reviewed in [facility]fund; [facility] stated she has sarcoidosis?    Medication Adherence: No issues found today    Insomnia: Current medications include: she has been trying quetiapine 25mg. -she sleeps thru the night but feels groggy in AM when awakening. She tried 1.5 tabs Saturday night --too much . Too groggy in am-takes her 1-2 hrs. To get going.  She went back to 25mg at night  For now. She didn't really nap --which is a good change for her.      Pt reports trouble staying asleep and can only sleep 4-6 hours. She admits to a boring life right now -she gets sleepy early Pm --naps for a few hours , then goes to bed -gets up at 4-5 am - perhaps she is sleeping enough already.        Current labs include:  Today's Vitals:   BP Readings from Last 3 Encounters:   07/30/18 116/56   07/23/18 135/50   07/19/18 128/68     Lab Results   Component Value Date    A1C 7.4 02/09/2018   .  Lab Results   Component Value Date    CHOL 134 06/19/2017     Lab Results   Component Value Date    TRIG 208 06/19/2017     Lab Results   Component Value Date    HDL 43 06/19/2017     Lab Results   Component Value Date    LDL 49  06/19/2017       Liver Function Studies -   Recent Labs   Lab Test  02/09/18   1350   PROTTOTAL  6.5*   ALBUMIN  3.5   BILITOTAL  0.5   ALKPHOS  61   AST  13   ALT  17       Lab Results   Component Value Date    UCRR 109 10/23/2017    MICROL 22 10/23/2017    UMALCR 19.91 10/23/2017       Last Basic Metabolic Panel:  Lab Results   Component Value Date     02/09/2018      Lab Results   Component Value Date    POTASSIUM 4.2 02/09/2018     Lab Results   Component Value Date    CHLORIDE 103 02/09/2018     Lab Results   Component Value Date    BUN 38 02/09/2018     Lab Results   Component Value Date    CR 1.21 02/09/2018     GFR Estimate   Date Value Ref Range Status   08/03/2018 49 (L) >60 mL/min/1.7m2 Final     Comment:     Non  GFR Calc   07/23/2018 44 (L) >60 mL/min/1.7m2 Final     Comment:     Non  GFR Calc   07/22/2018 44 (L) >60 mL/min/1.7m2 Final     Comment:     Non  GFR Calc     GFR Estimate If Black   Date Value Ref Range Status   08/03/2018 60 (L) >60 mL/min/1.7m2 Final     Comment:      GFR Calc   07/23/2018 53 (L) >60 mL/min/1.7m2 Final     Comment:      GFR Calc   07/22/2018 54 (L) >60 mL/min/1.7m2 Final     Comment:      GFR Calc     TSH   Date Value Ref Range Status   07/22/2018 0.89 0.40 - 4.00 mU/L Final   ]    Most Recent Immunizations   Administered Date(s) Administered     DTAP (<7y) 01/01/2010     Influenza (High Dose) 3 valent vaccine 09/25/2017     Influenza (IIV3) PF 09/01/2014     Pneumo Conj 13-V (2010&after) 10/10/2016     Pneumococcal (PCV 7) 09/09/2014     Pneumococcal 23 valent 10/23/2017     Zoster vaccine, live 01/01/2012         ASSESSMENT:                                                       Current medications were reviewed with her today.     Medication Adherence: No problems identified      Insomnia: Improved. Pt may benefit from adjustment to quetiapine--she feels alittle drunk the  next morning but is sleeping better -- see plan for details going forward.          PLAN:                                                        1. FYI--sleep is better on Quetiapine -most days 25mg./day , but when you need to get up for an eye appt. --only take 12.5mg at bedtime the night before.         Next MTM visit:  See me for follow-up--October 15th .       I spent 30 minutes with this patient today .     To schedule another MTM appointment, please call the clinic directly or you may call the MTM scheduling line at [phone] or toll-free at [phone].     My Clinical Pharmacist's contact information:                                                      It was a pleasure seeing you today!  Please feel free to contact me with any questions or concerns you have.      Janey Romero Rph.  Medication Therapy Management Provider  [phone]

## 2018-08-20 NOTE — MR AVS SNAPSHOT
After Visit Summary   8/20/2018    Maryalice Rebecca Wachter    MRN: [id number]           Patient Information     Date Of Birth          11/11/1933        Visit Information        Provider Department      8/20/2018 10:00 AM Janey Romero RPH [facility]        Today's Diagnoses     Insomnia due to other mental disorder    -  1      Care Instructions    Recommendations from today's MTM visit:                                                        1. FYI--sleep is better on Quetiapine -most days 25mg./day , but when you need to get up for an eye appt. --only take 12.5mg at bedtime the night before.         Next MTM visit:  See me for follow-up--October 15th .     To schedule another MTM appointment, please call the clinic directly or you may call the MTM scheduling line at [phone] or toll-free at [phone].     My Clinical Pharmacist's contact information:                                                      It was a pleasure seeing you today!  Please feel free to contact me with any questions or concerns you have.      Janey Romero Rph.  Medication Therapy Management Provider  [phone]      You may receive a survey about the MT services you received.  I would appreciate your feedback to help me serve you better in the future. Please fill it out and return it when you can. Your comments will be anonymous.                    Follow-ups after your visit        Your next 10 appointments already scheduled     Aug 24, 2018  9:30 AM CDT   Anticoagulation Visit with FM RN VISITS   [facility]    [address] [place] [address], [address]  [facility] [address] [phone]            Oct 15, 2018  1:00 PM CDT   SHORT with Janey Romero RPH   [facility]    [address] [facility] [address] [phone]              Who to contact     If you have questions or need  follow up information about today's clinic visit or your schedule please contact [facility] [place] MTM directly at [phone].  Normal or non-critical lab and imaging results will be communicated to you by MyChart, letter or phone within 4 business days after the clinic has received the results. If you do not hear from us within 7 days, please contact the clinic through MyChart or phone. If you have a critical or abnormal lab result, we will notify you by phone as soon as possible.  Submit refill requests through [employer] or call your pharmacy and they will forward the refill request to us. Please allow 3 business days for your refill to be completed.          Additional Information About Your Visit        [facility] [employer] Information     [employer] gives you secure access to your electronic health record. If you see a primary care provider, you can also send messages to your care team and make appointments. If you have questions, please call your primary care clinic.  If you do not have a primary care provider, please call [phone] and they will assist you.        Care EveryWhere ID     This is your Care EveryWhere ID. This could be used by other organizations to access your [place] medical records  [id number]         Blood Pressure from Last 3 Encounters:   07/30/18 116/56   07/23/18 135/50   07/19/18 128/68    Weight from Last 3 Encounters:   07/30/18 190 lb 3.2 oz (86.3 kg)   07/23/18 191 lb 1.6 oz (86.7 kg)   07/19/18 191 lb 3.2 oz (86.7 kg)              Today, you had the following     No orders found for display       Primary Care Provider Office Phone # Fax #    Venu Maria PA-C [phone]       [address]  [facility] [address]        Goals        General    Medical     Notes - Note created  7/25/2018  9:21 AM by Grisel Martinez, RN    Use COPD action plan to monitor symptoms, If symptoms in the YELLOW zone appear call clinic for recommendations/appointment   As of  today's date 7/25/2018 goal is met at 0 - 25%.   Goal Status:  Active          Equal Access to Services     MARIA ESTHER DMITRY : Hadii carson izaguirre jere Martinez, wastephenda luepifanio, supa ricketts, katrin adanin hayaan shelliemeli winston laMaldonadolatha elizabeth. So [facility] [phone].    ATENCIÓN: Si habla español, tiene a talamantes disposición servicios gratuitos de asistencia lingüística. Llame al [phone].    We comply with applicable federal civil rights laws and [place] laws. We do not discriminate on the basis of race, color, national origin, age, disability, sex, sexual orientation, or gender identity.            Thank you!     Thank you for choosing [facility]  for your care. Our goal is always to provide you with excellent care. Hearing back from our patients is one way we can continue to improve our services. Please take a few minutes to complete the written survey that you may receive in the mail after your visit with us. Thank you!             Your Updated Medication List - Protect others around you: Learn how to safely use, store and throw away your medicines at www.disposemymeds.org.          This list is accurate as of 8/20/18 10:29 AM.  Always use your most recent med list.                   Brand Name Dispense Instructions for use Diagnosis    ascorbic acid 500 MG tablet    VITAMIN C     Take 500 mg by mouth daily.        ASPIRIN EC PO      Take 81 mg by mouth daily        B-12 1000 MCG Caps      Take 1 capsule by mouth daily        blood glucose monitoring meter device kit     1 kit    Use to test blood sugar 1 time daily or as directed.  Ok to substitute alternative if insurance prefers.    Type 2 diabetes mellitus with hyperglycemia, without long-term current use of insulin (H)       Calcium carb-Vitamin D 500 mg [place]-200 units 500-200 MG-UNIT per tablet    OSCAL with D;Oyster Shell Calcium     Take 1 tablet by mouth daily.        ferrous sulfate 325 (65 Fe) MG tablet    IRON     Take 1 tablet by  mouth daily (with breakfast).        FIBERCON 625 MG tablet   Generic drug:  calcium polycarbophil      Take 1 tablet by mouth 2 times daily.        glipiZIDE 10 MG 24 hr tablet    GLUCOTROL XL    180 tablet    Take 1 tablet (10 mg) by mouth 2 times daily Due to see provider in August    Type 2 diabetes mellitus with hyperglycemia, without long-term current use of insulin (H)       hydrochlorothiazide 12.5 MG capsule    MICROZIDE    90 capsule    Take 1 capsule (12.5 mg) by mouth every morning    Edema, unspecified type       levothyroxine 75 MCG tablet    SYNTHROID/LEVOTHROID    30 tablet    Take 1 tablet (75 mcg) by mouth daily    Hypothyroidism, unspecified type       lisinopril 40 MG tablet    PRINIVIL/ZESTRIL    90 tablet    TAKE ONE TABLET BY MOUTH EVERY DAY    Systolic dysfunction, left ventricle, Past myocardial infarction       LOPERAMIDE HCL PO      1/2 to 1 tablet by mouth as needed.        melatonin 3 MG tablet     30 tablet    Take 1 tablet (3 mg) by mouth nightly as needed for sleep    Insomnia, unspecified type       METFORMIN HCL PO      Take 500 mg by mouth 2 times daily (with meals)        metoprolol tartrate 50 MG tablet    LOPRESSOR    180 tablet    TAKE ONE TABLET BY MOUTH TWICE A DAY    Past myocardial infarction, Systolic dysfunction, left ventricle, Atrial tachycardia, paroxysmal (H)       mometasone 220 MCG/INH Inhaler    ASMANEX     Inhale 2 puffs into the lungs daily as needed        pioglitazone 30 MG tablet    ACTOS    90 tablet    TAKE ONE TABLET BY MOUTH EVERY DAY    Type 2 diabetes mellitus with hyperglycemia, without long-term current use of insulin (H)       * PROAIR  (90 Base) MCG/ACT inhaler   Generic drug:  albuterol      Inhale 2 puffs into the lungs every 6 hours as needed Reported on 3/2/2017        * albuterol (2.5 MG/3ML) 0.083% neb solution     1 Box    Take 1 vial (2.5 mg) by nebulization every 6 hours as needed for shortness of breath / dyspnea or wheezing    COPD  exacerbation (H)       QUEtiapine 25 MG tablet    SEROQUEL    60 tablet    Take 1/2 tab at bedtime to sleep, ok to increase by 1/2 tab every 3 nights up to 2 tabs daily if needed.    Insomnia due to other mental disorder       ranitidine 150 MG tablet    ZANTAC    60 tablet    Take 1 tablet (150 mg) by mouth nightly as needed for heartburn    Hiatal hernia       simvastatin 20 MG tablet    ZOCOR    90 tablet    TAKE ONE TABLET BY MOUTH AT BEDTIME. NEEDS APPOINTMENT    Hyperlipidemia LDL goal <100       tiotropium 18 MCG capsule    SPIRIVA     Inhale 18 mcg into the lungs daily.        TYLENOL PM EXTRA STRENGTH  MG tablet   Generic drug:  diphenhydrAMINE-acetaminophen      Take 2 tablets by mouth At Bedtime        VITAMIN D (CHOLECALCIFEROL) PO      Take 2,000 Units by mouth daily        WARFARIN SODIUM PO      Take by mouth daily 4.5mg=Tue and Fri, 3mg=ROW        * Notice:  This list has 2 medication(s) that are the same as other medications prescribed for you. Read the directions carefully, and ask your doctor or other care provider to review them with you.

## 2018-08-20 NOTE — Clinical Note
Venu--erik-- hadley reports she is sleeping better but feeling a little drunk like when she awakens in am . Will have her fiddle with dose and see how things go --overall I think so far its working to help her obtain better sleep.  -Janey

## 2018-08-20 NOTE — PATIENT INSTRUCTIONS
Recommendations from today's MTM visit:                                                        1. FYI--sleep is better on Quetiapine -most days 25mg./day , but when you need to get up for an eye appt. --only take 12.5mg at bedtime the night before.         Next MTM visit:  See me for follow-up--October 15th .     To schedule another MTM appointment, please call the clinic directly or you may call the MTM scheduling line at [phone] or toll-free at [phone].     My Clinical Pharmacist's contact information:                                                      It was a pleasure seeing you today!  Please feel free to contact me with any questions or concerns you have.      Janey Romero Rph.  Medication Therapy Management Provider  [phone]      You may receive a survey about the MTM services you received.  I would appreciate your feedback to help me serve you better in the future. Please fill it out and return it when you can. Your comments will be anonymous.

## 2018-08-24 ENCOUNTER — ANTICOAGULATION THERAPY VISIT (OUTPATIENT)
Dept: NURSING | Facility: CLINIC | Age: 83
End: 2018-08-24
Payer: COMMERCIAL

## 2018-08-24 DIAGNOSIS — I48.20 CHRONIC ATRIAL FIBRILLATION (H): ICD-10-CM

## 2018-08-24 DIAGNOSIS — Z79.01 LONG-TERM (CURRENT) USE OF ANTICOAGULANTS: ICD-10-CM

## 2018-08-24 LAB — INR POINT OF CARE: 3 (ref 0.86–1.14)

## 2018-08-24 PROCEDURE — 99207 ZZC NO CHARGE NURSE ONLY: CPT

## 2018-08-24 PROCEDURE — 36416 COLLJ CAPILLARY BLOOD SPEC: CPT

## 2018-08-24 PROCEDURE — 85610 PROTHROMBIN TIME: CPT | Mod: QW

## 2018-08-24 RX ORDER — WARFARIN SODIUM 3 MG/1
TABLET ORAL
Qty: 90 TABLET | Refills: 1 | Status: SHIPPED | OUTPATIENT
Start: 2018-08-24 | End: 2019-02-07

## 2018-08-24 NOTE — PROGRESS NOTES
ANTICOAGULATION FOLLOW-UP CLINIC VISIT    Patient Name:  Maryalice Rebecca Wachter  Date:  8/24/2018  Contact Type:  Face to Face    SUBJECTIVE:     Patient Findings     Positives Change in medications    Comments Patient started taking Seroquel 25mg at bedtime 1 week ago.           OBJECTIVE    INR Protime   Date Value Ref Range Status   08/24/2018 3.0 (A) 0.86 - 1.14 Final       ASSESSMENT / PLAN  No question data found.  Anticoagulation Summary as of 8/24/2018     INR goal 2.0-3.0   Today's INR 3.0   Warfarin maintenance plan 4.5 mg (3 mg x 1.5) on Tue; 3 mg (3 mg x 1) all other days   Full warfarin instructions 4.5 mg on Tue; 3 mg all other days   Weekly warfarin total 22.5 mg   Plan last modified Cyndi Cabello RN (8/24/2018)   Next INR check 9/7/2018   Priority INR   Target end date     Indications   Long-term (current) use of anticoagulants [Z79.01] [Z79.01]  Atrial fibrillation (H) [I48.91]         Anticoagulation Episode Summary     INR check location     Preferred lab     Send INR reminders to  TRIAGE POOL    Comments       Anticoagulation Care Providers     Provider Role Specialty Phone number    Venu Maria PA-C Responsible Physician Assistant - Medical [phone]            See the Encounter Report to view Anticoagulation Flowsheet and Dosing Calendar (Go to Encounters tab in chart review, and find the Anticoagulation Therapy Visit)    Dosage adjustment made based on physician directed care plan.    Cyndi Cabello RN

## 2018-08-24 NOTE — MR AVS SNAPSHOT
Maryalice Rebecca Wachter   8/24/2018 9:30 AM   Anticoagulation Therapy Visit    Description:  84 year old female   Provider:  FM RN VISITS   Department:  Fm Nurse           INR as of 8/24/2018     Today's INR 3.0      Anticoagulation Summary as of 8/24/2018     INR goal 2.0-3.0   Today's INR 3.0   Full warfarin instructions 4.5 mg on Tue; 3 mg all other days   Next INR check 9/7/2018    Indications   Long-term (current) use of anticoagulants [Z79.01] [Z79.01]  Atrial fibrillation (H) [I48.91]         Your next Anticoagulation Clinic appointment(s)     Sep 07, 2018  9:30 AM CDT   [facility]agulation [facility] with FM RN VISITS   [facility]    [address] [place] [address], [facility] [address]  [facility] [address] [phone]              Contact Numbers     Clinic Number:         August 2018 Details    Sun Mon Tue Wed Thu Fri Sat        1               2               3               4                 5               6               7               8               9               10               11                 12               13               14               15               16               17               18                 19               20               21               22               23               24      3 mg   See details      25      3 mg           26      3 mg         27      3 mg         28      4.5 mg         29      3 mg         30      3 mg         31      3 mg           Date Details   08/24 This INR check               How to take your warfarin dose     To take:  3 mg Take 1 of the 3 mg tablets.    To take:  4.5 mg Take 1.5 of the 3 mg tablets.           September 2018 Details    Sun Mon Tue Wed Thu Fri Sat           1      3 mg           2      3 mg         3      3 mg         4      4.5 mg         5      3 mg         6      3 mg         7            8                 9               10               11               12               13               14                15                 16               17               18               19               20               21               22                 23               24               25               26               27               28               29                 30                      Date Details   No additional details    Date of next INR:  9/7/2018         How to take your warfarin dose     To take:  3 mg Take 1 of the 3 mg tablets.    To take:  4.5 mg Take 1.5 of the 3 mg tablets.

## 2018-09-07 ENCOUNTER — ANTICOAGULATION THERAPY VISIT (OUTPATIENT)
Dept: NURSING | Facility: CLINIC | Age: 83
End: 2018-09-07
Payer: COMMERCIAL

## 2018-09-07 DIAGNOSIS — Z79.01 LONG-TERM (CURRENT) USE OF ANTICOAGULANTS: ICD-10-CM

## 2018-09-07 DIAGNOSIS — Z23 NEED FOR PROPHYLACTIC VACCINATION AND INOCULATION AGAINST INFLUENZA: Primary | ICD-10-CM

## 2018-09-07 DIAGNOSIS — I48.20 CHRONIC ATRIAL FIBRILLATION (H): ICD-10-CM

## 2018-09-07 LAB — INR POINT OF CARE: 2.6 (ref 0.86–1.14)

## 2018-09-07 PROCEDURE — G0008 ADMIN INFLUENZA VIRUS VAC: HCPCS

## 2018-09-07 PROCEDURE — 99207 ZZC NO CHARGE NURSE ONLY: CPT

## 2018-09-07 PROCEDURE — 90662 IIV NO PRSV INCREASED AG IM: CPT

## 2018-09-07 PROCEDURE — 36416 COLLJ CAPILLARY BLOOD SPEC: CPT

## 2018-09-07 PROCEDURE — 85610 PROTHROMBIN TIME: CPT | Mod: QW

## 2018-09-07 NOTE — PROGRESS NOTES
ANTICOAGULATION FOLLOW-UP CLINIC VISIT    Patient Name:  Maryalice Rebecca Wachter  Date:  9/7/2018  Contact Type:  Face to Face    SUBJECTIVE:     Patient Findings     Positives No Problem Findings           OBJECTIVE    INR Protime   Date Value Ref Range Status   09/07/2018 2.6 (A) 0.86 - 1.14 Final       ASSESSMENT / PLAN  No question data found.  Anticoagulation Summary as of 9/7/2018     INR goal 2.0-3.0   Today's INR 2.6   Warfarin maintenance plan 4.5 mg (3 mg x 1.5) on Tue; 3 mg (3 mg x 1) all other days   Full warfarin instructions 4.5 mg on Tue; 3 mg all other days   Weekly warfarin total 22.5 mg   No change documented Cyndi Cabello RN   Plan last modified Cyndi Cabello RN (8/24/2018)   Next INR check 10/5/2018   Priority INR   Target end date     Indications   Long-term (current) use of anticoagulants [Z79.01] [Z79.01]  Atrial fibrillation (H) [I48.91]         Anticoagulation Episode Summary     INR check location     Preferred lab     Send INR reminders to  TRIAGE POOL    Comments       Anticoagulation Care Providers     Provider Role Specialty Phone number    Venu Maria PA-C Responsible Physician Assistant - Medical [phone]            See the Encounter Report to view Anticoagulation Flowsheet and Dosing Calendar (Go to Encounters tab in chart review, and find the Anticoagulation Therapy Visit)      Cyndi Cabello RN                 Injectable Influenza Immunization Documentation    1.  Is the person to be vaccinated sick today?   No    2. Does the person to be vaccinated have an allergy to a component   of the vaccine?   No  Egg Allergy Algorithm Link    3. Has the person to be vaccinated ever had a serious reaction   to influenza vaccine in the past?   No    4. Has the person to be vaccinated ever had Guillain-Barré syndrome?   No    Form completed by Cyndi Cabello RN

## 2018-09-07 NOTE — MR AVS SNAPSHOT
Sindi Beachchter   9/7/2018 9:30 AM   Anticoagulation Therapy Visit    Description:  84 year old female   Provider:  FM RN VISITS   Department:  Fm Nurse           INR as of 9/7/2018     Today's INR 2.6      Anticoagulation Summary as of 9/7/2018     INR goal 2.0-3.0   Today's INR 2.6   Full warfarin instructions 4.5 mg on Tue; 3 mg all other days   Next INR check 10/5/2018    Indications   Long-term (current) use of anticoagulants [Z79.01] [Z79.01]  Atrial fibrillation (H) [I48.91]         Your next Anticoagulation Clinic appointment(s)     Oct 05, 2018  9:30 AM CDT   Anticoagulation Visit with FM RN VISITS   [facility]    [address] [place] [address], [facility] [address]  [facility] [address] [phone]              Contact Numbers     Clinic Number:         September 2018 Details    Sun Mon Tue Wed Thu Fri Sat           1                 2               3               4               5               6               7      3 mg   See details      8      3 mg           9      3 mg         10      3 mg         11      4.5 mg         12      3 mg         13      3 mg         14      3 mg         15      3 mg           16      3 mg         17      3 mg         18      4.5 mg         19      3 mg         20      3 mg         21      3 mg         22      3 mg           23      3 mg         24      3 mg         25      4.5 mg         26      3 mg         27      3 mg         28      3 mg         29      3 mg           30      3 mg                Date Details   09/07 This INR check               How to take your warfarin dose     To take:  3 mg Take 1 of the 3 mg tablets.    To take:  4.5 mg Take 1.5 of the 3 mg tablets.           October 2018 Details    Sun Mon Tue Wed Thu Fri Sat      1      3 mg         2      4.5 mg         3      3 mg         4      3 mg         5            6                 7               8               9               10               11                12               13                 14               15               16               17               18               19               20                 21               22               23               24               25               26               27                 28               29               30               31                   Date Details   No additional details    Date of next INR:  10/5/2018         How to take your warfarin dose     To take:  3 mg Take 1 of the 3 mg tablets.    To take:  4.5 mg Take 1.5 of the 3 mg tablets.

## 2018-09-13 DIAGNOSIS — E78.5 HYPERLIPIDEMIA LDL GOAL <100: ICD-10-CM

## 2018-09-13 NOTE — TELEPHONE ENCOUNTER
"Requested Prescriptions   Pending Prescriptions Disp Refills     simvastatin (ZOCOR) 20 MG tablet [Pharmacy Med Name: SIMVASTATIN 20MG TABS] 90 tablet 0    Last Written Prescription Date:  6/7/18  Last Fill Quantity: 90,  # refills: 0   Last Office Visit: 7/30/2018   Future Office Visit:    Next 5 appointments (look out 90 days)     Oct 15, 2018  1:00 PM CDT   SHORT with Janey Romero RPH   [facility] ([facility])    [address] [facility] [id number] [phone]                  Sig: TAKE ONE TABLET BY MOUTH AT BEDTIME    Statins Protocol Passed    9/13/2018  9:14 AM       Passed - LDL on file in past 12 months    Recent Labs   Lab Test  08/03/18   0913   LDL  27            Passed - No abnormal creatine kinase in past 12 months    No lab results found.            Passed - Recent (12 mo) or future (30 days) visit within the authorizing provider's specialty    Patient had office visit in the last 12 months or has a visit in the next 30 days with authorizing provider or within the authorizing provider's specialty.  See \"Patient Info\" tab in inbasket, or \"Choose Columns\" in Meds & Orders section of the refill encounter.           Passed - Patient is age 18 or older       Passed - No active pregnancy on record       Passed - No positive pregnancy test in past 12 months          "

## 2018-09-18 RX ORDER — SIMVASTATIN 20 MG
TABLET ORAL
Qty: 90 TABLET | Refills: 3 | Status: SHIPPED | OUTPATIENT
Start: 2018-09-18 | End: 2019-09-05

## 2018-09-18 NOTE — TELEPHONE ENCOUNTER
Prescription approved per [facility] Refill Protocol.    Radha Valle RN -- [facility]

## 2018-09-20 ENCOUNTER — PATIENT OUTREACH (OUTPATIENT)
Dept: CARE COORDINATION | Facility: CLINIC | Age: 83
End: 2018-09-20

## 2018-09-21 NOTE — PROGRESS NOTES
Clinic Care Coordination Contact    Situation: Patient chart reviewed by care coordinator.    Background: COPD     Assessment: patient has been lost to timely follow up - no further hospitalizations since June     Plan/Recommendations: closing care coordination at this time - please place new referral if needs arise     Griselsa Martinez Care Coordinator RN  [facility]  [phone]  September 20, 2018

## 2018-10-01 DIAGNOSIS — J44.1 COPD EXACERBATION (H): ICD-10-CM

## 2018-10-01 RX ORDER — ALBUTEROL SULFATE 0.83 MG/ML
SOLUTION RESPIRATORY (INHALATION)
Qty: 180 ML | Refills: 1 | Status: SHIPPED | OUTPATIENT
Start: 2018-10-01 | End: 2019-01-09

## 2018-10-01 NOTE — TELEPHONE ENCOUNTER
"Albuterol Neb    Last Written Prescription Date:  7/24/2018  Last Fill Quantity: 1 box,  # refills: 0   Last office visit: 7/30/2018 with prescribing provider:     Future Office Visit:   Next 5 appointments (look out 90 days)     Oct 15, 2018  1:00 PM CDT   SHORT with Janey Romero RPH   [facility] ([facility])    [address] [facility] [address] [phone]                 Requested Prescriptions   Pending Prescriptions Disp Refills     albuterol (2.5 MG/3ML) 0.083% neb solution [Pharmacy Med Name: ALBUTEROL SULFATE 2.5 MG/3ML NEBU] 180 mL 0     Sig: INHALE CONTENTS OF 1 VIAL (2.5MG) VIA NEBULIZER EVERY 6 HOURS AS NEEDED FOR SHORTNESS OF BREATH / DYSPNEA OR WHEEZING    Asthma Maintenance Inhalers - Anticholinergics Passed    10/1/2018  9:51 AM       Passed - Patient is age 12 years or older       Passed - Recent (12 mo) or future (30 days) visit within the authorizing provider's specialty    Patient had office visit in the last 12 months or has a visit in the next 30 days with authorizing provider or within the authorizing provider's specialty.  See \"Patient Info\" tab in inbasket, or \"Choose Columns\" in Meds & Orders section of the refill encounter.          Spirometry done 1/11/2018.  Cyndi Cabello RN  "

## 2018-10-04 DIAGNOSIS — I47.19 ATRIAL TACHYCARDIA, PAROXYSMAL (H): ICD-10-CM

## 2018-10-04 DIAGNOSIS — I25.2 PAST MYOCARDIAL INFARCTION: ICD-10-CM

## 2018-10-04 DIAGNOSIS — I51.9 SYSTOLIC DYSFUNCTION, LEFT VENTRICLE: ICD-10-CM

## 2018-10-04 RX ORDER — METOPROLOL TARTRATE 50 MG
TABLET ORAL
Qty: 180 TABLET | Refills: 1 | Status: SHIPPED | OUTPATIENT
Start: 2018-10-04 | End: 2019-04-04

## 2018-10-04 NOTE — TELEPHONE ENCOUNTER
"Metoprolol 50mg    Last Written Prescription Date:  7/6/2018  Last Fill Quantity: 180,  # refills: 0   Last office visit: 7/30/2018 with prescribing provider:     Future Office Visit:   Next 5 appointments (look out 90 days)     Oct 15, 2018  1:00 PM CDT   SHORT with Janey Romero RPH   [facility] ([facility])    [address] [facility] [address] [phone]                 Requested Prescriptions   Pending Prescriptions Disp Refills     metoprolol tartrate (LOPRESSOR) 50 MG tablet [Pharmacy Med Name: METOPROLOL TARTRATE 50MG TABS] 180 tablet 0     Sig: TAKE ONE TABLET BY MOUTH TWICE A DAY    Beta-Blockers Protocol Passed    10/4/2018  9:18 AM       Passed - Blood pressure under 140/90 in past 12 months    BP Readings from Last 3 Encounters:   07/30/18 116/56   07/23/18 135/50   07/19/18 128/68                Passed - Patient is age 6 or older       Passed - Recent (12 mo) or future (30 days) visit within the authorizing provider's specialty    Patient had office visit in the last 12 months or has a visit in the next 30 days with authorizing provider or within the authorizing provider's specialty.  See \"Patient Info\" tab in inbasket, or \"Choose Columns\" in Meds & Orders section of the refill encounter.            Prescription approved per [facility] Refill Protocol.  Cyndi Cabello RN  "

## 2018-10-05 ENCOUNTER — ANTICOAGULATION THERAPY VISIT (OUTPATIENT)
Dept: NURSING | Facility: CLINIC | Age: 83
End: 2018-10-05
Payer: COMMERCIAL

## 2018-10-05 DIAGNOSIS — I48.20 CHRONIC ATRIAL FIBRILLATION (H): ICD-10-CM

## 2018-10-05 LAB — INR POINT OF CARE: 2.7 (ref 0.86–1.14)

## 2018-10-05 PROCEDURE — 99207 ZZC NO CHARGE NURSE ONLY: CPT

## 2018-10-05 PROCEDURE — 85610 PROTHROMBIN TIME: CPT | Mod: QW

## 2018-10-05 PROCEDURE — 36416 COLLJ CAPILLARY BLOOD SPEC: CPT

## 2018-10-05 NOTE — MR AVS SNAPSHOT
Maryalice Rebecca Wachter   10/5/2018 9:30 AM   Anticoagulation Therapy Visit    Description:  84 year old female   Provider:  FM RN VISITS   Department:  Fm Nurse           INR as of 10/5/2018     Today's INR 2.7      Anticoagulation Summary as of 10/5/2018     INR goal 2.0-3.0   Today's INR 2.7   Full warfarin instructions 4.5 mg on Tue; 3 mg all other days   Next INR check 11/16/2018    Indications   Long-term (current) use of anticoagulants [Z79.01] [Z79.01]  Atrial fibrillation (H) [I48.91]         Your next Anticoagulation Clinic appointment(s)     Nov 16, 2018  9:30 AM CST   Anticoagulation Visit with FM RN VISITS   [facility] ([facility])    [address] [place] [address] [address]  [facility] [address] [phone]              Contact Numbers     Clinic Number:         October 2018 Details    Sun Mon Tue Wed Thu Fri Sat      1               2               3               4               5      3 mg   See details      6      3 mg           7      3 mg         8      3 mg         9      4.5 mg         10      3 mg         11      3 mg         12      3 mg         13      3 mg           14      3 mg         15      3 mg         16      4.5 mg         17      3 mg         18      3 mg         19      3 mg         20      3 mg           21      3 mg         22      3 mg         23      4.5 mg         24      3 mg         25      3 mg         26      3 mg         27      3 mg           28      3 mg         29      3 mg         30      4.5 mg         31      3 mg             Date Details   10/05 This INR check               How to take your warfarin dose     To take:  3 mg Take 1 of the 3 mg tablets.    To take:  4.5 mg Take 1.5 of the 3 mg tablets.           November 2018 Details    Sun Mon Tue Wed Thu Fri Sat         1      3 mg         2      3 mg         3      3 mg           4      3 mg         5      3 mg         6      4.5 mg         7      3 mg         8      3 mg          9      3 mg         10      3 mg           11      3 mg         12      3 mg         13      4.5 mg         14      3 mg         15      3 mg         16            17                 18               19               20               21               22               23               24                 25               26               27               28               29               30                 Date Details   No additional details    Date of next INR:  11/16/2018         How to take your warfarin dose     To take:  3 mg Take 1 of the 3 mg tablets.    To take:  4.5 mg Take 1.5 of the 3 mg tablets.

## 2018-10-05 NOTE — PROGRESS NOTES
ANTICOAGULATION FOLLOW-UP CLINIC VISIT    Patient Name:  Maryalice Rebecca Wachter  Date:  10/5/2018  Contact Type:  Face to Face    SUBJECTIVE:     Patient Findings     Positives No Problem Findings           OBJECTIVE    INR Protime   Date Value Ref Range Status   10/05/2018 2.7 (A) 0.86 - 1.14 Final       ASSESSMENT / PLAN  No question data found.  Anticoagulation Summary as of 10/5/2018     INR goal 2.0-3.0   Today's INR 2.7   Warfarin maintenance plan 4.5 mg (3 mg x 1.5) on Tue; 3 mg (3 mg x 1) all other days   Full warfarin instructions 4.5 mg on Tue; 3 mg all other days   Weekly warfarin total 22.5 mg   No change documented Cyndi Cabello, JANNETTE   Plan last modified Cyndi Cabello RN (8/24/2018)   Next INR check 11/16/2018   Priority INR   Target end date     Indications   Long-term (current) use of anticoagulants [Z79.01] [Z79.01]  Atrial fibrillation (H) [I48.91]         Anticoagulation Episode Summary     INR check location     Preferred lab     Send INR reminders to  TRIAGE POOL    Comments       Anticoagulation Care Providers     Provider Role Specialty Phone number    Venu Maria PA-C Responsible Physician Assistant - Medical [phone]            See the Encounter Report to view Anticoagulation Flowsheet and Dosing Calendar (Go to Encounters tab in chart review, and find the Anticoagulation Therapy Visit)      Cyndi Cabello RN

## 2018-10-15 ENCOUNTER — OFFICE VISIT (OUTPATIENT)
Dept: PHARMACY | Facility: CLINIC | Age: 83
End: 2018-10-15
Payer: COMMERCIAL

## 2018-10-15 VITALS
BODY MASS INDEX: 32.52 KG/M2 | WEIGHT: 195.4 LBS | HEART RATE: 97 BPM | OXYGEN SATURATION: 90 % | DIASTOLIC BLOOD PRESSURE: 64 MMHG | SYSTOLIC BLOOD PRESSURE: 104 MMHG

## 2018-10-15 DIAGNOSIS — E03.9 HYPOTHYROIDISM, UNSPECIFIED TYPE: ICD-10-CM

## 2018-10-15 DIAGNOSIS — F99 INSOMNIA DUE TO OTHER MENTAL DISORDER: ICD-10-CM

## 2018-10-15 DIAGNOSIS — G47.00 INSOMNIA, UNSPECIFIED TYPE: ICD-10-CM

## 2018-10-15 DIAGNOSIS — E11.65 TYPE 2 DIABETES MELLITUS WITH HYPERGLYCEMIA, WITHOUT LONG-TERM CURRENT USE OF INSULIN (H): ICD-10-CM

## 2018-10-15 DIAGNOSIS — F51.05 INSOMNIA DUE TO OTHER MENTAL DISORDER: ICD-10-CM

## 2018-10-15 DIAGNOSIS — E11.65 TYPE 2 DIABETES MELLITUS WITH HYPERGLYCEMIA, WITHOUT LONG-TERM CURRENT USE OF INSULIN (H): Primary | ICD-10-CM

## 2018-10-15 DIAGNOSIS — D50.9 IRON DEFICIENCY ANEMIA, UNSPECIFIED IRON DEFICIENCY ANEMIA TYPE: ICD-10-CM

## 2018-10-15 DIAGNOSIS — E78.5 HYPERLIPIDEMIA LDL GOAL <100: ICD-10-CM

## 2018-10-15 DIAGNOSIS — I10 ESSENTIAL HYPERTENSION: ICD-10-CM

## 2018-10-15 DIAGNOSIS — J44.1 COPD EXACERBATION (H): ICD-10-CM

## 2018-10-15 DIAGNOSIS — I48.20 CHRONIC ATRIAL FIBRILLATION (H): ICD-10-CM

## 2018-10-15 LAB
ERYTHROCYTE [DISTWIDTH] IN BLOOD BY AUTOMATED COUNT: 15.4 % (ref 10–15)
HBA1C MFR BLD: 7.2 % (ref 0–5.6)
HCT VFR BLD AUTO: 37.1 % (ref 35–47)
HGB BLD-MCNC: 11.5 G/DL (ref 11.7–15.7)
MCH RBC QN AUTO: 30.8 PG (ref 26.5–33)
MCHC RBC AUTO-ENTMCNC: 31 G/DL (ref 31.5–36.5)
MCV RBC AUTO: 100 FL (ref 78–100)
PLATELET # BLD AUTO: 288 10E9/L (ref 150–450)
RBC # BLD AUTO: 3.73 10E12/L (ref 3.8–5.2)
WBC # BLD AUTO: 6.8 10E9/L (ref 4–11)

## 2018-10-15 PROCEDURE — 99607 MTMS BY PHARM ADDL 15 MIN: CPT | Performed by: PHARMACIST

## 2018-10-15 PROCEDURE — 85027 COMPLETE CBC AUTOMATED: CPT | Performed by: PHYSICIAN ASSISTANT

## 2018-10-15 PROCEDURE — 36415 COLL VENOUS BLD VENIPUNCTURE: CPT | Performed by: PHYSICIAN ASSISTANT

## 2018-10-15 PROCEDURE — 99606 MTMS BY PHARM EST 15 MIN: CPT | Performed by: PHARMACIST

## 2018-10-15 PROCEDURE — 83036 HEMOGLOBIN GLYCOSYLATED A1C: CPT | Performed by: PHYSICIAN ASSISTANT

## 2018-10-15 RX ORDER — HYDROCHLOROTHIAZIDE 25 MG/1
25 TABLET ORAL DAILY
COMMUNITY
End: 2019-04-15

## 2018-10-15 NOTE — PROGRESS NOTES
SUBJECTIVE/OBJECTIVE:                                                    Maryalice Rebecca Wachter is a 84 year old female coming in for a follow up visit (4-16-18)  for Medication Therapy Management.  She was referred to me from her PCP- Venu Maria.     Chief Complaint: cold all the time, didn't sleep well last night but its actually been better lately  but feet very cold --makes sleep more difficult. She gets riled up -watched 60 minutes last night and trump was on there.   Here also for a1c and hgb lab rechecks today .       Personal Healthcare Goals: fix diarrhea issues, timing of meds, get BP wnl.     Allergies/ADRs: Reviewed in Epic  Tobacco: No tobacco use   Alcohol: not currently using--very rare   Caffeine: decaff in am 1-2 cups/day of coffee; pm -tea a little bit.   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 Cannot exercise due to diarrhea issues --would like to go on walks with her hubby but has to have BM within 1 block of leaving.   PMH: Reviewed in [facility]; [facility] stated she has sarcoidosis?    Medication Adherence: No issues found today    IBS-diarrhea: patient uses prn loperamide --works well.     GERD:  She has a hiatal hernia -surgery not recommended.  Taking omeprazole 20 mg. qam .   Has never tried going off ppi --interested in doing so.     Diabetes:  Pt currently taking Glipizide er 10mg. bid, actos 30mg qday, Metformin-1 x500mg bid. Pt is experiencing the following side effects: mild loose stools from metformin --controlled with loperamide prn quite well.             Symptoms of low blood sugar? none. Frequency of hypoglycemia? never.  Recent symptoms of high blood sugar? fatigue  Eye exam: up to date  Foot exam: up to date  Microalbumin is < 30 mg/g. Pt is taking an ACEi/ARB.  Aspirin: Taking 81mg daily and denies side effects  Diet/Exercise: diet is ok but likes carbs, rice , chinese , no exercise.   She is eating low carb but not enjoying foods and is struggling with lack of  bs control with no sugar in diet.But she is determined to get that a1c back down.     Lab Results   Component Value Date    A1C 7.2 10/15/2018    A1C 7.4 07/21/2018    A1C 7.4 02/09/2018    A1C 7.4 09/25/2017    A1C 7.8 06/05/2017         Depression:  Current medications include: None--she stopped prozac-see above  . Pt reports that depression symptoms are  . Current depression symptoms include: lacks motivation or energy to do anything . Patient reports the following stressors: chronic health condition, lack of fun hubby to travel with . Therapies tried and response: Bupropion once daily-did nothing for her , prozac made her too antsy.  Notes cannot vacation due to hubby is a stick in the mud --no fun! He only wants to visit relatives.       Vitamin D3: level was 21 on  --she takes otc daily dose of 2,000 iu's now.      Vitamin B12: level was 493 on 10-10-16. She takes daily otc dose now.     Hypothyroidism: Patient is taking levothyroxine 100 mcg daily. She knows to take it on an empty stomach with her omeprazole in the morning.    Hypertension: Current medications include : HCTZ  25mg./day(her phcy. misfilled her previous hydrochlorothiazide of 12.5mg capsule with the 25mg tabs #90-so she is taking them until they run out -but feet less LLE on the 25mg -she prefers this now and wont worry about more frequent urination. ),  lisinopril 40mg-qam. And metoprolol 50mg bid-am and dinnertime.  Patient does  self-monitor BP.  Yes see below:  Patient side effects :  None now.   Range of home bp's = 115-143 --95% of readings <140/60-70.    Look good . She recognizes when in afib--bp erratic and pulse increases to 90+ .    BP Readings from Last 3 Encounters:   10/15/18 104/64   07/30/18 116/56   07/23/18 135/50     Iron deficiency anemia:  Previous hgb was 10.2 last summer --she has been on 2 x325mg iron tabs daily --still feels cold --wants lab recheck today .  We discussed cold feet at night making it more  difficult to sleep--mtm suggested she wear nice warm socks to bed. Then feet get too warm --OMG !!      COPD: Patient states she feels well controlled. Patient uses her inhalers more often when she gets sick and she always carries her rescue inhaler with her. Patient states that when she forgets it she becomes very worried and panicked.   She carries inhalers albuterol with her , uses the albuterol in nebulizer bid most days of the week--she feels it works better than the inhalers.   asmanex daily at hs . spiriva qam--takes everyother day due to cost.   Clinic oxygen at 90% today --she states she is fine.     Insomnia: Current medications include: quetiapine 12.5-37.5mg /night --works well --she just fiddles with dose depending on how riled up trump gets her.       /64  Pulse 97  Wt 195 lb 6.4 oz (88.6 kg)  SpO2 90%  BMI 32.52 kg/m2      ASSESSMENT:                                                       Current medications were reviewed with her today.     Medication Adherence: No problems identified    IBS-Diarrhea: Stable.     GERD: Stable.  Current treatment is effective. Chronic PPI use places patient at an increased risk of C. Diff, hypomagnesemia and lower bone mineral density, these risks were reviewed with the patient.  Pt would benefit from the following changes: taper off proton pump inhibitor.(see plan for details).      Diabetes: Stable. Patient is meeting A1c goal of < 8%. Self monitoring of blood glucose is at goal of fasting  mg/dL. Pt would benefit from minimum SMBG: Check blood sugars fasting, and occasionally 2 hours after starting a meal.  Metformin :  stay on the same dose.  SFU (Glipizide) :  stay on the same dose.  Actos:  stay on the same dose.  Increased exercise- as tolerated.   Updated Hemoglobin A1c- 7.2% .  Weight loss recommended--keep low carb diet .     Depression: Improved. Patient would benefit from no meds today --she will work on doing things that make her feel  happy.       Vitamin B12: is not at goal of 600-1000pg/mL. Pt would benefit from vitamin B12 lab recheck in 12 months.    Vitamin D3: is not at goal of 50-75ng/mL. Pt would benefit from vitamin D3 lab recheck in 12 months.    Hypothyroidism: TSH wnl.    Hypertension: Stable. Patient is meeting BP goal of < 140/90mmHg.     Iron deficiency anemia:  hgb 11.5 today --improved --stay on 2 tabs for now. Cold feeling probably from asa/warfarin combo + colder weather now.     COPD/Asthma: Stable.     Insomnia: stable. Pt may benefit from no changes.      PLAN:                                                        1. FYI--Please go to lab today for iron recheck and A1c (diabetes lab recheck). Lets see where your iron level is at --may be why your cold all the time.   Congratulations--your A1c today is excellent at 7.2% and your iron level (hemoglobin) is quite normal now ay 11.5 . Stay on all your current medications --they are working well for you.     2. FYi-Please wear warmer socks to bed.     3. FYI--Your blood pressure is excellent today 104/64mmhg --ok to stay on your hydrochlorothiazide water pill at 25mg. Daily in AM.         Next MTM visit:  See me in 6 months -Monday April 15th -2019 at 1pm.     To schedule another MTM appointment, please call the clinic directly or you may call the MTM scheduling line at [phone] or toll-free at [phone].     My Clinical Pharmacist's contact information:                                                      It was a pleasure seeing you today!  Please feel free to contact me with any questions or concerns you have.      Janey Romero Rph.  Medication Therapy Management Provider  [phone]

## 2018-10-15 NOTE — PATIENT INSTRUCTIONS
Recommendations from today's MTM visit:                                                        1. FYI--Please go to lab today for iron recheck and A1c (diabetes lab recheck). Lets see where your iron level is at --may be why your cold all the time.   Congratulations--your A1c today is excellent at 7.2% and your iron level (hemoglobin) is quite normal now ay 11.5 . Stay on all your current medications --they are working well for you.     2. FYi-Please wear warmer socks to bed.     3. FYI--Your blood pressure is excellent today 104/64mmhg --ok to stay on your hydrochlorothiazide water pill at 25mg. Daily in AM.         Next MTM visit:  See me in 6 months -Monday April 15th -2019 at 1pm.     To schedule another MTM appointment, please call the clinic directly or you may call the MTM scheduling line at [phone] or toll-free at [phone].     My Clinical Pharmacist's contact information:                                                      It was a pleasure seeing you today!  Please feel free to contact me with any questions or concerns you have.      Janey Romero [facility].  Medication Therapy Management Provider  [phone]      You may receive a survey about the MTM services you received.  I would appreciate your feedback to help me serve you better in the future. Please fill it out and return it when you can. Your comments will be anonymous.

## 2018-10-15 NOTE — MR AVS SNAPSHOT
After Visit Summary   10/15/2018    Maryalice Rebecca Wachter    MRN: [id number]           Patient Information     Date Of Birth          11/11/1933        Visit Information        Provider Department      10/15/2018 1:00 PM Janey Romero RPH [facility] MTM        Today's Diagnoses     Type 2 diabetes mellitus with hyperglycemia, without long-term current use of insulin (H)    -  1    Iron deficiency anemia, unspecified iron deficiency anemia type          Care Instructions    Recommendations from today's MT visit:                                                        1. FYI--Please go to lab today for iron recheck and A1c (diabetes lab recheck). Lets see where your iron level is at --may be why your cold all the time.     2. FYi-Please wear warmer socks to bed.     3. FYI--Your blood pressure is excellent today 104/64mmhg --ok to stay on your hydrochlorothiazide water pill at 25mg. Daily in AM.         Next MTM visit:  See me in 6 months -Monday April 15th -2019 at 1pm.     To schedule another MTM appointment, please call the clinic directly or you may call the MTM scheduling line at [phone] or toll-free at [phone].     My Clinical Pharmacist's contact information:                                                      It was a pleasure seeing you today!  Please feel free to contact me with any questions or concerns you have.      Janey Romero Rph.  Medication Therapy Management Provider  [phone]      You may receive a survey about the [facility] services you received.  I would appreciate your feedback to help me serve you better in the future. Please fill it out and return it when you can. Your comments will be anonymous.                    Follow-ups after your visit        Your next 10 appointments already scheduled     Oct 15, 2018  2:00 PM CDT   LAB with CR LAB   [facility] ([facility])    [address]  [facility] [id number] [phone]           Please do not eat 10-12 hours before your appointment if you are coming in fasting for labs on lipids, cholesterol, or glucose (sugar). This does not apply to pregnant women. Water, hot tea and black coffee (with nothing added) are okay. Do not drink other fluids, diet soda or chew gum.            Nov 16, 2018  9:30 AM CST   Anticoagulation Visit with FM RN VISITS   [facility]    [address] [place] [address], [address]  [facility] [id number] [phone]            Apr 15, 2019  1:00 PM CDT   SHORT with Janey Romero RPH   [facility] ([facility])    [address] [address] [phone]              Future tests that were ordered for you today     Open Future Orders        Priority Expected Expires Ordered    CBC with platelets Routine  12/15/2018 10/15/2018    Hemoglobin A1c Routine  12/15/2018 10/15/2018            Who to contact     If you have questions or need follow up information about today's clinic visit or your schedule please contact [facility] directly at [phone].  Normal or non-critical lab and imaging results will be communicated to you by [employer], letter or phone within 4 business days after the clinic has received the results. If you do not hear from us within 7 days, please contact the clinic through [employer] or phone. If you have a critical or abnormal lab result, we will notify you by phone as soon as possible.  Submit refill requests through [employer] or call your pharmacy and they will forward the refill request to us. Please allow 3 business days for your refill to be completed.          Additional Information About Your Visit        [employer] Information     [employer] gives you secure access to your electronic health record. If you see a primary care provider, you can also send messages to your care team and make  appointments. If you have questions, please call your primary care clinic.  If you do not have a primary care provider, please call [phone] and they will assist you.        Care EveryWhere ID     This is your Care EveryWhere ID. This could be used by other organizations to access your [facility] medical records  [id number]        Your Vitals Were     Pulse Pulse Oximetry BMI (Body Mass Index)             97 90% 32.52 kg/m2          Blood Pressure from Last 3 Encounters:   10/15/18 104/64   07/30/18 116/56   07/23/18 135/50    Weight from Last 3 Encounters:   10/15/18 195 lb 6.4 oz (88.6 kg)   07/30/18 190 lb 3.2 oz (86.3 kg)   07/23/18 191 lb 1.6 oz (86.7 kg)                 Today's Medication Changes          These changes are accurate as of 10/15/18  1:41 PM.  If you have any questions, ask your nurse or doctor.               These medicines have changed or have updated prescriptions.        Dose/Directions    hydrochlorothiazide 25 MG tablet   Commonly known as:  HYDRODIURIL   Indication:  Edema   This may have changed:  Another medication with the same name was removed. Continue taking this medication, and follow the directions you see here.   Changed by:  Janey Romero RPH        Dose:  25 mg   Take 25 mg by mouth daily   Refills:  0                Primary Care Provider Office Phone # Fax #    Venu Shirley Maria PA-C [phone]       [id number]  [facility] [address]        Equal Access to Services     MARIA ESTHER ANDRES AH: Hadii carson ku hadasho Soomaali, waaxda luqadaha, qaybta kaalmada angelesyaperlita, katrin lin . So [place] [phone].    ATENCIÓN: Si habla español, tiene a talamantes disposición servicios gratuitos de asistencia lingüística. Llame al [phone].    We comply with applicable federal civil rights laws and [place] laws. We do not discriminate on the basis of race, color, national origin, age, disability, sex, sexual orientation, or gender  identity.            Thank you!     Thank you for choosing [facility]  for your care. Our goal is always to provide you with excellent care. Hearing back from our patients is one way we can continue to improve our services. Please take a few minutes to complete the written survey that you may receive in the mail after your visit with us. Thank you!             Your Updated Medication List - Protect others around you: Learn how to safely use, store and throw away your medicines at www.disposemymeds.org.          This list is accurate as of 10/15/18  1:41 PM.  Always use your most recent med list.                   Brand Name Dispense Instructions for use Diagnosis    ascorbic acid 500 MG tablet    VITAMIN C     Take 500 mg by mouth daily.        ASPIRIN EC PO      Take 81 mg by mouth daily        B-12 1000 MCG Caps      Take 1 capsule by mouth daily        blood glucose monitoring meter device kit     1 kit    Use to test blood sugar 1 time daily or as directed.  Ok to substitute alternative if insurance prefers.    Type 2 diabetes mellitus with hyperglycemia, without long-term current use of insulin (H)       calcium carbonate 500 mg-vitamin D 200 units 500-200 MG-UNIT per tablet    OSCAL with D;OYSTER SHELL CALCIUM     Take 1 tablet by mouth daily.        ferrous sulfate 325 (65 Fe) MG tablet    IRON     Take 1 tablet by mouth daily (with breakfast).        FIBERCON 625 MG tablet   Generic drug:  calcium polycarbophil      Take 1 tablet by mouth 2 times daily.        glipiZIDE 10 MG 24 hr tablet    GLUCOTROL XL    180 tablet    TAKE ONE TABLET BY MOUTH TWICE A DAY (DUE TO SEE PROVIDER IN AUGUST)    Type 2 diabetes mellitus with hyperglycemia, without long-term current use of insulin (H)       hydrochlorothiazide 25 MG tablet    HYDRODIURIL     Take 25 mg by mouth daily        levothyroxine 75 MCG tablet    SYNTHROID/LEVOTHROID    30 tablet    Take 1 tablet (75 mcg) by mouth daily     Hypothyroidism, unspecified type       lisinopril 40 MG tablet    PRINIVIL/ZESTRIL    90 tablet    TAKE ONE TABLET BY MOUTH EVERY DAY    Systolic dysfunction, left ventricle, Past myocardial infarction       LOPERAMIDE HCL PO      1/2 to 1 tablet by mouth as needed.        melatonin 3 MG tablet     30 tablet    Take 1 tablet (3 mg) by mouth nightly as needed for sleep    Insomnia, unspecified type       METFORMIN HCL PO      Take 500 mg by mouth 2 times daily (with meals)        metoprolol tartrate 50 MG tablet    LOPRESSOR    180 tablet    TAKE ONE TABLET BY MOUTH TWICE A DAY    Past myocardial infarction, Systolic dysfunction, left ventricle, Atrial tachycardia, paroxysmal (H)       mometasone 220 MCG/INH Inhaler    ASMANEX     Inhale 2 puffs into the lungs daily as needed        pioglitazone 30 MG tablet    ACTOS    90 tablet    TAKE ONE TABLET BY MOUTH EVERY DAY    Type 2 diabetes mellitus with hyperglycemia, without long-term current use of insulin (H)       * PROAIR  (90 Base) MCG/ACT inhaler   Generic drug:  albuterol      Inhale 2 puffs into the lungs every 6 hours as needed Reported on 3/2/2017        * albuterol (2.5 MG/3ML) 0.083% neb solution     180 mL    INHALE CONTENTS OF 1 VIAL (2.5MG) VIA NEBULIZER EVERY 6 HOURS AS NEEDED FOR SHORTNESS OF BREATH / DYSPNEA OR WHEEZING    COPD exacerbation (H)       QUEtiapine 25 MG tablet    SEROQUEL    60 tablet    Take 1/2 tab at bedtime to sleep, ok to increase by 1/2 tab every 3 nights up to 2 tabs daily if needed.    Insomnia due to other mental disorder       ranitidine 150 MG tablet    ZANTAC    60 tablet    Take 1 tablet (150 mg) by mouth nightly as needed for heartburn    Hiatal hernia       simvastatin 20 MG tablet    ZOCOR    90 tablet    TAKE ONE TABLET BY MOUTH AT BEDTIME    Hyperlipidemia LDL goal <100       tiotropium 18 MCG capsule    SPIRIVA     Inhale 18 mcg into the lungs daily.        VITAMIN D (CHOLECALCIFEROL) PO      Take 2,000 Units by  mouth daily        warfarin 3 MG tablet    COUMADIN    90 tablet    Take 1 tablet (3mg) daily except 1.5 tablets (4.5mg) on Tuesdays as directed by Coumadin Clinic    Chronic atrial fibrillation (H), Long-term (current) use of anticoagulants       * Notice:  This list has 2 medication(s) that are the same as other medications prescribed for you. Read the directions carefully, and ask your doctor or other care provider to review them with you.

## 2018-11-15 DIAGNOSIS — E11.65 TYPE 2 DIABETES MELLITUS WITH HYPERGLYCEMIA, WITHOUT LONG-TERM CURRENT USE OF INSULIN (H): ICD-10-CM

## 2018-11-16 ENCOUNTER — ANTICOAGULATION THERAPY VISIT (OUTPATIENT)
Dept: NURSING | Facility: CLINIC | Age: 83
End: 2018-11-16
Payer: COMMERCIAL

## 2018-11-16 DIAGNOSIS — I48.20 CHRONIC ATRIAL FIBRILLATION (H): ICD-10-CM

## 2018-11-16 DIAGNOSIS — Z79.01 LONG TERM CURRENT USE OF ANTICOAGULANTS WITH INR GOAL OF 2.0-3.0: ICD-10-CM

## 2018-11-16 LAB — INR POINT OF CARE: 3 (ref 0.86–1.14)

## 2018-11-16 PROCEDURE — 85610 PROTHROMBIN TIME: CPT | Mod: QW

## 2018-11-16 PROCEDURE — 99207 ZZC NO CHARGE NURSE ONLY: CPT

## 2018-11-16 PROCEDURE — 36416 COLLJ CAPILLARY BLOOD SPEC: CPT

## 2018-11-16 NOTE — PROGRESS NOTES
ANTICOAGULATION FOLLOW-UP CLINIC VISIT    Patient Name:  Maryalice Rebecca Wachter  Date:  11/16/2018  Contact Type:  Face to Face    SUBJECTIVE:     Patient Findings     Positives No Problem Findings           OBJECTIVE    INR Protime   Date Value Ref Range Status   11/16/2018 3.0 (A) 0.86 - 1.14 Final       ASSESSMENT / PLAN  No question data found.  Anticoagulation Summary as of 11/16/2018     INR goal 2.0-3.0   Today's INR 3.0   Warfarin maintenance plan 4.5 mg (3 mg x 1.5) on Tue; 3 mg (3 mg x 1) all other days   Full warfarin instructions 4.5 mg on Tue; 3 mg all other days   Weekly warfarin total 22.5 mg   No change documented Cyndi Cabello RN   Plan last modified Cyndi Cabello RN (8/24/2018)   Next INR check 12/28/2018   Priority INR   Target end date     Indications   Atrial fibrillation (H) [I48.91]  Long term current use of anticoagulants with INR goal of 2.0-3.0 [Z79.01]         Anticoagulation Episode Summary     INR check location     Preferred lab     Send INR reminders to  TR[employer] POOL    Comments       Anticoagulation Care Providers     Provider Role Specialty Phone number    Venu Maria PA-C Responsible Physician Assistant - Medical [phone]            See the Encounter Report to view Anticoagulation Flowsheet and Dosing Calendar (Go to Encounters tab in chart review, and find the Anticoagulation Therapy Visit)      Cyndi Cabello RN

## 2018-11-16 NOTE — MR AVS SNAPSHOT
Sindi Beachchter   11/16/2018 9:30 AM   Anticoagulation Therapy Visit    Description:  85 year old female   Provider:  FM RN VISITS   Department:  Fm Nurse           INR as of 11/16/2018     Today's INR 3.0      Anticoagulation Summary as of 11/16/2018     INR goal 2.0-3.0   Today's INR 3.0   Full warfarin instructions 4.5 mg on Tue; 3 mg all other days   Next INR check 12/28/2018    Indications   Atrial fibrillation (H) [I48.91]  Long term current use of anticoagulants with INR goal of 2.0-3.0 [Z79.01]         Your next Anticoagulation Clinic appointment(s)     Dec 28, 2018  9:30 AM CST   Anticoagulation Visit with FM RN VISITS   [facility]    [address] [place] [address], [facility] [address]  [facility] [address]   [phone]              Contact Numbers     Clinic Number:         November 2018 Details    Sun Mon Tue Wed Thu Fri Sat         1               2               3                 4               5               6               7               8               9               10                 11               12               13               14               15               16      3 mg   See details      17      3 mg           18      3 mg         19      3 mg         20      4.5 mg         21      3 mg         22      3 mg         23      3 mg         24      3 mg           25      3 mg         26      3 mg         27      4.5 mg         28      3 mg         29      3 mg         30      3 mg           Date Details   11/16 This INR check               How to take your warfarin dose     To take:  3 mg Take 1 of the 3 mg tablets.    To take:  4.5 mg Take 1.5 of the 3 mg tablets.           December 2018 Details    Sun Mon Tue Wed Thu Fri Sat           1      3 mg           2      3 mg         3      3 mg         4      4.5 mg         5      3 mg         6      3 mg         7      3 mg         8      3 mg           9      3 mg         10      3 mg          11      4.5 mg         12      3 mg         13      3 mg         14      3 mg         15      3 mg           16      3 mg         17      3 mg         18      4.5 mg         19      3 mg         20      3 mg         21      3 mg         22      3 mg           23      3 mg         24      3 mg         25      4.5 mg         26      3 mg         27      3 mg         28            29                 30               31                     Date Details   No additional details    Date of next INR:  12/28/2018         How to take your warfarin dose     To take:  3 mg Take 1 of the 3 mg tablets.    To take:  4.5 mg Take 1.5 of the 3 mg tablets.

## 2018-11-16 NOTE — TELEPHONE ENCOUNTER
"Requested Prescriptions   Pending Prescriptions Disp Refills     metFORMIN (GLUCOPHAGE) 500 MG tablet [Pharmacy Med Name: METFORMIN HCL 500MG TABS] 360 tablet 0     (Historical)        Last Office Visit with [facility], P or [facility] prescribing provider: 7/30/2018  Crow       Return in about 6 weeks (around 9/10/2018) for Follow up, Med Check.     Future Office Visit:      Sig: TAKE TWO TABLETS BY MOUTH TWICE A DAY WITH MEALS    Biguanide Agents Passed    11/15/2018  7:10 PM       Passed - Blood pressure less than 140/90 in past 6 months    BP Readings from Last 3 Encounters:   10/15/18 104/64   07/30/18 116/56   07/23/18 135/50                Passed - Patient has documented LDL within the past 12 mos.    Recent Labs   Lab Test  08/03/18   0913   LDL  27            Passed - Patient has had a Microalbumin in the past 15 mos.    Recent Labs   Lab Test  10/23/17   1341   MICROL  22   UMALCR  19.91            Passed - Patient is age 10 or older       Passed - Patient has documented A1c within the specified period of time.    If HgbA1C is 8 or greater, it needs to be on file within the past 3 months.  If less than 8, must be on file within the past 6 months.     Recent Labs   Lab Test  10/15/18   1355   A1C  7.2*            Passed - Patient's CR is NOT>1.4 OR Patient's EGFR is NOT<45 within past 12 mos.    Recent Labs   Lab Test  08/03/18 0913   GFRESTIMATED  49*   GFRESTBLACK  60*       Recent Labs   Lab Test  08/03/18 0913   CR  1.06*            Passed - Patient does NOT have a diagnosis of CHF.       Passed - Patient is not pregnant       Passed - Patient has not had a positive pregnancy test within the past 12 mos.        Passed - Recent (6 mo) or future (30 days) visit within the authorizing provider's specialty    Patient had office visit in the last 6 months or has a visit in the next 30 days with authorizing provider or within the authorizing provider's specialty.  See \"Patient Info\" tab in inbasket, or " "\"Choose Columns\" in Meds & Orders section of the refill encounter.              "

## 2018-12-28 ENCOUNTER — ANTICOAGULATION THERAPY VISIT (OUTPATIENT)
Dept: NURSING | Facility: CLINIC | Age: 83
End: 2018-12-28
Payer: COMMERCIAL

## 2018-12-28 DIAGNOSIS — I48.91 ATRIAL FIBRILLATION (H): ICD-10-CM

## 2018-12-28 DIAGNOSIS — Z79.01 LONG TERM CURRENT USE OF ANTICOAGULANTS WITH INR GOAL OF 2.0-3.0: ICD-10-CM

## 2018-12-28 LAB — INR POINT OF CARE: 3.1 (ref 0.86–1.14)

## 2018-12-28 PROCEDURE — 99207 ZZC NO CHARGE NURSE ONLY: CPT

## 2018-12-28 PROCEDURE — 36416 COLLJ CAPILLARY BLOOD SPEC: CPT

## 2018-12-28 PROCEDURE — 85610 PROTHROMBIN TIME: CPT | Mod: QW

## 2018-12-28 NOTE — PROGRESS NOTES
ANTICOAGULATION FOLLOW-UP CLINIC VISIT    Patient Name:  Maryalice Rebecca Wachter  Date:  12/28/2018  Contact Type:  Face to Face    SUBJECTIVE:     Patient Findings     Positives:   No Problem Findings           OBJECTIVE    INR Protime   Date Value Ref Range Status   12/28/2018 3.1 (A) 0.86 - 1.14 Final       ASSESSMENT / PLAN  No question data found.  Anticoagulation Summary  As of 12/28/2018    INR goal:   2.0-3.0   TTR:   66.6 % (2.6 y)   INR used for dosing:   3.1! (12/28/2018)   Warfarin maintenance plan:   4.5 mg (3 mg x 1.5) every Tue; 3 mg (3 mg x 1) all other days   Full warfarin instructions:   4.5 mg every Tue; 3 mg all other days   Weekly warfarin total:   22.5 mg   No change documented:   Cyndi Cabello RN   Plan last modified:   Cyndi Cabello RN (8/24/2018)   Next INR check:   2/8/2019   Priority:   INR   Target end date:       Indications    Atrial fibrillation (H) [I48.91]  Long term current use of anticoagulants with INR goal of 2.0-3.0 [Z79.01]             Anticoagulation Episode Summary     INR check location:       Preferred lab:       Send INR reminders to:    TRIAGE POOL    Comments:         Anticoagulation Care Providers     Provider Role Specialty Phone number    Venu Maria PA-C Responsible Physician Assistant - Medical [phone]            See the Encounter Report to view Anticoagulation Flowsheet and Dosing Calendar (Go to Encounters tab in chart review, and find the Anticoagulation Therapy Visit)      Cyndi Cabello RN

## 2018-12-31 ENCOUNTER — TRANSFERRED RECORDS (OUTPATIENT)
Dept: HEALTH INFORMATION MANAGEMENT | Facility: CLINIC | Age: 83
End: 2018-12-31

## 2019-01-14 ENCOUNTER — TRANSFERRED RECORDS (OUTPATIENT)
Dept: HEALTH INFORMATION MANAGEMENT | Facility: CLINIC | Age: 84
End: 2019-01-14

## 2019-02-08 ENCOUNTER — ANTICOAGULATION THERAPY VISIT (OUTPATIENT)
Dept: NURSING | Facility: CLINIC | Age: 84
End: 2019-02-08
Payer: COMMERCIAL

## 2019-02-08 DIAGNOSIS — I48.91 ATRIAL FIBRILLATION (H): ICD-10-CM

## 2019-02-08 DIAGNOSIS — Z79.01 LONG TERM CURRENT USE OF ANTICOAGULANTS WITH INR GOAL OF 2.0-3.0: ICD-10-CM

## 2019-02-08 LAB — INR POINT OF CARE: 2.6 (ref 0.86–1.14)

## 2019-02-08 PROCEDURE — 36416 COLLJ CAPILLARY BLOOD SPEC: CPT

## 2019-02-08 PROCEDURE — 99207 ZZC NO CHARGE NURSE ONLY: CPT

## 2019-02-08 PROCEDURE — 85610 PROTHROMBIN TIME: CPT | Mod: QW

## 2019-02-08 NOTE — PROGRESS NOTES
ANTICOAGULATION FOLLOW-UP CLINIC VISIT    Patient Name:  Maryalice Rebecca Wachter  Date:  2019  Contact Type:  Face to Face    SUBJECTIVE:     Patient Findings     Positives:   No Problem Findings           OBJECTIVE    INR Protime   Date Value Ref Range Status   2019 2.6 (A) 0.86 - 1.14 Final       ASSESSMENT / PLAN  No question data found.  Anticoagulation Summary  As of 2019    INR goal:   2.0-3.0   TTR:   67.1 % (2.8 y)   INR used for dosin.6 (2019)   Warfarin maintenance plan:   4.5 mg (3 mg x 1.5) every Tue; 3 mg (3 mg x 1) all other days   Full warfarin instructions:   4.5 mg every Tue; 3 mg all other days   Weekly warfarin total:   22.5 mg   No change documented:   Cyndi Cabello RN   Plan last modified:   Cyndi Cabello RN (2018)   Next INR check:   3/22/2019   Priority:   INR   Target end date:       Indications    Atrial fibrillation (H) [I48.91]  Long term current use of anticoagulants with INR goal of 2.0-3.0 [Z79.01]             Anticoagulation Episode Summary     INR check location:       Preferred lab:       Send INR reminders to:    TRIAGE POOL    Comments:         Anticoagulation Care Providers     Provider Role Specialty Phone number    Venu Maria PA-C Responsible Physician Assistant - Medical [phone]            See the Encounter Report to view Anticoagulation Flowsheet and Dosing Calendar (Go to Encounters tab in chart review, and find the Anticoagulation Therapy Visit)      Cyndi Cabello RN

## 2019-03-01 DIAGNOSIS — E11.65 TYPE 2 DIABETES MELLITUS WITH HYPERGLYCEMIA, WITHOUT LONG-TERM CURRENT USE OF INSULIN (H): ICD-10-CM

## 2019-03-04 NOTE — TELEPHONE ENCOUNTER
Routing refill request to provider for review/approval because:  Labs not current:  A1c, CMP, AST/ALT  Last OV with Crow 7.30.18  Last OV with Janey 10.16.18    Amanda Bob RN, BS  Clinical Nurse Triage.

## 2019-03-04 NOTE — TELEPHONE ENCOUNTER
Requested Prescriptions   Pending Prescriptions Disp Refills     pioglitazone (ACTOS) 30 MG tablet [Pharmacy Med Name: PIOGLITAZONE HCL 30MG TABS] 90 tablet 1    Last Written Prescription Date:  09/06/2018  Last Fill Quantity: 90 TABLET,  # refills: 1   Last office visit: 10/15/2018 with prescribing provider:  06/25/2018   Future Office Visit:   Next 5 appointments (look out 90 days)    Apr 15, 2019  1:00 PM CDT  SHORT with Janey Romero RPH  [facility] [address] [facility] [id number] [phone]          Sig: TAKE ONE TABLET BY MOUTH EVERY DAY    Thiazolidinedione Agents (TZDs)  Failed - 3/1/2019 12:00 PM       Failed - Patient has a normal ALT within the past 12 mos.    Recent Labs   Lab Test 02/09/18  1350   ALT 17            Failed - Patient has a normal AST within the past 12 mos.     Recent Labs   Lab Test 02/09/18  1350   AST 13            Failed - Patient has had a Microalbumin in the past 12 mos.    Recent Labs   Lab Test 10/23/17  1341   MICROL 22   UMALCR 19.91            Failed - Patient has a normal serum Creatinine in the past 12 months    Recent Labs   Lab Test 08/03/18  0913   CR 1.06*            Passed - Blood pressure less than 140/90 in past 6 months    BP Readings from Last 3 Encounters:   10/15/18 104/64   07/30/18 116/56   07/23/18 135/50                Passed - Patient has documented LDL within the past 12 mos.    Recent Labs   Lab Test 08/03/18  0913   LDL 27            Passed - Patient has documented A1c within the specified period of time.    If HgbA1C is 8 or greater, it needs to be on file within the past 3 months.  If less than 8, must be on file within the past 6 months.     Recent Labs   Lab Test 10/15/18  1355   A1C 7.2*            Passed - Diagnosis not CHF       Passed - Medication is active on med list       Passed - Patient is age 18 or older       Passed - Patient is not pregnant       Passed - Patient has not  "had a positive pregnancy test within the past 12 mos.       Passed - Recent (6 mo) or future (30 days) visit within the authorizing provider's specialty    Patient had office visit in the last 6 months or has a visit in the next 30 days with authorizing provider or within the authorizing provider's specialty.  See \"Patient Info\" tab in inbasket, or \"Choose Columns\" in Meds & Orders section of the refill encounter.            Toby Huerta XRT  "

## 2019-03-05 RX ORDER — PIOGLITAZONEHYDROCHLORIDE 30 MG/1
TABLET ORAL
Qty: 90 TABLET | Refills: 1 | Status: SHIPPED | OUTPATIENT
Start: 2019-03-05 | End: 2019-09-06

## 2019-03-05 NOTE — TELEPHONE ENCOUNTER
Sindi has an appointment with Janey in [place] in mid April.  Please call to remind her of this.  She will be due for labs and other refills.  I'd like to see her this summer some time.  She can do a Medicare Wellness visit with me.  Thanks!

## 2019-03-08 ENCOUNTER — ANCILLARY PROCEDURE (OUTPATIENT)
Dept: GENERAL RADIOLOGY | Facility: CLINIC | Age: 84
End: 2019-03-08
Attending: FAMILY MEDICINE
Payer: COMMERCIAL

## 2019-03-08 ENCOUNTER — OFFICE VISIT (OUTPATIENT)
Dept: FAMILY MEDICINE | Facility: CLINIC | Age: 84
End: 2019-03-08
Payer: COMMERCIAL

## 2019-03-08 VITALS
HEART RATE: 114 BPM | WEIGHT: 199 LBS | OXYGEN SATURATION: 100 % | RESPIRATION RATE: 24 BRPM | DIASTOLIC BLOOD PRESSURE: 60 MMHG | SYSTOLIC BLOOD PRESSURE: 120 MMHG | BODY MASS INDEX: 33.12 KG/M2 | TEMPERATURE: 98.6 F

## 2019-03-08 DIAGNOSIS — R06.02 SHORTNESS OF BREATH: ICD-10-CM

## 2019-03-08 DIAGNOSIS — J44.1 COPD WITH ACUTE EXACERBATION (H): Primary | ICD-10-CM

## 2019-03-08 LAB
BASOPHILS # BLD AUTO: 0 10E9/L (ref 0–0.2)
BASOPHILS NFR BLD AUTO: 0.3 %
DIFFERENTIAL METHOD BLD: ABNORMAL
EOSINOPHIL # BLD AUTO: 0.2 10E9/L (ref 0–0.7)
EOSINOPHIL NFR BLD AUTO: 2.5 %
ERYTHROCYTE [DISTWIDTH] IN BLOOD BY AUTOMATED COUNT: 15.9 % (ref 10–15)
HCT VFR BLD AUTO: 31.1 % (ref 35–47)
HGB BLD-MCNC: 9.7 G/DL (ref 11.7–15.7)
LYMPHOCYTES # BLD AUTO: 1.4 10E9/L (ref 0.8–5.3)
LYMPHOCYTES NFR BLD AUTO: 21.1 %
MCH RBC QN AUTO: 32.1 PG (ref 26.5–33)
MCHC RBC AUTO-ENTMCNC: 31.2 G/DL (ref 31.5–36.5)
MCV RBC AUTO: 103 FL (ref 78–100)
MONOCYTES # BLD AUTO: 0.6 10E9/L (ref 0–1.3)
MONOCYTES NFR BLD AUTO: 8.4 %
NEUTROPHILS # BLD AUTO: 4.6 10E9/L (ref 1.6–8.3)
NEUTROPHILS NFR BLD AUTO: 67.7 %
NT-PROBNP SERPL-MCNC: 543 PG/ML (ref 0–450)
PLATELET # BLD AUTO: 340 10E9/L (ref 150–450)
RBC # BLD AUTO: 3.02 10E12/L (ref 3.8–5.2)
WBC # BLD AUTO: 6.8 10E9/L (ref 4–11)

## 2019-03-08 PROCEDURE — 83880 ASSAY OF NATRIURETIC PEPTIDE: CPT | Performed by: FAMILY MEDICINE

## 2019-03-08 PROCEDURE — 36415 COLL VENOUS BLD VENIPUNCTURE: CPT | Performed by: FAMILY MEDICINE

## 2019-03-08 PROCEDURE — 71046 X-RAY EXAM CHEST 2 VIEWS: CPT | Mod: FY

## 2019-03-08 PROCEDURE — 85025 COMPLETE CBC W/AUTO DIFF WBC: CPT | Performed by: FAMILY MEDICINE

## 2019-03-08 PROCEDURE — 99214 OFFICE O/P EST MOD 30 MIN: CPT | Performed by: FAMILY MEDICINE

## 2019-03-08 RX ORDER — AZITHROMYCIN 250 MG/1
TABLET, FILM COATED ORAL
Qty: 6 TABLET | Refills: 0 | Status: SHIPPED | OUTPATIENT
Start: 2019-03-08 | End: 2019-03-28

## 2019-03-08 RX ORDER — FUROSEMIDE 80 MG
80 TABLET ORAL DAILY
Qty: 6 TABLET | Refills: 0 | Status: SHIPPED | OUTPATIENT
Start: 2019-03-08 | End: 2019-04-15

## 2019-03-08 RX ORDER — PREDNISONE 20 MG/1
20 TABLET ORAL DAILY
Qty: 40 TABLET | Refills: 0 | Status: SHIPPED | OUTPATIENT
Start: 2019-03-08 | End: 2019-04-15

## 2019-03-08 ASSESSMENT — ANXIETY QUESTIONNAIRES
1. FEELING NERVOUS, ANXIOUS, OR ON EDGE: NOT AT ALL
GAD7 TOTAL SCORE: 0
2. NOT BEING ABLE TO STOP OR CONTROL WORRYING: NOT AT ALL
6. BECOMING EASILY ANNOYED OR IRRITABLE: NOT AT ALL
3. WORRYING TOO MUCH ABOUT DIFFERENT THINGS: NOT AT ALL
IF YOU CHECKED OFF ANY PROBLEMS ON THIS QUESTIONNAIRE, HOW DIFFICULT HAVE THESE PROBLEMS MADE IT FOR YOU TO DO YOUR WORK, TAKE CARE OF THINGS AT HOME, OR GET ALONG WITH OTHER PEOPLE: NOT DIFFICULT AT ALL
5. BEING SO RESTLESS THAT IT IS HARD TO SIT STILL: NOT AT ALL
7. FEELING AFRAID AS IF SOMETHING AWFUL MIGHT HAPPEN: NOT AT ALL

## 2019-03-08 ASSESSMENT — PATIENT HEALTH QUESTIONNAIRE - PHQ9
SUM OF ALL RESPONSES TO PHQ QUESTIONS 1-9: 6
5. POOR APPETITE OR OVEREATING: NOT AT ALL

## 2019-03-08 NOTE — NURSING NOTE
"Chief Complaint   Patient presents with     Breathing Problem     Initial /60 (BP Location: Right arm, Patient Position: Sitting, Cuff Size: Adult Regular)   Pulse 114   Temp 98.6  F (37  C) (Oral)   Resp 24   Wt 90.3 kg (199 lb)   SpO2 100%   BMI 33.12 kg/m   Estimated body mass index is 33.12 kg/m  as calculated from the following:    Height as of 7/21/18: 1.651 m (5' 5\").    Weight as of this encounter: 90.3 kg (199 lb).  BP completed using cuff size regular right arm    Lisa Magill, CMA    "

## 2019-03-08 NOTE — PROGRESS NOTES
"HPI   SUBJECTIVE:   Maryalice Rebecca Wachter is a 85 year old female who presents to clinic today for the following health issues:    COPD Follow-Up    Symptoms are currently: much worse    Current fatigue or dyspnea with ambulation: worsened from baseline    Shortness of breath: much worse    Increased or change in Cough/Sputum: No    Fever(s): No, but has the chills     Baseline ambulation without stopping to rest:  50 feet. Able to walk up 0 flights of stairs without stopping to rest.    Any ER/UC or hospital admissions since your last visit? No     History   Smoking Status     Never Smoker   Smokeless Tobacco     Never Used     No results found for: FEV1, [id number]    Amount of exercise or physical activity: None    Problems taking medications regularly: No    Medication side effects: none    Diet: regular (no restrictions)    States she has had trouble breathing x3-4 days.  Uses albuterol nebulizer in the morning daily.  Recently has been requiring albuterol in the morning, around noon, and again before bed either in the form of neb or inhaler.  States Thursday around 2:00am she woke up with trouble breathing.  States she sat up and \"waited it out\".  Eventually took Melatonin and went back to sleep.  States she \"panics\" thinking about when the shortness of breath will happen next.  Does not want to go to Urgent Care, Emergency room, or use the ambulance.    Feels she is retaining fluid.  States her face, hands, and feet feel puffy.  She is wondering if lasix would be helpful today as she has used this in the past.          Problem list and histories reviewed & adjusted, as indicated.  Additional history: as documented    Current Outpatient Medications   Medication Sig Dispense Refill     albuterol (ALBUTEROL) 108 (90 BASE) MCG/ACT inhaler Inhale 2 puffs into the lungs every 6 hours as needed Reported on 3/2/2017       albuterol (PROVENTIL) (2.5 MG/3ML) 0.083% neb solution INHALE CONTENTS OF 1 VIAL (3 ML) VIA " NEBULIZER EVERY 6 HOURS AS NEEDED FOR SHORTNESS OF BREATH / DYSPNEA OR WHEEZING 180 mL 1     ascorbic acid (VITAMIN C) 500 MG tablet Take 500 mg by mouth daily.       ASPIRIN EC PO Take 81 mg by mouth daily        blood glucose monitoring (ONE TOUCH ULTRA 2) meter device kit Use to test blood sugar 1 time daily or as directed.  Ok to substitute alternative if insurance prefers. 1 kit 0     calcium carb 1250 mg, 500 mg [place],/vitamin D 200 units (OSCAL WITH D) 500-200 MG-UNIT per tablet Take 1 tablet by mouth daily.       Cyanocobalamin (B-12) 1000 MCG CAPS Take 1 capsule by mouth daily        ferrous sulfate 325 (65 FE) MG tablet Take 1 tablet by mouth daily (with breakfast).       glipiZIDE (GLUCOTROL XL) 10 MG 24 hr tablet TAKE ONE TABLET BY MOUTH TWICE A  tablet 0     hydrochlorothiazide (HYDRODIURIL) 25 MG tablet TAKE ONE TABLET BY MOUTH EVERY DAY 90 tablet 0     hydrochlorothiazide (HYDRODIURIL) 25 MG tablet Take 25 mg by mouth daily       levothyroxine (SYNTHROID/LEVOTHROID) 75 MCG tablet Take 1 tablet (75 mcg) by mouth daily 90 tablet 2     lisinopril (PRINIVIL/ZESTRIL) 40 MG tablet TAKE ONE TABLET BY MOUTH EVERY DAY 90 tablet 1     LOPERAMIDE HCL PO 1/2 to 1 tablet by mouth as needed.       melatonin 3 MG tablet Take 1 tablet (3 mg) by mouth nightly as needed for sleep 30 tablet 0     metFORMIN (GLUCOPHAGE) 500 MG tablet Take 1 tablet (500 mg) by mouth 2 times daily (with meals) 180 tablet 1     METFORMIN HCL PO Take 500 mg by mouth 2 times daily (with meals)       metoprolol tartrate (LOPRESSOR) 50 MG tablet TAKE ONE TABLET BY MOUTH TWICE A  tablet 1     mometasone (ASMANEX) 220 MCG/INH inhaler Inhale 2 puffs into the lungs daily as needed        pioglitazone (ACTOS) 30 MG tablet TAKE ONE TABLET BY MOUTH EVERY DAY 90 tablet 1     polycarbophil (FIBERCON) 625 MG tablet Take 1 tablet by mouth 2 times daily.       QUEtiapine (SEROQUEL) 25 MG tablet Take 1/2 tab at bedtime to sleep, ok to increase  by 1/2 tab every 3 nights up to 2 tabs daily if needed. 60 tablet 5     ranitidine (ZANTAC) 150 MG tablet Take 1 tablet (150 mg) by mouth nightly as needed for heartburn 60 tablet 1     simvastatin (ZOCOR) 20 MG tablet TAKE ONE TABLET BY MOUTH AT BEDTIME 90 tablet 3     tiotropium (SPIRIVA) 18 MCG inhalation capsule Inhale 18 mcg into the lungs daily.       VITAMIN D, CHOLECALCIFEROL, PO Take 2,000 Units by mouth daily       warfarin (COUMADIN) 3 MG tablet TAKE 1 1/2 TABLETS ON TUESDAY. TAKE 1 TABLET DAILY ALL OTHER DAYS AS PER COUMADIN CLINIC. 90 tablet 1     BP Readings from Last 3 Encounters:   03/08/19 120/60   10/15/18 104/64   07/30/18 116/56    Wt Readings from Last 3 Encounters:   03/08/19 90.3 kg (199 lb)   10/15/18 88.6 kg (195 lb 6.4 oz)   07/30/18 86.3 kg (190 lb 3.2 oz)        Reviewed and updated as needed this visit by clinical staff  Tobacco  Allergies  Meds  Problems  Med Hx  Surg Hx       Reviewed and updated as needed this visit by Provider         ROS:  Constitutional, HEENT, cardiovascular, pulmonary, gi and gu systems are negative, except as otherwise noted.    OBJECTIVE:     /60 (BP Location: Right arm, Patient Position: Sitting, Cuff Size: Adult Regular)   Pulse 114   Temp 98.6  F (37  C) (Oral)   Resp 24   Wt 90.3 kg (199 lb)   SpO2 100%   BMI 33.12 kg/m    Body mass index is 33.12 kg/m .  GENERAL: healthy, alert and no distress  RESP: lungs clear to auscultation - no rales, rhonchi or wheezes, distant lung sounds  CV: regular rate and rhythm, normal S1 S2, no S3 or S4, no murmur, click or rub, pitting edema at mid tibia  MS: no gross musculoskeletal defects noted  NEURO: Normal tone, mentation intact and speech normal  PSYCH: mentation appears normal, affect normal/bright    Diagnostic Test Results:  Xray - Prominent hiatal hernia    ASSESSMENT/PLAN:     (J44.1) COPD with acute exacerbation (H)  (primary encounter diagnosis)  Comment: has had some concern with HF in the  past and with edema will also include a short course of furosemide in addition to standard COPD exacerbation approach  Plan: predniSONE (DELTASONE) 20 MG tablet,         azithromycin (ZITHROMAX) 250 MG tablet,         furosemide (LASIX) 80 MG tablet            (R06.02) Shortness of breath  Comment:   Plan: XR Chest 2 Views, CBC with platelets and         differential, BNP-N terminal pro           F/u 3-4 days if not improved, ED sooner if worsening.        SUNITHA ARIAS, NP Student    Bird Suazo MD  [facility]      Physical Exam

## 2019-03-09 ASSESSMENT — ANXIETY QUESTIONNAIRES: GAD7 TOTAL SCORE: 0

## 2019-03-15 ENCOUNTER — OFFICE VISIT (OUTPATIENT)
Dept: FAMILY MEDICINE | Facility: CLINIC | Age: 84
End: 2019-03-15
Payer: COMMERCIAL

## 2019-03-15 VITALS
TEMPERATURE: 98 F | WEIGHT: 193.9 LBS | HEIGHT: 65 IN | HEART RATE: 60 BPM | SYSTOLIC BLOOD PRESSURE: 130 MMHG | DIASTOLIC BLOOD PRESSURE: 60 MMHG | BODY MASS INDEX: 32.3 KG/M2 | RESPIRATION RATE: 14 BRPM

## 2019-03-15 DIAGNOSIS — D53.9 MACROCYTIC ANEMIA: Primary | ICD-10-CM

## 2019-03-15 LAB
ANISOCYTOSIS BLD QL SMEAR: SLIGHT
DIFFERENTIAL METHOD BLD: ABNORMAL
ERYTHROCYTE [DISTWIDTH] IN BLOOD BY AUTOMATED COUNT: 15.1 % (ref 10–15)
FOLATE SERPL-MCNC: 18.9 NG/ML
HCT VFR BLD AUTO: 33.6 % (ref 35–47)
HGB BLD-MCNC: 10.6 G/DL (ref 11.7–15.7)
IRON SATN MFR SERPL: 10 % (ref 15–46)
IRON SERPL-MCNC: 48 UG/DL (ref 35–180)
LYMPHOCYTES # BLD AUTO: 2.1 10E9/L (ref 0.8–5.3)
LYMPHOCYTES NFR BLD AUTO: 31 %
MACROCYTES BLD QL SMEAR: PRESENT
MCH RBC QN AUTO: 31.9 PG (ref 26.5–33)
MCHC RBC AUTO-ENTMCNC: 31.5 G/DL (ref 31.5–36.5)
MCV RBC AUTO: 101 FL (ref 78–100)
MONOCYTES # BLD AUTO: 0.7 10E9/L (ref 0–1.3)
MONOCYTES NFR BLD AUTO: 11 %
NEUTROPHILS # BLD AUTO: 4 10E9/L (ref 1.6–8.3)
NEUTROPHILS NFR BLD AUTO: 58 %
OVALOCYTES BLD QL SMEAR: SLIGHT
PLATELET # BLD AUTO: 351 10E9/L (ref 150–450)
PLATELET # BLD EST: ABNORMAL 10*3/UL
RBC # BLD AUTO: 3.32 10E12/L (ref 3.8–5.2)
RETICS # AUTO: 121 10E9/L (ref 25–95)
RETICS/RBC NFR AUTO: 3.6 % (ref 0.5–2)
TIBC SERPL-MCNC: 472 UG/DL (ref 240–430)
VIT B12 SERPL-MCNC: >6000 PG/ML (ref 193–986)
WBC # BLD AUTO: 6.8 10E9/L (ref 4–11)

## 2019-03-15 PROCEDURE — 85025 COMPLETE CBC W/AUTO DIFF WBC: CPT | Performed by: FAMILY MEDICINE

## 2019-03-15 PROCEDURE — 82746 ASSAY OF FOLIC ACID SERUM: CPT | Performed by: FAMILY MEDICINE

## 2019-03-15 PROCEDURE — 83540 ASSAY OF IRON: CPT | Performed by: FAMILY MEDICINE

## 2019-03-15 PROCEDURE — 85060 BLOOD SMEAR INTERPRETATION: CPT | Performed by: FAMILY MEDICINE

## 2019-03-15 PROCEDURE — 83550 IRON BINDING TEST: CPT | Performed by: FAMILY MEDICINE

## 2019-03-15 PROCEDURE — 85045 AUTOMATED RETICULOCYTE COUNT: CPT | Performed by: FAMILY MEDICINE

## 2019-03-15 PROCEDURE — 99214 OFFICE O/P EST MOD 30 MIN: CPT | Performed by: FAMILY MEDICINE

## 2019-03-15 PROCEDURE — 82607 VITAMIN B-12: CPT | Performed by: FAMILY MEDICINE

## 2019-03-15 PROCEDURE — 36415 COLL VENOUS BLD VENIPUNCTURE: CPT | Performed by: FAMILY MEDICINE

## 2019-03-15 ASSESSMENT — ENCOUNTER SYMPTOMS
PALPITATIONS: 0
BLURRED VISION: 0
DOUBLE VISION: 0
SHORTNESS OF BREATH: 0
DIZZINESS: 1
HEADACHES: 0
CONSTITUTIONAL NEGATIVE: 1

## 2019-03-15 ASSESSMENT — MIFFLIN-ST. JEOR: SCORE: 1325.4

## 2019-03-15 NOTE — PROGRESS NOTES
HPI    SUBJECTIVE:   Maryalice Rebecca Wachter is a 85 year old female who presents to clinic today for the following health issues:    COPD Follow-Up    Symptoms are currently: improved    Current fatigue or dyspnea with ambulation: better than usual    Shortness of breath: improved    Increased or change in Cough/Sputum: No    Fever(s): No    Baseline ambulation without stopping to rest:  50 feet. Able to walk up 0 flights of stairs without stopping to rest.    Any ER/UC or hospital admissions since your last visit? No     History   Smoking Status     Never Smoker   Smokeless Tobacco     Never Used     No results found for: FEV1, [id number]    Amount of exercise or physical activity: None    Problems taking medications regularly: No    Medication side effects: none    Diet: regular (no restrictions)      Notes has been taking iron supplement.  Hgb has been as low as 10, but started an iron supplement and it rebounded to 11.  Surprized to see it this.  Notes some mild dizziness when standing up.  Notes that she's been anemic all her life and had an extensive w/u by a previous MD.  Does take a vitamin B12 supplement.  No palpitations.  Has rate-controlled a fib.    Breathing is much better, cough has resolved, no shortness of breath.    Review of Systems   Constitutional: Negative.    Eyes: Negative for blurred vision and double vision.   Respiratory: Negative for shortness of breath.    Cardiovascular: Negative for chest pain and palpitations.   Neurological: Positive for dizziness. Negative for headaches.         Physical Exam   Constitutional: She is oriented to person, place, and time.   Eyes: Conjunctivae and EOM are normal.   Cardiovascular: Normal rate and normal heart sounds. An irregularly irregular rhythm present.   Pulmonary/Chest: Effort normal and breath sounds normal.   Musculoskeletal: She exhibits no edema.   Neurological: She is alert and oriented to person, place, and time.   Skin: Skin is warm and  dry.   Vitals reviewed.    (D53.9) Macrocytic anemia  (primary encounter diagnosis)  Comment: per pt is well established, given macrocytosis may have pernicious anemia and oral B12 may not be helping.  Plan: Vitamin B12, Folate, Blood Morphology         Pathologist Review, CBC with platelets         differential, Reticulocyte Count, Iron and iron        binding capacity              RTC in 1m    Bird Suazo MD

## 2019-03-18 ENCOUNTER — MYC MEDICAL ADVICE (OUTPATIENT)
Dept: FAMILY MEDICINE | Facility: CLINIC | Age: 84
End: 2019-03-18

## 2019-03-18 LAB — COPATH REPORT: NORMAL

## 2019-03-18 NOTE — TELEPHONE ENCOUNTER
PCP:    Please review lab results and provider feedback. B12 is very high.     Radha Valle RN -- [facility]

## 2019-03-22 ENCOUNTER — ANTICOAGULATION THERAPY VISIT (OUTPATIENT)
Dept: NURSING | Facility: CLINIC | Age: 84
End: 2019-03-22
Payer: COMMERCIAL

## 2019-03-22 DIAGNOSIS — Z79.01 LONG TERM CURRENT USE OF ANTICOAGULANTS WITH INR GOAL OF 2.0-3.0: ICD-10-CM

## 2019-03-22 DIAGNOSIS — I48.91 ATRIAL FIBRILLATION (H): ICD-10-CM

## 2019-03-22 LAB — INR POINT OF CARE: 3.9 (ref 0.86–1.14)

## 2019-03-22 PROCEDURE — 99207 ZZC NO CHARGE NURSE ONLY: CPT

## 2019-03-22 PROCEDURE — 36416 COLLJ CAPILLARY BLOOD SPEC: CPT

## 2019-03-22 PROCEDURE — 85610 PROTHROMBIN TIME: CPT | Mod: QW

## 2019-03-22 NOTE — PROGRESS NOTES
ANTICOAGULATION FOLLOW-UP CLINIC VISIT    Patient Name:  Maryalice Rebecca Wachter  Date:  3/22/2019  Contact Type:  Face to Face    SUBJECTIVE:     Patient Findings     Positives:   Change in medications    Comments:   Patient started on Prednisone 20mg daily 3/8/2019 x 1 month.  Will monitor closely.             OBJECTIVE    INR Protime   Date Value Ref Range Status   03/22/2019 3.9 (A) 0.86 - 1.14 Final       ASSESSMENT / PLAN  INR assessment SUPRA    Recheck INR In: 2 WEEKS    INR Location Clinic      Anticoagulation Summary  As of 3/22/2019    INR goal:   2.0-3.0   TTR:   65.7 % (2.9 y)   INR used for dosing:   3.9! (3/22/2019)   Warfarin maintenance plan:   4.5 mg (3 mg x 1.5) every Tue; 3 mg (3 mg x 1) all other days   Full warfarin instructions:   3/22: Hold; 3/26: 1.5 mg; 3/29: 1.5 mg; 4/2: 1.5 mg; Otherwise 4.5 mg every Tue; 3 mg all other days   Weekly warfarin total:   22.5 mg   Plan last modified:   Cyndi Cabello RN (8/24/2018)   Next INR check:   4/5/2019   Priority:   INR   Target end date:       Indications    Atrial fibrillation (H) [I48.91]  Long term current use of anticoagulants with INR goal of 2.0-3.0 [Z79.01]             Anticoagulation Episode Summary     INR check location:       Preferred lab:       Send INR reminders to:    TRIAGE POOL    Comments:         Anticoagulation Care Providers     Provider Role Specialty Phone number    Venu Maria PA-C Responsible Physician Assistant - Medical [phone]            See the Encounter Report to view Anticoagulation Flowsheet and Dosing Calendar (Go to Encounters tab in chart review, and find the Anticoagulation Therapy Visit)    Dosage adjustment made based on physician directed care plan.    Cyndi Cabello RN

## 2019-03-24 DIAGNOSIS — E11.65 TYPE 2 DIABETES MELLITUS WITH HYPERGLYCEMIA, WITHOUT LONG-TERM CURRENT USE OF INSULIN (H): ICD-10-CM

## 2019-03-25 NOTE — TELEPHONE ENCOUNTER
"Requested Prescriptions   Pending Prescriptions Disp Refills     ONETOUCH ULTRA test strip [Pharmacy Med Name: ONETOUCH ULTRA BLUE  STRP] 100 each 3    Last Written Prescription Date:  03/23/2017  Last Fill Quantity: 100 strips,  # refills: 3   Last office visit: 3/15/2019 with prescribing provider:  03/15/2019   Future Office Visit:   Next 5 appointments (look out 90 days)    Apr 15, 2019  1:00 PM CDT  SHORT with Janey Romero RPH  [facility] ([facility] [address] [facility] [address]  [phone]          Sig: USE TO TEST BLOOD SUGAR TWICE A DAY OR AS DIRECTED    Diabetic Supplies Protocol Failed - 3/24/2019  6:51 AM       Failed - Medication is active on med list       Passed - Patient is 18 years of age or older       Passed - Recent (6 mo) or future (30 days) visit within the authorizing provider's specialty    Patient had office visit in the last 6 months or has a visit in the next 30 days with authorizing provider.  See \"Patient Info\" tab in inbasket, or \"Choose Columns\" in Meds & Orders section of the refill encounter.            Toby Huerta XRT  "

## 2019-03-27 ENCOUNTER — MYC MEDICAL ADVICE (OUTPATIENT)
Dept: FAMILY MEDICINE | Facility: CLINIC | Age: 84
End: 2019-03-27

## 2019-03-27 NOTE — TELEPHONE ENCOUNTER
Pt called, appointment with Bettina Schroeder scheduled.       Mar 28, 2019 11:20 AM CDT  SHORT with FINESSE Bravo CNP  [facility] [id number] [place] [address], [facility] [address]  [place] [address] [phone]       Ben Lebron   03/27/19 9:05 AM

## 2019-03-28 ENCOUNTER — OFFICE VISIT (OUTPATIENT)
Dept: FAMILY MEDICINE | Facility: CLINIC | Age: 84
End: 2019-03-28
Payer: COMMERCIAL

## 2019-03-28 VITALS
SYSTOLIC BLOOD PRESSURE: 132 MMHG | HEIGHT: 65 IN | HEART RATE: 61 BPM | DIASTOLIC BLOOD PRESSURE: 60 MMHG | BODY MASS INDEX: 32.82 KG/M2 | OXYGEN SATURATION: 95 % | WEIGHT: 197 LBS | RESPIRATION RATE: 14 BRPM | TEMPERATURE: 98.4 F

## 2019-03-28 DIAGNOSIS — R60.0 BILATERAL LOWER EXTREMITY EDEMA: Primary | ICD-10-CM

## 2019-03-28 DIAGNOSIS — E11.65 TYPE 2 DIABETES MELLITUS WITH HYPERGLYCEMIA, WITHOUT LONG-TERM CURRENT USE OF INSULIN (H): ICD-10-CM

## 2019-03-28 PROCEDURE — 99214 OFFICE O/P EST MOD 30 MIN: CPT | Performed by: NURSE PRACTITIONER

## 2019-03-28 RX ORDER — FUROSEMIDE 20 MG
20 TABLET ORAL DAILY
Qty: 7 TABLET | Refills: 0 | Status: SHIPPED | OUTPATIENT
Start: 2019-03-28 | End: 2019-04-15

## 2019-03-28 ASSESSMENT — MIFFLIN-ST. JEOR: SCORE: 1339.47

## 2019-03-28 NOTE — PROGRESS NOTES
"  SUBJECTIVE:   Maryalice Rebecca Wachter is a 85 year old female who presents to clinic today for the following health issues:      Concern - edema and weeping leg  Onset: 8 -10 days ago started weeping, has had swollen legs for quite some time    Description:   Left shin lower is weeping in one spot, increased edema    Intensity: moderate    Progression of Symptoms:  worsening    Accompanying Signs & Symptoms:  none    Previous history of similar problem:   History of the edema, but not the weeping    Precipitating factors:     Worsened by: not elevating  legs    Alleviating factors:Improved by: elevating the legs    Therapies Tried and outcome:     Has compression stockings, but does not wear these.  Was in the clinic 2 weeks for COPD and SOB.  BNP and CXR normal.  She completed course of abx, three days of lasix, and is still taking prednisone daily.  She reports she feels \"as puffed up as a puffer fish\" and that her blood sugars are much higher than usual.  She denies CP, SOB, cough.       Problem list and histories reviewed & adjusted, as indicated.  Additional history: as documented    Current Outpatient Medications   Medication Sig Dispense Refill     albuterol (ALBUTEROL) 108 (90 BASE) MCG/ACT inhaler Inhale 2 puffs into the lungs every 6 hours as needed Reported on 3/2/2017       albuterol (PROVENTIL) (2.5 MG/3ML) 0.083% neb solution INHALE CONTENTS OF 1 VIAL (3 ML) VIA NEBULIZER EVERY 6 HOURS AS NEEDED FOR SHORTNESS OF BREATH / DYSPNEA OR WHEEZING 180 mL 1     ascorbic acid (VITAMIN C) 500 MG tablet Take 500 mg by mouth daily.       ASPIRIN EC PO Take 81 mg by mouth daily        calcium carb 1250 mg, 500 mg [place],/vitamin D 200 units (OSCAL WITH D) 500-200 MG-UNIT per tablet Take 1 tablet by mouth daily.       ferrous sulfate 325 (65 FE) MG tablet Take 1 tablet by mouth daily (with breakfast).       glipiZIDE (GLUCOTROL XL) 10 MG 24 hr tablet TAKE ONE TABLET BY MOUTH TWICE A  tablet 0     " hydrochlorothiazide (HYDRODIURIL) 25 MG tablet Take 25 mg by mouth daily       levothyroxine (SYNTHROID/LEVOTHROID) 75 MCG tablet Take 1 tablet (75 mcg) by mouth daily 90 tablet 2     lisinopril (PRINIVIL/ZESTRIL) 40 MG tablet TAKE ONE TABLET BY MOUTH EVERY DAY 90 tablet 1     LOPERAMIDE HCL PO 1/2 to 1 tablet by mouth as needed.       metFORMIN (GLUCOPHAGE) 500 MG tablet Take 1 tablet (500 mg) by mouth 2 times daily (with meals) 180 tablet 1     metoprolol tartrate (LOPRESSOR) 50 MG tablet TAKE ONE TABLET BY MOUTH TWICE A  tablet 1     mometasone (ASMANEX) 220 MCG/INH inhaler Inhale 2 puffs into the lungs daily as needed        polycarbophil (FIBERCON) 625 MG tablet Take 1 tablet by mouth 2 times daily.       predniSONE (DELTASONE) 20 MG tablet Take 20 mg by mouth daily. 40 tablet 0     QUEtiapine (SEROQUEL) 25 MG tablet Take 1/2 tab at bedtime to sleep, ok to increase by 1/2 tab every 3 nights up to 2 tabs daily if needed. 60 tablet 5     ranitidine (ZANTAC) 150 MG tablet Take 1 tablet (150 mg) by mouth nightly as needed for heartburn 60 tablet 1     simvastatin (ZOCOR) 20 MG tablet TAKE ONE TABLET BY MOUTH AT BEDTIME 90 tablet 3     tiotropium (SPIRIVA) 18 MCG inhalation capsule Inhale 18 mcg into the lungs daily.       VITAMIN D, CHOLECALCIFEROL, PO Take 2,000 Units by mouth daily       warfarin (COUMADIN) 3 MG tablet TAKE 1 1/2 TABLETS ON TUESDAY. TAKE 1 TABLET DAILY ALL OTHER DAYS AS PER COUMADIN CLINIC. 90 tablet 1     blood glucose monitoring (ONE TOUCH ULTRA 2) meter device kit Use to test blood sugar 1 time daily or as directed.  Ok to substitute alternative if insurance prefers. 1 kit 0     Cyanocobalamin (B-12) 1000 MCG CAPS Take 1 capsule by mouth daily        furosemide (LASIX) 80 MG tablet Take 1 tablet (80 mg) by mouth daily Use for three days then stop (Patient not taking: Reported on 3/15/2019) 6 tablet 0     hydrochlorothiazide (HYDRODIURIL) 25 MG tablet TAKE ONE TABLET BY MOUTH EVERY DAY  "(Patient not taking: Reported on 3/28/2019) 90 tablet 0     melatonin 3 MG tablet Take 1 tablet (3 mg) by mouth nightly as needed for sleep (Patient not taking: Reported on 3/28/2019) 30 tablet 0     METFORMIN HCL PO Take 500 mg by mouth 2 times daily (with meals)       ONETOUCH ULTRA test strip USE TO TEST BLOOD SUGAR TWICE A DAY OR AS DIRECTED 100 each 3     pioglitazone (ACTOS) 30 MG tablet TAKE ONE TABLET BY MOUTH EVERY DAY 90 tablet 1     Allergies   Allergen Reactions     Penicillins Hives     Amlodipine Other (See Comments)     Too much ankle edema and red itchy spots.      Furosemide Other (See Comments)     Fainted with first dose.        Reviewed and updated as needed this visit by clinical staff  Tobacco  Allergies  Meds  Med Hx  Surg Hx  Fam Hx  Soc Hx      Reviewed and updated as needed this visit by Provider         ROS:  Constitutional, HEENT, cardiovascular, pulmonary, gi and gu systems are negative, except as otherwise noted.    OBJECTIVE:     /60 (BP Location: Right arm, Patient Position: Sitting, Cuff Size: Adult Regular)   Pulse 61   Temp 98.4  F (36.9  C) (Oral)   Resp 14   Ht 1.651 m (5' 5\")   Wt 89.4 kg (197 lb)   SpO2 95%   BMI 32.78 kg/m    Body mass index is 32.78 kg/m .  GENERAL: healthy, alert and no distress  RESP: lungs clear to auscultation - no rales, rhonchi or wheezes  CV: regular rate and rhythm, normal S1 S2, no S3 or S4, no murmur, click or rub, pitting edema to the mid tibia bilat  MS: no gross musculoskeletal defects noted, no edema  SKIN: warm and dry; small shallow weeping wound on the lower, anterior L leg    Diagnostic Test Results:  none     ASSESSMENT/PLAN:   1. Bilateral lower extremity edema  BNP and CXR normal 2 weeks ago.  Will have her start lasix.  Elevate legs and use compression stockings.  Follow up in 1 week.   - furosemide (LASIX) 20 MG tablet; Take 1 tablet (20 mg) by mouth daily  Dispense: 7 tablet; Refill: 0    2. Type 2 diabetes mellitus " with hyperglycemia, without long-term current use of insulin (H)  Prednisone causing elevated sugars; I do not see any reason for her to continue prednisone at this time.  Will have her stop.      FINESSE Franco [facility]

## 2019-04-05 ENCOUNTER — ANTICOAGULATION THERAPY VISIT (OUTPATIENT)
Dept: NURSING | Facility: CLINIC | Age: 84
End: 2019-04-05
Payer: COMMERCIAL

## 2019-04-05 ENCOUNTER — MYC MEDICAL ADVICE (OUTPATIENT)
Dept: PHARMACY | Facility: CLINIC | Age: 84
End: 2019-04-05

## 2019-04-05 DIAGNOSIS — I48.91 ATRIAL FIBRILLATION (H): ICD-10-CM

## 2019-04-05 DIAGNOSIS — Z79.01 LONG TERM CURRENT USE OF ANTICOAGULANTS WITH INR GOAL OF 2.0-3.0: ICD-10-CM

## 2019-04-05 LAB — INR POINT OF CARE: 2 (ref 0.86–1.14)

## 2019-04-05 PROCEDURE — 99207 ZZC NO CHARGE NURSE ONLY: CPT

## 2019-04-05 PROCEDURE — 85610 PROTHROMBIN TIME: CPT | Mod: QW

## 2019-04-05 PROCEDURE — 36416 COLLJ CAPILLARY BLOOD SPEC: CPT

## 2019-04-05 NOTE — PROGRESS NOTES
ANTICOAGULATION FOLLOW-UP CLINIC VISIT    Patient Name:  Maryalice Rebecca Wachter  Date:  2019  Contact Type:  Face to Face    SUBJECTIVE:     Patient Findings     Comments:   Patient states that she stopped taking Prednisone about 5 days ago.  She states that she has not been feeling well.  Increased SOB.  Using nebulizer and rescue inhaler.  Blood sugars still remain in the 200's.           OBJECTIVE    INR Protime   Date Value Ref Range Status   2019 2.0 (A) 0.86 - 1.14 Final       ASSESSMENT / PLAN  INR assessment THER    Recheck INR In: 4 WEEKS    INR Location Clinic      Anticoagulation Summary  As of 2019    INR goal:   2.0-3.0   TTR:   65.5 % (2.9 y)   INR used for dosin.0 (2019)   Warfarin maintenance plan:   4.5 mg (3 mg x 1.5) every Tue; 3 mg (3 mg x 1) all other days   Full warfarin instructions:   4.5 mg every Tue; 3 mg all other days   Weekly warfarin total:   22.5 mg   No change documented:   Cyndi Cabello RN   Plan last modified:   Cyndi Cabello RN (2018)   Next INR check:   5/3/2019   Priority:   INR   Target end date:       Indications    Atrial fibrillation (H) [I48.91]  Long term current use of anticoagulants with INR goal of 2.0-3.0 [Z79.01]             Anticoagulation Episode Summary     INR check location:       Preferred lab:       Send INR reminders to:    TRIAGE POOL    Comments:         Anticoagulation Care Providers     Provider Role Specialty Phone number    Venu Maria PA-C Responsible Physician Assistant - Medical [phone]            See the Encounter Report to view Anticoagulation Flowsheet and Dosing Calendar (Go to Encounters tab in chart review, and find the Anticoagulation Therapy Visit)      Cyndi Cabello RN

## 2019-04-06 NOTE — TELEPHONE ENCOUNTER
4-5-19    It has been 8 days since I stopped Prednesone, but my Blood sugar  is still ar;ound 200 every day. I still feel rotten even though I was in  to see the INR nurse today, and she couldn't see any reason why.  Sindi rouse will have her wait one more week --if bs's still >200 --make appt. To see me asap.    Janey Romero [facility].  Medication Therapy Management Provider  [phone]

## 2019-04-11 DIAGNOSIS — E11.65 TYPE 2 DIABETES MELLITUS WITH HYPERGLYCEMIA, WITHOUT LONG-TERM CURRENT USE OF INSULIN (H): ICD-10-CM

## 2019-04-12 NOTE — TELEPHONE ENCOUNTER
Requested Prescriptions   Pending Prescriptions Disp Refills     glipiZIDE (GLUCOTROL XL) 10 MG 24 hr tablet [Pharmacy Med Name: GLIPIZIDE ER 10MG TAB] 180 tablet 0     Sig: TAKE ONE TABLET BY MOUTH TWICE A DAY   Last Written Prescription Date:  1/4/19  Last Fill Quantity: 180,  # refills: 0   Last Office Visit: 3/28/2019 Strong      Return in about 1 week (around 4/4/2019) for edema follow up .     Future Office Visit:    Next 5 appointments (look out 90 days)    Apr 15, 2019  9:40 AM CDT  [facility] [address], [address]  [facility] [address] [phone]   May 30, 2019  1:00 PM CDT  SHORT with Janey Romero RPH  [facility]) [address] [address] [phone]             Sulfonylurea Agents Failed - 4/11/2019  8:15 PM        Failed - Patient has had a Microalbumin in the past 12 mos.     Recent Labs   Lab Test 10/23/17  1341   MICROL 22   UMALCR 19.91             Failed - Patient has a recent creatinine (normal) within the past 12 mos.     Recent Labs   Lab Test 08/03/18  0913   CR 1.06*             Passed - Blood pressure less than 140/90 in past 6 months     BP Readings from Last 3 Encounters:   03/28/19 132/60   03/15/19 130/60   03/08/19 120/60                 Passed - Patient has documented LDL within the past 12 mos.     Recent Labs   Lab Test 08/03/18  0913   LDL 27             Passed - Patient has documented A1c within the specified period of time.     If HgbA1C is 8 or greater, it needs to be on file within the past 3 months.  If less than 8, must be on file within the past 6 months.     Recent Labs   Lab Test 10/15/18  1355   A1C 7.2*             Passed - Medication is active on med list        Passed - Patient is age 18 or older        Passed - No active pregnancy on record        Passed - Patient has not had a positive  "pregnancy test within the past 12 mos.        Passed - Recent (6 mo) or future (30 days) visit within the authorizing provider's specialty     Patient had office visit in the last 6 months or has a visit in the next 30 days with authorizing provider or within the authorizing provider's specialty.  See \"Patient Info\" tab in inbasket, or \"Choose Columns\" in Meds & Orders section of the refill encounter.            "

## 2019-04-15 ENCOUNTER — OFFICE VISIT (OUTPATIENT)
Dept: FAMILY MEDICINE | Facility: CLINIC | Age: 84
End: 2019-04-15
Payer: COMMERCIAL

## 2019-04-15 VITALS
TEMPERATURE: 98.4 F | RESPIRATION RATE: 16 BRPM | WEIGHT: 195.3 LBS | DIASTOLIC BLOOD PRESSURE: 54 MMHG | HEART RATE: 60 BPM | SYSTOLIC BLOOD PRESSURE: 120 MMHG | OXYGEN SATURATION: 97 % | BODY MASS INDEX: 32.5 KG/M2

## 2019-04-15 DIAGNOSIS — J43.1 PANLOBULAR EMPHYSEMA (H): Primary | ICD-10-CM

## 2019-04-15 DIAGNOSIS — D64.9 ANEMIA, UNSPECIFIED TYPE: ICD-10-CM

## 2019-04-15 DIAGNOSIS — J44.1 COPD EXACERBATION (H): ICD-10-CM

## 2019-04-15 DIAGNOSIS — E11.65 TYPE 2 DIABETES MELLITUS WITH HYPERGLYCEMIA, WITHOUT LONG-TERM CURRENT USE OF INSULIN (H): ICD-10-CM

## 2019-04-15 DIAGNOSIS — I48.20 CHRONIC ATRIAL FIBRILLATION (H): ICD-10-CM

## 2019-04-15 DIAGNOSIS — Z86.2 HISTORY OF SARCOIDOSIS: ICD-10-CM

## 2019-04-15 LAB
ERYTHROCYTE [DISTWIDTH] IN BLOOD BY AUTOMATED COUNT: 14.9 % (ref 10–15)
HCT VFR BLD AUTO: 35.5 % (ref 35–47)
HGB BLD-MCNC: 11.1 G/DL (ref 11.7–15.7)
MCH RBC QN AUTO: 31.4 PG (ref 26.5–33)
MCHC RBC AUTO-ENTMCNC: 31.3 G/DL (ref 31.5–36.5)
MCV RBC AUTO: 100 FL (ref 78–100)
PLATELET # BLD AUTO: 426 10E9/L (ref 150–450)
RBC # BLD AUTO: 3.54 10E12/L (ref 3.8–5.2)
WBC # BLD AUTO: 5.2 10E9/L (ref 4–11)

## 2019-04-15 PROCEDURE — 36415 COLL VENOUS BLD VENIPUNCTURE: CPT | Performed by: FAMILY MEDICINE

## 2019-04-15 PROCEDURE — 82043 UR ALBUMIN QUANTITATIVE: CPT | Performed by: FAMILY MEDICINE

## 2019-04-15 PROCEDURE — 85027 COMPLETE CBC AUTOMATED: CPT | Performed by: FAMILY MEDICINE

## 2019-04-15 PROCEDURE — 99214 OFFICE O/P EST MOD 30 MIN: CPT | Performed by: FAMILY MEDICINE

## 2019-04-15 ASSESSMENT — ENCOUNTER SYMPTOMS
WHEEZING: 0
PALPITATIONS: 0
BLURRED VISION: 0
CARDIOVASCULAR NEGATIVE: 1
SHORTNESS OF BREATH: 1
CONSTITUTIONAL NEGATIVE: 1
COUGH: 0
DOUBLE VISION: 0
HEADACHES: 0

## 2019-04-15 NOTE — LETTER
My Depression Action Plan  Name: Maryalice Rebecca Wachter   Date of Birth 11/11/1933  Date: 4/15/2019    My doctor: Venu Maria   My clinic: [address], [address]  [facility] [address] [phone]          GREEN    ZONE   Good Control    What it looks like:     Things are going generally well. You have normal up s and down s. You may even feel depressed from time to time, but bad moods usually last less than a day.   What you need to do:  1. Continue to care for yourself (see self care plan)  2. Check your depression survival kit and update it as needed  3. Follow your physician s recommendations including any medication.  4. Do not stop taking medication unless you consult with your physician first.           YELLOW         ZONE Getting Worse    What it looks like:     Depression is starting to interfere with your life.     It may be hard to get out of bed; you may be starting to isolate yourself from others.    Symptoms of depression are starting to last most all day and this has happened for several days.     You may have suicidal thoughts but they are not constant.   What you need to do:     1. Call your care team, your response to treatment will improve if you keep your care team informed of your progress. Yellow periods are signs an adjustment may need to be made.     2. Continue your self-care, even if you have to fake it!    3. Talk to someone in your support network    4. Open up your depression survival kit           RED    ZONE Medical Alert - Get Help    What it looks like:     Depression is seriously interfering with your life.     You may experience these or other symptoms: You can t get out of bed most days, can t work or engage in other necessary activities, you have trouble taking care of basic hygiene, or basic responsibilities, thoughts of suicide or death that will not go away, self-injurious behavior.     What you need to  do:  1. Call your care team and request a same-day appointment. If they are not available (weekends or after hours) call your local crisis line, emergency room or 911.            Depression Self Care Plan / Survival Kit    Self-Care for Depression  Here s the deal. Your body and mind are really not as separate as most people think.  What you do and think affects how you feel and how you feel influences what you do and think. This means if you do things that people who feel good do, it will help you feel better.  Sometimes this is all it takes.  There is also a place for medication and therapy depending on how severe your depression is, so be sure to consult with your medical provider and/ or Behavioral Health Consultant if your symptoms are worsening or not improving.     In order to better manage my stress, I will:    Exercise  Get some form of exercise, every day. This will help reduce pain and release endorphins, the  feel good  chemicals in your brain. This is almost as good as taking antidepressants!  This is not the same as joining a gym and then never going! (they count on that by the way ) It can be as simple as just going for a walk or doing some gardening, anything that will get you moving.      Hygiene   Maintain good hygiene (Get out of bed in the morning, Make your bed, Brush your teeth, Take a shower, and Get dressed like you were going to work, even if you are unemployed).  If your clothes don't fit try to get ones that do.    Diet  I will strive to eat foods that are good for me, drink plenty of water, and avoid excessive sugar, caffeine, alcohol, and other mood-altering substances.  Some foods that are helpful in depression are: complex carbohydrates, B vitamins, flaxseed, fish or fish oil, fresh fruits and vegetables.    Psychotherapy  I agree to participate in Individual Therapy (if recommended).    Medication  If prescribed medications, I agree to take them.  Missing doses can result in serious  side effects.  I understand that drinking alcohol, or other illicit drug use, may cause potential side effects.  I will not stop my medication abruptly without first discussing it with my provider.    Staying Connected With Others  I will stay in touch with my friends, family members, and my primary care provider/team.    Use your imagination  Be creative.  We all have a creative side; it doesn t matter if it s oil painting, sand castles, or mud pies! This will also kick up the endorphins.    Witness Beauty  (AKA stop and smell the roses) Take a look outside, even in mid-winter. Notice colors, textures. Watch the squirrels and birds.     Service to others  Be of service to others.  There is always someone else in need.  By helping others we can  get out of ourselves  and remember the really important things.  This also provides opportunities for practicing all the other parts of the program.    Humor  Laugh and be silly!  Adjust your TV habits for less news and crime-drama and more comedy.    Control your stress  Try breathing deep, massage therapy, biofeedback, and meditation. Find time to relax each day.     My support system    Clinic Contact:  Phone number:    Contact 1:  Phone number:    Contact 2:  Phone number:    Restoration/:  Phone number:    Therapist:  Phone number:    Local crisis center:    Phone number:    Other community support:  Phone number:

## 2019-04-15 NOTE — PROGRESS NOTES
HPI    SUBJECTIVE:   Maryalice Rebecca Wachter is a 85 year old female who presents to clinic today for the following health issues:    Leg edema has resolved, had been weeping, this has resolved.    COPD diagnosis, started from sarcoidosis. Has a pulmonologist, last seen there 1/19.  Reports that is using albuterol inhaler 4x daily, doesn't really seem to help. Uses at least once daily.  Notes that neb seems to owrk much better than inhaler.  Does also use Spireva and Asmanex.       Did use a little bit of lasix for a week, didn't seem to make a big difference in how much she urinated.  Does wear compression hose daily.    Is working with Janey Adan on diabetes controll.     Review of Systems   Constitutional: Negative.    Eyes: Negative for blurred vision and double vision.   Respiratory: Positive for shortness of breath. Negative for cough and wheezing.    Cardiovascular: Negative.  Negative for chest pain and palpitations.   Neurological: Negative for headaches.         Physical Exam   Constitutional: She is oriented to person, place, and time.   Eyes: Conjunctivae and EOM are normal.   Cardiovascular: Normal rate, regular rhythm and normal heart sounds.   Pulmonary/Chest: Effort normal and breath sounds normal.   Musculoskeletal: She exhibits no edema.   Neurological: She is alert and oriented to person, place, and time.   Skin: Skin is warm and dry.   Vitals reviewed.    (J43.1) Panlobular emphysema (H)  (primary encounter diagnosis)  Comment: will get optichamber, but sounds as if she needs a tune up with pulm  Recommend she f/u with them soon  Plan: mometasone (ASMANEX) 110 MCG/INH inhaler,         Spacer/Aero-Holding Chambers (OPTICHAMBER         ADVANTAGE) MISC            (I48.2) Chronic atrial fibrillation (H)  Comment: chornic, on anticoag  Plan:     (Z86.2) History of sarcoidosis  Comment:   Plan: noted for PL    (D64.9) Anemia, unspecified type  Comment: rechecking today  Plan:     (E11.65) Type 2  diabetes mellitus with hyperglycemia, without long-term current use of insulin (H)  Comment:   Plan: Hemoglobin A1c, Albumin Random Urine         Quantitative with Creat Ratio              RTC in     Bird Suazo MD

## 2019-04-16 LAB
CREAT UR-MCNC: 151 MG/DL
MICROALBUMIN UR-MCNC: 30 MG/L
MICROALBUMIN/CREAT UR: 20.2 MG/G CR (ref 0–25)

## 2019-04-16 NOTE — TELEPHONE ENCOUNTER
"Requested Prescriptions   Pending Prescriptions Disp Refills     albuterol (PROVENTIL) (2.5 MG/3ML) 0.083% neb solution [Pharmacy Med Name: ALBUTEROL SULFATE 2.5 MG/3ML NEBU] 180 mL 1     Sig: INHALE CONTENTS OF 1 VIAL (3 ML) VIA NEBULIZER EVERY 6 HOURS AS NEEDED FOR SHORTNESS OF BREATH / DYSPNEA OR WHEEZING    (Historical)        Last Office Visit with [facility] or [facility] prescribing provider: 4/15/2019  Suazo      Return in about 3 months (around 7/15/2019).     Future Office Visit:    Next 5 appointments (look out 90 days)    May 30, 2019  1:00 PM CDT  SHORT with Janey Romero RPH  [facility] ([facility]) [address] [address]  [phone]             Asthma Maintenance Inhalers - Anticholinergics Passed - 4/15/2019 10:39 PM        Passed - Patient is age 12 years or older        Passed - Recent (12 mo) or future (30 days) visit within the authorizing provider's specialty     Patient had office visit in the last 12 months or has a visit in the next 30 days with authorizing provider or within the authorizing provider's specialty.  See \"Patient Info\" tab in inbasket, or \"Choose Columns\" in Meds & Orders section of the refill encounter.              Passed - Medication is active on med list        "

## 2019-04-17 LAB — HBA1C MFR BLD: 7.8 % (ref 0–5.6)

## 2019-04-17 PROCEDURE — 83036 HEMOGLOBIN GLYCOSYLATED A1C: CPT | Performed by: PHYSICIAN ASSISTANT

## 2019-04-17 PROCEDURE — 36415 COLL VENOUS BLD VENIPUNCTURE: CPT | Performed by: PHYSICIAN ASSISTANT

## 2019-04-17 RX ORDER — ALBUTEROL SULFATE 0.83 MG/ML
SOLUTION RESPIRATORY (INHALATION)
Qty: 180 ML | Refills: 1 | Status: SHIPPED | OUTPATIENT
Start: 2019-04-17 | End: 2019-08-10

## 2019-04-17 RX ORDER — GLIPIZIDE 10 MG/1
TABLET, FILM COATED, EXTENDED RELEASE ORAL
Qty: 180 TABLET | Refills: 1 | Status: SHIPPED | OUTPATIENT
Start: 2019-04-17 | End: 2019-10-03

## 2019-04-17 NOTE — TELEPHONE ENCOUNTER
4-17-19        Venu --do NOT do a1c today --she has been on prednisone and bs's have been higher --she will be seeing me on 5-31-19 for a1c lab that day .    She is just fine for now on 10mg. Er glip /day .     Thanks ,Janey Romero, [facility].  Medication Therapy Management Provider  [phone]

## 2019-04-17 NOTE — TELEPHONE ENCOUNTER
Janey- I'm going to try and add A1C from 4/15/19 visit.  Is she really on 10mg XL glipizide BID?  Is that a lot??    Just wanted to confirm this dosing.    Hopefully the lab can be added on.      Let me know if dose is correct and I can refill but we should get an A1C.  Does she have a visit coming up with you soon?    Venu

## 2019-04-17 NOTE — TELEPHONE ENCOUNTER
Routing refill request to provider for review/approval because:  Medication is reported/historical  Cayla Ozuna RN, BSN

## 2019-05-03 ENCOUNTER — ANTICOAGULATION THERAPY VISIT (OUTPATIENT)
Dept: NURSING | Facility: CLINIC | Age: 84
End: 2019-05-03
Payer: COMMERCIAL

## 2019-05-03 DIAGNOSIS — I48.91 ATRIAL FIBRILLATION (H): ICD-10-CM

## 2019-05-03 DIAGNOSIS — Z79.01 LONG TERM CURRENT USE OF ANTICOAGULANTS WITH INR GOAL OF 2.0-3.0: ICD-10-CM

## 2019-05-03 LAB — INR POINT OF CARE: 2.4 (ref 0.86–1.14)

## 2019-05-03 PROCEDURE — 99207 ZZC NO CHARGE NURSE ONLY: CPT

## 2019-05-03 PROCEDURE — 85610 PROTHROMBIN TIME: CPT | Mod: QW

## 2019-05-03 PROCEDURE — 36416 COLLJ CAPILLARY BLOOD SPEC: CPT

## 2019-05-03 NOTE — PROGRESS NOTES
ANTICOAGULATION FOLLOW-UP CLINIC VISIT    Patient Name:  Maryalice Rebecca Wachter  Date:  5/3/2019  Contact Type:  Face to Face    SUBJECTIVE:     Patient Findings            OBJECTIVE    INR Protime   Date Value Ref Range Status   2019 2.4 (A) 0.86 - 1.14 Final       ASSESSMENT / PLAN  No question data found.  Anticoagulation Summary  As of 5/3/2019    INR goal:   2.0-3.0   TTR:   66.4 % (3 y)   INR used for dosin.4 (5/3/2019)   Warfarin maintenance plan:   4.5 mg (3 mg x 1.5) every Tue; 3 mg (3 mg x 1) all other days   Full warfarin instructions:   4.5 mg every Tue; 3 mg all other days   Weekly warfarin total:   22.5 mg   No change documented:   Cyndi Cabello RN   Plan last modified:   Cyndi Cabello RN (2018)   Next INR check:   2019   Priority:   INR   Target end date:       Indications    Atrial fibrillation (H) [I48.91]  Long term current use of anticoagulants with INR goal of 2.0-3.0 [Z79.01]             Anticoagulation Episode Summary     INR check location:       Preferred lab:       Send INR reminders to:    TRIAGE POOL    Comments:         Anticoagulation Care Providers     Provider Role Specialty Phone number    Venu Maria PA-C Responsible Physician Assistant - Medical [phone]            See the Encounter Report to view Anticoagulation Flowsheet and Dosing Calendar (Go to Encounters tab in chart review, and find the Anticoagulation Therapy Visit)      Cyndi Cabello RN

## 2019-06-03 ENCOUNTER — TRANSFERRED RECORDS (OUTPATIENT)
Dept: HEALTH INFORMATION MANAGEMENT | Facility: CLINIC | Age: 84
End: 2019-06-03

## 2019-06-18 ENCOUNTER — ANTICOAGULATION THERAPY VISIT (OUTPATIENT)
Dept: NURSING | Facility: CLINIC | Age: 84
End: 2019-06-18
Payer: COMMERCIAL

## 2019-06-18 DIAGNOSIS — I48.91 ATRIAL FIBRILLATION (H): ICD-10-CM

## 2019-06-18 DIAGNOSIS — Z79.01 LONG TERM CURRENT USE OF ANTICOAGULANTS WITH INR GOAL OF 2.0-3.0: ICD-10-CM

## 2019-06-18 LAB — INR POINT OF CARE: 2.1 (ref 0.86–1.14)

## 2019-06-18 PROCEDURE — 36416 COLLJ CAPILLARY BLOOD SPEC: CPT

## 2019-06-18 PROCEDURE — 85610 PROTHROMBIN TIME: CPT | Mod: QW

## 2019-06-18 NOTE — PROGRESS NOTES
ANTICOAGULATION FOLLOW-UP CLINIC VISIT    Patient Name:  Maryalice Rebecca Wachter  Date:  2019  Contact Type:  Face to Face    SUBJECTIVE:  Patient Findings     Comments:   The patient was assessed for diet, medication, and activity level changes, missed or extra doses, bruising or bleeding, with no problem findings.          Clinical Outcomes     Negatives:   Major bleeding event, Thromboembolic event, Anticoagulation-related hospital admission, Anticoagulation-related ED visit, Anticoagulation-related fatality    Comments:   The patient was assessed for diet, medication, and activity level changes, missed or extra doses, bruising or bleeding, with no problem findings.             OBJECTIVE    INR Protime   Date Value Ref Range Status   2019 2.1 (A) 0.86 - 1.14 Final       ASSESSMENT / PLAN  INR assessment THER    Recheck INR In: 6 WEEKS    INR Location Clinic      Anticoagulation Summary  As of 2019    INR goal:   2.0-3.0   TTR:   67.8 % (3.1 y)   INR used for dosin.1 (2019)   Warfarin maintenance plan:   4.5 mg (3 mg x 1.5) every Tue; 3 mg (3 mg x 1) all other days   Full warfarin instructions:   4.5 mg every Tue; 3 mg all other days   Weekly warfarin total:   22.5 mg   No change documented:   Jacquelyn Hagen RN   Plan last modified:   Cyndi Cabello RN (2018)   Next INR check:   2019   Priority:   INR   Target end date:       Indications    Atrial fibrillation (H) [I48.91]  Long term current use of anticoagulants with INR goal of 2.0-3.0 [Z79.01]             Anticoagulation Episode Summary     INR check location:       Preferred lab:       Send INR reminders to:    TRIAGE POOL    Comments:         Anticoagulation Care Providers     Provider Role Specialty Phone number    Venu Maria PA-C Responsible Physician Assistant - Medical [phone]            See the Encounter Report to view Anticoagulation Flowsheet and Dosing Calendar (Go to Encounters tab in chart  review, and find the Anticoagulation Therapy Visit)        Jacquelyn Hagen RN

## 2019-06-25 DIAGNOSIS — I10 ESSENTIAL HYPERTENSION: ICD-10-CM

## 2019-06-26 NOTE — TELEPHONE ENCOUNTER
"Requested Prescriptions   Pending Prescriptions Disp Refills     hydrochlorothiazide (HYDRODIURIL) 25 MG tablet [Pharmacy Med Name: HYDROCHLOROTHIAZIDE 25MG TABS] 90 tablet 0     Sig: TAKE ONE TABLET BY MOUTH EVERY DAY   Last Written Prescription Date:  3/22/19  Last Fill Quantity: 90,  # refills: 0   Last Office Visit: 4/15/2019 Suazo      Return in about 3 months (around 7/15/2019).     Future Office Visit:    Next 5 appointments (look out 90 days)    Jul 30, 2019 10:30 AM CDT  Nurse Only with FM RN VISITS  [facility] [address], [address]  [place] [facility] [address]  [phone]   Aug 12, 2019  1:30 PM CDT  SHORT with Janey Romero [facility] ([facility]) [address] [address]  [phone]             Diuretics (Including Combos) Protocol Failed - 6/25/2019  8:43 PM        Failed - Normal serum creatinine on file in past 12 months     Recent Labs   Lab Test 08/03/18 0913   CR 1.06*              Passed - Blood pressure under 140/90 in past 12 months     BP Readings from Last 3 Encounters:   04/15/19 120/54   03/28/19 132/60   03/15/19 130/60                 Passed - Recent (12 mo) or future (30 days) visit within the authorizing provider's specialty     Patient had office visit in the last 12 months or has a visit in the next 30 days with authorizing provider or within the authorizing provider's specialty.  See \"Patient Info\" tab in inbasket, or \"Choose Columns\" in Meds & Orders section of the refill encounter.              Passed - Medication is active on med list        Passed - Patient is age 18 or older        Passed - No active pregancy on record        Passed - Normal serum potassium on file in past 12 months     Recent Labs   Lab Test 08/03/18 0913   POTASSIUM 4.3                    Passed - Normal serum sodium on file in past 12 months     Recent Labs   Lab Test 08/03/18 0913 "                 Passed - No positive pregnancy test in past 12 months

## 2019-06-27 RX ORDER — HYDROCHLOROTHIAZIDE 25 MG/1
TABLET ORAL
Qty: 90 TABLET | Refills: 0 | Status: SHIPPED | OUTPATIENT
Start: 2019-06-27 | End: 2019-09-19

## 2019-06-27 NOTE — TELEPHONE ENCOUNTER
Prescription approved per [facility] Refill Protocol.    Shira Graham RN Flex

## 2019-07-15 ENCOUNTER — OFFICE VISIT (OUTPATIENT)
Dept: FAMILY MEDICINE | Facility: CLINIC | Age: 84
End: 2019-07-15
Payer: COMMERCIAL

## 2019-07-15 VITALS
WEIGHT: 189 LBS | DIASTOLIC BLOOD PRESSURE: 50 MMHG | HEIGHT: 65 IN | TEMPERATURE: 98.4 F | HEART RATE: 70 BPM | RESPIRATION RATE: 16 BRPM | BODY MASS INDEX: 31.49 KG/M2 | SYSTOLIC BLOOD PRESSURE: 112 MMHG

## 2019-07-15 DIAGNOSIS — Z00.00 ENCOUNTER FOR MEDICARE ANNUAL WELLNESS EXAM: Primary | ICD-10-CM

## 2019-07-15 DIAGNOSIS — I25.119 ATHEROSCLEROSIS OF NATIVE CORONARY ARTERY OF NATIVE HEART WITH ANGINA PECTORIS (H): ICD-10-CM

## 2019-07-15 DIAGNOSIS — N18.30 CKD (CHRONIC KIDNEY DISEASE) STAGE 3, GFR 30-59 ML/MIN (H): ICD-10-CM

## 2019-07-15 DIAGNOSIS — E11.65 TYPE 2 DIABETES MELLITUS WITH HYPERGLYCEMIA, WITHOUT LONG-TERM CURRENT USE OF INSULIN (H): ICD-10-CM

## 2019-07-15 DIAGNOSIS — I10 ESSENTIAL HYPERTENSION: ICD-10-CM

## 2019-07-15 LAB
ERYTHROCYTE [DISTWIDTH] IN BLOOD BY AUTOMATED COUNT: 16 % (ref 10–15)
HCT VFR BLD AUTO: 39.3 % (ref 35–47)
HGB BLD-MCNC: 12.1 G/DL (ref 11.7–15.7)
MCH RBC QN AUTO: 29.9 PG (ref 26.5–33)
MCHC RBC AUTO-ENTMCNC: 30.8 G/DL (ref 31.5–36.5)
MCV RBC AUTO: 97 FL (ref 78–100)
PLATELET # BLD AUTO: 266 10E9/L (ref 150–450)
RBC # BLD AUTO: 4.05 10E12/L (ref 3.8–5.2)
WBC # BLD AUTO: 6.4 10E9/L (ref 4–11)

## 2019-07-15 PROCEDURE — 80061 LIPID PANEL: CPT | Performed by: PHYSICIAN ASSISTANT

## 2019-07-15 PROCEDURE — 36415 COLL VENOUS BLD VENIPUNCTURE: CPT | Performed by: PHYSICIAN ASSISTANT

## 2019-07-15 PROCEDURE — 80048 BASIC METABOLIC PNL TOTAL CA: CPT | Performed by: PHYSICIAN ASSISTANT

## 2019-07-15 PROCEDURE — 99207 C FOOT EXAM  NO CHARGE: CPT | Mod: 25 | Performed by: PHYSICIAN ASSISTANT

## 2019-07-15 PROCEDURE — 99397 PER PM REEVAL EST PAT 65+ YR: CPT | Performed by: PHYSICIAN ASSISTANT

## 2019-07-15 PROCEDURE — 84443 ASSAY THYROID STIM HORMONE: CPT | Performed by: PHYSICIAN ASSISTANT

## 2019-07-15 PROCEDURE — 85027 COMPLETE CBC AUTOMATED: CPT | Performed by: PHYSICIAN ASSISTANT

## 2019-07-15 ASSESSMENT — MIFFLIN-ST. JEOR: SCORE: 1303.18

## 2019-07-15 NOTE — PROGRESS NOTES
"  SUBJECTIVE:   Maryalice Rebecca Wachter is a 85 year old female who presents for Preventive Visit.  click delete button to remove this line now  click delete button to remove this line now  Are you in the first 12 months of your Medicare Part B coverage?  No    Physical Health:    In general, how would you rate your overall physical health? fair    Outside of work, how many days during the week do you exercise? none    Outside of work, approximately how many minutes a day do you exercise?not applicable    If you drink alcohol do you typically have >3 drinks per day or >7 drinks per week? Not Applicable    Do you usually eat at least 4 servings of fruit and vegetables a day, include whole grains & fiber and avoid regularly eating high fat or \"junk\" foods? Yes    Do you have any problems taking medications regularly?  No    Do you have any side effects from medications? none    Needs assistance for the following daily activities: no assistance needed    Which of the following safety concerns are present in your home?  none identified     Hearing impairment: Yes, wear hearting aids    In the past 6 months, have you been bothered by leaking of urine? yes    Mental Health:    In general, how would you rate your overall mental or emotional health? fair  PHQ-2 Score:      Do you feel safe in your environment? Yes    Do you have a Health Care Directive? Yes: Advance Directive has been received and scanned.    Additional concerns to address?  YES - swollen ankles. Better in the AM and     Fall risk:  Fallen 2 or more times in the past year?: No  Any fall with injury in the past year?: No  click delete button to remove this line now  Cognitive Screenin) Repeat 3 items (Leader, Season, Table)    2) Clock draw: NORMAL  3) 3 item recall: Recalls 3 objects  Results: 3 items recalled: COGNITIVE IMPAIRMENT LESS LIKELY    Mini-CogTM Copyright S Reji. Licensed by the author for use in [facility]; reprinted " with permission ([email or web address]). All rights reserved.      Do you have sleep apnea, excessive snoring or daytime drowsiness?: no  Still doing well with seroquel when needed.  Takes one at a time and this works well.    Diabetes Follow-up      How often are you checking your blood sugar? A few times a day    What time of day are you checking your blood sugars (select all that apply)?  Before meals    Have you had any blood sugars above 200?  No    Have you had any blood sugars below 70?  Yes     What symptoms do you notice when your blood sugar is low?  Shaky and Dizzy    What concerns do you have today about your diabetes? None     Do you have any of these symptoms? (Select all that apply)  No numbness or tingling in feet.  No redness, sores or blisters on feet.  No complaints of excessive thirst.  No reports of blurry vision.  No significant changes to weight.     Have you had a diabetic eye exam in the last 12 months? No       Seeing JADEN Toth Aug 12.  Having some AM sugars under 100.  Does carry OJ with her for needed lows which works well.    BP Readings from Last 2 Encounters:   07/15/19 112/50   04/15/19 120/54     Hemoglobin A1C (%)   Date Value   04/17/2019 7.8 (H)   10/15/2018 7.2 (H)     LDL Cholesterol Calculated (mg/dL)   Date Value   08/03/2018 27   06/19/2017 49       Diabetes Management Resources  Hypertension Follow-up      Do you check your blood pressure regularly outside of the clinic? No     Are you following a low salt diet? Yes    Are your blood pressures ever more than 140 on the top number (systolic) OR more   than 90 on the bottom number (diastolic), for example 140/90? No  COPD Follow-Up    Overall, how are your COPD symptoms since your last clinic visit?  Slightly worse    How much fatigue or shortness of breath do you have when you are walking?  Same as usual    How much shortness of breath do you have when you are resting?  Same as usual    How often do you cough? Sometimes    Recently  saw Pulmonary- Dr. Nava thought that her use of albuterol was appropriate.  Even walking from room to room or doing household chores gets her out of breath.  Generally uses nebulizer each morning.  And then inhaler 3-4 times daily    History   Smoking Status     Never Smoker   Smokeless Tobacco     Never Used     No results found for: FEV1, DAH2ZQM    Reviewed and updated as needed this visit by clinical staff  Tobacco  Allergies  Meds  Med Hx  Surg Hx  Fam Hx  Soc Hx        Reviewed and updated as needed this visit by Provider        Social History     Tobacco Use     Smoking status: Never Smoker     Smokeless tobacco: Never Used   Substance Use Topics     Alcohol use: Yes     Comment: Extremely rare use in small amounts.                           Current providers sharing in care for this patient include:   Patient Care Team:  Venu Maria PA-C as PCP - General (Physician Assistant - Medical)  Bird Suazo MD as Assigned PCP  Janey Romero RPH as Pharmacist (Pharmacist)    The following health maintenance items are reviewed in Epic and correct as of today:  Health Maintenance   Topic Date Due     ASTHMA ACTION PLAN  11/11/1933     ASTHMA CONTROL TEST  11/11/1933     COPD ACTION PLAN  11/11/1933     ZOSTER IMMUNIZATION (2 of 3) 02/26/2012     ADVANCE CARE PLANNING  08/23/2017     MEDICARE ANNUAL WELLNESS VISIT  06/19/2018     DIABETIC FOOT EXAM  07/30/2019     BMP  08/03/2019     LIPID  08/03/2019     INFLUENZA VACCINE (1) 09/01/2019     PHQ-9  09/08/2019     A1C  10/17/2019     EYE EXAM  12/31/2019     DTAP/TDAP/TD IMMUNIZATION (2 - Tdap) 01/01/2020     MICROALBUMIN  04/15/2020     FALL RISK ASSESSMENT  04/15/2020     TSH W/FREE T4 REFLEX  07/22/2020     SPIROMETRY  Completed     DEPRESSION ACTION PLAN  Completed     IPV IMMUNIZATION  Aged Out     MENINGITIS IMMUNIZATION  Aged Out     Lab work is in process  Labs reviewed in EPIC  Pneumonia Vaccine:Adults age 65+ who have not  "received previous Pneumovax (PPSV23) or PCV13 as an adult: Should first be given PCV13 AND then should be given PPSV23 6-12 months after PCV13  Mammogram Screening: Patient over age 75, has elected to stop mammography screening.    ROS:  Constitutional, HEENT, cardiovascular, pulmonary, GI, , musculoskeletal, neuro, skin, endocrine and psych systems are negative, except as otherwise noted.    OBJECTIVE:   /50 (BP Location: Right arm, Patient Position: Sitting, Cuff Size: Adult Regular)   Pulse 70   Temp 98.4  F (36.9  C) (Oral)   Resp 16   Ht 1.651 m (5' 5\")   Wt 85.7 kg (189 lb)   BMI 31.45 kg/m   Estimated body mass index is 31.45 kg/m  as calculated from the following:    Height as of this encounter: 1.651 m (5' 5\").    Weight as of this encounter: 85.7 kg (189 lb).  EXAM:   GENERAL APPEARANCE: healthy, alert and no distress  EYES:  conjunctivae and sclerae normal  HENT: ear canals and TM's normal, nose and mouth without ulcers or lesions, oropharynx clear and oral mucous membranes moist  NECK: no adenopathy, no asymmetry, masses, or scars and thyroid normal to palpation  RESP: lungs clear to auscultation - no rales, rhonchi or wheezes  CV:  no peripheral edema and irregularly irregular rhythm  ABDOMEN: soft, nontender, no hepatosplenomegaly, no masses and bowel sounds normal  MS: no musculoskeletal defects are noted and gait is age appropriate without ataxia  SKIN: no suspicious lesions or rashes  NEURO: Normal strength and tone, sensory exam grossly normal, mentation intact and speech normal  PSYCH: mentation appears normal and affect flat    Diagnostic Test Results:  Labs reviewed in Epic    ASSESSMENT / PLAN:   1. Encounter for Medicare annual wellness exam  Will check labs today, has appointment with MTM next month, A1C planned for then.  - BASIC METABOLIC PANEL  - Lipid panel reflex to direct LDL Fasting  - HONORING CHOICES REFERRAL  - CBC with platelets    2. CKD (chronic kidney disease) " "stage 3, GFR 30-59 ml/min (H)  Will keep monitoring this.  GFR has been in the 40's lately.  Need to keep watching carefully.  - BASIC METABOLIC PANEL    3. Atherosclerosis of native coronary artery of native heart with angina pectoris (H)    - Lipid panel reflex to direct LDL Fasting    4. Type 2 diabetes mellitus with hyperglycemia, without long-term current use of insulin (H)  Will visit with MTM also next month, did not need refills at this time.  When needed we should be able to till them.  - FOOT EXAM    5. Essential hypertension  BP controlled today.  Did not need refills yet of medications, ok to fill when she does.  - TSH with free T4 reflex  - CBC with platelets    End of Life Planning:  Patient currently has an advanced directive: Yes.  Practitioner is supportive of decision.    COUNSELING:  Reviewed preventive health counseling, as reflected in patient instructions    Estimated body mass index is 31.45 kg/m  as calculated from the following:    Height as of this encounter: 1.651 m (5' 5\").    Weight as of this encounter: 85.7 kg (189 lb).    Weight management plan: Discussed healthy diet and exercise guidelines     reports that she has never smoked. She has never used smokeless tobacco.      Appropriate preventive services were discussed with this patient, including applicable screening as appropriate for cardiovascular disease, diabetes, osteopenia/osteoporosis, and glaucoma.  As appropriate for age/gender, discussed screening for colorectal cancer, prostate cancer, breast cancer, and cervical cancer. Checklist reviewing preventive services available has been given to the patient.    Reviewed patients plan of care and provided an AVS. The Complex Care Plan (for patients with higher acuity and needing more deliberate coordination of services) for Sindi meets the Care Plan requirement. This Care Plan has been established and reviewed with the Patient.    Counseling Resources:  ATP IV " Guidelines  Pooled Cohorts Equation Calculator  Breast Cancer Risk Calculator  FRAX Risk Assessment  ICSI Preventive Guidelines  Dietary Guidelines for Americans, 2010  [employer] MyPlate  ASA Prophylaxis  Lung CA Screening    Venu Maria PA-C  [facility]

## 2019-07-15 NOTE — PATIENT INSTRUCTIONS
Patient Education   Personalized Prevention Plan  You are due for the preventive services outlined below.  Your care team is available to assist you in scheduling these services.  If you have already completed any of these items, please share that information with your care team to update in your medical record.  Health Maintenance Due   Topic Date Due     ANNUAL REVIEW OF HM ORDERS  11/11/1933     Asthma Action Plan - yearly  11/11/1933     Asthma Control Test  11/11/1933     COPD Action Plan  11/11/1933     Zoster (Shingles) Vaccine (2 of 3) 02/26/2012     Discuss Advance Directive Planning  08/23/2017     Annual Wellness Visit  06/19/2018     Diabetic Foot Exam  07/30/2019     Basic Metabolic Panel  08/03/2019     Cholesterol Lab  08/03/2019

## 2019-07-16 LAB
ANION GAP SERPL CALCULATED.3IONS-SCNC: 4 MMOL/L (ref 3–14)
BUN SERPL-MCNC: 32 MG/DL (ref 7–30)
CALCIUM SERPL-MCNC: 9.2 MG/DL (ref 8.5–10.1)
CHLORIDE SERPL-SCNC: 106 MMOL/L (ref 94–109)
CHOLEST SERPL-MCNC: 109 MG/DL
CO2 SERPL-SCNC: 31 MMOL/L (ref 20–32)
CREAT SERPL-MCNC: 1.18 MG/DL (ref 0.52–1.04)
GFR SERPL CREATININE-BSD FRML MDRD: 42 ML/MIN/{1.73_M2}
GLUCOSE SERPL-MCNC: 97 MG/DL (ref 70–99)
HDLC SERPL-MCNC: 39 MG/DL
LDLC SERPL CALC-MCNC: 34 MG/DL
NONHDLC SERPL-MCNC: 70 MG/DL
POTASSIUM SERPL-SCNC: 4.5 MMOL/L (ref 3.4–5.3)
SODIUM SERPL-SCNC: 141 MMOL/L (ref 133–144)
TRIGL SERPL-MCNC: 178 MG/DL
TSH SERPL DL<=0.005 MIU/L-ACNC: 3.08 MU/L (ref 0.4–4)

## 2019-07-28 DIAGNOSIS — Z79.01 LONG TERM CURRENT USE OF ANTICOAGULANT THERAPY: ICD-10-CM

## 2019-07-28 DIAGNOSIS — I48.20 CHRONIC ATRIAL FIBRILLATION (H): ICD-10-CM

## 2019-07-29 RX ORDER — WARFARIN SODIUM 3 MG/1
TABLET ORAL
Qty: 90 TABLET | Refills: 0 | Status: SHIPPED | OUTPATIENT
Start: 2019-07-29 | End: 2019-10-24

## 2019-07-29 NOTE — TELEPHONE ENCOUNTER
"Requested Prescriptions   Pending Prescriptions Disp Refills     warfarin (COUMADIN) 3 MG tablet [Pharmacy Med Name: WARFARIN SODIUM 3MG TABS] 90 tablet 1     Sig: TAKE ONE AND ONE-HALF TABLETS BY MOUTH EVERY TUESDAY , THEN TAKE ONE TABLET BY MOUTH EVERY DAY ON ALL OTHER DAYS AS PER COUMADIN CLINIC   Last Written Prescription Date:  2/7/19  Last Fill Quantity: 90,  # refills: 1   Last Office Visit: 7/15/2019   Future Office Visit:    Next 5 appointments (look out 90 days)    Jul 30, 2019 10:30 AM CDT  Nurse Only with FM RN VISITS  [facility] [address], [address]  [place] [address]  [phone]   Aug 12, 2019  1:30 PM CDT  SHORT with Janey Romero RPH  [facility] [address] [address]  [phone]             Vitamin K Antagonists Failed - 7/28/2019  3:42 PM        Failed - INR is within goal in the past 6 weeks     Confirm INR is within goal in the past 6 weeks.     Recent Labs   Lab Test 06/18/19   INR 2.1*   Date and Result of Last PT/INR:   Lab Results   Component Value Date    INR 2.1 06/18/2019    INR 2.4 05/03/2019    INR 4.80 08/03/2018    INR 2.69 07/23/2018              Passed - Recent (12 mo) or future (30 days) visit within the authorizing provider's specialty     Patient had office visit in the last 12 months or has a visit in the next 30 days with authorizing provider or within the authorizing provider's specialty.  See \"Patient Info\" tab in inbasket, or \"Choose Columns\" in Meds & Orders section of the refill encounter.              Passed - Medication is active on med list        Passed - Patient is 18 years of age or older        Passed - Patient is not pregnant        Passed - No positive pregnancy on file in past 12 months        "

## 2019-07-29 NOTE — TELEPHONE ENCOUNTER
Prescription approved per [facility] Refill Protocol.  Cayla Ozuna RN, BSN

## 2019-07-30 ENCOUNTER — ANTICOAGULATION THERAPY VISIT (OUTPATIENT)
Dept: FAMILY MEDICINE | Facility: CLINIC | Age: 84
End: 2019-07-30

## 2019-07-30 DIAGNOSIS — Z79.01 LONG TERM CURRENT USE OF ANTICOAGULANTS WITH INR GOAL OF 2.0-3.0: ICD-10-CM

## 2019-07-30 DIAGNOSIS — I48.20 CHRONIC ATRIAL FIBRILLATION (H): ICD-10-CM

## 2019-07-30 DIAGNOSIS — I48.91 ATRIAL FIBRILLATION (H): ICD-10-CM

## 2019-07-30 DIAGNOSIS — I48.91 ATRIAL FIBRILLATION (H): Primary | ICD-10-CM

## 2019-07-30 DIAGNOSIS — I48.20 CHRONIC ATRIAL FIBRILLATION (H): Primary | ICD-10-CM

## 2019-07-30 LAB — INR PPP: 3.3 (ref 0.86–1.14)

## 2019-07-30 PROCEDURE — 99207 ZZC NO CHARGE NURSE ONLY: CPT | Performed by: PHYSICIAN ASSISTANT

## 2019-07-30 PROCEDURE — 36415 COLL VENOUS BLD VENIPUNCTURE: CPT | Performed by: PHYSICIAN ASSISTANT

## 2019-07-30 PROCEDURE — 85610 PROTHROMBIN TIME: CPT | Performed by: PHYSICIAN ASSISTANT

## 2019-07-30 NOTE — PROGRESS NOTES
ANTICOAGULATION FOLLOW-UP CLINIC VISIT    Patient Name:  Maryalice Rebecca Wachter  Date:  7/30/2019  Contact Type:  Telephone    SUBJECTIVE:  Patient Findings     Comments:   Patient denies any identifiable changes that caused the supratherapeutic INR.           Clinical Outcomes     Comments:   Patient denies any identifiable changes that caused the supratherapeutic INR.              OBJECTIVE    INR   Date Value Ref Range Status   07/30/2019 3.30 (H) 0.86 - 1.14 Final     Comment:     This test is intended for monitoring Coumadin therapy.  Results are not   accurate in patients with prolonged INR due to factor deficiency.         ASSESSMENT / PLAN  INR assessment SUPRA    Recheck INR In: 2 WEEKS    INR Location Outside lab      Anticoagulation Summary  As of 7/30/2019    INR goal:   2.0-3.0   TTR:   68.0 % (3.2 y)   INR used for dosing:   3.30! (7/30/2019)   Warfarin maintenance plan:   4.5 mg (3 mg x 1.5) every Tue; 3 mg (3 mg x 1) all other days   Full warfarin instructions:   7/30: 3 mg; Otherwise 4.5 mg every Tue; 3 mg all other days   Weekly warfarin total:   22.5 mg   Plan last modified:   Cyndi Cabello RN (8/24/2018)   Next INR check:   8/13/2019   Priority:   INR   Target end date:       Indications    Atrial fibrillation (H) [I48.91]  Long term current use of anticoagulants with INR goal of 2.0-3.0 [Z79.01]             Anticoagulation Episode Summary     INR check location:       Preferred lab:       Send INR reminders to:    TRIAGE POOL    Comments:         Anticoagulation Care Providers     Provider Role Specialty Phone number    Venu Maria PA-C Responsible Physician Assistant - Medical [phone]            See the Encounter Report to view Anticoagulation Flowsheet and Dosing Calendar (Go to Encounters tab in chart review, and find the Anticoagulation Therapy Visit)    Dosage adjustment made based on physician directed care plan.    Jojo Ozuna, JANNETTE

## 2019-08-01 NOTE — PROGRESS NOTES
SUBJECTIVE/OBJECTIVE:                                                    Maryalice Rebecca Wachter is a 85 year old female coming in for a follow up visit (10-15-18)  for Medication Therapy Management.  She was referred to me from her PCP- Venu Maria.     Chief Complaint:    a1c recheck today .    Copd issues now --more difficult breathing --house work difficult, cant breathing in shower, drainage down back of throat is bothersome as well. She feels her health is going downhill last 90+ days .          Personal Healthcare Goals: fix diarrhea issues, timing of meds, get BP wnl.     Allergies/ADRs: Reviewed in Epic  Tobacco: No tobacco use   Alcohol: not currently using--very rare   Caffeine: decaff in am 1-2 cups/day of coffee; pm -tea a little bit.   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 Cannot exercise due to diarrhea issues --would like to go on walks with her hubby but has to have BM within 1 block of leaving.   PMH: Reviewed in [facility]; [facility] stated she has sarcoidosis?    Medication Adherence: No issues found today    IBS-diarrhea: patient uses prn loperamide --works well.     GERD:  She has a hiatal hernia -surgery not recommended.  Taking ranitidine 150mg prn now -doing ok.     Diabetes:  Pt currently taking Glipizide er 10mg. bid, actos 30mg qday, Metformin-1 x500mg bid. Pt is experiencing the following side effects: mild loose stools from metformin --controlled with loperamide prn quite well.     7days avg.- 129 n=7, 14 days =125, n=14, 30days 119 n=30.     Date FBG/ 2hours post Lunch/2hours post Dinner /2hours post    9-12-(wrong month on meter) 118 7;30am      8-11 146 725am      8-10 127 708am      8-9 145 745am      8-8 140 729am      8-7 115 709am      8-6 113 708am              Symptoms of low blood sugar? none. Frequency of hypoglycemia? never.  Recent symptoms of high blood sugar? fatigue  Eye exam: up to date  Foot exam: up to date  Microalbumin is < 30 mg/g. Pt is taking an  ACEi/ARB.  Aspirin: Taking 81mg daily and denies side effects  Diet/Exercise: diet is ok but likes carbs, rice , chinese , no exercise.   She is eating low carb but not enjoying foods and is struggling with lack of bs control with no sugar in diet.But she is determined to get that a1c back down.     Lab Results   Component Value Date    A1C 7.5 08/12/2019    A1C 7.8 04/17/2019    A1C 7.2 10/15/2018    A1C 7.4 07/21/2018    A1C 7.4 02/09/2018         Depression:  Current medications include: None--she stopped prozac-see above  . Pt reports that depression symptoms are  . Current depression symptoms include: lacks motivation or energy to do anything . Patient reports the following stressors: chronic health condition, lack of fun hubby to travel with . Therapies tried and response: Bupropion once daily-did nothing for her , prozac made her too antsy.  Notes cannot vacation due to hubby is a stick in the mud --no fun! He only wants to visit relatives.       Vitamin D3: level was 21 on  --she takes otc daily dose of 2,000 iu's now.      Vitamin B12: level was 493 on 10-10-16. She takes daily otc dose now.     Hypothyroidism: Patient is taking levothyroxine 100 mcg daily. She knows to take it on an empty stomach with her omeprazole in the morning.    Hypertension: Current medications include : HCTZ  25mg./day(her phcy. misfilled her previous hydrochlorothiazide of 12.5mg capsule with the 25mg tabs #90-so she is taking them until they run out -but feet less LLE on the 25mg -she prefers this now and wont worry about more frequent urination. ),  lisinopril 40mg-qam. And metoprolol 50mg bid-am and dinnertime.  Patient does  self-monitor BP.  Yes see below:  Patient side effects :  None now.   Range of home bp's = 115-143 --95% of readings <140/60-70.    Look good . She recognizes when in afib--bp erratic and pulse increases to 90+ .    BP Readings from Last 3 Encounters:   08/12/19 114/60   07/15/19 112/50   04/15/19  "120/54     Iron deficiency anemia:  Previous hgb was 10.2 last summer --now 12.1 on  7-15-19 -she prefers to stay on bid oral iron tabs to keep hgb  Up --she is less cold now .      COPD: Patient states she feels her breathing is struggling now --humid today .  She did see dr. coreas - did breathing tests --didn't qualify for oxygen yet .joss Robertson chnaged her to spiriva respimat but she is still struggling to breath.    Patient uses her inhalers more often when she gets sick and she always carries her rescue inhaler with her. Patient states that when she forgets it she becomes very worried and panicked.   Cant comfortably use her nebulizer(albuterol) --her throat constricts on her --difficult to breath.    Nikita sleep on her rt. Side -breathing wise.   asmanex daily at hs . spiriva qam-respimat now.   Clinic oxygen at 92% today --she states she is struggling to breath in this humid weather.    She has some recent drainage from nose but not at night and doesn't keep her awake.    Insomnia: Current medications include: quetiapine 12.5-37.5mg /night --works well --she just fiddles with dose depending on how riled up trump gets her.         BP Readings from Last 1 Encounters:   08/12/19 114/60     Pulse Readings from Last 1 Encounters:   08/12/19 100     Wt Readings from Last 1 Encounters:   08/12/19 190 lb 1.6 oz (86.2 kg)     Ht Readings from Last 1 Encounters:   07/15/19 5' 5\" (1.651 m)     Estimated body mass index is 31.63 kg/m  as calculated from the following:    Height as of 7/15/19: 5' 5\" (1.651 m).    Weight as of this encounter: 190 lb 1.6 oz (86.2 kg).    Temp Readings from Last 1 Encounters:   07/15/19 98.4  F (36.9  C) (Oral)           ASSESSMENT:                                                       Current medications were reviewed with her today.     Medication Adherence: No problems identified    IBS-Diarrhea: Stable.     GERD: Stable.  Current treatment is effective.     Diabetes: Stable. Patient is " meeting A1c goal of < 8%. Self monitoring of blood glucose is at goal of fasting  mg/dL. Pt would benefit from minimum SMBG: Check blood sugars fasting, and occasionally 2 hours after starting a meal.  Metformin :  stay on the same dose.  SFU (Glipizide) :  stay on the same dose.  Actos:  stay on the same dose.  Increased exercise- as tolerated.   Updated Hemoglobin A1c- 7.5% .  Weight loss recommended--keep low carb diet .     Depression: Improved. Patient would benefit from no meds today --she will work on doing things that make her feel happy.       Vitamin B12: is not at goal of 600-1000pg/mL. Pt would benefit from vitamin B12 lab recheck in 12 months.    Vitamin D3: is not at goal of 50-75ng/mL. Pt would benefit from vitamin D3 lab recheck in 12 months.    Hypothyroidism: TSH wnl.    Hypertension: Stable. Patient is meeting BP goal of < 140/90mmHg.     Iron deficiency anemia:  hgb 12.1 today -stable now.    COPD: Needs Improvement. Patient would benefit from the following medication changes: stop asmanex and spiriva and ADD once daily trelegy  and further education of Control vs. Quick relief medications, using controller medications regularly and the administration technique of a disk inhaler and rinsing mouth after using steroid. mtm feels she needs a laba added to her steroid and lama inhalers -we can get all3 ingredients in one now.  She successfully took first puff of Trelegy today in the clinic . Also discussed call [employer] -drug  see if she qualifies fr med part-d donut hole patient assistance ?    Insomnia: stable. Pt may benefit from no changes.      PLAN:                                                        1. A1c today = 7.5% --excellent for your age --continue with the metformin, glipizide and pioglitazone as is.    2. FYI--Your COPD/breathing is worse today --lets do a 1 month Trial of Trelegy --1 puff daily --this replaces your spiriva and your asmanex , ok to use your albuterol  inhaler or nebulizer as needed for shortness of breath.    Please call [employer] -they have a plan for donut hole medicare-part-d. [employer] Patient Assistance Program for help to identify if you qualify for free medication while in the [facility] ([phone]).              It was great to speak with you today.  I value your experience and would be very thankful for your time with providing good feedback on clinic surveys.  Kindly let me know personally if your experience today was not exceptional so that we can continue to improve your care delivery experience.  I spent 40 minutes with this patient today .   Next MTM visit: see me in 1 month for COPD f/up.  Monday September 9th -2019 at 12;50pm.     To schedule another MTM appointment, please call the clinic directly or you may call the MTM scheduling line at [phone] or toll-free at [phone].     My Clinical Pharmacist's contact information:                                                      It was a pleasure seeing you today!  Please feel free to contact me with any questions or concerns you have.      Janey Romero [facility].  Medication Therapy Management Provider  [phone]  Roselyn Munguia, Pharm-D4 (pawel)

## 2019-08-04 DIAGNOSIS — F51.05 INSOMNIA DUE TO OTHER MENTAL DISORDER: ICD-10-CM

## 2019-08-04 DIAGNOSIS — F99 INSOMNIA DUE TO OTHER MENTAL DISORDER: ICD-10-CM

## 2019-08-05 NOTE — TELEPHONE ENCOUNTER
Requested Prescriptions   Pending Prescriptions Disp Refills     QUEtiapine (SEROQUEL) 25 MG tablet [Pharmacy Med Name: QUETIAPINE FUMARATE 25MG TABS] 60 tablet 5     Sig: TAKE ONE-HALF TABLET BY MOUTH AT BEDTIME TO SLEEP . OK TO INCREASE BY 1/2 TABLET EVERY 3 NIGHTS UP TO 2 TABLETS DAILY IF NEEDED   Last Written Prescription Date:  8/16/18  Last Fill Quantity: 60,  # refills: 5   Last Office Visit: 7/15/19  Crow      Return in about 53 weeks (around 7/20/2020) for Annual Wellness Visit.     Future Office Visit:    Next 5 appointments (look out 90 days)    Aug 12, 2019  1:30 PM CDT  [facility] [address] [facility] [address] [phone]             Antipsychotic Medications Passed - 8/4/2019 10:39 PM        Passed - Blood pressure under 140/90 in past 12 months     BP Readings from Last 3 Encounters:   07/15/19 112/50   04/15/19 120/54   03/28/19 132/60                 Passed - Patient is 12 years of age or older        Passed - Lipid panel on file within the past 12 months     Recent Labs   Lab Test 07/15/19  0948  06/30/15  0952   CHOL 109   < > 109   TRIG 178*   < > 240*   HDL 39*   < > 36*   LDL 34   < > 25   NHDL 70   < >  --    VLDL  --   --  48*   CHOLHDLRATIO  --   --  3.0    < > = values in this interval not displayed.               Passed - CBC on file in past 12 months     Recent Labs   Lab Test 07/15/19  0948   WBC 6.4   RBC 4.05   HGB 12.1   HCT 39.3                    Passed - Heart Rate on file within past 12 months     Pulse Readings from Last 3 Encounters:   07/15/19 70   04/15/19 60   03/28/19 61               Passed - A1c or Glucose on file in past 12 months     Recent Labs   Lab Test 07/15/19  0948 04/17/19  1533   GLC 97  --    A1C  --  7.8*       Please review patients last 3 weights. If a weight gain of >10 lbs exists, you may refill the prescription once after instructing the  "patient to schedule an appointment within the next 30 days.    Wt Readings from Last 3 Encounters:   07/15/19 85.7 kg (189 lb)   04/15/19 88.6 kg (195 lb 4.8 oz)   03/28/19 89.4 kg (197 lb)             Passed - Medication is active on med list        Passed - Patient is not pregnant        Passed - No positve pregnancy test on file in past 12 months        Passed - Recent (6 mo) or future (30 days) visit within the authorizing provider's specialty     Patient had office visit in the last 6 months or has a visit in the next 30 days with authorizing provider or within the authorizing provider's specialty.  See \"Patient Info\" tab in inbasket, or \"Choose Columns\" in Meds & Orders section of the refill encounter.            "

## 2019-08-07 NOTE — TELEPHONE ENCOUNTER
Routing refill request to provider for review/approval because:  Pt was just seen and this was not filled-  OK for RX    Cassy Iniguez RN

## 2019-08-08 RX ORDER — QUETIAPINE FUMARATE 25 MG/1
TABLET, FILM COATED ORAL
Qty: 60 TABLET | Refills: 5 | Status: SHIPPED | OUTPATIENT
Start: 2019-08-08 | End: 2020-01-01

## 2019-08-10 DIAGNOSIS — J44.1 COPD EXACERBATION (H): ICD-10-CM

## 2019-08-12 ENCOUNTER — OFFICE VISIT (OUTPATIENT)
Dept: PHARMACY | Facility: CLINIC | Age: 84
End: 2019-08-12
Payer: COMMERCIAL

## 2019-08-12 VITALS
DIASTOLIC BLOOD PRESSURE: 60 MMHG | SYSTOLIC BLOOD PRESSURE: 114 MMHG | HEART RATE: 100 BPM | WEIGHT: 190.1 LBS | OXYGEN SATURATION: 92 % | BODY MASS INDEX: 31.63 KG/M2

## 2019-08-12 DIAGNOSIS — J43.1 PANLOBULAR EMPHYSEMA (H): Primary | ICD-10-CM

## 2019-08-12 DIAGNOSIS — D50.9 IRON DEFICIENCY ANEMIA, UNSPECIFIED IRON DEFICIENCY ANEMIA TYPE: ICD-10-CM

## 2019-08-12 DIAGNOSIS — E03.9 HYPOTHYROIDISM, UNSPECIFIED TYPE: ICD-10-CM

## 2019-08-12 DIAGNOSIS — F99 INSOMNIA DUE TO OTHER MENTAL DISORDER: ICD-10-CM

## 2019-08-12 DIAGNOSIS — F51.05 INSOMNIA DUE TO OTHER MENTAL DISORDER: ICD-10-CM

## 2019-08-12 DIAGNOSIS — E11.65 TYPE 2 DIABETES MELLITUS WITH HYPERGLYCEMIA, WITHOUT LONG-TERM CURRENT USE OF INSULIN (H): ICD-10-CM

## 2019-08-12 DIAGNOSIS — F34.1 DYSTHYMIA: ICD-10-CM

## 2019-08-12 DIAGNOSIS — I10 ESSENTIAL HYPERTENSION: ICD-10-CM

## 2019-08-12 DIAGNOSIS — E78.5 HYPERLIPIDEMIA LDL GOAL <100: ICD-10-CM

## 2019-08-12 LAB — HBA1C MFR BLD: 7.5 % (ref 0–5.6)

## 2019-08-12 PROCEDURE — 36415 COLL VENOUS BLD VENIPUNCTURE: CPT | Performed by: PHYSICIAN ASSISTANT

## 2019-08-12 PROCEDURE — 99606 MTMS BY PHARM EST 15 MIN: CPT | Performed by: PHARMACIST

## 2019-08-12 PROCEDURE — 99607 MTMS BY PHARM ADDL 15 MIN: CPT | Performed by: PHARMACIST

## 2019-08-12 PROCEDURE — 83036 HEMOGLOBIN GLYCOSYLATED A1C: CPT | Performed by: PHYSICIAN ASSISTANT

## 2019-08-12 NOTE — PATIENT INSTRUCTIONS
Recommendations from today's MTM visit:                                                      1. A1c today = 7.5% --excellent for your age --continue with the metformin, glipizide and pioglitazone as is.    2. FYI--Your COPD/breathing is worse today --lets do a 1 month Trial of Trelegy --1 puff daily --this replaces your spiriva and your asmanex , ok to use your albuterol inhaler or nebulizer as needed for shortness of breath.    Please call [email or web address] -they have a plan for donut hole medicare-part-d. [email or web address] Patient Assistance Program for help to identify if you qualify for free medication while in the [facility] ([phone]).              It was great to speak with you today.  I value your experience and would be very thankful for your time with providing good feedback on clinic surveys.  Kindly let me know personally if your experience today was not exceptional so that we can continue to improve your care delivery experience.    Next MTM visit: see me in 1 month for COPD f/up.  Monday September 9th -2019 at 12;50pm.             To schedule another MTM appointment, please call the clinic directly or you may call the MTM scheduling line at [phone] or toll-free at [phone].     My Clinical Pharmacist's contact information:                                                      It was a pleasure talking with you today!  Please feel free to contact me with any questions or concerns you have.      Janey Romero Rph.  Medication Therapy Management Provider  [phone]

## 2019-08-12 NOTE — Clinical Note
Dr. Brandy Lea--REBECCAI-- I saw Sindi today --struggling to breath more and having issues with nebbing albuterol.  She is using spiriva respimat but not getting any better. I changed her today to a 1 month trial of Trelegy inhaler --she successfully took first puff in clinic today and will hold spiriva and asmanex . I was thinking she is perhaps missing the laba drug that may help her breathing?Let me know of any concerns -she will f/up with me in 4 weeks to see how she is doing.Yohanx,. Janey Romero [facility].Medication Therapy Management [phone]

## 2019-08-12 NOTE — TELEPHONE ENCOUNTER
"Requested Prescriptions   Pending Prescriptions Disp Refills     albuterol (PROVENTIL) (2.5 MG/3ML) 0.083% neb solution [Pharmacy Med Name: ALBUTEROL SULFATE 2.5 MG/3ML NEBU] 180 mL 1     Sig: INHALE CONTENTS OF 1 VIAL (3 ML) VIA NEBULIZER EVERY 6 HOURS AS NEEDED FOR SHORTNESS OF BREATH / DYSPNEA OR WHEEZING  Last Written Prescription Date:  4/17/19  Last Fill Quantity: 180 mL,  # refills: 1   Last office visit: 7/15/2019 with prescribing provider:  Crow   Future Office Visit:   Next 5 appointments (look out 90 days)    Aug 12, 2019  1:30 PM CDT  SHORT with Janey Romero RPH  [facility] ([facility]) [address] [facility] [address] [phone]             Asthma Maintenance Inhalers - Anticholinergics Passed - 8/10/2019 12:08 PM        Passed - Patient is age 12 years or older        Passed - Recent (12 mo) or future (30 days) visit within the authorizing provider's specialty     Patient had office visit in the last 12 months or has a visit in the next 30 days with authorizing provider or within the authorizing provider's specialty.  See \"Patient Info\" tab in inbasket, or \"Choose Columns\" in Meds & Orders section of the refill encounter.              Passed - Medication is active on med list          "

## 2019-08-13 ENCOUNTER — ANTICOAGULATION THERAPY VISIT (OUTPATIENT)
Dept: FAMILY MEDICINE | Facility: CLINIC | Age: 84
End: 2019-08-13

## 2019-08-13 DIAGNOSIS — I48.91 ATRIAL FIBRILLATION (H): ICD-10-CM

## 2019-08-13 DIAGNOSIS — Z79.01 LONG TERM CURRENT USE OF ANTICOAGULANTS WITH INR GOAL OF 2.0-3.0: ICD-10-CM

## 2019-08-13 LAB — INR PPP: 2.7 (ref 0.86–1.14)

## 2019-08-13 PROCEDURE — 99207 ZZC NO CHARGE NURSE ONLY: CPT | Performed by: PHYSICIAN ASSISTANT

## 2019-08-13 PROCEDURE — 36416 COLLJ CAPILLARY BLOOD SPEC: CPT | Performed by: PHYSICIAN ASSISTANT

## 2019-08-13 PROCEDURE — 85610 PROTHROMBIN TIME: CPT | Performed by: PHYSICIAN ASSISTANT

## 2019-08-13 RX ORDER — ALBUTEROL SULFATE 0.83 MG/ML
SOLUTION RESPIRATORY (INHALATION)
Qty: 180 ML | Refills: 1 | Status: SHIPPED | OUTPATIENT
Start: 2019-08-13 | End: 2020-01-01

## 2019-08-13 NOTE — PROGRESS NOTES
ANTICOAGULATION FOLLOW-UP CLINIC VISIT    Patient Name:  Maryalice Rebecca Wachter  Date:  2019  Contact Type:  Telephone    SUBJECTIVE:  Patient Findings     Comments:   The patient was assessed for diet, medication, and activity level changes, missed or extra doses, bruising or bleeding, with no problem findings.          Clinical Outcomes     Comments:   The patient was assessed for diet, medication, and activity level changes, missed or extra doses, bruising or bleeding, with no problem findings.             OBJECTIVE    INR   Date Value Ref Range Status   2019 2.70 (H) 0.86 - 1.14 Final     Comment:     This test is intended for monitoring Coumadin therapy.  Results are not   accurate in patients with prolonged INR due to factor deficiency.         ASSESSMENT / PLAN  No question data found.  Anticoagulation Summary  As of 2019    INR goal:   2.0-3.0   TTR:   67.8 % (3.3 y)   INR used for dosin.70 (2019)   Warfarin maintenance plan:   4.5 mg (3 mg x 1.5) every Tue; 3 mg (3 mg x 1) all other days   Full warfarin instructions:   4.5 mg every Tue; 3 mg all other days   Weekly warfarin total:   22.5 mg   No change documented:   Jojo Ozuna RN   Plan last modified:   Cyndi Cabello RN (2018)   Next INR check:   2019   Priority:   INR   Target end date:       Indications    Atrial fibrillation (H) [I48.91]  Long term current use of anticoagulants with INR goal of 2.0-3.0 [Z79.01]             Anticoagulation Episode Summary     INR check location:       Preferred lab:       Send INR reminders to:   YEN THAYER    Comments:         Anticoagulation Care Providers     Provider Role Specialty Phone number    Venu Maria PA-C Responsible Physician Assistant - Medical [phone]            See the Encounter Report to view Anticoagulation Flowsheet and Dosing Calendar (Go to Encounters tab in chart review, and find the Anticoagulation Therapy  Visit)        Jojo Ozuna, RN

## 2019-08-13 NOTE — TELEPHONE ENCOUNTER
Prescription approved per [facility] Refill Protocol.  Cayla Ozuna RN, BSN

## 2019-09-03 DIAGNOSIS — E11.9 TYPE 2 DIABETES, HBA1C GOAL < 8% (H): ICD-10-CM

## 2019-09-03 DIAGNOSIS — E11.65 TYPE 2 DIABETES MELLITUS WITH HYPERGLYCEMIA, WITHOUT LONG-TERM CURRENT USE OF INSULIN (H): ICD-10-CM

## 2019-09-03 RX ORDER — PIOGLITAZONEHYDROCHLORIDE 30 MG/1
30 TABLET ORAL DAILY
Qty: 90 TABLET | Refills: 1 | Status: CANCELLED | OUTPATIENT
Start: 2019-09-03

## 2019-09-03 NOTE — TELEPHONE ENCOUNTER
"Requested Prescriptions   Pending Prescriptions Disp Refills     blood glucose monitoring (ONE TOUCH ULTRASOFT) lancets [Pharmacy Med Name: ONETOUCH ULTRASOFT LANCETS  MISC] 100 each 3     Sig: USE TO TEST BLOOD SUGAR 2 TO 3 TIMES A DAY.    (Discontinued)      Last Written Prescription Date:  7/24/15    END:4/4/17  Reason for Discontinue: None   Last Fill Quantity: 100,   # refills: 3  Last Office Visit with [facility], P or [facility] prescribing provider: 7/15/2019  Future Office Visit:    Next 5 appointments (look out 90 days)    Sep 09, 2019 12:50 PM CDT  Pharmacist visit with Janey Romero RPH, CR EXAM ROOM 32  [facility] ([facility]) [address] [place] [id number]  [phone]               Diabetic Supplies Protocol Failed - 9/3/2019 10:27 AM        Failed - Medication is active on med list        Passed - Patient is 18 years of age or older        Passed - Recent (6 mo) or future (30 days) visit within the authorizing provider's specialty     Patient had office visit in the last 6 months or has a visit in the next 30 days with authorizing provider.  See \"Patient Info\" tab in inbasket, or \"Choose Columns\" in Meds & Orders section of the refill encounter.            "

## 2019-09-05 DIAGNOSIS — E78.5 HYPERLIPIDEMIA LDL GOAL <100: ICD-10-CM

## 2019-09-05 NOTE — TELEPHONE ENCOUNTER
Requested Prescriptions   Pending Prescriptions Disp Refills     pioglitazone (ACTOS) 30 MG tablet 90 tablet 1     Sig: Take 1 tablet (30 mg) by mouth daily   Last Written Prescription Date:  3/5/19  Last Fill Quantity: 90,  # refills: 1   Last Office Visit: 7/15/2019 Crow      Return in about 53 weeks (around 7/20/2020) for Annual Wellness Visit.     Future Office Visit:    Next 5 appointments (look out 90 days)    Sep 09, 2019 12:50 PM CDT  Pharmacist visit with Janey Romero RPH, CR EXAM ROOM 32  [facility] ([facility]) [address] [facility] [id number] [phone]             Thiazolidinedione Agents (TZDs)  Failed - 9/3/2019  3:09 PM        Failed - Patient has a normal ALT within the past 12 mos.     Recent Labs   Lab Test 02/09/18  1350   ALT 17             Failed - Patient has a normal AST within the past 12 mos.      Recent Labs   Lab Test 02/09/18  1350   AST 13             Failed - Patient has a normal serum Creatinine in the past 12 months     Recent Labs   Lab Test 07/15/19  0948   CR 1.18*             Passed - Blood pressure less than 140/90 in past 6 months     BP Readings from Last 3 Encounters:   08/12/19 114/60   07/15/19 112/50   04/15/19 120/54                 Passed - Patient has documented LDL within the past 12 mos.     Recent Labs   Lab Test 07/15/19  0948   LDL 34             Passed - Patient has had a Microalbumin in the past 12 mos.     Recent Labs   Lab Test 04/15/19  1032   MICROL 30   UMALCR 20.20             Passed - Patient has documented A1c within the specified period of time.     If HgbA1C is 8 or greater, it needs to be on file within the past 3 months.  If less than 8, must be on file within the past 6 months.     Recent Labs   Lab Test 08/12/19  1335   A1C 7.5*             Passed - Diagnosis not CHF        Passed - Medication is active on med list        Passed - Patient is age 18 or older        Passed - Patient is not  "pregnant        Passed - Patient has not had a positive pregnancy test within the past 12 mos.        Passed - Recent (6 mo) or future (30 days) visit within the authorizing provider's specialty     Patient had office visit in the last 6 months or has a visit in the next 30 days with authorizing provider or within the authorizing provider's specialty.  See \"Patient Info\" tab in inbasket, or \"Choose Columns\" in Meds & Orders section of the refill encounter.            "

## 2019-09-06 RX ORDER — LANCETS
EACH MISCELLANEOUS
Qty: 100 EACH | Refills: 3 | Status: SHIPPED | OUTPATIENT
Start: 2019-09-06 | End: 2020-01-01

## 2019-09-06 NOTE — TELEPHONE ENCOUNTER
"Requested Prescriptions   Pending Prescriptions Disp Refills     simvastatin (ZOCOR) 20 MG tablet [Pharmacy Med Name: SIMVASTATIN 20MG TABS] 90 tablet 3     Sig: TAKE ONE TABLET BY MOUTH AT BEDTIME   Last Written Prescription Date:  9/18/18  Last Fill Quantity: 90,  # refills: 3   Last Office Visit: 7/15/2019 Crow      Return in about 53 weeks (around 7/20/2020) for Annual Wellness Visit.     Future Office Visit:    Next 5 appointments (look out 90 days)    Sep 09, 2019 12:50 PM CDT  Pharmacist visit with Janey Romero RPH, CR EXAM ROOM 32  [facility] ([facility]) [address] [place] [id number] [phone]             Statins Protocol Passed - 9/5/2019  8:32 PM        Passed - LDL on file in past 12 months     Recent Labs   Lab Test 07/15/19  0948   LDL 34             Passed - No abnormal creatine kinase in past 12 months     No lab results found.             Passed - Recent (12 mo) or future (30 days) visit within the authorizing provider's specialty     Patient had office visit in the last 12 months or has a visit in the next 30 days with authorizing provider or within the authorizing provider's specialty.  See \"Patient Info\" tab in inbasket, or \"Choose Columns\" in Meds & Orders section of the refill encounter.              Passed - Medication is active on med list        Passed - Patient is age 18 or older        Passed - No active pregnancy on record        Passed - No positive pregnancy test in past 12 months        "

## 2019-09-06 NOTE — TELEPHONE ENCOUNTER
Disp Refills Start End ROM   blood glucose monitoring (ONE TOUCH ULTRA) test strip (Discontinued) 100 each 3 3/23/2017 4/4/2017 No   Sig: Use to test blood sugar 2 times daily or as directed.   Class: E-Prescribe     Prescription approved per [facility] Refill Protocol  Amanda Bob RN BS

## 2019-09-08 NOTE — PROGRESS NOTES
SUBJECTIVE/OBJECTIVE:                                                    Maryalice Rebecca Wachter is a 85 year old female coming in for a follow up visit (8-12-19)  for Medication Therapy Management.  She was referred to me from her PCP- Venu Maria.     Must bill in outcomes 2019.     Chief Complaint:    trelegy helping a lot --less albuterol use , no more sudden SOB periods.            Personal Healthcare Goals: fix diarrhea issues, timing of meds, get BP wnl.     Allergies/ADRs: Reviewed in Epic  Tobacco: No tobacco use   Alcohol: not currently using--very rare   Caffeine: decaff in am 1-2 cups/day of coffee; pm -tea a little bit.   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 Cannot exercise due to diarrhea issues --would like to go on walks with her hubby but has to have BM within 1 block of leaving.   PMH: Reviewed in [facility]; [facility] stated she has sarcoidosis?    Medication Adherence: No issues found today    IBS-diarrhea: patient uses prn loperamide --works well.     GERD:  She has a hiatal hernia -surgery not recommended.  Taking ranitidine 150mg prn now -doing ok.     Diabetes:  Pt currently taking Glipizide er 10mg. bid, actos 30mg qday, Metformin-1 x500mg bid. Pt is experiencing the following side effects: mild loose stools from metformin --controlled with loperamide prn quite well.     7days avg.- 129 n=7, 14 days =125, n=14, 30days 119 n=30.     Date FBG/ 2hours post Lunch/2hours post Dinner /2hours post    9-12-(wrong month on meter) 118 7;30am      8-11 146 725am      8-10 127 708am      8-9 145 745am      8-8 140 729am      8-7 115 709am      8-6 113 708am              Symptoms of low blood sugar? none. Frequency of hypoglycemia? never.  Recent symptoms of high blood sugar? fatigue  Eye exam: up to date  Foot exam: up to date  Microalbumin is < 30 mg/g. Pt is taking an ACEi/ARB.  Aspirin: Taking 81mg daily and denies side effects  Diet/Exercise: diet is ok but likes carbs, rice ,  chinese , no exercise.   She is eating low carb but not enjoying foods and is struggling with lack of bs control with no sugar in diet.But she is determined to get that a1c back down.     Lab Results   Component Value Date    A1C 7.5 08/12/2019    A1C 7.8 04/17/2019    A1C 7.2 10/15/2018    A1C 7.4 07/21/2018    A1C 7.4 02/09/2018         Depression:  Current medications include: None--she stopped prozac-see above  . Pt reports that depression symptoms are  . Current depression symptoms include: lacks motivation or energy to do anything . Patient reports the following stressors: chronic health condition, lack of fun hubby to travel with . Therapies tried and response: Bupropion once daily-did nothing for her , prozac made her too antsy.  Notes cannot vacation due to hubby is a stick in the mud --no fun! He only wants to visit relatives.       Vitamin D3: level was 21 on  --she takes otc daily dose of 2,000 iu's now.      Vitamin B12: level was 493 on 10-10-16. She takes daily otc dose now.     Hypothyroidism: Patient is taking levothyroxine 100 mcg daily. She knows to take it on an empty stomach with her omeprazole in the morning.    Hypertension: Current medications include : HCTZ  25mg./day(her phcy. misfilled her previous hydrochlorothiazide of 12.5mg capsule with the 25mg tabs #90-so she is taking them until they run out -but feet less LLE on the 25mg -she prefers this now and wont worry about more frequent urination. ),  lisinopril 40mg-qam. And metoprolol 50mg bid-am and dinnertime.  Patient does  self-monitor BP.  Yes see below:  Patient side effects :  None now.   Range of home bp's = 115-143 --95% of readings <140/60-70.    Look good . She recognizes when in afib--bp erratic and pulse increases to 90+ .    BP Readings from Last 3 Encounters:   09/09/19 122/68   08/12/19 114/60   07/15/19 112/50     Iron deficiency anemia:  Previous hgb was 10.2 last summer --now 12.1 on  7-15-19 -she prefers to stay  "on bid oral iron tabs to keep hgb  Up --she is less cold now .      COPD: Patient states she feels her breathing is dramatically better on 1 month trial of Trelegy --much less albuterol use now - she needs new rx for 90 days to send to [employer] P[facility] --3 free inhalers she is eligible for now.   She see's pulmo md coreas . Previously on asmanex and spiriva --not working well.    Patient uses her inhalers more often when she gets sick and she always carries her rescue inhaler with her. Patient states that when she forgets it she becomes very worried and panicked.   Will use prn albuterol nebs as well.     Cant sleep on her rt. Side -breathing wise.   Oxygen 91% today --feels fine.         Insomnia: Current medications include: quetiapine 12.5-37.5mg /night --works well --she just fiddles with dose depending on how riled up trump gets her.         BP Readings from Last 1 Encounters:   09/09/19 122/68     Pulse Readings from Last 1 Encounters:   09/09/19 63     Wt Readings from Last 1 Encounters:   09/09/19 87.5 kg (193 lb)     Ht Readings from Last 1 Encounters:   07/15/19 1.651 m (5' 5\")     Estimated body mass index is 32.12 kg/m  as calculated from the following:    Height as of 7/15/19: 1.651 m (5' 5\").    Weight as of this encounter: 87.5 kg (193 lb).    Temp Readings from Last 1 Encounters:   07/15/19 98.4  F (36.9  C) (Oral)           ASSESSMENT:                                                       Current medications were reviewed with her today.     Medication Adherence: No problems identified    IBS-Diarrhea: Stable.     GERD: Stable.  Current treatment is effective.     Diabetes: Stable. Patient is meeting A1c goal of < 8%. Self monitoring of blood glucose is at goal of fasting  mg/dL. Pt would benefit from minimum SMBG: Check blood sugars fasting, and occasionally 2 hours after starting a meal.  Metformin :  stay on the same dose.  SFU (Glipizide) :  stay on the same dose.  Actos:  stay on the same " dose.  Increased exercise- as tolerated.   Updated Hemoglobin A1c- 7.5% .  Weight loss recommended--keep low carb diet .     Depression: Improved. Patient would benefit from no meds today --she will work on doing things that make her feel happy.       Vitamin B12: is not at goal of 600-1000pg/mL. Pt would benefit from vitamin B12 lab recheck in 12 months.    Vitamin D3: is not at goal of 50-75ng/mL. Pt would benefit from vitamin D3 lab recheck in 12 months.    Hypothyroidism: TSH wnl.    Hypertension: Stable. Patient is meeting BP goal of < 140/90mmHg.     Iron deficiency anemia:  hgb 12.1 today -stable now.    COPD: Stable. Patient would benefit from Pt would also benefit from no changes at this time and the following medication changes: Trelegy 1 puff daily working much better than asmanex and spiriva combo --will stay on trelegy and send in rx for PAP program.       Insomnia: stable. Pt may benefit from no changes.      PLAN:                                                        1. FYI--Glad to hear Trelegy is working well--stay on this inhaler and OFF the spiriva and asmanex.   Use your albuterol as needed.    Mail the rx in with your patient assistance forms to receive free Trelegy medication .       It was great to speak with you today.  I value your experience and would be very thankful for your time with providing feedback on our clinic survey. You may receive a survey via email or text message in the next few days.     Next MTM visit:  Monday march 9th -2020 at 1pm.            It was great to speak with you today.  I value your experience and would be very thankful for your time with providing good feedback on clinic surveys.  Kindly let me know personally if your experience today was not exceptional so that we can continue to improve your care delivery experience.  I spent 40 minutes with this patient today .       To schedule another MTM appointment, please call the clinic directly or you may call the  [facility] scheduling line at [phone] or toll-free at [phone].     My Clinical Pharmacist's contact information:                                                      It was a pleasure seeing you today!  Please feel free to contact me with any questions or concerns you have.      Janey Romero [facility].  Medication Therapy Management Provider  [phone]  Roselyn Munguia Pharm-D4 (pawel)

## 2019-09-09 ENCOUNTER — OFFICE VISIT (OUTPATIENT)
Dept: PHARMACY | Facility: CLINIC | Age: 84
End: 2019-09-09
Payer: COMMERCIAL

## 2019-09-09 VITALS
HEART RATE: 63 BPM | SYSTOLIC BLOOD PRESSURE: 122 MMHG | BODY MASS INDEX: 32.12 KG/M2 | WEIGHT: 193 LBS | DIASTOLIC BLOOD PRESSURE: 68 MMHG | OXYGEN SATURATION: 91 %

## 2019-09-09 DIAGNOSIS — F99 INSOMNIA DUE TO OTHER MENTAL DISORDER: ICD-10-CM

## 2019-09-09 DIAGNOSIS — J43.1 PANLOBULAR EMPHYSEMA (H): Primary | ICD-10-CM

## 2019-09-09 DIAGNOSIS — F51.05 INSOMNIA DUE TO OTHER MENTAL DISORDER: ICD-10-CM

## 2019-09-09 DIAGNOSIS — E11.65 TYPE 2 DIABETES MELLITUS WITH HYPERGLYCEMIA, WITHOUT LONG-TERM CURRENT USE OF INSULIN (H): ICD-10-CM

## 2019-09-09 DIAGNOSIS — E03.9 HYPOTHYROIDISM, UNSPECIFIED TYPE: ICD-10-CM

## 2019-09-09 DIAGNOSIS — D50.9 IRON DEFICIENCY ANEMIA, UNSPECIFIED IRON DEFICIENCY ANEMIA TYPE: ICD-10-CM

## 2019-09-09 DIAGNOSIS — I10 ESSENTIAL HYPERTENSION: ICD-10-CM

## 2019-09-09 DIAGNOSIS — E78.5 HYPERLIPIDEMIA LDL GOAL <100: ICD-10-CM

## 2019-09-09 DIAGNOSIS — F34.1 DYSTHYMIA: ICD-10-CM

## 2019-09-09 DIAGNOSIS — G47.00 INSOMNIA, UNSPECIFIED TYPE: ICD-10-CM

## 2019-09-09 PROCEDURE — 99207 ZZC NO CHARGE LOS: CPT | Performed by: PHARMACIST

## 2019-09-09 RX ORDER — PIOGLITAZONEHYDROCHLORIDE 30 MG/1
30 TABLET ORAL DAILY
Qty: 90 TABLET | Refills: 1 | Status: SHIPPED | OUTPATIENT
Start: 2019-09-09 | End: 2019-11-20

## 2019-09-09 RX ORDER — SIMVASTATIN 20 MG
TABLET ORAL
Qty: 90 TABLET | Refills: 2 | Status: SHIPPED | OUTPATIENT
Start: 2019-09-09 | End: 2019-11-20

## 2019-09-09 NOTE — TELEPHONE ENCOUNTER
Prescription approved per [facility], [facility] or [facility] refill protocol.  Karoline MALHOTRA RN   [facility]

## 2019-09-09 NOTE — Clinical Note
erik-- michelle whitmore doing much better on trelegy to replace her asmanex and spiriva - will keep that going and she can followup with either of you this fall-thx,. Janey Romero, [facility].Medication Therapy Management [phone]

## 2019-09-09 NOTE — PATIENT INSTRUCTIONS
Recommendations from today's MTM visit:                                                      1. FYI--Glad to hear Trelegy is working well--stay on this inhaler and OFF the spiriva and asmanex.   Use your albuterol as needed.    Mail the rx in with your patient assistance forms to receive free Trelegy medication .       It was great to speak with you today.  I value your experience and would be very thankful for your time with providing feedback on our clinic survey. You may receive a survey via email or text message in the next few days.     Next MTM visit:  Monday march 9th -2020 at 1pm.     To schedule another MTM appointment, please call the clinic directly or you may call the MTM scheduling line at [phone] or toll-free at [phone].     My Clinical Pharmacist's contact information:                                                      It was a pleasure talking with you today!  Please feel free to contact me with any questions or concerns you have.      Janey Romero Rph.  Medication Therapy Management Provider  [phone]

## 2019-09-19 DIAGNOSIS — I10 ESSENTIAL HYPERTENSION: ICD-10-CM

## 2019-09-19 NOTE — TELEPHONE ENCOUNTER
"Requested Prescriptions   Pending Prescriptions Disp Refills     hydrochlorothiazide (HYDRODIURIL) 25 MG tablet [Pharmacy Med Name: HYDROCHLOROTHIAZIDE 25MG TABS] 90 tablet 0     Sig: TAKE ONE TABLET BY MOUTH EVERY DAY   Last Written Prescription Date:  6/27/19  Last Fill Quantity: 90,  # refills: 0   Last Office Visit: 7/15/2019 [facility]      Return in about 53 weeks (around 7/20/2020) for Annual Wellness Visit.     Future Office Visit:         Diuretics (Including Combos) Protocol Failed - 9/19/2019  9:46 AM        Failed - Normal serum creatinine on file in past 12 months     Recent Labs   Lab Test 07/15/19  0948   CR 1.18*              Passed - Blood pressure under 140/90 in past 12 months     BP Readings from Last 3 Encounters:   09/09/19 122/68   08/12/19 114/60   07/15/19 112/50                 Passed - Recent (12 mo) or future (30 days) visit within the authorizing provider's specialty     Patient had office visit in the last 12 months or has a visit in the next 30 days with authorizing provider or within the authorizing provider's specialty.  See \"Patient Info\" tab in inbasket, or \"Choose Columns\" in Meds & Orders section of the refill encounter.              Passed - Medication is active on med list        Passed - Patient is age 18 or older        Passed - No active pregancy on record        Passed - Normal serum potassium on file in past 12 months     Recent Labs   Lab Test 07/15/19  0948   POTASSIUM 4.5                    Passed - Normal serum sodium on file in past 12 months     Recent Labs   Lab Test 07/15/19  0948                 Passed - No positive pregnancy test in past 12 months        "

## 2019-09-20 ENCOUNTER — ANTICOAGULATION THERAPY VISIT (OUTPATIENT)
Dept: NURSING | Facility: CLINIC | Age: 84
End: 2019-09-20
Payer: COMMERCIAL

## 2019-09-20 DIAGNOSIS — Z23 NEED FOR PROPHYLACTIC VACCINATION AND INOCULATION AGAINST INFLUENZA: Primary | ICD-10-CM

## 2019-09-20 DIAGNOSIS — Z79.01 LONG TERM CURRENT USE OF ANTICOAGULANTS WITH INR GOAL OF 2.0-3.0: ICD-10-CM

## 2019-09-20 DIAGNOSIS — I48.91 ATRIAL FIBRILLATION (H): ICD-10-CM

## 2019-09-20 LAB — INR POINT OF CARE: 3.1 (ref 0.86–1.14)

## 2019-09-20 PROCEDURE — 85610 PROTHROMBIN TIME: CPT | Mod: QW

## 2019-09-20 PROCEDURE — G0008 ADMIN INFLUENZA VIRUS VAC: HCPCS

## 2019-09-20 PROCEDURE — 36416 COLLJ CAPILLARY BLOOD SPEC: CPT

## 2019-09-20 PROCEDURE — 99207 ZZC NO CHARGE NURSE ONLY: CPT

## 2019-09-20 PROCEDURE — 90662 IIV NO PRSV INCREASED AG IM: CPT

## 2019-09-20 RX ORDER — HYDROCHLOROTHIAZIDE 25 MG/1
TABLET ORAL
Qty: 90 TABLET | Refills: 1 | Status: ON HOLD | OUTPATIENT
Start: 2019-09-20 | End: 2019-11-13

## 2019-09-20 NOTE — PROGRESS NOTES
ANTICOAGULATION FOLLOW-UP CLINIC VISIT    Patient Name:  Maryalice Rebecca Wachter  Date:  9/20/2019  Contact Type:  Face to Face    SUBJECTIVE:  Patient Findings         Clinical Outcomes     Negatives:   Major bleeding event, Thromboembolic event, Anticoagulation-related hospital admission, Anticoagulation-related ED visit, Anticoagulation-related fatality           OBJECTIVE    INR Protime   Date Value Ref Range Status   09/20/2019 3.1 (A) 0.86 - 1.14 Final       ASSESSMENT / PLAN  No question data found.  Anticoagulation Summary  As of 9/20/2019    INR goal:   2.0-3.0   TTR:   68.0 % (3.4 y)   INR used for dosing:   3.1! (9/20/2019)   Warfarin maintenance plan:   4.5 mg (3 mg x 1.5) every Tue; 3 mg (3 mg x 1) all other days   Full warfarin instructions:   9/20: 1.5 mg; Otherwise 4.5 mg every Tue; 3 mg all other days   Weekly warfarin total:   22.5 mg   Plan last modified:   Cyndi Cabello RN (8/24/2018)   Next INR check:   10/4/2019   Priority:   INR   Target end date:       Indications    Atrial fibrillation (H) [I48.91]  Long term current use of anticoagulants with INR goal of 2.0-3.0 [Z79.01]             Anticoagulation Episode Summary     INR check location:       Preferred lab:       Send INR reminders to:   YEN THAYER    Comments:         Anticoagulation Care Providers     Provider Role Specialty Phone number    Venu Maria PA-C Responsible Physician Assistant - Medical [phone]            See the Encounter Report to view Anticoagulation Flowsheet and Dosing Calendar (Go to Encounters tab in chart review, and find the Anticoagulation Therapy Visit)    Cyndi Cabello RN

## 2019-09-20 NOTE — TELEPHONE ENCOUNTER
Routing refill request to provider for review/approval because:  Labs out of range:  Creat

## 2019-10-01 ENCOUNTER — HEALTH MAINTENANCE LETTER (OUTPATIENT)
Age: 84
End: 2019-10-01

## 2019-10-03 DIAGNOSIS — E11.65 TYPE 2 DIABETES MELLITUS WITH HYPERGLYCEMIA, WITHOUT LONG-TERM CURRENT USE OF INSULIN (H): ICD-10-CM

## 2019-10-03 DIAGNOSIS — I51.9 SYSTOLIC DYSFUNCTION, LEFT VENTRICLE: ICD-10-CM

## 2019-10-03 DIAGNOSIS — I47.19 ATRIAL TACHYCARDIA, PAROXYSMAL (H): ICD-10-CM

## 2019-10-03 DIAGNOSIS — I25.2 PAST MYOCARDIAL INFARCTION: ICD-10-CM

## 2019-10-03 NOTE — TELEPHONE ENCOUNTER
"Last Written Prescription Date:  04/05/19  Last Fill Quantity: 180,  # refills: 1  Last office visit: 7/15/2019 with prescribing provider:  Venu Maria   Future Office Visit:        Last Written Prescription Date:  04/17/19  Last Fill Quantity: 180,  # refills: 1   Last office visit: 7/15/2019 with prescribing provider:  Venu Maria   Future Office Visit:      Requested Prescriptions   Pending Prescriptions Disp Refills     metoprolol tartrate (LOPRESSOR) 50 MG tablet [Pharmacy Med Name: METOPROLOL TARTRATE 50MG TABS] 180 tablet 1     Sig: TAKE ONE TABLET BY MOUTH TWICE A DAY       Beta-Blockers Protocol Passed - 10/3/2019  9:27 AM        Passed - Blood pressure under 140/90 in past 12 months     BP Readings from Last 3 Encounters:   09/09/19 122/68   08/12/19 114/60   07/15/19 112/50                 Passed - Patient is age 6 or older        Passed - Recent (12 mo) or future (30 days) visit within the authorizing provider's specialty     Patient has had an office visit with the authorizing provider or a provider within the authorizing providers department within the previous 12 mos or has a future within next 30 days. See \"Patient Info\" tab in inbasket, or \"Choose Columns\" in Meds & Orders section of the refill encounter.              Passed - Medication is active on med list        glipiZIDE (GLUCOTROL XL) 10 MG 24 hr tablet [Pharmacy Med Name: GLIPIZIDE ER 10MG TAB] 180 tablet 1     Sig: TAKE ONE TABLET BY MOUTH TWICE A DAY       Sulfonylurea Agents Failed - 10/3/2019  9:27 AM        Failed - Patient has a recent creatinine (normal) within the past 12 mos.     Recent Labs   Lab Test 07/15/19  0948   CR 1.18*             Passed - Blood pressure less than 140/90 in past 6 months     BP Readings from Last 3 Encounters:   09/09/19 122/68   08/12/19 114/60   07/15/19 112/50                 Passed - Patient has documented LDL within the past 12 mos.     Recent Labs   Lab Test 07/15/19  0948   LDL 34             " "Passed - Patient has had a Microalbumin in the past 15 mos.     Recent Labs   Lab Test 04/15/19  1032   MICROL 30   UMALCR 20.20             Passed - Patient has documented A1c within the specified period of time.     If HgbA1C is 8 or greater, it needs to be on file within the past 3 months.  If less than 8, must be on file within the past 6 months.     Recent Labs   Lab Test 08/12/19  1335   A1C 7.5*             Passed - Medication is active on med list        Passed - Patient is age 18 or older        Passed - No active pregnancy on record        Passed - Patient has not had a positive pregnancy test within the past 12 mos.        Passed - Recent (6 mo) or future (30 days) visit within the authorizing provider's specialty     Patient had office visit in the last 6 months or has a visit in the next 30 days with authorizing provider or within the authorizing provider's specialty.  See \"Patient Info\" tab in inbasket, or \"Choose Columns\" in Meds & Orders section of the refill encounter.              "

## 2019-10-04 ENCOUNTER — ANTICOAGULATION THERAPY VISIT (OUTPATIENT)
Dept: NURSING | Facility: CLINIC | Age: 84
End: 2019-10-04
Payer: COMMERCIAL

## 2019-10-04 DIAGNOSIS — Z79.01 LONG TERM CURRENT USE OF ANTICOAGULANTS WITH INR GOAL OF 2.0-3.0: ICD-10-CM

## 2019-10-04 DIAGNOSIS — I48.91 ATRIAL FIBRILLATION (H): ICD-10-CM

## 2019-10-04 LAB — INR POINT OF CARE: 3.3 (ref 0.86–1.14)

## 2019-10-04 PROCEDURE — 99207 ZZC NO CHARGE NURSE ONLY: CPT

## 2019-10-04 PROCEDURE — 85610 PROTHROMBIN TIME: CPT | Mod: QW

## 2019-10-04 PROCEDURE — 36416 COLLJ CAPILLARY BLOOD SPEC: CPT

## 2019-10-04 RX ORDER — METOPROLOL TARTRATE 50 MG
TABLET ORAL
Qty: 180 TABLET | Refills: 1 | Status: ON HOLD | OUTPATIENT
Start: 2019-10-04 | End: 2019-11-13

## 2019-10-04 RX ORDER — GLIPIZIDE 10 MG/1
TABLET, FILM COATED, EXTENDED RELEASE ORAL
Qty: 180 TABLET | Refills: 1 | Status: SHIPPED | OUTPATIENT
Start: 2019-10-04 | End: 2020-01-01

## 2019-10-04 NOTE — PROGRESS NOTES
ANTICOAGULATION FOLLOW-UP CLINIC VISIT    Patient Name:  Maryalice Rebecca Wachter  Date:  10/4/2019  Contact Type:  Face to Face    Patient has had 3 consecutive high INR readings.  Will adjust maintenance dose.  Recheck in 2 weeks.    SUBJECTIVE:  Patient Findings     Comments:   The patient was assessed for diet, medication, and activity level changes, missed or extra doses, bruising or bleeding, with no problem findings.        Clinical Outcomes     Negatives:   Major bleeding event, Thromboembolic event, Anticoagulation-related hospital admission, Anticoagulation-related ED visit, Anticoagulation-related fatality    Comments:   The patient was assessed for diet, medication, and activity level changes, missed or extra doses, bruising or bleeding, with no problem findings.           OBJECTIVE    INR Protime   Date Value Ref Range Status   10/04/2019 3.3 (A) 0.86 - 1.14 Final       ASSESSMENT / PLAN  No question data found.  Anticoagulation Summary  As of 10/4/2019    INR goal:   2.0-3.0   TTR:   67.3 % (3.4 y)   INR used for dosing:   3.3! (10/4/2019)   Warfarin maintenance plan:   3 mg (3 mg x 1) every day   Full warfarin instructions:   10/4: 1.5 mg; Otherwise 3 mg every day   Weekly warfarin total:   21 mg   Plan last modified:   Cyndi Cabello, RN (10/4/2019)   Next INR check:   10/28/2019   Priority:   INR   Target end date:       Indications    Atrial fibrillation (H) [I48.91]  Long term current use of anticoagulants with INR goal of 2.0-3.0 [Z79.01]             Anticoagulation Episode Summary     INR check location:       Preferred lab:       Send INR reminders to:   YEN THAYER    Comments:         Anticoagulation Care Providers     Provider Role Specialty Phone number    Venu Maria PA-C Responsible Physician Assistant - Medical [phone]            See the Encounter Report to view Anticoagulation Flowsheet and Dosing Calendar (Go to Encounters tab in chart review, and find the  Anticoagulation Therapy Visit)      Cyndi Cabello RN

## 2019-10-04 NOTE — TELEPHONE ENCOUNTER
Routing refill request to provider for review/approval because:  Labs out of range:  Creatinine elevated.    Cyndi Cabello RN

## 2019-10-28 ENCOUNTER — ANTICOAGULATION THERAPY VISIT (OUTPATIENT)
Dept: NURSING | Facility: CLINIC | Age: 84
End: 2019-10-28
Payer: COMMERCIAL

## 2019-10-28 ENCOUNTER — TELEPHONE (OUTPATIENT)
Dept: FAMILY MEDICINE | Facility: CLINIC | Age: 84
End: 2019-10-28

## 2019-10-28 DIAGNOSIS — I48.20 CHRONIC ATRIAL FIBRILLATION (H): ICD-10-CM

## 2019-10-28 DIAGNOSIS — Z79.01 LONG TERM CURRENT USE OF ANTICOAGULANTS WITH INR GOAL OF 2.0-3.0: Primary | ICD-10-CM

## 2019-10-28 DIAGNOSIS — Z79.01 LONG TERM CURRENT USE OF ANTICOAGULANTS WITH INR GOAL OF 2.0-3.0: ICD-10-CM

## 2019-10-28 DIAGNOSIS — I48.91 ATRIAL FIBRILLATION (H): ICD-10-CM

## 2019-10-28 LAB — INR POINT OF CARE: 2.7 (ref 0.86–1.14)

## 2019-10-28 PROCEDURE — 36416 COLLJ CAPILLARY BLOOD SPEC: CPT

## 2019-10-28 PROCEDURE — 85610 PROTHROMBIN TIME: CPT | Mod: QW

## 2019-10-28 PROCEDURE — 99207 ZZC NO CHARGE NURSE ONLY: CPT

## 2019-10-28 NOTE — TELEPHONE ENCOUNTER
Patients need annual INR referral. Please review and sign.     Karoline Cisse RN  Anticoagulation Clinic-[facility]

## 2019-10-28 NOTE — PROGRESS NOTES
ANTICOAGULATION FOLLOW-UP CLINIC VISIT    Patient Name:  Maryalice Rebecca Wachter  Date:  10/28/2019  Contact Type:  Face to Face    SUBJECTIVE:  Patient Findings     Comments:   The patient was assessed for diet, medication, and activity level changes, missed or extra doses, bruising or bleeding, with no problem findings. Patient states she has generally less appetite and does not generally eat green veggies. Doing well on new maintenance dose.   Renewal of INR referral sent to PCP for review and signature.  Karoline Cisse RN  Anticoagulation Clinic-[facility]          Clinical Outcomes     Negatives:   Major bleeding event, Thromboembolic event, Anticoagulation-related hospital admission, Anticoagulation-related ED visit, Anticoagulation-related fatality    Comments:   The patient was assessed for diet, medication, and activity level changes, missed or extra doses, bruising or bleeding, with no problem findings. Patient states she has generally less appetite and does not generally eat green veggies. Doing well on new maintenance dose.   Renewal of INR referral sent to PCP for review and signature.  Karoline Cisse RN  Anticoagulation Clinic-[facility]             OBJECTIVE    INR Protime   Date Value Ref Range Status   10/28/2019 2.7 (A) 0.86 - 1.14 Final       ASSESSMENT / PLAN  INR assessment THER    Recheck INR In: 4 WEEKS    INR Location Clinic      Anticoagulation Summary  As of 10/28/2019    INR goal:   2.0-3.0   TTR:   66.9 % (3.5 y)   INR used for dosin.7 (10/28/2019)   Warfarin maintenance plan:   3 mg (3 mg x 1) every day   Full warfarin instructions:   3 mg every day   Weekly warfarin total:   21 mg   No change documented:   Karoline Cisse RN   Plan last modified:   Cyndi Cabello RN (10/4/2019)   Next INR check:   2019   Priority:   INR   Target end date:       Indications    Atrial fibrillation (H) [I48.91]  Long term current use of  anticoagulants with INR goal of 2.0-3.0 [Z79.01]             Anticoagulation Episode Summary     INR check location:       Preferred lab:       Send INR reminders to:   YEN THAYER    Comments:   3 mg tabs      Anticoagulation Care Providers     Provider Role Specialty Phone number    Venu Maria PA-C Responsible Physician Assistant - Medical [phone]            See the Encounter Report to view Anticoagulation Flowsheet and Dosing Calendar (Go to Encounters tab in chart review, and find the Anticoagulation Therapy Visit)    Karoline Cisse RN

## 2019-11-05 ENCOUNTER — APPOINTMENT (OUTPATIENT)
Dept: GENERAL RADIOLOGY | Facility: CLINIC | Age: 84
DRG: 314 | End: 2019-11-05
Attending: EMERGENCY MEDICINE
Payer: COMMERCIAL

## 2019-11-05 ENCOUNTER — APPOINTMENT (OUTPATIENT)
Dept: CARDIOLOGY | Facility: CLINIC | Age: 84
DRG: 314 | End: 2019-11-05
Attending: EMERGENCY MEDICINE
Payer: COMMERCIAL

## 2019-11-05 ENCOUNTER — HOSPITAL ENCOUNTER (INPATIENT)
Facility: CLINIC | Age: 84
LOS: 8 days | Discharge: HOME HEALTH | DRG: 314 | End: 2019-11-13
Attending: EMERGENCY MEDICINE | Admitting: INTERNAL MEDICINE
Payer: COMMERCIAL

## 2019-11-05 DIAGNOSIS — J44.1 COPD EXACERBATION (H): ICD-10-CM

## 2019-11-05 DIAGNOSIS — I42.9 CARDIOMYOPATHY, UNSPECIFIED TYPE (H): ICD-10-CM

## 2019-11-05 DIAGNOSIS — J96.22 ACUTE ON CHRONIC RESPIRATORY FAILURE WITH HYPOXIA AND HYPERCAPNIA (H): ICD-10-CM

## 2019-11-05 DIAGNOSIS — J96.01 ACUTE RESPIRATORY FAILURE WITH HYPOXIA (H): Primary | ICD-10-CM

## 2019-11-05 DIAGNOSIS — J96.21 ACUTE ON CHRONIC RESPIRATORY FAILURE WITH HYPOXIA AND HYPERCAPNIA (H): ICD-10-CM

## 2019-11-05 DIAGNOSIS — I10 ESSENTIAL HYPERTENSION: ICD-10-CM

## 2019-11-05 DIAGNOSIS — E86.1 HYPOVOLEMIA: ICD-10-CM

## 2019-11-05 DIAGNOSIS — I48.11 LONGSTANDING PERSISTENT ATRIAL FIBRILLATION (H): ICD-10-CM

## 2019-11-05 LAB
ALBUMIN SERPL-MCNC: 3.4 G/DL (ref 3.4–5)
ALP SERPL-CCNC: 72 U/L (ref 40–150)
ALT SERPL W P-5'-P-CCNC: 30 U/L (ref 0–50)
ANION GAP SERPL CALCULATED.3IONS-SCNC: 8 MMOL/L (ref 3–14)
AST SERPL W P-5'-P-CCNC: 55 U/L (ref 0–45)
BASE DEFICIT BLDV-SCNC: 2.4 MMOL/L
BASOPHILS # BLD AUTO: 0 10E9/L (ref 0–0.2)
BASOPHILS NFR BLD AUTO: 0.4 %
BILIRUB DIRECT SERPL-MCNC: 0.2 MG/DL (ref 0–0.2)
BILIRUB SERPL-MCNC: 0.6 MG/DL (ref 0.2–1.3)
BUN SERPL-MCNC: 21 MG/DL (ref 7–30)
CALCIUM SERPL-MCNC: 9 MG/DL (ref 8.5–10.1)
CHLORIDE SERPL-SCNC: 103 MMOL/L (ref 94–109)
CO2 BLDCOV-SCNC: 27 MMOL/L (ref 21–28)
CO2 BLDCOV-SCNC: 28 MMOL/L (ref 21–28)
CO2 SERPL-SCNC: 27 MMOL/L (ref 20–32)
CREAT SERPL-MCNC: 1.15 MG/DL (ref 0.52–1.04)
D DIMER PPP FEU-MCNC: <0.3 UG/ML FEU (ref 0–0.5)
DIFFERENTIAL METHOD BLD: ABNORMAL
EOSINOPHIL # BLD AUTO: 0.1 10E9/L (ref 0–0.7)
EOSINOPHIL NFR BLD AUTO: 1.3 %
ERYTHROCYTE [DISTWIDTH] IN BLOOD BY AUTOMATED COUNT: 15.1 % (ref 10–15)
ERYTHROCYTE [DISTWIDTH] IN BLOOD BY AUTOMATED COUNT: 15.2 % (ref 10–15)
GFR SERPL CREATININE-BSD FRML MDRD: 43 ML/MIN/{1.73_M2}
GLUCOSE BLDC GLUCOMTR-MCNC: 206 MG/DL (ref 70–99)
GLUCOSE BLDC GLUCOMTR-MCNC: 267 MG/DL (ref 70–99)
GLUCOSE SERPL-MCNC: 226 MG/DL (ref 70–99)
HCO3 BLDV-SCNC: 23 MMOL/L (ref 21–28)
HCT VFR BLD AUTO: 33.5 % (ref 35–47)
HCT VFR BLD AUTO: 37.9 % (ref 35–47)
HGB BLD-MCNC: 10.5 G/DL (ref 11.7–15.7)
HGB BLD-MCNC: 11.8 G/DL (ref 11.7–15.7)
IMM GRANULOCYTES # BLD: 0 10E9/L (ref 0–0.4)
IMM GRANULOCYTES NFR BLD: 0.4 %
INR PPP: 2.04 (ref 0.86–1.14)
INTERPRETATION ECG - MUSE: NORMAL
LACTATE BLD-SCNC: 2.5 MMOL/L (ref 0.7–2.1)
LACTATE BLD-SCNC: 3.1 MMOL/L (ref 0.7–2.1)
LACTATE BLD-SCNC: 3.5 MMOL/L (ref 0.7–2)
LACTATE BLD-SCNC: 3.8 MMOL/L (ref 0.7–2)
LYMPHOCYTES # BLD AUTO: 2.8 10E9/L (ref 0.8–5.3)
LYMPHOCYTES NFR BLD AUTO: 32.9 %
MAGNESIUM SERPL-MCNC: 1.9 MG/DL (ref 1.6–2.3)
MCH RBC QN AUTO: 30.7 PG (ref 26.5–33)
MCH RBC QN AUTO: 30.8 PG (ref 26.5–33)
MCHC RBC AUTO-ENTMCNC: 31.1 G/DL (ref 31.5–36.5)
MCHC RBC AUTO-ENTMCNC: 31.3 G/DL (ref 31.5–36.5)
MCV RBC AUTO: 98 FL (ref 78–100)
MCV RBC AUTO: 99 FL (ref 78–100)
MONOCYTES # BLD AUTO: 0.5 10E9/L (ref 0–1.3)
MONOCYTES NFR BLD AUTO: 5.4 %
NEUTROPHILS # BLD AUTO: 5.1 10E9/L (ref 1.6–8.3)
NEUTROPHILS NFR BLD AUTO: 59.6 %
NRBC # BLD AUTO: 0 10*3/UL
NRBC BLD AUTO-RTO: 0 /100
NT-PROBNP SERPL-MCNC: 869 PG/ML (ref 0–1800)
O2/TOTAL GAS SETTING VFR VENT: ABNORMAL %
OXYHGB MFR BLDV: 92 %
PCO2 BLDV: 42 MM HG (ref 40–50)
PCO2 BLDV: 53 MM HG (ref 40–50)
PCO2 BLDV: 54 MM HG (ref 40–50)
PH BLDV: 7.31 PH (ref 7.32–7.43)
PH BLDV: 7.32 PH (ref 7.32–7.43)
PH BLDV: 7.35 PH (ref 7.32–7.43)
PHOSPHATE SERPL-MCNC: 4.7 MG/DL (ref 2.5–4.5)
PLATELET # BLD AUTO: 240 10E9/L (ref 150–450)
PLATELET # BLD AUTO: 302 10E9/L (ref 150–450)
PO2 BLDV: 22 MM HG (ref 25–47)
PO2 BLDV: 46 MM HG (ref 25–47)
PO2 BLDV: 69 MM HG (ref 25–47)
POTASSIUM SERPL-SCNC: 4.2 MMOL/L (ref 3.4–5.3)
PROCALCITONIN SERPL-MCNC: <0.05 NG/ML
PROT SERPL-MCNC: 6.5 G/DL (ref 6.8–8.8)
RBC # BLD AUTO: 3.41 10E12/L (ref 3.8–5.2)
RBC # BLD AUTO: 3.84 10E12/L (ref 3.8–5.2)
SAO2 % BLDV FROM PO2: 32 %
SAO2 % BLDV FROM PO2: 77 %
SODIUM SERPL-SCNC: 138 MMOL/L (ref 133–144)
TROPONIN I BLD-MCNC: 0.01 UG/L (ref 0–0.08)
TROPONIN I SERPL-MCNC: 0.33 UG/L (ref 0–0.04)
TROPONIN I SERPL-MCNC: 0.53 UG/L (ref 0–0.04)
TROPONIN I SERPL-MCNC: <0.015 UG/L (ref 0–0.04)
WBC # BLD AUTO: 10.4 10E9/L (ref 4–11)
WBC # BLD AUTO: 8.6 10E9/L (ref 4–11)

## 2019-11-05 PROCEDURE — 93321 DOPPLER ECHO F-UP/LMTD STD: CPT | Mod: 26 | Performed by: INTERNAL MEDICINE

## 2019-11-05 PROCEDURE — 25000128 H RX IP 250 OP 636: Performed by: INTERNAL MEDICINE

## 2019-11-05 PROCEDURE — 93308 TTE F-UP OR LMTD: CPT

## 2019-11-05 PROCEDURE — 96361 HYDRATE IV INFUSION ADD-ON: CPT

## 2019-11-05 PROCEDURE — 00000146 ZZHCL STATISTIC GLUCOSE BY METER IP

## 2019-11-05 PROCEDURE — 36415 COLL VENOUS BLD VENIPUNCTURE: CPT | Performed by: EMERGENCY MEDICINE

## 2019-11-05 PROCEDURE — 99223 1ST HOSP IP/OBS HIGH 75: CPT | Mod: AI | Performed by: INTERNAL MEDICINE

## 2019-11-05 PROCEDURE — 25800030 ZZH RX IP 258 OP 636: Performed by: EMERGENCY MEDICINE

## 2019-11-05 PROCEDURE — 94640 AIRWAY INHALATION TREATMENT: CPT

## 2019-11-05 PROCEDURE — 36415 COLL VENOUS BLD VENIPUNCTURE: CPT | Performed by: INTERNAL MEDICINE

## 2019-11-05 PROCEDURE — 25500064 ZZH RX 255 OP 636: Performed by: EMERGENCY MEDICINE

## 2019-11-05 PROCEDURE — 84484 ASSAY OF TROPONIN QUANT: CPT | Performed by: EMERGENCY MEDICINE

## 2019-11-05 PROCEDURE — 25000132 ZZH RX MED GY IP 250 OP 250 PS 637: Performed by: INTERNAL MEDICINE

## 2019-11-05 PROCEDURE — 25000125 ZZHC RX 250: Performed by: EMERGENCY MEDICINE

## 2019-11-05 PROCEDURE — 85610 PROTHROMBIN TIME: CPT | Performed by: EMERGENCY MEDICINE

## 2019-11-05 PROCEDURE — 84484 ASSAY OF TROPONIN QUANT: CPT

## 2019-11-05 PROCEDURE — 82803 BLOOD GASES ANY COMBINATION: CPT

## 2019-11-05 PROCEDURE — 71045 X-RAY EXAM CHEST 1 VIEW: CPT

## 2019-11-05 PROCEDURE — 3E033XZ INTRODUCTION OF VASOPRESSOR INTO PERIPHERAL VEIN, PERCUTANEOUS APPROACH: ICD-10-PCS | Performed by: INTERNAL MEDICINE

## 2019-11-05 PROCEDURE — 87040 BLOOD CULTURE FOR BACTERIA: CPT | Performed by: EMERGENCY MEDICINE

## 2019-11-05 PROCEDURE — 84145 PROCALCITONIN (PCT): CPT | Performed by: EMERGENCY MEDICINE

## 2019-11-05 PROCEDURE — 40000264 ECHOCARDIOGRAM LIMITED

## 2019-11-05 PROCEDURE — 87640 STAPH A DNA AMP PROBE: CPT | Performed by: INTERNAL MEDICINE

## 2019-11-05 PROCEDURE — 93010 ELECTROCARDIOGRAM REPORT: CPT | Performed by: INTERNAL MEDICINE

## 2019-11-05 PROCEDURE — 40000281 ZZH STATISTIC TRANSPORT TIME EA 15 MIN

## 2019-11-05 PROCEDURE — 96374 THER/PROPH/DIAG INJ IV PUSH: CPT | Mod: 59

## 2019-11-05 PROCEDURE — 96375 TX/PRO/DX INJ NEW DRUG ADDON: CPT

## 2019-11-05 PROCEDURE — 85027 COMPLETE CBC AUTOMATED: CPT | Performed by: INTERNAL MEDICINE

## 2019-11-05 PROCEDURE — 84484 ASSAY OF TROPONIN QUANT: CPT | Performed by: INTERNAL MEDICINE

## 2019-11-05 PROCEDURE — 85025 COMPLETE CBC W/AUTO DIFF WBC: CPT | Performed by: EMERGENCY MEDICINE

## 2019-11-05 PROCEDURE — 99285 EMERGENCY DEPT VISIT HI MDM: CPT | Mod: 25

## 2019-11-05 PROCEDURE — 84100 ASSAY OF PHOSPHORUS: CPT | Performed by: EMERGENCY MEDICINE

## 2019-11-05 PROCEDURE — 20000003 ZZH R&B ICU

## 2019-11-05 PROCEDURE — 93325 DOPPLER ECHO COLOR FLOW MAPG: CPT | Mod: 26 | Performed by: INTERNAL MEDICINE

## 2019-11-05 PROCEDURE — 82805 BLOOD GASES W/O2 SATURATION: CPT | Performed by: INTERNAL MEDICINE

## 2019-11-05 PROCEDURE — 93308 TTE F-UP OR LMTD: CPT | Mod: 26 | Performed by: INTERNAL MEDICINE

## 2019-11-05 PROCEDURE — 93005 ELECTROCARDIOGRAM TRACING: CPT | Mod: 76

## 2019-11-05 PROCEDURE — 25000125 ZZHC RX 250: Performed by: INTERNAL MEDICINE

## 2019-11-05 PROCEDURE — 80048 BASIC METABOLIC PNL TOTAL CA: CPT | Performed by: EMERGENCY MEDICINE

## 2019-11-05 PROCEDURE — 80076 HEPATIC FUNCTION PANEL: CPT | Performed by: EMERGENCY MEDICINE

## 2019-11-05 PROCEDURE — 83605 ASSAY OF LACTIC ACID: CPT

## 2019-11-05 PROCEDURE — 94660 CPAP INITIATION&MGMT: CPT

## 2019-11-05 PROCEDURE — 25000125 ZZHC RX 250

## 2019-11-05 PROCEDURE — 83880 ASSAY OF NATRIURETIC PEPTIDE: CPT | Performed by: EMERGENCY MEDICINE

## 2019-11-05 PROCEDURE — 87641 MR-STAPH DNA AMP PROBE: CPT | Performed by: INTERNAL MEDICINE

## 2019-11-05 PROCEDURE — 93005 ELECTROCARDIOGRAM TRACING: CPT

## 2019-11-05 PROCEDURE — 85379 FIBRIN DEGRADATION QUANT: CPT | Performed by: EMERGENCY MEDICINE

## 2019-11-05 PROCEDURE — 40000275 ZZH STATISTIC RCP TIME EA 10 MIN

## 2019-11-05 PROCEDURE — 99207 ZZC CDG-CODE CATEGORY CHANGED: CPT | Performed by: INTERNAL MEDICINE

## 2019-11-05 PROCEDURE — 25000128 H RX IP 250 OP 636: Performed by: EMERGENCY MEDICINE

## 2019-11-05 PROCEDURE — 83735 ASSAY OF MAGNESIUM: CPT | Performed by: EMERGENCY MEDICINE

## 2019-11-05 PROCEDURE — 83605 ASSAY OF LACTIC ACID: CPT | Performed by: INTERNAL MEDICINE

## 2019-11-05 PROCEDURE — 94644 CONT INHLJ TX 1ST HOUR: CPT

## 2019-11-05 RX ORDER — WARFARIN SODIUM 3 MG/1
3 TABLET ORAL ONCE
Status: COMPLETED | OUTPATIENT
Start: 2019-11-05 | End: 2019-11-05

## 2019-11-05 RX ORDER — ASPIRIN 81 MG/1
81 TABLET ORAL DAILY
Status: DISCONTINUED | OUTPATIENT
Start: 2019-11-06 | End: 2019-11-13 | Stop reason: HOSPADM

## 2019-11-05 RX ORDER — SIMVASTATIN 20 MG
20 TABLET ORAL AT BEDTIME
Status: DISCONTINUED | OUTPATIENT
Start: 2019-11-05 | End: 2019-11-13 | Stop reason: HOSPADM

## 2019-11-05 RX ORDER — IPRATROPIUM BROMIDE AND ALBUTEROL SULFATE 2.5; .5 MG/3ML; MG/3ML
SOLUTION RESPIRATORY (INHALATION)
Status: COMPLETED
Start: 2019-11-05 | End: 2019-11-05

## 2019-11-05 RX ORDER — NICOTINE POLACRILEX 4 MG
15-30 LOZENGE BUCCAL
Status: DISCONTINUED | OUTPATIENT
Start: 2019-11-05 | End: 2019-11-13 | Stop reason: HOSPADM

## 2019-11-05 RX ORDER — DEXTROSE MONOHYDRATE 25 G/50ML
25-50 INJECTION, SOLUTION INTRAVENOUS
Status: DISCONTINUED | OUTPATIENT
Start: 2019-11-05 | End: 2019-11-13 | Stop reason: HOSPADM

## 2019-11-05 RX ORDER — POLYETHYLENE GLYCOL 3350 17 G/17G
17 POWDER, FOR SOLUTION ORAL DAILY PRN
Status: DISCONTINUED | OUTPATIENT
Start: 2019-11-05 | End: 2019-11-13 | Stop reason: HOSPADM

## 2019-11-05 RX ORDER — MAGNESIUM CARB/ALUMINUM HYDROX 105-160MG
148 TABLET,CHEWABLE ORAL
Status: COMPLETED | OUTPATIENT
Start: 2019-11-05 | End: 2019-11-10

## 2019-11-05 RX ORDER — METHYLPREDNISOLONE SODIUM SUCCINATE 125 MG/2ML
125 INJECTION, POWDER, LYOPHILIZED, FOR SOLUTION INTRAMUSCULAR; INTRAVENOUS ONCE
Status: COMPLETED | OUTPATIENT
Start: 2019-11-05 | End: 2019-11-05

## 2019-11-05 RX ORDER — ALBUTEROL SULFATE 5 MG/ML
10 SOLUTION, NON-ORAL INHALATION CONTINUOUS PRN
Status: DISPENSED | OUTPATIENT
Start: 2019-11-05 | End: 2019-11-05

## 2019-11-05 RX ORDER — LIDOCAINE 40 MG/G
CREAM TOPICAL
Status: DISCONTINUED | OUTPATIENT
Start: 2019-11-05 | End: 2019-11-13 | Stop reason: HOSPADM

## 2019-11-05 RX ORDER — NOREPINEPHRINE BITARTRATE 0.06 MG/ML
0.03-0.4 INJECTION, SOLUTION INTRAVENOUS CONTINUOUS
Status: DISCONTINUED | OUTPATIENT
Start: 2019-11-05 | End: 2019-11-07

## 2019-11-05 RX ORDER — FUROSEMIDE 10 MG/ML
40 INJECTION INTRAMUSCULAR; INTRAVENOUS ONCE
Status: COMPLETED | OUTPATIENT
Start: 2019-11-05 | End: 2019-11-05

## 2019-11-05 RX ORDER — ALBUTEROL SULFATE 0.83 MG/ML
5 SOLUTION RESPIRATORY (INHALATION) ONCE
Status: COMPLETED | OUTPATIENT
Start: 2019-11-05 | End: 2019-11-05

## 2019-11-05 RX ORDER — LEVOTHYROXINE SODIUM 75 UG/1
75 TABLET ORAL DAILY
Status: DISCONTINUED | OUTPATIENT
Start: 2019-11-06 | End: 2019-11-13 | Stop reason: HOSPADM

## 2019-11-05 RX ORDER — IPRATROPIUM BROMIDE AND ALBUTEROL SULFATE 2.5; .5 MG/3ML; MG/3ML
3 SOLUTION RESPIRATORY (INHALATION) ONCE
Status: DISCONTINUED | OUTPATIENT
Start: 2019-11-05 | End: 2019-11-05

## 2019-11-05 RX ORDER — HEPARIN SODIUM,PORCINE 10 UNIT/ML
2-5 VIAL (ML) INTRAVENOUS
Status: DISCONTINUED | OUTPATIENT
Start: 2019-11-05 | End: 2019-11-06

## 2019-11-05 RX ORDER — ALBUTEROL SULFATE 5 MG/ML
10 SOLUTION, NON-ORAL INHALATION CONTINUOUS
Status: ACTIVE | OUTPATIENT
Start: 2019-11-05 | End: 2019-11-05

## 2019-11-05 RX ORDER — ONDANSETRON 2 MG/ML
4 INJECTION INTRAMUSCULAR; INTRAVENOUS EVERY 6 HOURS PRN
Status: DISCONTINUED | OUTPATIENT
Start: 2019-11-05 | End: 2019-11-13 | Stop reason: HOSPADM

## 2019-11-05 RX ORDER — HEPARIN SODIUM 10000 [USP'U]/100ML
850 INJECTION, SOLUTION INTRAVENOUS CONTINUOUS
Status: DISCONTINUED | OUTPATIENT
Start: 2019-11-05 | End: 2019-11-07

## 2019-11-05 RX ORDER — ONDANSETRON 4 MG/1
4 TABLET, ORALLY DISINTEGRATING ORAL EVERY 6 HOURS PRN
Status: DISCONTINUED | OUTPATIENT
Start: 2019-11-05 | End: 2019-11-13 | Stop reason: HOSPADM

## 2019-11-05 RX ORDER — FERROUS SULFATE 325(65) MG
325 TABLET ORAL
Status: DISCONTINUED | OUTPATIENT
Start: 2019-11-06 | End: 2019-11-13 | Stop reason: HOSPADM

## 2019-11-05 RX ADMIN — WARFARIN SODIUM 3 MG: 3 TABLET ORAL at 21:52

## 2019-11-05 RX ADMIN — FUROSEMIDE 40 MG: 10 INJECTION, SOLUTION INTRAMUSCULAR; INTRAVENOUS at 14:58

## 2019-11-05 RX ADMIN — IPRATROPIUM BROMIDE AND ALBUTEROL SULFATE 3 ML: .5; 3 SOLUTION RESPIRATORY (INHALATION) at 16:32

## 2019-11-05 RX ADMIN — HEPARIN SODIUM 850 UNITS/HR: 10000 INJECTION, SOLUTION INTRAVENOUS at 22:07

## 2019-11-05 RX ADMIN — SIMVASTATIN 20 MG: 20 TABLET, FILM COATED ORAL at 21:52

## 2019-11-05 RX ADMIN — SODIUM CHLORIDE 1000 ML: 9 INJECTION, SOLUTION INTRAVENOUS at 17:23

## 2019-11-05 RX ADMIN — ALBUTEROL SULFATE 5 MG: 2.5 SOLUTION RESPIRATORY (INHALATION) at 14:00

## 2019-11-05 RX ADMIN — SODIUM CHLORIDE, POTASSIUM CHLORIDE, SODIUM LACTATE AND CALCIUM CHLORIDE 500 ML: 600; 310; 30; 20 INJECTION, SOLUTION INTRAVENOUS at 19:17

## 2019-11-05 RX ADMIN — METHYLPREDNISOLONE SODIUM SUCCINATE 125 MG: 125 INJECTION, POWDER, FOR SOLUTION INTRAMUSCULAR; INTRAVENOUS at 14:16

## 2019-11-05 RX ADMIN — HUMAN ALBUMIN MICROSPHERES AND PERFLUTREN 2 ML: 10; .22 INJECTION, SOLUTION INTRAVENOUS at 19:19

## 2019-11-05 RX ADMIN — SODIUM CHLORIDE, POTASSIUM CHLORIDE, SODIUM LACTATE AND CALCIUM CHLORIDE 500 ML: 600; 310; 30; 20 INJECTION, SOLUTION INTRAVENOUS at 14:22

## 2019-11-05 RX ADMIN — Medication 0.03 MCG/KG/MIN: at 22:06

## 2019-11-05 ASSESSMENT — MIFFLIN-ST. JEOR: SCORE: 1327.81

## 2019-11-05 ASSESSMENT — ENCOUNTER SYMPTOMS
COUGH: 0
WHEEZING: 1
ABDOMINAL PAIN: 0
SHORTNESS OF BREATH: 1
FEVER: 0
CHEST TIGHTNESS: 0

## 2019-11-05 NOTE — ED NOTES
Initial ems report that pt was too weak to stand. Now on bipap and received iv lasix. Placing pure wick to assist with voiding

## 2019-11-05 NOTE — ED PROVIDER NOTES
History     Chief Complaint:  Shortness of Breath    The history is limited by the condition of the patient.      Maryalice Rebecca Wachter is a 85 year old female on Warfarin with a history of COPD with no home oxygen who presents with shortness of breath. The patient felt normal this morning and was coming home to her nursing home from playing cards when her regular door was out of order. She therefore had to walk around the building and became suddenly short of breath in doing so. The patient tried to use her inhaler with no relief, so she called EMS. She was barely able to stand for EMS on their arrival. She was on a nonrebreather mask at that time with oxygen saturation of 90%. EMS notes audible inspiratory and expiratory wheezing. Her blood pressure was reportedly 190/117. They gave the patient a DuoNeb, albuterol, and BiPAP prior to arrival.  Just prior to arrival, her oxygen saturation was between 99 and 100% on BiPAP. EMS states the patient denies chest pain prior to the episode. Upon arrival and initial treatments in the Emergency Department, the patient reports she is breathing slightly better. She denies any chest pain or pressure, abdominal pain, abnormal leg swelling, fever, or recent sickness or cough. The patient does not have a history of recent pneumonia or prior intubation. She reports she felt at her normal state of health this morning prior to onset of symptoms. Her dyspnea started suddenly.    Allergies:  Penicillins  Amlodipine  Furosemide      Medications:    albuterol   Trelegy Ellipta  glipizide  hydrochlorothiazide   levothyroxine  Lisinopril  Loperamide  metformin  metoprolol tartrate   pioglitazone   Quetiapine  Ranitidine  simvastatin   warfarin     Past Medical History:    Coronary artery disease  CHF  COPD  CKD, stage 3  History of sarcoidosis  Type 2 diabetes   Hypothyroidism  hypertension  Hyperlipidemia  History of atrial fibrillation    Past Surgical History:    Ankle  surgery  Dilation and curettage, diagnostic hysteroscopy  ENT surgery  Combined EGD x 2    Family History:    Mother - asthma,  at 42 due to asthma/ smoking complications  Father - diabetes     Social History:  Negative for tobacco use.  Alcohol use: extremely rare use in small amounts  Marital Status:   [2]    Review of Systems   Unable to perform ROS: Acuity of condition   Constitutional: Negative for fever.   Respiratory: Positive for shortness of breath and wheezing. Negative for cough and chest tightness.    Cardiovascular: Negative for chest pain and leg swelling.   Gastrointestinal: Negative for abdominal pain.   Limited due to the condition of the patient.      Physical Exam     Patient Vitals for the past 24 hrs:   BP Temp Temp src Pulse Heart Rate Resp SpO2   19 1721 (!) 85/42 -- -- 92 92 -- --   19 1715 (!) 83/47 -- -- 92 93 -- --   19 1700 -- -- -- -- 97 11 95 %   19 1659 91/54 -- -- 93 96 13 (!) 84 %   19 1645 (!) 83/48 -- -- 95 90 20 92 %   19 1632 -- -- -- -- -- -- 91 %   19 1620 93/58 -- -- 97 96 13 91 %   19 1617 -- -- -- -- 93 (!) 37 (!) 86 %   19 1615 (!) 80/66 -- -- 96 95 (!) 0 (!) 88 %   19 1600 91/68 -- -- 95 89 16 95 %   19 1535 90/54 -- -- 88 93 17 98 %   19 1530 (!) 81/47 -- -- 90 89 10 96 %   19 1515 91/57 -- -- 117 82 14 93 %   19 1500 101/66 -- -- 108 103 22 96 %   19 1415 99/62 -- -- 105 104 26 97 %   19 1410 104/64 -- -- -- 102 28 97 %   19 1403 109/63 96.3  F (35.7  C) Axillary -- -- -- --   19 1400 109/63 -- -- -- 110 30 94 %   19 1359 -- -- -- -- 108 (!) 31 94 %      Physical Exam  General: Awake, alert. Nontoxic.  Head:  Scalp, face, and head appear normal  Eyes:  Pupils are equal, round, and reactive to light    Conjunctivae non-injected and sclerae white  ENT:    The external nose is normal    Pinnae are normal  Neck:  Normal range of motion    There is no  rigidity noted    Trachea is in the midline  CV:  Heart sounds distant. Tachycardic rate.    Normal S1/S2, no S3/S4    No murmur or rub  Resp:  Breath sounds are diminished in all lung fields on BiPAP.     + tachypnea    + increased work of breathing    No definite rales, wheezing, or rhonchi  GI:  Abdomen is soft, obese, no rigidity or guarding    No distension, or mass    No tenderness or rebound tenderness   MS:  Normal muscular tone    Symmetric motor strength    1+ bilateral pitting lower extremity edema. No calf swelling or tenderness.  Skin:  No rash or acute skin lesions noted  Neuro: Awake and alert. Follows commands.     Speech is normal and fluent    Moves all extremities spontaneously  Psych:  Normal affect.  Appropriate interactions.      Emergency Department Course   ECG:  Indication: shortness of breath   Time: 1356  Vent. Rate 114 bpm. LA interval *. QRS duration 124. QT/QTc 372/512. P-R-T axis * -2 56.  Wide QRS rhythm. Left bundle branch block. Abnormal ECG. No significant change compared to EKG dated 7/21/18. Read time: 1504     Indication: repeat  Time: 1519  Vent. Rate 115 bpm. LA interval 202. QRS duration 114. QT/QTc 396/547. P-R-T axis -18 -5 96.  Sinus tachycardia. Anterior infarct, age undetermined. Prolonged QT. Nonspecific intra ventricular conduction delay. Abnormal ECG. Change from previous ECG. Read time: 1536     Imaging:  Radiology findings were communicated with the patient who voiced understanding of the findings.    XR chest port 1 view:  Large hiatal hernia is again noted. Thoracic kyphosis.  Interstitial prominence in both perihilar regions is nonspecific, but  could be related to edema or infection. Please clinically correlate.  Heart size appears stable. Mild pulmonary venous congestion has  increased slightly since the previous exam, as per radiology.     POC US ECHO LIMITED  Grossly normal left ventricular function and chamber size.  No pericardial effusion.    Echo  limited:  LVEF 25% based on biplane 2D tracing.  Left ventricular systolic function is severely reduced.  Severe global hypokineiss of mid and distal segments with preserved  contractility at the base. These findings could be seen in patients with  stress induced cardiomyopathy vs Multivessel disease (less likely but not  excluded).  EF decreased compared to 7/18.  Findings discussed with Dr Serrato at 7.54pm today. The study was technically  difficult.    Laboratory:  Laboratory findings were communicated with the patient who voiced understanding of the findings.    CBC: WBC 8.6, HGB 11.8,   BMP: Glucose 226(H), GFR 43 (L) o/w WNL (Creatinine 1.15 (H))    ISTAT gases lactate noé POCT: PCO2 venous 54 (H), Lactic acid 2.5 (H), o/w WNL  Troponin POCT: 0.01  Glucose by meter: 206 (H)    INR: 2.04 (H)  D dimer quantitative: <0.3  Hepatic panel: Protein total 6.5 (L), AST 55 (H)  1411 Troponin <0.015  Magnesium: 1.9  Nt probnp inpatient: 869  Phosphorus: 4.7 (H)  Procalcitonin: pending  Blood culture one site (right hand): no growth after 2 hours    Repeat ISTAT gases lactate noé POCT: pH 7.1 (L), PCO2 venous 53 (H), PO2 venous 22 (L), lactic acid 3.1 (H), o/w WNL    Interventions:  1400 Proventil 5 mg Neb   1416 Solu-Medrol 125 mg IV  1422 Lactated ringers 0.5 L IV  1458 Lasix 40 mg IV  1632 Duoneb 3 mL Neb  1723 NS 1 L IV  1917 Lactated ringers 0.5 L IV    Emergency Department Course:    1350  EMS arrival and report.   1352 I performed an exam on the patient as documented above.   1353 EMS's BiPAP was removed.   1354   I ordered ISTAT gas, troponin, and Chest Xray.  1356 Blood draw attempted, IV inserted from left  1357  EKG resulted  1404 Xray image taken  1408 IV inserted with ultrasound assistance  1415 Lactate resulted 2.5. Blood pressure 99/62.  1559 Patient rechecked and updated. BiPAP removed  1649 Patient rechecked and updated.  1709 Patient placed on BiPAP again.  1721 Patient rechecked an  updated.    1734: I consulted with Dr. Jacob, hospitalist, regarding the patient's history and presentation here in the emergency department.    1738: Patient rechecked based on hospitalist recommendations; ISTAT gases repeated and heart ultrasound conducted.    1820: Patient rechecked and updated.     1820: I consulted with Dr. Jacob of the hospitalist services. He was in agreement to accept the patient for admission.    Findings and plan explained to the Patient who consents to admission. Discussed the patient with Dr. Wahl, who will admit the patient to a ICU bed for further monitoring, evaluation, and treatment.    1953: Cardiologist called to discuss formal ECHO reading.     Impression & Plan      The Lactic acid level is elevated due to hypovolemia and respiratory failure, at this time there is no sign of severe sepsis or septic shock.       Medical Decision Making:  Maryalice Rebecca Wachter is a 85 year old female with a history of COPD and atrial fibrillation on anticoagulation as well as multiple others noted above who presents to the emergency department today for evaluation of acute respiratory distress. On my evaluation, patient arrived on BiPAP via EMS with tachypnea and increased work of breathing. She is afebrile and nontoxic appearing. A broad differential diagnosis is considered, including COPD exacerbation, pneumonia, pneumothorax, pleural effusions, pericarditis, myocarditis, CHF, or acute coronary syndrome among others. On examination, patient has very poor air movement throughout all lung fields with expiratory wheezing throughout. She has trace pitting edema in the legs but no significant evidence of volume overload to suggest acute decompensated heart failure. Extremities are warm and well-perfused. Patient was initially maintained on BiPAP. Continuous nebs were given with albuterol and ipratropium as well as IV steroids. Chest x-ray was obtained which reveals interstitial  prominence and mild pulmonary vascular congestion but no definite evidence to suggest pneumonia, or other acute thoracic process. Laboratory studies reveal normal CBC without leukocytosis. Troponin is undetectable. BNP is normal. Complete chemistry is largely unremarkable. Lactic acid was elevated 2.5 likely due to stress response and hypovolemia. Initial VGB revealed mild hypercapnia and no significant acidosis. Patient remained afebrile. She denied any preceding infections signs or symptoms. She reports she was simply at her baseline until symptoms started suddenly. D dimer is negative making PE unlikely. Patient was continued on BiPAP. At one point, she was trialed off of BiPAP. However, after approximately 30 minutes had evidence of worsening work of breathing and hypoxia. Therefore, She was placed back on BiPAP. During her Emergency Department course, she did have episodes of low blood pressure and hypotension. However, she maintained good mentation and peripheral perfusion. Bedside echocardiogram was performed which per my review of limited bedside images suggested that cardiac function was not severely impaired without dilated RV, pericardial effusion, or findings to suggest overt cardiogenic shock or obstructive shock. Ultimately, I feel that her low blood pressures were due to hypovolemia and dehydration. Her blood pressure responded well to IV fluid. She received a total of 2 L in the Emergency Department. MAP remained >65 mmHg following IVF. Stat formal ECHO was obtained however that showed new low EF of 25% in a pattern suggestive of stress cardiomyopathy. In discussion with the cardiologist who called with the echo results he felt that this was likely secondary to the COPD exacerbation. Given her acute on chronic respiratory failure, hypotension, and elevated lactate, patient will be admitted to the ICU for further close monitoring, evaluation, and treatment. The case was discussed with Dr. Jacob and  the patient was admitted to the ICU in guarded condition.     Critical Care Time:   Upon my evaluation, this patient had a critical illness with high probability of imminent or life-threatening deterioration due to respiratory failure, hypotension, which required my direct attention, intervention, and personal management.    I have personally provided 64 minutes of critical care time exclusive of time spent on separately billable procedures. Time includes review of laboratory data, radiology results, discussion with consultants, reassessment of the patient and monitoring for potential decompensation. Interventions were performed as documented above.    Diagnosis:    ICD-10-CM    1. COPD exacerbation (H) J44.1 Procalcitonin     Blood culture ONE site     Lactic acid whole blood     ISTAT gases lactate noé POCT     ISTAT gases lactate noé POCT     Troponin I     Lactic acid whole blood     Methicillin Resist/Sens S. aureus PCR     CBC with platelets     Troponin I     Lactic acid whole blood     Blood gas venous with oxyhemoglobin     Glucose by meter     Glucose by meter     INR     Heparin Xa (10a) Level     Basic metabolic panel     Magnesium     Glucose by meter     Glucose by meter     Lactic acid whole blood     Troponin I     Troponin I     Glucose by meter     Glucose by meter     Glucose by meter     Glucose by meter     CANCELED: Lactic acid whole blood     CANCELED: Heparin Xa level (AM Draw)     CANCELED: Troponin I   2. Acute on chronic respiratory failure with hypoxia and hypercapnia (H) J96.21     J96.22    3. Hypovolemia E86.1    4. Cardiomyopathy, unspecified type (H) I42.9      Disposition:   Admitted to ICU    Scribe Disclosure:  BRISEYDA Naziasa Burgess, am serving as a scribe on 11/5/2019 at 2:01 PM to personally document services performed by Rj Serrato MD based on my observations and the provider's statements to me.         Rj Serrato MD  11/06/19 9104

## 2019-11-05 NOTE — ED PROVIDER NOTES
Started for oncoming provider.     Exam:  Poor air mvmt  No rales  Diff insp/exp wheezes    Plan:   Per orders     Bird Vogt MD  11/05/19 0139

## 2019-11-05 NOTE — ED TRIAGE NOTES
Pt arrives via ems with increased sob. Pt called ems after returning home from being out playing cards, inhaler not useful. PD had non-rebreather on her and ems initial reading was 90%, they put on cpap and got her sats to 99%. Duo neb and 2 albuterol given. Hx copd. Not on home o2.

## 2019-11-05 NOTE — ED NOTES
Bed: ED02  Expected date: 11/5/19  Expected time:   Means of arrival: Ambulance  Comments:  Denisha red patient

## 2019-11-05 NOTE — ED NOTES
Rn placed pt back on Bipap, de-sating to 80's on RA (md aske to keep sats >90%). Remains hypotensive as well, md updated-no new orders. Will continue to monitor.

## 2019-11-05 NOTE — PROGRESS NOTES
RT note; continuous neb given as ordered x1 hr,  [id number], pt tolerated well and continues with BiPap.

## 2019-11-06 ENCOUNTER — APPOINTMENT (OUTPATIENT)
Dept: PHYSICAL THERAPY | Facility: CLINIC | Age: 84
DRG: 314 | End: 2019-11-06
Attending: INTERNAL MEDICINE
Payer: COMMERCIAL

## 2019-11-06 LAB
ANION GAP SERPL CALCULATED.3IONS-SCNC: 11 MMOL/L (ref 3–14)
BUN SERPL-MCNC: 29 MG/DL (ref 7–30)
CALCIUM SERPL-MCNC: 9.3 MG/DL (ref 8.5–10.1)
CHLORIDE SERPL-SCNC: 105 MMOL/L (ref 94–109)
CO2 SERPL-SCNC: 23 MMOL/L (ref 20–32)
CREAT SERPL-MCNC: 1.44 MG/DL (ref 0.52–1.04)
GFR SERPL CREATININE-BSD FRML MDRD: 33 ML/MIN/{1.73_M2}
GLUCOSE BLDC GLUCOMTR-MCNC: 137 MG/DL (ref 70–99)
GLUCOSE BLDC GLUCOMTR-MCNC: 168 MG/DL (ref 70–99)
GLUCOSE BLDC GLUCOMTR-MCNC: 265 MG/DL (ref 70–99)
GLUCOSE BLDC GLUCOMTR-MCNC: 271 MG/DL (ref 70–99)
GLUCOSE BLDC GLUCOMTR-MCNC: 291 MG/DL (ref 70–99)
GLUCOSE SERPL-MCNC: 313 MG/DL (ref 70–99)
INR PPP: 1.9 (ref 0.86–1.14)
INTERPRETATION ECG - MUSE: NORMAL
LACTATE BLD-SCNC: 2.5 MMOL/L (ref 0.7–2)
LMWH PPP CHRO-ACNC: 0.26 IU/ML
MAGNESIUM SERPL-MCNC: 1.7 MG/DL (ref 1.6–2.3)
MRSA DNA SPEC QL NAA+PROBE: NEGATIVE
POTASSIUM SERPL-SCNC: 4.2 MMOL/L (ref 3.4–5.3)
SODIUM SERPL-SCNC: 139 MMOL/L (ref 133–144)
SPECIMEN SOURCE: NORMAL
TROPONIN I SERPL-MCNC: 0.84 UG/L (ref 0–0.04)
TROPONIN I SERPL-MCNC: 0.86 UG/L (ref 0–0.04)
TROPONIN I SERPL-MCNC: 0.93 UG/L (ref 0–0.04)

## 2019-11-06 PROCEDURE — 40000275 ZZH STATISTIC RCP TIME EA 10 MIN

## 2019-11-06 PROCEDURE — 94660 CPAP INITIATION&MGMT: CPT

## 2019-11-06 PROCEDURE — 93005 ELECTROCARDIOGRAM TRACING: CPT

## 2019-11-06 PROCEDURE — 83605 ASSAY OF LACTIC ACID: CPT | Performed by: INTERNAL MEDICINE

## 2019-11-06 PROCEDURE — 00000146 ZZHCL STATISTIC GLUCOSE BY METER IP

## 2019-11-06 PROCEDURE — 83735 ASSAY OF MAGNESIUM: CPT | Performed by: INTERNAL MEDICINE

## 2019-11-06 PROCEDURE — 25000125 ZZHC RX 250: Performed by: INTERNAL MEDICINE

## 2019-11-06 PROCEDURE — 99291 CRITICAL CARE FIRST HOUR: CPT | Performed by: SURGERY

## 2019-11-06 PROCEDURE — 36415 COLL VENOUS BLD VENIPUNCTURE: CPT | Performed by: INTERNAL MEDICINE

## 2019-11-06 PROCEDURE — 94640 AIRWAY INHALATION TREATMENT: CPT | Mod: 76

## 2019-11-06 PROCEDURE — 20000003 ZZH R&B ICU

## 2019-11-06 PROCEDURE — 25000125 ZZHC RX 250: Performed by: HOSPITALIST

## 2019-11-06 PROCEDURE — 93010 ELECTROCARDIOGRAM REPORT: CPT | Performed by: INTERNAL MEDICINE

## 2019-11-06 PROCEDURE — 84484 ASSAY OF TROPONIN QUANT: CPT | Performed by: HOSPITALIST

## 2019-11-06 PROCEDURE — 97530 THERAPEUTIC ACTIVITIES: CPT | Mod: GP

## 2019-11-06 PROCEDURE — 99222 1ST HOSP IP/OBS MODERATE 55: CPT | Mod: 25 | Performed by: INTERNAL MEDICINE

## 2019-11-06 PROCEDURE — 85610 PROTHROMBIN TIME: CPT | Performed by: INTERNAL MEDICINE

## 2019-11-06 PROCEDURE — 25000132 ZZH RX MED GY IP 250 OP 250 PS 637: Performed by: HOSPITALIST

## 2019-11-06 PROCEDURE — 97161 PT EVAL LOW COMPLEX 20 MIN: CPT | Mod: GP

## 2019-11-06 PROCEDURE — 25000132 ZZH RX MED GY IP 250 OP 250 PS 637: Performed by: INTERNAL MEDICINE

## 2019-11-06 PROCEDURE — 99233 SBSQ HOSP IP/OBS HIGH 50: CPT | Performed by: HOSPITALIST

## 2019-11-06 PROCEDURE — 94640 AIRWAY INHALATION TREATMENT: CPT

## 2019-11-06 PROCEDURE — 85520 HEPARIN ASSAY: CPT | Performed by: INTERNAL MEDICINE

## 2019-11-06 PROCEDURE — 80048 BASIC METABOLIC PNL TOTAL CA: CPT | Performed by: INTERNAL MEDICINE

## 2019-11-06 PROCEDURE — 25000132 ZZH RX MED GY IP 250 OP 250 PS 637: Performed by: SURGERY

## 2019-11-06 PROCEDURE — 36415 COLL VENOUS BLD VENIPUNCTURE: CPT | Performed by: HOSPITALIST

## 2019-11-06 PROCEDURE — 84484 ASSAY OF TROPONIN QUANT: CPT | Performed by: INTERNAL MEDICINE

## 2019-11-06 PROCEDURE — 25000131 ZZH RX MED GY IP 250 OP 636 PS 637: Performed by: INTERNAL MEDICINE

## 2019-11-06 RX ORDER — NICOTINE POLACRILEX 4 MG
15-30 LOZENGE BUCCAL
Status: DISCONTINUED | OUTPATIENT
Start: 2019-11-06 | End: 2019-11-06

## 2019-11-06 RX ORDER — ALBUTEROL SULFATE 90 UG/1
2 AEROSOL, METERED RESPIRATORY (INHALATION) EVERY 6 HOURS PRN
Status: DISCONTINUED | OUTPATIENT
Start: 2019-11-06 | End: 2019-11-07 | Stop reason: ALTCHOICE

## 2019-11-06 RX ORDER — IPRATROPIUM BROMIDE AND ALBUTEROL SULFATE 2.5; .5 MG/3ML; MG/3ML
3 SOLUTION RESPIRATORY (INHALATION)
Status: DISCONTINUED | OUTPATIENT
Start: 2019-11-06 | End: 2019-11-13 | Stop reason: HOSPADM

## 2019-11-06 RX ORDER — DEXTROSE MONOHYDRATE 25 G/50ML
25-50 INJECTION, SOLUTION INTRAVENOUS
Status: DISCONTINUED | OUTPATIENT
Start: 2019-11-06 | End: 2019-11-06

## 2019-11-06 RX ORDER — QUETIAPINE FUMARATE 25 MG/1
25 TABLET, FILM COATED ORAL
Status: DISCONTINUED | OUTPATIENT
Start: 2019-11-06 | End: 2019-11-08

## 2019-11-06 RX ORDER — ACETAMINOPHEN 325 MG/1
650 TABLET ORAL EVERY 4 HOURS PRN
Status: DISCONTINUED | OUTPATIENT
Start: 2019-11-06 | End: 2019-11-13 | Stop reason: HOSPADM

## 2019-11-06 RX ADMIN — WARFARIN SODIUM 3.5 MG: 2.5 TABLET ORAL at 17:14

## 2019-11-06 RX ADMIN — FERROUS SULFATE TAB 325 MG (65 MG ELEMENTAL FE) 325 MG: 325 (65 FE) TAB at 08:46

## 2019-11-06 RX ADMIN — INSULIN ASPART 2 UNITS: 100 INJECTION, SOLUTION INTRAVENOUS; SUBCUTANEOUS at 00:26

## 2019-11-06 RX ADMIN — ASPIRIN 81 MG: 81 TABLET ORAL at 08:46

## 2019-11-06 RX ADMIN — INSULIN ASPART 2 UNITS: 100 INJECTION, SOLUTION INTRAVENOUS; SUBCUTANEOUS at 08:11

## 2019-11-06 RX ADMIN — ACETAMINOPHEN 650 MG: 325 TABLET, FILM COATED ORAL at 21:12

## 2019-11-06 RX ADMIN — IPRATROPIUM BROMIDE AND ALBUTEROL SULFATE 3 ML: .5; 3 SOLUTION RESPIRATORY (INHALATION) at 15:25

## 2019-11-06 RX ADMIN — QUETIAPINE FUMARATE 25 MG: 25 TABLET ORAL at 20:45

## 2019-11-06 RX ADMIN — SIMVASTATIN 20 MG: 20 TABLET, FILM COATED ORAL at 20:45

## 2019-11-06 RX ADMIN — LEVOTHYROXINE SODIUM 75 MCG: 75 TABLET ORAL at 08:46

## 2019-11-06 RX ADMIN — IPRATROPIUM BROMIDE AND ALBUTEROL SULFATE 3 ML: .5; 3 SOLUTION RESPIRATORY (INHALATION) at 12:31

## 2019-11-06 RX ADMIN — INSULIN ASPART 2 UNITS: 100 INJECTION, SOLUTION INTRAVENOUS; SUBCUTANEOUS at 04:20

## 2019-11-06 RX ADMIN — IPRATROPIUM BROMIDE AND ALBUTEROL SULFATE 3 ML: .5; 3 SOLUTION RESPIRATORY (INHALATION) at 20:03

## 2019-11-06 RX ADMIN — Medication 2 G: at 05:26

## 2019-11-06 RX ADMIN — OYSTER SHELL CALCIUM WITH VITAMIN D 1 TABLET: 500; 200 TABLET, FILM COATED ORAL at 08:46

## 2019-11-06 ASSESSMENT — ACTIVITIES OF DAILY LIVING (ADL)
ADLS_ACUITY_SCORE: 14

## 2019-11-06 NOTE — PLAN OF CARE
PT orders received, evaluation completed, treatment initiated. Pt is 85 year old female with a history of atrial fibrillation on warfarin, DM II, COPD who is admitted to the ICU for acute hypoxic respiratory failure and cardiogenic shock secondary to Takotsubo cardiomyopathy. Sudden onset of SOB at home. Pt lives with spouse in house with no stairs, walk in shower with grab bars and raised toilet seat with grab bars. Pt does not ambulate with an assistive device at baseline, reports not having walker or cane to use at home.      Discharge Planner PT   Patient plan for discharge: home  Current status: Pt supine upon initiation, agreeable to PT. Educated about PT role and POC. Pt transferred supine<>sit with SBA, extra time needed for safe set up of lines and tubes. Pt transferred sit<>stand with CGA from bed with VC for technique, no LOB observed. Pt ambulated 5' with CGA from bed to chair, no LOB observed, vitals stable throughout. Pt may need FWW for longer distances, does not use AD at baseline. Transferred stand<>sit to chair, demonstrating good eccentric control. Sitting at end of appointment with all needs in reach. Pt very [place], reports spouse is supposed to bring  for her hearing aids.  Barriers to return to prior living situation: SOB, decreased endurance  Recommendations for discharge: home with SBA from spouse for transfers and ambulation  Rationale for recommendations: Pt presenting below baseline for functional mobility, would benefit from continued IP PT to increase endurance and activity tolerance. Pt may benefit from use of FWW for ambulating longer distances.       Entered by: Elizabeth Martínez 11/06/2019 11:00 AM

## 2019-11-06 NOTE — PLAN OF CARE
Vascular access consult placed 11/5/19 at 21:45 for PICC-  Discussed with with Walter BHATIA at 0700 on 11/6, patient does not need PICC placement at this time per Dr Ash. Order will be discontinued, if patient to need PICC please place new VAT consult order.

## 2019-11-06 NOTE — H&P
[facility]    History and Physical - Hospitalist Service       Date of Admission:  11/5/2019     Assessment & Plan   Maryalice Rebecca Wachter is a 85 year old female with a history of atrial fibrillation on warfarin, DM II, COPD who is admitted to the ICU for acute hypoxic respiratory failure and cardiogenic shock secondary to Takotsubo cardiomyopathy.     Patient had a sudden onset of shortness of breath at home.  Required BiPAP to maintain saturations in route and in the ED.  Had evidence of shock with hypotension and lactic acidosis.  BP improved after fluids.  Basic work-up was negative including labs and chest x-ray. Stat echocardiogram obtained in the ED showed severe global hypokinesis.  She  was admitted to the ICU and remained on BiPAP.  Discussed with cardiology and critical care.  Patient has lactic acidosis and elevated troponin.  She will be started on vasopressors and kept on BiPAP. IV heparin added in case of NSTEMI. She is having minimal urine output at this time.  Patient is critically ill secondary to cardiogenic shock and multiorgan failure with respiratory failure and likely impending renal failure.    Cardiogenic shock secondary to Takotsubo cardiomyopathy  - Patient has evidence of shock with low blood pressures in the ED (80/30) accompanied by lactic acidosis which initially improved after fluids but later worsened  - Narrow pulse pressure is consistent with cardiogenic etiology  - No evidence of sepsis, hypovolemia or hemorrhage.  Doubt she has a massive PE causing this given echo findings ia dthe therapeutic INR. No STEMI on EKG.   - Stat echocardiogram obtained in the ED showed apical ballooning consistent with Takotsubo  - Discussed with Dr. Mae on call for cardiology. Did not feel that she would need urgent balloon pump placement or similar. Also felt that the elevation in troponin to 0.3 was consistent with Takotsubo pathology and not likely representative of high-risk  "NSTEMI, nonetheless heparin GTT felt to be appropriate.  - Formal cardiology consult for the AM.   - Trend troponin  - Discussed with Dr. Garcia of [facility] ICU. Recommended pressor administration to augment BP.  - PICC ordered     Acute hypoxic respiratory failure secondary to the above  - Patient requiring BiPAP in route by EMS and on admission.  - Work-up showed a chest x-ray with possibly mild vascular congestion.  No crackles were heard on auscultation.  - Patient given Lasix followed by fluids in the emergency department.  Her respiratory status did not worsen after fluids and her blood pressure was borderline therefore no more Lasix was ordered.  - On admission to ICU patient placed on BiPAP 30% FiO2 at 10/5    Lactic acidosis  - Secondary to cardiogenic shock  - Lactate is trending up on admission, will not administer further fluids given Takotsubo cardiomyopathy.  - Continue to trend for prognostication    Suspected impending acute renal failure  - Patient has a reported history of CKD, Cr 1.15 on admission  - Not producing really any urine on admission to ICU.  - High concern that she may develop ATN, need to trend renal function daily    DM II  Hold home medications, sliding scale insulin every 4 hours while on the BiPAP.    Paroxysmal atrial fibrillation  - INR was therapeutic on admission.  Will hold warfarin given IV heparin  - Hold home metoprolol given shock state.   - Sinus tach on admission    Diet: NPO while on BIPAP  DVT Prophylaxis: Heparin IV  Che Catheter: No  Code Status: Full Code. Patient stated she wanted \"one round\" of chest compressions.    Disposition Plan   Expected discharge: 4 - 7 days, anticipate she will need skilled placement.   Entered: Jv Jacob MD at 8:36 PM     Jv Jacob MD  [facility]    ______________________________________________________________________    Chief Complaint   Shortness of breath    History of Present Illness " "  Maryalice Rebecca Wachter is a 85 year old female with a history of atrial fibrillation on warfarin, DM II, COPD who is admitted to the ICU after presenting with shortness of breath.    History provided by patient. She states over the past week or so she has been in her normal state of health.  Felt fine this morning but around lunchtime when she was going to play bridge had a sudden onset of shortness of breath. No chest pain at that time. Inhaler provided no relief so EMS was called. She was placed on BiPAP en route. In the ED she continued to require BiPAP and desaturated when it was taken off.  Initial work-up showed mild interstitial prominence and perihilar regions on chest x-ray, otherwise basic laboratory work-up was normal.  D-dimer was negative as was troponin.  EKG showed a new left bundle branch block which later resolved on repeat EKG.  Patient was hypotensive in the emergency department initially in the 80/30 range.  Blood pressure improved after given 2 L of IV fluids and respiratory status remained stable.  She was also given treatment for COPD exacerbation with Solu-Medrol and nebulizer treatments.  Ultimately blood pressure improved to 100/60 range and she was then admitted to the ICU on BiPAP.    On interview, the patient states she has not had any chest pain today.  She confirms that she has been feeling in her normal state of health over the past week.  She states she has never had anything like this before.  Denies fevers or chills at home.  States she has not had any increasing cough or sputum production.  Otherwise no new symptoms.    Review of Systems    The 10 point Review of Systems is negative other than noted in the HPI or here.     Physical Exam   BP 96/54 (BP Location: Left arm)   Pulse 92   Temp 98.7  F (37.1  C) (Oral)   Resp (!) 31   Ht 1.664 m (5' 5.5\")   SpO2 98%   BMI 31.63 kg/m         General: Older woman, laying in the bed, slight respiratory distress, on " BiPAP.    HEENT: No scleral icterus.  Exam limited secondary to BiPAP    Neck: Supple.  Normal range of motion    Pulmonary: Moderate respiratory distress.  Wearing BiPAP.  Able to speak in complete sentences.  No obvious crackles.    Cardiovascular: Tachycardia, regular rhythm, no murmur. Distal pulses intact.     Abdomen: Soft and non-tender.    Extremities: No peripheral edema. No clubbing or cyanosis.      Neurologic: Awake, alert, appropriate.    Skin: Dry. Slightly cool to the touch.     Psychiatric: Normal affect and mood.     Data   Data reviewed today: I reviewed all medications, new labs and imaging results over the last 24 hours. I personally reviewed the EKG showing LBBB which later resolve, CXR.    Recent Labs   Lab 11/05/19 2028 11/05/19  1413 11/05/19  1411   WBC  --   --  8.6   HGB  --   --  11.8   MCV  --   --  99   PLT  --   --  302   INR  --   --  2.04*   NA  --   --  138   POTASSIUM  --   --  4.2   CHLORIDE  --   --  103   CO2  --   --  27   BUN  --   --  21   CR  --   --  1.15*   ANIONGAP  --   --  8   VASHTI  --   --  9.0   GLC  --   --  226*   ALBUMIN  --   --  3.4   PROTTOTAL  --   --  6.5*   BILITOTAL  --   --  0.6   ALKPHOS  --   --  72   ALT  --   --  30   AST  --   --  55*   TROPI 0.331*  --  <0.015   TROPONIN  --  0.01  --        Past Medical History    I have reviewed this patient's medical history and updated it with pertinent information if needed.   Past Medical History:   Diagnosis Date     Atrial fibrillation (H)     history of atrial fibrillation     CAD (coronary artery disease)      Congestive heart failure (CHF) (H)      COPD (chronic obstructive pulmonary disease) (H)      Diabetes mellitus (H)     Oral medication treated.     Hiatal hernia     Documented quite large by CT Fall 2011.     Hypercholesterolemia     Per mention in notes, but no recent chol panel noted.  She does take simvastatin.     Hypertension      Hypothyroidism      Postmenopausal bleeding 8/23/2012     Sarcoid          Past Surgical History    I have reviewed this patient's surgical history and updated it with pertinent information if needed.  Past Surgical History:   Procedure Laterality Date     ANKLE SURGERY       DILATION AND CURETTAGE, HYSTEROSCOPY DIAGNOSTIC, COMBINED  2012    Procedure: COMBINED DILATION AND CURETTAGE, HYSTEROSCOPY DIAGNOSTIC;   DILATION AND CURETTAGE, Operative  HYSTEROSCOPY ;  Surgeon: Justa Francois MD;  Location:  OR     ENT SURGERY       ESOPHAGOSCOPY, GASTROSCOPY, DUODENOSCOPY (EGD), COMBINED N/A 2017    Procedure: COMBINED ESOPHAGOSCOPY, GASTROSCOPY, DUODENOSCOPY (EGD);  Surgeon: Johnny Stratton MD;  Location:  GI     ESOPHAGOSCOPY, GASTROSCOPY, DUODENOSCOPY (EGD), COMBINED N/A 2017    Procedure: COMBINED ESOPHAGOSCOPY, GASTROSCOPY, DUODENOSCOPY (EGD), BIOPSY SINGLE OR MULTIPLE;  Surgeon: Johnny Stratton MD;  Location:  GI        Social History    I have reviewed this patient's social history and updated it with pertinent information if needed.  Social History     Tobacco Use     Smoking status: Never Smoker     Smokeless tobacco: Never Used   Substance Use Topics     Alcohol use: Yes     Comment: Extremely rare use in small amounts.     Drug use: No          Family History    I have reviewed this patient's family history and updated it with pertinent information if needed.   Family History   Problem Relation Age of Onset     Asthma Mother 42        smoker  age 42 of compications of asthma     Diabetes Father          in 70's     Other - See Comments Other         Denies cardiac.        Prior to Admission Medications    Prior to Admission Medications   Prescriptions Last Dose Informant Patient Reported? Taking?   ASPIRIN EC PO 2019 at am  Yes Yes   Sig: Take 81 mg by mouth daily    Fluticasone-Umeclidin-Vilanterol (TRELEGY ELLIPTA) 100-62.5-25 MCG/INH oral inhaler 2019 at    No Yes   Sig: Inhale 1 puff into the lungs daily   LOPERAMIDE HCL PO  11/5/2019 at am  Yes Yes   Sig: Take 2-4 mg by mouth daily as needed    ONETOUCH ULTRA test strip   No No   Sig: USE TO TEST BLOOD SUGAR TWICE A DAY OR AS DIRECTED   QUEtiapine (SEROQUEL) 25 MG tablet prn  No Yes   Sig: TAKE ONE-HALF TABLET BY MOUTH AT BEDTIME TO SLEEP . OK TO INCREASE BY 1/2 TABLET EVERY 3 NIGHTS UP TO 2 TABLETS DAILY IF NEEDED   VITAMIN D, CHOLECALCIFEROL, PO 11/5/2019 at am  Yes Yes   Sig: Take 2,000 Units by mouth daily   albuterol (ALBUTEROL) 108 (90 BASE) MCG/ACT inhaler prn  Yes Yes   Sig: Inhale 2 puffs into the lungs every 6 hours as needed Reported on 3/2/2017   albuterol (PROVENTIL) (2.5 MG/3ML) 0.083% neb solution prn  No Yes   Sig: INHALE CONTENTS OF 1 VIAL (3 ML) VIA NEBULIZER EVERY 6 HOURS AS NEEDED FOR SHORTNESS OF BREATH / DYSPNEA OR WHEEZING   ascorbic acid (VITAMIN C) 500 MG tablet 11/5/2019 at am  Yes Yes   Sig: Take 500 mg by mouth daily.   blood glucose monitoring (ONE TOUCH ULTRA 2) meter device kit   No No   Sig: Use to test blood sugar 1 time daily or as directed.  Ok to substitute alternative if insurance prefers.   blood glucose monitoring (ONE TOUCH ULTRASOFT) lancets   No No   Sig: USE TO TEST BLOOD SUGAR 2 TO 3 TIMES A DAY.   calcium carb 1250 mg, 500 mg [place],/vitamin D 200 units (OSCAL WITH D) 500-200 MG-UNIT per tablet 11/4/2019 at    Yes Yes   Sig: Take 1 tablet by mouth daily.   ferrous sulfate 325 (65 FE) MG tablet 11/5/2019 at am  Yes Yes   Sig: Take 1 tablet by mouth daily (with breakfast).   glipiZIDE (GLUCOTROL XL) 10 MG 24 hr tablet 11/5/2019 at am  No Yes   Sig: TAKE ONE TABLET BY MOUTH TWICE A DAY   hydrochlorothiazide (HYDRODIURIL) 25 MG tablet 11/5/2019 at am  No Yes   Sig: TAKE ONE TABLET BY MOUTH EVERY DAY   levothyroxine (SYNTHROID/LEVOTHROID) 75 MCG tablet 11/5/2019 at am  No Yes   Sig: TAKE ONE TABLET BY MOUTH EVERY DAY   lisinopril (PRINIVIL/ZESTRIL) 40 MG tablet 11/5/2019 at am  No Yes   Sig: TAKE ONE TABLET BY MOUTH EVERY DAY   metFORMIN  (GLUCOPHAGE) 500 MG tablet 11/5/2019 at am  No Yes   Sig: TAKE ONE TABLET BY MOUTH TWICE A DAY WITH MEALS   metoprolol tartrate (LOPRESSOR) 50 MG tablet 11/5/2019 at am  No Yes   Sig: TAKE ONE TABLET BY MOUTH TWICE A DAY   pioglitazone (ACTOS) 30 MG tablet 11/5/2019 at am  No Yes   Sig: Take 1 tablet (30 mg) by mouth daily   polycarbophil (FIBERCON) 625 MG tablet 11/4/2019 at    Yes Yes   Sig: Take 1 tablet by mouth 2 times daily.   ranitidine (ZANTAC) 150 MG tablet prn  No Yes   Sig: Take 1 tablet (150 mg) by mouth nightly as needed for heartburn   simvastatin (ZOCOR) 20 MG tablet 11/4/2019 at    No Yes   Sig: TAKE ONE TABLET BY MOUTH AT BEDTIME   warfarin ANTICOAGULANT (COUMADIN) 3 MG tablet 11/4/2019 at pm  No Yes   Sig: Take 1 tablet (3mg) daily or as directed by INR nurse      Facility-Administered Medications: None        Allergies    Allergies   Allergen Reactions     Penicillins Hives     Amlodipine Other (See Comments)     Too much ankle edema and red itchy spots.      Furosemide Other (See Comments)     Fainted with first dose.

## 2019-11-06 NOTE — PROGRESS NOTES
Telehub notified of rise in troponin to 0.532.   Pt still on levo at 0.03 and heparin at 850 units/hr. No urine output. BiPAP @ 30% FiO2. Pain free.

## 2019-11-06 NOTE — PROGRESS NOTES
A BiPAP of  10 /5 @ 30 % was applied to the pt via the mask for an increase in WOB and/or SOB.  The bridge of the nose intact Skin integrity good.  .Pt is tolerating it well. Will continue to monitor and assess the pt's current respiratory status and needs.

## 2019-11-06 NOTE — PHARMACY-ADMISSION MEDICATION HISTORY
Admission medication history interview status for this patient is complete. See [facility] admission navigator for allergy information, prior to admission medications and immunization status.     Medication history interview source(s):Patient  Medication history resources (including written lists, pill bottles, clinic record):None  Primary pharmacy: [facility]    Changes made to PTA medication list:  Added:  None  Deleted:  None  Changed:  None    Actions taken by pharmacist (provider contacted, etc):None     Additional medication history information:None    Medication reconciliation/reorder completed by provider prior to medication history?  No     Do you take OTC medications (eg tylenol, ibuprofen, fish oil, eye/ear drops, etc)? Yes     For patients on insulin therapy: No      Prior to Admission medications    Medication Sig Last Dose Taking? Auth Provider   albuterol (ALBUTEROL) 108 (90 BASE) MCG/ACT inhaler Inhale 2 puffs into the lungs every 6 hours as needed Reported on 3/2/2017 prn Yes Reported, Patient   albuterol (PROVENTIL) (2.5 MG/3ML) 0.083% neb solution INHALE CONTENTS OF 1 VIAL (3 ML) VIA NEBULIZER EVERY 6 HOURS AS NEEDED FOR SHORTNESS OF BREATH / DYSPNEA OR WHEEZING prn Yes Venu Maria PA-C   ascorbic acid (VITAMIN C) 500 MG tablet Take 500 mg by mouth daily. 11/5/2019 at am Yes Reported, Patient   ASPIRIN EC PO Take 81 mg by mouth daily  11/5/2019 at am Yes Reported, Patient   calcium carb 1250 mg, 500 mg [place],/vitamin D 200 units (OSCAL WITH D) 500-200 MG-UNIT per tablet Take 1 tablet by mouth daily. 11/4/2019 at   Yes Reported, Patient   ferrous sulfate 325 (65 FE) MG tablet Take 1 tablet by mouth daily (with breakfast). 11/5/2019 at am Yes Reported, Patient   Fluticasone-Umeclidin-Vilanterol (TRELEGY ELLIPTA) 100-62.5-25 MCG/INH oral inhaler Inhale 1 puff into the lungs daily 11/4/2019 at   Yes Venu Maria PA-C   glipiZIDE (GLUCOTROL XL) 10 MG 24 hr tablet TAKE ONE  TABLET BY MOUTH TWICE A DAY 11/5/2019 at am Yes Venu Maria PA-C   hydrochlorothiazide (HYDRODIURIL) 25 MG tablet TAKE ONE TABLET BY MOUTH EVERY DAY 11/5/2019 at am Yes Venu Maria PA-C   levothyroxine (SYNTHROID/LEVOTHROID) 75 MCG tablet TAKE ONE TABLET BY MOUTH EVERY DAY 11/5/2019 at am Yes Venu Maria PA-C   lisinopril (PRINIVIL/ZESTRIL) 40 MG tablet TAKE ONE TABLET BY MOUTH EVERY DAY 11/5/2019 at am Yes Venu Maria PA-C   LOPERAMIDE HCL PO Take 2-4 mg by mouth daily as needed  11/5/2019 at am Yes Reported, Patient   metFORMIN (GLUCOPHAGE) 500 MG tablet TAKE ONE TABLET BY MOUTH TWICE A DAY WITH MEALS 11/5/2019 at am Yes Fartun Mcnair MD   metoprolol tartrate (LOPRESSOR) 50 MG tablet TAKE ONE TABLET BY MOUTH TWICE A DAY 11/5/2019 at am Yes Venu Maria PA-C   pioglitazone (ACTOS) 30 MG tablet Take 1 tablet (30 mg) by mouth daily 11/5/2019 at am Yes Venu Maria PA-C   polycarbophil (FIBERCON) 625 MG tablet Take 1 tablet by mouth 2 times daily. 11/4/2019 at   Yes Reported, Patient   QUEtiapine (SEROQUEL) 25 MG tablet TAKE ONE-HALF TABLET BY MOUTH AT BEDTIME TO SLEEP . OK TO INCREASE BY 1/2 TABLET EVERY 3 NIGHTS UP TO 2 TABLETS DAILY IF NEEDED prn Yes Venu Maria PA-C   ranitidine (ZANTAC) 150 MG tablet Take 1 tablet (150 mg) by mouth nightly as needed for heartburn prn Yes Venu Maria PA-C   simvastatin (ZOCOR) 20 MG tablet TAKE ONE TABLET BY MOUTH AT BEDTIME 11/4/2019 at   Yes Venu Maria PA-C   VITAMIN D, CHOLECALCIFEROL, PO Take 2,000 Units by mouth daily 11/5/2019 at am Yes Reported, Patient   warfarin ANTICOAGULANT (COUMADIN) 3 MG tablet Take 1 tablet (3mg) daily or as directed by INR nurse 11/4/2019 at pm Yes Venu Maria PA-C   blood glucose monitoring (ONE TOUCH ULTRA 2) meter device kit Use to test blood sugar 1 time daily or as directed.  Ok to substitute alternative if insurance prefers.    Venu Maria PA-C   blood glucose monitoring (ONE TOUCH ULTRASOFT) lancets USE TO TEST BLOOD SUGAR 2 TO 3 TIMES A DAY.   Venu Maria PA-C   ONETOUCH ULTRA test strip USE TO TEST BLOOD SUGAR TWICE A DAY OR AS DIRECTED   Venu Maria PA-C

## 2019-11-06 NOTE — PROGRESS NOTES
11/06/19 1000   Quick Adds   Type of Visit Initial PT Evaluation   Living Environment   Lives With spouse   Home Accessibility no concerns   Transportation Anticipated family or friend will provide   Living Environment Comment Pt has walk in shower with one grab bar on the outside, shower chair, and raised toilet seat with grab bar. No stairs.   Self-Care   Usual Activity Tolerance moderate   Current Activity Tolerance fair   Equipment Currently Used at Home raised toilet;shower chair;grab bar, tub/shower;grab bar, toilet   Activity/Exercise/Self-Care Comment Pt does not use AD at baseline, does not have AD at home   Functional Level Prior   Ambulation 0-->independent   Transferring 0-->independent   Toileting 0-->independent   Bathing 0-->independent   Fall history within last six months no   Which of the above functional risks had a recent onset or change? ambulation;transferring;toileting;bathing;dressing   General Information   Onset of Illness/Injury or Date of Surgery - Date 11/05/19   Referring Physician Jv Jacob MD   Patient/Family Goals Statement not stated   Pertinent History of Current Problem (include personal factors and/or comorbidities that impact the POC) 85 year old female with a history of atrial fibrillation on warfarin, DM II, COPD who is admitted to the ICU for acute hypoxic respiratory failure and cardiogenic shock secondary to Takotsubo cardiomyopathy. Sudden onset of SOB at home.   General Observations Pt very [place], hearing aids battery dead, spouse to bring    Cognitive Status Examination   Orientation orientation to person, place and time   Level of Consciousness alert   Follows Commands and Answers Questions 100% of the time   Personal Safety and Judgment impulsive  (per discussion with RN, moves quickly)   Memory intact   Pain Assessment   Patient Currently in Pain Yes, see Vital Sign flowsheet   Integumentary/Edema   Integumentary/Edema no deficits were  identifed   Posture    Posture Forward head position;Protracted shoulders   Range of Motion (ROM)   ROM Comment WFL   Strength   Strength Comments WFL   Bed Mobility   Bed Mobility Comments Pt supine upon initiation, agreeable to PT. Educated about PT role and POC. Pt transferred supine<>sit with SBA, extra time needed for safe set up of lines and tubes.    Transfer Skills   Transfer Comments Pt transferred sit<>stand with CGA from bed with VC for technique, no LOB observed. Transferred stand<>sit to chair, demonstrating good eccentric control. Sitting at end of appointment with all needs in reach.   Gait   Gait Comments Pt ambulated 5' with CGA from bed to chair, no LOB observed, vitals stable throughout. Pt may need FWW for longer distances, does not use AD at baseline.   Balance   Balance Comments no impairments noted at this time, only ambulated short distance   Coordination   Coordination no deficits were identified   Muscle Tone   Muscle Tone no deficits were identified   General Therapy Interventions   Planned Therapy Interventions bed mobility training;gait training;transfer training;home program guidelines;progressive activity/exercise   Clinical Impression   Criteria for Skilled Therapeutic Intervention yes, treatment indicated   PT Diagnosis impaired functional mobility   Influenced by the following impairments decreased endurance, SOB   Functional limitations due to impairments transfers, ambulation   Clinical Presentation Stable/Uncomplicated   Clinical Presentation Rationale good social support, PT plan in place   Clinical Decision Making (Complexity) Low complexity   Therapy Frequency Daily   Predicted Duration of Therapy Intervention (days/wks) 4 days   Anticipated Equipment Needs at Discharge   (possibly walker, assess with ambulation of longer distances)   Anticipated Discharge Disposition Home with Assist  (home with assist from spouse)   Risk & Benefits of therapy have been explained Yes  "  Patient, Family & other staff in agreement with plan of care Yes   [facility] AM-PAC TM \"6 Clicks\"   2016, Trustees of [facility], under license to [employer].  All rights reserved.   6 Clicks Short Forms Basic Mobility Inpatient Short Form   [facility] AM-PAC  \"6 Clicks\" V.2 Basic Mobility Inpatient Short Form   1. Turning from your back to your side while in a flat bed without using bedrails? 4 - None   2. Moving from lying on your back to sitting on the side of a flat bed without using bedrails? 4 - None   3. Moving to and from a bed to a chair (including a wheelchair)? 3 - A Little   4. Standing up from a chair using your arms (e.g., wheelchair, or bedside chair)? 3 - A Little   5. To walk in hospital room? 3 - A Little   6. Climbing 3-5 steps with a railing? 2 - A Lot   Basic Mobility Raw Score (Score out of 24.Lower scores equate to lower levels of function) 19   Total Evaluation Time   Total Evaluation Time (Minutes) 5     "

## 2019-11-06 NOTE — PLAN OF CARE
ICU End of Shift Summary.  For vital signs and complete assessments, please see documentation flowsheets.     Pertinent assessments: .Afebrile, BPs soft, levo titrated to maintain MAP > 65. Alert & oriented. Denies pain. Tele: SR with prolonged QT interval, MD aware; pt frequently becomes tachycardic, lasts for 15 seconds or less. BiPAP 30% FiO2, lung sounds diminished with some fine crackles. Purewick in place, output: 0, bladder scanned for 112; purewick in place. Pt NPO with hypoactive bowel sounds. Pt has some bruising and some blanchable redness on buttocks and around face where mask sits. Pt declined some turns. Heparin @ 850 units/hr, levo @ 0.06.     Major Shift Events: Levo started to maintain MAP>65. Heparin started. Pt tolerating BiPAP. No urine output; MD aware.   Plan (Upcoming Events): PT/ OT; cards consult  Discharge/Transfer Needs: TBD    Bedside Shift Report Completed   Bedside Safety Check Completed    PICC stat notified of need for a PICC; unable to make it here before 0630 or 0700. MD will place a central line for continued need for levo.

## 2019-11-06 NOTE — PROGRESS NOTES
[facility]    Hospitalist Progress Note      Assessment & Plan   Maryalice Rebecca Wachter is a 85 year old female with a history of atrial fibrillation on warfarin, DM II, COPD who is admitted to the ICU for acute hypoxic respiratory failure and cardiogenic shock secondary to Takotsubo cardiomyopathy.      Patient had a sudden onset of shortness of breath at home.  Required BiPAP to maintain saturations in route and in the ED.  Had evidence of shock with hypotension and lactic acidosis.  BP improved after fluids.  Basic work-up was negative including labs and chest x-ray. Stat echocardiogram obtained in the ED showed severe global hypokinesis.  She  was admitted to the ICU and remained on BiPAP.  Discussed with cardiology and critical care.  Patient has lactic acidosis and elevated troponin.    She was initially started on vasopressor therapy and continued on BiPAP.  Her vasopressor therapy was stopped this a.m. and blood pressures have been within normal limits.  She was started on IV heparin for NSTEMI.       Cardiogenic shock secondary to Takotsubo cardiomyopathy.  NSTEMI, likely type II in the setting of tachycardia suitable cardiomyopathy.  -Patient initially with signs of cardiogenic shock with blood pressures in the 80s in the ED.  She did initially require norepinephrine overnight which has been discontinued since 0600 this a.m.  - No evidence of sepsis, hypovolemia or hemorrhage.  Doubt she has a massive PE causing this given echo findings ia dthe therapeutic INR. No STEMI on EKG.   -TTE performed in the ED does show evidence of tachycardia suitable cardiomyopathy.  -Cardiology has been consulted, at this point not recommending cardiac cath for evaluation given relatively mild elevation in troponin likely secondary to takatsubo.    -We will trend troponin to peak.  -We will initiate low-dose metoprolol therapy pending blood pressures tomorrow.  Could start lisinopril pending renal function  later in hospital stay.  -Patient will require Lexiscan nuclear stress test prior to discharge.    #Acute hypoxic respiratory failure secondary to the above.   - Patient requiring BiPAP in route by EMS and on admission.  - Work-up showed a chest x-ray with possibly mild vascular congestion.  No crackles were heard on auscultation.  - Patient given Lasix followed by fluids in the emergency department.  Her respiratory status did not worsen after fluids and her blood pressure was borderline therefore no more Lasix was ordered.  - On admission to ICU patient placed on BiPAP 30% FiO2 at 10/5  -Given known COPD on chronic inhalers, will administer duo nebs and as needed albuterol.  -Patient does not appear volume overloaded at this point, will hold off on further IV diuresis.    #Lactic acidosis  - Secondary to cardiogenic shock  - Lactate trending down, last at 2.5 this a.m.  Holding off on further IV fluids given tachycardia suitable cardiomyopathy.     #CHLOÉ  - Patient has a reported history of CKD, Cr 1.15 on admission.  Has had low urine output on admission and overnight.  Creatinine up to 1.44 today in the setting of cardiogenic shock.  - High concern that she may develop ATN, need to trend renal function daily  -Continue to monitor    #DM II  -Resume diabetic diet, sliding scale insulin.  Holding metformin and glipizide while inpatient.     Paroxysmal atrial fibrillation  - Patient on heparin drip, continue warfarin pharmacy to dose given patient improvement today.     Diet:  Restarted diabetic diet given clinical improvement.  No plan for procedure per cardiology  DVT Prophylaxis: Heparin IV, warfarin pharmacy to dose.  Cardiology like heparin drip resumed for now.  Discussed discontinuing likely tomorrow.  Code Status: Full Code.      Disposition Plan   Expected discharge: 4 - 7 days, anticipate she will need skilled placement.   Blood pressures in the afternoon, can consider transfer to [facility] cares in the  afternoon.    Bird Garcia MD  Text Page    Interval History   Admitted overnight.  Patient feels improved today.  Breathing feels improved.  She denies any current chest pain, palpitations, abdominal pain, nausea vomiting, diarrhea.      -Data reviewed today: I reviewed all new labs and imaging results over the last 24 hours.     Physical Exam   Temp: 98  F (36.7  C) Temp src: Axillary BP: 91/51 Pulse: 72 Heart Rate: 71 Resp: 20 SpO2: 92 % O2 Device: Nasal cannula Oxygen Delivery: 1.5 LPM  Vitals:    11/05/19 2000   Weight: 87.4 kg (192 lb 10.9 oz)     Vital Signs with Ranges  Temp:  [96.3  F (35.7  C)-99.1  F (37.3  C)] 98  F (36.7  C)  Pulse:  [] 72  Heart Rate:  [] 71  Resp:  [0-37] 20  BP: ()/(42-84) 91/51  FiO2 (%):  [30 %] 30 %  SpO2:  [84 %-100 %] 92 %  I/O last 3 completed shifts:  In: 1562.71 [I.V.:1562.71]  Out: 0     Constitutional: Nontoxic, no acute distress.  Lying in bed answers questions appropriately  Respiratory: Normal work of breathing, some diffuse expiratory wheezing noted.  Cardiovascular: Distant, irregular.  Extremities warm  GI: Bowel sounds present, nontender nondistended  Skin/Integumen: No rashes noted  Neuro: Cranial nerves II through XII intact, answers questions appropriately.  Moves all extremities.  No tremor noted.    Medications     HEParin 850 Units/hr (11/06/19 0843)     norepinephrine Stopped (11/06/19 0623)     Warfarin Therapy Reminder         aspirin  81 mg Oral Daily     calcium carbonate-vitamin D  1 tablet Oral Daily     ferrous sulfate  325 mg Oral Daily with breakfast     insulin aspart  1-4 Units Subcutaneous Q4H     levothyroxine  75 mcg Oral Daily     simvastatin  20 mg Oral At Bedtime       Data   Recent Labs   Lab 11/06/19  0430 11/05/19  2303 11/05/19 2028 11/05/19  1413 11/05/19  1411   WBC  --  10.4  --   --  8.6   HGB  --  10.5*  --   --  11.8   MCV  --  98  --   --  99   PLT  --  240  --   --  302   INR 1.90*  --   --   --  2.04*      --   --   --  138   POTASSIUM 4.2  --   --   --  4.2   CHLORIDE 105  --   --   --  103   CO2 23  --   --   --  27   BUN 29  --   --   --  21   CR 1.44*  --   --   --  1.15*   ANIONGAP 11  --   --   --  8   VASHTI 9.3  --   --   --  9.0   *  --   --   --  226*   ALBUMIN  --   --   --   --  3.4   PROTTOTAL  --   --   --   --  6.5*   BILITOTAL  --   --   --   --  0.6   ALKPHOS  --   --   --   --  72   ALT  --   --   --   --  30   AST  --   --   --   --  55*   TROPI 0.839* 0.532* 0.331*  --  <0.015   TROPONIN  --   --   --  0.01  --        Recent Results (from the past 24 hour(s))   XR Chest Port 1 View    Narrative    CHEST ONE VIEW PORTABLE   2019 2:10 PM     HISTORY:  Shortness of breath.    COMPARISON: 3/8/2019.      Impression    IMPRESSION: Large hiatal hernia is again noted. Thoracic kyphosis.  Interstitial prominence in both perihilar regions is nonspecific, but  could be related to edema or infection. Please clinically correlate.  Heart size appears stable. Mild pulmonary venous congestion has  increased slightly since the previous exam.    JAKE ESTRADA MD   POC US ECHO LIMITED    Impression    Limited Bedside Cardiac Ultrasound, performed and interpreted by me.   Indication: Shortness of Breath and Hypotension/shock.  Parasternal long axis, parasternal short axis and apical 4 chamber views were acquired.   Image quality was satisfactory.    Findings:    Global left ventricular function appears intact.  Chambers do not appear dilated.  There is no evidence of free fluid within the pericardium.    IMPRESSION: Grossly normal left ventricular function and chamber size.  No pericardial effusion.     Echo Limited    Narrative    [id number]  [id number]  [id number]  [id number]^MISA^JEFFERSON^[facility]  Echocardiography Laboratory  [address] [place] [address]        Name: WACHTER, MARYALICE REBECCA  MRN: [id number]  : 1933  Study Date: 2019 06:49 PM  Age: 85  yrs  Gender: Female  Patient Location: [facility]  Reason For Study: Respiratory Failure  Ordering Physician: JEFFERSON SERRATO  Referring Physician: Venu Maria  Performed By: Juliette Malone RDCS     BSA: 1.9 m2  Height: 65 in  Weight: 193 lb  HR: 94  BP: 100/54 mmHg  _____________________________________________________________________________  __        Procedure  Limited Portable Echo Adult. Optison (NDC #[id number]) given intravenously.  (Emergent exam, abbreviated study performed).  _____________________________________________________________________________  __        Interpretation Summary     LVEF 25% based on biplane 2D tracing.  Left ventricular systolic function is severely reduced.  Severe global hypokineiss of mid and distal segments with preserved  contractility at the base. These findings could be seen in patients with  stress induced cardiomyopathy vs Multivessel disease (less likely but not  excluded).  EF decreased compared to 7/18.  Findings discussed with Dr Serrato at 7.54pm today. The study was technically  difficult.  _____________________________________________________________________________  __        Left Ventricle  The left ventricle is normal in size. Left ventricular systolic function is  severely reduced. LVEF 25% based on biplane 2D tracing. Severe global  hypokineiss of mid and distal segments with preserved contractility at the  base. These findings could be seen in patients with stress induced  cardiomyopathy vs Multivessel disease.     Right Ventricle  The right ventricle is normal size. The right ventricular systolic function is  mild to moderately reduced. THe mid segments of RV free wall are severely  hypokinetic.     Mitral Valve  There is mild mitral annular calcification. There is mild to moderate (1-2+)  mitral regurgitation.     Tricuspid Valve  There is mild (1+) tricuspid regurgitation. The right ventricular systolic  pressure is approximated at 24.7 mmHg plus the right atrial  pressure.        Aortic Valve  There is mild trileaflet aortic sclerosis. There is trace aortic  regurgitation. No aortic stenosis is present.     Pulmonic Valve  The pulmonic valve is not well visualized.     Vessels  The aortic root is normal size.     Pericardium  There is no pericardial effusion.     Rhythm  Sinus rhythm was noted.     _____________________________________________________________________________  __  MMode/2D Measurements & Calculations  asc Aorta Diam: 3.1 cm  TAPSE: 1.3 cm           Doppler Measurements & Calculations  TR max michael: 248.5 cm/sec  TR max P.7 mmHg           _____________________________________________________________________________  __           Report approved by: Frederick Quach 2019 07:55 PM

## 2019-11-06 NOTE — PROGRESS NOTES
I spoke with Dr Alves about this patient, admitted with shortness of breath. Noted to have new LV dysfunction. Mildly elevated troponin, cardiology believes it is due to stress cardiomyopathy.     Echo showing LVEF   Low urine output, borderline hypotension    Recs:  Norepinephrine for cardiogenic shock  Repeat troponin  Heparin infusion for possible NSTEMI    Addendum:  We were just made aware PICC did not get placed. Currently norepinephrine running through a 22g peripheral IV. I have called Dr Ash who will come in to place a central line.

## 2019-11-06 NOTE — PLAN OF CARE
ICU End of Shift Summary.  For vital signs and complete assessments, please see documentation flowsheets.     Pertinent assessments: Patient alert and oriented, anxious at times. Up in chair with assist. Tolerating diet. Troponin trending down.remains on heparin drip.  Major Shift Events: able to be off bipap. Short of breath with activity.  Plan (Upcoming Events): continue to monitor  Discharge/Transfer Needs: to be determined    Bedside Shift Report Completed : yes  Bedside Safety Check Completed:yes

## 2019-11-06 NOTE — CONSULTS
[facility]    CARDIOLOGY CONSULT    Date of Admission:  11/5/2019  Date of Consult: November 6, 2019    ASSESSMENT:  85-year-old female seen for new cardiomyopathy and acute systolic congestive heart failure.  Overall presentation is most consistent with Takotsubo cardiomyopathy.  She did have a normal ejection fraction in July 2018.  Troponin is only 0.8 and EKG had no ST elevation or depression, making acute MI much less likely for this degree of cardiomyopathy.    She is much improved clinically in the first 12 hours of admission.  She is only on a little oxygen and is now off pressors.  Recommend ongoing supportive cares with diuresis as needed.  Blood pressure stable, metoprolol can be started.  When renal function is stable, consider ACE inhibitor.  The natural history of Takotsubo cardiomyopathy is generally for improvement in days to weeks.    She may benefit from an ischemic work-up at some point when she is more stable.  At this time there is no indication for angiogram.  Consider a Lexiscan nuclear stress before discharge or as an outpatient.  She probably should remain on heparin for 24 to 48 hours because of the mild troponin elevation and her history of paroxysmal A. fib.    RECOMMENDATIONS:  1.  New cardiomyopathy, suspect Takotsubo  -Supportive cares with IV Lasix as needed for any extra fluid retention or pulmonary edema  - Currently off pressors  -Blood pressure stable, start low-dose metoprolol  -Later could start lisinopril if renal function is stable  -Consider Lexiscan nuclear stress prior to discharge or as an outpatient  -No indication for coronary angiogram at this point    2.  Paroxysmal atrial fibrillation  -Continue heparin drip, restart warfarin when she is clinically stable    Chino Arango MD  Cardiology - [facility]  Pager:  [phone]  Text Page  November 6, 2019    CODE STATUS:  Full Code    REASON FOR CONSULT: New cardiomyopathy    PRIMARY CARE  PHYSICIAN:  Venu Maria    HISTORY OF PRESENT ILLNESS:  85 year old female with a history of paroxysmal atrial fibrillation is seen for new cardiomyopathy and acute systolic heart failure.  She also has type 2 diabetes and COPD.    Nuclear stress April 2017 showed small, mild ischemia of the anteroseptal base, EF over 70%.  Echo July 2018 showed EF 60%, no wall motion.    At baseline she lives in a town home with her . She gets around slowly with some mild chronic dyspnea, but is independent with her cares.  She has been feeling her usual self in the past few weeks.  She denies any unusual stressors.    Yesterday around noon she went to play cards at the [employer].  She became acutely short of breath when she was trying to enter through her usual door, but it was closed.  She had a difficult time breathing, but had no chest pain.  She denies palpitations, lightheadedness, or syncope.  She went home and was persistently dyspneic.  Therefore they called EMS.    Reportedly she had saturations around 90% when EMS arrived, it went up to 99% with BiPAP.  In the ER she had mild sinus tachycardia and a systolic blood pressure in the 80s.  She was placed on BiPAP and eventually norepinephrine.  Urine output has been marginal overnight, however she did improve.  Norepinephrine has been weaned off this morning and she is now on a few liters of oxygen via nasal cannula.    At the time of interview she is chest pain-free.  She is dyspneic in bed and was quite dyspneic when she got up to use the commode.    PAST MEDICAL HISTORY:  I have reviewed this patient's medical history and updated it with pertinent information if needed.   Past Medical History:   Diagnosis Date     Atrial fibrillation (H)     history of atrial fibrillation     CAD (coronary artery disease)      Congestive heart failure (CHF) (H)      COPD (chronic obstructive pulmonary disease) (H)      Diabetes mellitus (H)     Oral medication treated.      Hiatal hernia     Documented quite large by CT Fall 2011.     Hypercholesterolemia     Per mention in notes, but no recent chol panel noted.  She does take simvastatin.     Hypertension      Hypothyroidism      Postmenopausal bleeding 8/23/2012     Sarcoid        PAST SURGICAL HISTORY:  I have reviewed this patient's surgical history and updated it with pertinent information if needed.  Past Surgical History:   Procedure Laterality Date     ANKLE SURGERY       DILATION AND CURETTAGE, HYSTEROSCOPY DIAGNOSTIC, COMBINED  8/23/2012    Procedure: COMBINED DILATION AND CURETTAGE, HYSTEROSCOPY DIAGNOSTIC;   DILATION AND CURETTAGE, Operative  HYSTEROSCOPY ;  Surgeon: Justa Francois MD;  Location:  OR     ENT SURGERY       ESOPHAGOSCOPY, GASTROSCOPY, DUODENOSCOPY (EGD), COMBINED N/A 4/13/2017    Procedure: COMBINED ESOPHAGOSCOPY, GASTROSCOPY, DUODENOSCOPY (EGD);  Surgeon: Johnny Stratton MD;  Location:  GI     ESOPHAGOSCOPY, GASTROSCOPY, DUODENOSCOPY (EGD), COMBINED N/A 4/13/2017    Procedure: COMBINED ESOPHAGOSCOPY, GASTROSCOPY, DUODENOSCOPY (EGD), BIOPSY SINGLE OR MULTIPLE;  Surgeon: Johnny Stratton MD;  Location:  GI       HOME MEDICATIONS:  Prior to Admission Medications   Prescriptions Last Dose Informant Patient Reported? Taking?   ASPIRIN EC PO 11/5/2019 at am  Yes Yes   Sig: Take 81 mg by mouth daily    Fluticasone-Umeclidin-Vilanterol (TRELEGY ELLIPTA) 100-62.5-25 MCG/INH oral inhaler 11/4/2019 at    No Yes   Sig: Inhale 1 puff into the lungs daily   LOPERAMIDE HCL PO 11/5/2019 at am  Yes Yes   Sig: Take 2-4 mg by mouth daily as needed    ONETOUCH ULTRA test strip   No No   Sig: USE TO TEST BLOOD SUGAR TWICE A DAY OR AS DIRECTED   QUEtiapine (SEROQUEL) 25 MG tablet prn  No Yes   Sig: TAKE ONE-HALF TABLET BY MOUTH AT BEDTIME TO SLEEP . OK TO INCREASE BY 1/2 TABLET EVERY 3 NIGHTS UP TO 2 TABLETS DAILY IF NEEDED   VITAMIN D, CHOLECALCIFEROL, PO 11/5/2019 at am  Yes Yes   Sig: Take 2,000 Units by  mouth daily   albuterol (ALBUTEROL) 108 (90 BASE) MCG/ACT inhaler prn  Yes Yes   Sig: Inhale 2 puffs into the lungs every 6 hours as needed Reported on 3/2/2017   albuterol (PROVENTIL) (2.5 MG/3ML) 0.083% neb solution prn  No Yes   Sig: INHALE CONTENTS OF 1 VIAL (3 ML) VIA NEBULIZER EVERY 6 HOURS AS NEEDED FOR SHORTNESS OF BREATH / DYSPNEA OR WHEEZING   ascorbic acid (VITAMIN C) 500 MG tablet 11/5/2019 at am  Yes Yes   Sig: Take 500 mg by mouth daily.   blood glucose monitoring (ONE TOUCH ULTRA 2) meter device kit   No No   Sig: Use to test blood sugar 1 time daily or as directed.  Ok to substitute alternative if insurance prefers.   blood glucose monitoring (ONE TOUCH ULTRASOFT) lancets   No No   Sig: USE TO TEST BLOOD SUGAR 2 TO 3 TIMES A DAY.   calcium carb 1250 mg, 500 mg [place],/vitamin D 200 units (OSCAL WITH D) 500-200 MG-UNIT per tablet 11/4/2019 at    Yes Yes   Sig: Take 1 tablet by mouth daily.   ferrous sulfate 325 (65 FE) MG tablet 11/5/2019 at am  Yes Yes   Sig: Take 1 tablet by mouth daily (with breakfast).   glipiZIDE (GLUCOTROL XL) 10 MG 24 hr tablet 11/5/2019 at am  No Yes   Sig: TAKE ONE TABLET BY MOUTH TWICE A DAY   hydrochlorothiazide (HYDRODIURIL) 25 MG tablet 11/5/2019 at am  No Yes   Sig: TAKE ONE TABLET BY MOUTH EVERY DAY   levothyroxine (SYNTHROID/LEVOTHROID) 75 MCG tablet 11/5/2019 at am  No Yes   Sig: TAKE ONE TABLET BY MOUTH EVERY DAY   lisinopril (PRINIVIL/ZESTRIL) 40 MG tablet 11/5/2019 at am  No Yes   Sig: TAKE ONE TABLET BY MOUTH EVERY DAY   metFORMIN (GLUCOPHAGE) 500 MG tablet 11/5/2019 at am  No Yes   Sig: TAKE ONE TABLET BY MOUTH TWICE A DAY WITH MEALS   metoprolol tartrate (LOPRESSOR) 50 MG tablet 11/5/2019 at am  No Yes   Sig: TAKE ONE TABLET BY MOUTH TWICE A DAY   pioglitazone (ACTOS) 30 MG tablet 11/5/2019 at am  No Yes   Sig: Take 1 tablet (30 mg) by mouth daily   polycarbophil (FIBERCON) 625 MG tablet 11/4/2019 at    Yes Yes   Sig: Take 1 tablet by mouth 2 times daily.    ranitidine (ZANTAC) 150 MG tablet prn  No Yes   Sig: Take 1 tablet (150 mg) by mouth nightly as needed for heartburn   simvastatin (ZOCOR) 20 MG tablet 2019 at    No Yes   Sig: TAKE ONE TABLET BY MOUTH AT BEDTIME   warfarin ANTICOAGULANT (COUMADIN) 3 MG tablet 2019 at pm  No Yes   Sig: Take 1 tablet (3mg) daily or as directed by INR nurse      Facility-Administered Medications: None       ALLERGIES:  Allergies   Allergen Reactions     Penicillins Hives     Amlodipine Other (See Comments)     Too much ankle edema and red itchy spots.      Furosemide Other (See Comments)     Fainted with first dose.        SOCIAL HISTORY:  I have reviewed this patient's social history and updated it with pertinent information if needed. Maryalice Rebecca Wachter  reports that she has never smoked. She has never used smokeless tobacco. She reports current alcohol use. She reports that she does not use drugs.    FAMILY HISTORY:  I have reviewed this patient's family history and updated it with pertinent information if needed.   Family History   Problem Relation Age of Onset     Asthma Mother 42        smoker  age 42 of compications of asthma     Diabetes Father          in 70's     Other - See Comments Other         Denies cardiac.       REVIEW OF SYSTEMS:  Constitutional:  No weight loss, fever, chills, weakness or fatigue.  HEENT:  Eyes:  No visual loss, blurred vision, double vision or yellow sclerae. No hearing loss, sneezing, congestion, runny nose or sore throat.  Skin:  No rash or itching.  Cardiovascular: per HPI  Respiratory: per HPI  GI:  No anorexia, nausea, vomiting or diarrhea. No abdominal pain or blood.  :  No dysurea, hematuria  Neurologic:  No headache, dizziness, syncope, paralysis, ataxia, numbness or tingling in the extremities. No change in bowel or bladder control.  Musculoskeletal:  No muscle, back pain, joint pain or stiffness.  Hematologic:  No anemia, bleeding or bruising.  Lymphatics:  No  enlarged nodes. No history of splenectomy.  Psychiatric:  No history of depression or anxiety.  Endocrine:  No reports of sweating, cold or heat intolerance. No polyuria or polydipsia.  Allergies:  No history of asthma, hives, eczema or rhinitis.    PHYSICAL EXAM:  Temp: 98.1  F (36.7  C) Temp src: Axillary BP: 113/57 Pulse: 90 Heart Rate: 108 Resp: 22 SpO2: 95 % O2 Device: Nasal cannula Oxygen Delivery: 2 LPM  Vital Signs with Ranges  Temp:  [96.3  F (35.7  C)-99.1  F (37.3  C)] 98.1  F (36.7  C)  Pulse:  [] 90  Heart Rate:  [] 108  Resp:  [0-37] 22  BP: ()/(42-84) 113/57  FiO2 (%):  [30 %] 30 %  SpO2:  [84 %-100 %] 95 %  192 lbs 10.91 oz    Constitutional: awake, alert, no distress  Eyes: PERRL, sclera nonicteric  ENT: trachea midline  Respiratory: Few coarse sounds at the bases  Cardiovascular: Mild tachycardia, no murmurs, trace lower extremity edema  GI: nondistended, nontender, bowel sounds present  Lymph/Hematologic: no lymphadenopathy  Skin: dry, no rash  Musculoskeletal: good muscle tone, strength 5/5 in upper and lower extremities  Neurologic: no focal deficits  Neuropsychiatric: appropriate affact    Intake/Output Summary (Last 24 hours) at 11/6/2019 0728  Last data filed at 11/6/2019 0314  Gross per 24 hour   Intake 1562.71 ml   Output 0 ml   Net 1562.71 ml     DATA:  Labs: Potassium 4.2, creatinine 1.4, LFTs normal, lactate 2.5, troponin negative, 0.5, 0.8, procalcitonin negative, NT proBNP 860, WBC 10, hemoglobin 10, platelets 240, INR 1.9  Recent Labs   Lab Test 07/15/19  0948 08/03/18  0913  06/30/15  0952   CHOL 109 105   < > 109   HDL 39* 39*   < > 36*   LDL 34 27   < > 25   TRIG 178* 195*   < > 240*   CHOLHDLRATIO  --   --   --  3.0    < > = values in this interval not displayed.     EKG: November 5: Sinus tachycardia, rate 114, left bundle branch block    Tele: Sinus and sinus tachycardia, some narrow complex regular atrial runs lasting a few seconds with rate around  120    Echo: November 5: EF 25%, mid to distal segments severely hypokinetic, good contractility of the base, suggestive of stress cardiomyopathy, mild to moderate RV hypokinesis, 1-2+ MR.    CXR: November 5: Interstitial prominence of both perihilar regions, could be edema or infection

## 2019-11-06 NOTE — PHARMACY-ANTICOAGULATION SERVICE
Clinical Pharmacy - Warfarin Dosing Consult     Pharmacy has been consulted to manage this patient s warfarin therapy.  Indication: Atrial Fibrillation  Therapy Goal: INR 2-3  Warfarin Prior to Admission: Yes  Warfarin PTA Regimen: 3mg daily  Dose Comments: 3mg tonight    INR   Date Value Ref Range Status   11/05/2019 2.04 (H) 0.86 - 1.14 Final     INR Protime   Date Value Ref Range Status   10/28/2019 2.7 (A) 0.86 - 1.14 Final       Pharmacy will monitor Maryalice Rebecca Wachter daily and order warfarin doses to achieve specified goal.      Please contact pharmacy as soon as possible if the warfarin needs to be held for a procedure or if the warfarin goals change.

## 2019-11-06 NOTE — PROGRESS NOTES
RT Note:    Patient transported from ER to ICU on BiPAP without complications. RT will continue to follow.    Christy Bundy, RT  November 5, 2019.7:59 PM

## 2019-11-06 NOTE — PLAN OF CARE
OT: Pt admitted due to  lactic acidosis and elevated troponin. Will hold OT eval for day to allow for medical management.

## 2019-11-06 NOTE — PROGRESS NOTES
ICU Attending Note    I have seen and examined Maryalice Rebecca Wachter, reviewed the patient's history, pertinent labs, vital signs, medications, physical exam, and radiographs.     Maryalice Rebecca Wachter is admitted to ICU with chest tightness and acute dyspnea.  Had hypotension and elevated lactate in the ED.  I came to the hospital for consideration of a central line placement because she needed norepinephrine to maintain a perfusing blood pressure.  ECHO in the ED showed reduced EF to 25%.  Mild troponemia and she was started on heparin qtt.    Recent Events:  On bipap saturating 100% when I arrived.  After speaking full sentences while on bipap, it was removed and she remained with SpO2 in the 90s on low rate nasal canula.  BP when I arrived was 120s systolic on 0.09 mcg/kg/min of norepinephrine.  I weaned this off and continued to observe.  She continued speaking full sentences with high oxygen saturation and normal blood pressure.  She reports having anxiety attacks when she gets weak when walking or feels like she may be incontinent.    Exam:  Temp:  [96.3  F (35.7  C)-99.1  F (37.3  C)] 98  F (36.7  C)  Pulse:  [] 72  Heart Rate:  [] 71  Resp:  [0-37] 20  BP: ()/(42-84) 91/51  FiO2 (%):  [30 %] 30 %  SpO2:  [84 %-100 %] 92 %    Intake/Output Summary (Last 24 hours) at 11/6/2019 0939  Last data filed at 11/6/2019 0314  Gross per 24 hour   Intake 1562.71 ml   Output 0 ml   Net 1562.71 ml     FiO2 (%): 30 %  Resp: 20    Lungs clear  Heart irreg  abd soft  Ext warm, normal radial and pedal pulses      Results:  ABG No lab results found in last 7 days.  CBC  Recent Labs   Lab 11/05/19  2303 11/05/19  1411   WBC 10.4 8.6   HGB 10.5* 11.8   HCT 33.5* 37.9    302     BMP  Recent Labs   Lab 11/06/19  0430 11/05/19  1411    138   POTASSIUM 4.2 4.2   CHLORIDE 105 103   CO2 23 27   BUN 29 21   CR 1.44* 1.15*   * 226*     LFT  Recent Labs   Lab 11/06/19  0430 11/05/19  1414    AST  --  55*   ALT  --  30   ALKPHOS  --  72   BILITOTAL  --  0.6   ALBUMIN  --  3.4   INR 1.90* 2.04*     PancreasNo lab results found in last 7 days.  INR  Lab Results   Component Value Date    INR 1.90 11/06/2019    INR 2.04 11/05/2019    INR 2.7 10/28/2019    INR 3.3 10/04/2019    INR 3.1 09/20/2019    INR 2.70 08/13/2019    INR 3.30 07/30/2019       Current Issues:  1.  Heart failure, secondary to Takotsubo cardiomyopathy: cause unclear. Could also have global ischemia. Cardiology evaluating. Able to maintain BP currently.  2.  Atrial fibrillation: anticoagulation  3.  Elevated troponins: low levels, likely stress, follow  4.  Hypoxic respiratory failure: resolves  5.  Diabetes with hyperglycemia: sliding scale insulin initiated    I was at the bedside from 6:30-7:00 AM evaluating for cardiogenic shock and hypotension.  Interventions as described above.      Gage Ash MD  Acute Care Surgery/Critical Care

## 2019-11-07 ENCOUNTER — APPOINTMENT (OUTPATIENT)
Dept: OCCUPATIONAL THERAPY | Facility: CLINIC | Age: 84
DRG: 314 | End: 2019-11-07
Attending: INTERNAL MEDICINE
Payer: COMMERCIAL

## 2019-11-07 ENCOUNTER — APPOINTMENT (OUTPATIENT)
Dept: PHYSICAL THERAPY | Facility: CLINIC | Age: 84
DRG: 314 | End: 2019-11-07
Payer: COMMERCIAL

## 2019-11-07 LAB
ANION GAP SERPL CALCULATED.3IONS-SCNC: 4 MMOL/L (ref 3–14)
BUN SERPL-MCNC: 38 MG/DL (ref 7–30)
CALCIUM SERPL-MCNC: 8.9 MG/DL (ref 8.5–10.1)
CHLORIDE SERPL-SCNC: 104 MMOL/L (ref 94–109)
CO2 SERPL-SCNC: 30 MMOL/L (ref 20–32)
CREAT SERPL-MCNC: 1.49 MG/DL (ref 0.52–1.04)
ERYTHROCYTE [DISTWIDTH] IN BLOOD BY AUTOMATED COUNT: 15.6 % (ref 10–15)
GFR SERPL CREATININE-BSD FRML MDRD: 31 ML/MIN/{1.73_M2}
GLUCOSE BLDC GLUCOMTR-MCNC: 106 MG/DL (ref 70–99)
GLUCOSE BLDC GLUCOMTR-MCNC: 131 MG/DL (ref 70–99)
GLUCOSE BLDC GLUCOMTR-MCNC: 140 MG/DL (ref 70–99)
GLUCOSE BLDC GLUCOMTR-MCNC: 146 MG/DL (ref 70–99)
GLUCOSE BLDC GLUCOMTR-MCNC: 151 MG/DL (ref 70–99)
GLUCOSE BLDC GLUCOMTR-MCNC: 154 MG/DL (ref 70–99)
GLUCOSE BLDC GLUCOMTR-MCNC: 179 MG/DL (ref 70–99)
GLUCOSE SERPL-MCNC: 116 MG/DL (ref 70–99)
HCT VFR BLD AUTO: 30.7 % (ref 35–47)
HGB BLD-MCNC: 9.4 G/DL (ref 11.7–15.7)
INR PPP: 2.65 (ref 0.86–1.14)
INTERPRETATION ECG - MUSE: NORMAL
INTERPRETATION ECG - MUSE: NORMAL
LACTATE BLD-SCNC: 0.8 MMOL/L (ref 0.7–2)
LMWH PPP CHRO-ACNC: 0.27 IU/ML
MAGNESIUM SERPL-MCNC: 2.4 MG/DL (ref 1.6–2.3)
MCH RBC QN AUTO: 30.2 PG (ref 26.5–33)
MCHC RBC AUTO-ENTMCNC: 30.6 G/DL (ref 31.5–36.5)
MCV RBC AUTO: 99 FL (ref 78–100)
PLATELET # BLD AUTO: 237 10E9/L (ref 150–450)
POTASSIUM SERPL-SCNC: 4.1 MMOL/L (ref 3.4–5.3)
RBC # BLD AUTO: 3.11 10E12/L (ref 3.8–5.2)
SODIUM SERPL-SCNC: 138 MMOL/L (ref 133–144)
WBC # BLD AUTO: 12.3 10E9/L (ref 4–11)

## 2019-11-07 PROCEDURE — 83735 ASSAY OF MAGNESIUM: CPT | Performed by: INTERNAL MEDICINE

## 2019-11-07 PROCEDURE — 99233 SBSQ HOSP IP/OBS HIGH 50: CPT | Performed by: HOSPITALIST

## 2019-11-07 PROCEDURE — 36415 COLL VENOUS BLD VENIPUNCTURE: CPT | Performed by: INTERNAL MEDICINE

## 2019-11-07 PROCEDURE — 85520 HEPARIN ASSAY: CPT | Performed by: INTERNAL MEDICINE

## 2019-11-07 PROCEDURE — 97166 OT EVAL MOD COMPLEX 45 MIN: CPT | Mod: GO | Performed by: OCCUPATIONAL THERAPIST

## 2019-11-07 PROCEDURE — 94640 AIRWAY INHALATION TREATMENT: CPT | Mod: 76

## 2019-11-07 PROCEDURE — 40000275 ZZH STATISTIC RCP TIME EA 10 MIN

## 2019-11-07 PROCEDURE — 25000132 ZZH RX MED GY IP 250 OP 250 PS 637: Performed by: INTERNAL MEDICINE

## 2019-11-07 PROCEDURE — 25000125 ZZHC RX 250: Performed by: HOSPITALIST

## 2019-11-07 PROCEDURE — 25000132 ZZH RX MED GY IP 250 OP 250 PS 637: Performed by: HOSPITALIST

## 2019-11-07 PROCEDURE — 94640 AIRWAY INHALATION TREATMENT: CPT

## 2019-11-07 PROCEDURE — 00000146 ZZHCL STATISTIC GLUCOSE BY METER IP

## 2019-11-07 PROCEDURE — 97116 GAIT TRAINING THERAPY: CPT | Mod: GP | Performed by: PHYSICAL THERAPIST

## 2019-11-07 PROCEDURE — 85610 PROTHROMBIN TIME: CPT | Performed by: INTERNAL MEDICINE

## 2019-11-07 PROCEDURE — 97530 THERAPEUTIC ACTIVITIES: CPT | Mod: GP | Performed by: PHYSICAL THERAPIST

## 2019-11-07 PROCEDURE — 99232 SBSQ HOSP IP/OBS MODERATE 35: CPT | Performed by: INTERNAL MEDICINE

## 2019-11-07 PROCEDURE — 25000128 H RX IP 250 OP 636: Performed by: INTERNAL MEDICINE

## 2019-11-07 PROCEDURE — 80048 BASIC METABOLIC PNL TOTAL CA: CPT | Performed by: INTERNAL MEDICINE

## 2019-11-07 PROCEDURE — 25000132 ZZH RX MED GY IP 250 OP 250 PS 637: Performed by: PHYSICIAN ASSISTANT

## 2019-11-07 PROCEDURE — 12000000 ZZH R&B MED SURG/OB

## 2019-11-07 PROCEDURE — 85027 COMPLETE CBC AUTOMATED: CPT | Performed by: INTERNAL MEDICINE

## 2019-11-07 PROCEDURE — 25000132 ZZH RX MED GY IP 250 OP 250 PS 637: Performed by: SURGERY

## 2019-11-07 PROCEDURE — 36415 COLL VENOUS BLD VENIPUNCTURE: CPT | Performed by: HOSPITALIST

## 2019-11-07 PROCEDURE — 40000274 ZZH STATISTIC RCP CONSULT EA 30 MIN

## 2019-11-07 PROCEDURE — 97535 SELF CARE MNGMENT TRAINING: CPT | Mod: GO | Performed by: OCCUPATIONAL THERAPIST

## 2019-11-07 PROCEDURE — 83605 ASSAY OF LACTIC ACID: CPT | Performed by: HOSPITALIST

## 2019-11-07 RX ORDER — ALBUTEROL SULFATE 0.83 MG/ML
2.5 SOLUTION RESPIRATORY (INHALATION) EVERY 4 HOURS PRN
Status: DISCONTINUED | OUTPATIENT
Start: 2019-11-07 | End: 2019-11-13 | Stop reason: HOSPADM

## 2019-11-07 RX ORDER — WARFARIN SODIUM 1 MG/1
1 TABLET ORAL
Status: COMPLETED | OUTPATIENT
Start: 2019-11-07 | End: 2019-11-07

## 2019-11-07 RX ORDER — METOPROLOL TARTRATE 25 MG/1
25 TABLET, FILM COATED ORAL 2 TIMES DAILY
Status: DISCONTINUED | OUTPATIENT
Start: 2019-11-07 | End: 2019-11-13 | Stop reason: HOSPADM

## 2019-11-07 RX ORDER — FUROSEMIDE 10 MG/ML
20 INJECTION INTRAMUSCULAR; INTRAVENOUS ONCE
Status: COMPLETED | OUTPATIENT
Start: 2019-11-07 | End: 2019-11-07

## 2019-11-07 RX ADMIN — FERROUS SULFATE TAB 325 MG (65 MG ELEMENTAL FE) 325 MG: 325 (65 FE) TAB at 09:02

## 2019-11-07 RX ADMIN — OYSTER SHELL CALCIUM WITH VITAMIN D 1 TABLET: 500; 200 TABLET, FILM COATED ORAL at 09:02

## 2019-11-07 RX ADMIN — IPRATROPIUM BROMIDE AND ALBUTEROL SULFATE 3 ML: .5; 3 SOLUTION RESPIRATORY (INHALATION) at 15:16

## 2019-11-07 RX ADMIN — HEPARIN SODIUM 850 UNITS/HR: 10000 INJECTION, SOLUTION INTRAVENOUS at 00:21

## 2019-11-07 RX ADMIN — LEVOTHYROXINE SODIUM 75 MCG: 75 TABLET ORAL at 09:02

## 2019-11-07 RX ADMIN — METOPROLOL TARTRATE 25 MG: 25 TABLET, FILM COATED ORAL at 21:27

## 2019-11-07 RX ADMIN — ASPIRIN 81 MG: 81 TABLET ORAL at 09:02

## 2019-11-07 RX ADMIN — SIMVASTATIN 20 MG: 20 TABLET, FILM COATED ORAL at 21:27

## 2019-11-07 RX ADMIN — Medication 12.5 MG: at 11:02

## 2019-11-07 RX ADMIN — IPRATROPIUM BROMIDE AND ALBUTEROL SULFATE 3 ML: .5; 3 SOLUTION RESPIRATORY (INHALATION) at 11:37

## 2019-11-07 RX ADMIN — ACETAMINOPHEN 650 MG: 325 TABLET, FILM COATED ORAL at 16:29

## 2019-11-07 RX ADMIN — WARFARIN SODIUM 1 MG: 1 TABLET ORAL at 18:22

## 2019-11-07 RX ADMIN — IPRATROPIUM BROMIDE AND ALBUTEROL SULFATE 3 ML: .5; 3 SOLUTION RESPIRATORY (INHALATION) at 08:49

## 2019-11-07 RX ADMIN — IPRATROPIUM BROMIDE AND ALBUTEROL SULFATE 3 ML: .5; 3 SOLUTION RESPIRATORY (INHALATION) at 20:03

## 2019-11-07 RX ADMIN — FUROSEMIDE 20 MG: 10 INJECTION, SOLUTION INTRAVENOUS at 14:08

## 2019-11-07 RX ADMIN — Medication 12.5 MG: at 14:08

## 2019-11-07 ASSESSMENT — ACTIVITIES OF DAILY LIVING (ADL)
ADLS_ACUITY_SCORE: 14

## 2019-11-07 ASSESSMENT — MIFFLIN-ST. JEOR: SCORE: 1348.81

## 2019-11-07 NOTE — PROGRESS NOTES
[facility]  Cardiology Progress Note    Date of Service: 11/07/2019       Patient being seen by cardiology in follow up of new cardiomyopathy and acute systolic heart failure.     Interval history:  I/O (+)5012cc yesterday, overall (+)2L. Remains off pressors.  Hemoglobin slowly trending down, remains on heparin gtt, INR 2.6 this AM.     Discussed with available staff at bedside, chart reviewed, patient seen and examined.      ASSESSMENT/PLAN:    1. New cardiomyopathy, suspect Takotsubo.   --Previously normal EF 2018, echo this admit showed LVEF 25%, severe global hypokinesis with preserved contractility at base. Presented with acute onset shortness of breath, with no recent chest pain.   --Troponin peak 0.931, has trended down. With no ischemic changes on EKG. No need for urgent angiogram at this point. Will plan for lexiscan at some point for ischemic workup. Continue ASA/statin.    --Initially presented with cardiogenic shock requiring pressors. Will start low dose metoprolol today and hopefully ACE-I in the future if renal function stable.    --Does not appear significantly volume overloaded on exam, not currently on diuretics.       2.  Paroxysmal atrial fibrillation.   --Currently in sinus via tele. Heart rates a bit higher over the last hour, but she is up on the chair.  Plan start low dose beta blockers as above,.    --On warfarin, INR therapeutic this AM. Can likely discontinue heparin.        HPI:   Maryalice Rebecca Wachter is a 85 year old female with past medical history significant for hypothyroidism, HTN, hypercholesterolemia, DM, COPD, CAD, and atrial fibrillation, who was admitted on 11/5/2019 with shortness of breath. She required bipap, and was transferred to the ICU for hypotension requiring pressor support. Echo showed new decline in EF felt likely stress cardiomyopathy.        Patient Active Problem List   Diagnosis     Chest pain     SOB (shortness of breath)     ACP (advance  "care planning)     Atherosclerosis of native coronary artery of native heart with angina pectoris (H)     Atrial fibrillation (H)     Hyperlipidemia LDL goal <100     HTN (hypertension)     History of colonic polyps     Health Care Home     Hypothyroidism     Type 2 diabetes mellitus with hyperglycemia (H)     Panlobular emphysema (H)     Hiatal hernia     Uterine prolapse     Cystocele, midline     Mixed incontinence urge and stress (male)(female)     Incomplete bladder emptying     Respiratory distress     Long term current use of anticoagulants with INR goal of 2.0-3.0     History of sarcoidosis     CKD (chronic kidney disease) stage 3, GFR 30-59 ml/min (H)     Acute respiratory failure with hypoxia (H)       Subjective:  Says she is a bit short of breath currently, after recently getting up to go to the bathroom and up in the chair. Denies chest pain, palpitations. Mild dizziness while upright.     Objective:  Vital signs:  /70   Pulse 92   Temp 98.7  F (37.1  C) (Axillary)   Resp 18   Ht 1.664 m (5' 5.5\")   Wt 89.5 kg (197 lb 5 oz)   SpO2 96%   BMI 32.34 kg/m      Intake/Output Summary (Last 24 hours) at 11/7/2019 0913  Last data filed at 11/7/2019 0654  Gross per 24 hour   Intake 687.53 ml   Output 175 ml   Net 512.53 ml       Vitals:    11/05/19 2000 11/07/19 0400   Weight: 87.4 kg (192 lb 10.9 oz) 89.5 kg (197 lb 5 oz)       PE:  Gen: In general, this is a well nourished elderly  female, seen sitting up in chair. NAD on NC.  Neck: Supple with midline trachea. No significant JVD identified with patient upright.  CV: Regular rhythm, a bit tachycardic currently. No obvious murmur or rub appreciated.   Resp:Respirations are unlabored without use of accessory muscles. Auscultation of the lungs reveals some crackles in the bases posteriorly, but no wheezing or rhonchi.    GI: Soft, nontender, nondistended. BS present.  Extrem: Extremities are warm, without cyanosis or clubbing. Trace " bilateral lower extremity edema.   Neuro: Patient is alert, oriented, and cooperative. No obvious focal abnormalities.   Skin: No visible rashes. Clean and dry.     Current Medications:?    aspirin  81 mg Oral Daily     calcium carbonate-vitamin D  1 tablet Oral Daily     ferrous sulfate  325 mg Oral Daily with breakfast     insulin aspart  1-7 Units Subcutaneous TID AC     insulin aspart  1-5 Units Subcutaneous At Bedtime     ipratropium - albuterol 0.5 mg/2.5 mg/3 mL  3 mL Nebulization 4x daily     levothyroxine  75 mcg Oral Daily     simvastatin  20 mg Oral At Bedtime     warfarin ANTICOAGULANT  1 mg Oral ONCE at 18:00         Labs/Imaging/Additional testing:  The following labs and imaging were reviewed during today's visit:    Recent Labs   Lab 11/06/19  1518 11/06/19  1159 11/06/19  0430 11/05/19  2303 11/05/19  2028   TROPI 0.865* 0.931* 0.839* 0.532* 0.331*       Recent Labs   Lab 11/07/19  0548 11/06/19  1518 11/06/19  1159 11/06/19  0430 11/05/19  2303  11/05/19  1411   WBC 12.3*  --   --   --  10.4  --  8.6   HGB 9.4*  --   --   --  10.5*  --  11.8   MCV 99  --   --   --  98  --  99     --   --   --  240  --  302   INR 2.65*  --   --  1.90*  --   --  2.04*     --   --  139  --   --  138   POTASSIUM 4.1  --   --  4.2  --   --  4.2   CHLORIDE 104  --   --  105  --   --  103   CO2 30  --   --  23  --   --  27   BUN 38*  --   --  29  --   --  21   CR 1.49*  --   --  1.44*  --   --  1.15*   GFRESTIMATED 31*  --   --  33*  --   --  43*   GFRESTBLACK 36*  --   --  38*  --   --  50*   ANIONGAP 4  --   --  11  --   --  8   VASHTI 8.9  --   --  9.3  --   --  9.0   *  --   --  313*  --   --  226*   ALBUMIN  --   --   --   --   --   --  3.4   PROTTOTAL  --   --   --   --   --   --  6.5*   BILITOTAL  --   --   --   --   --   --  0.6   ALKPHOS  --   --   --   --   --   --  72   ALT  --   --   --   --   --   --  30   AST  --   --   --   --   --   --  55*   TROPI  --  0.865* 0.931* 0.839* 0.532*   < >  <0.015    < > = values in this interval not displayed.     Recent Labs   Lab Test 07/15/19  0948 08/03/18  0913  06/30/15  0952   CHOL 109 105   < > 109   HDL 39* 39*   < > 36*   LDL 34 27   < > 25   TRIG 178* 195*   < > 240*   CHOLHDLRATIO  --   --   --  3.0    < > = values in this interval not displayed.     Recent Labs   Lab 11/05/19  1411   NTBNPI [id number]       Echo:  11/5/19  Interpretation Summary     LVEF 25% based on biplane 2D tracing.  Left ventricular systolic function is severely reduced.  Severe global hypokineiss of mid and distal segments with preserved  contractility at the base. These findings could be seen in patients with  stress induced cardiomyopathy vs Multivessel disease (less likely but not  excluded).  EF decreased compared to 7/18.  Findings discussed with Dr Serrato at 7.54pm today. The study was technically  difficult.  ??  Jessica Veliz PA-C  [facility]  Pager [phone]

## 2019-11-07 NOTE — PROGRESS NOTES
[facility]    Hospitalist Progress Note      Assessment & Plan   Sindice Rebecca Wachter is a 85 year old female with a history of atrial fibrillation on warfarin, DM II, COPD who is admitted to the ICU for acute hypoxic respiratory failure and cardiogenic shock secondary to Takotsubo cardiomyopathy.      Patient had a sudden onset of shortness of breath at home.  Required BiPAP to maintain saturations in route and in the ED.  Had evidence of shock with hypotension and lactic acidosis.  BP improved after fluids.  Basic work-up was negative including labs and chest x-ray. Stat echocardiogram obtained in the ED showed severe global hypokinesis.  She  was admitted to the ICU and remained on BiPAP in the evening of admission.  Discussed with cardiology and critical care.  Patient has lactic acidosis and elevated troponin.   She was initially started on vasopressor therapy and continued on BiPAP.  Her vasopressor therapy was stopped on the a.m. of 11/6.  She was started on IV heparin for NSTEMI.       Cardiogenic shock secondary to Takotsubo cardiomyopathy.  NSTEMI, likely type II in the setting of tachycardia suitable cardiomyopathy.  -Patient initially with signs of cardiogenic shock with blood pressures in the 80s in the ED.  She did initially require norepinephrine overnight which has been discontinued since 0600 on 11/6..  - No evidence of sepsis, hypovolemia or hemorrhage.  Doubt she has a massive PE causing this given echo findings ia dthe therapeutic INR. No STEMI on EKG.   -TTE performed in the ED does show evidence of Takatutsubo cardiomyopathy.  -Cardiology has been consulted, at this point not recommending cardiac cath for evaluation given relatively mild elevation in troponin likely secondary to takatsubo.    -Troponin did peak on 11/6 at 0.931.  -Blood pressure improved today.  Starting low-dose beta-blockade and consultation with cardiology.  -Patient will require Lexiscan nuclear stress  test prior to discharge.  -Stopping heparin drip today per cardiology recs as  not a type I NSTEMI.     #Acute hypoxic respiratory failure secondary to the above.   - Patient requiring BiPAP in route by EMS and overnight on day of admission.  - Work-up showed a chest x-ray with possibly mild vascular congestion.  No crackles were heard on auscultation.  - Patient given Lasix followed by fluids in the emergency department.  Her respiratory status did not worsen after fluids and her blood pressure was borderline therefore no more Lasix was ordered.  -Patient has been off of BiPAP since a.m. of 11/6.  -Given known COPD on chronic inhalers, will administer duo nebs and as needed albuterol.  -Patient does not appear volume overloaded at this point, will hold off on further IV diuresis.     #Lactic acidosis  - Secondary to cardiogenic shock  - Lactate trending down, last at 2.5 this a.m.  Holding off on further IV fluids given tachycardia suitable cardiomyopathy.     #CHLOÉ  - Patient has a reported history of CKD, Cr 1.15 on admission.  Has had low urine output on admission and overnight.  Creatinine up to 1.49 today in the setting of cardiogenic shock.  - High concern that she may develop ATN, need to trend renal function daily  -Continue to monitor     #DM II  -Resume diabetic diet, sliding scale insulin.  Holding metformin and glipizide while inpatient.     Paroxysmal atrial fibrillation  -Stopping heparin drip.  Continue warfarin dosing per pharmacy.     Diet:   Continue diabetic diet.  DVT Prophylaxis: Stopping heparin drip today.  Resume home warfarin.  Code Status: Full Code.      Disposition Plan   Expected discharge: 4 - 7 days, anticipate she will need skilled placement.     Blood pressure is improved today and can transfer to the floor with telemetry. Transfer order placed    Bird Garcia MD  Text Page    Interval History   No acute events.  Patient states she feels improved compared to yesterday no worsening  shortness of breath, chest pain, palpitations, fever/chills, headache, lightheadedness, abdominal pain, nausea vomiting.  Does still endorse shortness of breath with minimal exertion.    -Data reviewed today: I reviewed all new labs and imaging results over the last 24 hours.     Physical Exam   Temp: 98.7  F (37.1  C) Temp src: Axillary BP: 110/42 Pulse: 81 Heart Rate: 81 Resp: 18 SpO2: 96 % O2 Device: Nasal cannula Oxygen Delivery: 3 LPM  Vitals:    11/05/19 2000 11/07/19 0400   Weight: 87.4 kg (192 lb 10.9 oz) 89.5 kg (197 lb 5 oz)     Vital Signs with Ranges  Temp:  [97.9  F (36.6  C)-98.7  F (37.1  C)] 98.7  F (37.1  C)  Pulse:  [] 81  Heart Rate:  [] 81  Resp:  [18-26] 18  BP: ()/(33-77) 110/42  SpO2:  [87 %-99 %] 96 %  I/O last 3 completed shifts:  In: 687.53 [P.O.:460; I.V.:227.53]  Out: 175 [Urine:175]    Constitutional: Nontoxic, no acute distress.  Lying in bed answers questions appropriately  Respiratory:     Normal work of breathing, no expiratory wheezing noted today.  Cardiovascular: Distant, irregular.  Extremities warm  GI: Bowel sounds present, nontender nondistended  Skin/Integumen: No rashes noted  Neuro: Cranial nerves II through XII intact, answers questions appropriately.  Moves all extremities.  No tremor noted.    Medications     HEParin 850 Units/hr (11/07/19 0600)     norepinephrine Stopped (11/06/19 0623)     Warfarin Therapy Reminder         aspirin  81 mg Oral Daily     calcium carbonate-vitamin D  1 tablet Oral Daily     ferrous sulfate  325 mg Oral Daily with breakfast     insulin aspart  1-7 Units Subcutaneous TID AC     insulin aspart  1-5 Units Subcutaneous At Bedtime     ipratropium - albuterol 0.5 mg/2.5 mg/3 mL  3 mL Nebulization 4x daily     levothyroxine  75 mcg Oral Daily     simvastatin  20 mg Oral At Bedtime       Data   Recent Labs   Lab 11/07/19  0548 11/06/19  1518 11/06/19  1159 11/06/19  0430 11/05/19  2303  11/05/19  1413 11/05/19  1411   WBC 12.3*   --   --   --  10.4  --   --  8.6   HGB 9.4*  --   --   --  10.5*  --   --  11.8   MCV 99  --   --   --  98  --   --  99     --   --   --  240  --   --  302   INR 2.65*  --   --  1.90*  --   --   --  2.04*     --   --  139  --   --   --  138   POTASSIUM 4.1  --   --  4.2  --   --   --  4.2   CHLORIDE 104  --   --  105  --   --   --  103   CO2 30  --   --  23  --   --   --  27   BUN 38*  --   --  29  --   --   --  21   CR 1.49*  --   --  1.44*  --   --   --  1.15*   ANIONGAP 4  --   --  11  --   --   --  8   VASHTI 8.9  --   --  9.3  --   --   --  9.0   *  --   --  313*  --   --   --  226*   ALBUMIN  --   --   --   --   --   --   --  3.4   PROTTOTAL  --   --   --   --   --   --   --  6.5*   BILITOTAL  --   --   --   --   --   --   --  0.6   ALKPHOS  --   --   --   --   --   --   --  72   ALT  --   --   --   --   --   --   --  30   AST  --   --   --   --   --   --   --  55*   TROPI  --  0.865* 0.931* 0.839* 0.532*   < >  --  <0.015   TROPONIN  --   --   --   --   --   --  0.01  --     < > = values in this interval not displayed.       No results found for this or any previous visit (from the past 24 hour(s)).  '

## 2019-11-07 NOTE — PLAN OF CARE
ICU End of Shift Summary.  For vital signs and complete assessments, please see documentation flowsheets.     Pertinent assessments: Patient alert and oriented. Up in room with walker  Major Shift Events: started on beta blocker. Heparin dc'd.  Plan (Upcoming Events): continue to monitor  Discharge/Transfer Needs: home    Bedside Shift Report Completed : yes  Bedside Safety Check Completed:yes

## 2019-11-07 NOTE — PLAN OF CARE
Discharge Planner PT   Patient plan for discharge: home  Current status: Pt was cued for supine to sit. Pt able to perform SBA. Pt was cued for sit<>stand with FWW. Pt was cued for hand placement and technique from bed and toilet. Pt was cued for ambulation with FWW. Pt was able to ambulate 15 feet and 40 feet with FWW. Pt SHUKLA, labored breathing mindy with exhale, shallow breathing, educated on PLB, sats dropping 88%,  with activity. Pt able to regain with seated rest break each time, but needing over 1 min to decrease RR.   Barriers to return to prior living situation: SOB, decreased endurance  Recommendations for discharge: home with SBA from spouse for transfers and ambulation, home PT  Rationale for recommendations: Pt presenting below baseline for functional mobility, would benefit from continued IP PT to increase endurance and activity tolerance. Pt may benefit from use of FWW for ambulating longer distances.HHPT indicated as it would be a taxing effort for the pt to leave the home for OPPT, limiting her ability to participate in therapies.        Entered by: Nga Wan 11/07/2019 3:18 PM

## 2019-11-07 NOTE — PLAN OF CARE
Discharge Planner OT     Pt is an 85 year old female admitted wtih SOB, possible Tokolsubo, cardiomyopathy and systolic HF.  PMH includes parysymal Afib, hypothroidism, HTN, DM hypercholesterolemia, COPD, CAD.  Lives with spouse in a single level [facility].  Reports being independent in ADLS at baseline.    Patient plan for discharge: home with assist of spouse.  Current status: Initial evaluation complete.  Pt sitting in chair, willing to try activity.  Was able to demonstrate donning/doffing socks for LE dressing.  Increased SOB with any activity.  Stood with SBA and moved away from chair about 8 feet.  Increasing SOB with more activity.  Pt o 2L O2.  Discussed therapy options.  Pt not wanting TCU but would be willng to have home therapy as needed.  Barriers to return to prior living situation: Current SOB.    Recommendations for discharge: Home with assist of spouse  Rationale for recommendations: Anticipate pt will improve to be able to go home with assist of her spouse and AE.  Likely to be able to return to her daily routine.  Does not want to go to a TCU, if progress is slow may need HH services at discharge.  OT will work here to increase endurance and EC techniques to help manage fatigue and SOB with activity.       Entered by: Romero Morton 11/07/2019 12:51 PM

## 2019-11-07 NOTE — PROGRESS NOTES
11/07/19 1200   Quick Adds   Type of Visit Initial Occupational Therapy Evaluation   Living Environment   Lives With spouse   Living Arrangements house  (one level townhouse)   Home Accessibility no concerns   Transportation Anticipated family or friend will provide   Living Environment Comment walk in shower with one grab bar and shower chair, RTS   Self-Care   Usual Activity Tolerance moderate   Current Activity Tolerance fair   Regular Exercise No   Equipment Currently Used at Home raised toilet;shower chair   Activity/Exercise/Self-Care Comment does not use AD at baseline   Functional Level   Ambulation 0-->independent   Transferring 0-->independent   Toileting 0-->independent   Bathing 0-->independent   Dressing 0-->independent   Eating 0-->independent   Communication 0-->understands/communicates without difficulty   Swallowing 0-->swallows foods/liquids without difficulty   Cognition 0 - no cognition issues reported   Fall history within last six months no   Which of the above functional risks had a recent onset or change? ambulation;transferring;bathing;dressing   Prior Functional Level Comment pt states she was independent in basic ADLs at baseline   General Information   Onset of Illness/Injury or Date of Surgery - Date 11/04/19   Referring Physician David Jacob   Patient/Family Goals Statement return home   Additional Occupational Profile Info/Pertinent History of Current Problem Pt admitted wtih SOB, possible [place], cardiomyopathy and systolic HF.  PMH includes parysymal Afib, hypothroidism, HTN, DM hypercholesterolemia, COPD, CAD   Precautions/Limitations fall precautions;oxygen therapy device and L/min  (2L O2, Pt is also very [place], B hearing aides)   General Observations pt up in chair, willing to participate   Cognitive Status Examination   Orientation orientation to person, place and time   Level of Consciousness alert   Follows Commands (Cognition) WNL   Memory intact   Attention No  deficits were identified   Visual Perception   Visual Perception Wears glasses   Pain Assessment   Patient Currently in Pain No   Range of Motion (ROM)   ROM Quick Adds Other (describe)   ROM Comment pt has shoulder flexion limitations in right shoulder, has WFL ROM in this joint.  Otherwise is WNL   Strength   Manual Muscle Testing Quick Adds Other   Strength Comments general deconditioning   Hand Strength   Hand Strength Comments fair and equal   Coordination   Upper Extremity Coordination No deficits were identified   Transfer Skill: Sit to Stand   Level of [place]: Sit/Stand stand-by assist   Physical Assist/Nonphysical Assist: Sit/Stand verbal cues;1 person assist   Transfer Skill: Sit to Stand full weight-bearing   Transfer Skill: Toilet Transfer   Level of [place]: Toilet minimum assist (75% patients effort)   Physical Assist/Nonphysical Assist: Toilet verbal cues;1 person assist   Weight-Bearing Restrictions: Toilet full weight-bearing   Assistive Device grab bars   Toilet Transfer Skill Comments pt may benefit from walker for longer distances   Activities of Daily Living Analysis   Impairments Contributing to Impaired Activities of Daily Living strength decreased   ADL Comments SOB with any activity   General Therapy Interventions   Planned Therapy Interventions ADL retraining;strengthening;home program guidelines   Clinical Impression   Criteria for Skilled Therapeutic Interventions Met yes, treatment indicated   OT Diagnosis decreased independence in ADLS   Influenced by the following impairments SOB, weakness   Assessment of Occupational Performance 3-5 Performance Deficits   Identified Performance Deficits decreased strength and endurance for dressing, bathing, meal preparationi, household chores   Clinical Decision Making (Complexity) Moderate complexity   Therapy Frequency Daily   Predicted Duration of Therapy Intervention (days/wks) 4 days   Anticipated Discharge Disposition Home with  "Assist;Home with Home Therapy   Risks and Benefits of Treatment have been explained. Yes   Patient, Family & other staff in agreement with plan of care Yes   [facility] TM \"6 Clicks\"   2016, Trustees of [facility], under license to [employer].  All rights reserved.   6 Clicks Short Forms Daily Activity Inpatient Short Form   [facility]  \"6 Clicks\" Daily Activity Inpatient Short Form   1. Putting on and taking off regular lower body clothing? 3 - A Little   2. Bathing (including washing, rinsing, drying)? 3 - A Little   3. Toileting, which includes using toilet, bedpan or urinal? 3 - A Little   4. Putting on and taking off regular upper body clothing? 4 - None   5. Taking care of personal grooming such as brushing teeth? 4 - None   6. Eating meals? 4 - None   Daily Activity Raw Score (Score out of 24.Lower scores equate to lower levels of function) 21   Total Evaluation Time   Total Evaluation Time (Minutes) 15     "

## 2019-11-08 ENCOUNTER — APPOINTMENT (OUTPATIENT)
Dept: CARDIOLOGY | Facility: CLINIC | Age: 84
DRG: 314 | End: 2019-11-08
Attending: INTERNAL MEDICINE
Payer: COMMERCIAL

## 2019-11-08 ENCOUNTER — APPOINTMENT (OUTPATIENT)
Dept: OCCUPATIONAL THERAPY | Facility: CLINIC | Age: 84
DRG: 314 | End: 2019-11-08
Payer: COMMERCIAL

## 2019-11-08 LAB
ANION GAP SERPL CALCULATED.3IONS-SCNC: 3 MMOL/L (ref 3–14)
BUN SERPL-MCNC: 32 MG/DL (ref 7–30)
CALCIUM SERPL-MCNC: 9.2 MG/DL (ref 8.5–10.1)
CHLORIDE SERPL-SCNC: 103 MMOL/L (ref 94–109)
CO2 SERPL-SCNC: 30 MMOL/L (ref 20–32)
CREAT SERPL-MCNC: 1.23 MG/DL (ref 0.52–1.04)
ERYTHROCYTE [DISTWIDTH] IN BLOOD BY AUTOMATED COUNT: 15.4 % (ref 10–15)
GFR SERPL CREATININE-BSD FRML MDRD: 40 ML/MIN/{1.73_M2}
GLUCOSE BLDC GLUCOMTR-MCNC: 131 MG/DL (ref 70–99)
GLUCOSE BLDC GLUCOMTR-MCNC: 144 MG/DL (ref 70–99)
GLUCOSE BLDC GLUCOMTR-MCNC: 150 MG/DL (ref 70–99)
GLUCOSE BLDC GLUCOMTR-MCNC: 151 MG/DL (ref 70–99)
GLUCOSE BLDC GLUCOMTR-MCNC: 180 MG/DL (ref 70–99)
GLUCOSE BLDC GLUCOMTR-MCNC: 209 MG/DL (ref 70–99)
GLUCOSE SERPL-MCNC: 174 MG/DL (ref 70–99)
HCT VFR BLD AUTO: 31.4 % (ref 35–47)
HGB BLD-MCNC: 9.8 G/DL (ref 11.7–15.7)
INR PPP: 2.61 (ref 0.86–1.14)
MAGNESIUM SERPL-MCNC: 2.1 MG/DL (ref 1.6–2.3)
MCH RBC QN AUTO: 30.7 PG (ref 26.5–33)
MCHC RBC AUTO-ENTMCNC: 31.2 G/DL (ref 31.5–36.5)
MCV RBC AUTO: 98 FL (ref 78–100)
PLATELET # BLD AUTO: 210 10E9/L (ref 150–450)
POTASSIUM SERPL-SCNC: 4.2 MMOL/L (ref 3.4–5.3)
RBC # BLD AUTO: 3.19 10E12/L (ref 3.8–5.2)
SODIUM SERPL-SCNC: 136 MMOL/L (ref 133–144)
WBC # BLD AUTO: 7.2 10E9/L (ref 4–11)

## 2019-11-08 PROCEDURE — 97535 SELF CARE MNGMENT TRAINING: CPT | Mod: GO

## 2019-11-08 PROCEDURE — 25000132 ZZH RX MED GY IP 250 OP 250 PS 637: Performed by: INTERNAL MEDICINE

## 2019-11-08 PROCEDURE — 94640 AIRWAY INHALATION TREATMENT: CPT

## 2019-11-08 PROCEDURE — 80048 BASIC METABOLIC PNL TOTAL CA: CPT | Performed by: INTERNAL MEDICINE

## 2019-11-08 PROCEDURE — 40000275 ZZH STATISTIC RCP TIME EA 10 MIN

## 2019-11-08 PROCEDURE — 25500064 ZZH RX 255 OP 636: Performed by: INTERNAL MEDICINE

## 2019-11-08 PROCEDURE — 99207 ZZC CDG-MDM COMPONENT: MEETS MODERATE - UP CODED: CPT | Performed by: INTERNAL MEDICINE

## 2019-11-08 PROCEDURE — 36415 COLL VENOUS BLD VENIPUNCTURE: CPT | Performed by: INTERNAL MEDICINE

## 2019-11-08 PROCEDURE — 85027 COMPLETE CBC AUTOMATED: CPT | Performed by: INTERNAL MEDICINE

## 2019-11-08 PROCEDURE — 40000264 ECHOCARDIOGRAM LIMITED

## 2019-11-08 PROCEDURE — 25000128 H RX IP 250 OP 636: Performed by: INTERNAL MEDICINE

## 2019-11-08 PROCEDURE — 93325 DOPPLER ECHO COLOR FLOW MAPG: CPT | Mod: 26 | Performed by: INTERNAL MEDICINE

## 2019-11-08 PROCEDURE — 94640 AIRWAY INHALATION TREATMENT: CPT | Mod: 76

## 2019-11-08 PROCEDURE — 93308 TTE F-UP OR LMTD: CPT | Mod: 26 | Performed by: INTERNAL MEDICINE

## 2019-11-08 PROCEDURE — 99233 SBSQ HOSP IP/OBS HIGH 50: CPT | Performed by: INTERNAL MEDICINE

## 2019-11-08 PROCEDURE — 12000000 ZZH R&B MED SURG/OB

## 2019-11-08 PROCEDURE — 25000125 ZZHC RX 250: Performed by: HOSPITALIST

## 2019-11-08 PROCEDURE — 00000146 ZZHCL STATISTIC GLUCOSE BY METER IP

## 2019-11-08 PROCEDURE — 99232 SBSQ HOSP IP/OBS MODERATE 35: CPT | Mod: 25 | Performed by: INTERNAL MEDICINE

## 2019-11-08 PROCEDURE — 85610 PROTHROMBIN TIME: CPT | Performed by: INTERNAL MEDICINE

## 2019-11-08 PROCEDURE — 25000131 ZZH RX MED GY IP 250 OP 636 PS 637: Performed by: INTERNAL MEDICINE

## 2019-11-08 PROCEDURE — 25800030 ZZH RX IP 258 OP 636: Performed by: INTERNAL MEDICINE

## 2019-11-08 PROCEDURE — 83735 ASSAY OF MAGNESIUM: CPT | Performed by: INTERNAL MEDICINE

## 2019-11-08 PROCEDURE — 93321 DOPPLER ECHO F-UP/LMTD STD: CPT | Mod: 26 | Performed by: INTERNAL MEDICINE

## 2019-11-08 RX ORDER — FUROSEMIDE 10 MG/ML
20 INJECTION INTRAMUSCULAR; INTRAVENOUS ONCE
Status: COMPLETED | OUTPATIENT
Start: 2019-11-08 | End: 2019-11-08

## 2019-11-08 RX ORDER — QUETIAPINE FUMARATE 25 MG/1
25 TABLET, FILM COATED ORAL
Status: DISCONTINUED | OUTPATIENT
Start: 2019-11-08 | End: 2019-11-13 | Stop reason: HOSPADM

## 2019-11-08 RX ORDER — AMIODARONE HYDROCHLORIDE 200 MG/1
200 TABLET ORAL 2 TIMES DAILY
Status: DISCONTINUED | OUTPATIENT
Start: 2019-11-08 | End: 2019-11-13 | Stop reason: HOSPADM

## 2019-11-08 RX ORDER — DIPHENHYDRAMINE HCL 25 MG
25 CAPSULE ORAL
Status: DISCONTINUED | OUTPATIENT
Start: 2019-11-08 | End: 2019-11-09

## 2019-11-08 RX ADMIN — AMIODARONE HYDROCHLORIDE 1 MG/MIN: 50 INJECTION, SOLUTION INTRAVENOUS at 18:14

## 2019-11-08 RX ADMIN — IPRATROPIUM BROMIDE AND ALBUTEROL SULFATE 3 ML: .5; 3 SOLUTION RESPIRATORY (INHALATION) at 07:52

## 2019-11-08 RX ADMIN — AMIODARONE HYDROCHLORIDE 1 MG/MIN: 50 INJECTION, SOLUTION INTRAVENOUS at 13:41

## 2019-11-08 RX ADMIN — IPRATROPIUM BROMIDE AND ALBUTEROL SULFATE 3 ML: .5; 3 SOLUTION RESPIRATORY (INHALATION) at 20:13

## 2019-11-08 RX ADMIN — AMIODARONE HYDROCHLORIDE 150 MG: 1.5 INJECTION, SOLUTION INTRAVENOUS at 13:26

## 2019-11-08 RX ADMIN — FERROUS SULFATE TAB 325 MG (65 MG ELEMENTAL FE) 325 MG: 325 (65 FE) TAB at 09:33

## 2019-11-08 RX ADMIN — Medication 1.5 MG: at 18:51

## 2019-11-08 RX ADMIN — DIPHENHYDRAMINE HYDROCHLORIDE 25 MG: 25 CAPSULE ORAL at 21:02

## 2019-11-08 RX ADMIN — ASPIRIN 81 MG: 81 TABLET ORAL at 09:33

## 2019-11-08 RX ADMIN — METOPROLOL TARTRATE 25 MG: 25 TABLET, FILM COATED ORAL at 21:02

## 2019-11-08 RX ADMIN — LEVOTHYROXINE SODIUM 75 MCG: 75 TABLET ORAL at 09:32

## 2019-11-08 RX ADMIN — OYSTER SHELL CALCIUM WITH VITAMIN D 1 TABLET: 500; 200 TABLET, FILM COATED ORAL at 09:33

## 2019-11-08 RX ADMIN — IPRATROPIUM BROMIDE AND ALBUTEROL SULFATE 3 ML: .5; 3 SOLUTION RESPIRATORY (INHALATION) at 15:30

## 2019-11-08 RX ADMIN — SIMVASTATIN 20 MG: 20 TABLET, FILM COATED ORAL at 21:02

## 2019-11-08 RX ADMIN — FUROSEMIDE 20 MG: 10 INJECTION, SOLUTION INTRAVENOUS at 13:16

## 2019-11-08 RX ADMIN — INSULIN GLARGINE 8 UNITS: 100 INJECTION, SOLUTION SUBCUTANEOUS at 21:09

## 2019-11-08 RX ADMIN — Medication 1 MG: at 01:11

## 2019-11-08 RX ADMIN — AMIODARONE HYDROCHLORIDE 200 MG: 200 TABLET ORAL at 21:02

## 2019-11-08 RX ADMIN — HUMAN ALBUMIN MICROSPHERES AND PERFLUTREN 9 ML: 10; .22 INJECTION, SOLUTION INTRAVENOUS at 11:30

## 2019-11-08 ASSESSMENT — ACTIVITIES OF DAILY LIVING (ADL)
ADLS_ACUITY_SCORE: 14
ADLS_ACUITY_SCORE: 16
ADLS_ACUITY_SCORE: 14
ADLS_ACUITY_SCORE: 14
ADLS_ACUITY_SCORE: 16
ADLS_ACUITY_SCORE: 14

## 2019-11-08 ASSESSMENT — MIFFLIN-ST. JEOR: SCORE: 1332.43

## 2019-11-08 NOTE — PLAN OF CARE
Took over care after 1215 today from Benjamin. Transferred from [facility] to room 305.  Manuel was present during the transfer of rooms. Tele SR. Amiodarone bolus given and amiodarone drip started this afternoon. See vital signs flowsheets for complete vital signs. Able to decrease oxygen from 2 LPM via nasal canula to 1 LPM. SpO2 decreased to 88% on 1 LPM after ambulation to the bathroom. One time dose of furosemide given. Transferred standby assist with gait belt and walker. Cardiology, PT/OT following.

## 2019-11-08 NOTE — PLAN OF CARE
PT: patient not appropriate today for PT as she needed to be started on amiodarone for rhythm control. Will plan to check status of patient next on 11/9

## 2019-11-08 NOTE — PLAN OF CARE
Discharge Planner OT   Patient plan for discharge: Home  Current status: While seated/standing pt completed LE dressing including don/doff socks with SBA and don/doff underwear with CGA and set up. Pt ambulated to the bathroom with CGA. Pt transferred to/from the toilet with SBA/CGA. With activity, Oxygen with 2L oxygen was at 89%, ~1 minute to recover to 91%.   Barriers to return to prior living situation: Decreased activity tolerance for I/ADLs; decreased independence in I/ADLs.   Recommendations for discharge: Home with assist for I/ADLs as needed and home OT   Rationale for recommendations: Home OT for home safety evaluation, and to increase activity tolerance and independence for I/ADLs, as pt now requires assist and significant effort for I/ADLs.        Entered by: Yfn Rollins 11/08/2019 9:24 AM

## 2019-11-08 NOTE — PROGRESS NOTES
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 Ischemic etiology is possible but less likely, TnI modest and flat, non-ACS pattern. ECG without ischemic changes. No symptoms suggestive of recent ischemic event.     On telemetry her HR has been in the 70s-80s but intermittently jumps to the 120s-130s bpm, even while she is resting in bed. Asymptomatic. She does have a h/o paroxysmal atrial fibrillation. However during these episodes there appears to be P waves on telemetry, and so it is possible she is having paroxysmal atrial tachycardia or flutter. Will start amiodarone for rhythm control.     - start amiodarone gtt and PO load  - continue metoprolol tartrate 25mg BID  - continue warfarin  - will diurese again today, hypervolemic on exam  - limited TTE today  - plan for nuclear stress test before discharge    Thank you for allowing our team to participate in the care of Maryalice Rebecca Wachter.  Please do not hesitate to page me with any questions or concerns.     Tres Metz MD  [facility]  Cardiology  Pager:  [phone]  Text Page   November 8, 2019    This note was transcribed using electronic voice recognition software, typographical errors may be present.         Interval Events:     - feels modestly improved today  - On telemetry her HR has been in the 70s-80s but intermittently jumps to the 120s-130s bpm, even while she is resting in bed.  Asymptomatic. P waves during these episodes of tachycardia.  - no CP, chest pressure/discomfort  - remains dyspneic on 2L/NC         Medications reviewed:   Prior to admission medications:  Prior to Admission medications    Medication Sig Start Date End Date Taking? Authorizing Provider   albuterol (ALBUTEROL) 108 (90 BASE) MCG/ACT inhaler Inhale 2 puffs into the lungs every 6 hours as needed Reported on 3/2/2017   Yes Reported, Patient   albuterol (PROVENTIL) (2.5 MG/3ML) 0.083% neb solution INHALE CONTENTS OF 1 VIAL (3 ML) VIA NEBULIZER EVERY 6 HOURS AS NEEDED FOR SHORTNESS OF BREATH / DYSPNEA OR WHEEZING 8/13/19  Yes Venu Maria PA-C   ascorbic acid (VITAMIN C) 500 MG tablet Take 500 mg by mouth daily.   Yes Reported, Patient   ASPIRIN EC PO Take 81 mg by mouth daily    Yes Reported, Patient   calcium carb 1250 mg, 500 mg [place],/vitamin D 200 units (OSCAL WITH D) 500-200 MG-UNIT per tablet Take 1 tablet by mouth daily.   Yes Reported, Patient   ferrous sulfate 325 (65 FE) MG tablet Take 1 tablet by mouth daily (with breakfast).   Yes Reported, Patient   Fluticasone-Umeclidin-Vilanterol (TRELEGY ELLIPTA) 100-62.5-25 MCG/INH oral inhaler Inhale 1 puff into the lungs daily 9/9/19  Yes Venu Maria PA-C   glipiZIDE (GLUCOTROL XL) 10 MG 24 hr tablet TAKE ONE TABLET BY MOUTH TWICE A DAY 10/4/19  Yes Venu Maria PA-C   hydrochlorothiazide (HYDRODIURIL) 25 MG tablet TAKE ONE TABLET BY MOUTH EVERY DAY 9/20/19  Yes Venu Maria PA-C   levothyroxine (SYNTHROID/LEVOTHROID) 75 MCG tablet TAKE ONE TABLET BY MOUTH EVERY DAY 7/11/19  Yes Venu Maria PA-C   lisinopril (PRINIVIL/ZESTRIL) 40 MG tablet TAKE ONE TABLET BY MOUTH EVERY DAY 7/11/19  Yes Venu Maria PA-C   LOPERAMIDE HCL PO Take 2-4 mg by mouth daily as needed    Yes Reported, Patient   metFORMIN (GLUCOPHAGE) 500 MG tablet TAKE ONE TABLET BY MOUTH TWICE A DAY WITH MEALS 5/10/19  Yes Fartun Mcnair MD    metoprolol tartrate (LOPRESSOR) 50 MG tablet TAKE ONE TABLET BY MOUTH TWICE A DAY 10/4/19  Yes Venu Maria PA-C   pioglitazone (ACTOS) 30 MG tablet Take 1 tablet (30 mg) by mouth daily 9/9/19  Yes Venu Maria PA-C   polycarbophil (FIBERCON) 625 MG tablet Take 1 tablet by mouth 2 times daily.   Yes Reported, Patient   QUEtiapine (SEROQUEL) 25 MG tablet TAKE ONE-HALF TABLET BY MOUTH AT BEDTIME TO SLEEP . OK TO INCREASE BY 1/2 TABLET EVERY 3 NIGHTS UP TO 2 TABLETS DAILY IF NEEDED 8/8/19  Yes Venu Maria PA-C   ranitidine (ZANTAC) 150 MG tablet Take 1 tablet (150 mg) by mouth nightly as needed for heartburn 10/23/17  Yes Venu Maria PA-C   simvastatin (ZOCOR) 20 MG tablet TAKE ONE TABLET BY MOUTH AT BEDTIME 9/9/19  Yes Venu Maria PA-C   VITAMIN D, CHOLECALCIFEROL, PO Take 2,000 Units by mouth daily   Yes Reported, Patient   warfarin ANTICOAGULANT (COUMADIN) 3 MG tablet Take 1 tablet (3mg) daily or as directed by INR nurse 10/25/19  Yes Venu Maria PA-C   blood glucose monitoring (ONE TOUCH ULTRA 2) meter device kit Use to test blood sugar 1 time daily or as directed.  Ok to substitute alternative if insurance prefers. 8/13/18   Venu Maria PA-C   blood glucose monitoring (ONE TOUCH ULTRASOFT) lancets USE TO TEST BLOOD SUGAR 2 TO 3 TIMES A DAY. 9/6/19   Venu Maria PA-C   ONETOUCH ULTRA test strip USE TO TEST BLOOD SUGAR TWICE A DAY OR AS DIRECTED 3/25/19   Venu Maria PA-C        Current medications:  Current Facility-Administered Medications Ordered in Epic   Medication Dose Route Frequency Last Rate Last Dose     acetaminophen (TYLENOL) tablet 650 mg  650 mg Oral Q4H PRN   650 mg at 11/07/19 1629     albuterol (PROVENTIL) neb solution 2.5 mg  2.5 mg Nebulization Q4H PRN         aspirin EC tablet 81 mg  81 mg Oral Daily   81 mg at 11/08/19 0933     calcium carbonate-vitamin D (OSCAL w/D) per tablet 1 tablet  1 tablet  Oral Daily   1 tablet at 19 0933     glucose gel 15-30 g  15-30 g Oral Q15 Min PRN        Or     dextrose 50 % injection 25-50 mL  25-50 mL Intravenous Q15 Min PRN        Or     glucagon injection 1 mg  1 mg Subcutaneous Q15 Min PRN         ferrous sulfate (FEROSUL) tablet 325 mg  325 mg Oral Daily with breakfast   325 mg at 19 0933     insulin aspart (NovoLOG) inj (RAPID ACTING)  1-7 Units Subcutaneous TID AC   1 Units at 19 1817     insulin aspart (NovoLOG) inj (RAPID ACTING)  1-5 Units Subcutaneous At Bedtime         ipratropium - albuterol 0.5 mg/2.5 mg/3 mL (DUONEB) neb solution 3 mL  3 mL Nebulization 4x daily   3 mL at 19 0752     levothyroxine (SYNTHROID/LEVOTHROID) tablet 75 mcg  75 mcg Oral Daily   75 mcg at 19 0932     lidocaine (LMX4) cream   Topical Q1H PRN         lidocaine 1 % 0.1-1 mL  0.1-1 mL Other Q1H PRN         magnesium citrate solution 148 mL  148 mL Oral Once PRN         melatonin tablet 1 mg  1 mg Oral At Bedtime PRN   1 mg at 19 0111     metoprolol tartrate (LOPRESSOR) tablet 25 mg  25 mg Oral BID   25 mg at 19     ondansetron (ZOFRAN-ODT) ODT tab 4 mg  4 mg Oral Q6H PRN        Or     ondansetron (ZOFRAN) injection 4 mg  4 mg Intravenous Q6H PRN         polyethylene glycol (MIRALAX/GLYCOLAX) Packet 17 g  17 g Oral Daily PRN         QUEtiapine (SEROquel) tablet 25 mg  25 mg Oral At Bedtime PRN         simvastatin (ZOCOR) tablet 20 mg  20 mg Oral At Bedtime   20 mg at 19     sodium chloride (PF) 0.9% PF flush 5-50 mL  5-50 mL Intracatheter Once PRN         Warfarin Therapy Reminder (Check START DATE - warfarin may be starting in the FUTURE)  1 each Does not apply Continuous PRN         No current Epic-ordered outpatient medications on file.             Physical Exam:   Vital signs were reviewed:  Temperatures:  Current - Temp: 98.4  F (36.9  C); Max - Temp  Av.7  F (37.1  C)  Min: 98  F (36.7  C)  Max: 99.8  F (37.7   C)  Respiration range: Resp  Av.7  Min: 18  Max: 24  Pulse range: Pulse  Av.7  Min: 87  Max: 102  Blood pressure range: Systolic (24hrs), Av , Min:92 , Max:143   ; Diastolic (24hrs), Av, Min:36, Max:85    Pulse oximetry range: SpO2  Av.4 %  Min: 89 %  Max: 99 %    Intake/Output Summary (Last 24 hours) at 2019 1118  Last data filed at 2019 0753  Gross per 24 hour   Intake 360 ml   Output 950 ml   Net -590 ml     193 lbs 11.2 oz  Body mass index is 31.74 kg/m .   Body surface area is 2.02 meters squared.    Constitutional: appears stated age, in no apparent distress, appears to be well nourished  Eyes: sclera anicteric, conjunctiva normal, no lesions on eyelids or lashes  ENT: normocephalic, without obvious abnormality, atraumatic, external ears without lesions,   Pulmonary: mild rales at the bases bilat  Cardiovascular: JVP elevated to the ear at 45' with (+)HJR, regular rate, regular rhythm  Gastrointestinal: abdominal exam benign, non-tender, no rigidity, no guarding  Neurologic: awake, alert, face symmetrical, moves all extremities  Skin: no abnormal rashes or lesions on limited exam, nails normal without discoloration or clubbing, no jaundice  Psychiatric: affect is normal, answers questions appropriately, oriented to self and place         Selected laboratory tests:   Laboratory test results personally reviewed:   [facility]  Recent Labs   Lab 19  0817 19  0548 19  0430 19  1411    138 139 138   POTASSIUM 4.2 4.1 4.2 4.2   CHLORIDE 103 104 105 103   CO2 30 30 23 27   ANIONGAP 3 4 11 8   * 116* 313* 226*   BUN 32* 38* 29 21   CR 1.23* 1.49* 1.44* 1.15*   GFRESTIMATED 40* 31* 33* 43*   GFRESTBLACK 46* 36* 38* 50*   VASHTI 9.2 8.9 9.3 9.0   MAG 2.1 2.4* 1.7 1.9   PHOS  --   --   --  4.7*   PROTTOTAL  --   --   --  6.5*   ALBUMIN  --   --   --  3.4   BILITOTAL  --   --   --  0.6   ALKPHOS  --   --   --  72   AST  --   --   --  55*   ALT  --   --   --  30      CBC  Recent Labs   Lab 19  0817 19  0548 19  2303 19  1411   WBC 7.2 12.3* 10.4 8.6   RBC 3.19* 3.11* 3.41* 3.84   HGB 9.8* 9.4* 10.5* 11.8   HCT 31.4* 30.7* 33.5* 37.9   MCV 98 99 98 99   MCH 30.7 30.2 30.8 30.7   MCHC 31.2* 30.6* 31.3* 31.1*   RDW 15.4* 15.6* 15.2* 15.1*    237 240 302     INR  Recent Labs   Lab 19  0817 19  0548 19  0430 19  1411   INR 2.61* 2.65* 1.90* 2.04*     Lab Results   Component Value Date    TROPI 0.865 () 2019    TROPI 0.931 () 2019    TROPI 0.839 () 2019    TROPONIN 0.01 2019     Recent Labs   Lab Test 07/15/19  0948 18  0913  06/30/15  0952   CHOL 109 105   < > 109   HDL 39* 39*   < > 36*   LDL 34 27   < > 25   TRIG 178* 195*   < > 240*   CHOLHDLRATIO  --   --   --  3.0    < > = values in this interval not displayed.     Lab Results   Component Value Date    A1C 7.5 2019    A1C 7.8 2019    A1C 7.2 10/15/2018    A1C 7.4 2018    A1C 7.4 2018     TSH   Date Value Ref Range Status   07/15/2019 3.08 0.40 - 4.00 mU/L Final            Selected Imaging and Additional Data:   Additional data personally reviewed:  Recent Results (from the past 4320 hour(s))   Echo Limited    Narrative    [id number]  [id number]  [id number]  [id number]^MISA^[facility]  Echocardiography Laboratory  [address] [place] [address]        Name: WACHTER, MARYALICE REBECCA  MRN: [id number]  : 1933  Study Date: 2019 06:49 PM  Age: 85 yrs  Gender: Female  Patient Location: [facility]  Reason For Study: Respiratory Failure  Ordering Physician: JEFFERSON BYNUM  Referring Physician: Venu Maria  Performed By: Juliette Malone RDCS     BSA: 1.9 m2  Height: 65 in  Weight: 193 lb  HR: 94  BP: 100/54 mmHg  _____________________________________________________________________________  __        Procedure  Limited Portable Echo Adult. [employer] (NDC #9517-2042) given  intravenously.  (Emergent exam, abbreviated study performed).  _____________________________________________________________________________  __        Interpretation Summary     LVEF 25% based on biplane 2D tracing.  Left ventricular systolic function is severely reduced.  Severe global hypokineiss of mid and distal segments with preserved  contractility at the base. These findings could be seen in patients with  stress induced cardiomyopathy vs Multivessel disease (less likely but not  excluded).  EF decreased compared to 7/18.  Findings discussed with Dr Serrato at 7.54pm today. The study was technically  difficult.  _____________________________________________________________________________  __        Left Ventricle  The left ventricle is normal in size. Left ventricular systolic function is  severely reduced. LVEF 25% based on biplane 2D tracing. Severe global  hypokineiss of mid and distal segments with preserved contractility at the  base. These findings could be seen in patients with stress induced  cardiomyopathy vs Multivessel disease.     Right Ventricle  The right ventricle is normal size. The right ventricular systolic function is  mild to moderately reduced. THe mid segments of RV free wall are severely  hypokinetic.     Mitral Valve  There is mild mitral annular calcification. There is mild to moderate (1-2+)  mitral regurgitation.     Tricuspid Valve  There is mild (1+) tricuspid regurgitation. The right ventricular systolic  pressure is approximated at 24.7 mmHg plus the right atrial pressure.        Aortic Valve  There is mild trileaflet aortic sclerosis. There is trace aortic  regurgitation. No aortic stenosis is present.     Pulmonic Valve  The pulmonic valve is not well visualized.     Vessels  The aortic root is normal size.     Pericardium  There is no pericardial effusion.     Rhythm  Sinus rhythm was noted.      _____________________________________________________________________________  __  MMode/2D Measurements & Calculations  asc Aorta Diam: 3.1 cm  TAPSE: 1.3 cm           Doppler Measurements & Calculations  TR max michael: 248.5 cm/sec  TR max P.7 mmHg           _____________________________________________________________________________  __           Report approved by: Frederick Quach 2019 07:55 PM

## 2019-11-08 NOTE — PLAN OF CARE
A&O x 4, assist x 1 w/gb & walker, mod carb diet,  & 144, LS dim, O2 96% 2L, doesn't wear home O2, tele SR w/inverted T waves, BP soft, pt having trouble falling asleep, pt requested to try a sleep aid other than seroquel, doctor paged and melatonin ordered, didn't help much, will continue to monitor.

## 2019-11-08 NOTE — PLAN OF CARE
BP's 115/51 and 95/44 other VSS on 2 LPM (no O2 baseline). Tele ST w/ inverted T waves. Denies pain and SOB. SHUKLA present. LS clear. BLE edema. Echo today, results pending.  and 150. Good appetite. Voiding in BR, no BM today. Assist x1 w/ walker and gait belt. PT and OT recommending discharge home with HH. Cardiology ordered amiodarone gtt, pt transferred to room 305 @1235 and report given to [facility] RN. Discharge 2-3 days.

## 2019-11-08 NOTE — PROGRESS NOTES
[facility]    Medicine Progress Note - Hospitalist Service       Date of Admission:  11/5/2019  Assessment & Plan   Maryalice Rebecca Wachter is a 85 year old female with a history of atrial fibrillation on warfarin, DM II, COPD who is admitted to the ICU for acute hypoxic respiratory failure and cardiogenic shock secondary to Takotsubo cardiomyopathy.      Patient had a sudden onset of shortness of breath at home.  Required BiPAP to maintain saturations in route and in the ED.  Had evidence of shock with hypotension and lactic acidosis.  BP improved after fluids.  Basic work-up was negative including labs and chest x-ray. Stat echocardiogram obtained in the ED showed severe global hypokinesis.  She  was admitted to the ICU and remained on BiPAP in the evening of admission.  Discussed with cardiology and critical care.  Patient has lactic acidosis and elevated troponin.   She was initially started on vasopressor therapy and continued on BiPAP.  Her vasopressor therapy was stopped on the a.m. of 11/6.  She was started on IV heparin for NSTEMI.  She was able to transfer out of the intensive care unit on 11/7.    1.  Cardiomyopathy.  Likely stress cardiomyopathy.  Cardiology following.  -Cardiology starting amiodarone today for rhythm control.  -Remains fluid overloaded.  -IV furosemide 20 mg once today.  -Continue metoprolol 25 mg twice a day.  -Cardiology is recommending cardiac stress test prior to discharge.    2.  Paroxysmal atrial for ablation.  -Cardiology starting amiodarone today.  -Continue metoprolol.  -Continue warfarin.    3.  Hypothyroidism.  Continue levothyroxine.    4.  Hypertension.  Initially hypotensive.  Hypotension has resolved.  -Continue metoprolol 25 mg twice a day.    5.  Hyperlipidemia.  Continue simvastatin.    6.  Diabetes mellitus.  Pioglitazone and glipizide currently on hold.  -Continue NovoLog sliding scale.  -Start Lantus 8 units at bedtime.    7.  Chronic kidney disease.   Creatinine likely at baseline today at 1.2.  -Avoid nephrotoxins as able.    8.  Deconditioning.  Continue to work with therapy.        Diet: Room Service  Consistent Carbohydrate Diet 0100-1164 Calories: Moderate Consistent CHO (4-6 CHO units/meal)    DVT Prophylaxis: Warfarin  Che Catheter: not present  Code Status: Full Code      Disposition Plan   Expected discharge: 2 - 3 days    Josh Roque DO  Hospitalist Service  [facility]    ______________________________________________________________________    Interval History   Short of breath.  Denies chest pain, fevers, chills, nausea, vomiting, or diarrhea.    Data reviewed today: I reviewed all medications, new labs and imaging results over the last 24 hours.     Physical Exam   Vital Signs: Temp: 99  F (37.2  C) Temp src: Oral BP: 114/48 Pulse: 98 Heart Rate: 71 Resp: 16 SpO2: 97 % O2 Device: Nasal cannula with humidification Oxygen Delivery: 1 LPM  Weight: 193 lbs 11.2 oz  Gen:  NAD, A&Ox3.  Eyes:  PERRL, sclera anicteric.  OP:  MMM, no lesions.  Neck:  Supple.  CV:  Regular, no murmurs.  Lung:  Crackles at bases b/l, normal effort.  Ab:  +BS, soft.  Skin:  Warm, dry to touch.  No rash.  Ext:  2+ pitting edema LE b/l.      Data   Recent Labs   Lab 11/08/19  0817 11/07/19  0548 11/06/19  1518 11/06/19  1159 11/06/19  0430 11/05/19  2303  11/05/19  1413 11/05/19  1411   WBC 7.2 12.3*  --   --   --  10.4  --   --  8.6   HGB 9.8* 9.4*  --   --   --  10.5*  --   --  11.8   MCV 98 99  --   --   --  98  --   --  99    237  --   --   --  240  --   --  302   INR 2.61* 2.65*  --   --  1.90*  --   --   --  2.04*    138  --   --  139  --   --   --  138   POTASSIUM 4.2 4.1  --   --  4.2  --   --   --  4.2   CHLORIDE 103 104  --   --  105  --   --   --  103   CO2 30 30  --   --  23  --   --   --  27   BUN 32* 38*  --   --  29  --   --   --  21   CR 1.23* 1.49*  --   --  1.44*  --   --   --  1.15*   ANIONGAP 3 4  --   --  11  --   --   --   8   VASHTI 9.2 8.9  --   --  9.3  --   --   --  9.0   * 116*  --   --  313*  --   --   --  226*   ALBUMIN  --   --   --   --   --   --   --   --  3.4   PROTTOTAL  --   --   --   --   --   --   --   --  6.5*   BILITOTAL  --   --   --   --   --   --   --   --  0.6   ALKPHOS  --   --   --   --   --   --   --   --  72   ALT  --   --   --   --   --   --   --   --  30   AST  --   --   --   --   --   --   --   --  55*   TROPI  --   --  0.865* 0.931* 0.839* 0.532*   < >  --  <0.015   TROPONIN  --   --   --   --   --   --   --  0.01  --     < > = values in this interval not displayed.

## 2019-11-08 NOTE — PROGRESS NOTES
Date: 11/07/2019  Admission Dx: copd   Pulmonary hx: copd  Home nebulizer/MDI: Elipta, Albuterol  Home oxygen: None  Acuity level (RCAT flow sheet): 3  Aerosol therapy initiated: duoneb QID  Pulmonary hygiene initiated: None  Volume expansion initiated: IS TID  Current oxygen requirements: 2 LPM NC  Current spo2: 97%  Re-evaluation date: 11/10/2019  Patient education: encourage IS use    Tamie Nuñez, RRT

## 2019-11-09 LAB
ANION GAP SERPL CALCULATED.3IONS-SCNC: 6 MMOL/L (ref 3–14)
BUN SERPL-MCNC: 30 MG/DL (ref 7–30)
CALCIUM SERPL-MCNC: 9.1 MG/DL (ref 8.5–10.1)
CHLORIDE SERPL-SCNC: 100 MMOL/L (ref 94–109)
CO2 SERPL-SCNC: 28 MMOL/L (ref 20–32)
CREAT SERPL-MCNC: 1.28 MG/DL (ref 0.52–1.04)
GFR SERPL CREATININE-BSD FRML MDRD: 38 ML/MIN/{1.73_M2}
GLUCOSE BLDC GLUCOMTR-MCNC: 158 MG/DL (ref 70–99)
GLUCOSE BLDC GLUCOMTR-MCNC: 160 MG/DL (ref 70–99)
GLUCOSE BLDC GLUCOMTR-MCNC: 217 MG/DL (ref 70–99)
GLUCOSE BLDC GLUCOMTR-MCNC: 241 MG/DL (ref 70–99)
GLUCOSE SERPL-MCNC: 176 MG/DL (ref 70–99)
INR PPP: 2.32 (ref 0.86–1.14)
POTASSIUM SERPL-SCNC: 4 MMOL/L (ref 3.4–5.3)
SODIUM SERPL-SCNC: 134 MMOL/L (ref 133–144)

## 2019-11-09 PROCEDURE — 00000146 ZZHCL STATISTIC GLUCOSE BY METER IP

## 2019-11-09 PROCEDURE — 25000132 ZZH RX MED GY IP 250 OP 250 PS 637: Performed by: SURGERY

## 2019-11-09 PROCEDURE — 12000000 ZZH R&B MED SURG/OB

## 2019-11-09 PROCEDURE — 99207 ZZC CDG-MDM COMPONENT: MEETS MODERATE - UP CODED: CPT | Performed by: INTERNAL MEDICINE

## 2019-11-09 PROCEDURE — 25000132 ZZH RX MED GY IP 250 OP 250 PS 637: Performed by: INTERNAL MEDICINE

## 2019-11-09 PROCEDURE — 25000128 H RX IP 250 OP 636: Performed by: INTERNAL MEDICINE

## 2019-11-09 PROCEDURE — 25800030 ZZH RX IP 258 OP 636: Performed by: INTERNAL MEDICINE

## 2019-11-09 PROCEDURE — 40000275 ZZH STATISTIC RCP TIME EA 10 MIN

## 2019-11-09 PROCEDURE — 94640 AIRWAY INHALATION TREATMENT: CPT | Mod: 76

## 2019-11-09 PROCEDURE — 40000556 ZZH STATISTIC PERIPHERAL IV START W US GUIDANCE

## 2019-11-09 PROCEDURE — 25000131 ZZH RX MED GY IP 250 OP 636 PS 637: Performed by: INTERNAL MEDICINE

## 2019-11-09 PROCEDURE — 25000125 ZZHC RX 250: Performed by: INTERNAL MEDICINE

## 2019-11-09 PROCEDURE — 25000125 ZZHC RX 250: Performed by: HOSPITALIST

## 2019-11-09 PROCEDURE — 94640 AIRWAY INHALATION TREATMENT: CPT

## 2019-11-09 PROCEDURE — 36415 COLL VENOUS BLD VENIPUNCTURE: CPT | Performed by: INTERNAL MEDICINE

## 2019-11-09 PROCEDURE — 80048 BASIC METABOLIC PNL TOTAL CA: CPT | Performed by: INTERNAL MEDICINE

## 2019-11-09 PROCEDURE — 85610 PROTHROMBIN TIME: CPT | Performed by: INTERNAL MEDICINE

## 2019-11-09 PROCEDURE — 99233 SBSQ HOSP IP/OBS HIGH 50: CPT | Performed by: INTERNAL MEDICINE

## 2019-11-09 RX ORDER — WARFARIN SODIUM 2 MG/1
2 TABLET ORAL
Status: COMPLETED | OUTPATIENT
Start: 2019-11-09 | End: 2019-11-09

## 2019-11-09 RX ORDER — FUROSEMIDE 10 MG/ML
20 INJECTION INTRAMUSCULAR; INTRAVENOUS EVERY 12 HOURS
Status: COMPLETED | OUTPATIENT
Start: 2019-11-09 | End: 2019-11-09

## 2019-11-09 RX ORDER — HYDROXYZINE HYDROCHLORIDE 10 MG/1
10 TABLET, FILM COATED ORAL EVERY 6 HOURS PRN
Status: DISCONTINUED | OUTPATIENT
Start: 2019-11-09 | End: 2019-11-13 | Stop reason: HOSPADM

## 2019-11-09 RX ADMIN — ALBUTEROL SULFATE 2.5 MG: 2.5 SOLUTION RESPIRATORY (INHALATION) at 02:19

## 2019-11-09 RX ADMIN — AMIODARONE HYDROCHLORIDE 0.5 MG/MIN: 50 INJECTION, SOLUTION INTRAVENOUS at 01:53

## 2019-11-09 RX ADMIN — AMIODARONE HYDROCHLORIDE 0.5 MG/MIN: 50 INJECTION, SOLUTION INTRAVENOUS at 13:08

## 2019-11-09 RX ADMIN — FERROUS SULFATE TAB 325 MG (65 MG ELEMENTAL FE) 325 MG: 325 (65 FE) TAB at 07:45

## 2019-11-09 RX ADMIN — METOPROLOL TARTRATE 25 MG: 25 TABLET, FILM COATED ORAL at 20:31

## 2019-11-09 RX ADMIN — INSULIN GLARGINE 8 UNITS: 100 INJECTION, SOLUTION SUBCUTANEOUS at 22:32

## 2019-11-09 RX ADMIN — METOPROLOL TARTRATE 25 MG: 25 TABLET, FILM COATED ORAL at 07:46

## 2019-11-09 RX ADMIN — AMIODARONE HYDROCHLORIDE 200 MG: 200 TABLET ORAL at 20:31

## 2019-11-09 RX ADMIN — ASPIRIN 81 MG: 81 TABLET ORAL at 07:45

## 2019-11-09 RX ADMIN — AMIODARONE HYDROCHLORIDE 200 MG: 200 TABLET ORAL at 07:45

## 2019-11-09 RX ADMIN — IPRATROPIUM BROMIDE AND ALBUTEROL SULFATE 3 ML: .5; 3 SOLUTION RESPIRATORY (INHALATION) at 15:37

## 2019-11-09 RX ADMIN — OYSTER SHELL CALCIUM WITH VITAMIN D 1 TABLET: 500; 200 TABLET, FILM COATED ORAL at 07:46

## 2019-11-09 RX ADMIN — LEVOTHYROXINE SODIUM 75 MCG: 75 TABLET ORAL at 07:45

## 2019-11-09 RX ADMIN — SIMVASTATIN 20 MG: 20 TABLET, FILM COATED ORAL at 22:32

## 2019-11-09 RX ADMIN — POLYETHYLENE GLYCOL 3350 17 G: 17 POWDER, FOR SOLUTION ORAL at 12:30

## 2019-11-09 RX ADMIN — FUROSEMIDE 20 MG: 10 INJECTION, SOLUTION INTRAVENOUS at 12:14

## 2019-11-09 RX ADMIN — IPRATROPIUM BROMIDE AND ALBUTEROL SULFATE 3 ML: .5; 3 SOLUTION RESPIRATORY (INHALATION) at 11:25

## 2019-11-09 RX ADMIN — FUROSEMIDE 20 MG: 10 INJECTION, SOLUTION INTRAVENOUS at 22:32

## 2019-11-09 RX ADMIN — IPRATROPIUM BROMIDE AND ALBUTEROL SULFATE 3 ML: .5; 3 SOLUTION RESPIRATORY (INHALATION) at 08:02

## 2019-11-09 RX ADMIN — ACETAMINOPHEN 650 MG: 325 TABLET, FILM COATED ORAL at 20:31

## 2019-11-09 RX ADMIN — WARFARIN SODIUM 2 MG: 2 TABLET ORAL at 17:37

## 2019-11-09 RX ADMIN — HYDROXYZINE HYDROCHLORIDE 10 MG: 10 TABLET ORAL at 22:44

## 2019-11-09 ASSESSMENT — ACTIVITIES OF DAILY LIVING (ADL)
ADLS_ACUITY_SCORE: 14

## 2019-11-09 NOTE — PROGRESS NOTES
[facility]  Hospitalist Progress Note  Bird Mcknight MD 11/09/2019    Reason for Stay (Diagnosis): cardiomyopathy         Assessment and Plan:      Summary of Stay: Maryalice Rebecca Wachter is a 85 year old female with a history of atrial fibrillation on warfarin, DM II, COPD who was admitted to the ICU for acute hypoxic respiratory failure and cardiogenic shock secondary to cardiomyopathy.      Patient had a sudden onset of shortness of breath at home.  Required BiPAP to maintain saturations in route and in the ED.  Had evidence of shock with hypotension and lactic acidosis.  BP improved after fluids.  Basic work-up was negative including labs and chest x-ray. Stat echocardiogram obtained in the ED showed severe global hypokinesis.  She  was admitted to the ICU and remained on BiPAP in the evening of admission.  She was initially started on vasopressor therapy and continued on BiPAP.  Her vasopressor therapy was stopped on the a.m. of 11/6.  She was started on IV heparin for NSTEMI.  She was able to transfer out of the intensive care unit on 11/7.    It is suspected that her CM was due to either stress CM vs tachycardia-mediated due to underlying afib.    TTE done 11/5 showed EF 25%; repeat TTE done 11/8 showed EF improved to near 40%  Cards following and recommend NM stress test prior to discharge.  Pt remains on amiodarone gtt (load) for treatment and rate control of afib     1.  Cardiomyopathy.  Likely stress cardiomyopathy vs tachycardia mediated.  Cardiology following.  -Cardiology started amiodarone for rhythm control.  -Remains fluid overloaded.  -IV furosemide 20 mg q12h x 2 doses today.  -Continue metoprolol 25 mg twice a day.  -Cardiology is recommending NM cardiac stress test prior to discharge.  Can be done on Monday     2.  Paroxysmal atrial for ablation.  -on amiodarone.  -Continue metoprolol.  -Continue warfarin.     3.  Hypothyroidism.  Continue levothyroxine.     4.   "Hypertension.  Initially hypotensive.  Hypotension has resolved.  -Continue metoprolol 25 mg twice a day.     5.  Hyperlipidemia.  Continue simvastatin.     6.  Diabetes mellitus.  Pioglitazone and glipizide currently on hold.  -Continue NovoLog sliding scale.  -continue Lantus 8 units at bedtime.     7.  Chronic kidney disease.  Creatinine likely at baseline today at 1.2.  -Avoid nephrotoxins as able.     8.  Deconditioning.  Continue to work with therapy.           Diet: Room Service  Consistent Carbohydrate Diet 0176-9157 Calories: Moderate Consistent CHO (4-6 CHO units/meal)    DVT Prophylaxis: Warfarin  Che Catheter: not present  Code Status: Full Code    Dispo:  Anticipate discharge Monday or Tuesday after stress test obtained and breathing improved        Interval History (Subjective):      SOB much improved from admit.  HR better controlled with addition of amiodarone                  Physical Exam:      Last Vital Signs:  /52 (BP Location: Left arm)   Pulse 68   Temp (!) 48.9  F (9.4  C) (Axillary)   Resp 18   Ht 1.664 m (5' 5.5\")   Wt 87.9 kg (193 lb 11.2 oz)   SpO2 95%   BMI 31.74 kg/m        Intake/Output Summary (Last 24 hours) at 11/9/2019 1247  Last data filed at 11/9/2019 1213  Gross per 24 hour   Intake 1043 ml   Output 925 ml   Net 118 ml       Constitutional: Awake, alert, cooperative, no apparent distress   Respiratory: Clear to auscultation bilaterally, no crackles or wheezing   Cardiovascular: irreg, normal S1 and S2, and no murmur noted   Abdomen: Normal bowel sounds, soft, non-distended, non-tender   Skin: No rashes, no cyanosis, dry to touch   Neuro: Alert and oriented x3, no weakness, numbness, memory loss   Extremities: No edema, normal range of motion   Other(s):        All other systems: Negative          Medications:      All current medications were reviewed with changes reflected in problem list.         Data:      All new lab and imaging data was reviewed. "   Labs:  Recent Labs   Lab 11/08/19  0817   WBC 7.2   HGB 9.8*   HCT 31.4*   MCV 98         Imaging:   No results found for this or any previous visit (from the past 24 hour(s)).

## 2019-11-09 NOTE — PLAN OF CARE
VSS on 1 LPM NC sating at 94%, pt desats to 88% with ambulation, A/OX4, SBA w/ walker and GB, denies pain, LS dim w/ exp wheezing, BLE edema +2/+1, SR w/ inverted T's on tele, HR has ranged from 65-80 bpm, amio gtt running at 30 mL/hr per protocol, pt states melatonin gave her hallucinations last night, MD paged, benadryl ordered and given last evening, discharge 2-3 days.

## 2019-11-09 NOTE — PLAN OF CARE
VSS on 1 LPM NC sating at 94%, pt desats to 88% with ambulation, A/OX4, SBA w/ walker and GB, denies pain, LS dim w/ exp wheezing, BLE edema +2/+1, SR w/ inverted T's on tele, amio gtt running at 30 mL/hr per protocol, pt states melatonin gave her hallucinations last night, MD paged, benadryl ordered and given, discharge 2-3 days.

## 2019-11-10 LAB
ANION GAP SERPL CALCULATED.3IONS-SCNC: 5 MMOL/L (ref 3–14)
BUN SERPL-MCNC: 28 MG/DL (ref 7–30)
CALCIUM SERPL-MCNC: 9.1 MG/DL (ref 8.5–10.1)
CHLORIDE SERPL-SCNC: 102 MMOL/L (ref 94–109)
CO2 SERPL-SCNC: 32 MMOL/L (ref 20–32)
CREAT SERPL-MCNC: 1.38 MG/DL (ref 0.52–1.04)
GFR SERPL CREATININE-BSD FRML MDRD: 35 ML/MIN/{1.73_M2}
GLUCOSE BLDC GLUCOMTR-MCNC: 159 MG/DL (ref 70–99)
GLUCOSE BLDC GLUCOMTR-MCNC: 162 MG/DL (ref 70–99)
GLUCOSE BLDC GLUCOMTR-MCNC: 176 MG/DL (ref 70–99)
GLUCOSE BLDC GLUCOMTR-MCNC: 196 MG/DL (ref 70–99)
GLUCOSE BLDC GLUCOMTR-MCNC: 203 MG/DL (ref 70–99)
GLUCOSE SERPL-MCNC: 169 MG/DL (ref 70–99)
INR PPP: 2.2 (ref 0.86–1.14)
POTASSIUM SERPL-SCNC: 4.2 MMOL/L (ref 3.4–5.3)
SODIUM SERPL-SCNC: 139 MMOL/L (ref 133–144)

## 2019-11-10 PROCEDURE — 25000125 ZZHC RX 250: Performed by: INTERNAL MEDICINE

## 2019-11-10 PROCEDURE — 85610 PROTHROMBIN TIME: CPT | Performed by: INTERNAL MEDICINE

## 2019-11-10 PROCEDURE — 00000146 ZZHCL STATISTIC GLUCOSE BY METER IP

## 2019-11-10 PROCEDURE — 25000132 ZZH RX MED GY IP 250 OP 250 PS 637: Performed by: INTERNAL MEDICINE

## 2019-11-10 PROCEDURE — 80048 BASIC METABOLIC PNL TOTAL CA: CPT | Performed by: INTERNAL MEDICINE

## 2019-11-10 PROCEDURE — 99233 SBSQ HOSP IP/OBS HIGH 50: CPT | Performed by: INTERNAL MEDICINE

## 2019-11-10 PROCEDURE — 94640 AIRWAY INHALATION TREATMENT: CPT

## 2019-11-10 PROCEDURE — 40000274 ZZH STATISTIC RCP CONSULT EA 30 MIN

## 2019-11-10 PROCEDURE — 12000000 ZZH R&B MED SURG/OB

## 2019-11-10 PROCEDURE — 36415 COLL VENOUS BLD VENIPUNCTURE: CPT | Performed by: INTERNAL MEDICINE

## 2019-11-10 PROCEDURE — 25000131 ZZH RX MED GY IP 250 OP 636 PS 637: Performed by: INTERNAL MEDICINE

## 2019-11-10 PROCEDURE — 40000275 ZZH STATISTIC RCP TIME EA 10 MIN

## 2019-11-10 PROCEDURE — 25000128 H RX IP 250 OP 636: Performed by: INTERNAL MEDICINE

## 2019-11-10 PROCEDURE — 25000132 ZZH RX MED GY IP 250 OP 250 PS 637: Performed by: SURGERY

## 2019-11-10 PROCEDURE — 25000125 ZZHC RX 250: Performed by: HOSPITALIST

## 2019-11-10 PROCEDURE — 94640 AIRWAY INHALATION TREATMENT: CPT | Mod: 76

## 2019-11-10 RX ORDER — SPIRONOLACTONE 25 MG
12.5 TABLET ORAL DAILY
Status: DISCONTINUED | OUTPATIENT
Start: 2019-11-10 | End: 2019-11-13 | Stop reason: HOSPADM

## 2019-11-10 RX ORDER — FUROSEMIDE 10 MG/ML
20 INJECTION INTRAMUSCULAR; INTRAVENOUS EVERY 8 HOURS
Status: COMPLETED | OUTPATIENT
Start: 2019-11-10 | End: 2019-11-11

## 2019-11-10 RX ORDER — AMIODARONE HYDROCHLORIDE 200 MG/1
200 TABLET ORAL 2 TIMES DAILY
Status: DISCONTINUED | OUTPATIENT
Start: 2019-11-10 | End: 2019-11-10

## 2019-11-10 RX ORDER — FUROSEMIDE 10 MG/ML
20 INJECTION INTRAMUSCULAR; INTRAVENOUS EVERY 12 HOURS
Status: DISCONTINUED | OUTPATIENT
Start: 2019-11-10 | End: 2019-11-10

## 2019-11-10 RX ORDER — LORAZEPAM 0.5 MG/1
0.5 TABLET ORAL EVERY 8 HOURS PRN
Status: DISCONTINUED | OUTPATIENT
Start: 2019-11-10 | End: 2019-11-13 | Stop reason: HOSPADM

## 2019-11-10 RX ORDER — WARFARIN SODIUM 2.5 MG/1
2.5 TABLET ORAL
Status: COMPLETED | OUTPATIENT
Start: 2019-11-10 | End: 2019-11-10

## 2019-11-10 RX ORDER — TEMAZEPAM 7.5 MG/1
7.5 CAPSULE ORAL
Status: DISCONTINUED | OUTPATIENT
Start: 2019-11-10 | End: 2019-11-13 | Stop reason: HOSPADM

## 2019-11-10 RX ADMIN — ASPIRIN 81 MG: 81 TABLET ORAL at 07:52

## 2019-11-10 RX ADMIN — ACETAMINOPHEN 650 MG: 325 TABLET, FILM COATED ORAL at 15:13

## 2019-11-10 RX ADMIN — IPRATROPIUM BROMIDE AND ALBUTEROL SULFATE 3 ML: .5; 3 SOLUTION RESPIRATORY (INHALATION) at 19:33

## 2019-11-10 RX ADMIN — Medication 12.5 MG: at 11:28

## 2019-11-10 RX ADMIN — INSULIN GLARGINE 8 UNITS: 100 INJECTION, SOLUTION SUBCUTANEOUS at 22:27

## 2019-11-10 RX ADMIN — LORAZEPAM 0.5 MG: 0.5 TABLET ORAL at 22:22

## 2019-11-10 RX ADMIN — METOPROLOL TARTRATE 25 MG: 25 TABLET, FILM COATED ORAL at 22:21

## 2019-11-10 RX ADMIN — LEVOTHYROXINE SODIUM 75 MCG: 75 TABLET ORAL at 07:52

## 2019-11-10 RX ADMIN — OYSTER SHELL CALCIUM WITH VITAMIN D 1 TABLET: 500; 200 TABLET, FILM COATED ORAL at 11:28

## 2019-11-10 RX ADMIN — AMIODARONE HYDROCHLORIDE 200 MG: 200 TABLET ORAL at 07:53

## 2019-11-10 RX ADMIN — FERROUS SULFATE TAB 325 MG (65 MG ELEMENTAL FE) 325 MG: 325 (65 FE) TAB at 07:53

## 2019-11-10 RX ADMIN — FUROSEMIDE 20 MG: 10 INJECTION, SOLUTION INTRAVENOUS at 12:47

## 2019-11-10 RX ADMIN — IPRATROPIUM BROMIDE AND ALBUTEROL SULFATE 3 ML: .5; 3 SOLUTION RESPIRATORY (INHALATION) at 11:41

## 2019-11-10 RX ADMIN — MAGNESIUM CITRATE 148 ML: 1.75 LIQUID ORAL at 20:47

## 2019-11-10 RX ADMIN — FUROSEMIDE 20 MG: 10 INJECTION, SOLUTION INTRAVENOUS at 20:01

## 2019-11-10 RX ADMIN — METOPROLOL TARTRATE 25 MG: 25 TABLET, FILM COATED ORAL at 07:53

## 2019-11-10 RX ADMIN — ALBUTEROL SULFATE 2.5 MG: 2.5 SOLUTION RESPIRATORY (INHALATION) at 06:07

## 2019-11-10 RX ADMIN — IPRATROPIUM BROMIDE AND ALBUTEROL SULFATE 3 ML: .5; 3 SOLUTION RESPIRATORY (INHALATION) at 15:51

## 2019-11-10 RX ADMIN — WARFARIN SODIUM 2.5 MG: 2.5 TABLET ORAL at 18:19

## 2019-11-10 RX ADMIN — SIMVASTATIN 20 MG: 20 TABLET, FILM COATED ORAL at 22:22

## 2019-11-10 RX ADMIN — POLYETHYLENE GLYCOL 3350 17 G: 17 POWDER, FOR SOLUTION ORAL at 15:09

## 2019-11-10 RX ADMIN — AMIODARONE HYDROCHLORIDE 200 MG: 200 TABLET ORAL at 20:01

## 2019-11-10 ASSESSMENT — ACTIVITIES OF DAILY LIVING (ADL)
ADLS_ACUITY_SCORE: 14

## 2019-11-10 ASSESSMENT — MIFFLIN-ST. JEOR: SCORE: 1326.53

## 2019-11-10 NOTE — PROGRESS NOTES
"                                                            [facility]    Date:11/10/2019  Admission Dx: Cardiomyopathy  Pulmonary History: COPD  Home Nebulizer/MDI Use: trelegy, albuterol  Home Oxygen: N/A    Acuity Level (RCAT flow sheet): 3    Aerosol Therapy initiated: Duoneb QID, Albuterol prn    Pulmonary Hygiene initiated: None    Volume Expansion initiated:IS  Current Oxygen Requirements: 1LPM Nc    Current SpO2: 95%    Re-evaluation date: 17 November     Patient Education: Patient educated on benefits and possible side effects from bronchodilators.    See \"RT Assessments\" flow sheet for patient assessment scoring and Acuity Level Details.           "

## 2019-11-10 NOTE — PLAN OF CARE
OT: Attempted session. Pt reported that she is being diuresed and has had  frequent bathroom trips, and pt requesting therapist to return later as able.

## 2019-11-10 NOTE — PLAN OF CARE
VS- HR tachycardic this evening 's. Attempting to wean oxygen, SpO2 89% on RA, 94% on 1L NC with humidification. Tele SR with slight ST depression and inverted T's. Amiodarone drip stopped this afternoon after 24 hours of infusion. Cardiologist Dr. Laws verbalized ok to discontinue amiodarone drip via telephone. Started oral amiodarone yesterday evening. Plan for nuclear stress test Monday. , 217, 158. Transfers SBA with gait belt and walker. Up to chair for most of day.  present this afternoon.

## 2019-11-10 NOTE — PLAN OF CARE
A&O x 4, assist x 1 w/gb & walker, mod carb diet, no caffeine,  & 176, LS diminished, desats with ambulation, takes several minutes to recover after ambulation, doctor paged and atarax ordered for work of breathing, scheduled IV lasix given, O2 sats 79% RA at beginning of shift, 1L O2 applied and sats increased to 93-95% when lying in bed, pt requested neb treatment at 0600, tele SR w/inverted T waves, fan ordered, pt states she felt flushed in her face, pt requested Tylenol for sleep, plan is for nuclear stress test on Monday, plan is for discharge Monday or Tuesday if breathing improves, will continue to monitor.

## 2019-11-10 NOTE — PROGRESS NOTES
"Cardiology Progress Note   Date of service: 11/10/19       Assessment and Plan:     1. New cardiomyopathy in the setting of rapid atrial fibrillation    Reviewing ECGs, it does appear that her initial rhythm may have been atypical flutter or PAT, the ECG of 2019 at 2118 shows break of the tachycardia with single sinus beat at the end of the tracing.  Currently in sinus rhythm from telemetry reviewed after IV amiodarone.    P.o. amiodarone loading and continue anticoagulation.    Nuclear stress test on Monday to rule out ischemic contribution, previous nuclear stress test 2017 unremarkable and only modest troponin elevation on presentation suggests nonischemic contribution.      2. Dyspnea and systolic congestive heart failure    Continue gentle diuresis, patient still dyspneic and volume overloaded.    Caution with renal dysfunction.  We will try a very small dose of spironolactone 12.5 mg daily but watch renal function and potassium level.                   Interval History:   Breathing okay currently, had significant PND/orthopnea last night with difficulty sleeping.  2 days of IV furosemide completed.              Review of Systems:   As per subjective, otherwise 5 systems reviewed and negative.           Physical Exam:     Vitals were reviewed  Blood pressure 112/47, pulse 68, temperature 98.4  F (36.9  C), temperature source Oral, resp. rate 26, height 1.664 m (5' 5.5\"), weight 87.3 kg (192 lb 6.4 oz), SpO2 96 %, not currently breastfeeding.  Temperatures:  Current - Temp: 98.4  F (36.9  C); Max - Temp  Av  F (37.2  C)  Min: 97.8  F (36.6  C)  Max: 100  F (37.8  C)  Respiration range: Resp  Av.3  Min: 18  Max: 28  Pulse range: No data recorded  Blood pressure range: Systolic (24hrs), Av , Min:112 , Max:145   ; Diastolic (24hrs), Av, Min:45, Max:69    Pulse oximetry range: SpO2  Av.3 %  Min: 79 %  Max: 97 %    Intake/Output Summary (Last 24 hours) at 11/10/2019 0958  Last data filed " at 11/10/2019 0920  Gross per 24 hour   Intake 1403 ml   Output 2150 ml   Net -747 ml       Constitutional:   awake, alert, cooperative, no apparent distress, and appears stated age     Eyes:   Lids and lashes normal, pupils equal, round and reactive to light, extra ocular muscles intact, sclera clear, conjunctiva normal     Neck:   supple, symmetrical, trachea midline     Back:   symmetric     Lungs:   Coarse breath sounds with bibasilar crackles     Cardiovascular:   Distant heart sounds, regular with ectopy     Abdomen:   benign     Musculoskeletal:   motor strength is 5 out of 5 all extremities bilaterally     Neurologic:   Grossly nonfocal     Skin:   normal skin color, texture, turgor            Medications:     Current Facility-Administered Medications Ordered in Epic   Medication Dose Route Frequency Last Rate Last Dose     acetaminophen (TYLENOL) tablet 650 mg  650 mg Oral Q4H PRN   650 mg at 11/09/19 2031     albuterol (PROVENTIL) neb solution 2.5 mg  2.5 mg Nebulization Q4H PRN   2.5 mg at 11/10/19 0607     amiodarone (NEXTERONE) 250 mg in D5W 250 mL infusion  0.5 mg/min Intravenous Continuous   Stopped at 11/09/19 1650     amiodarone (PACERONE/CODARONE) tablet 200 mg  200 mg Oral BID         amiodarone (PACERONE/CODARONE) tablet 200 mg  200 mg Oral BID   200 mg at 11/10/19 0753     aspirin EC tablet 81 mg  81 mg Oral Daily   81 mg at 11/10/19 0752     calcium carbonate-vitamin D (OSCAL w/D) per tablet 1 tablet  1 tablet Oral Daily   1 tablet at 11/09/19 0746     glucose gel 15-30 g  15-30 g Oral Q15 Min PRN        Or     dextrose 50 % injection 25-50 mL  25-50 mL Intravenous Q15 Min PRN        Or     glucagon injection 1 mg  1 mg Subcutaneous Q15 Min PRN         ferrous sulfate (FEROSUL) tablet 325 mg  325 mg Oral Daily with breakfast   325 mg at 11/10/19 0753     hydrOXYzine (ATARAX) tablet 10 mg  10 mg Oral Q6H PRN   10 mg at 11/09/19 2244     insulin aspart (NovoLOG) inj (RAPID ACTING)  1-7 Units  Subcutaneous TID AC   1 Units at 11/10/19 0759     insulin aspart (NovoLOG) inj (RAPID ACTING)  1-5 Units Subcutaneous At Bedtime   1 Units at 11/09/19 2232     insulin glargine (LANTUS) injection 8 Units  8 Units Subcutaneous At Bedtime   8 Units at 11/09/19 2232     ipratropium - albuterol 0.5 mg/2.5 mg/3 mL (DUONEB) neb solution 3 mL  3 mL Nebulization 4x daily   3 mL at 11/09/19 1537     levothyroxine (SYNTHROID/LEVOTHROID) tablet 75 mcg  75 mcg Oral Daily   75 mcg at 11/10/19 0752     lidocaine (LMX4) cream   Topical Q1H PRN         lidocaine 1 % 0.1-1 mL  0.1-1 mL Other Q1H PRN         magnesium citrate solution 148 mL  148 mL Oral Once PRN         metoprolol tartrate (LOPRESSOR) tablet 25 mg  25 mg Oral BID   25 mg at 11/10/19 0753     ondansetron (ZOFRAN-ODT) ODT tab 4 mg  4 mg Oral Q6H PRN        Or     ondansetron (ZOFRAN) injection 4 mg  4 mg Intravenous Q6H PRN         polyethylene glycol (MIRALAX/GLYCOLAX) Packet 17 g  17 g Oral Daily PRN   17 g at 11/09/19 1230     QUEtiapine (SEROquel) tablet 25 mg  25 mg Oral At Bedtime PRN         simvastatin (ZOCOR) tablet 20 mg  20 mg Oral At Bedtime   20 mg at 11/09/19 2232     Warfarin Therapy Reminder (Check START DATE - warfarin may be starting in the FUTURE)  1 each Does not apply Continuous PRN         No current [facility]-ordered outpatient medications on file.                Data:   All laboratory data reviewed  Results for orders placed or performed during the hospital encounter of 11/05/19 (from the past 24 hour(s))   Glucose by meter   Result Value Ref Range    Glucose 217 (H) 70 - 99 mg/dL   Glucose by meter   Result Value Ref Range    Glucose 158 (H) 70 - 99 mg/dL   Glucose by meter   Result Value Ref Range    Glucose 241 (H) 70 - 99 mg/dL   Glucose by meter   Result Value Ref Range    Glucose 176 (H) 70 - 99 mg/dL   INR   Result Value Ref Range    INR 2.20 (H) 0.86 - 1.14   Basic metabolic panel   Result Value Ref Range    Sodium 139 133 - 144 mmol/L     Potassium 4.2 3.4 - 5.3 mmol/L    Chloride 102 94 - 109 mmol/L    Carbon Dioxide 32 20 - 32 mmol/L    Anion Gap 5 3 - 14 mmol/L    Glucose 169 (H) 70 - 99 mg/dL    Urea Nitrogen 28 7 - 30 mg/dL    Creatinine 1.38 (H) 0.52 - 1.04 mg/dL    GFR Estimate 35 (L) >60 mL/min/[1.73_m2]    GFR Estimate If Black 40 (L) >60 mL/min/[1.73_m2]    Calcium 9.1 8.5 - 10.1 mg/dL   Glucose by meter   Result Value Ref Range    Glucose 159 (H) 70 - 99 mg/dL       All laboratory data reviewed  Lab Results   Component Value Date    CHOL 109 07/15/2019     Lab Results   Component Value Date    HDL 39 07/15/2019     Lab Results   Component Value Date    LDL 34 07/15/2019     Lab Results   Component Value Date    TRIG 178 07/15/2019     Lab Results   Component Value Date    CHOLHDLRATIO 3.0 06/30/2015     TSH   Date Value Ref Range Status   07/15/2019 3.08 0.40 - 4.00 mU/L Final     Last Basic Metabolic Panel:  Lab Results   Component Value Date     11/10/2019      Lab Results   Component Value Date    POTASSIUM 4.2 11/10/2019     Lab Results   Component Value Date    CHLORIDE 102 11/10/2019     Lab Results   Component Value Date    VASHTI 9.1 11/10/2019     Lab Results   Component Value Date    CO2 32 11/10/2019     Lab Results   Component Value Date    BUN 28 11/10/2019     Lab Results   Component Value Date    CR 1.38 11/10/2019     Lab Results   Component Value Date     11/10/2019     Lab Results   Component Value Date    WBC 7.2 11/08/2019     Lab Results   Component Value Date    RBC 3.19 11/08/2019     Lab Results   Component Value Date    HGB 9.8 11/08/2019     Lab Results   Component Value Date    HCT 31.4 11/08/2019     Lab Results   Component Value Date    MCV 98 11/08/2019     Lab Results   Component Value Date    MCH 30.7 11/08/2019     Lab Results   Component Value Date    MCHC 31.2 11/08/2019     Lab Results   Component Value Date    RDW 15.4 11/08/2019     Lab Results   Component Value Date     11/08/2019

## 2019-11-10 NOTE — PROGRESS NOTES
"  [facility]  Hospitalist Progress Note  Bird Mcknight MD 11/10/2019    Reason for Stay (Diagnosis): SOB         Assessment and Plan:      Summary of Stay: Maryalice Rebecca Wachter is a 85 year old female with a history of atrial fibrillation on warfarin, DM II, COPD who was admitted to the ICU for acute hypoxic respiratory failure and cardiogenic shock secondary to cardiomyopathy.      Patient had a sudden onset of shortness of breath at home.  Required BiPAP to maintain saturations in route and in the ED.  Had evidence of shock with hypotension and lactic acidosis.  BP improved after fluids.  Basic work-up was negative including labs and chest x-ray. Stat echocardiogram obtained in the ED showed severe global hypokinesis.  She  was admitted to the ICU and remained on BiPAP in the evening of admission.  She was initially started on vasopressor therapy and continued on BiPAP.  Her vasopressor therapy was stopped on the a.m. of 11/6.  She was started on IV heparin for NSTEMI.  She was able to transfer out of the intensive care unit on 11/7.    It is suspected that her CM was due to either stress CM vs tachycardia-mediated due to underlying afib.    TTE done 11/5 showed EF 25%; repeat TTE done 11/8 showed EF improved to near 40%  Cards following and recommend NM stress test prior to discharge.  Pt remains on amiodarone gtt (load) for treatment and rate control of afib    Today pt states she had a \"rough night\" due to orthopnea symptoms.  She requested to sleep in a chair as she felt SOB lying flat in bed.       1.  Cardiomyopathy.  Likely stress cardiomyopathy vs tachycardia mediated.  Cardiology following.  -Cardiology started amiodarone for rhythm control.  -Remains fluid overloaded.  -IV furosemide 20 mg q8h x 3 doses today.  Monitor creatinine daily while diuresing; creatinine has climbed slightly the past 2 days  -Continue metoprolol 25 mg twice a day.  -Cardiology is recommending NM cardiac " "stress test prior to discharge.  Ordered for Monday  - recommended low salt diet and daily weight check on discharge from hospital  - recommend repeat TTE in several weeks after discharge to reassess LV ftn     2.  Paroxysmal atrial for ablation.  -on amiodarone.  -Continue metoprolol.  -Continue warfarin.     3.  Hypothyroidism.  Continue levothyroxine.     4.  Hypertension.  Initially hypotensive.  Hypotension has resolved.  -Continue metoprolol 25 mg twice a day.     5.  Hyperlipidemia.  Continue simvastatin.     6.  Diabetes mellitus.  Pioglitazone and glipizide currently on hold.  -Continue NovoLog sliding scale.  -continue Lantus 8 units at bedtime.     7.  Chronic kidney disease.  Creatinine likely at baseline today at 1.2.  -Avoid nephrotoxins as able.     8.  Deconditioning.  Continue to work with therapy.      I spoke with and updated daughter and son in law at bedside today; multiple questions answered.       Diet: Room Service  Consistent Carbohydrate Diet 6975-9312 Calories: Moderate Consistent CHO (4-6 CHO units/meal)    DVT Prophylaxis: Warfarin  Che Catheter: not present  Code Status: Full Code    Dispo:  Anticipate discharge Monday or Tuesday after stress test obtained and breathing improved        Interval History (Subjective):      Reports orthopnea sx when trying to lie flat and sleep last night.  Sx improved when she slept in bedside chair.  She also says her cheeks felt warm/flushed last night.  Had low grade fever.  No chest pain.                    Physical Exam:      Last Vital Signs:  /52 (BP Location: Left arm)   Pulse 68   Temp 98.1  F (36.7  C) (Oral)   Resp 24   Ht 1.664 m (5' 5.5\")   Wt 87.3 kg (192 lb 6.4 oz)   SpO2 91%   BMI 31.53 kg/m        Intake/Output Summary (Last 24 hours) at 11/10/2019 1324  Last data filed at 11/10/2019 0920  Gross per 24 hour   Intake 300 ml   Output 2025 ml   Net -1725 ml       Constitutional: Awake, alert, cooperative, no apparent distress "   Respiratory: Clear to auscultation bilaterally, no crackles or wheezing   Cardiovascular: Regular rate and rhythm, normal S1 and S2, and no murmur noted   Abdomen: Normal bowel sounds, soft, non-distended, non-tender   Skin: No rashes, no cyanosis, dry to touch   Neuro: Alert and oriented x3, no weakness, numbness, memory loss   Extremities: 2+ pitting edema, normal range of motion   Other(s):        All other systems: Negative          Medications:      All current medications were reviewed with changes reflected in problem list.         Data:      All new lab and imaging data was reviewed.   Labs:  Recent Labs   Lab 11/08/19  0817   WBC 7.2   HGB 9.8*   HCT 31.4*   MCV 98         Imaging:   No results found for this or any previous visit (from the past 24 hour(s)).

## 2019-11-11 ENCOUNTER — APPOINTMENT (OUTPATIENT)
Dept: NUCLEAR MEDICINE | Facility: CLINIC | Age: 84
DRG: 314 | End: 2019-11-11
Attending: INTERNAL MEDICINE
Payer: COMMERCIAL

## 2019-11-11 ENCOUNTER — APPOINTMENT (OUTPATIENT)
Dept: PHYSICAL THERAPY | Facility: CLINIC | Age: 84
DRG: 314 | End: 2019-11-11
Payer: COMMERCIAL

## 2019-11-11 LAB
ANION GAP SERPL CALCULATED.3IONS-SCNC: 4 MMOL/L (ref 3–14)
BACTERIA SPEC CULT: NO GROWTH
BUN SERPL-MCNC: 28 MG/DL (ref 7–30)
CALCIUM SERPL-MCNC: 9.2 MG/DL (ref 8.5–10.1)
CHLORIDE SERPL-SCNC: 101 MMOL/L (ref 94–109)
CO2 SERPL-SCNC: 33 MMOL/L (ref 20–32)
CREAT SERPL-MCNC: 1.25 MG/DL (ref 0.52–1.04)
CV BLOOD PRESSURE: 52 %
CV STRESS MAX HR HE: 114
GFR SERPL CREATININE-BSD FRML MDRD: 39 ML/MIN/{1.73_M2}
GLUCOSE BLDC GLUCOMTR-MCNC: 113 MG/DL (ref 70–99)
GLUCOSE BLDC GLUCOMTR-MCNC: 125 MG/DL (ref 70–99)
GLUCOSE BLDC GLUCOMTR-MCNC: 170 MG/DL (ref 70–99)
GLUCOSE BLDC GLUCOMTR-MCNC: 192 MG/DL (ref 70–99)
GLUCOSE SERPL-MCNC: 168 MG/DL (ref 70–99)
INR PPP: 2.19 (ref 0.86–1.14)
Lab: NORMAL
POTASSIUM SERPL-SCNC: 4.2 MMOL/L (ref 3.4–5.3)
RATE PRESSURE PRODUCT: NORMAL
SODIUM SERPL-SCNC: 138 MMOL/L (ref 133–144)
SPECIMEN SOURCE: NORMAL
STRESS ECHO BASELINE DIASTOLIC HE: 62
STRESS ECHO BASELINE HR: 81
STRESS ECHO BASELINE SYSTOLIC BP: 115
STRESS ECHO CALCULATED PERCENT HR: 85 %
STRESS ECHO LAST STRESS DIASTOLIC BP: 65
STRESS ECHO LAST STRESS SYSTOLIC BP: 131
STRESS ECHO TARGET HR: 134

## 2019-11-11 PROCEDURE — 36415 COLL VENOUS BLD VENIPUNCTURE: CPT | Performed by: INTERNAL MEDICINE

## 2019-11-11 PROCEDURE — 34300033 ZZH RX 343: Performed by: INTERNAL MEDICINE

## 2019-11-11 PROCEDURE — 94640 AIRWAY INHALATION TREATMENT: CPT

## 2019-11-11 PROCEDURE — 25000132 ZZH RX MED GY IP 250 OP 250 PS 637: Performed by: INTERNAL MEDICINE

## 2019-11-11 PROCEDURE — 25000128 H RX IP 250 OP 636: Performed by: INTERNAL MEDICINE

## 2019-11-11 PROCEDURE — 25000132 ZZH RX MED GY IP 250 OP 250 PS 637: Performed by: SURGERY

## 2019-11-11 PROCEDURE — 80048 BASIC METABOLIC PNL TOTAL CA: CPT | Performed by: INTERNAL MEDICINE

## 2019-11-11 PROCEDURE — 93017 CV STRESS TEST TRACING ONLY: CPT

## 2019-11-11 PROCEDURE — 97116 GAIT TRAINING THERAPY: CPT | Mod: GP

## 2019-11-11 PROCEDURE — 78452 HT MUSCLE IMAGE SPECT MULT: CPT | Mod: 26 | Performed by: INTERNAL MEDICINE

## 2019-11-11 PROCEDURE — A9502 TC99M TETROFOSMIN: HCPCS | Performed by: INTERNAL MEDICINE

## 2019-11-11 PROCEDURE — 25000131 ZZH RX MED GY IP 250 OP 636 PS 637: Performed by: INTERNAL MEDICINE

## 2019-11-11 PROCEDURE — 85610 PROTHROMBIN TIME: CPT | Performed by: INTERNAL MEDICINE

## 2019-11-11 PROCEDURE — 99232 SBSQ HOSP IP/OBS MODERATE 35: CPT | Mod: 25 | Performed by: INTERNAL MEDICINE

## 2019-11-11 PROCEDURE — 78452 HT MUSCLE IMAGE SPECT MULT: CPT

## 2019-11-11 PROCEDURE — 94640 AIRWAY INHALATION TREATMENT: CPT | Mod: 76

## 2019-11-11 PROCEDURE — 93016 CV STRESS TEST SUPVJ ONLY: CPT | Performed by: INTERNAL MEDICINE

## 2019-11-11 PROCEDURE — 40000556 ZZH STATISTIC PERIPHERAL IV START W US GUIDANCE

## 2019-11-11 PROCEDURE — 25000125 ZZHC RX 250: Performed by: HOSPITALIST

## 2019-11-11 PROCEDURE — 25000128 H RX IP 250 OP 636

## 2019-11-11 PROCEDURE — 99233 SBSQ HOSP IP/OBS HIGH 50: CPT | Performed by: INTERNAL MEDICINE

## 2019-11-11 PROCEDURE — 00000146 ZZHCL STATISTIC GLUCOSE BY METER IP

## 2019-11-11 PROCEDURE — 12000000 ZZH R&B MED SURG/OB

## 2019-11-11 PROCEDURE — 93018 CV STRESS TEST I&R ONLY: CPT | Performed by: INTERNAL MEDICINE

## 2019-11-11 PROCEDURE — 40000275 ZZH STATISTIC RCP TIME EA 10 MIN

## 2019-11-11 RX ORDER — REGADENOSON 0.08 MG/ML
INJECTION, SOLUTION INTRAVENOUS
Status: COMPLETED
Start: 2019-11-11 | End: 2019-11-11

## 2019-11-11 RX ORDER — FUROSEMIDE 10 MG/ML
40 INJECTION INTRAMUSCULAR; INTRAVENOUS EVERY 8 HOURS
Status: COMPLETED | OUTPATIENT
Start: 2019-11-11 | End: 2019-11-12

## 2019-11-11 RX ORDER — REGADENOSON 0.08 MG/ML
0.4 INJECTION, SOLUTION INTRAVENOUS ONCE
Status: COMPLETED | OUTPATIENT
Start: 2019-11-11 | End: 2019-11-11

## 2019-11-11 RX ORDER — ALBUTEROL SULFATE 90 UG/1
2 AEROSOL, METERED RESPIRATORY (INHALATION) EVERY 5 MIN PRN
Status: DISCONTINUED | OUTPATIENT
Start: 2019-11-11 | End: 2019-11-13 | Stop reason: HOSPADM

## 2019-11-11 RX ORDER — AMINOPHYLLINE 25 MG/ML
50-100 INJECTION, SOLUTION INTRAVENOUS
Status: DISCONTINUED | OUTPATIENT
Start: 2019-11-11 | End: 2019-11-13 | Stop reason: HOSPADM

## 2019-11-11 RX ORDER — BISACODYL 10 MG
10 SUPPOSITORY, RECTAL RECTAL ONCE
Status: DISCONTINUED | OUTPATIENT
Start: 2019-11-11 | End: 2019-11-13 | Stop reason: HOSPADM

## 2019-11-11 RX ORDER — WARFARIN SODIUM 3 MG/1
3 TABLET ORAL
Status: COMPLETED | OUTPATIENT
Start: 2019-11-11 | End: 2019-11-11

## 2019-11-11 RX ORDER — ACYCLOVIR 200 MG/1
0-1 CAPSULE ORAL
Status: DISCONTINUED | OUTPATIENT
Start: 2019-11-11 | End: 2019-11-13 | Stop reason: HOSPADM

## 2019-11-11 RX ADMIN — TETROFOSMIN 11 MCI.: 1.38 INJECTION, POWDER, LYOPHILIZED, FOR SOLUTION INTRAVENOUS at 08:40

## 2019-11-11 RX ADMIN — METOPROLOL TARTRATE 25 MG: 25 TABLET, FILM COATED ORAL at 20:43

## 2019-11-11 RX ADMIN — REGADENOSON 0.4 MG: 0.08 INJECTION, SOLUTION INTRAVENOUS at 10:57

## 2019-11-11 RX ADMIN — IPRATROPIUM BROMIDE AND ALBUTEROL SULFATE 3 ML: .5; 3 SOLUTION RESPIRATORY (INHALATION) at 11:48

## 2019-11-11 RX ADMIN — FERROUS SULFATE TAB 325 MG (65 MG ELEMENTAL FE) 325 MG: 325 (65 FE) TAB at 13:14

## 2019-11-11 RX ADMIN — WARFARIN SODIUM 3 MG: 3 TABLET ORAL at 18:07

## 2019-11-11 RX ADMIN — HYDROXYZINE HYDROCHLORIDE 10 MG: 10 TABLET ORAL at 22:14

## 2019-11-11 RX ADMIN — METOPROLOL TARTRATE 25 MG: 25 TABLET, FILM COATED ORAL at 13:14

## 2019-11-11 RX ADMIN — LEVOTHYROXINE SODIUM 75 MCG: 75 TABLET ORAL at 08:56

## 2019-11-11 RX ADMIN — IPRATROPIUM BROMIDE AND ALBUTEROL SULFATE 3 ML: .5; 3 SOLUTION RESPIRATORY (INHALATION) at 07:30

## 2019-11-11 RX ADMIN — FUROSEMIDE 20 MG: 10 INJECTION, SOLUTION INTRAVENOUS at 05:51

## 2019-11-11 RX ADMIN — AMIODARONE HYDROCHLORIDE 200 MG: 200 TABLET ORAL at 08:56

## 2019-11-11 RX ADMIN — INSULIN GLARGINE 8 UNITS: 100 INJECTION, SOLUTION SUBCUTANEOUS at 21:59

## 2019-11-11 RX ADMIN — FUROSEMIDE 40 MG: 10 INJECTION, SOLUTION INTRAMUSCULAR; INTRAVENOUS at 13:29

## 2019-11-11 RX ADMIN — TETROFOSMIN 30.5 MCI.: 1.38 INJECTION, POWDER, LYOPHILIZED, FOR SOLUTION INTRAVENOUS at 11:04

## 2019-11-11 RX ADMIN — FUROSEMIDE 40 MG: 10 INJECTION, SOLUTION INTRAMUSCULAR; INTRAVENOUS at 20:46

## 2019-11-11 RX ADMIN — AMIODARONE HYDROCHLORIDE 200 MG: 200 TABLET ORAL at 20:44

## 2019-11-11 RX ADMIN — SIMVASTATIN 20 MG: 20 TABLET, FILM COATED ORAL at 21:59

## 2019-11-11 RX ADMIN — ASPIRIN 81 MG: 81 TABLET ORAL at 08:56

## 2019-11-11 RX ADMIN — OYSTER SHELL CALCIUM WITH VITAMIN D 1 TABLET: 500; 200 TABLET, FILM COATED ORAL at 08:56

## 2019-11-11 RX ADMIN — Medication 12.5 MG: at 13:15

## 2019-11-11 RX ADMIN — ACETAMINOPHEN 650 MG: 325 TABLET, FILM COATED ORAL at 15:56

## 2019-11-11 ASSESSMENT — ACTIVITIES OF DAILY LIVING (ADL)
ADLS_ACUITY_SCORE: 14
ADLS_ACUITY_SCORE: 15
ADLS_ACUITY_SCORE: 15

## 2019-11-11 ASSESSMENT — MIFFLIN-ST. JEOR: SCORE: 1312.46

## 2019-11-11 NOTE — PROGRESS NOTES
Inpatient Cardiology Consultation Progress Note:    Maryalice Rebecca Wachter MRN#: [id number]   YOB: 1933 Age: 86 year old     Date of Admission: 11/5/2019  Referring:   Consult indication: cardiomyopathy         Assessment and Plan:     In summary, patient Maryalice Rebecca Wachter is a pleasant 86 yo female with a h/o paroxysmal atrial fibrillation on warfarin, COPD, DM2, HTN, hypothyroidism, newly diagnosed cardiomyopathy (LVEF 55-60% TTE 7/2018, LVEF 25% 11/2019), most clinically consistent with stress cardiomyopathy or possibly tachycardia-induced cardiomyopathy. Ischemic etiology is possible but less likely, TnI modest and flat, non-ACS pattern. ECG without ischemic changes. No symptoms suggestive of recent ischemic event.      On telemetry her HR has been in the 70s-80s but intermittently jumps to the 120s-130s bpm, even while she is resting in bed. Asymptomatic. She does have a h/o paroxysmal atrial fibrillation. However during these episodes there appears to be P waves on telemetry, and so it is possible she is having paroxysmal atrial tachycardia or flutter. Amiodarone started on 11/8/2019 with resolution of these episodes of tachycardia.     - continue amiodarone  - continue metoprolol tartrate 25mg BID  - continue warfarin  - would recommend restarting diuresis later today or tomorrow (still hypervolemic on exam)  - nuclear stress test today    Thank you for allowing our team to participate in the care of Maryalice Rebecca Wachter.  Please do not hesitate to page me with any questions or concerns.     Tres Metz MD  [facility]  Cardiology  Pager:  [phone]  Text Page   November 11, 2019    This note was transcribed using electronic voice recognition software, typographical errors may be present.         Interval Events:     - no complaints or concerns  - no chest pain  - stable orthopnea/PND  - no palpitations overnight         Medications reviewed:    Prior to admission medications:  Prior to Admission medications    Medication Sig Start Date End Date Taking? Authorizing Provider   albuterol (ALBUTEROL) 108 (90 BASE) MCG/ACT inhaler Inhale 2 puffs into the lungs every 6 hours as needed Reported on 3/2/2017   Yes Reported, Patient   albuterol (PROVENTIL) (2.5 MG/3ML) 0.083% neb solution INHALE CONTENTS OF 1 VIAL (3 ML) VIA NEBULIZER EVERY 6 HOURS AS NEEDED FOR SHORTNESS OF BREATH / DYSPNEA OR WHEEZING 8/13/19  Yes Venu Maria PA-C   ascorbic acid (VITAMIN C) 500 MG tablet Take 500 mg by mouth daily.   Yes Reported, Patient   ASPIRIN EC PO Take 81 mg by mouth daily    Yes Reported, Patient   calcium carb 1250 mg, 500 mg [place],/vitamin D 200 units (OSCAL WITH D) 500-200 MG-UNIT per tablet Take 1 tablet by mouth daily.   Yes Reported, Patient   ferrous sulfate 325 (65 FE) MG tablet Take 1 tablet by mouth daily (with breakfast).   Yes Reported, Patient   Fluticasone-Umeclidin-Vilanterol (TRELEGY ELLIPTA) 100-62.5-25 MCG/INH oral inhaler Inhale 1 puff into the lungs daily 9/9/19  Yes Venu Maria PA-C   glipiZIDE (GLUCOTROL XL) 10 MG 24 hr tablet TAKE ONE TABLET BY MOUTH TWICE A DAY 10/4/19  Yes Venu Maria PA-C   hydrochlorothiazide (HYDRODIURIL) 25 MG tablet TAKE ONE TABLET BY MOUTH EVERY DAY 9/20/19  Yes Venu Maria PA-C   levothyroxine (SYNTHROID/LEVOTHROID) 75 MCG tablet TAKE ONE TABLET BY MOUTH EVERY DAY 7/11/19  Yes Venu Maria PA-C   lisinopril (PRINIVIL/ZESTRIL) 40 MG tablet TAKE ONE TABLET BY MOUTH EVERY DAY 7/11/19  Yes Venu Maria PA-C   LOPERAMIDE HCL PO Take 2-4 mg by mouth daily as needed    Yes Reported, Patient   metFORMIN (GLUCOPHAGE) 500 MG tablet TAKE ONE TABLET BY MOUTH TWICE A DAY WITH MEALS 5/10/19  Yes Fartun Mcnair MD   metoprolol tartrate (LOPRESSOR) 50 MG tablet TAKE ONE TABLET BY MOUTH TWICE A DAY 10/4/19  Yes Venu Maria PA-C   pioglitazone (ACTOS) 30 MG  tablet Take 1 tablet (30 mg) by mouth daily 9/9/19  Yes Venu Maria PA-C   polycarbophil (FIBERCON) 625 MG tablet Take 1 tablet by mouth 2 times daily.   Yes Reported, Patient   QUEtiapine (SEROQUEL) 25 MG tablet TAKE ONE-HALF TABLET BY MOUTH AT BEDTIME TO SLEEP . OK TO INCREASE BY 1/2 TABLET EVERY 3 NIGHTS UP TO 2 TABLETS DAILY IF NEEDED 8/8/19  Yes Venu Maria PA-C   ranitidine (ZANTAC) 150 MG tablet Take 1 tablet (150 mg) by mouth nightly as needed for heartburn 10/23/17  Yes Venu Maria PA-C   simvastatin (ZOCOR) 20 MG tablet TAKE ONE TABLET BY MOUTH AT BEDTIME 9/9/19  Yes Venu Maria PA-C   VITAMIN D, CHOLECALCIFEROL, PO Take 2,000 Units by mouth daily   Yes Reported, Patient   warfarin ANTICOAGULANT (COUMADIN) 3 MG tablet Take 1 tablet (3mg) daily or as directed by INR nurse 10/25/19  Yes Venu Maria PA-C   blood glucose monitoring (ONE TOUCH ULTRA 2) meter device kit Use to test blood sugar 1 time daily or as directed.  Ok to substitute alternative if insurance prefers. 8/13/18   Venu Maria PA-C   blood glucose monitoring (ONE TOUCH ULTRASOFT) lancets USE TO TEST BLOOD SUGAR 2 TO 3 TIMES A DAY. 9/6/19   Venu Maria PA-C   ONETOUCH ULTRA test strip USE TO TEST BLOOD SUGAR TWICE A DAY OR AS DIRECTED 3/25/19   Venu Maria PA-C        Current medications:  Current Facility-Administered Medications Ordered in Epic   Medication Dose Route Frequency Last Rate Last Dose     acetaminophen (TYLENOL) tablet 650 mg  650 mg Oral Q4H PRN   650 mg at 11/10/19 1513     albuterol (PROAIR HFA/PROVENTIL HFA/VENTOLIN HFA) 108 (90 Base) MCG/ACT inhaler 2 puff  2 puff Inhalation Q5 Min PRN         albuterol (PROVENTIL) neb solution 2.5 mg  2.5 mg Nebulization Q4H PRN   2.5 mg at 11/10/19 0607     aminophylline  mg   mg Intravenous Once PRN         amiodarone (PACERONE/CODARONE) tablet 200 mg  200 mg Oral BID   200 mg at 11/11/19  0856     aspirin EC tablet 81 mg  81 mg Oral Daily   81 mg at 11/11/19 0856     bisacodyl (DULCOLAX) Suppository 10 mg  10 mg Rectal Once         calcium carbonate-vitamin D (OSCAL w/D) per tablet 1 tablet  1 tablet Oral Daily   1 tablet at 11/11/19 0856     glucose gel 15-30 g  15-30 g Oral Q15 Min PRN        Or     dextrose 50 % injection 25-50 mL  25-50 mL Intravenous Q15 Min PRN        Or     glucagon injection 1 mg  1 mg Subcutaneous Q15 Min PRN         ferrous sulfate (FEROSUL) tablet 325 mg  325 mg Oral Daily with breakfast   325 mg at 11/10/19 0753     hydrOXYzine (ATARAX) tablet 10 mg  10 mg Oral Q6H PRN   10 mg at 11/09/19 2244     insulin aspart (NovoLOG) inj (RAPID ACTING)  1-7 Units Subcutaneous TID AC   1 Units at 11/11/19 0859     insulin aspart (NovoLOG) inj (RAPID ACTING)  1-5 Units Subcutaneous At Bedtime   1 Units at 11/09/19 2232     insulin glargine (LANTUS) injection 8 Units  8 Units Subcutaneous At Bedtime   8 Units at 11/10/19 2227     ipratropium - albuterol 0.5 mg/2.5 mg/3 mL (DUONEB) neb solution 3 mL  3 mL Nebulization 4x daily   3 mL at 11/11/19 0730     levothyroxine (SYNTHROID/LEVOTHROID) tablet 75 mcg  75 mcg Oral Daily   75 mcg at 11/11/19 0856     lidocaine (LMX4) cream   Topical Q1H PRN         lidocaine 1 % 0.1-1 mL  0.1-1 mL Other Q1H PRN         LORazepam (ATIVAN) tablet 0.5 mg  0.5 mg Oral Q8H PRN   0.5 mg at 11/10/19 2222     metoprolol tartrate (LOPRESSOR) tablet 25 mg  25 mg Oral BID   25 mg at 11/10/19 2221     ondansetron (ZOFRAN-ODT) ODT tab 4 mg  4 mg Oral Q6H PRN        Or     ondansetron (ZOFRAN) injection 4 mg  4 mg Intravenous Q6H PRN         polyethylene glycol (MIRALAX/GLYCOLAX) Packet 17 g  17 g Oral Daily PRN   17 g at 11/10/19 1509     QUEtiapine (SEROquel) tablet 25 mg  25 mg Oral At Bedtime PRN         regadenoson (LEXISCAN) 0.4 MG/5ML injection             regadenoson (LEXISCAN) injection 0.4 mg  0.4 mg Intravenous Once         simvastatin (ZOCOR) tablet 20  mg  20 mg Oral At Bedtime   20 mg at 11/10/19 2222     sodium chloride (PF) 0.9% PF flush 10 mL  10 mL Intravenous Q10 Min PRN         sodium chloride (PF) 0.9% PF flush 5-10 mL  5-10 mL Intravenous q1 min prn         sodium chloride bacteriostatic 0.9 % flush 0-1 mL  0-1 mL Intradermal Once PRN         spironolactone (ALDACTONE) half-tab 12.5 mg  12.5 mg Oral Daily   12.5 mg at 11/10/19 1128     technetium Tc 99m tetrofosmin 2UD study (MYOVIEW) radioisotope injection 3-42 mCi  3-42 mCi Intravenous Q2H   11 mCi at 19 0840     temazepam (RESTORIL) capsule 7.5 mg  7.5 mg Oral At Bedtime PRN         Warfarin Therapy Reminder (Check START DATE - warfarin may be starting in the FUTURE)  1 each Does not apply Continuous PRN         No current Epic-ordered outpatient medications on file.             Physical Exam:   Vital signs were reviewed:  Temperatures:  Current - Temp: 99.2  F (37.3  C); Max - Temp  Av.8  F (37.1  C)  Min: 98.1  F (36.7  C)  Max: 99.4  F (37.4  C)  Respiration range: Resp  Av.8  Min: 18  Max: 24  Pulse range: No data recorded  Blood pressure range: Systolic (24hrs), Av , Min:96 , Max:129   ; Diastolic (24hrs), Av, Min:42, Max:56    Pulse oximetry range: SpO2  Av.3 %  Min: 91 %  Max: 97 %    Intake/Output Summary (Last 24 hours) at 2019 1058  Last data filed at 2019 0925  Gross per 24 hour   Intake 120 ml   Output 1400 ml   Net -1280 ml     192 lbs 6.4 oz  Body mass index is 31.53 kg/m .   Body surface area is 2.01 meters squared.    Constitutional: appears stated age, in no apparent distress, appears to be well nourished  Eyes: sclera anicteric, conjunctiva normal, no lesions on eyelids or lashes  ENT: normocephalic, without obvious abnormality, atraumatic, external ears without lesions,   Pulmonary: mild rales at the bases  Cardiovascular: JVP elevated to ear at 45'', regular rate, regular rhythm, normal S1 and S2, 1/6 GHULAM  Gastrointestinal: abdominal exam  benign, non-tender, no rigidity, no guarding  Neurologic: awake, alert, face symmetrical, moves all extremities  Skin: no abnormal rashes or lesions on limited exam, nails normal without discoloration or clubbing, no jaundice  Psychiatric: affect is normal, answers questions appropriately, oriented to self and place         Selected laboratory tests:   Laboratory test results personally reviewed:   CMP  Recent Labs   Lab 11/11/19  0708 11/10/19  0706 11/09/19  0628 11/08/19  0817 11/07/19  0548 11/06/19  0430 11/05/19  1411    139 134 136 138 139 138   POTASSIUM 4.2 4.2 4.0 4.2 4.1 4.2 4.2   CHLORIDE 101 102 100 103 104 105 103   CO2 33* 32 28 30 30 23 27   ANIONGAP 4 5 6 3 4 11 8   * 169* 176* 174* 116* 313* 226*   BUN 28 28 30 32* 38* 29 21   CR 1.25* 1.38* 1.28* 1.23* 1.49* 1.44* 1.15*   GFRESTIMATED 39* 35* 38* 40* 31* 33* 43*   GFRESTBLACK 45* 40* 44* 46* 36* 38* 50*   VASHTI 9.2 9.1 9.1 9.2 8.9 9.3 9.0   MAG  --   --   --  2.1 2.4* 1.7 1.9   PHOS  --   --   --   --   --   --  4.7*   PROTTOTAL  --   --   --   --   --   --  6.5*   ALBUMIN  --   --   --   --   --   --  3.4   BILITOTAL  --   --   --   --   --   --  0.6   ALKPHOS  --   --   --   --   --   --  72   AST  --   --   --   --   --   --  55*   ALT  --   --   --   --   --   --  30     CBC  Recent Labs   Lab 11/08/19  0817 11/07/19  0548 11/05/19  2303 11/05/19  1411   WBC 7.2 12.3* 10.4 8.6   RBC 3.19* 3.11* 3.41* 3.84   HGB 9.8* 9.4* 10.5* 11.8   HCT 31.4* 30.7* 33.5* 37.9   MCV 98 99 98 99   MCH 30.7 30.2 30.8 30.7   MCHC 31.2* 30.6* 31.3* 31.1*   RDW 15.4* 15.6* 15.2* 15.1*    237 240 302     INR  Recent Labs   Lab 11/11/19  0708 11/10/19  0706 11/09/19  0628 11/08/19  0817   INR 2.19* 2.20* 2.32* 2.61*     Lab Results   Component Value Date    TROPI 0.865 () 11/06/2019    TROPI 0.931 () 11/06/2019    TROPI 0.839 () 11/06/2019    TROPONIN 0.01 11/05/2019     Recent Labs   Lab Test 07/15/19  0948 08/03/18  0913  06/30/15  0952    CHOL 109 105   < > 109   HDL 39* 39*   < > 36*   LDL 34 27   < > 25   TRIG 178* 195*   < > 240*   CHOLHDLRATIO  --   --   --  3.0    < > = values in this interval not displayed.     Lab Results   Component Value Date    A1C 7.5 2019    A1C 7.8 2019    A1C 7.2 10/15/2018    A1C 7.4 2018    A1C 7.4 2018     TSH   Date Value Ref Range Status   07/15/2019 3.08 0.40 - 4.00 mU/L Final            Selected Imaging and Additional Data:   Additional data personally reviewed:  Recent Results (from the past 4320 hour(s))   Echocardiogram Limited    Narrative    [id number]  [id number]  [id number]  [id number]^HARLEY^VARGHESE^KIERSTEN           [facility]  Echocardiography Laboratory  [address] [place] [address]        Name: WACHTER, MARYALICE REBECCA  MRN: [id number]  : 1933  Study Date: 2019 10:17 AM  Age: 85 yrs  Gender: Female  Patient Location: [facility]  Reason For Study: Cardiomyopathy  Ordering Physician: VARGHESE SOUZA  Referring Physician: Venu Maria  Performed By: Carlos Enrique Ford RDCS     BSA: 1.9 m2  Height: 65 in  Weight: 193 lb  BP: 115/51 mmHg  _____________________________________________________________________________  __        Procedure  Limited Portable Echo Adult. Optison (NDC #1303-3229) given intravenously.  _____________________________________________________________________________  __        Interpretation Summary     The visual ejection fraction is estimated at 37-43%.  Diastolic Doppler findings (E/E' ratio and/or other parameters) suggest left  ventricular filling pressures are increased.  By direct comparison the EF has improved compared to study 19 (possible  tako tsubo on that echo). The Mid/distal septum and infero-apical walls are  still hypokinetic now but improved. Cannot assess diastolic function, if this  is NSR/sinus tach there is merging of the E and A waves but E/e suggests  elevated LV filling pressure today  The left atrium is mildly  dilated.  Right ventricular systolic pressure is elevated, consistent with mild  pulmonary hypertension.  _____________________________________________________________________________  __        Left Ventricle  The left ventricle is normal in size. There is normal left ventricular wall  thickness. The visual ejection fraction is estimated at 37-43%. Left  ventricular diastolic function is not assessable. Diastolic Doppler findings  (E/E' ratio and/or other parameters) suggest left ventricular filling  pressures are increased. By direct comparison the EF has improved compared to  study 11-5-19 (possible tako tsubo on that echo). The Mid/distal septum and  infero-apical walls are still hypokinetic now but improved. Cannot assess  diastolic function, if this is NSR/sinus tach there is merging of the E and A  waves but E/e suggests elevated LV filling pressure today. There is moderate  septal hypokinesis. There is mild to moderate apical wall hypokinesis. There  is no thrombus seen in the left ventricle.     Right Ventricle  The right ventricle is normal in structure, function and size.     Atria  The left atrium is mildly dilated. Right atrial size is normal.     Mitral Valve  There is moderate mitral annular calcification. There is trace to mild mitral  regurgitation.        Tricuspid Valve  There is trace to mild tricuspid regurgitation. The right ventricular systolic  pressure is approximated at 27.9 mmHg plus the right atrial pressure. Right  ventricular systolic pressure is elevated, consistent with mild pulmonary  hypertension. IVC diameter and respiratory changes fall into an intermediate  range suggesting an RA pressure of 8 mmHg.     Aortic Valve  Normal tricuspid aortic valve.     Pulmonic Valve  The pulmonic valve is not well seen, but is grossly normal.     Vessels  Borderline aortic root dilatation.     Pericardium  The pericardium appears normal.        Rhythm  The rhythm was  undetermined.  _____________________________________________________________________________  __  MMode/2D Measurements & Calculations     IVSd: 1.1 cm  LVIDd: 4.9 cm  LVIDs: 3.8 cm  LVPWd: 1.0 cm  FS: 20.9 %  LV mass(C)d: 187.0 grams  LV mass(C)dI: 96.0 grams/m2  Ao root diam: 3.7 cm  LA dimension: 3.0 cm  LA/Ao: 0.83  LA Volume (BP): 71.7 ml  LA Volume Index (BP): 36.8 ml/m2  RWT: 0.42           Doppler Measurements & Calculations  MV E max michael: 137.0 cm/sec  MV dec slope: 1080 cm/sec2  MV dec time: 0.13 sec  TR max michael: 264.0 cm/sec  TR max P.9 mmHg  E/E' av.4  Lateral E/e': 10.8  Medial E/e': 29.9           _____________________________________________________________________________  __           Report approved by: Frederick Tellez 2019 01:48 PM      Echo Limited    Narrative    [id number]  [id number]  [id number]  [id number]^MISA^JEFFERSON^[facility]  Echocardiography Laboratory  [address] [place] [address]        Name: WACHTER, MARYALICE REBECCA  MRN: [id number]  : 1933  Study Date: 2019 06:49 PM  Age: 85 yrs  Gender: Female  Patient Location: [facility]  Reason For Study: Respiratory Failure  Ordering Physician: JEFFERSON BYNUM  Referring Physician: Venu Maria  Performed By: Juliette Malone, RDCS     BSA: 1.9 m2  Height: 65 in  Weight: 193 lb  HR: 94  BP: 100/54 mmHg  _____________________________________________________________________________  __        Procedure  Limited Portable Echo Adult. Optison (NDC #[id number]) given intravenously.  (Emergent exam, abbreviated study performed).  _____________________________________________________________________________  __        Interpretation Summary     LVEF 25% based on biplane 2D tracing.  Left ventricular systolic function is severely reduced.  Severe global hypokineiss of mid and distal segments with preserved  contractility at the base. These findings could be seen in patients with  stress induced  cardiomyopathy vs Multivessel disease (less likely but not  excluded).  EF decreased compared to .  Findings discussed with Dr Serrato at 7.54pm today. The study was technically  difficult.  _____________________________________________________________________________  __        Left Ventricle  The left ventricle is normal in size. Left ventricular systolic function is  severely reduced. LVEF 25% based on biplane 2D tracing. Severe global  hypokineiss of mid and distal segments with preserved contractility at the  base. These findings could be seen in patients with stress induced  cardiomyopathy vs Multivessel disease.     Right Ventricle  The right ventricle is normal size. The right ventricular systolic function is  mild to moderately reduced. THe mid segments of RV free wall are severely  hypokinetic.     Mitral Valve  There is mild mitral annular calcification. There is mild to moderate (1-2+)  mitral regurgitation.     Tricuspid Valve  There is mild (1+) tricuspid regurgitation. The right ventricular systolic  pressure is approximated at 24.7 mmHg plus the right atrial pressure.        Aortic Valve  There is mild trileaflet aortic sclerosis. There is trace aortic  regurgitation. No aortic stenosis is present.     Pulmonic Valve  The pulmonic valve is not well visualized.     Vessels  The aortic root is normal size.     Pericardium  There is no pericardial effusion.     Rhythm  Sinus rhythm was noted.     _____________________________________________________________________________  __  MMode/2D Measurements & Calculations  asc Aorta Diam: 3.1 cm  TAPSE: 1.3 cm           Doppler Measurements & Calculations  TR max michael: 248.5 cm/sec  TR max P.7 mmHg           _____________________________________________________________________________  __           Report approved by: Frederick Quach 2019 07:55 PM

## 2019-11-11 NOTE — PROGRESS NOTES
[facility]  Hospitalist Progress Note  Bird Mcknight MD 11/11/2019    Reason for Stay (Diagnosis): CHF         Assessment and Plan:      Summary of Stay: Maryalice Rebecca Wachter is a 85 year old female with a history of atrial fibrillation on warfarin, DM II, COPD who was admitted to the ICU for acute hypoxic respiratory failure and cardiogenic shock secondary to cardiomyopathy.      Patient had a sudden onset of shortness of breath at home.  Required BiPAP to maintain saturations in route and in the ED.  Had evidence of shock with hypotension and lactic acidosis.  BP improved after fluids.  Basic work-up was negative including labs and chest x-ray. Stat echocardiogram obtained in the ED showed severe global hypokinesis.  She  was admitted to the ICU and remained on BiPAP in the evening of admission.  She was initially started on vasopressor therapy and continued on BiPAP.  Her vasopressor therapy was stopped on the a.m. of 11/6.  She was started on IV heparin for NSTEMI.  She was able to transfer out of the intensive care unit on 11/7.    It is suspected that her CM was due to either stress CM vs tachycardia-mediated due to underlying afib.    TTE done 11/5 showed EF 25%; repeat TTE done 11/8 showed EF improved to near 40%  Cards following and recommend NM stress test prior to discharge.  Pt remains on amiodarone gtt (load) for treatment and rate control of afib     Pt reports she is still having SHUKLA and breathing not yet at baseline.  Weight has been fairly stable despite diuresis and remains on oxygen (does not use home O2)     1.  Cardiomyopathy.  Likely stress cardiomyopathy vs tachycardia mediated.  Cardiology following.  -Cardiology started amiodarone for rhythm control.  -Remains fluid overloaded.  -increase IV furosemide to 40 mg q8h x 3 doses today to try and get more fluid off.  Creatinine improved yesterday with diuresis  -Continue metoprolol 25 mg twice a day.  - NM stress  "test today to r/o ischemia as cause of her CM  - recommended low salt diet and daily weight check on discharge from hospital  - recommend repeat TTE in several weeks after discharge to reassess LV ftn     2.  Paroxysmal atrial for ablation.  -on amiodarone.  -Continue metoprolol.  -Continue warfarin.     3.  Hypothyroidism.  Continue levothyroxine.     4.  Hypertension.  Initially hypotensive.  Hypotension has resolved.  -Continue metoprolol 25 mg twice a day.     5.  Hyperlipidemia.  Continue simvastatin.     6.  Diabetes mellitus.  Pioglitazone and glipizide currently on hold.  -Continue NovoLog sliding scale.  -continue Lantus 8 units at bedtime.     7.  Chronic kidney disease.  Creatinine likely at baseline today at 1.2.  -Avoid nephrotoxins as able.     8.  Deconditioning.  Continue to work with therapy.     I updated pt's  at bedside today   Pt d/w bedside nurse today    Diet: Room Service  Consistent Carbohydrate Diet 3547-1850 Calories: Moderate Consistent CHO (4-6 CHO units/meal)    DVT Prophylaxis: Warfarin  Che Catheter: not present  Code Status: Full Code    Dispo:  Anticipate discharge 1-2 days after stress test obtained and breathing improved           Interval History (Subjective):      Still having SOB with activity.  Does not use home O2; still requiring oxygen in hospital.  Weight has not dropped much despite attempts at diuresis.  No chest pain                  Physical Exam:      Last Vital Signs:  /62 (BP Location: Left arm)   Pulse 68   Temp 98.7  F (37.1  C) (Oral)   Resp 18   Ht 1.664 m (5' 5.5\")   Wt 86.4 kg (190 lb 6.4 oz)   SpO2 95%   BMI 31.20 kg/m        Intake/Output Summary (Last 24 hours) at 11/11/2019 1311  Last data filed at 11/11/2019 1143  Gross per 24 hour   Intake 120 ml   Output 1500 ml   Net -1380 ml       Constitutional: Awake, alert, cooperative, no apparent distress.   present   Respiratory: Clear to auscultation bilaterally, no crackles or " wheezing   Cardiovascular: Regular rate and rhythm, normal S1 and S2, and no murmur noted   Abdomen: Normal bowel sounds, soft, non-distended, non-tender   Skin: No rashes, no cyanosis, dry to touch   Neuro: Alert and oriented x3, no weakness, numbness, memory loss   Extremities: 2+ pitting edema, normal range of motion   Other(s):        All other systems: Negative          Medications:      All current medications were reviewed with changes reflected in problem list.         Data:      All new lab and imaging data was reviewed.   Labs:  Recent Labs   Lab 11/08/19  0817   WBC 7.2   HGB 9.8*   HCT 31.4*   MCV 98         Imaging:   Recent Results (from the past 24 hour(s))   NM MPI w Lexiscan   Result Value    Target

## 2019-11-11 NOTE — PLAN OF CARE
OT session attempted. Pt off unit at [facility] per discussion with nursing.

## 2019-11-11 NOTE — PLAN OF CARE
VSS low grade temp this morning,afebrile this afternoon. Tele SR ST depression,inverted T's on PO Amiodarone and Metoprolol. On Lasix and aldactone. Cardiology following. NPO for Fina scan today. 1 lo2 90-93% at rest, SHUKLA on scheduled Nebs. No BM since the 6th, pt has had Miralaax and mag citrate without any success, added Dulcolax supp for today. Held off on Dulcolax, pt had large BM soft formed. Right lower arm old iv site, hard lump and swelling, md aware, elevated.     Later afternoon right arm  Increased redness and warmth, md paged. Applied ice pack and elevated.       Breathing/ SHUKLA 82-85% 1 lo2 takes about 1-2min to recover 1 lo2 91-93% at rest.

## 2019-11-11 NOTE — PLAN OF CARE
Please see flowsheets for detailed vital signs and assessments.   Neuro: A&O, anxious  Vital Signs: stable  Pain: denies  O2: mid 90s 1L NC  Tele: SR inverted Ts  Respiratory: LS dim, fine crackles posteriorly to lower lobes  GI: no BM for 5 days, denies abdominal pain, passing flatus well, hypoactive BS in all quadrants  : voiding w/o difficulty, frequent voiding  Skin: bruised, blanchable redness  Activity: SBA with gait belt/walker--pt impulsive at times when needing to void, bed alarm set  IVF: saline locked  Notable labs: INR 2.20, Cr 1.38  Protocols: NA  Consults: OT, Cardiology  Plan: PO antiarrhythmics, diuretics/daily weights, anticoagulation with coumadin, nuclear stress test this AM--NPO of food since midnight, last drink of water at 0200  Discharge: possible 1-2 days pending nuclear stress test later today and improvement with O2 saturation     Heart Failure Care Pathway  GOALS TO BE MET BEFORE DISCHARGE:    1. Decrease congestion and/or edema with diuretic therapy to achieve near      optimal volume status.            Dyspnea improved:  Yes            Edema improved:     No, please explain: edema to BLE        Net I/O and Weights since admission:          10/12 0700 - 11/11 0659  In: 4675.17 [P.O.:2150; I.V.:2525.17]  Out: 5250 [Urine:5250]  Net: -574.83            Vitals:    11/05/19 2000 11/07/19 0400 11/08/19 0405 11/10/19 0544   Weight: 87.4 kg (192 lb 10.9 oz) 89.5 kg (197 lb 5 oz) 87.9 kg (193 lb 11.2 oz) 87.3 kg (192 lb 6.4 oz)       2.  O2 sats > 92% on RA or at prior home O2 therapy level.          Current oxygenation status:       SpO2: 95 %         O2 Device: Nasal cannula,  Oxygen Delivery: 1 LPM         Able to wean O2 this shift to keep sats > 92%:  No, please explain: pt on 1L NC       Does patient use Home O2? No    3.  Tolerates ambulation and mobility near baseline: Yes        How many times did the patient ambulate with nursing staff this shift? 3    Please review the Heart Failure  Care Pathway for additional HF goal outcomes.    Laina Fishman, RN RN  11/11/2019

## 2019-11-11 NOTE — PLAN OF CARE
VS- 96%  on 1L NC, PRN tylenol given for headache with relief of headache. Tele SR with prolonged QT, inverted T's. Starting spironolactone and IV lasix every 8 hours. , 203, 162- sliding scale insulin given see MAR. Ambulating standby assist. Cardiology following, plan for nuclear stress test tomorrow. Possible discharge tomorrow or Tuesday.

## 2019-11-11 NOTE — PROVIDER NOTIFICATION
FYI: Support staff checked vitals and noted lactic protocol triggered. I am not seeing a lactic order pop up to order one. Would you like lactic checked? Thanks    Addendum: Received call from provider, no need to order lactic at this time.

## 2019-11-12 ENCOUNTER — APPOINTMENT (OUTPATIENT)
Dept: PHYSICAL THERAPY | Facility: CLINIC | Age: 84
DRG: 314 | End: 2019-11-12
Payer: COMMERCIAL

## 2019-11-12 ENCOUNTER — APPOINTMENT (OUTPATIENT)
Dept: OCCUPATIONAL THERAPY | Facility: CLINIC | Age: 84
DRG: 314 | End: 2019-11-12
Payer: COMMERCIAL

## 2019-11-12 LAB
ANION GAP SERPL CALCULATED.3IONS-SCNC: 4 MMOL/L (ref 3–14)
BUN SERPL-MCNC: 26 MG/DL (ref 7–30)
CALCIUM SERPL-MCNC: 9.1 MG/DL (ref 8.5–10.1)
CHLORIDE SERPL-SCNC: 94 MMOL/L (ref 94–109)
CO2 SERPL-SCNC: 40 MMOL/L (ref 20–32)
CREAT SERPL-MCNC: 1.32 MG/DL (ref 0.52–1.04)
GFR SERPL CREATININE-BSD FRML MDRD: 36 ML/MIN/{1.73_M2}
GLUCOSE BLDC GLUCOMTR-MCNC: 140 MG/DL (ref 70–99)
GLUCOSE BLDC GLUCOMTR-MCNC: 145 MG/DL (ref 70–99)
GLUCOSE BLDC GLUCOMTR-MCNC: 188 MG/DL (ref 70–99)
GLUCOSE BLDC GLUCOMTR-MCNC: 249 MG/DL (ref 70–99)
GLUCOSE SERPL-MCNC: 148 MG/DL (ref 70–99)
INR PPP: 2.26 (ref 0.86–1.14)
POTASSIUM SERPL-SCNC: 3.7 MMOL/L (ref 3.4–5.3)
SODIUM SERPL-SCNC: 138 MMOL/L (ref 133–144)

## 2019-11-12 PROCEDURE — 25000132 ZZH RX MED GY IP 250 OP 250 PS 637: Performed by: SURGERY

## 2019-11-12 PROCEDURE — 25000132 ZZH RX MED GY IP 250 OP 250 PS 637: Performed by: INTERNAL MEDICINE

## 2019-11-12 PROCEDURE — 94640 AIRWAY INHALATION TREATMENT: CPT | Mod: 76

## 2019-11-12 PROCEDURE — 97535 SELF CARE MNGMENT TRAINING: CPT | Mod: GO

## 2019-11-12 PROCEDURE — 99207 ZZC CDG-MDM COMPONENT: MEETS MODERATE - UP CODED: CPT | Performed by: INTERNAL MEDICINE

## 2019-11-12 PROCEDURE — 00000146 ZZHCL STATISTIC GLUCOSE BY METER IP

## 2019-11-12 PROCEDURE — 80048 BASIC METABOLIC PNL TOTAL CA: CPT | Performed by: INTERNAL MEDICINE

## 2019-11-12 PROCEDURE — 12000000 ZZH R&B MED SURG/OB

## 2019-11-12 PROCEDURE — 36415 COLL VENOUS BLD VENIPUNCTURE: CPT | Performed by: INTERNAL MEDICINE

## 2019-11-12 PROCEDURE — 25000125 ZZHC RX 250: Performed by: HOSPITALIST

## 2019-11-12 PROCEDURE — 99233 SBSQ HOSP IP/OBS HIGH 50: CPT | Performed by: INTERNAL MEDICINE

## 2019-11-12 PROCEDURE — 40000275 ZZH STATISTIC RCP TIME EA 10 MIN

## 2019-11-12 PROCEDURE — 85610 PROTHROMBIN TIME: CPT | Performed by: INTERNAL MEDICINE

## 2019-11-12 PROCEDURE — 25000131 ZZH RX MED GY IP 250 OP 636 PS 637: Performed by: INTERNAL MEDICINE

## 2019-11-12 PROCEDURE — 97530 THERAPEUTIC ACTIVITIES: CPT | Mod: GP | Performed by: PHYSICAL THERAPY ASSISTANT

## 2019-11-12 PROCEDURE — 25000128 H RX IP 250 OP 636: Performed by: INTERNAL MEDICINE

## 2019-11-12 PROCEDURE — 94640 AIRWAY INHALATION TREATMENT: CPT

## 2019-11-12 PROCEDURE — 97116 GAIT TRAINING THERAPY: CPT | Mod: GP | Performed by: PHYSICAL THERAPY ASSISTANT

## 2019-11-12 PROCEDURE — 99232 SBSQ HOSP IP/OBS MODERATE 35: CPT | Performed by: INTERNAL MEDICINE

## 2019-11-12 RX ORDER — WARFARIN SODIUM 3 MG/1
3 TABLET ORAL
Status: COMPLETED | OUTPATIENT
Start: 2019-11-12 | End: 2019-11-12

## 2019-11-12 RX ORDER — FUROSEMIDE 10 MG/ML
40 INJECTION INTRAMUSCULAR; INTRAVENOUS EVERY 12 HOURS
Status: COMPLETED | OUTPATIENT
Start: 2019-11-12 | End: 2019-11-12

## 2019-11-12 RX ORDER — FUROSEMIDE 10 MG/ML
40 INJECTION INTRAMUSCULAR; INTRAVENOUS EVERY 8 HOURS
Status: DISCONTINUED | OUTPATIENT
Start: 2019-11-12 | End: 2019-11-12

## 2019-11-12 RX ADMIN — Medication 12.5 MG: at 08:43

## 2019-11-12 RX ADMIN — INSULIN GLARGINE 8 UNITS: 100 INJECTION, SOLUTION SUBCUTANEOUS at 21:29

## 2019-11-12 RX ADMIN — HYDROXYZINE HYDROCHLORIDE 10 MG: 10 TABLET ORAL at 21:24

## 2019-11-12 RX ADMIN — OYSTER SHELL CALCIUM WITH VITAMIN D 1 TABLET: 500; 200 TABLET, FILM COATED ORAL at 08:43

## 2019-11-12 RX ADMIN — WARFARIN SODIUM 3 MG: 3 TABLET ORAL at 18:24

## 2019-11-12 RX ADMIN — SIMVASTATIN 20 MG: 20 TABLET, FILM COATED ORAL at 21:24

## 2019-11-12 RX ADMIN — LEVOTHYROXINE SODIUM 75 MCG: 75 TABLET ORAL at 08:43

## 2019-11-12 RX ADMIN — IPRATROPIUM BROMIDE AND ALBUTEROL SULFATE 3 ML: .5; 3 SOLUTION RESPIRATORY (INHALATION) at 06:27

## 2019-11-12 RX ADMIN — AMIODARONE HYDROCHLORIDE 200 MG: 200 TABLET ORAL at 08:43

## 2019-11-12 RX ADMIN — ACETAMINOPHEN 650 MG: 325 TABLET, FILM COATED ORAL at 21:24

## 2019-11-12 RX ADMIN — FERROUS SULFATE TAB 325 MG (65 MG ELEMENTAL FE) 325 MG: 325 (65 FE) TAB at 08:43

## 2019-11-12 RX ADMIN — METOPROLOL TARTRATE 25 MG: 25 TABLET, FILM COATED ORAL at 08:41

## 2019-11-12 RX ADMIN — METOPROLOL TARTRATE 25 MG: 25 TABLET, FILM COATED ORAL at 21:24

## 2019-11-12 RX ADMIN — AMIODARONE HYDROCHLORIDE 200 MG: 200 TABLET ORAL at 21:24

## 2019-11-12 RX ADMIN — FUROSEMIDE 40 MG: 10 INJECTION, SOLUTION INTRAMUSCULAR; INTRAVENOUS at 23:58

## 2019-11-12 RX ADMIN — IPRATROPIUM BROMIDE AND ALBUTEROL SULFATE 3 ML: .5; 3 SOLUTION RESPIRATORY (INHALATION) at 20:18

## 2019-11-12 RX ADMIN — FUROSEMIDE 40 MG: 10 INJECTION, SOLUTION INTRAVENOUS at 11:30

## 2019-11-12 RX ADMIN — IPRATROPIUM BROMIDE AND ALBUTEROL SULFATE 3 ML: .5; 3 SOLUTION RESPIRATORY (INHALATION) at 15:44

## 2019-11-12 RX ADMIN — IPRATROPIUM BROMIDE AND ALBUTEROL SULFATE 3 ML: .5; 3 SOLUTION RESPIRATORY (INHALATION) at 11:27

## 2019-11-12 RX ADMIN — FUROSEMIDE 40 MG: 10 INJECTION, SOLUTION INTRAMUSCULAR; INTRAVENOUS at 04:11

## 2019-11-12 RX ADMIN — ASPIRIN 81 MG: 81 TABLET ORAL at 08:44

## 2019-11-12 ASSESSMENT — MIFFLIN-ST. JEOR: SCORE: 1295.68

## 2019-11-12 ASSESSMENT — ACTIVITIES OF DAILY LIVING (ADL)
ADLS_ACUITY_SCORE: 14
ADLS_ACUITY_SCORE: 15
ADLS_ACUITY_SCORE: 15
ADLS_ACUITY_SCORE: 16
ADLS_ACUITY_SCORE: 15
ADLS_ACUITY_SCORE: 16

## 2019-11-12 NOTE — PLAN OF CARE
Tele: SR, notched Ps, inverted Ts, HR 71. Physical assessment WDL w/exceptions as noted on Adult PCS flowsheet. Refer to VS, I/O, Adult PCS for full assessment & shift details. IV Lasix given w/250 mL of clear yellow urine, Rt lower arm red/pink & swollen, continue to elevate. BLE of +2 to ankles & feet, +3 Lt lower leg, +4 Rt lower leg. Disposition anticipated for 1-2 days home w/home PT.  Salome Leo, ANGELIQUEN, RN  Medical/Telemetry - 3

## 2019-11-12 NOTE — CONSULTS
Care Transition Initial Assessment - RN        Met with: Patient and daughter Celia.  DATA   Active Problems:    Acute respiratory failure with hypoxia (H)       Cognitive Status: awake and alert.  Primary Care Clinic Name: [facility]  Primary Care MD Name: Venu Larsonon  Contact information and PCP information verified: Yes  Lives With: spouse   Living Arrangements: house(one level townhouse)     Description of Support System: Supportive, Involved   Who is your support system?: , Children   Support Assessment: Adequate family and caregiver support   Insurance concerns: No Insurance issues identified  ASSESSMENT  Patient currently receives the following services:  None. Pt lives at home w/ he spouse at baseline. She has a WW. She manages her own medications and denies issue or concerns w/ this. She reports likely needing new home O2 at discharge.        Identified issues/concerns regarding health management: Met w/ pt and daughter at bedside to discuss PT recommendations for home PT. Pt is agreeable to home PT, she also expresses interest in home RN and HHA. Options given, pt elects [facility] - referral sent and AVS updated.     PLAN  Patient given options and choices for discharge Yes.  Patient/family is agreeable to the plan?  Yes  Patient anticipates discharging to home w/ home RN/PT/HHA .  Resources List: Home Care     Patient anticipates needs for home equipment: No   Plan/Disposition: Home   Appointments: Pt will call to schedule, reminder placed on AVS.       Care  (CTS) will continue to follow as needed.    Vanesa Zapata RN BSN   Inpatient Care Coordination  [facility]   Phone [phone]

## 2019-11-12 NOTE — PROGRESS NOTES
"BRIEF NUTRITION ASSESSMENT    REASON FOR ASSESSMENT:  LOS  History of atrial fibrillation on warfarin, DM II, COPD   CURRENT DIET AND NOURISHMENT ORDER:    Information obtained from patient     Food allergies/intolerances: NKFA     Patient is on a regular diet at home - endorses being advised to follow a low sodium diet but thinks label reading is too much effort. Unable to verbalize upper limit for daily intake recommended to her. Convenience based foods are important to patient (limited cooking)    Breakfast: frozen waffles, half a banana    Lunch: soup or 1/2 a sandwich with turkey meat    Dinner: relies heavily on frozen dinners from [employer], or eating out at places such as [employer] (high sodium items). Aims to have a vegetable at each dinner meal to be consistent with Vitamin K intake    Minimizes added salt when cooking meat, although she does this very infrequently.     Supplements at home: none    Living situation: with     Meal preparation and grocery shopping: she and  drive together, daughter is supportive    Issues chewing or swallowing: denies    Diet: Moderate consistent carb, no caffeine   Current Intake/Tolerance: Per flow sheet review, % intake for majority of documented meals.    ANTHROPOMETRICS  Height: 5' 5.5\"  Weight: 84 kg   Body mass index is 30.6 kg/m .  Weight Status:  Obesity Grade I BMI 30-34.9  Ideal body weight: 56.8 kg +/- 10%, 149% of IBW   Weight History:  Weight has decreased since admit 2/2 diuresis  Wt Readings from Last 10 Encounters:   11/12/19 84.7 kg (186 lb 11.2 oz)   09/09/19 87.5 kg (193 lb)   08/12/19 86.2 kg (190 lb 1.6 oz)   07/15/19 85.7 kg (189 lb)   04/15/19 88.6 kg (195 lb 4.8 oz)   03/28/19 89.4 kg (197 lb)   03/15/19 88 kg (193 lb 14.4 oz)   03/08/19 90.3 kg (199 lb)   10/15/18 88.6 kg (195 lb 6.4 oz)   07/30/18 86.3 kg (190 lb 3.2 oz)     LABS/MEDS/PHYSICAL FINDINGS:  Meds reviewed  Muscle loss consistent with sarcopenic obesity  EF 25% --> " "40%  Labs reviewed:  Lab Results   Component Value Date    A1C 7.5 08/12/2019    A1C 7.8 04/17/2019    A1C 7.2 10/15/2018    A1C 7.4 07/21/2018    A1C 7.4 02/09/2018     Recent Labs   Lab 11/12/19  0642 11/11/19  0708 11/10/19  0706 11/09/19  0628 11/08/19  0817 11/07/19  0548 11/06/19  0430 11/05/19  1411   * 168* 169* 176* 174* 116* 313* 226*       Malnutrition:  Patient does not meet two of the following criteria necessary for diagnosing malnutrition: significant weight loss, reduced intake, subcutaneous fat loss, muscle loss or fluid retention    INTERVENTION:  Nutrition Diagnosis:  No nutrition diagnosis at this time.  Excessive nutrient intake (sodium) related to non compliance with low sodium diet given CHF history as evidenced by diet recall, reliance on prepackaged foods    Implementation:  Nutrition Education: Attempted to review low sodium food options. Denied need for restriction here - \"Im going home tomorrow.\" Denies opportunities to cut back on sodium in current food choices. May benefit from review when additional family members present  Collaboration and Referral of care: Discussed patient during interdisciplinary care rounds this morning    Follow Up/Monitoring:   Progress towards goals will be monitored and evaluated per protocol and Practice Guidelines        Hedy Andrade RDN, LD, CNSC  Pager - 3rd floor/ICU: [phone]  Pager - All other floors: [phone]  Pager - Weekend/holiday: [phone]  Office: [phone]    "

## 2019-11-12 NOTE — PROGRESS NOTES
Patient alert and oriented.  [place], hearing aid in right ear.  Up with assist of 1 gait belt walker.  Relief of headache with tylenol.  Mod carb diet, blood sugar 113.  Lung sounds diminished.  Dyspnea on exertion, 02 96% 1L. Right arm redness blanchable edema, warmth to area, arm elevated and iced.  Will continue to monitor.       Addendum:  Tele SR 1AVB inverted T's.  MD paged 1AVB appears to be a new finding.

## 2019-11-12 NOTE — PROGRESS NOTES
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 Ischemic etiology is possible but less likely, TnI modest and flat, non-ACS pattern. ECG without ischemic changes. No symptoms suggestive of recent ischemic event.      On telemetry her HR has been in the 70s-80s but intermittently jumps to the 120s-130s bpm, even while she is resting in bed. Asymptomatic. She does have a h/o paroxysmal atrial fibrillation. However during these episodes there appears to be P waves on telemetry, and so it is possible she is having paroxysmal atrial tachycardia or flutter. Amiodarone started on 11/8/2019 with resolution of these episodes of tachycardia.      - continue amiodarone  - continue metoprolol tartrate 25mg BID  - continue warfarin  - Nuclear stress test negative 11/11/2019   - we will sign off     - 7 day ZioPatch on discharge and follow up with cardiology KAILASH in 2 weeks and initial consulting cardiologist in 1-2 months (ordered)    Thank you for allowing our team to participate in the care of Maryalice Rebecca Wachter.  Please do not hesitate to page me with any questions or concerns.     Tres Metz MD  [facility]  Cardiology  Pager:  [phone]  Text Page   November 12, 2019    This note was transcribed using electronic voice recognition software, typographical errors may be present.         Interval Events:     - no chest pain overnight  - breathing overall  improved  - no complaints or concerns  - Lexiscan nuclear stress test negative 11/11/2019  - telemetry reviewed, no recurrence of atrial tachycardia vs SVT since initiation of amiodarone         Medications reviewed:   Prior to admission medications:  Prior to Admission medications    Medication Sig Start Date End Date Taking? Authorizing Provider   albuterol (ALBUTEROL) 108 (90 BASE) MCG/ACT inhaler Inhale 2 puffs into the lungs every 6 hours as needed Reported on 3/2/2017   Yes Reported, Patient   albuterol (PROVENTIL) (2.5 MG/3ML) 0.083% neb solution INHALE CONTENTS OF 1 VIAL (3 ML) VIA NEBULIZER EVERY 6 HOURS AS NEEDED FOR SHORTNESS OF BREATH / DYSPNEA OR WHEEZING 8/13/19  Yes Venu Maria PA-C   ascorbic acid (VITAMIN C) 500 MG tablet Take 500 mg by mouth daily.   Yes Reported, Patient   ASPIRIN EC PO Take 81 mg by mouth daily    Yes Reported, Patient   calcium carb 1250 mg, 500 mg [place],/vitamin D 200 units (OSCAL WITH D) 500-200 MG-UNIT per tablet Take 1 tablet by mouth daily.   Yes Reported, Patient   ferrous sulfate 325 (65 FE) MG tablet Take 1 tablet by mouth daily (with breakfast).   Yes Reported, Patient   Fluticasone-Umeclidin-Vilanterol (TRELEGY ELLIPTA) 100-62.5-25 MCG/INH oral inhaler Inhale 1 puff into the lungs daily 9/9/19  Yes Venu Maria PA-C   glipiZIDE (GLUCOTROL XL) 10 MG 24 hr tablet TAKE ONE TABLET BY MOUTH TWICE A DAY 10/4/19  Yes Venu Maria PA-C   hydrochlorothiazide (HYDRODIURIL) 25 MG tablet TAKE ONE TABLET BY MOUTH EVERY DAY 9/20/19  Yes Venu Maria PA-C   levothyroxine (SYNTHROID/LEVOTHROID) 75 MCG tablet TAKE ONE TABLET BY MOUTH EVERY DAY 7/11/19  Yes Venu Maria PA-C   lisinopril (PRINIVIL/ZESTRIL) 40 MG tablet TAKE ONE TABLET BY MOUTH EVERY DAY 7/11/19  Yes Venu Maria PA-C   LOPERAMIDE HCL PO Take 2-4 mg by mouth daily as needed    Yes Reported, Patient   metFORMIN (GLUCOPHAGE) 500 MG tablet TAKE ONE TABLET BY MOUTH  TWICE A DAY WITH MEALS 5/10/19  Yes Fartun Mcnair MD   metoprolol tartrate (LOPRESSOR) 50 MG tablet TAKE ONE TABLET BY MOUTH TWICE A DAY 10/4/19  Yes Venu Maria PA-C   pioglitazone (ACTOS) 30 MG tablet Take 1 tablet (30 mg) by mouth daily 9/9/19  Yes Venu Maria PA-C   polycarbophil (FIBERCON) 625 MG tablet Take 1 tablet by mouth 2 times daily.   Yes Reported, Patient   QUEtiapine (SEROQUEL) 25 MG tablet TAKE ONE-HALF TABLET BY MOUTH AT BEDTIME TO SLEEP . OK TO INCREASE BY 1/2 TABLET EVERY 3 NIGHTS UP TO 2 TABLETS DAILY IF NEEDED 8/8/19  Yes Venu Maria PA-C   ranitidine (ZANTAC) 150 MG tablet Take 1 tablet (150 mg) by mouth nightly as needed for heartburn 10/23/17  Yes Venu Maria PA-C   simvastatin (ZOCOR) 20 MG tablet TAKE ONE TABLET BY MOUTH AT BEDTIME 9/9/19  Yes Venu Maria PA-C   VITAMIN D, CHOLECALCIFEROL, PO Take 2,000 Units by mouth daily   Yes Reported, Patient   warfarin ANTICOAGULANT (COUMADIN) 3 MG tablet Take 1 tablet (3mg) daily or as directed by INR nurse 10/25/19  Yes Venu Maria PA-C   blood glucose monitoring (ONE TOUCH ULTRA 2) meter device kit Use to test blood sugar 1 time daily or as directed.  Ok to substitute alternative if insurance prefers. 8/13/18   Venu Maria PA-C   blood glucose monitoring (ONE TOUCH ULTRASOFT) lancets USE TO TEST BLOOD SUGAR 2 TO 3 TIMES A DAY. 9/6/19   Venu Maria PA-C   ONETOUCH ULTRA test strip USE TO TEST BLOOD SUGAR TWICE A DAY OR AS DIRECTED 3/25/19   Venu Maria PA-C        Current medications:  Current Facility-Administered Medications Ordered in Epic   Medication Dose Route Frequency Last Rate Last Dose     acetaminophen (TYLENOL) tablet 650 mg  650 mg Oral Q4H PRN   650 mg at 11/11/19 1556     albuterol (PROAIR HFA/PROVENTIL HFA/VENTOLIN HFA) 108 (90 Base) MCG/ACT inhaler 2 puff  2 puff Inhalation Q5 Min PRN         albuterol (PROVENTIL) neb  solution 2.5 mg  2.5 mg Nebulization Q4H PRN   2.5 mg at 11/10/19 0607     aminophylline  mg   mg Intravenous Once PRN         amiodarone (PACERONE/CODARONE) tablet 200 mg  200 mg Oral BID   200 mg at 11/12/19 0843     aspirin EC tablet 81 mg  81 mg Oral Daily   81 mg at 11/12/19 0844     bisacodyl (DULCOLAX) Suppository 10 mg  10 mg Rectal Once         calcium carbonate-vitamin D (OSCAL w/D) per tablet 1 tablet  1 tablet Oral Daily   1 tablet at 11/12/19 0843     glucose gel 15-30 g  15-30 g Oral Q15 Min PRN        Or     dextrose 50 % injection 25-50 mL  25-50 mL Intravenous Q15 Min PRN        Or     glucagon injection 1 mg  1 mg Subcutaneous Q15 Min PRN         ferrous sulfate (FEROSUL) tablet 325 mg  325 mg Oral Daily with breakfast   325 mg at 11/12/19 0843     furosemide (LASIX) injection 40 mg  40 mg Intravenous Q8H         hydrOXYzine (ATARAX) tablet 10 mg  10 mg Oral Q6H PRN   10 mg at 11/11/19 2214     insulin aspart (NovoLOG) inj (RAPID ACTING)  1-7 Units Subcutaneous TID AC   1 Units at 11/12/19 0845     insulin aspart (NovoLOG) inj (RAPID ACTING)  1-5 Units Subcutaneous At Bedtime   1 Units at 11/09/19 2232     insulin glargine (LANTUS) injection 8 Units  8 Units Subcutaneous At Bedtime   8 Units at 11/11/19 2159     ipratropium - albuterol 0.5 mg/2.5 mg/3 mL (DUONEB) neb solution 3 mL  3 mL Nebulization 4x daily   3 mL at 11/12/19 0627     levothyroxine (SYNTHROID/LEVOTHROID) tablet 75 mcg  75 mcg Oral Daily   75 mcg at 11/12/19 0843     lidocaine (LMX4) cream   Topical Q1H PRN         lidocaine 1 % 0.1-1 mL  0.1-1 mL Other Q1H PRN         LORazepam (ATIVAN) tablet 0.5 mg  0.5 mg Oral Q8H PRN   0.5 mg at 11/10/19 2222     metoprolol tartrate (LOPRESSOR) tablet 25 mg  25 mg Oral BID   25 mg at 11/12/19 0841     miconazole (MICATIN/MICRO GUARD) 2 % powder   Topical Q1H PRN         ondansetron (ZOFRAN-ODT) ODT tab 4 mg  4 mg Oral Q6H PRN        Or     ondansetron (ZOFRAN) injection 4 mg  4 mg  Intravenous Q6H PRN         polyethylene glycol (MIRALAX/GLYCOLAX) Packet 17 g  17 g Oral Daily PRN   17 g at 11/10/19 1509     QUEtiapine (SEROquel) tablet 25 mg  25 mg Oral At Bedtime PRN         simvastatin (ZOCOR) tablet 20 mg  20 mg Oral At Bedtime   20 mg at 19 2159     sodium chloride (PF) 0.9% PF flush 10 mL  10 mL Intravenous Q10 Min PRN         sodium chloride (PF) 0.9% PF flush 5-10 mL  5-10 mL Intravenous q1 min prn         sodium chloride bacteriostatic 0.9 % flush 0-1 mL  0-1 mL Intradermal Once PRN         spironolactone (ALDACTONE) half-tab 12.5 mg  12.5 mg Oral Daily   12.5 mg at 19 0843     temazepam (RESTORIL) capsule 7.5 mg  7.5 mg Oral At Bedtime PRN         Warfarin Therapy Reminder (Check START DATE - warfarin may be starting in the FUTURE)  1 each Does not apply Continuous PRN         No current [facility]-ordered outpatient medications on file.             Physical Exam:   Vital signs were reviewed:  Temperatures:  Current - Temp: 97.6  F (36.4  C); Max - Temp  Av.3  F (36.8  C)  Min: 97.5  F (36.4  C)  Max: 99.1  F (37.3  C)  Respiration range: Resp  Av.8  Min: 18  Max: 24  Pulse range: No data recorded  Blood pressure range: Systolic (24hrs), Av , Min:102 , Max:129   ; Diastolic (24hrs), Av, Min:40, Max:73    Pulse oximetry range: SpO2  Av %  Min: 82 %  Max: 99 %    Intake/Output Summary (Last 24 hours) at 2019 1114  Last data filed at 2019 0849  Gross per 24 hour   Intake 440 ml   Output 1050 ml   Net -610 ml     186 lbs 11.2 oz  Body mass index is 30.6 kg/m .   Body surface area is 1.98 meters squared.    Constitutional: appears stated age, in no apparent distress, appears to be well nourished  Eyes: sclera anicteric, conjunctiva normal, no lesions on eyelids or lashes  ENT: normocephalic, without obvious abnormality, atraumatic, external ears without lesions,   Pulmonary: clear to auscultation bilaterally, no wheezes, no rales, no increased work  of breathing  Cardiovascular: JVP normal, regular rate, regular rhythm, normal S1 and S2, no lower extremity edema  Gastrointestinal: abdominal exam benign, non-tender, no rigidity, no guarding  Neurologic: awake, alert, face symmetrical, moves all extremities  Skin: no abnormal rashes or lesions on limited exam, nails normal without discoloration or clubbing, no jaundice  Psychiatric: affect is normal, answers questions appropriately, oriented to self and place         Selected laboratory tests:   Laboratory test results personally reviewed:   CMP  Recent Labs   Lab 11/12/19  0642 11/11/19  0708 11/10/19  0706 11/09/19  0628 11/08/19  0817 11/07/19  0548 11/06/19  0430 11/05/19  1411    138 139 134 136 138 139 138   POTASSIUM 3.7 4.2 4.2 4.0 4.2 4.1 4.2 4.2   CHLORIDE 94 101 102 100 103 104 105 103   CO2 40* 33* 32 28 30 30 23 27   ANIONGAP 4 4 5 6 3 4 11 8   * 168* 169* 176* 174* 116* 313* 226*   BUN 26 28 28 30 32* 38* 29 21   CR 1.32* 1.25* 1.38* 1.28* 1.23* 1.49* 1.44* 1.15*   GFRESTIMATED 36* 39* 35* 38* 40* 31* 33* 43*   GFRESTBLACK 42* 45* 40* 44* 46* 36* 38* 50*   VASHTI 9.1 9.2 9.1 9.1 9.2 8.9 9.3 9.0   MAG  --   --   --   --  2.1 2.4* 1.7 1.9   PHOS  --   --   --   --   --   --   --  4.7*   PROTTOTAL  --   --   --   --   --   --   --  6.5*   ALBUMIN  --   --   --   --   --   --   --  3.4   BILITOTAL  --   --   --   --   --   --   --  0.6   ALKPHOS  --   --   --   --   --   --   --  72   AST  --   --   --   --   --   --   --  55*   ALT  --   --   --   --   --   --   --  30     CBC  Recent Labs   Lab 11/08/19  0817 11/07/19  0548 11/05/19  2303 11/05/19  1411   WBC 7.2 12.3* 10.4 8.6   RBC 3.19* 3.11* 3.41* 3.84   HGB 9.8* 9.4* 10.5* 11.8   HCT 31.4* 30.7* 33.5* 37.9   MCV 98 99 98 99   MCH 30.7 30.2 30.8 30.7   MCHC 31.2* 30.6* 31.3* 31.1*   RDW 15.4* 15.6* 15.2* 15.1*    237 240 302     INR  Recent Labs   Lab 11/12/19  0642 11/11/19  0708 11/10/19  0706 11/09/19  0628   INR 2.26* 2.19*  2.20* 2.32*     Lab Results   Component Value Date    TROPI 0.865 (HH) 2019    TROPI 0.931 (HH) 2019    TROPI 0.839 () 2019    TROPONIN 0.01 2019     Recent Labs   Lab Test 07/15/19  0948 18  0913  06/30/15  0952   CHOL 109 105   < > 109   HDL 39* 39*   < > 36*   LDL 34 27   < > 25   TRIG 178* 195*   < > 240*   CHOLHDLRATIO  --   --   --  3.0    < > = values in this interval not displayed.     Lab Results   Component Value Date    A1C 7.5 2019    A1C 7.8 2019    A1C 7.2 10/15/2018    A1C 7.4 2018    A1C 7.4 2018     TSH   Date Value Ref Range Status   07/15/2019 3.08 0.40 - 4.00 mU/L Final            Selected Imaging and Additional Data:   Additional data personally reviewed:  Recent Results (from the past 4320 hour(s))   Echocardiogram Limited    Narrative    [id number]  [id number]  [id number]  [id number]^HARLEY^VARGHESE^KIERSTEN           [facility]  Echocardiography Laboratory  [address] [place] [address]        Name: WACHTER, MARYALICE REBECCA  MRN: [id number]  : 1933  Study Date: 2019 10:17 AM  Age: 85 yrs  Gender: Female  Patient [facility]: [facility]  Reason For Study: Cardiomyopathy  Ordering Physician: VARGHESE SOUZA  Referring Physician: Venu Maria  Performed By: Carlos Enrique Ford RDCS     BSA: 1.9 m2  Height: 65 in  Weight: 193 lb  BP: 115/51 mmHg  _____________________________________________________________________________  __        Procedure  Limited Portable Echo Adult. Optison (NDC #2665-4987) given intravenously.  _____________________________________________________________________________  __        Interpretation Summary     The visual ejection fraction is estimated at 37-43%.  Diastolic Doppler findings (E/E' ratio and/or other parameters) suggest left  ventricular filling pressures are increased.  By direct comparison the EF has improved compared to study 19 (possible  tako tsubo on that echo). The Mid/distal septum  and infero-apical walls are  still hypokinetic now but improved. Cannot assess diastolic function, if this  is NSR/sinus tach there is merging of the E and A waves but E/e suggests  elevated LV filling pressure today  The left atrium is mildly dilated.  Right ventricular systolic pressure is elevated, consistent with mild  pulmonary hypertension.  _____________________________________________________________________________  __        Left Ventricle  The left ventricle is normal in size. There is normal left ventricular wall  thickness. The visual ejection fraction is estimated at 37-43%. Left  ventricular diastolic function is not assessable. Diastolic Doppler findings  (E/E' ratio and/or other parameters) suggest left ventricular filling  pressures are increased. By direct comparison the EF has improved compared to  study 11-5-19 (possible tako tsubo on that echo). The Mid/distal septum and  infero-apical walls are still hypokinetic now but improved. Cannot assess  diastolic function, if this is NSR/sinus tach there is merging of the E and A  waves but E/e suggests elevated LV filling pressure today. There is moderate  septal hypokinesis. There is mild to moderate apical wall hypokinesis. There  is no thrombus seen in the left ventricle.     Right Ventricle  The right ventricle is normal in structure, function and size.     Atria  The left atrium is mildly dilated. Right atrial size is normal.     Mitral Valve  There is moderate mitral annular calcification. There is trace to mild mitral  regurgitation.        Tricuspid Valve  There is trace to mild tricuspid regurgitation. The right ventricular systolic  pressure is approximated at 27.9 mmHg plus the right atrial pressure. Right  ventricular systolic pressure is elevated, consistent with mild pulmonary  hypertension. IVC diameter and respiratory changes fall into an intermediate  range suggesting an RA pressure of 8 mmHg.     Aortic Valve  Normal tricuspid aortic  valve.     Pulmonic Valve  The pulmonic valve is not well seen, but is grossly normal.     Vessels  Borderline aortic root dilatation.     Pericardium  The pericardium appears normal.        Rhythm  The rhythm was undetermined.  _____________________________________________________________________________  __  MMode/2D Measurements & Calculations     IVSd: 1.1 cm  LVIDd: 4.9 cm  LVIDs: 3.8 cm  LVPWd: 1.0 cm  FS: 20.9 %  LV mass(C)d: 187.0 grams  LV mass(C)dI: 96.0 grams/m2  Ao root diam: 3.7 cm  LA dimension: 3.0 cm  LA/Ao: 0.83  LA Volume (BP): 71.7 ml  LA Volume Index (BP): 36.8 ml/m2  RWT: 0.42           Doppler Measurements & Calculations  MV E max michael: 137.0 cm/sec  MV dec slope: 1080 cm/sec2  MV dec time: 0.13 sec  TR max michael: 264.0 cm/sec  TR max P.9 mmHg  E/E' av.4  Lateral E/e': 10.8  Medial E/e': 29.9           _____________________________________________________________________________  __           Report approved by: Frederick Tellez 2019 01:48 PM      Echo Limited    Narrative    [id number]  [id number]  [id number]  [id number]^MISA^JEFFERSON^[facility]  Echocardiography Laboratory  [address] [place] [address]        Name: WACHTER, MARYALICE REBECCA  MRN: [id number]  : 1933  Study Date: 2019 06:49 PM  Age: 85 yrs  Gender: Female  Patient Location: [facility]  Reason For Study: Respiratory Failure  Ordering Physician: JEFFERSON BYNUM  Referring Physician: Venu Maria  Performed By: Juliette Malone RDCS     BSA: 1.9 m2  Height: 65 in  Weight: 193 lb  HR: 94  BP: 100/54 mmHg  _____________________________________________________________________________  __        Procedure  Limited Portable Echo Adult. Optison (NDC #[id number]) given intravenously.  (Emergent exam, abbreviated study performed).  _____________________________________________________________________________  __        Interpretation Summary     LVEF 25% based on biplane 2D  tracing.  Left ventricular systolic function is severely reduced.  Severe global hypokineiss of mid and distal segments with preserved  contractility at the base. These findings could be seen in patients with  stress induced cardiomyopathy vs Multivessel disease (less likely but not  excluded).  EF decreased compared to .  Findings discussed with Dr Serrato at 7.54pm today. The study was technically  difficult.  _____________________________________________________________________________  __        Left Ventricle  The left ventricle is normal in size. Left ventricular systolic function is  severely reduced. LVEF 25% based on biplane 2D tracing. Severe global  hypokineiss of mid and distal segments with preserved contractility at the  base. These findings could be seen in patients with stress induced  cardiomyopathy vs Multivessel disease.     Right Ventricle  The right ventricle is normal size. The right ventricular systolic function is  mild to moderately reduced. THe mid segments of RV free wall are severely  hypokinetic.     Mitral Valve  There is mild mitral annular calcification. There is mild to moderate (1-2+)  mitral regurgitation.     Tricuspid Valve  There is mild (1+) tricuspid regurgitation. The right ventricular systolic  pressure is approximated at 24.7 mmHg plus the right atrial pressure.        Aortic Valve  There is mild trileaflet aortic sclerosis. There is trace aortic  regurgitation. No aortic stenosis is present.     Pulmonic Valve  The pulmonic valve is not well visualized.     Vessels  The aortic root is normal size.     Pericardium  There is no pericardial effusion.     Rhythm  Sinus rhythm was noted.     _____________________________________________________________________________  __  MMode/2D Measurements & Calculations  asc Aorta Diam: 3.1 cm  TAPSE: 1.3 cm           Doppler Measurements & Calculations  TR max michael: 248.5 cm/sec  TR max P.7 mmHg            _____________________________________________________________________________  __           Report approved by: Frederick Quach 11/05/2019 07:55 PM

## 2019-11-12 NOTE — PROVIDER NOTIFICATION
Provider paged: FYI: Tele strip indicated SR with 1AVB inverted T's. No other strips shows 1AVB, this is a new finding. Thank you

## 2019-11-12 NOTE — PLAN OF CARE
Discharge Planner OT   Patient plan for discharge: Home  Current status: Pt fatigued from just working with PT, agreeable to education. Pt educated on EC/WS strategies to implement into ADL/IADL routines in order to manage SOB and fatigue, handout provided and reviewed with pt. Pt using some strategies prior to admission and owns shower chair. Pt reporting spouse able to assist with tasks as needed upon return home.   Barriers to return to prior living situation: Decreased activity tolerance for I/ADLs; decreased independence in I/ADLs.   Recommendations for discharge: Home with assist for LB dressing and IADLs (homemaking, laundry) and home OT   Rationale for recommendations: Home OT for home safety evaluation, and to increase activity tolerance and independence for I/ADLs, as pt now requires assist and for I/ADLs.        Entered by: Shruthi Pressley 11/12/2019 3:54 PM

## 2019-11-12 NOTE — PROGRESS NOTES
Pt called due to feeling short of breath.    /49. O2 97%  Resp - 24 HR 64.  Denies pain.   Paged RT for a neb.

## 2019-11-12 NOTE — PLAN OF CARE
Pt is a/o. UP with ast x 1 walker.   Tele: SR with notched P waves and inverted T's.   Dyspnea on exertion. LS:  clear/dim.  O2 95% on 1L NC.   B

## 2019-11-12 NOTE — PROGRESS NOTES
[facility]  Hospitalist Progress Note  Bird Mcknight MD 11/12/2019    Reason for Stay (Diagnosis): CHF         Assessment and Plan:      Summary of Stay: Maryalice Rebecca Wachter is a 85 year old female with a history of atrial fibrillation on warfarin, DM II, COPD who was admitted to the ICU for acute hypoxic respiratory failure and cardiogenic shock secondary to cardiomyopathy.      Patient had a sudden onset of shortness of breath at home.  Required BiPAP to maintain saturations in route and in the ED.  Had evidence of shock with hypotension and lactic acidosis.  BP improved after fluids.  Basic work-up was negative including labs and chest x-ray. Stat echocardiogram obtained in the ED showed severe global hypokinesis.  She  was admitted to the ICU and remained on BiPAP in the evening of admission.  She was initially started on vasopressor therapy and continued on BiPAP.  Her vasopressor therapy was stopped on the a.m. of 11/6.  She was started on IV heparin for NSTEMI.  She was able to transfer out of the intensive care unit on 11/7.    It is suspected that her CM was due to either stress CM vs tachycardia-mediated due to underlying afib.    TTE done 11/5 showed EF 25%; repeat TTE done 11/8 showed EF improved to near 40%  Cards following and recommend NM stress test prior to discharge.  Pt remains on amiodarone gtt (load) for treatment and rate control of afib     Pt reports she is still having SHUKLA and breathing not yet at baseline.  Weight has been fairly stable despite diuresis and remains on oxygen (does not use home O2)     1.  Cardiomyopathy.  Likely stress cardiomyopathy vs tachycardia mediated.  Cardiology following.  -Cardiology started amiodarone for rhythm control.  -Remains fluid overloaded.  -will give one more day of IV Lasix 40 mg q12h x 2 doses, then start PO dosing tomorrow  -Continue metoprolol 25 mg twice a day.  - NM stress test neg for ischemia  - recommended low  "salt diet and daily weight check on discharge from hospital  - recommend repeat TTE in several weeks after discharge to reassess LV ftn     2.  Paroxysmal atrial for ablation.  -on amiodarone.  -Continue metoprolol.  -Continue warfarin.     3.  Hypothyroidism.  Continue levothyroxine.     4.  Hypertension.  Initially hypotensive.  Hypotension has resolved.  -Continue metoprolol 25 mg twice a day.     5.  Hyperlipidemia.  Continue simvastatin.     6.  Diabetes mellitus.  Pioglitazone and glipizide currently on hold.  -Continue NovoLog sliding scale.  -continue Lantus 8 units at bedtime.     7.  Chronic kidney disease.  Creatinine likely at baseline today at 1.2.  -Avoid nephrotoxins as able.     8.  Deconditioning.  Continue to work with therapy.     I updated pt's  at bedside today   Pt d/w bedside nurse today     Diet: Room Service  Consistent Carbohydrate Diet 0388-2022 Calories: Moderate Consistent CHO (4-6 CHO units/meal)    DVT Prophylaxis: Warfarin  Che Catheter: not present  Code Status: Full Code    Dispo:  Anticipate discharge tomorrow      Interval History (Subjective):      Still having some SOB but is improving.  Had good urine output overnight with drop in weight today of 4 pounds                  Physical Exam:      Last Vital Signs:  /40 (BP Location: Left arm)   Pulse 68   Temp 97.6  F (36.4  C) (Oral)   Resp 18   Ht 1.664 m (5' 5.5\")   Wt 84.7 kg (186 lb 11.2 oz)   SpO2 94%   BMI 30.60 kg/m        Intake/Output Summary (Last 24 hours) at 11/12/2019 1238  Last data filed at 11/12/2019 1100  Gross per 24 hour   Intake 440 ml   Output 1100 ml   Net -660 ml       Constitutional: Awake, alert, cooperative, no apparent distress   Respiratory: Clear to auscultation bilaterally, no crackles or wheezing   Cardiovascular: Regular rate and rhythm, normal S1 and S2, and no murmur noted   Abdomen: Normal bowel sounds, soft, non-distended, non-tender   Skin: No rashes, no cyanosis, dry to " touch   Neuro: Alert and oriented x3, no weakness, numbness, memory loss   Extremities: less edema, normal range of motion   Other(s):        All other systems: Negative          Medications:      All current medications were reviewed with changes reflected in problem list.         Data:      All new lab and imaging data was reviewed.   Labs:  Recent Labs   Lab 11/08/19  0817 11/07/19  0548 11/05/19  2303   WBC 7.2 12.3* 10.4   HGB 9.8* 9.4* 10.5*   HCT 31.4* 30.7* 33.5*   MCV 98 99 98    237 240      Imaging:   Recent Results (from the past 24 hour(s))   NM MPI w Lexiscan   Result Value    Target     Baseline Systolic     Baseline Diastolic BP 62    Last Stress Systolic     Last Stress Diastolic BP 65    Baseline HR 81    Max     Calculated Percent HR 85    Rate Pressure Product 14,934.0    Nuc Rest EF 52    Narrative       The nuclear stress test is negative for inducible myocardial ischemia   or infarction. There is a fixed distal anterior wall defect most likely   consistent with an attenuation artifact.     Left ventricular function is low normal.     The left ventricular ejection fraction at rest is 52%.     A prior study was conducted on 4/14/2017. In prior study basal septal   wall ischemia was reported.

## 2019-11-12 NOTE — PLAN OF CARE
Discharge Planner PT   Patient plan for discharge: home  Current status: Patient ambulates 100' with FWW and CGA/SBA. Mild balance deficits noted. PT cues for PLB due to SOB. SpO2 low 80's on 1L via NC.   Barriers to return to prior living situation: SOB, decreased endurance  Recommendations for discharge: home with FWW, home PT  Rationale for recommendations: Pt presenting below baseline for functional mobility, would benefit from continued IP PT to increase endurance and activity tolerance. Pt may benefit from use of FWW for ambulating longer distances. HHPT indicated as it would be a taxing effort for the pt to leave the home for OPPT, limiting her ability to participate in therapies.        Entered by: Rina Herrera 11/11/2019 6:02 PM

## 2019-11-12 NOTE — PLAN OF CARE
Discharge Planner PT   Patient plan for discharge: home tomorrow  Current status:  gait with RW to 200 feet with CGA to SBA, basic tranfers with S only   Barriers to return to prior living situation: SOB, decreased endurance  Recommendations for discharge: home with FWW, home PT per plan established by the PT.   Rationale for recommendations: Pt presenting below baseline for functional mobility, would benefit from continued IP PT to increase endurance and activity tolerance. Pt may benefit from use of FWW for ambulating longer distances. HHPT indicated as it would be a taxing effort for the pt to leave the home for OPPT, limiting her ability to participate in therapies       Entered by: Vale Garcia 11/12/2019 3:35 PM

## 2019-11-13 ENCOUNTER — DOCUMENTATION ONLY (OUTPATIENT)
Dept: MEDSURG UNIT | Facility: CLINIC | Age: 84
End: 2019-11-13

## 2019-11-13 VITALS
HEART RATE: 68 BPM | BODY MASS INDEX: 29.15 KG/M2 | HEIGHT: 66 IN | DIASTOLIC BLOOD PRESSURE: 48 MMHG | WEIGHT: 181.4 LBS | TEMPERATURE: 99 F | OXYGEN SATURATION: 92 % | SYSTOLIC BLOOD PRESSURE: 104 MMHG | RESPIRATION RATE: 18 BRPM

## 2019-11-13 LAB
ANION GAP SERPL CALCULATED.3IONS-SCNC: 7 MMOL/L (ref 3–14)
BUN SERPL-MCNC: 26 MG/DL (ref 7–30)
CALCIUM SERPL-MCNC: 9 MG/DL (ref 8.5–10.1)
CHLORIDE SERPL-SCNC: 93 MMOL/L (ref 94–109)
CO2 SERPL-SCNC: 36 MMOL/L (ref 20–32)
CREAT SERPL-MCNC: 1.36 MG/DL (ref 0.52–1.04)
GFR SERPL CREATININE-BSD FRML MDRD: 35 ML/MIN/{1.73_M2}
GLUCOSE BLDC GLUCOMTR-MCNC: 160 MG/DL (ref 70–99)
GLUCOSE BLDC GLUCOMTR-MCNC: 178 MG/DL (ref 70–99)
GLUCOSE BLDC GLUCOMTR-MCNC: 212 MG/DL (ref 70–99)
GLUCOSE BLDC GLUCOMTR-MCNC: 266 MG/DL (ref 70–99)
GLUCOSE SERPL-MCNC: 159 MG/DL (ref 70–99)
INR PPP: 2.15 (ref 0.86–1.14)
POTASSIUM SERPL-SCNC: 4.1 MMOL/L (ref 3.4–5.3)
SODIUM SERPL-SCNC: 136 MMOL/L (ref 133–144)

## 2019-11-13 PROCEDURE — 99239 HOSP IP/OBS DSCHRG MGMT >30: CPT | Performed by: INTERNAL MEDICINE

## 2019-11-13 PROCEDURE — 25000132 ZZH RX MED GY IP 250 OP 250 PS 637: Performed by: INTERNAL MEDICINE

## 2019-11-13 PROCEDURE — 94640 AIRWAY INHALATION TREATMENT: CPT

## 2019-11-13 PROCEDURE — 40000275 ZZH STATISTIC RCP TIME EA 10 MIN

## 2019-11-13 PROCEDURE — 94640 AIRWAY INHALATION TREATMENT: CPT | Mod: 76

## 2019-11-13 PROCEDURE — 85610 PROTHROMBIN TIME: CPT | Performed by: INTERNAL MEDICINE

## 2019-11-13 PROCEDURE — 25000125 ZZHC RX 250: Performed by: HOSPITALIST

## 2019-11-13 PROCEDURE — 36415 COLL VENOUS BLD VENIPUNCTURE: CPT | Performed by: INTERNAL MEDICINE

## 2019-11-13 PROCEDURE — 00000146 ZZHCL STATISTIC GLUCOSE BY METER IP

## 2019-11-13 PROCEDURE — 80048 BASIC METABOLIC PNL TOTAL CA: CPT | Performed by: INTERNAL MEDICINE

## 2019-11-13 RX ORDER — FUROSEMIDE 40 MG
40 TABLET ORAL 2 TIMES DAILY
Qty: 60 TABLET | Refills: 1 | Status: SHIPPED | OUTPATIENT
Start: 2019-11-13 | End: 2019-12-10

## 2019-11-13 RX ORDER — AMIODARONE HYDROCHLORIDE 200 MG/1
200 TABLET ORAL DAILY
Qty: 30 TABLET | Refills: 1 | Status: SHIPPED | OUTPATIENT
Start: 2019-11-13 | End: 2019-12-10

## 2019-11-13 RX ORDER — SPIRONOLACTONE 25 MG/1
12.5 TABLET ORAL DAILY
Qty: 30 TABLET | Refills: 1 | Status: SHIPPED | OUTPATIENT
Start: 2019-11-14 | End: 2019-12-10

## 2019-11-13 RX ORDER — METOPROLOL TARTRATE 25 MG/1
25 TABLET, FILM COATED ORAL 2 TIMES DAILY
Qty: 60 TABLET | Refills: 1 | Status: SHIPPED | OUTPATIENT
Start: 2019-11-13 | End: 2020-01-16 | Stop reason: DRUGHIGH

## 2019-11-13 RX ORDER — WARFARIN SODIUM 3 MG/1
3 TABLET ORAL
Status: DISCONTINUED | OUTPATIENT
Start: 2019-11-13 | End: 2019-11-13 | Stop reason: HOSPADM

## 2019-11-13 RX ADMIN — AMIODARONE HYDROCHLORIDE 200 MG: 200 TABLET ORAL at 09:04

## 2019-11-13 RX ADMIN — OYSTER SHELL CALCIUM WITH VITAMIN D 1 TABLET: 500; 200 TABLET, FILM COATED ORAL at 09:04

## 2019-11-13 RX ADMIN — IPRATROPIUM BROMIDE AND ALBUTEROL SULFATE 3 ML: .5; 3 SOLUTION RESPIRATORY (INHALATION) at 07:19

## 2019-11-13 RX ADMIN — METOPROLOL TARTRATE 25 MG: 25 TABLET, FILM COATED ORAL at 09:05

## 2019-11-13 RX ADMIN — Medication 12.5 MG: at 09:04

## 2019-11-13 RX ADMIN — POLYETHYLENE GLYCOL 3350 17 G: 17 POWDER, FOR SOLUTION ORAL at 10:10

## 2019-11-13 RX ADMIN — LEVOTHYROXINE SODIUM 75 MCG: 75 TABLET ORAL at 09:04

## 2019-11-13 RX ADMIN — ASPIRIN 81 MG: 81 TABLET ORAL at 09:04

## 2019-11-13 RX ADMIN — IPRATROPIUM BROMIDE AND ALBUTEROL SULFATE 3 ML: .5; 3 SOLUTION RESPIRATORY (INHALATION) at 11:51

## 2019-11-13 RX ADMIN — FERROUS SULFATE TAB 325 MG (65 MG ELEMENTAL FE) 325 MG: 325 (65 FE) TAB at 09:04

## 2019-11-13 ASSESSMENT — ACTIVITIES OF DAILY LIVING (ADL)
ADLS_ACUITY_SCORE: 14
ADLS_ACUITY_SCORE: 18
ADLS_ACUITY_SCORE: 14
ADLS_ACUITY_SCORE: 14

## 2019-11-13 ASSESSMENT — MIFFLIN-ST. JEOR: SCORE: 1271.64

## 2019-11-13 NOTE — PROGRESS NOTES
[facility]  Met with pt to discuss plans for HC.  Pt to be discharged home today and has agreed to have [facility] follow with services of SN, PT and OT. Patient care support center processing referral.  Pt verbalized understanding that initial visit is scheduled for 11/14 or 11/15/19.   Pt has 24 hour phone number for [facility] for any questions or concerns.

## 2019-11-13 NOTE — PHARMACY-ANTICOAGULATION SERVICE
Clinical Pharmacy- Warfarin Discharge Note  This patient is currently on warfarin for the treatment of Atrial fibrillation.  INR Goal= 2-3  Expected length of therapy lifetime.           Anticoagulation Dose History     Recent Dosing and Labs Latest Ref Rng & Units 11/7/2019 11/8/2019 11/9/2019 11/10/2019 11/11/2019 11/12/2019 11/13/2019    Warfarin 1 mg - 1 mg - - - - - -    Warfarin 1.5 mg - - 1.5 mg - - - - -    Warfarin 2 mg - - - 2 mg - - - -    Warfarin 2.5 mg - - - - 2.5 mg - - -    Warfarin 3 mg - - - - - 3 mg 3 mg -    INR 0.86 - 1.14 2.65(H) 2.61(H) 2.32(H) 2.20(H) 2.19(H) 2.26(H) 2.15(H)    INR 0.86 - 1.14 - - - - - - -    INR Point of Care 0.86 - 1.14 - - - - - - -          Agree with discharging the patient on a warfarin regimen of 3 mg daily.    The patient should have an INR checked in 2 days on 11/15/19.

## 2019-11-13 NOTE — PLAN OF CARE
Cared for pt [id number] - VSS. BP soft. On 1L of O2. A&Ox4. Ruby. Pt c/o pain in her arms. Says the IV hurts. Writer assessed and IV flushes well and doesn't hurt pt when flushed. Right arm red/pink and tender from old infiltrated IV. Offered Ice, pt refused. . Prn atarax and tylenol given this shift. LS clear. Tele Sr with inverted T waves. +2 pitting edema to ankles and feet. Plan to discharge home tomorrow on PO lasix. Continue to monitor per POC.

## 2019-11-13 NOTE — PLAN OF CARE
Patient has been assessed for Home Oxygen needs. Oxygen readings:    *Pulse oximetry (SpO2) = 92% on room air at rest while awake.    N/A  *SpO2 improved to 92% on 0liters/minute at rest.    *SpO2 = 78% on room air during activity/with exercise. Ambulated hallway 90'    *SpO2 improved to 92% on 1.5liters/minute during activity/with exercise.

## 2019-11-13 NOTE — PROGRESS NOTES
Pt seen and examined.  Having some constipation concerns.  No SOB.  Edema improved.  Weight down to 181.  Appears appropriate for discharge today.      Will order home therapy on discharge

## 2019-11-13 NOTE — PLAN OF CARE
VSS ex BP soft but asymptomatic. Afebrile, 92% on RA. C/o 2/10 BUE arm pain/discomfort d/t previous IV/venipunture sites. Declined interventions at this time, intermittently uses heating pad with relief. , declined noon meal. Tele - NSR. Ax1/SBA walker. Calls appro. Will discharge back to home with home O2, and HHC referral. Transportation via spouse.     AVS reviewed with pt. All questions and concerns addressed. Pt denies any further questions or concerns. PIV removed, no complications. Telemetry monitor removed. All belongings returned. Waiting on Home o2 to be delivered to pt.

## 2019-11-13 NOTE — PROGRESS NOTES
Patient alert and oriented.  [place], hearing aid in right ear.  Up with assist of 1 gait belt walker.  Mod carb diet, blood sugars 140, 188, 249.  Lung sounds diminished.  Dyspnea on exertion, 02 94% 1L Temp 99.1  Right arm pink blanchable edema, warmth to area, arm elevated, improved since observed yesterday.  Will continue to monitor.         Tele: SR Inverted T's.  IV lasix  2+ pitting edema BLE  1+ pitting edema BUE

## 2019-11-13 NOTE — PROGRESS NOTES
I certify that this patient, Maryalice Rebecca Wachter has been under my care and that I, or a nurse practitioner or physician's assistant working with me, had a face-to-face encounter that meets face-to-face encounter requirements with this patient on November 13, 2019.    Maryalice Rebecca Wachter is now in a chronic stable state and continues to require supplemental oxygen due to continued oxygen desaturation.  This patient has been treated in part, or in whole for the following medical condition(s):  Chronic Heart Failure I50  Treatments tried and failed or ruled out to treat hypoxemia include diuresis.  If portability is ordered, is the patient mobile within the home? yes

## 2019-11-13 NOTE — PROGRESS NOTES
Received intake call for home oxygen at 1:23PM. Reviewed patient's chart; Missing oxygen order with valid diagnosis, and F2F notes to include .phrase  1:43 PM- Spoke with JANNETTE Jonas to tell her we were missing an order, and F2F notes discussing the need for oxygen in relation to a valid diagnosis. She said she had already text paged the MD to get this done. Since there was not yet an order I asked what diagnosis they were planning on using to qualify the patient, sukhdeep confirmed CHF as diagnosis to be used for oxygen qualifications.  1:56 PM- Order was received in patients chart, still missing F2F notes.  2:02 PM- attempted to contact the patient to offer choice; unsuccessful, phone kept ringing with no answer.  2:10 PM- Called to offer choice and patient is okay with [facility] setting them up. Discussed equipment with patient and informed them that we would be to bedside with oxygen in the next 2 hours after receiving documentation from MD.  2:26 PM- Patient qualifies under Medicare guidelines and all documentation is in the chart including a good order.

## 2019-11-13 NOTE — DISCHARGE SUMMARY
Admit Date:     11/05/2019   Discharge Date:           PRINCIPAL AND FINAL DIAGNOSES:   1.  Acute on chronic systolic heart failure exacerbation.   2.  New diagnosis of cardiomyopathy.  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   3.  Acute hypoxic respiratory failure secondary to acute on chronic systolic heart failure exacerbation.   4.  Cardiogenic shock secondary to acute cardiomyopathy.      PAST MEDICAL HISTORY:   1.  History of paroxysmal atrial fibrillation.   2.  Hypothyroidism.   3.  Hypertension.   4.  Hyperlipidemia.   5.  Diabetes mellitus.   6.  Chronic kidney disease, baseline creatinine near 1.2.   7.  Obesity with BMI of 29.   8.  Chronic obstructive pulmonary disease history.      PRINCIPAL PROCEDURES THIS ADMISSION:   1.  Cardiology consultation.   2.  Echocardiogram.  The patient had a 2D echocardiogram performed this admission.  Initial echocardiogram performed 11/05/2019 showed ejection fraction of 25% with severely reduced left ventricular systolic function and severe global hypokinesis.  Repeat echocardiogram was performed on 11/08/2019 that showed improvement of ejection fraction to 37%-43%.   3.  Nuclear medicine stress test performed this admission showing no evidence of ischemia with ejection fraction measured at 52%.  This was performed on 11/11/2019.   4.  Chest x-ray.      REASON FOR ADMISSION:  Please see dictated history and physical.  In brief, Ms. Wachter is an 86-year-old female with a history of atrial fibrillation, maintained on warfarin therapy, diabetes mellitus, and COPD who was admitted to the Intensive Care Unit for acute hypoxic respiratory failure and cardiogenic shock related to cardiomyopathy.  Please see dictated history and physical for details.      HOSPITAL COURSE:  Cardiogenic shock secondary to acute cardiomyopathy:  The patient was admitted to the ICU with hypotension in the setting of cardiomyopathy.  She  responded well to therapy.  She was transferred out of the ICU two days after admission.  Initially she required BiPAP therapy and initially started on vasopressor therapy, which was weaned off within 24 hours.  Following stabilization of her blood pressure, we were able to diurese the patient.  By day of discharge, patient's weight had decreased to 181 pounds.  She lost about 10 pounds of weight while hospitalized.  Her breathing is much improved.  She does require home oxygen on discharge from the hospital with activity only.  The patient was discharged home today with home therapy.      DISCHARGE MEDICATIONS:   1.  Amiodarone 200 mg daily.  Started this admission for atrial fibrillation.   2.  Aspirin 81 mg daily.   3.  Calcium with vitamin D.   4.  Ferrous sulfate.   5.  FiberCon.   6.  Trelegy Ellipta inhaler 1 puff daily.   7.  Lasix 40 mg twice a day.  New medication this admission.   8.  Glipizide XL 10 mg twice a day.   9.  Levothyroxine 75 mcg daily.   10.  Loperamide as needed for loose stools.   11.  Metformin 500 mg twice a day with meals.   12.  Metoprolol 25 mg twice a day.   13.  Actos 30 mg daily.   14.  ProAir inhaler every 6 hours as needed.   15.  Seroquel 12.5 mg at bedtime.   16.  Zantac 150 mg nightly as needed for heartburn.   17.  Simvastatin 20 mg at bedtime.   18.  Spironolactone 12.5 mg daily.  New medication this admission.   19.  Vitamin C.   20.  Vitamin D.   21.  Warfarin 3 mg daily as directed by INR clinic.      FOLLOWUP INSTRUCTIONS:   1.  See primary MD within 1 week.  Check blood pressure and electrolytes/kidney function at that visit.   2.  Follow up with Cardiology Clinic as instructed.   3.  Recommend check INR and basic metabolic panel in 2 days.  INR is 2.15 and creatinine is 1.3 on discharge from the hospital.   4.  Lisinopril dose has been stopped as her blood pressure is mildly low while in the hospital.  Discuss with her primary MD at next clinic visit about if and when  to resume this medication.   5.  Hydrochlorothiazide has been stopped and replaced by furosemide to help prevent fluid retention.   6.  Recommend to weigh yourself every morning and record your weight.  If you gain more than 2 pounds in a day or 5 pounds in a week, contact your primary MD as this may be a sign of fluid retention.   7.  The patient will be discharged on home oxygen with activity.      On discharge, home OT, home PT, home health nurse were all ordered.      I saw and examined the patient on day of discharge.  I spent greater than 30 minutes discharging the patient from the hospital on day of discharge.         ELAINE SANTO MD             D: 2019   T: 2019   MT: NITA      Name:     WACHTER, MARYALICE   MRN:      [id number]        Account:        [id number]   :      1933           Admit Date:     2019                                  Discharge Date:       Document: [id number]       cc: Venu Maria PA-C

## 2019-11-13 NOTE — PLAN OF CARE
OT- Attempted session. Pt preparing for discharge to home. Will discharge from IP OT.      Occupational Therapy Discharge Summary    Reason for therapy discharge:    Discharging to home     Progress towards therapy goal(s). See goals on Care Plan in [facility] electronic health record for goal details.  Goals not met     Therapy recommendation(s):    Home with assist for LB dressing and IADLs (homemaking, laundry) and home OT. Home OT for home safety evaluation, and to increase activity tolerance and independence for I/ADLs

## 2019-11-13 NOTE — PLAN OF CARE
Discharge Planner PT   Patient plan for discharge: home today  Current status: declined gait as leaving and just to bathroom x 2  Barriers to return to prior living situation: none  Recommendations for discharge:  home with FWW, home PT per plan established by the PT  Rationale for recommendations: Goals are mostly met recommend to PT for discharge to home with home PT per MD plan. Do recommend use of RW until cleared by home PT services.       Entered by: Vale Garcia 11/13/2019 11:48 AM

## 2019-11-13 NOTE — PLAN OF CARE
Orientation: Alert and oriented x4. [place]  VSS. 95% on 1L NC. Afebrile.   Tele: SR with inverted Ts. HR 90s.   LS: diminished throughout  GI: Passing gas. no BM. Denies N/V.   : Adequate urine output.   Skin: blanchable redness to right arm. Bruises.   Activity: Ax1 with GB and walker. Pt slept comfortably throughout shift.   Pain: 2/10 right arm pain. Declined PRN pain medication.   Plan: Continue with current cares.

## 2019-11-14 ENCOUNTER — TELEPHONE (OUTPATIENT)
Dept: FAMILY MEDICINE | Facility: CLINIC | Age: 84
End: 2019-11-14

## 2019-11-14 ENCOUNTER — TELEPHONE (OUTPATIENT)
Dept: CARDIOLOGY | Facility: CLINIC | Age: 84
End: 2019-11-14

## 2019-11-14 DIAGNOSIS — E11.65 TYPE 2 DIABETES MELLITUS WITH HYPERGLYCEMIA, WITHOUT LONG-TERM CURRENT USE OF INSULIN (H): ICD-10-CM

## 2019-11-14 NOTE — TELEPHONE ENCOUNTER
Patient was evaluated by cardiology while inpatient for for SOB, HF and new onset cardiomyopathy with EF 25% (EF 55% 7/2018), probable stress induced vs tachy induced CM. PMH of PAF. Started on Amiodarone 11/8/19 for PAT vs Flutter. Rates intermittently up to 120-130's during hospitalization. Nuclear stress test negative for ischemia. Continue PTA medications and pt to wear 7 day Zio Patch monitor and then f/u with cardiology OLIVIA. Called patient to discuss any post hospital d/c questions she may have, review medication changes, and confirm f/u appts. Patient denied any questions regarding new medications or changes to PTA medications. Patient denied any SOB, chest pain, or light headedness. RN confirmed with patient that she needs to schedule for Zio Patch monitor placement and f/u OLIVIA OV as ordered. Instructed patient to weigh self every AM, after waking and using the restroom, but before breakfast and medications. Call clinic for a weight gain of 2 lbs overnight or 5 lbs in a week. Low Na+ diet encouraged. Pt instructed to bring daily wt/BP diary and medications with to f/u OV. Pt states she uses 02 24:7 and voicing concerns regarding adequate portable 02 supply to come in for these olivia'ts. Encouraged pt to call her 02 supply company and let them know of scheduled olivia'ts so adequate supply of 02 is available. Pt reluctantly verbalized understanding. Phone call transferred to scheduling. Patient advised to call clinic with any cardiac related questions or concerns prior to this olivia't. Patient verbalized understanding and agreed with plan. MARYA Gordon RN.

## 2019-11-14 NOTE — PROGRESS NOTES
Patient discharged from hospital 11/13/19 to home with homecare. Edited episode to change reminders for INR to go to [facility] rather than [facility].    Karoline Cisse RN  Anticoagulation Clinic-[facility]

## 2019-11-14 NOTE — TELEPHONE ENCOUNTER
Patient discharged yesterday to home with homecare nursing PT & OT.     INR yesterday 2.15, was therapeutic throughout hospital stay.   Called [facility] and they verified a nurse is going out tomorrow going out tomorrow. Called patient/ and informed them the homecare nurse will check the patient's INR as long as she is on that service and a [facility] nurse will provide the nurse with warfarin dosing instructions and when to check next. Patient is doing well at home on O2, but is dubious about going out of the house with the portable oxygen. They stated understanding of the plan.     Karoline Cisse RN  Anticoagulation Clinic-[facility]

## 2019-11-15 ENCOUNTER — DOCUMENTATION ONLY (OUTPATIENT)
Dept: CARE COORDINATION | Facility: CLINIC | Age: 84
End: 2019-11-15

## 2019-11-15 ENCOUNTER — TELEPHONE (OUTPATIENT)
Dept: FAMILY MEDICINE | Facility: CLINIC | Age: 84
End: 2019-11-15

## 2019-11-15 DIAGNOSIS — I25.119 ATHEROSCLEROSIS OF NATIVE CORONARY ARTERY OF NATIVE HEART WITH ANGINA PECTORIS (H): ICD-10-CM

## 2019-11-15 DIAGNOSIS — N18.30 CKD (CHRONIC KIDNEY DISEASE) STAGE 3, GFR 30-59 ML/MIN (H): Primary | ICD-10-CM

## 2019-11-15 DIAGNOSIS — Z79.01 LONG TERM CURRENT USE OF ANTICOAGULANTS WITH INR GOAL OF 2.0-3.0: ICD-10-CM

## 2019-11-15 DIAGNOSIS — E11.65 TYPE 2 DIABETES MELLITUS WITH HYPERGLYCEMIA, WITHOUT LONG-TERM CURRENT USE OF INSULIN (H): Primary | ICD-10-CM

## 2019-11-15 DIAGNOSIS — I48.11 LONGSTANDING PERSISTENT ATRIAL FIBRILLATION (H): ICD-10-CM

## 2019-11-15 LAB
ANION GAP SERPL CALCULATED.3IONS-SCNC: 7 MMOL/L (ref 3–14)
BUN SERPL-MCNC: 25 MG/DL (ref 7–30)
CALCIUM SERPL-MCNC: 9.2 MG/DL (ref 8.5–10.1)
CHLORIDE SERPL-SCNC: 93 MMOL/L (ref 94–109)
CO2 SERPL-SCNC: 35 MMOL/L (ref 20–32)
CREAT SERPL-MCNC: 1.3 MG/DL (ref 0.52–1.04)
GFR SERPL CREATININE-BSD FRML MDRD: 37 ML/MIN/{1.73_M2}
GLUCOSE SERPL-MCNC: 234 MG/DL (ref 70–99)
INR PPP: 2.7 (ref 0.8–1.1)
POTASSIUM SERPL-SCNC: 3.3 MMOL/L (ref 3.4–5.3)
SODIUM SERPL-SCNC: 135 MMOL/L (ref 133–144)

## 2019-11-15 PROCEDURE — 80048 BASIC METABOLIC PNL TOTAL CA: CPT | Performed by: INTERNAL MEDICINE

## 2019-11-15 PROCEDURE — 36415 COLL VENOUS BLD VENIPUNCTURE: CPT | Performed by: INTERNAL MEDICINE

## 2019-11-15 NOTE — PROGRESS NOTES
[facility] now requests orders and shares plan of care/discharge summaries for some patients through [employer].  Please REPLY TO THIS MESSAGE OR ROUTE BACK TO THE AUTHOR in order to give authorization for orders when needed.  This is considered a verbal order, you will still receive a faxed copy of orders for signature.  Thank you for your assistance in improving collaboration for our patients.     ORDER  SN 2 week 2, 1 week 3, 3 PRN  PT and OT eval.    Pt had interaction warning for meds  For amiodarone and simvasatin,. Amiodarone and warfarin, and warfarin and aspirin. OK to continue?    JANNETTE Laws

## 2019-11-15 NOTE — TELEPHONE ENCOUNTER
"Last Written Prescription Date: 05/10/19  Last Fill Quantity: 180,  # refills: 1   Last office visit: 7/15/2019 with prescribing provider:  Venu Maria   Future Office Visit:      Requested Prescriptions   Pending Prescriptions Disp Refills     metFORMIN (GLUCOPHAGE) 500 MG tablet [Pharmacy Med Name: METFORMIN HCL 500MG TABS] 180 tablet 1     Sig: TAKE ONE TABLET BY MOUTH TWICE A DAY WITH MEALS       Biguanide Agents Passed - 11/14/2019  5:35 PM        Passed - Blood pressure less than 140/90 in past 6 months     BP Readings from Last 3 Encounters:   11/13/19 104/48   09/09/19 122/68   08/12/19 114/60                 Passed - Patient has documented LDL within the past 12 mos.     Recent Labs   Lab Test 07/15/19  0948   LDL 34             Passed - Patient has had a Microalbumin in the past 15 mos.     Recent Labs   Lab Test 04/15/19  1032   MICROL 30   UMALCR 20.20             Passed - Patient is age 10 or older        Passed - Patient has documented A1c within the specified period of time.     If HgbA1C is 8 or greater, it needs to be on file within the past 3 months.  If less than 8, must be on file within the past 6 months.     Recent Labs   Lab Test 08/12/19  1335   A1C 7.5*             Passed - Patient's CR is NOT>1.4 OR Patient's EGFR is NOT<45 within past 12 mos.     Recent Labs   Lab Test 11/13/19  0647   GFRESTIMATED 35*   GFRESTBLACK 41*       Recent Labs   Lab Test 11/13/19  0647   CR 1.36*             Passed - Patient does NOT have a diagnosis of CHF.        Passed - Medication is active on med list        Passed - Patient is not pregnant        Passed - Patient has not had a positive pregnancy test within the past 12 mos.         Passed - Recent (6 mo) or future (30 days) visit within the authorizing provider's specialty     Patient had office visit in the last 6 months or has a visit in the next 30 days with authorizing provider or within the authorizing provider's specialty.  See \"Patient Info\" tab " "in inbasket, or \"Choose Columns\" in Meds & Orders section of the refill encounter.              "

## 2019-11-15 NOTE — TELEPHONE ENCOUNTER
ANTICOAGULATION MANAGEMENT     Patient Name:  Maryalice Rebecca Wachter  Date:  11/15/2019    ASSESSMENT /SUBJECTIVE:      Today's INR result of 2.7 is therapeutic. Goal INR of 2.0-3.0      Warfarin dose taken: Warfarin taken as previously instructed - less warfarin given inpatient than maintenance dose    Diet: No new diet changes affecting INR    Medication changes/ interactions: Lisinopril and hctz D/C'ed (no interaction with warfarin) Lasix added (no interaction) Spironolactone added (can decrease INR) amiodarone started (can increase INR in the upcoming weeks)    Previous INR: Therapeutic at hospital discharge     S/S of bleeding or thromboembolism: No    New injury or illness:  Yes: hospitalized with acute respiratory failure with hypoxia    Upcoming surgery, procedure or cardioversion:  No    Additional findings: none      PLAN:    Spoke with Eusebia home care nurse regarding INR result and instructed:     Warfarin Dosing Instructions: Continue your current warfarin dose of 3 mg daily    Instructed patient to follow up no later than:     Education provided: Yes: no interaction with warfarin and lisinopril, hydrochlorothiazide, and lasix, interaction between warfarin and spironolactone and amiodarone      Eusebia verbalizes understanding and agrees to warfarin dosing plan.    Instructed to call the Anticoagulation Clinic for any changes, questions or concerns. (#[phone])        OBJECTIVE:  INR   Date Value Ref Range Status   11/15/2019 2.7 (A) 0.8 - 1.1 Final             Anticoagulation Summary  As of 11/15/2019    INR goal:   2.0-3.0   TTR:   67.2 % (3.5 y)   INR used for dosin.7 (11/15/2019)   Warfarin maintenance plan:   3 mg (3 mg x 1) every day   Full warfarin instructions:   3 mg every day   Weekly warfarin total:   21 mg   Plan last modified:   Cyndi Cabello RN (10/4/2019)   Next INR check:   2019   Priority:   High   Target end date:       Indications    Atrial fibrillation (H)  [I48.91]  Long term current use of anticoagulants with INR goal of 2.0-3.0 [Z79.01]             Anticoagulation Episode Summary     INR check location:       Preferred lab:       Send INR reminders to:   YEN PANDYA    Comments:   3 mg tabs      Anticoagulation Care Providers     Provider Role Specialty Phone number    Venu Maria PA-C Responsible Physician Assistant - Medical [phone]

## 2019-11-18 ENCOUNTER — TELEPHONE (OUTPATIENT)
Dept: FAMILY MEDICINE | Facility: CLINIC | Age: 84
End: 2019-11-18

## 2019-11-18 DIAGNOSIS — I48.11 LONGSTANDING PERSISTENT ATRIAL FIBRILLATION (H): ICD-10-CM

## 2019-11-18 DIAGNOSIS — Z79.01 LONG TERM CURRENT USE OF ANTICOAGULANTS WITH INR GOAL OF 2.0-3.0: ICD-10-CM

## 2019-11-18 LAB — INR PPP: 3.8

## 2019-11-18 NOTE — TELEPHONE ENCOUNTER
11/18/2019    Patient called stating she was told to do a F/U for her ER visit 11/05/2019 with PCP. Patient stated she doesn't know when she should come in if she can slide into the schedule this week or if she can wait until next week soonest on 11/26/2019. Patient would like some advice on the issue.  Patient ph:[phone]  Kirk Valadez -Patient Representative

## 2019-11-18 NOTE — PROGRESS NOTES
Venu,     I did give VO for below, can you address medication interaction concerns? Ok to respond to home care nurse directly via this encounter.     Radha Valle RN -- [facility] [employer]

## 2019-11-18 NOTE — TELEPHONE ENCOUNTER
ANTICOAGULATION MANAGEMENT     Patient Name:  Maryalice Rebecca Wachter  Date:  11/18/2019    ASSESSMENT /SUBJECTIVE:      Today's INR result of 3.8 is supratherapeutic. Goal INR of 2.0-3.0      Warfarin dose taken: Warfarin taken as previously instructed    Diet: No new diet changes affecting INR    Medication changes/ interactions: No new medications/supplements affecting INR    Previous INR: Therapeutic     S/S of bleeding or thromboembolism: No    New injury or illness:  No    Upcoming surgery, procedure or cardioversion:  No  Additional findings: Maya denies there have been any changes for patient.    PLAN:    Spoke with Maya regarding INR result and instructed:     Warfarin Dosing Instructions: hold  then continue your current warfarin dose of 3mg daily    Instructed patient to follow up no later than: 11/21/19 when home care is back in her house.    Education provided: Yes: AJNNETTE Trejo denies signs and symptoms of bleeding; she was educated regarding what signs and symptoms to be aware of and when seek immediate medical attention at ED versus PCP's Clinic.         JANNETTE Trejo verbalizes understanding and agrees to warfarin dosing plan.    Instructed to call the Anticoagulation Clinic for any changes, questions or concerns. (#[phone])        OBJECTIVE:  INR   Date Value Ref Range Status   11/18/2019 3.8  Final             Anticoagulation Summary  As of 11/18/2019    INR goal:   2.0-3.0   TTR:   67.1 % (3.5 y)   INR used for dosing:   3.8! (11/18/2019)   Warfarin maintenance plan:   3 mg (3 mg x 1) every day   Full warfarin instructions:   3 mg every day   Weekly warfarin total:   21 mg   Plan last modified:   Cyndi Cabello RN (10/4/2019)   Next INR check:      Priority:   High   Target end date:       Indications    Atrial fibrillation (H) [I48.91]  Long term current use of anticoagulants with INR goal of 2.0-3.0 [Z79.01]             Anticoagulation Episode Summary     INR check location:        Preferred lab:       Send INR reminders to:   YEN PANDYA    Comments:   3 mg tabs      Anticoagulation Care Providers     Provider Role Specialty Phone number    Venu Maria PA-C Responsible Physician Assistant - Medical [phone]

## 2019-11-19 NOTE — TELEPHONE ENCOUNTER
She needs a HOSPITAL follow up really.  It's too late to bill that was but we need a 40 minute appointment for her.  I can't do it this week.  Please call to help her schedule this appointment for when I have an appropriate appointment.  I can't believe she doesn't have an appointment yet for this.....      Thanks,  Venu

## 2019-11-19 NOTE — PROGRESS NOTES
11-19-19    MTm reviewed new meds and hospital stay for CHF.    please consider the following :     1. STOP actos --not advised in chf.    2. STOP simvastatin and change her statin drug to either 10mg atorvastatin OR 5mg./day Rosuvastatin .    3. Monitor INR closely as Amiodarone and statins react with Warfarin and ASA to increase bleed risk/INR.    Hope that helps.        Janey Romero [facility].  Medication Therapy Management Provider  [phone]

## 2019-11-19 NOTE — PROGRESS NOTES
Truthfully I am not sure how to address those interactions.  Can we have pharmacy weigh in please.  She see's JADEN Toth as well.  I am going to forward to him for review.    Thanks,  Venu

## 2019-11-20 RX ORDER — ROSUVASTATIN CALCIUM 5 MG/1
5 TABLET, COATED ORAL DAILY
Qty: 30 TABLET | Refills: 1 | Status: CANCELLED
Start: 2019-11-20

## 2019-11-20 NOTE — PROGRESS NOTES
OK, is she at home?  Or where is she now?  Will you be calling her?  Does she have an appointment coming up with me?    Also [facility] closed?  Where to send Rx.    When is next INR due?    Have her stop Zocor and Actos.  We will need to know where to send Crestor (it is pended).    Sorry so many questions.......      Venu

## 2019-11-20 NOTE — PROGRESS NOTES
Called Eusebia ([phone]) to relay the below information. (medication changes).     Radha Valle RN -- [facility]

## 2019-11-20 NOTE — PROGRESS NOTES
PCP:    Can you please adjust medications based on Janey's recommendations. (discontinue or switch meds)     Radha Valle RN -- [facility] Workforce

## 2019-11-21 ENCOUNTER — TELEPHONE (OUTPATIENT)
Dept: FAMILY MEDICINE | Facility: CLINIC | Age: 84
End: 2019-11-21

## 2019-11-21 DIAGNOSIS — I48.11 LONGSTANDING PERSISTENT ATRIAL FIBRILLATION (H): ICD-10-CM

## 2019-11-21 DIAGNOSIS — Z79.01 LONG TERM CURRENT USE OF ANTICOAGULANTS WITH INR GOAL OF 2.0-3.0: ICD-10-CM

## 2019-11-21 LAB — INR PPP: 3.5 (ref 0.8–1.1)

## 2019-11-21 NOTE — TELEPHONE ENCOUNTER
ANTICOAGULATION MANAGEMENT     Patient Name:  Maryalice Rebecca Wachter  Date:  11/21/2019    ASSESSMENT /SUBJECTIVE:      Today's INR result of 3.5 is supratherapeutic. Goal INR of 2.0-3.0      Warfarin dose taken: Warfarin taken as previously instructed    Diet: No new diet changes affecting INR    Medication changes/ interactions: Interaction between amiodarone and warfarin may be affecting INR - started while hospitalized beginning of month    Previous INR: Supratherapeutic     S/S of bleeding or thromboembolism: No    New injury or illness:  No    Upcoming surgery, procedure or cardioversion:  No    Additional findings: none      PLAN:    Spoke with nurse Maya, regarding INR result and instructed:     Warfarin Dosing Instructions: Hold warfarin today only then change your warfarin dose to 1.5 mg every Tue, Sat; 3 mg all other days    Instructed patient to follow up no later than: Mon 11/25    Education provided: Yes: interaction between amiodarone and warfarin      Maya verbalizes understanding and agrees to warfarin dosing plan.    Instructed to call the Anticoagulation Clinic for any changes, questions or concerns. (#[phone])        OBJECTIVE:  INR   Date Value Ref Range Status   11/21/2019 3.5 (A) 0.8 - 1.1 Final             Anticoagulation Summary  As of 11/21/2019    INR goal:   2.0-3.0   TTR:   60.8 % (11.8 mo)   INR used for dosing:   3.5! (11/21/2019)   Warfarin maintenance plan:   1.5 mg (3 mg x 0.5) every Tue, Sat; 3 mg (3 mg x 1) all other days   Full warfarin instructions:   11/21: Hold; Otherwise 1.5 mg every Tue, Sat; 3 mg all other days   Weekly warfarin total:   18 mg   Plan last modified:   Karoline Lim RN (11/21/2019)   Next INR check:   11/25/2019   Priority:   High   Target end date:       Indications    Atrial fibrillation (H) [I48.91]  Long term current use of anticoagulants with INR goal of 2.0-3.0 [Z79.01]             Anticoagulation Episode Summary     INR check location:        Preferred lab:       Send INR reminders to:   YEN PANDYA    Comments:   3 mg tabs - amiodarone started 11/2019 (during hospitalization)      Anticoagulation Care Providers     Provider Role Specialty Phone number    Venu Maria PA-C Responsible Physician Assistant - Medical [phone]

## 2019-11-25 ENCOUNTER — HOSPITAL ENCOUNTER (OUTPATIENT)
Dept: CARDIOLOGY | Facility: CLINIC | Age: 84
Discharge: HOME | End: 2019-11-25
Attending: INTERNAL MEDICINE | Admitting: INTERNAL MEDICINE
Payer: COMMERCIAL

## 2019-11-25 ENCOUNTER — TELEPHONE (OUTPATIENT)
Dept: FAMILY MEDICINE | Facility: CLINIC | Age: 84
End: 2019-11-25

## 2019-11-25 DIAGNOSIS — I48.11 LONGSTANDING PERSISTENT ATRIAL FIBRILLATION (H): ICD-10-CM

## 2019-11-25 DIAGNOSIS — Z79.01 LONG TERM CURRENT USE OF ANTICOAGULANTS WITH INR GOAL OF 2.0-3.0: ICD-10-CM

## 2019-11-25 LAB — INR PPP: 2.3 (ref 0.8–1.1)

## 2019-11-25 PROCEDURE — 0296T ZIO PATCH HOLTER ADULT PEDIATRIC GREATER THAN 48 HRS: CPT

## 2019-11-25 NOTE — TELEPHONE ENCOUNTER
ANTICOAGULATION MANAGEMENT -home care    Patient Name:  Maryalice Rebecca Wachter  Date:  2019    ASSESSMENT /SUBJECTIVE:      Today's INR result of 2.3 is therapeutic. Goal INR of 2.0-3.0      Warfarin dose taken: Warfarin taken as previously instructed    Diet: No new diet changes affecting INR    Medication changes/ interactions: Interaction between recent amiodarone start (~2-3 weeks ago) and warfarin may be affecting INR    Previous INR: Supratherapeutic     S/S of bleeding or thromboembolism: No    New injury or illness:  No    Upcoming surgery, procedure or cardioversion:  No    Additional findings: none      PLAN:    Spoke with Maya BHATIA regarding INR result and instructed:     Warfarin Dosing Instructions: Change your warfarin dose to      3 mg every Mon, Wed, Fri; 1.5 mg all other days     Instructed patient to follow up no later than:       Education provided: Yes: expected interaction with amiodarone and warfarin      aMya verbalizes understanding and agrees to warfarin dosing plan.    Instructed to call the Anticoagulation Clinic for any changes, questions or concerns. (#[phone])        OBJECTIVE:  INR   Date Value Ref Range Status   2019 2.3 (A) 0.8 - 1.1 Final             Anticoagulation Summary  As of 2019    INR goal:   2.0-3.0   TTR:   61.5 % (11.8 mo)   INR used for dosin.3 (2019)   Warfarin maintenance plan:   3 mg (3 mg x 1) every Mon, Wed, Fri; 1.5 mg (3 mg x 0.5) all other days   Full warfarin instructions:   3 mg every Mon, Wed, Fri; 1.5 mg all other days   Weekly warfarin total:   15 mg   Plan last modified:   Modesta Christensen RN (2019)   Next INR check:   2019   Priority:   High   Target end date:       Indications    Atrial fibrillation (H) [I48.91]  Long term current use of anticoagulants with INR goal of 2.0-3.0 [Z79.01]             Anticoagulation Episode Summary     INR check location:       Preferred lab:       Send INR  reminders to:   YEN PANDYA    Comments:   3 mg tabs - amiodarone started 11/2019 (during hospitalization)      Anticoagulation Care Providers     Provider Role Specialty Phone number    Venu Maria PA-C Responsible Physician Assistant - Medical [phone]

## 2019-11-29 ENCOUNTER — TELEPHONE (OUTPATIENT)
Dept: FAMILY MEDICINE | Facility: CLINIC | Age: 84
End: 2019-11-29

## 2019-11-29 ENCOUNTER — OFFICE VISIT (OUTPATIENT)
Dept: FAMILY MEDICINE | Facility: CLINIC | Age: 84
End: 2019-11-29
Payer: COMMERCIAL

## 2019-11-29 VITALS
HEIGHT: 65 IN | DIASTOLIC BLOOD PRESSURE: 70 MMHG | OXYGEN SATURATION: 97 % | SYSTOLIC BLOOD PRESSURE: 110 MMHG | BODY MASS INDEX: 30.19 KG/M2 | TEMPERATURE: 98 F | RESPIRATION RATE: 18 BRPM | HEART RATE: 60 BPM

## 2019-11-29 DIAGNOSIS — E78.5 HYPERLIPIDEMIA LDL GOAL <100: ICD-10-CM

## 2019-11-29 DIAGNOSIS — Z79.01 LONG TERM CURRENT USE OF ANTICOAGULANTS WITH INR GOAL OF 2.0-3.0: ICD-10-CM

## 2019-11-29 DIAGNOSIS — I10 ESSENTIAL HYPERTENSION: ICD-10-CM

## 2019-11-29 DIAGNOSIS — J43.1 PANLOBULAR EMPHYSEMA (H): Primary | ICD-10-CM

## 2019-11-29 DIAGNOSIS — I48.11 LONGSTANDING PERSISTENT ATRIAL FIBRILLATION (H): ICD-10-CM

## 2019-11-29 LAB — INR PPP: 2.1

## 2019-11-29 PROCEDURE — 99214 OFFICE O/P EST MOD 30 MIN: CPT | Performed by: PHYSICIAN ASSISTANT

## 2019-11-29 RX ORDER — ROSUVASTATIN CALCIUM 5 MG/1
5 TABLET, COATED ORAL DAILY
Qty: 30 TABLET | Refills: 3 | Status: SHIPPED | OUTPATIENT
Start: 2019-11-29 | End: 2020-01-15

## 2019-11-29 NOTE — PROGRESS NOTES
Subjective     Maryalice Rebecca Wachter is a 86 year old female who presents to clinic today for the following health issues:    HPI       Hospital Follow-up Visit:    Hospital/Nursing Home/IP Rehab Facility: [facility]  Date of Admission: 11/5  Date of Discharge: 11/14  Reason(s) for Admission: SOB            Problems taking medications regularly:  None       Medication changes since discharge: yes       Problems adhering to non-medication therapy:  none    Summary of hospitalization:  [facility] discharge summary reviewed  Diagnostic Tests/Treatments reviewed.  Follow up needed: yes  Other Healthcare Providers Involved in Patient s Care:         Care Coordination and MTM  Update since discharge: improved.     Post Discharge Medication Reconciliation: discharge medications reconciled and changed, per note/orders (see AVS).  Plan of care communicated with patient     Coding guidelines for this visit:  Type of Medical   Decision Making Face-to-Face Visit       within 7 Days of discharge Face-to-Face Visit        within 14 days of discharge   Moderate Complexity [id number]   High Complexity [id number]            Discharge Summary:  FOLLOWUP INSTRUCTIONS:   1.  See primary MD within 1 week.  Check blood pressure and electrolytes/kidney function at that visit.   2.  Follow up with Cardiology Clinic as instructed.   3.  Recommend check INR and basic metabolic panel in 2 days.  INR is 2.15 and creatinine is 1.3 on discharge from the hospital.   4.  Lisinopril dose has been stopped as her blood pressure is mildly low while in the hospital.  Discuss with her primary MD at next clinic visit about if and when to resume this medication.   5.  Hydrochlorothiazide has been stopped and replaced by furosemide to help prevent fluid retention.   6.  Recommend to weigh yourself every morning and record your weight.  If you gain more than 2 pounds in a day or 5 pounds in a week, contact your primary MD as this  "may be a sign of fluid retention.   7.  The patient will be discharged on home oxygen with activity.      On discharge, home OT, home PT, home health nurse were all ordered.       Sindi is here today for hospital follow up though she has been home   Finding O2 very cumbersome and limiting- cannot go places for very long  Without it she will drop to 77% however  Using neb in the AM and Treligy daily    INR at home.  Has been off due to meds.  Med changes yet?  Home care coming 3x week.     had to go to ED today, could not get in to cardiology until Jan.  Had a transfusion so far. Hgb was 7.    BP Readings from Last 6 Encounters:   11/29/19 110/70   11/13/19 104/48   09/09/19 122/68   08/12/19 114/60   07/15/19 112/50   04/15/19 120/54     Med review from [facility] Janey QUINTERO  11-19-19     [facility] reviewed new meds and hospital stay for CHF.     please consider the following :      1. STOP actos --not advised in chf.     2. STOP simvastatin and change her statin drug to either 10mg atorvastatin OR 5mg./day Rosuvastatin .     3. Monitor INR closely as Amiodarone and statins react with Warfarin and ASA to increase bleed risk/INR.     Hope that helps.     Janey Romero [facility].  Medication Therapy Management Provider  [phone]      Reviewed and updated as needed this visit by Provider         Review of Systems   ROS COMP: Constitutional, HEENT, cardiovascular, pulmonary, gi and gu systems are negative, except as otherwise noted.      Objective    /70 (BP Location: Right arm, Patient Position: Sitting, Cuff Size: Adult Large)   Pulse 60   Temp 98  F (36.7  C) (Oral)   Resp 18   Ht 1.651 m (5' 5\")   SpO2 97%   BMI 30.19 kg/m    Body mass index is 30.19 kg/m .  Physical Exam   GENERAL: healthy, alert and no distress  ABDOMEN: soft, nontender, no hepatosplenomegaly, no masses and bowel sounds normal  MS: extremities normal- no gross deformities noted  SKIN: no suspicious lesions or rashes  PSYCH: mentation " "appears normal and affect flat    Diagnostic Test Results:  Labs reviewed in Epic        Assessment & Plan     1. Panlobular emphysema (H)  She will continue with her O2 -- She is still monitoring daily weight.  Has done down about 8 more lbs.  She feels breathing is stable on the O2 at this time.  Has a Home Health nurse coming to her home right now.    2. Essential hypertension  Patient left before having lab drawn, can do at a future appointment.  - Basic metabolic panel; Future    3. Hyperlipidemia LDL goal <100  She did not receive the information regarding Rx changes.  She will need to STOP her lipitor and START on crestor daily.  ALSO will need to STOP actos.  - rosuvastatin (CRESTOR) 5 MG tablet; Take 1 tablet (5 mg) by mouth daily  Dispense: 30 tablet; Refill: 3         BMI:   Estimated body mass index is 30.19 kg/m  as calculated from the following:    Height as of this encounter: 1.651 m (5' 5\").    Weight as of 11/13/19: 82.3 kg (181 lb 6.4 oz).       Return in about 6 months (around 5/29/2020) for Follow up.    Venu Maria PA-C  [facility]    "

## 2019-11-29 NOTE — TELEPHONE ENCOUNTER
ANTICOAGULATION MANAGEMENT     Patient Name:  Maryalice Rebecca Wachter  Date:  2019    ASSESSMENT /SUBJECTIVE:      Today's INR result of 2.1 is therapeutic. Goal INR of 2.0-3.0      Warfarin dose taken: Warfarin taken differently than instructed, but no impact to total weekly dose    Diet: No new diet changes affecting INR    Medication changes/ interactions: Interaction between Amiodarone (started roughly 3.5 weeks ago) and warfarin may be affecting INR    Previous INR: Therapeutic     S/S of bleeding or thromboembolism: No    New injury or illness:  No    Upcoming surgery, procedure or cardioversion:  No    Additional findings: No      PLAN:    Spoke with Maya (home care nurse) regarding INR result and instructed:     Warfarin Dosing Instructions: Continue your current warfarin dose    Instructed patient to follow up no later than:  (when home care visit is next scheduled)    Education provided: Yes potential interaction between warfarin and amiodarone      Maya verbalizes understanding and agrees to warfarin dosing plan.    Instructed to call the Anticoagulation Clinic for any changes, questions or concerns. (#[phone])        OBJECTIVE:  INR   Date Value Ref Range Status   2019 2.1  Final             Anticoagulation Summary  As of 2019    INR goal:   2.0-3.0   TTR:   62.6 % (11.8 mo)   INR used for dosin.1 (2019)   Warfarin maintenance plan:   3 mg (3 mg x 1) every Mon, Wed, Fri; 1.5 mg (3 mg x 0.5) all other days   Full warfarin instructions:   3 mg every Mon, Wed, Fri; 1.5 mg all other days   Weekly warfarin total:   15 mg   Plan last modified:   Modesta Christensen RN (2019)   Next INR check:   2019   Priority:   High   Target end date:       Indications    Atrial fibrillation (H) [I48.91]  Long term current use of anticoagulants with INR goal of 2.0-3.0 [Z79.01]             Anticoagulation Episode Summary     INR check location:       Preferred lab:        Send INR reminders to:   YEN PANDYA    Comments:   3 mg tabs - amiodarone started 11/2019 (during hospitalization)      Anticoagulation Care Providers     Provider Role Specialty Phone number    Venu Maria PA-C Responsible Physician Assistant - Medical [phone]

## 2019-12-04 ENCOUNTER — TELEPHONE (OUTPATIENT)
Dept: FAMILY MEDICINE | Facility: CLINIC | Age: 84
End: 2019-12-04

## 2019-12-04 DIAGNOSIS — I48.11 LONGSTANDING PERSISTENT ATRIAL FIBRILLATION (H): ICD-10-CM

## 2019-12-04 DIAGNOSIS — Z79.01 LONG TERM CURRENT USE OF ANTICOAGULANTS WITH INR GOAL OF 2.0-3.0: ICD-10-CM

## 2019-12-04 LAB — INR PPP: 2.1

## 2019-12-04 NOTE — TELEPHONE ENCOUNTER
ANTICOAGULATION MANAGEMENT     Patient Name:  Maryalice Rebecca Wachter  Date:  2019    ASSESSMENT /SUBJECTIVE:      Today's INR result of 2.1 is therapeutic. Goal INR of 2.0-3.0      Warfarin dose taken: Warfarin taken as previously instructed    Diet: No new diet changes affecting INR    Medication changes/ interactions: No new medications/supplements affecting INR    Previous INR: Therapeutic     S/S of bleeding or thromboembolism: No    New injury or illness:  No    Upcoming surgery, procedure or cardioversion:  No    Additional findings: None      PLAN:    Spoke with JANNETTE Trejo regarding INR result and instructed:     Warfarin Dosing Instructions: Continue your current warfarin dose    Instructed patient to follow up no later than: 1 week    Education provided: No      Maya RN verbalizes understanding and agrees to warfarin dosing plan.    Instructed to call the Anticoagulation Clinic for any changes, questions or concerns. (#[phone])        OBJECTIVE:  INR   Date Value Ref Range Status   2019 2.1  Final             Anticoagulation Summary  As of 2019    INR goal:   2.0-3.0   TTR:   64.0 % (11.8 mo)   INR used for dosin.1 (2019)   Warfarin maintenance plan:   3 mg (3 mg x 1) every Mon, Wed, Fri; 1.5 mg (3 mg x 0.5) all other days   Full warfarin instructions:   3 mg every Mon, Wed, Fri; 1.5 mg all other days   Weekly warfarin total:   15 mg   Plan last modified:   Modesta Christensen RN (2019)   Next INR check:      Priority:   High   Target end date:       Indications    Atrial fibrillation (H) [I48.91]  Long term current use of anticoagulants with INR goal of 2.0-3.0 [Z79.01]             Anticoagulation Episode Summary     INR check location:       Preferred lab:       Send INR reminders to:   YEN PANDYA    Comments:   3 mg tabs - amiodarone started 2019 (during hospitalization)      Anticoagulation Care Providers     Provider Role Specialty Phone number     Venu Maria PA-C Responsible Physician Assistant - Medical [phone]

## 2019-12-10 ENCOUNTER — OFFICE VISIT (OUTPATIENT)
Dept: CARDIOLOGY | Facility: CLINIC | Age: 84
End: 2019-12-10
Attending: INTERNAL MEDICINE
Payer: COMMERCIAL

## 2019-12-10 VITALS
SYSTOLIC BLOOD PRESSURE: 126 MMHG | OXYGEN SATURATION: 95 % | HEIGHT: 65 IN | DIASTOLIC BLOOD PRESSURE: 52 MMHG | HEART RATE: 78 BPM | WEIGHT: 180.1 LBS | BODY MASS INDEX: 30.01 KG/M2

## 2019-12-10 DIAGNOSIS — I42.9 CARDIOMYOPATHY, UNSPECIFIED TYPE (H): Primary | ICD-10-CM

## 2019-12-10 DIAGNOSIS — I10 ESSENTIAL HYPERTENSION: ICD-10-CM

## 2019-12-10 DIAGNOSIS — I42.9 CARDIOMYOPATHY, UNSPECIFIED TYPE (H): ICD-10-CM

## 2019-12-10 LAB
ANION GAP SERPL CALCULATED.3IONS-SCNC: 7 MMOL/L (ref 3–14)
BUN SERPL-MCNC: 42 MG/DL (ref 7–30)
CALCIUM SERPL-MCNC: 9.9 MG/DL (ref 8.5–10.1)
CHLORIDE SERPL-SCNC: 93 MMOL/L (ref 94–109)
CO2 SERPL-SCNC: 36 MMOL/L (ref 20–32)
CREAT SERPL-MCNC: 1.74 MG/DL (ref 0.52–1.04)
GFR SERPL CREATININE-BSD FRML MDRD: 26 ML/MIN/{1.73_M2}
GLUCOSE SERPL-MCNC: 199 MG/DL (ref 70–99)
POTASSIUM SERPL-SCNC: 3.7 MMOL/L (ref 3.4–5.3)
SODIUM SERPL-SCNC: 136 MMOL/L (ref 133–144)

## 2019-12-10 PROCEDURE — 99214 OFFICE O/P EST MOD 30 MIN: CPT | Performed by: PHYSICIAN ASSISTANT

## 2019-12-10 PROCEDURE — 36415 COLL VENOUS BLD VENIPUNCTURE: CPT | Performed by: PHYSICIAN ASSISTANT

## 2019-12-10 PROCEDURE — 80048 BASIC METABOLIC PNL TOTAL CA: CPT | Performed by: PHYSICIAN ASSISTANT

## 2019-12-10 RX ORDER — FUROSEMIDE 40 MG
40 TABLET ORAL DAILY
Qty: 90 TABLET | Refills: 0 | Status: SHIPPED | OUTPATIENT
Start: 2019-12-10

## 2019-12-10 RX ORDER — FUROSEMIDE 40 MG
40 TABLET ORAL 2 TIMES DAILY
Qty: 180 TABLET | Refills: 1 | Status: SHIPPED | OUTPATIENT
Start: 2019-12-10 | End: 2019-12-10

## 2019-12-10 RX ORDER — AMIODARONE HYDROCHLORIDE 200 MG/1
200 TABLET ORAL DAILY
Qty: 30 TABLET | Refills: 3 | Status: SHIPPED | OUTPATIENT
Start: 2019-12-10 | End: 2020-01-16

## 2019-12-10 RX ORDER — SPIRONOLACTONE 25 MG/1
12.5 TABLET ORAL DAILY
Qty: 45 TABLET | Refills: 1 | Status: SHIPPED | OUTPATIENT
Start: 2019-12-10 | End: 2020-01-16

## 2019-12-10 ASSESSMENT — MIFFLIN-ST. JEOR: SCORE: 1257.81

## 2019-12-10 NOTE — LETTER
12/10/2019    Venu Maria PA-C  [address]  [facility] [address]    RE: Maryalice Rebecca Wachter       Dear Colleague,    I had the pleasure of seeing Maryalice Rebecca Wachter in the [facility].    Please see separate dictation for HPI, PHYSICAL EXAM AND IMPRESSION/PLAN.    CURRENT MEDICATIONS:  Current Outpatient Medications   Medication Sig Dispense Refill     albuterol (ALBUTEROL) 108 (90 BASE) MCG/ACT inhaler Inhale 2 puffs into the lungs every 6 hours as needed Reported on 3/2/2017       albuterol (PROVENTIL) (2.5 MG/3ML) 0.083% neb solution INHALE CONTENTS OF 1 VIAL (3 ML) VIA NEBULIZER EVERY 6 HOURS AS NEEDED FOR SHORTNESS OF BREATH / DYSPNEA OR WHEEZING 180 mL 1     amiodarone (PACERONE/CODARONE) 200 MG tablet Take 1 tablet (200 mg) by mouth daily 30 tablet 1     ascorbic acid (VITAMIN C) 500 MG tablet Take 500 mg by mouth daily.       ASPIRIN EC PO Take 81 mg by mouth daily        calcium carb 1250 mg, 500 mg Kashia,/vitamin D 200 units (OSCAL WITH D) 500-200 MG-UNIT per tablet Take 1 tablet by mouth daily.       ferrous sulfate 325 (65 FE) MG tablet Take 1 tablet by mouth daily (with breakfast).       Fluticasone-Umeclidin-Vilanterol (TRELEGY ELLIPTA) 100-62.5-25 MCG/INH oral inhaler Inhale 1 puff into the lungs daily 1 Inhaler 11     furosemide (LASIX) 40 MG tablet Take 1 tablet (40 mg) by mouth 2 times daily 60 tablet 1     glipiZIDE (GLUCOTROL XL) 10 MG 24 hr tablet TAKE ONE TABLET BY MOUTH TWICE A  tablet 1     levothyroxine (SYNTHROID/LEVOTHROID) 75 MCG tablet TAKE ONE TABLET BY MOUTH EVERY DAY 90 tablet 2     LOPERAMIDE HCL PO Take 2-4 mg by mouth daily as needed        metFORMIN (GLUCOPHAGE) 500 MG tablet TAKE ONE TABLET BY MOUTH TWICE A DAY WITH MEALS 180 tablet 1     metoprolol tartrate (LOPRESSOR) 25 MG tablet Take 1 tablet (25 mg) by mouth 2 times daily 60 tablet 1     polycarbophil (FIBERCON) 625 MG tablet Take 1 tablet by mouth 2 times  daily.       QUEtiapine (SEROQUEL) 25 MG tablet TAKE ONE-HALF TABLET BY MOUTH AT BEDTIME TO SLEEP . OK TO INCREASE BY 1/2 TABLET EVERY 3 NIGHTS UP TO 2 TABLETS DAILY IF NEEDED 60 tablet 5     rosuvastatin (CRESTOR) 5 MG tablet Take 1 tablet (5 mg) by mouth daily 30 tablet 3     spironolactone (ALDACTONE) 25 MG tablet Take 0.5 tablets (12.5 mg) by mouth daily 30 tablet 1     VITAMIN D, CHOLECALCIFEROL, PO Take 2,000 Units by mouth daily       warfarin ANTICOAGULANT (COUMADIN) 3 MG tablet Take 1 tablet (3mg) daily or as directed by INR nurse 90 tablet 0     blood glucose monitoring (ONE TOUCH ULTRA 2) meter device kit Use to test blood sugar 1 time daily or as directed.  Ok to substitute alternative if insurance prefers. 1 kit 0     blood glucose monitoring (ONE TOUCH ULTRASOFT) lancets USE TO TEST BLOOD SUGAR 2 TO 3 TIMES A DAY. 100 each 3     ONETOUCH ULTRA test strip USE TO TEST BLOOD SUGAR TWICE A DAY OR AS DIRECTED 100 each 3       ALLERGIES:     Allergies   Allergen Reactions     Penicillins Hives     Amlodipine Other (See Comments)     Too much ankle edema and red itchy spots.      Furosemide Other (See Comments)     Fainted with first dose. 11/8 pt states she took lasix years ago and fainted sometime after, has had since with no reaction       PAST MEDICAL HISTORY:  Past Medical History:   Diagnosis Date     Atrial fibrillation (H)     history of atrial fibrillation     CAD (coronary artery disease)      Congestive heart failure (CHF) (H)      COPD (chronic obstructive pulmonary disease) (H)      Diabetes mellitus (H)     Oral medication treated.     Hiatal hernia     Documented quite large by CT Fall 2011.     Hypercholesterolemia     Per mention in notes, but no recent chol panel noted.  She does take simvastatin.     Hypertension      Hypothyroidism      Postmenopausal bleeding 8/23/2012     Sarcoid        PAST SURGICAL HISTORY:  Past Surgical History:   Procedure Laterality Date     ANKLE SURGERY        DILATION AND CURETTAGE, HYSTEROSCOPY DIAGNOSTIC, COMBINED  8/23/2012    Procedure: COMBINED DILATION AND CURETTAGE, HYSTEROSCOPY DIAGNOSTIC;   DILATION AND CURETTAGE, Operative  HYSTEROSCOPY ;  Surgeon: Justa Francois MD;  Location: [facility] OR     ENT SURGERY       ESOPHAGOSCOPY, GASTROSCOPY, DUODENOSCOPY (EGD), COMBINED N/A 4/13/2017    Procedure: COMBINED ESOPHAGOSCOPY, GASTROSCOPY, DUODENOSCOPY (EGD);  Surgeon: Johnny Stratton MD;  Location: [facility]     ESOPHAGOSCOPY, GASTROSCOPY, DUODENOSCOPY (EGD), COMBINED N/A 4/13/2017    Procedure: COMBINED ESOPHAGOSCOPY, GASTROSCOPY, DUODENOSCOPY (EGD), BIOPSY SINGLE OR MULTIPLE;  Surgeon: Johnny Stratton MD;  Location: [facility]       SOCIAL HISTORY:  Social History     Socioeconomic History     Marital status:      Spouse name: Not on file     Number of children: Not on file     Years of education: Not on file     Highest education level: Not on file   Occupational History     Occupation: retired   Social Needs     Financial resource strain: Not on file     Food insecurity:     Worry: Not on file     Inability: Not on file     Transportation needs:     Medical: Not on file     Non-medical: Not on file   Tobacco Use     Smoking status: Never Smoker     Smokeless tobacco: Never Used   Substance and Sexual Activity     Alcohol use: Yes     Comment: Extremely rare use in small amounts.     Drug use: No     Sexual activity: Not Currently     Partners: Male   Lifestyle     Physical activity:     Days per week: Not on file     Minutes per session: Not on file     Stress: Not on file   Relationships     Social connections:     Talks on phone: Not on file     Gets together: Not on file     Attends Buddhist service: Not on file     Active member of club or organization: Not on file     Attends meetings of clubs or organizations: Not on file     Relationship status: Not on file     Intimate partner violence:     Fear of current or ex partner: Not on file     Emotionally  abused: Not on file     Physically abused: Not on file     Forced sexual activity: Not on file   Other Topics Concern     Parent/sibling w/ CABG, MI or angioplasty before 65F 55M? No   Social History Narrative    , lives with her .  Had 5 children, one is .  2 children live locally, one lives in [place], one lives in [place].  Volunteers actively including [employer].       FAMILY HISTORY:  Family History   Problem Relation Age of Onset     Asthma Mother 42        smoker  age 42 of compications of asthma     Diabetes Father          in 70's     Other - See Comments Other         Denies cardiac.       Review of Systems:  Skin:  Negative       Eyes:  Positive for glasses due for eye appt  ENT:  Negative      Respiratory:  Positive for dyspnea on exertion oxygen 2 liters continuous   Cardiovascular:  Negative for;palpitations;chest pain;lightheadedness;dizziness;edema Positive for;fatigue    Gastroenterology: Negative      Genitourinary:  Negative      Musculoskeletal:  Positive for arthritis;joint pain knee pain  Neurologic:  Negative      Psychiatric:  Negative      Heme/Lymph/Imm:  Negative      Endocrine:  Positive for diabetes;thyroid disorder      Service Date: 12/10/2019      HISTORY OF PRESENT ILLNESS:  Ms. Wachter is an 86-year-old female who presents to Cardiology office today following a hospitalization at [facility] secondary to acute dyspnea, at which time she was found to have cardiogenic shock and a new cardiomyopathy with an EF in the 25% range.      The patient's past cardiovascular history includes paroxysmal atrial fibrillation which appears to have been first diagnosed in .  Her LVEF was mildly reduced at that time, and she underwent a stress test which suggested a possible prior infarction; however, followup stress testing as well as echocardiography did not show similar findings.  Her EF as of 2018 was normal.  She also has hypertension,  hyperlipidemia, type 2 diabetes and COPD.      On the day of her presentation in early November, she developed sudden onset of dyspnea.  Due to ongoing symptomatology, EMS was called.  She actually required BiPAP on the way to the Emergency Department.  Her basic initial workup was unremarkable.  She did undergo an echocardiogram which suggested reduced LV systolic function with an EF of 25% with severe global hypokinesis of the mid and distal segment and preserved contractility of the base. Cardiology was consulted.  Due to the echo findings in conjunction with a only very mildly elevated troponin it was felt she likely had a stress cardiomyopathy.  It sounds like she initially was treated with BiPAP and pressors and fortunately improved quickly.  During her stay, she was noted to be in sinus rhythm but had intermittent increase in her heart rate to the 120s-130s.  It was felt that these episodes were most likely a paroxysmal atrial tachycardia or atrial flutter.  She was started on amiodarone during her stay as well as metoprolol tartrate.  She was continued on Coumadin.  She did develop volume overload, which was treated with IV diuresis and eventually she was placed on p.o. furosemide 40 mg b.i.d. along with spironolactone 12.5 mg daily.  She did undergo a repeat limited echocardiogram later during her stay which suggested an improvement in her EF to the 40% range.  The prior wall motion abnormalities had improved but had not normalized.  She also underwent a Lexiscan nuclear stress test.  This did not show any evidence of myocardial ischemia or infarction.  It was noted that there was a fixed distal anterior wall defect which was felt most likely consistent with attenuation artifact.  Her LV systolic function was noted to be low-normal on that study.  The Cardiology team had recommended a 7-day Zio Patch monitor upon discharge, and today's office visit was set up as well.      Since discharge from her  hospitalization, the patient states that she has been feeling okay.  She has not noted any palpitations or tachycardia but states she has really not been aware of having episodes of paroxysmal atrial fibrillation in the past.  She does continue to wear oxygen with activity.  She denies any orthopnea, PND or significant lower extremity edema.  She did mention today that she is running out of amiodarone as well as furosemide and spironolactone.  She wonders if she needs to continue these medications.  She did have followup with her primary care provider at the end of November, but unfortunately it does not look like a repeat basic metabolic panel was performed at that time.  Her weight at the time of discharge was recorded at 181 pounds.  She is 180 pounds on our office scale.      CURRENT CARDIAC MEDICATIONS:   1.  Amiodarone 200 mg daily.   2.  Aspirin 81 mg daily.   3.  Furosemide 40 mg b.i.d.   4.  Metoprolol tartrate 25 mg b.i.d.   5.  Crestor 5 mg daily.   6.  Spironolactone 25 mg half tablet daily.   7.  Coumadin as directed.      The remainder of her medications, allergies and review of systems were reviewed and as are documented separately.      PHYSICAL EXAMINATION:   GENERAL:  The patient is an 86-year-old female who appears her stated age.  She is in no apparent distress.  She is wearing oxygen by nasal cannula.   VITAL SIGNS:  Her blood pressure is 126/52, pulse is 78, weight is 180 pounds.   PULMONARY:  Breathing is nonlabored.  Lungs are overall clear to auscultation.   CARDIAC:  Examination reveals a regular rate and rhythm.  I do not appreciate any significant murmur.   EXTREMITIES:  Lower extremities show trace edema bilaterally.   NEUROLOGIC:  Alert and oriented.      Her recent 7-day Zio Patch monitor showed a predominant rhythm of sinus.  She was noted to have about 6500 runs of paroxysmal SVT, the fastest was 7 beats at a maximum rate of 122 beats per minute, the longest was 2 minutes and 17  seconds with an average rate of 112 beats per minute.  No significant ventricular ectopy was noted.      ASSESSMENT AND PLAN:  Ms. Wachter is an 86-year-old female with a history of paroxysmal atrial fibrillation is was on warfarin for anticoagulation, hypertension, hyperlipidemia, type 2 diabetes and COPD who was recently admitted due to acute onset dyspnea.  Echocardiography showed a new cardiomyopathy.  It was felt that this was most consistent with a stress cardiomyopathy, although the differential also includes a tachycardic-induced cardiomyopathy.  Based on the sudden onset of her symptoms as well as the improvement seen in her EF in just a couple of days, I agree that it is most likely that this was a stress cardiomyopathy.  During her stay, she was noted to have some paroxysmal atrial arrhythmia, thought possibly to be paroxysmal atrial tachycardia or atrial flutter.  She was started on amiodarone as well as metoprolol.  Followup outpatient monitoring showed a predominant rhythm of sinus, although she was noted to have fairly frequent but short episodes of paroxysmal SVT.  Even when she had the episodes of SVT, they were not particularly fast.  She is asymptomatic with these.  I did recommend continuing amiodarone for the time being, at least until we can get a reassessment of her LV function.  The patient was initially under the impression she only had to continue with medication for 30 days, and although it may not be the best medication for her long-term, I think that it is safe to continue to use for the next month or so until we can reassess her EF.  She was agreeable to this.  If we are going to keep her on amiodarone longterm, then we will need to do routine monitoring including TSH, basic metabolic panel and hepatic testing on an every 6 month basis.      Most concerning to me today was that she was started on a fairly high dose of diuretic therapy during her stay, which she reports she was not on  prior to admission and she has not had any repeat blood work since discharge.  I recommended that we get a basic metabolic panel before she leaves today.  I did not adjust her diuretic therapy at today's office visit but will make further recommendations once I have the labs available.  In reviewing the notes, it looks like she also had previously been on lisinopril, but this was discontinued during her hospitalization due to some low blood pressure readings.  I did not restart the medication at this time, but this can be reconsidered in the future, especially if her EF remains reduced, although I suspect it actually had been used previously due to her history of diabetes or hypertension.      I did recommend a followup limited echocardiogram as well as office followup with her initial consulting cardiologist, Dr. Arango, in approximately 1 month.  Again, further recommendations in regard to continuing amiodarone long-term as well as recommendations in regard to her other medical therapy can be made at that time.  We will call her with the results of her basic metabolic panel from today and let her know if any adjustments need to be made to her medical therapy in the interim.      Thank you for allowing me to participate in the care of this patient.      cc:     Venu Maria PA-C    [facility]    [id number] [place] [address]  [id number]         COREEN ROLON PA-C             D: 12/10/2019   T: 12/10/2019   MT: CATARINA      Name:     WACHTER, MARYALICE   MRN:      [id number]        Account:      [id number]   :      1933           Service Date: 12/10/2019      Document: [id number]        Thank you for allowing me to participate in the care of your patient.      Sincerely,     Coreen Rolon PA-C     [facility]    cc:   Tres Metz MD  [address], [address]  [place] [address]

## 2019-12-10 NOTE — LETTER
12/10/2019      Venu Maria PA-C  [address] [facility] [address]      RE: Sindi Rebecca Wachter       Dear Colleague,    I had the pleasure of seeing Maryalice Rebecca Wachter in the [facility].    Service Date: 12/10/2019      HISTORY OF PRESENT ILLNESS:  Ms. Wachter is an 86-year-old female who presents to Cardiology office today following a hospitalization at [facility] secondary to acute dyspnea, at which time she was found to have cardiogenic shock and a new cardiomyopathy with an EF in the 25% range.      The patient's past cardiovascular history includes paroxysmal atrial fibrillation which appears to have been first diagnosed in 2012.  Her LVEF was mildly reduced at that time, and she underwent a stress test which suggested a possible prior infarction; however, followup stress testing as well as echocardiography did not show similar findings.  Her EF as of 07/2018 was normal.  She also has hypertension, hyperlipidemia, type 2 diabetes and COPD.      On the day of her presentation in early November, she developed sudden onset of dyspnea.  Due to ongoing symptomatology, EMS was called.  She actually required BiPAP on the way to the Emergency Department.  Her basic initial workup was unremarkable.  She did undergo an echocardiogram which suggested reduced LV systolic function with an EF of 25% with severe global hypokinesis of the mid and distal segment and preserved contractility of the base. Cardiology was consulted.  Due to the echo findings in conjunction with a only very mildly elevated troponin it was felt she likely had a stress cardiomyopathy.  It sounds like she initially was treated with BiPAP and pressors and fortunately improved quickly.  During her stay, she was noted to be in sinus rhythm but had intermittent increase in her heart rate to the 120s-130s.  It was felt that these episodes were most likely a paroxysmal atrial tachycardia or  atrial flutter.  She was started on amiodarone during her stay as well as metoprolol tartrate.  She was continued on Coumadin.  She did develop volume overload, which was treated with IV diuresis and eventually she was placed on p.o. furosemide 40 mg b.i.d. along with spironolactone 12.5 mg daily.  She did undergo a repeat limited echocardiogram later during her stay which suggested an improvement in her EF to the 40% range.  The prior wall motion abnormalities had improved but had not normalized.  She also underwent a Lexiscan nuclear stress test.  This did not show any evidence of myocardial ischemia or infarction.  It was noted that there was a fixed distal anterior wall defect which was felt most likely consistent with attenuation artifact.  Her LV systolic function was noted to be low-normal on that study.  The Cardiology team had recommended a 7-day Zio Patch monitor upon discharge, and today's office visit was set up as well.      Since discharge from her hospitalization, the patient states that she has been feeling okay.  She has not noted any palpitations or tachycardia but states she has really not been aware of having episodes of paroxysmal atrial fibrillation in the past.  She does continue to wear oxygen with activity.  She denies any orthopnea, PND or significant lower extremity edema.  She did mention today that she is running out of amiodarone as well as furosemide and spironolactone.  She wonders if she needs to continue these medications.  She did have followup with her primary care provider at the end of November, but unfortunately it does not look like a repeat basic metabolic panel was performed at that time.  Her weight at the time of discharge was recorded at 181 pounds.  She is 180 pounds on our office scale.      CURRENT CARDIAC MEDICATIONS:   1.  Amiodarone 200 mg daily.   2.  Aspirin 81 mg daily.   3.  Furosemide 40 mg b.i.d.   4.  Metoprolol tartrate 25 mg b.i.d.   5.  Crestor 5 mg daily.    6.  Spironolactone 25 mg half tablet daily.   7.  Coumadin as directed.      The remainder of her medications, allergies and review of systems were reviewed and as are documented separately.      PHYSICAL EXAMINATION:   GENERAL:  The patient is an 86-year-old female who appears her stated age.  She is in no apparent distress.  She is wearing oxygen by nasal cannula.   VITAL SIGNS:  Her blood pressure is 126/52, pulse is 78, weight is 180 pounds.   PULMONARY:  Breathing is nonlabored.  Lungs are overall clear to auscultation.   CARDIAC:  Examination reveals a regular rate and rhythm.  I do not appreciate any significant murmur.   EXTREMITIES:  Lower extremities show trace edema bilaterally.   NEUROLOGIC:  Alert and oriented.      Her recent 7-day Zio Patch monitor showed a predominant rhythm of sinus.  She was noted to have about 6500 runs of paroxysmal SVT, the fastest was 7 beats at a maximum rate of 122 beats per minute, the longest was 2 minutes and 17 seconds with an average rate of 112 beats per minute.  No significant ventricular ectopy was noted.      ASSESSMENT AND PLAN:  Ms. Wachter is an 86-year-old female with a history of paroxysmal atrial fibrillation is was on warfarin for anticoagulation, hypertension, hyperlipidemia, type 2 diabetes and COPD who was recently admitted due to acute onset dyspnea.  Echocardiography showed a new cardiomyopathy.  It was felt that this was most consistent with a stress cardiomyopathy, although the differential also includes a tachycardic-induced cardiomyopathy.  Based on the sudden onset of her symptoms as well as the improvement seen in her EF in just a couple of days, I agree that it is most likely that this was a stress cardiomyopathy.  During her stay, she was noted to have some paroxysmal atrial arrhythmia, thought possibly to be paroxysmal atrial tachycardia or atrial flutter.  She was started on amiodarone as well as metoprolol.  Followup outpatient monitoring  showed a predominant rhythm of sinus, although she was noted to have fairly frequent but short episodes of paroxysmal SVT.  Even when she had the episodes of SVT, they were not particularly fast.  She is asymptomatic with these.  I did recommend continuing amiodarone for the time being, at least until we can get a reassessment of her LV function.  The patient was initially under the impression she only had to continue with medication for 30 days, and although it may not be the best medication for her long-term, I think that it is safe to continue to use for the next month or so until we can reassess her EF.  She was agreeable to this.  If we are going to keep her on amiodarone longterm, then we will need to do routine monitoring including TSH, basic metabolic panel and hepatic testing on an every 6 month basis.      Most concerning to me today was that she was started on a fairly high dose of diuretic therapy during her stay, which she reports she was not on prior to admission and she has not had any repeat blood work since discharge.  I recommended that we get a basic metabolic panel before she leaves today.  I did not adjust her diuretic therapy at today's office visit but will make further recommendations once I have the labs available.  In reviewing the notes, it looks like she also had previously been on lisinopril, but this was discontinued during her hospitalization due to some low blood pressure readings.  I did not restart the medication at this time, but this can be reconsidered in the future, especially if her EF remains reduced, although I suspect it actually had been used previously due to her history of diabetes or hypertension.      I did recommend a followup limited echocardiogram as well as office followup with her initial consulting cardiologist, Dr. Arango, in approximately 1 month.  Again, further recommendations in regard to continuing amiodarone long-term as well as recommendations in regard  to her other medical therapy can be made at that time.  We will call her with the results of her basic metabolic panel from today and let her know if any adjustments need to be made to her medical therapy in the interim.      Thank you for allowing me to participate in the care of this patient.      cc:     Venu Maria PA-C    [address]  [address]         MARTHA ROLON PA-C             D: 12/10/2019   T: 12/10/2019   MT: CATARINA      Name:     WACHTER, MARYALICE   MRN:      [id number]        Account:      [id number]   :      1933           Service Date: 12/10/2019      Document: [id number]           Outpatient Encounter Medications as of 12/10/2019   Medication Sig Dispense Refill     albuterol (ALBUTEROL) 108 (90 BASE) MCG/ACT inhaler Inhale 2 puffs into the lungs every 6 hours as needed Reported on 3/2/2017       albuterol (PROVENTIL) (2.5 MG/3ML) 0.083% neb solution INHALE CONTENTS OF 1 VIAL (3 ML) VIA NEBULIZER EVERY 6 HOURS AS NEEDED FOR SHORTNESS OF BREATH / DYSPNEA OR WHEEZING 180 mL 1     amiodarone (PACERONE/CODARONE) 200 MG tablet Take 1 tablet (200 mg) by mouth daily 30 tablet 3     ascorbic acid (VITAMIN C) 500 MG tablet Take 500 mg by mouth daily.       ASPIRIN EC PO Take 81 mg by mouth daily        calcium carb 1250 mg, 500 mg [place],/vitamin D 200 units (OSCAL WITH D) 500-200 MG-UNIT per tablet Take 1 tablet by mouth daily.       ferrous sulfate 325 (65 FE) MG tablet Take 1 tablet by mouth daily (with breakfast).       Fluticasone-Umeclidin-Vilanterol (TRELEGY ELLIPTA) 100-62.5-25 MCG/INH oral inhaler Inhale 1 puff into the lungs daily 1 Inhaler 11     furosemide (LASIX) 40 MG tablet Take 1 tablet (40 mg) by mouth daily 90 tablet 0     glipiZIDE (GLUCOTROL XL) 10 MG 24 hr tablet TAKE ONE TABLET BY MOUTH TWICE A  tablet 1     levothyroxine (SYNTHROID/LEVOTHROID) 75 MCG tablet TAKE ONE TABLET BY MOUTH EVERY DAY 90 tablet  2     LOPERAMIDE HCL PO Take 2-4 mg by mouth daily as needed        metFORMIN (GLUCOPHAGE) 500 MG tablet TAKE ONE TABLET BY MOUTH TWICE A DAY WITH MEALS 180 tablet 1     metoprolol tartrate (LOPRESSOR) 25 MG tablet Take 1 tablet (25 mg) by mouth 2 times daily 60 tablet 1     polycarbophil (FIBERCON) 625 MG tablet Take 1 tablet by mouth 2 times daily.       QUEtiapine (SEROQUEL) 25 MG tablet TAKE ONE-HALF TABLET BY MOUTH AT BEDTIME TO SLEEP . OK TO INCREASE BY 1/2 TABLET EVERY 3 NIGHTS UP TO 2 TABLETS DAILY IF NEEDED 60 tablet 5     rosuvastatin (CRESTOR) 5 MG tablet Take 1 tablet (5 mg) by mouth daily 30 tablet 3     spironolactone (ALDACTONE) 25 MG tablet Take 0.5 tablets (12.5 mg) by mouth daily 45 tablet 1     VITAMIN D, CHOLECALCIFEROL, PO Take 2,000 Units by mouth daily       warfarin ANTICOAGULANT (COUMADIN) 3 MG tablet Take 1 tablet (3mg) daily or as directed by INR nurse 90 tablet 0     blood glucose monitoring (ONE TOUCH ULTRA 2) meter device kit Use to test blood sugar 1 time daily or as directed.  Ok to substitute alternative if insurance prefers. 1 kit 0     blood glucose monitoring (ONE TOUCH ULTRASOFT) lancets USE TO TEST BLOOD SUGAR 2 TO 3 TIMES A DAY. 100 each 3     ONETOUCH ULTRA test strip USE TO TEST BLOOD SUGAR TWICE A DAY OR AS DIRECTED 100 each 3     [DISCONTINUED] amiodarone (PACERONE/CODARONE) 200 MG tablet Take 1 tablet (200 mg) by mouth daily 30 tablet 1     [DISCONTINUED] furosemide (LASIX) 40 MG tablet Take 1 tablet (40 mg) by mouth 2 times daily 180 tablet 1     [DISCONTINUED] furosemide (LASIX) 40 MG tablet Take 1 tablet (40 mg) by mouth 2 times daily 60 tablet 1     [DISCONTINUED] spironolactone (ALDACTONE) 25 MG tablet Take 0.5 tablets (12.5 mg) by mouth daily 30 tablet 1     No facility-administered encounter medications on file as of 12/10/2019.        Again, thank you for allowing me to participate in the care of your patient.      Sincerely,    Coreen Cortez PA-C      [facility]

## 2019-12-10 NOTE — PROGRESS NOTES
Please see separate dictation for HPI, PHYSICAL EXAM AND IMPRESSION/PLAN.    CURRENT MEDICATIONS:  Current Outpatient Medications   Medication Sig Dispense Refill     albuterol (ALBUTEROL) 108 (90 BASE) MCG/ACT inhaler Inhale 2 puffs into the lungs every 6 hours as needed Reported on 3/2/2017       albuterol (PROVENTIL) (2.5 MG/3ML) 0.083% neb solution INHALE CONTENTS OF 1 VIAL (3 ML) VIA NEBULIZER EVERY 6 HOURS AS NEEDED FOR SHORTNESS OF BREATH / DYSPNEA OR WHEEZING 180 mL 1     amiodarone (PACERONE/CODARONE) 200 MG tablet Take 1 tablet (200 mg) by mouth daily 30 tablet 1     ascorbic acid (VITAMIN C) 500 MG tablet Take 500 mg by mouth daily.       ASPIRIN EC PO Take 81 mg by mouth daily        calcium carb 1250 mg, 500 mg [place],/vitamin D 200 units (OSCAL WITH D) 500-200 MG-UNIT per tablet Take 1 tablet by mouth daily.       ferrous sulfate 325 (65 FE) MG tablet Take 1 tablet by mouth daily (with breakfast).       Fluticasone-Umeclidin-Vilanterol (TRELEGY ELLIPTA) 100-62.5-25 MCG/INH oral inhaler Inhale 1 puff into the lungs daily 1 Inhaler 11     furosemide (LASIX) 40 MG tablet Take 1 tablet (40 mg) by mouth 2 times daily 60 tablet 1     glipiZIDE (GLUCOTROL XL) 10 MG 24 hr tablet TAKE ONE TABLET BY MOUTH TWICE A  tablet 1     levothyroxine (SYNTHROID/LEVOTHROID) 75 MCG tablet TAKE ONE TABLET BY MOUTH EVERY DAY 90 tablet 2     LOPERAMIDE HCL PO Take 2-4 mg by mouth daily as needed        metFORMIN (GLUCOPHAGE) 500 MG tablet TAKE ONE TABLET BY MOUTH TWICE A DAY WITH MEALS 180 tablet 1     metoprolol tartrate (LOPRESSOR) 25 MG tablet Take 1 tablet (25 mg) by mouth 2 times daily 60 tablet 1     polycarbophil (FIBERCON) 625 MG tablet Take 1 tablet by mouth 2 times daily.       QUEtiapine (SEROQUEL) 25 MG tablet TAKE ONE-HALF TABLET BY MOUTH AT BEDTIME TO SLEEP . OK TO INCREASE BY 1/2 TABLET EVERY 3 NIGHTS UP TO 2 TABLETS DAILY IF NEEDED 60 tablet 5     rosuvastatin (CRESTOR) 5 MG tablet Take 1 tablet (5 mg) by  mouth daily 30 tablet 3     spironolactone (ALDACTONE) 25 MG tablet Take 0.5 tablets (12.5 mg) by mouth daily 30 tablet 1     VITAMIN D, CHOLECALCIFEROL, PO Take 2,000 Units by mouth daily       warfarin ANTICOAGULANT (COUMADIN) 3 MG tablet Take 1 tablet (3mg) daily or as directed by INR nurse 90 tablet 0     blood glucose monitoring (ONE TOUCH ULTRA 2) meter device kit Use to test blood sugar 1 time daily or as directed.  Ok to substitute alternative if insurance prefers. 1 kit 0     blood glucose monitoring (ONE TOUCH ULTRASOFT) lancets USE TO TEST BLOOD SUGAR 2 TO 3 TIMES A DAY. 100 each 3     ONETOUCH ULTRA test strip USE TO TEST BLOOD SUGAR TWICE A DAY OR AS DIRECTED 100 each 3       ALLERGIES:     Allergies   Allergen Reactions     Penicillins Hives     Amlodipine Other (See Comments)     Too much ankle edema and red itchy spots.      Furosemide Other (See Comments)     Fainted with first dose. 11/8 pt states she took lasix years ago and fainted sometime after, has had since with no reaction       PAST MEDICAL HISTORY:  Past Medical History:   Diagnosis Date     Atrial fibrillation (H)     history of atrial fibrillation     CAD (coronary artery disease)      Congestive heart failure (CHF) (H)      COPD (chronic obstructive pulmonary disease) (H)      Diabetes mellitus (H)     Oral medication treated.     Hiatal hernia     Documented quite large by CT Fall 2011.     Hypercholesterolemia     Per mention in notes, but no recent chol panel noted.  She does take simvastatin.     Hypertension      Hypothyroidism      Postmenopausal bleeding 8/23/2012     Sarcoid        PAST SURGICAL HISTORY:  Past Surgical History:   Procedure Laterality Date     ANKLE SURGERY       DILATION AND CURETTAGE, HYSTEROSCOPY DIAGNOSTIC, COMBINED  8/23/2012    Procedure: COMBINED DILATION AND CURETTAGE, HYSTEROSCOPY DIAGNOSTIC;   DILATION AND CURETTAGE, Operative  HYSTEROSCOPY ;  Surgeon: Justa Francois MD;  Location: [facility] OR     ENT  SURGERY       ESOPHAGOSCOPY, GASTROSCOPY, DUODENOSCOPY (EGD), COMBINED N/A 4/13/2017    Procedure: COMBINED ESOPHAGOSCOPY, GASTROSCOPY, DUODENOSCOPY (EGD);  Surgeon: Johnny Stratton MD;  Location: [facility] GI     ESOPHAGOSCOPY, GASTROSCOPY, DUODENOSCOPY (EGD), COMBINED N/A 4/13/2017    Procedure: COMBINED ESOPHAGOSCOPY, GASTROSCOPY, DUODENOSCOPY (EGD), BIOPSY SINGLE OR MULTIPLE;  Surgeon: Johnny Stratton MD;  Location: [facility] GI       SOCIAL HISTORY:  Social History     Socioeconomic History     Marital status:      Spouse name: Not on file     Number of children: Not on file     Years of education: Not on file     Highest education level: Not on file   Occupational History     Occupation: retired   Social Needs     Financial resource strain: Not on file     Food insecurity:     Worry: Not on file     Inability: Not on file     Transportation needs:     Medical: Not on file     Non-medical: Not on file   Tobacco Use     Smoking status: Never Smoker     Smokeless tobacco: Never Used   Substance and Sexual Activity     Alcohol use: Yes     Comment: Extremely rare use in small amounts.     Drug use: No     Sexual activity: Not Currently     Partners: Male   Lifestyle     Physical activity:     Days per week: Not on file     Minutes per session: Not on file     Stress: Not on file   Relationships     Social connections:     Talks on phone: Not on file     Gets together: Not on file     Attends Hoahaoism service: Not on file     Active member of club or organization: Not on file     Attends meetings of clubs or organizations: Not on file     Relationship status: Not on file     Intimate partner violence:     Fear of current or ex partner: Not on file     Emotionally abused: Not on file     Physically abused: Not on file     Forced sexual activity: Not on file   Other Topics Concern     Parent/sibling w/ CABG, MI or angioplasty before 65F 55M? No   Social History Narrative    , lives with her .  Had 5  children, one is .  2 children live locally, one lives in [place], one lives in [place].  Volunteers actively including [employer].       FAMILY HISTORY:  Family History   Problem Relation Age of Onset     Asthma Mother 42        smoker  age 42 of compications of asthma     Diabetes Father          in 70's     Other - See Comments Other         Denies cardiac.       Review of Systems:  Skin:  Negative       Eyes:  Positive for glasses due for eye appt  ENT:  Negative      Respiratory:  Positive for dyspnea on exertion oxygen 2 liters continuous   Cardiovascular:  Negative for;palpitations;chest pain;lightheadedness;dizziness;edema Positive for;fatigue    Gastroenterology: Negative      Genitourinary:  Negative      Musculoskeletal:  Positive for arthritis;joint pain knee pain  Neurologic:  Negative      Psychiatric:  Negative      Heme/Lymph/Imm:  Negative      Endocrine:  Positive for diabetes;thyroid disorder

## 2019-12-11 ENCOUNTER — TELEPHONE (OUTPATIENT)
Dept: CARDIOLOGY | Facility: CLINIC | Age: 84
End: 2019-12-11

## 2019-12-11 ENCOUNTER — TELEPHONE (OUTPATIENT)
Dept: FAMILY MEDICINE | Facility: CLINIC | Age: 84
End: 2019-12-11

## 2019-12-11 DIAGNOSIS — Z79.01 LONG TERM CURRENT USE OF ANTICOAGULANTS WITH INR GOAL OF 2.0-3.0: ICD-10-CM

## 2019-12-11 DIAGNOSIS — I48.11 LONGSTANDING PERSISTENT ATRIAL FIBRILLATION (H): ICD-10-CM

## 2019-12-11 LAB — INR PPP: 2.3 (ref 0.8–1.1)

## 2019-12-11 NOTE — TELEPHONE ENCOUNTER
Anticoagulation Management    Unable to reach Farnaz with [facility] care nursing today. She left voicemail with INR results at 111am.    Today's INR result of 2.3 is therapeutic (goal INR of 2.0-3.0).  Result received from: Home [facility]    Follow up required to confirm warfarin dose taken and assess for changes    Left message to return call.      Anticoagulation clinic to follow up    Jennifer Gloria RN

## 2019-12-11 NOTE — TELEPHONE ENCOUNTER
Called pt to discuss BMP results from 12/10/19. Reviewed medication changes per Coreen KUMAR. Pt is to cut Lasix to 40 mg daily (was BID). Added a BMP to her next Echo appt. Reviewed wt gain and when to call clinic. Pt understands and has no further questions.       Component      Latest Ref Rng & Units 12/10/2019   Sodium      133 - 144 mmol/L 136   Potassium      3.4 - 5.3 mmol/L 3.7   Chloride      94 - 109 mmol/L 93 (L)   Carbon Dioxide      20 - 32 mmol/L 36 (H)   Anion Gap      3 - 14 mmol/L 7   Glucose      70 - 99 mg/dL 199 (H)   Urea Nitrogen      7 - 30 mg/dL 42 (H)   Creatinine      0.52 - 1.04 mg/dL 1.74 (H)   GFR Estimate      >60 mL/min/1.73:m2 26 (L)   GFR Estimate If Black      >60 mL/min/1.73:m2 30 (L)   Calcium      8.5 - 10.1 mg/dL 9.9

## 2019-12-11 NOTE — PROGRESS NOTES
Service Date: 12/10/2019      HISTORY OF PRESENT ILLNESS:  Ms. Wachter is an 86-year-old female who presents to Cardiology office today following a hospitalization at [facility] secondary to acute dyspnea, at which time she was found to have cardiogenic shock and a new cardiomyopathy with an EF in the 25% range.      The patient's past cardiovascular history includes paroxysmal atrial fibrillation which appears to have been first diagnosed in 2012.  Her LVEF was mildly reduced at that time, and she underwent a stress test which suggested a possible prior infarction; however, followup stress testing as well as echocardiography did not show similar findings.  Her EF as of 07/2018 was normal.  She also has hypertension, hyperlipidemia, type 2 diabetes and COPD.      On the day of her presentation in early November, she developed sudden onset of dyspnea.  Due to ongoing symptomatology, EMS was called.  She actually required BiPAP on the way to the Emergency Department.  Her basic initial workup was unremarkable.  She did undergo an echocardiogram which suggested reduced LV systolic function with an EF of 25% with severe global hypokinesis of the mid and distal segment and preserved contractility of the base. Cardiology was consulted.  Due to the echo findings in conjunction with a only very mildly elevated troponin it was felt she likely had a stress cardiomyopathy.  It sounds like she initially was treated with BiPAP and pressors and fortunately improved quickly.  During her stay, she was noted to be in sinus rhythm but had intermittent increase in her heart rate to the 120s-130s.  It was felt that these episodes were most likely a paroxysmal atrial tachycardia or atrial flutter.  She was started on amiodarone during her stay as well as metoprolol tartrate.  She was continued on Coumadin.  She did develop volume overload, which was treated with IV diuresis and eventually she was placed on p.o. furosemide 40 mg  b.i.d. along with spironolactone 12.5 mg daily.  She did undergo a repeat limited echocardiogram later during her stay which suggested an improvement in her EF to the 40% range.  The prior wall motion abnormalities had improved but had not normalized.  She also underwent a Lexiscan nuclear stress test.  This did not show any evidence of myocardial ischemia or infarction.  It was noted that there was a fixed distal anterior wall defect which was felt most likely consistent with attenuation artifact.  Her LV systolic function was noted to be low-normal on that study.  The Cardiology team had recommended a 7-day Zio Patch monitor upon discharge, and today's office visit was set up as well.      Since discharge from her hospitalization, the patient states that she has been feeling okay.  She has not noted any palpitations or tachycardia but states she has really not been aware of having episodes of paroxysmal atrial fibrillation in the past.  She does continue to wear oxygen with activity.  She denies any orthopnea, PND or significant lower extremity edema.  She did mention today that she is running out of amiodarone as well as furosemide and spironolactone.  She wonders if she needs to continue these medications.  She did have followup with her primary care provider at the end of November, but unfortunately it does not look like a repeat basic metabolic panel was performed at that time.  Her weight at the time of discharge was recorded at 181 pounds.  She is 180 pounds on our office scale.      CURRENT CARDIAC MEDICATIONS:   1.  Amiodarone 200 mg daily.   2.  Aspirin 81 mg daily.   3.  Furosemide 40 mg b.i.d.   4.  Metoprolol tartrate 25 mg b.i.d.   5.  Crestor 5 mg daily.   6.  Spironolactone 25 mg half tablet daily.   7.  Coumadin as directed.      The remainder of her medications, allergies and review of systems were reviewed and as are documented separately.      PHYSICAL EXAMINATION:   GENERAL:  The patient is an  86-year-old female who appears her stated age.  She is in no apparent distress.  She is wearing oxygen by nasal cannula.   VITAL SIGNS:  Her blood pressure is 126/52, pulse is 78, weight is 180 pounds.   PULMONARY:  Breathing is nonlabored.  Lungs are overall clear to auscultation.   CARDIAC:  Examination reveals a regular rate and rhythm.  I do not appreciate any significant murmur.   EXTREMITIES:  Lower extremities show trace edema bilaterally.   NEUROLOGIC:  Alert and oriented.      Her recent 7-day Zio Patch monitor showed a predominant rhythm of sinus.  She was noted to have about 6500 runs of paroxysmal SVT, the fastest was 7 beats at a maximum rate of 122 beats per minute, the longest was 2 minutes and 17 seconds with an average rate of 112 beats per minute.  No significant ventricular ectopy was noted.      ASSESSMENT AND PLAN:  Ms. Wachter is an 86-year-old female with a history of paroxysmal atrial fibrillation is was on warfarin for anticoagulation, hypertension, hyperlipidemia, type 2 diabetes and COPD who was recently admitted due to acute onset dyspnea.  Echocardiography showed a new cardiomyopathy.  It was felt that this was most consistent with a stress cardiomyopathy, although the differential also includes a tachycardic-induced cardiomyopathy.  Based on the sudden onset of her symptoms as well as the improvement seen in her EF in just a couple of days, I agree that it is most likely that this was a stress cardiomyopathy.  During her stay, she was noted to have some paroxysmal atrial arrhythmia, thought possibly to be paroxysmal atrial tachycardia or atrial flutter.  She was started on amiodarone as well as metoprolol.  Followup outpatient monitoring showed a predominant rhythm of sinus, although she was noted to have fairly frequent but short episodes of paroxysmal SVT.  Even when she had the episodes of SVT, they were not particularly fast.  She is asymptomatic with these.  I did recommend  continuing amiodarone for the time being, at least until we can get a reassessment of her LV function.  The patient was initially under the impression she only had to continue with medication for 30 days, and although it may not be the best medication for her long-term, I think that it is safe to continue to use for the next month or so until we can reassess her EF.  She was agreeable to this.  If we are going to keep her on amiodarone longterm, then we will need to do routine monitoring including TSH, basic metabolic panel and hepatic testing on an every 6 month basis.      Most concerning to me today was that she was started on a fairly high dose of diuretic therapy during her stay, which she reports she was not on prior to admission and she has not had any repeat blood work since discharge.  I recommended that we get a basic metabolic panel before she leaves today.  I did not adjust her diuretic therapy at today's office visit but will make further recommendations once I have the labs available.  In reviewing the notes, it looks like she also had previously been on lisinopril, but this was discontinued during her hospitalization due to some low blood pressure readings.  I did not restart the medication at this time, but this can be reconsidered in the future, especially if her EF remains reduced, although I suspect it actually had been used previously due to her history of diabetes or hypertension.      I did recommend a followup limited echocardiogram as well as office followup with her initial consulting cardiologist, Dr. Arango, in approximately 1 month.  Again, further recommendations in regard to continuing amiodarone long-term as well as recommendations in regard to her other medical therapy can be made at that time.  We will call her with the results of her basic metabolic panel from today and let her know if any adjustments need to be made to her medical therapy in the interim.      Thank you for  allowing me to participate in the care of this patient.      cc:     [place] [address]         MARTHA ROLON PA-C             D: 12/10/2019   T: 12/10/2019   MT: CATARINA      Name:     WACHTER, MARYALICE   MRN:      [id number]        Account:      [id number]   :      1933           Service Date: 12/10/2019      Document: [id number]

## 2019-12-11 NOTE — TELEPHONE ENCOUNTER
ANTICOAGULATION MANAGEMENT     Patient Name:  Maryalice Rebecca Wachter  Date:  2019    ASSESSMENT /SUBJECTIVE:      Today's INR result of 2.3 is therapeutic. Goal INR of 2.0-3.0      Warfarin dose taken: Warfarin taken as previously instructed    Diet: No new diet changes affecting INR    Medication changes/ interactions: Interaction between amidarone and warfarin may be affecting INR    Previous INR: Therapeutic     S/S of bleeding or thromboembolism: No    New injury or illness:  No    Upcoming surgery, procedure or cardioversion:  No    Additional findings: Patient home care reports that patient saw cardiology and going to continue amiodarone for 1 more month, patient will be rechecked in a week to make sure this isn't affecting INR.  Patient home care also reports that plan is to discontinue patient from home care on  appt.  Will need to schedule with [place] lab at this time for followup INR.      PLAN:    Spoke with July [facility]YS [facility] regarding INR result and instructed:     Warfarin Dosing Instructions: Continue your current warfarin dose  Of 3 mg MWF and 1.5 mg all other days  Instructed patient to follow up no later than: 1 week    Education provided: Yes: amiodarone interaction with warfarin      July, home care nurse [facility] verbalizes understanding and agrees to warfarin dosing plan.    Instructed to call the Anticoagulation Clinic for any changes, questions or concerns. (#[phone])        OBJECTIVE:  INR   Date Value Ref Range Status   2019 2.3 (A) 0.8 - 1.1 Final             Anticoagulation Summary  As of 2019    INR goal:   2.0-3.0   TTR:   66.0 % (11.8 mo)   INR used for dosin.3 (2019)   Warfarin maintenance plan:   3 mg (3 mg x 1) every Mon, Wed, Fri; 1.5 mg (3 mg x 0.5) all other days   Full warfarin instructions:   3 mg every Mon, Wed, Fri; 1.5 mg all other days   Weekly warfarin total:   15 mg   Plan last modified:   Modesta Christensen, RN (2019)   Next INR  check:   12/18/2019   Priority:   High   Target end date:       Indications    Atrial fibrillation (H) [I48.91]  Long term current use of anticoagulants with INR goal of 2.0-3.0 [Z79.01]             Anticoagulation Episode Summary     INR check location:       Preferred lab:       Send INR reminders to:   YEN PANDYA    Comments:   3 mg tabs - amiodarone started 11/2019 (during hospitalization)      Anticoagulation Care Providers     Provider Role Specialty Phone number    Venu Maria PA-C Responsible Physician Assistant - Medical [phone]

## 2019-12-18 ENCOUNTER — TELEPHONE (OUTPATIENT)
Dept: FAMILY MEDICINE | Facility: CLINIC | Age: 84
End: 2019-12-18

## 2019-12-18 DIAGNOSIS — I48.11 LONGSTANDING PERSISTENT ATRIAL FIBRILLATION (H): ICD-10-CM

## 2019-12-18 DIAGNOSIS — Z79.01 LONG TERM CURRENT USE OF ANTICOAGULANTS WITH INR GOAL OF 2.0-3.0: ICD-10-CM

## 2019-12-18 LAB — INR PPP: 3 (ref 0.9–1.1)

## 2019-12-18 NOTE — TELEPHONE ENCOUNTER
ANTICOAGULATION MANAGEMENT     Patient Name:  Maryalice Rebecca Wachter  Date:  12/18/2019    ASSESSMENT /SUBJECTIVE:      Today's INR result of 3.0 is therapeutic. Goal INR of 2.0-3.0      Warfarin dose taken: Warfarin taken as previously instructed    Diet: No new diet changes affecting INR    Medication changes/ interactions: Interaction between amiodarone (started 11/13/19) and warfarin may be affecting INR    Previous INR: Therapeutic     S/S of bleeding or thromboembolism: No    New injury or illness:  No    Upcoming surgery, procedure or cardioversion:  No    Additional findings: Pt being discharged from homecare today.  Lab appt for next INR made for 12/26.      PLAN:    Spoke with Antoinette ( nurse) regarding INR result and instructed:     Warfarin Dosing Instructions: Continue your current warfarin dose    Instructed patient to follow up no later than: 1 week (12/26 due to the holiday)    Education provided: Yes: Amiodarone can continue to increase INR for several weeks after starting.    Antoinette verbalizes understanding and agrees to warfarin dosing plan.    Instructed to call the Anticoagulation Clinic for any changes, questions or concerns. (#[phone])        OBJECTIVE:  INR   Date Value Ref Range Status   12/18/2019 3.0 (A) 0.90 - 1.10 Final             Anticoagulation Summary  As of 12/18/2019    INR goal:   2.0-3.0   TTR:   68.0 % (11.8 mo)   INR used for dosing:   3.0 (12/18/2019)   Warfarin maintenance plan:   3 mg (3 mg x 1) every Mon, Wed, Fri; 1.5 mg (3 mg x 0.5) all other days   Full warfarin instructions:   3 mg every Mon, Wed, Fri; 1.5 mg all other days   Weekly warfarin total:   15 mg   Plan last modified:   Modesta Christensen RN (11/25/2019)   Next INR check:   12/26/2019   Priority:   High   Target end date:       Indications    Atrial fibrillation (H) [I48.91]  Long term current use of anticoagulants with INR goal of 2.0-3.0 [Z79.01]             Anticoagulation Episode Summary     INR check  location:       Preferred lab:       Send INR reminders to:   YEN PANDYA    Comments:   3 mg tabs - amiodarone started 11/2019 (during hospitalization)      Anticoagulation Care Providers     Provider Role Specialty Phone number    Venu Maria PA-C Responsible Physician Assistant - Medical [phone]

## 2019-12-26 ENCOUNTER — TELEPHONE (OUTPATIENT)
Dept: FAMILY MEDICINE | Facility: CLINIC | Age: 84
End: 2019-12-26

## 2019-12-26 DIAGNOSIS — Z79.01 LONG TERM CURRENT USE OF ANTICOAGULANTS WITH INR GOAL OF 2.0-3.0: ICD-10-CM

## 2019-12-26 DIAGNOSIS — I48.11 LONGSTANDING PERSISTENT ATRIAL FIBRILLATION (H): ICD-10-CM

## 2019-12-26 DIAGNOSIS — I48.11 LONGSTANDING PERSISTENT ATRIAL FIBRILLATION (H): Primary | ICD-10-CM

## 2019-12-26 LAB
CAPILLARY BLOOD COLLECTION: NORMAL
INR PPP: 3 (ref 0.86–1.14)

## 2019-12-26 PROCEDURE — 85610 PROTHROMBIN TIME: CPT | Performed by: PHYSICIAN ASSISTANT

## 2019-12-26 PROCEDURE — 36415 COLL VENOUS BLD VENIPUNCTURE: CPT | Performed by: PHYSICIAN ASSISTANT

## 2019-12-26 NOTE — TELEPHONE ENCOUNTER
ANTICOAGULATION MANAGEMENT     Patient Name:  Maryalice Rebecca Wachter  Date:  12/26/2019    ASSESSMENT /SUBJECTIVE:      Today's INR result of 3.00 is therapeutic. Goal INR of 2.0-3.0      Warfarin dose taken: Warfarin taken as previously instructed    Diet: No new diet changes affecting INR    Medication changes/ interactions: No new medications/supplements affecting INR    Previous INR: Therapeutic     S/S of bleeding or thromboembolism: No    New injury or illness:  No    Upcoming surgery, procedure or cardioversion:  Yes: echocardigram on 1/10/20    Additional findings: none      PLAN:    Spoke with Sindi regarding INR result and instructed:     Warfarin Dosing Instructions: Continue your current warfarin dose of 3 mg on MWF and 1.5 mg all other days    Instructed patient to follow up no later than: 2 weeks, will have INR drawn with lab appt on 1/10/20, note added to appt to have INR drawn as well  Education provided: No      Sindi verbalizes understanding and agrees to warfarin dosing plan.    Instructed to call the Anticoagulation Clinic for any changes, questions or concerns. (#[phone])        OBJECTIVE:  INR   Date Value Ref Range Status   12/26/2019 3.00 (H) 0.86 - 1.14 Final     Comment:     This test is intended for monitoring Coumadin therapy.  Results are not   accurate in patients with prolonged INR due to factor deficiency.               Anticoagulation Summary  As of 12/26/2019    INR goal:   2.0-3.0   TTR:   70.2 % (11.9 mo)   INR used for dosing:   3.00 (12/26/2019)   Warfarin maintenance plan:   3 mg (3 mg x 1) every Mon, Wed, Fri; 1.5 mg (3 mg x 0.5) all other days   Full warfarin instructions:   3 mg every Mon, Wed, Fri; 1.5 mg all other days   Weekly warfarin total:   15 mg   Plan last modified:   Modesta Christensen RN (11/25/2019)   Next INR check:   1/9/2020   Priority:   High   Target end date:       Indications    Atrial fibrillation (H) [I48.91]  Long term current use of  anticoagulants with INR goal of 2.0-3.0 [Z79.01]             Anticoagulation Episode Summary     INR check location:       Preferred lab:       Send INR reminders to:   YEN PANDYA    Comments:   3 mg tabs - amiodarone started 11/2019 (during hospitalization)      Anticoagulation Care Providers     Provider Role Specialty Phone number    Venu Maria PA-C Responsible Physician Assistant - Medical [phone]

## 2020-01-01 ENCOUNTER — ANTICOAGULATION THERAPY VISIT (OUTPATIENT)
Dept: FAMILY MEDICINE | Facility: CLINIC | Age: 85
End: 2020-01-01

## 2020-01-01 ENCOUNTER — TELEPHONE (OUTPATIENT)
Dept: PHARMACY | Facility: CLINIC | Age: 85
End: 2020-01-01

## 2020-01-01 ENCOUNTER — TELEPHONE (OUTPATIENT)
Dept: FAMILY MEDICINE | Facility: CLINIC | Age: 85
End: 2020-01-01

## 2020-01-01 ENCOUNTER — DOCUMENTATION ONLY (OUTPATIENT)
Dept: FAMILY MEDICINE | Facility: CLINIC | Age: 85
End: 2020-01-01

## 2020-01-01 ENCOUNTER — ANTICOAGULATION THERAPY VISIT (OUTPATIENT)
Dept: NURSING | Facility: CLINIC | Age: 85
End: 2020-01-01

## 2020-01-01 ENCOUNTER — HOSPITAL ENCOUNTER (OUTPATIENT)
Dept: CARDIOLOGY | Facility: CLINIC | Age: 85
Discharge: HOME | End: 2020-12-03
Attending: INTERNAL MEDICINE | Admitting: INTERNAL MEDICINE
Payer: COMMERCIAL

## 2020-01-01 ENCOUNTER — OFFICE VISIT (OUTPATIENT)
Dept: FAMILY MEDICINE | Facility: CLINIC | Age: 85
End: 2020-01-01
Payer: COMMERCIAL

## 2020-01-01 ENCOUNTER — DOCUMENTATION ONLY (OUTPATIENT)
Dept: OTHER | Facility: CLINIC | Age: 85
End: 2020-01-01

## 2020-01-01 ENCOUNTER — TELEPHONE (OUTPATIENT)
Dept: CARDIOLOGY | Facility: CLINIC | Age: 85
End: 2020-01-01

## 2020-01-01 ENCOUNTER — VIRTUAL VISIT (OUTPATIENT)
Dept: PHARMACY | Facility: CLINIC | Age: 85
End: 2020-01-01
Payer: COMMERCIAL

## 2020-01-01 ENCOUNTER — TRANSFERRED RECORDS (OUTPATIENT)
Dept: HEALTH INFORMATION MANAGEMENT | Facility: CLINIC | Age: 85
End: 2020-01-01

## 2020-01-01 ENCOUNTER — ANTICOAGULATION THERAPY VISIT (OUTPATIENT)
Dept: NURSING | Facility: CLINIC | Age: 85
End: 2020-01-01
Payer: COMMERCIAL

## 2020-01-01 ENCOUNTER — OFFICE VISIT (OUTPATIENT)
Dept: PHARMACY | Facility: CLINIC | Age: 85
End: 2020-01-01
Payer: COMMERCIAL

## 2020-01-01 ENCOUNTER — NURSE TRIAGE (OUTPATIENT)
Dept: NURSING | Facility: CLINIC | Age: 85
End: 2020-01-01

## 2020-01-01 ENCOUNTER — OFFICE VISIT (OUTPATIENT)
Dept: CARDIOLOGY | Facility: CLINIC | Age: 85
End: 2020-01-01
Attending: INTERNAL MEDICINE
Payer: COMMERCIAL

## 2020-01-01 ENCOUNTER — HOSPITAL ENCOUNTER (OUTPATIENT)
Dept: CARDIOLOGY | Facility: CLINIC | Age: 85
Discharge: HOME | End: 2020-12-18
Attending: PHYSICIAN ASSISTANT | Admitting: PHYSICIAN ASSISTANT
Payer: COMMERCIAL

## 2020-01-01 VITALS
BODY MASS INDEX: 30.09 KG/M2 | OXYGEN SATURATION: 93 % | DIASTOLIC BLOOD PRESSURE: 64 MMHG | SYSTOLIC BLOOD PRESSURE: 130 MMHG | HEART RATE: 74 BPM | WEIGHT: 180.8 LBS

## 2020-01-01 VITALS
SYSTOLIC BLOOD PRESSURE: 144 MMHG | DIASTOLIC BLOOD PRESSURE: 83 MMHG | BODY MASS INDEX: 29.32 KG/M2 | OXYGEN SATURATION: 97 % | WEIGHT: 176 LBS | HEIGHT: 65 IN | HEART RATE: 92 BPM

## 2020-01-01 VITALS
BODY MASS INDEX: 29.99 KG/M2 | WEIGHT: 180.2 LBS | SYSTOLIC BLOOD PRESSURE: 138 MMHG | TEMPERATURE: 98.7 F | RESPIRATION RATE: 28 BRPM | HEART RATE: 102 BPM | OXYGEN SATURATION: 95 % | DIASTOLIC BLOOD PRESSURE: 70 MMHG

## 2020-01-01 VITALS
OXYGEN SATURATION: 96 % | HEART RATE: 88 BPM | BODY MASS INDEX: 29.47 KG/M2 | DIASTOLIC BLOOD PRESSURE: 76 MMHG | SYSTOLIC BLOOD PRESSURE: 140 MMHG | WEIGHT: 177.1 LBS

## 2020-01-01 VITALS
RESPIRATION RATE: 16 BRPM | HEART RATE: 58 BPM | DIASTOLIC BLOOD PRESSURE: 60 MMHG | TEMPERATURE: 98.3 F | OXYGEN SATURATION: 94 % | WEIGHT: 177 LBS | BODY MASS INDEX: 29.45 KG/M2 | SYSTOLIC BLOOD PRESSURE: 130 MMHG

## 2020-01-01 DIAGNOSIS — I42.9 CARDIOMYOPATHY, UNSPECIFIED TYPE (H): ICD-10-CM

## 2020-01-01 DIAGNOSIS — Z79.01 LONG TERM CURRENT USE OF ANTICOAGULANTS WITH INR GOAL OF 2.0-3.0: ICD-10-CM

## 2020-01-01 DIAGNOSIS — R22.1 NECK SWELLING: ICD-10-CM

## 2020-01-01 DIAGNOSIS — E03.9 HYPOTHYROIDISM, UNSPECIFIED TYPE: ICD-10-CM

## 2020-01-01 DIAGNOSIS — F51.05 INSOMNIA DUE TO OTHER MENTAL DISORDER: ICD-10-CM

## 2020-01-01 DIAGNOSIS — Z79.01 LONG TERM CURRENT USE OF ANTICOAGULANTS WITH INR GOAL OF 2.0-3.0: Primary | ICD-10-CM

## 2020-01-01 DIAGNOSIS — I10 ESSENTIAL HYPERTENSION: ICD-10-CM

## 2020-01-01 DIAGNOSIS — E11.9 TYPE 2 DIABETES, HBA1C GOAL < 8% (H): ICD-10-CM

## 2020-01-01 DIAGNOSIS — E78.5 HYPERLIPIDEMIA LDL GOAL <100: ICD-10-CM

## 2020-01-01 DIAGNOSIS — J44.1 COPD EXACERBATION (H): ICD-10-CM

## 2020-01-01 DIAGNOSIS — I48.0 PAROXYSMAL ATRIAL FIBRILLATION (H): ICD-10-CM

## 2020-01-01 DIAGNOSIS — F99 INSOMNIA DUE TO OTHER MENTAL DISORDER: ICD-10-CM

## 2020-01-01 DIAGNOSIS — E11.65 TYPE 2 DIABETES MELLITUS WITH HYPERGLYCEMIA, WITHOUT LONG-TERM CURRENT USE OF INSULIN (H): ICD-10-CM

## 2020-01-01 DIAGNOSIS — F34.1 DYSTHYMIA: ICD-10-CM

## 2020-01-01 DIAGNOSIS — I48.91 ATRIAL FIBRILLATION (H): ICD-10-CM

## 2020-01-01 DIAGNOSIS — I25.119 ATHEROSCLEROSIS OF NATIVE CORONARY ARTERY OF NATIVE HEART WITH ANGINA PECTORIS (H): ICD-10-CM

## 2020-01-01 DIAGNOSIS — D50.9 IRON DEFICIENCY ANEMIA, UNSPECIFIED IRON DEFICIENCY ANEMIA TYPE: ICD-10-CM

## 2020-01-01 DIAGNOSIS — G47.00 INSOMNIA, UNSPECIFIED TYPE: ICD-10-CM

## 2020-01-01 DIAGNOSIS — N18.30 CKD (CHRONIC KIDNEY DISEASE) STAGE 3, GFR 30-59 ML/MIN (H): ICD-10-CM

## 2020-01-01 DIAGNOSIS — J43.1 PANLOBULAR EMPHYSEMA (H): ICD-10-CM

## 2020-01-01 DIAGNOSIS — F41.3 OTHER MIXED ANXIETY DISORDERS: ICD-10-CM

## 2020-01-01 DIAGNOSIS — I48.11 LONGSTANDING PERSISTENT ATRIAL FIBRILLATION (H): ICD-10-CM

## 2020-01-01 DIAGNOSIS — E11.65 TYPE 2 DIABETES MELLITUS WITH HYPERGLYCEMIA, WITHOUT LONG-TERM CURRENT USE OF INSULIN (H): Primary | ICD-10-CM

## 2020-01-01 DIAGNOSIS — I48.20 CHRONIC ATRIAL FIBRILLATION (H): ICD-10-CM

## 2020-01-01 DIAGNOSIS — Z79.01 LONG TERM CURRENT USE OF ANTICOAGULANT THERAPY: ICD-10-CM

## 2020-01-01 DIAGNOSIS — Z71.89 ADVANCED CARE PLANNING/COUNSELING DISCUSSION: ICD-10-CM

## 2020-01-01 DIAGNOSIS — K58.0 IRRITABLE BOWEL SYNDROME WITH DIARRHEA: ICD-10-CM

## 2020-01-01 DIAGNOSIS — J43.1 PANLOBULAR EMPHYSEMA (H): Primary | ICD-10-CM

## 2020-01-01 LAB
ANION GAP SERPL CALCULATED.3IONS-SCNC: 11 MMOL/L (ref 3–14)
ANION GAP SERPL CALCULATED.3IONS-SCNC: 3 MMOL/L (ref 3–14)
BUN SERPL-MCNC: 20 MG/DL (ref 7–30)
BUN SERPL-MCNC: 21 MG/DL (ref 7–30)
CALCIUM SERPL-MCNC: 9 MG/DL (ref 8.5–10.1)
CALCIUM SERPL-MCNC: 9.2 MG/DL (ref 8.5–10.1)
CAPILLARY BLOOD COLLECTION: NORMAL
CHLORIDE SERPL-SCNC: 104 MMOL/L (ref 94–109)
CHLORIDE SERPL-SCNC: 104 MMOL/L (ref 94–109)
CHOLEST SERPL-MCNC: 102 MG/DL
CO2 SERPL-SCNC: 24 MMOL/L (ref 20–32)
CO2 SERPL-SCNC: 31 MMOL/L (ref 20–32)
CREAT SERPL-MCNC: 1.12 MG/DL (ref 0.52–1.04)
CREAT SERPL-MCNC: 1.15 MG/DL (ref 0.52–1.04)
CREAT UR-MCNC: 180 MG/DL
GFR SERPL CREATININE-BSD FRML MDRD: 43 ML/MIN/{1.73_M2}
GFR SERPL CREATININE-BSD FRML MDRD: 44 ML/MIN/{1.73_M2}
GLUCOSE SERPL-MCNC: 170 MG/DL (ref 70–99)
GLUCOSE SERPL-MCNC: 180 MG/DL (ref 70–99)
HBA1C MFR BLD: 8.5 % (ref 0–5.6)
HBA1C MFR BLD: 8.8 % (ref 0–5.6)
HBA1C MFR BLD: 9.4 % (ref 0–5.6)
HDLC SERPL-MCNC: 36 MG/DL
INR PPP: 1.7 (ref 0.86–1.14)
INR PPP: 2 (ref 0.86–1.14)
INR PPP: 2.3 (ref 0.86–1.14)
INR PPP: 2.4 (ref 0.86–1.14)
INR PPP: 2.5 (ref 0.86–1.14)
INR PPP: 2.5 (ref 0.86–1.14)
INR PPP: 2.6 (ref 0.86–1.14)
INR PPP: 2.7 (ref 0.86–1.14)
INR PPP: 2.7 (ref 0.86–1.14)
INR PPP: 3 (ref 0.86–1.14)
INR PPP: 3 (ref 0.86–1.14)
LDLC SERPL CALC-MCNC: 21 MG/DL
MICROALBUMIN UR-MCNC: 42 MG/L
MICROALBUMIN/CREAT UR: 23.5 MG/G CR (ref 0–25)
NONHDLC SERPL-MCNC: 66 MG/DL
POTASSIUM SERPL-SCNC: 4.1 MMOL/L (ref 3.4–5.3)
POTASSIUM SERPL-SCNC: 4.4 MMOL/L (ref 3.4–5.3)
SODIUM SERPL-SCNC: 138 MMOL/L (ref 133–144)
SODIUM SERPL-SCNC: 139 MMOL/L (ref 133–144)
TRIGL SERPL-MCNC: 226 MG/DL
TSH SERPL DL<=0.005 MIU/L-ACNC: 2.41 MU/L (ref 0.4–4)

## 2020-01-01 PROCEDURE — 93306 TTE W/DOPPLER COMPLETE: CPT | Mod: 26 | Performed by: INTERNAL MEDICINE

## 2020-01-01 PROCEDURE — 36416 COLLJ CAPILLARY BLOOD SPEC: CPT | Performed by: PHYSICIAN ASSISTANT

## 2020-01-01 PROCEDURE — 0298T LEADLESS EKG MONITOR 3 TO 14 DAYS: CPT | Performed by: INTERNAL MEDICINE

## 2020-01-01 PROCEDURE — 83036 HEMOGLOBIN GLYCOSYLATED A1C: CPT | Performed by: PHYSICIAN ASSISTANT

## 2020-01-01 PROCEDURE — 99207 PR NO CHARGE NURSE ONLY: CPT

## 2020-01-01 PROCEDURE — 85610 PROTHROMBIN TIME: CPT | Performed by: PHYSICIAN ASSISTANT

## 2020-01-01 PROCEDURE — 36415 COLL VENOUS BLD VENIPUNCTURE: CPT | Performed by: PHYSICIAN ASSISTANT

## 2020-01-01 PROCEDURE — 99605 MTMS BY PHARM NP 15 MIN: CPT | Performed by: PHARMACIST

## 2020-01-01 PROCEDURE — 93306 TTE W/DOPPLER COMPLETE: CPT

## 2020-01-01 PROCEDURE — 80048 BASIC METABOLIC PNL TOTAL CA: CPT | Performed by: PHYSICIAN ASSISTANT

## 2020-01-01 PROCEDURE — 99214 OFFICE O/P EST MOD 30 MIN: CPT | Performed by: PHYSICIAN ASSISTANT

## 2020-01-01 PROCEDURE — 80061 LIPID PANEL: CPT | Performed by: PHYSICIAN ASSISTANT

## 2020-01-01 PROCEDURE — 99207 C FOOT EXAM  NO CHARGE: CPT | Performed by: PHYSICIAN ASSISTANT

## 2020-01-01 PROCEDURE — 99207 PR NO CHARGE LOS: CPT | Performed by: PHARMACIST

## 2020-01-01 PROCEDURE — 0296T LEADLESS EKG MONITOR 3 TO 14 DAYS: CPT

## 2020-01-01 PROCEDURE — 82043 UR ALBUMIN QUANTITATIVE: CPT | Performed by: PHYSICIAN ASSISTANT

## 2020-01-01 PROCEDURE — 84443 ASSAY THYROID STIM HORMONE: CPT | Performed by: PHYSICIAN ASSISTANT

## 2020-01-01 PROCEDURE — 99207 ZZC NO CHARGE NURSE ONLY: CPT

## 2020-01-01 PROCEDURE — 99207 ZZC NO CHARGE LOS: CPT | Performed by: PHARMACIST

## 2020-01-01 PROCEDURE — 99607 MTMS BY PHARM ADDL 15 MIN: CPT | Performed by: PHARMACIST

## 2020-01-01 RX ORDER — LEVOTHYROXINE SODIUM 75 UG/1
TABLET ORAL
Qty: 90 TABLET | Refills: 3 | Status: SHIPPED | OUTPATIENT
Start: 2020-01-01

## 2020-01-01 RX ORDER — LANCETS
EACH MISCELLANEOUS
Qty: 100 EACH | Refills: 3 | Status: SHIPPED | OUTPATIENT
Start: 2020-01-01

## 2020-01-01 RX ORDER — ROSUVASTATIN CALCIUM 5 MG/1
TABLET, COATED ORAL
Qty: 90 TABLET | Refills: 1 | OUTPATIENT
Start: 2020-01-01

## 2020-01-01 RX ORDER — METOPROLOL TARTRATE 50 MG
50 TABLET ORAL 2 TIMES DAILY
Qty: 180 TABLET | Refills: 3 | Status: SHIPPED | OUTPATIENT
Start: 2020-01-01

## 2020-01-01 RX ORDER — GLIPIZIDE 10 MG/1
TABLET, FILM COATED, EXTENDED RELEASE ORAL
Qty: 180 TABLET | Refills: 1 | OUTPATIENT
Start: 2020-01-01

## 2020-01-01 RX ORDER — GLIPIZIDE 10 MG/1
10 TABLET, FILM COATED, EXTENDED RELEASE ORAL 2 TIMES DAILY
Qty: 180 TABLET | Refills: 1 | Status: SHIPPED | OUTPATIENT
Start: 2020-01-01 | End: 2020-01-01

## 2020-01-01 RX ORDER — LEVOTHYROXINE SODIUM 75 UG/1
75 TABLET ORAL DAILY
Qty: 90 TABLET | Refills: 0 | Status: SHIPPED | OUTPATIENT
Start: 2020-01-01 | End: 2020-01-01

## 2020-01-01 RX ORDER — WARFARIN SODIUM 3 MG/1
TABLET ORAL
Qty: 75 TABLET | Refills: 0 | Status: SHIPPED | OUTPATIENT
Start: 2020-01-01 | End: 2020-01-01

## 2020-01-01 RX ORDER — LISINOPRIL 10 MG/1
10 TABLET ORAL DAILY
Qty: 30 TABLET | Refills: 1 | Status: SHIPPED | OUTPATIENT
Start: 2020-01-01 | End: 2021-01-01

## 2020-01-01 RX ORDER — METOPROLOL TARTRATE 50 MG
50 TABLET ORAL 2 TIMES DAILY
Qty: 90 TABLET | Refills: 3 | Status: CANCELLED | OUTPATIENT
Start: 2020-01-01

## 2020-01-01 RX ORDER — LEVOTHYROXINE SODIUM 75 UG/1
TABLET ORAL
Qty: 90 TABLET | Refills: 0 | Status: SHIPPED | OUTPATIENT
Start: 2020-01-01 | End: 2020-01-01

## 2020-01-01 RX ORDER — QUETIAPINE FUMARATE 25 MG/1
TABLET, FILM COATED ORAL
Qty: 60 TABLET | Refills: 2 | Status: SHIPPED | OUTPATIENT
Start: 2020-01-01 | End: 2020-01-01

## 2020-01-01 RX ORDER — WARFARIN SODIUM 3 MG/1
TABLET ORAL
Qty: 75 TABLET | Refills: 1 | Status: SHIPPED | OUTPATIENT
Start: 2020-01-01 | End: 2021-01-01

## 2020-01-01 RX ORDER — GLIPIZIDE 10 MG/1
TABLET, FILM COATED, EXTENDED RELEASE ORAL
Qty: 180 TABLET | Refills: 3 | Status: SHIPPED | OUTPATIENT
Start: 2020-01-01

## 2020-01-01 RX ORDER — ROSUVASTATIN CALCIUM 5 MG/1
5 TABLET, COATED ORAL DAILY
Qty: 90 TABLET | Refills: 1 | Status: SHIPPED | OUTPATIENT
Start: 2020-01-01 | End: 2021-01-01

## 2020-01-01 RX ORDER — QUETIAPINE FUMARATE 25 MG/1
25 TABLET, FILM COATED ORAL AT BEDTIME
Qty: 60 TABLET | Refills: 5 | Status: SHIPPED | OUTPATIENT
Start: 2020-01-01

## 2020-01-01 RX ORDER — ALBUTEROL SULFATE 0.83 MG/ML
SOLUTION RESPIRATORY (INHALATION)
Qty: 180 ML | Refills: 1 | Status: SHIPPED | OUTPATIENT
Start: 2020-01-01

## 2020-01-01 ASSESSMENT — MIFFLIN-ST. JEOR: SCORE: 1234.21

## 2020-01-01 ASSESSMENT — PATIENT HEALTH QUESTIONNAIRE - PHQ9: SUM OF ALL RESPONSES TO PHQ QUESTIONS 1-9: 1

## 2020-01-10 ENCOUNTER — HOSPITAL ENCOUNTER (OUTPATIENT)
Dept: CARDIOLOGY | Facility: CLINIC | Age: 85
Discharge: HOME | End: 2020-01-10
Attending: PHYSICIAN ASSISTANT | Admitting: PHYSICIAN ASSISTANT
Payer: COMMERCIAL

## 2020-01-10 ENCOUNTER — TELEPHONE (OUTPATIENT)
Dept: FAMILY MEDICINE | Facility: CLINIC | Age: 85
End: 2020-01-10

## 2020-01-10 DIAGNOSIS — I42.9 CARDIOMYOPATHY, UNSPECIFIED TYPE (H): ICD-10-CM

## 2020-01-10 DIAGNOSIS — I48.11 LONGSTANDING PERSISTENT ATRIAL FIBRILLATION (H): ICD-10-CM

## 2020-01-10 DIAGNOSIS — Z79.01 LONG TERM CURRENT USE OF ANTICOAGULANTS WITH INR GOAL OF 2.0-3.0: ICD-10-CM

## 2020-01-10 LAB
ANION GAP SERPL CALCULATED.3IONS-SCNC: 2 MMOL/L (ref 3–14)
BUN SERPL-MCNC: 34 MG/DL (ref 7–30)
CALCIUM SERPL-MCNC: 9.4 MG/DL (ref 8.5–10.1)
CHLORIDE SERPL-SCNC: 99 MMOL/L (ref 94–109)
CO2 SERPL-SCNC: 36 MMOL/L (ref 20–32)
CREAT SERPL-MCNC: 1.59 MG/DL (ref 0.52–1.04)
GFR SERPL CREATININE-BSD FRML MDRD: 29 ML/MIN/{1.73_M2}
GLUCOSE SERPL-MCNC: 171 MG/DL (ref 70–99)
INR PPP: 2.31 (ref 0.86–1.14)
POTASSIUM SERPL-SCNC: 4.1 MMOL/L (ref 3.4–5.3)
SODIUM SERPL-SCNC: 137 MMOL/L (ref 133–144)

## 2020-01-10 PROCEDURE — 93308 TTE F-UP OR LMTD: CPT | Mod: 26 | Performed by: INTERNAL MEDICINE

## 2020-01-10 PROCEDURE — 85610 PROTHROMBIN TIME: CPT | Performed by: INTERNAL MEDICINE

## 2020-01-10 PROCEDURE — 93321 DOPPLER ECHO F-UP/LMTD STD: CPT | Mod: 26 | Performed by: INTERNAL MEDICINE

## 2020-01-10 PROCEDURE — 80048 BASIC METABOLIC PNL TOTAL CA: CPT | Performed by: INTERNAL MEDICINE

## 2020-01-10 PROCEDURE — 93325 DOPPLER ECHO COLOR FLOW MAPG: CPT | Mod: 26 | Performed by: INTERNAL MEDICINE

## 2020-01-10 PROCEDURE — 25500064 ZZH RX 255 OP 636: Performed by: PHYSICIAN ASSISTANT

## 2020-01-10 PROCEDURE — 36415 COLL VENOUS BLD VENIPUNCTURE: CPT | Performed by: INTERNAL MEDICINE

## 2020-01-10 RX ADMIN — HUMAN ALBUMIN MICROSPHERES AND PERFLUTREN 2 ML: 10; .22 INJECTION, SOLUTION INTRAVENOUS at 12:33

## 2020-01-10 NOTE — TELEPHONE ENCOUNTER
ANTICOAGULATION MANAGEMENT     Patient Name:  Maryalice Rebecca Wachter  Date:  1/10/2020    ASSESSMENT /SUBJECTIVE:      Today's INR result of 2.31 is therapeutic. Goal INR of 2.0-3.0      Warfarin dose taken: Warfarin taken as previously instructed    Diet: No new diet changes affecting INR    Medication changes/ interactions: No new medications/supplements affecting INR    Previous INR: Therapeutic     S/S of bleeding or thromboembolism: No    New injury or illness:  No    Upcoming surgery, procedure or cardioversion:  No    Additional findings: None      PLAN:    Spoke with Sindi regarding INR result and instructed:     Warfarin Dosing Instructions: Continue your current warfarin dose    Instructed patient to follow up no later than: 3 weeks, scheduled appt in clinic lab    Education provided: No      Sindi verbalizes understanding and agrees to warfarin dosing plan.    Instructed to call the Anticoagulation Clinic for any changes, questions or concerns. (#[phone])        OBJECTIVE:  INR   Date Value Ref Range Status   01/10/2020 2.31 (H) 0.86 - 1.14 Final             Anticoagulation Summary  As of 1/10/2020    INR goal:   2.0-3.0   TTR:   73.2 % (11.9 mo)   INR used for dosin.31 (1/10/2020)   Warfarin maintenance plan:   3 mg (3 mg x 1) every Mon, Wed, Fri; 1.5 mg (3 mg x 0.5) all other days   Full warfarin instructions:   3 mg every Mon, Wed, Fri; 1.5 mg all other days   Weekly warfarin total:   15 mg   Plan last modified:   Modesta Christensen RN (2019)   Next INR check:   2020   Priority:   High   Target end date:       Indications    Atrial fibrillation (H) [I48.91]  Long term current use of anticoagulants with INR goal of 2.0-3.0 [Z79.01]             Anticoagulation Episode Summary     INR check location:       Preferred lab:       Send INR reminders to:   YEN PANDYA    Comments:   3 mg tabs - amiodarone started 2019 (during hospitalization)      Anticoagulation Care  Providers     Provider Role Specialty Phone number    Venu Maria PA-C Responsible Physician Assistant - Medical [phone]

## 2020-01-10 NOTE — TELEPHONE ENCOUNTER
Anticoagulation Management    Unable to reach [facility] today.    Today's INR result of 2.31 is therapeutic (goal INR of 2.0-3.0).  Result received from: Clinic Lab    Follow up required to confirm warfarin dose taken and assess for changes    Left message with weekend dosing: 3 mg tonight and 1.5 mg on Sat/Sun and call Monday for further dosing and assessment if message received after 530 pm      Anticoagulation clinic to follow up    Jennifer Gloria RN

## 2020-01-15 DIAGNOSIS — E78.5 HYPERLIPIDEMIA LDL GOAL <100: ICD-10-CM

## 2020-01-15 RX ORDER — ROSUVASTATIN CALCIUM 5 MG/1
TABLET, COATED ORAL
Qty: 90 TABLET | Refills: 1 | Status: SHIPPED | OUTPATIENT
Start: 2020-01-15 | End: 2020-01-01

## 2020-01-15 NOTE — TELEPHONE ENCOUNTER
Prescription approved per [facility] Refill Protocol.  Cayla Ozuna RN, BSN

## 2020-01-15 NOTE — PROGRESS NOTES
CARDIOLOGY VISIT    REASON FOR VISIT: afib, cardiomyopathy    SUBJECTIVE:  86-year-old female with paroxysmal atrial fibrillation is seen for cardiomyopathy.  She has type 2 diabetes and COPD.     Nuclear stress April 2017 showed small, mild ischemia of the anteroseptal base, EF 70%.  Echo July 2018 showed EF 60%, no wall motion abnormality.     In November 2019 she was admitted with systolic heart failure.  Symptoms came on quite acutely.  Echo showed EF 25% with mid to distal segments severely hypokinetic, mild to moderate RV hypokinesis, 1-2+ MR.  Troponin 0.8, EKG showed left bundle branch block.  Nuclear stress showed attenuation artifact, no infarct or ischemia, EF 52%.  During admission she had episodes of tachycardia with a P wave, possibly atrial tachycardia or flutter, amiodarone was started.     6-day ZIO monitor November 2019 showed sinus rhythm, average heart rate 73, first-degree AV block, rare ectopy, frequent runs of SVT, longest 2 minutes.     Echo January 2020 showed EF 50 to 55%, mild basal to mid anteroseptal hypokinesis, normal RV, no valve disease.     She has been feeling much better.  She walks around with minimal dyspnea.  Oxygen saturations are 90% or better, she will use some oxygen intermittently.  Pulse is 60 to 80s, she denies any faster heart rates.  She has no lower extremity edema or chest pain.    MEDICATIONS:  Current Outpatient Medications   Medication     albuterol (ALBUTEROL) 108 (90 BASE) MCG/ACT inhaler     albuterol (PROVENTIL) (2.5 MG/3ML) 0.083% neb solution     amiodarone (PACERONE/CODARONE) 200 MG tablet     ascorbic acid (VITAMIN C) 500 MG tablet     ASPIRIN EC PO     blood glucose monitoring (ONE TOUCH ULTRA 2) meter device kit     blood glucose monitoring (ONE TOUCH ULTRASOFT) lancets     calcium carb 1250 mg, 500 mg Marshall,/vitamin D 200 units (OSCAL WITH D) 500-200 MG-UNIT per tablet     ferrous sulfate 325 (65 FE) MG tablet     Fluticasone-Umeclidin-Vilanterol  (TRELEGY ELLIPTA) 100-62.5-25 MCG/INH oral inhaler     furosemide (LASIX) 40 MG tablet     glipiZIDE (GLUCOTROL XL) 10 MG 24 hr tablet     levothyroxine (SYNTHROID/LEVOTHROID) 75 MCG tablet     LOPERAMIDE HCL PO     metFORMIN (GLUCOPHAGE) 500 MG tablet     metoprolol tartrate (LOPRESSOR) 25 MG tablet     ONETOUCH ULTRA test strip     polycarbophil (FIBERCON) 625 MG tablet     QUEtiapine (SEROQUEL) 25 MG tablet     rosuvastatin (CRESTOR) 5 MG tablet     spironolactone (ALDACTONE) 25 MG tablet     VITAMIN D, CHOLECALCIFEROL, PO     warfarin ANTICOAGULANT (COUMADIN) 3 MG tablet     No current facility-administered medications for this visit.        ALLERGIES:  Allergies   Allergen Reactions     Penicillins Hives     Amlodipine Other (See Comments)     Too much ankle edema and red itchy spots.      Furosemide Other (See Comments)     Fainted with first dose. 11/8 pt states she took lasix years ago and fainted sometime after, has had since with no reaction       REVIEW OF SYSTEMS:  Constitutional:  No weight loss, fever, chills, weakness or fatigue.  HEENT:  Eyes:  No visual loss, blurred vision, double vision or yellow sclerae. No hearing loss, sneezing, congestion, runny nose or sore throat.  Skin:  No rash or itching.  Cardiovascular: per HPI  Respiratory: per HPI  GI:  No anorexia, nausea, vomiting or diarrhea. No abdominal pain or blood.  :  No dysurea, hematuria  Neurologic:  No headache, dizziness, syncope, paralysis, ataxia, numbness or tingling in the extremities. No change in bowel or bladder control.  Musculoskeletal:  No muscle, back pain, joint pain or stiffness.  Hematologic:  No anemia, bleeding or bruising.  Lymphatics:  No enlarged nodes. No history of splenectomy.  Psychiatric:  No history of depression or anxiety.  Endocrine:  No reports of sweating, cold or heat intolerance. No polyuria or polydipsia.  Allergies:  No history of asthma, hives, eczema or rhinitis.    PHYSICAL EXAM:  /62 (BP  "Location: Right arm, Patient Position: Sitting, Cuff Size: Adult Regular)   Pulse 78   Ht 1.651 m (5' 5\")   Wt 81.6 kg (180 lb)   SpO2 90%   BMI 29.95 kg/m      Constitutional: awake, alert, no distress  Eyes: PERRL, sclera nonicteric  ENT: trachea midline  Respiratory: Lungs clear  Cardiovascular: Regular rate and rhythm, no murmurs, no lower extremity edema  GI: nondistended, nontender, bowel sounds present  Lymph/Hematologic: no lymphadenopathy  Skin: dry, no rash  Musculoskeletal: good muscle tone, strength 5/5 in upper and lower extremities  Neurologic: no focal deficits  Neuropsychiatric: appropriate affact    DATA:  Lab: January 10, 2020: Potassium 4.1, creatinine 1.6  Recent Labs   Lab Test 07/15/19  0948 08/03/18  0913  06/30/15  0952   CHOL 109 105   < > 109   HDL 39* 39*   < > 36*   LDL 34 27   < > 25   TRIG 178* 195*   < > 240*   CHOLHDLRATIO  --   --   --  3.0    < > = values in this interval not displayed.       EKG: January 16, 2020: Sinus rhythm, rate 56, first-degree AV block    ASSESSMENT:  86-year-old female seen for follow-up of paroxysmal A. fib and stress cardiomyopathy.  Her ejection fraction has normalized.  She is euvolemic on exam and has no cardiac symptoms.  She has had no symptomatic recurrence of A. fib.  Amiodarone and spironolactone will be discontinued.  She can cut back on her furosemide.    RECOMMENDATIONS:  1.  Resolved stress cardiomyopathy  -Discontinue spironolactone  - Increase metoprolol to 50 mg twice daily, this was her previous dose  -Decrease furosemide every other day, eventually can try weaning off and take as needed for any fluid weight gain  - Repeat echo in 6 months    2.  Paroxysmal atrial fibrillation-   - Discontinue amiodarone due to underlying lung disease  - Continue metoprolol and warfarin    Follow-up in 6 months with echo.    Chino Arango MD  Cardiology - [facility]  Pager:  [phone]  Text Page  January 15, 2020    "

## 2020-01-16 ENCOUNTER — OFFICE VISIT (OUTPATIENT)
Dept: CARDIOLOGY | Facility: CLINIC | Age: 85
End: 2020-01-16
Attending: PHYSICIAN ASSISTANT
Payer: COMMERCIAL

## 2020-01-16 VITALS
WEIGHT: 180 LBS | HEIGHT: 65 IN | HEART RATE: 78 BPM | SYSTOLIC BLOOD PRESSURE: 122 MMHG | BODY MASS INDEX: 29.99 KG/M2 | OXYGEN SATURATION: 90 % | DIASTOLIC BLOOD PRESSURE: 62 MMHG

## 2020-01-16 DIAGNOSIS — I42.9 CARDIOMYOPATHY, UNSPECIFIED TYPE (H): ICD-10-CM

## 2020-01-16 PROCEDURE — 99214 OFFICE O/P EST MOD 30 MIN: CPT | Performed by: INTERNAL MEDICINE

## 2020-01-16 PROCEDURE — 93000 ELECTROCARDIOGRAM COMPLETE: CPT | Performed by: INTERNAL MEDICINE

## 2020-01-16 RX ORDER — METOPROLOL TARTRATE 50 MG
50 TABLET ORAL 2 TIMES DAILY
Qty: 90 TABLET | Refills: 3 | Status: SHIPPED | OUTPATIENT
Start: 2020-01-16 | End: 2020-01-01

## 2020-01-16 ASSESSMENT — MIFFLIN-ST. JEOR: SCORE: 1257.35

## 2020-01-16 NOTE — LETTER
1/16/2020    Venu Maria PA-C  [address]  [facility] [address]    RE: Maryalice Rebecca Wachter       Dear Colleague,    I had the pleasure of seeing Maryalice Rebecca Wachter in the [facility].    CARDIOLOGY VISIT    REASON FOR VISIT: afib, cardiomyopathy    SUBJECTIVE:  86-year-old female with paroxysmal atrial fibrillation is seen for cardiomyopathy.  She has type 2 diabetes and COPD.     Nuclear stress April 2017 showed small, mild ischemia of the anteroseptal base, EF 70%.  Echo July 2018 showed EF 60%, no wall motion abnormality.     In November 2019 she was admitted with systolic heart failure.  Symptoms came on quite acutely.  Echo showed EF 25% with mid to distal segments severely hypokinetic, mild to moderate RV hypokinesis, 1-2+ MR.  Troponin 0.8, EKG showed left bundle branch block.  Nuclear stress showed attenuation artifact, no infarct or ischemia, EF 52%.  During admission she had episodes of tachycardia with a P wave, possibly atrial tachycardia or flutter, amiodarone was started.     6-day ZIO monitor November 2019 showed sinus rhythm, average heart rate 73, first-degree AV block, rare ectopy, frequent runs of SVT, longest 2 minutes.     Echo January 2020 showed EF 50 to 55%, mild basal to mid anteroseptal hypokinesis, normal RV, no valve disease.     She has been feeling much better.  She walks around with minimal dyspnea.  Oxygen saturations are 90% or better, she will use some oxygen intermittently.  Pulse is 60 to 80s, she denies any faster heart rates.  She has no lower extremity edema or chest pain.    MEDICATIONS:  Current Outpatient Medications   Medication     albuterol (ALBUTEROL) 108 (90 BASE) MCG/ACT inhaler     albuterol (PROVENTIL) (2.5 MG/3ML) 0.083% neb solution     amiodarone (PACERONE/CODARONE) 200 MG tablet     ascorbic acid (VITAMIN C) 500 MG tablet     ASPIRIN EC PO     blood glucose monitoring (ONE TOUCH ULTRA 2) meter device  kit     blood glucose monitoring (ONE TOUCH ULTRASOFT) lancets     calcium carb 1250 mg, 500 mg [place],/vitamin D 200 units (OSCAL WITH D) 500-200 MG-UNIT per tablet     ferrous sulfate 325 (65 FE) MG tablet     Fluticasone-Umeclidin-Vilanterol (TRELEGY ELLIPTA) 100-62.5-25 MCG/INH oral inhaler     furosemide (LASIX) 40 MG tablet     glipiZIDE (GLUCOTROL XL) 10 MG 24 hr tablet     levothyroxine (SYNTHROID/LEVOTHROID) 75 MCG tablet     LOPERAMIDE HCL PO     metFORMIN (GLUCOPHAGE) 500 MG tablet     metoprolol tartrate (LOPRESSOR) 25 MG tablet     ONETOUCH ULTRA test strip     polycarbophil (FIBERCON) 625 MG tablet     QUEtiapine (SEROQUEL) 25 MG tablet     rosuvastatin (CRESTOR) 5 MG tablet     spironolactone (ALDACTONE) 25 MG tablet     VITAMIN D, CHOLECALCIFEROL, PO     warfarin ANTICOAGULANT (COUMADIN) 3 MG tablet     No current facility-administered medications for this visit.        ALLERGIES:  Allergies   Allergen Reactions     Penicillins Hives     Amlodipine Other (See Comments)     Too much ankle edema and red itchy spots.      Furosemide Other (See Comments)     Fainted with first dose. 11/8 pt states she took lasix years ago and fainted sometime after, has had since with no reaction       REVIEW OF SYSTEMS:  Constitutional:  No weight loss, fever, chills, weakness or fatigue.  HEENT:  Eyes:  No visual loss, blurred vision, double vision or yellow sclerae. No hearing loss, sneezing, congestion, runny nose or sore throat.  Skin:  No rash or itching.  Cardiovascular: per HPI  Respiratory: per HPI  GI:  No anorexia, nausea, vomiting or diarrhea. No abdominal pain or blood.  :  No dysurea, hematuria  Neurologic:  No headache, dizziness, syncope, paralysis, ataxia, numbness or tingling in the extremities. No change in bowel or bladder control.  Musculoskeletal:  No muscle, back pain, joint pain or stiffness.  Hematologic:  No anemia, bleeding or bruising.  Lymphatics:  No enlarged nodes. No history of  "splenectomy.  Psychiatric:  No history of depression or anxiety.  Endocrine:  No reports of sweating, cold or heat intolerance. No polyuria or polydipsia.  Allergies:  No history of asthma, hives, eczema or rhinitis.    PHYSICAL EXAM:  /62 (BP Location: Right arm, Patient Position: Sitting, Cuff Size: Adult Regular)   Pulse 78   Ht 1.651 m (5' 5\")   Wt 81.6 kg (180 lb)   SpO2 90%   BMI 29.95 kg/m       Constitutional: awake, alert, no distress  Eyes: PERRL, sclera nonicteric  ENT: trachea midline  Respiratory: Lungs clear  Cardiovascular: Regular rate and rhythm, no murmurs, no lower extremity edema  GI: nondistended, nontender, bowel sounds present  Lymph/Hematologic: no lymphadenopathy  Skin: dry, no rash  Musculoskeletal: good muscle tone, strength 5/5 in upper and lower extremities  Neurologic: no focal deficits  Neuropsychiatric: appropriate affact    DATA:  Lab: January 10, 2020: Potassium 4.1, creatinine 1.6  Recent Labs   Lab Test 07/15/19  0948 08/03/18  0913  06/30/15  0952   CHOL 109 105   < > 109   HDL 39* 39*   < > 36*   LDL 34 27   < > 25   TRIG 178* 195*   < > 240*   CHOLHDLRATIO  --   --   --  3.0    < > = values in this interval not displayed.       EKG: January 16, 2020: Sinus rhythm, rate 56, first-degree AV block    ASSESSMENT:  86-year-old female seen for follow-up of paroxysmal A. fib and stress cardiomyopathy.  Her ejection fraction has normalized.  She is euvolemic on exam and has no cardiac symptoms.  She has had no symptomatic recurrence of A. fib.  Amiodarone and spironolactone will be discontinued.  She can cut back on her furosemide.    RECOMMENDATIONS:  1.  Resolved stress cardiomyopathy  -Discontinue spironolactone  - Increase metoprolol to 50 mg twice daily, this was her previous dose  -Decrease furosemide every other day, eventually can try weaning off and take as needed for any fluid weight gain  - Repeat echo in 6 months    2.  Paroxysmal atrial fibrillation-   - " Discontinue amiodarone due to underlying lung disease  - Continue metoprolol and warfarin    Follow-up in 6 months with echo.    Chino Arango MD  Cardiology - [facility]  Pager:  [phone]  Text Page  January 15, 2020      Thank you for allowing me to participate in the care of your patient.      Sincerely,     Chino Arango MD     [facility]

## 2020-01-16 NOTE — PATIENT INSTRUCTIONS
1. Stop amiodarone    2. Stop spironolactone    3. Increase metoprolol to 50mg twice daily    4. Cut back your Lasix to every other day and you can try eventually weaning if off.  You can take it as needed if you notice any fluid weight gain or leg swelling.    5. Will have you come back in 6 months and do another echocardiogram.

## 2020-01-20 DIAGNOSIS — J43.1 PANLOBULAR EMPHYSEMA (H): ICD-10-CM

## 2020-01-21 NOTE — TELEPHONE ENCOUNTER
Previously sent to [facility].  Requesting to be sent to different pharmacy.    Prescription approved per [facility] Refill Protocol.  Cyndi Cabello RN

## 2020-01-31 NOTE — TELEPHONE ENCOUNTER
ANTICOAGULATION MANAGEMENT     Patient Name:  Maryalice Rebecca Wachter  Date:  2020    ASSESSMENT /SUBJECTIVE:      Today's INR result of 2.70 is therapeutic. Goal INR of 2.0-3.0      Warfarin dose taken: Warfarin taken as previously instructed    Diet: No new diet changes affecting INR    Medication changes/ interactions: No new medications/supplements affecting INR    Previous INR: Therapeutic     S/S of bleeding or thromboembolism: No    New injury or illness:  No    Upcoming surgery, procedure or cardioversion:  No    Additional findings: none      PLAN:    Spoke with Sindi regarding INR result and instructed:     Warfarin Dosing Instructions: Continue your current warfarin dose of 3 mg on MWF and 1.5 mg all other days    Instructed patient to follow up no later than: 4 weeks  Lab visit scheduled    Education provided: Apurva Dale verbalizes understanding and agrees to warfarin dosing plan.    Instructed to call the Anticoagulation Clinic for any changes, questions or concerns. (#[phone])        OBJECTIVE:  INR   Date Value Ref Range Status   2020 2.70 (H) 0.86 - 1.14 Final     Comment:     This test is intended for monitoring Coumadin therapy.  Results are not   accurate in patients with prolonged INR due to factor deficiency.               Anticoagulation Summary  As of 2020    INR goal:   2.0-3.0   TTR:   73.2 % (11.9 mo)   INR used for dosin.70 (2020)   Warfarin maintenance plan:   3 mg (3 mg x 1) every Mon, Wed, Fri; 1.5 mg (3 mg x 0.5) all other days   Full warfarin instructions:   3 mg every Mon, Wed, Fri; 1.5 mg all other days   Weekly warfarin total:   15 mg   Plan last modified:   Modesta Christensen RN (2019)   Next INR check:   2020   Priority:   High   Target end date:       Indications    Atrial fibrillation (H) [I48.91]  Long term current use of anticoagulants with INR goal of 2.0-3.0 [Z79.01]             Anticoagulation Episode Summary     INR  check location:       Preferred lab:       Send INR reminders to:   YEN PANDYA    Comments:   3 mg tabs - amiodarone started 11/2019 (during hospitalization)      Anticoagulation Care Providers     Provider Role Specialty Phone number    Venu Maria PA-C Responsible Physician Assistant - Medical [phone]

## 2020-02-05 NOTE — TELEPHONE ENCOUNTER
Spoke with patient.  She states that she is switching pharmacies but does not need a refill of Metoprolol right now.  No longer wants to use [facility].  She will call us and let us know when she needs a refill of her medications and then they will go to [facility].    Cyndi Cabello RN

## 2020-02-13 NOTE — TELEPHONE ENCOUNTER
Patient calling having a difficult time with her new mail order pharmacy [facility].  She states that she spoke with them and was advised that they sent over refill requests for her meds to our office and have not heard any response.  I advised patient that nothing was ever received.    Patient needing refills of Seroquel, Trelegy and Metformin.  Will send in ASAP.    Cyndi Cabello RN

## 2020-02-21 NOTE — PROGRESS NOTES
ANTICOAGULATION FOLLOW-UP CLINIC VISIT    Patient Name:  Maryalice Rebecca Wachter  Date:  5/11/2018  Contact Type:  Face to Face    SUBJECTIVE:     Patient Findings     Positives No Problem Findings           OBJECTIVE    INR Protime   Date Value Ref Range Status   05/11/2018 2.3 (A) 0.86 - 1.14 Final       ASSESSMENT / PLAN  No question data found.  Anticoagulation Summary as of 5/11/2018     INR goal 2.0-3.0   Today's INR 2.3   Maintenance plan 4.5 mg (3 mg x 1.5) on Tue, Fri; 3 mg (3 mg x 1) all other days   Full instructions 4.5 mg on Tue, Fri; 3 mg all other days   Weekly total 24 mg   No change documented Cyndi Cabello RN   Plan last modified Cyndi Cabello RN (9/23/2016)   Next INR check 6/22/2018   Priority INR   Target end date     Indications   Long-term (current) use of anticoagulants [Z79.01] [Z79.01]  Atrial fibrillation (H) [I48.91]         Anticoagulation Episode Summary     INR check location     Preferred lab     Send INR reminders to  [facility]    Comments       Anticoagulation Care Providers     Provider Role Specialty Phone number    Venu Maria PA-C Responsible Physician Assistant - Medical [phone]            See the Encounter Report to view Anticoagulation Flowsheet and Dosing Calendar (Go to Encounters tab in chart review, and find the Anticoagulation Therapy Visit)      Cyndi Cabello RN                Mom calling.    She would like orders for [facility] to provide IV Fluids    Patient has been vomiting weds and Thursday and 1 time today. Parent is concerned about dehydration. Surgery is 2/27/2020 and mom wants him well hydrated.     [facility] Fax is [phone]    Ok to call and  [phone]

## 2020-02-28 NOTE — TELEPHONE ENCOUNTER
ANTICOAGULATION MANAGEMENT     Patient Name:  Maryalice Rebecca Wachter  Date:  2/28/2020    ASSESSMENT /SUBJECTIVE:      Today's INR result of 3.0 is therapeutic. Goal INR of 2.0-3.0      Warfarin dose taken: Warfarin taken as previously instructed    Diet: No new diet changes affecting INR    Medication changes/ interactions: No new medications/supplements affecting INR    Previous INR: Therapeutic     S/S of bleeding or thromboembolism: No    New injury or illness:  No    Upcoming surgery, procedure or cardioversion:  No    Additional findings: No      PLAN:    Spoke with Sindi regarding INR result and instructed:     Warfarin Dosing Instructions: Continue your current warfarin dose    Instructed patient to follow up no later than: 4 weeks  Lab visit scheduled    Education provided: Yes: significance of INR result      Sindi verbalizes understanding and agrees to warfarin dosing plan.    Instructed to call the Anticoagulation Clinic for any changes, questions or concerns. (#[phone])        OBJECTIVE:  INR   Date Value Ref Range Status   02/28/2020 3.00 (H) 0.86 - 1.14 Final     Comment:     This test is intended for monitoring Coumadin therapy.  Results are not   accurate in patients with prolonged INR due to factor deficiency.               Anticoagulation Summary  As of 2/28/2020    INR goal:   2.0-3.0   TTR:   75.1 % (11.8 mo)   INR used for dosing:   3.00 (2/28/2020)   Warfarin maintenance plan:   3 mg (3 mg x 1) every Mon, Wed, Fri; 1.5 mg (3 mg x 0.5) all other days   Full warfarin instructions:   3 mg every Mon, Wed, Fri; 1.5 mg all other days   Weekly warfarin total:   15 mg   No change documented:   Naa Castrejon RN   Plan last modified:   Modesta Christensen RN (11/25/2019)   Next INR check:   3/27/2020   Priority:   High   Target end date:       Indications    Atrial fibrillation (H) [I48.91]  Long term current use of anticoagulants with INR goal of 2.0-3.0 [Z79.01]              Anticoagulation Episode Summary     INR check location:       Preferred lab:       Send INR reminders to:   YEN PANDYA    Comments:   3 mg tabs - amiodarone started 11/2019 (during hospitalization)      Anticoagulation Care Providers     Provider Role Specialty Phone number    Venu Maria PA-C Responsible Physician Assistant - Medical [phone]

## 2020-02-28 NOTE — TELEPHONE ENCOUNTER
Anticoagulation Management    Unable to reach [facility] today.    Today's INR result of 3.0 is therapeutic (goal INR of 2.0-3.0).  Result received from: Clinic Lab    Follow up required to confirm warfarin dose taken and assess for changes      Left message for patient to return a call directly to [facility] [place] [facility] line: [phone].     Anticoagulation clinic to follow up    Naa Castrejon RN

## 2020-03-08 NOTE — PROGRESS NOTES
SUBJECTIVE/OBJECTIVE:                                                    Maryalice Rebecca Wachter is a 86 year old female coming in for a follow up visit (9-9-2019)  for Medication Therapy Management.  She was referred to me from her PCP- Venu Maria.     Must bill in outcomes 2019. This patient has no immediate MTM opportunities available 3-9-2020.    Chief Complaint:    Here for 6 months DM , A1c lab recheck.  She states in hospital for 9 days -had a panic attack/breathing issue -discovered CHF issue/new diagnosis of cardiomyopathy.    Today she is doing well --feels good --but is confused when /if she should wear her oxygen?            Personal Healthcare Goals:  Control DM, copd, stay ambulatory.    Allergies/ADRs: Reviewed in Epic  Tobacco: No tobacco use   Alcohol: not currently using--very rare   Caffeine: decaff in am 1-2 cups/day of coffee; pm -tea a little bit.   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 Cannot exercise due to diarrhea issues --would like to go on walks with her hubby but has to have BM within 1 block of leaving.   PMH: Reviewed in [facility]; [facility] stated she has sarcoidosis?    Medication Adherence:  She forgets to take some supper meds -she didn't realize it sunday until she looked in her pill box.     IBS-diarrhea: patient uses prn loperamide --works well.     GERD:  She has a hiatal hernia -surgery not recommended.      Diabetes:  Pt currently taking Glipizide er 10mg. bid, off actos due to CHF exacerbation 11-5-19, Metformin-1 x500mg bid due to ckd and chronic loose stools. Pt is experiencing the following side effects: mild loose stools from metformin --controlled with loperamide prn quite well.           7 days =162 n=6, 14 days =161 n=13 30 days =158 n=28      Date FBG/ 2hours post Lunch/2hours post Dinner /2hours post    3-9-2020 140 704am      3-8-20 149 740am      3-7-20 170 727am      3-6 185  648am      3-4 166 719am      3-3 159 720am      3-2 168 710am             7days avg.- 129 n=7, 14 days =125, n=14, 30days 119 n=30.     Date FBG/ 2hours post Lunch/2hours post Dinner /2hours post    9-12-(wrong month on meter) 118 7;30am      8-11 146 725am      8-10 127 708am      8-9 145 745am      8-8 140 729am      8-7 115 709am      8-6 113 708am              Symptoms of low blood sugar? none. Frequency of hypoglycemia? never.  Recent symptoms of high blood sugar? fatigue  Eye exam: up to date  Foot exam: up to date  Microalbumin is < 30 mg/g. Pt is NO longer post hospital 11-5-19- taking an ACEi/ARB.  Aspirin: Taking 81mg daily and denies side effects  Diet/Exercise: diet is ok but likes carbs, rice , chinese , no exercise.   Now eating meals on wheels -5 days /week for lunch , she eats 1/2 at lunch and 1/2 at dinnertime.    She feels they eat more pasta then they should have.  No exercise due to copd.          Lab Results   Component Value Date    A1C 8.5 03/09/2020    A1C 7.5 08/12/2019    A1C 7.8 04/17/2019    A1C 7.2 10/15/2018    A1C 7.4 07/21/2018         Depression:  Current medications include: None--she stopped prozac . Pt reports that depression symptoms are  . Current depression symptoms include: lacks motivation or energy to do anything . Patient reports the following stressors: chronic health condition, lack of fun hubby to travel with . Therapies tried and response: Bupropion once daily-did nothing for her , prozac made her too antsy.  Notes cannot vacation due to hubby is a stick in the mud --no fun! He only wants to visit relatives.   She hasnt played Bridge with her friends --struggles with agoraphobia and copd issues.     Vitamin D3: level was 21 on  --she takes otc daily dose of 2,000 iu's now.      Vitamin B12: level was 493 on 10-10-16. She takes daily otc dose now.     Hypothyroidism: Patient is taking levothyroxine 100 mcg daily. She knows to take it on an empty stomach with her omeprazole in the morning.    TSH   Date Value Ref Range Status    07/15/2019 3.08 0.40 - 4.00 mU/L Final         Hypertension: Current medications include : only takes lasix 40mg./qod if water weight up and using if at home and not going anywhere out of the house --lasix keeps her busy in the bathroom x 4 hours.   She weighs daily qam --178-180 is her weight range.    metoprolol 50mg bid-am and dinnertime.  Patient does  self-monitor BP.  Yes see below:  Patient side effects :  None now.   Range of home bp's = 115-143 --95% of readings <140/60-70.    Look good . She recognizes when in afib--bp erratic and pulse increases to 90+ .not occurring now.    BP Readings from Last 3 Encounters:   03/09/20 130/64   01/16/20 122/62   12/10/19 126/52     Iron deficiency anemia: now on daily 325mg feso4 tab post hospital stay .    Hemoglobin   Date Value Ref Range Status   11/08/2019 9.8 (L) 11.7 - 15.7 g/dL Final         COPD: Current medications: Short-Acting Bronchodilator: Albuterol MDI, Nebs and pt reports using 1-2 times per day --usually always before bedtime, rare albuterol nebs --but she gets nervous when she has to leave the house --its her safety space!    ICS/LAMA/LABA- Trelegy Ellipta--I puff daily.     Oxygen-has portable oxygen --uses it prn - sometimes overnight (maybe 4/7 nights) - she doesn't know what to do with all this equipment? --she has a pulse oxygen wears on her --checks if nocturia. She doesn't like to carry the oxygen tank --its too heavy.   . Pt rinses their mouth after using steroid inhaler.    Pt is not experiencing side effects.   Pt reports the following symptoms: none.  Pt does have an COPD Action Plan on file.   Has spirometry been completed: Yes   Oxygen today 93%. (fyi--she wears/carries portable oximeter with her at all times -helps calm her to know where her oxygen level is at ).       Insomnia: Current medications include: quetiapine 25mg./night  --works well --she just fiddles with dose depending on how riled up trump gets her.         BP  "Readings from Last 1 Encounters:   03/09/20 130/64     Pulse Readings from Last 1 Encounters:   03/09/20 74     Wt Readings from Last 1 Encounters:   03/09/20 180 lb 12.8 oz (82 kg)     Ht Readings from Last 1 Encounters:   01/16/20 5' 5\" (1.651 m)     Estimated body mass index is 30.09 kg/m  as calculated from the following:    Height as of 1/16/20: 5' 5\" (1.651 m).    Weight as of this encounter: 180 lb 12.8 oz (82 kg).    Temp Readings from Last 1 Encounters:   11/29/19 98  F (36.7  C) (Oral)           ASSESSMENT:                                                       Current medications were reviewed with her today.     Medication Adherence: Fill med pill boxes weekly, check pill box am and pm to make sure she has taken all her pills as she has missed a few pm doses last month .     IBS-Diarrhea: Stable.     GERD: Stable.  Current treatment is effective.     Diabetes: Stable. Patient is meeting A1c goal of </= 8.5%. Self monitoring of blood glucose is at goal of fasting  mg/dL. Pt would benefit from minimum SMBG: Check blood sugars fasting, and occasionally 2 hours after starting a meal.  Metformin :  stay on the same dose.  SFU (Glipizide) :  stay on the same dose.  No actos due to chf exacerbation  .   Increased exercise- as tolerated.   Updated Hemoglobin A1c- 8.5%.  Weight loss recommended--keep low carb diet as she can tolerate --otherwise dont stress out about blood sugars --other DM med options discussed today --she declines due to cost or injection.     Depression: Improved. Patient would benefit from no meds today --she will work on doing things that make her feel happy like restart playing [employer] card game with friends and hubby have heart valve replacement surgery coming up --she worries about that but will try to stay positive.       Vitamin B12: is not at goal of 600-1000pg/mL. Pt would benefit from vitamin B12 lab recheck in 12 months.    Vitamin D3: is not at goal of 50-75ng/mL. Pt " "would benefit from vitamin D3 lab recheck in 12 months.    Hypothyroidism: TSH wnl.    Hypertension: Stable. Patient is meeting BP goal of < 140/90mmHg.     Iron deficiency anemia:  Recheck HGb in 2020.     COPD: Improved:  doing well with daily trelegy--we discussed carry albuterol and portable oxygen as her security blankets -see plan for details.      Insomnia: stable. Pt may benefit from no changes.      PLAN:                                                        1. A1c today = 8.5% --stay on your glipizide and metformin as is (we will watch your kidney function) , do your best with your diet as we discussed--maybe a little less pasta and sweets.    2. FYI-- For your COPD--carry your albuterol rescue inhaler at all times to use for shortness of breath. Carry /wear your oximeter with you daily when your not wearing your portable oxygen.  Wear portable oxygen when you have activity like walking or shopping , also wear it overnight if your bedtime oxygen level is <90%. Take your oxygen with you when you go play bridge with your friends.     Remember: your oxygen and your albuterol are your \"security blanket\" !    3. FYI--BP today excellent at 130/64mmhg-- stay on metoprolol twice daily, use your Furosemide 40mg. As needed to keep fluid off .      4. FYI--please use your daily pill boxes and use a reminder note or make sure you look in the pill box twice daily(b-fast and supper/bedtime) to make sure you have taken them . Fill your pill boxes once /week on fridays .      It was great to speak with you today.  I value your experience and would be very thankful for your time with providing feedback on our clinic survey. You may receive a survey via email or text message in the next few days.     Next MTM visit:  See Venu this summer , see me in 6 months - September 14th -2020 at 1pm.   Please bring your blood sugar meter to the visit.          It was great to speak with you today.  I value your experience and would " be very thankful for your time with providing good feedback on clinic surveys.  Kindly let me know personally if your experience today was not exceptional so that we can continue to improve your care delivery experience.  I spent 45 minutes with this patient today .       To schedule another MTM appointment, please call the clinic directly or you may call the MTM scheduling line at [phone] or toll-free at [phone].     My Clinical Pharmacist's contact information:                                                      It was a pleasure seeing you today!  Please feel free to contact me with any questions or concerns you have.      Janey Romero Rph.  Medication Therapy Management Provider  [phone]

## 2020-03-09 NOTE — PATIENT INSTRUCTIONS
"Recommendations from today's MTM visit:                                                      1. A1c today = 8.5% --stay on your glipizide and metformin as is (we will watch your kidney function) , do your best with your diet as we discussed--maybe a little less pasta and sweets.    2. FYI-- For your COPD--carry your albuterol rescue inhaler at all times to use for shortness of breath. Carry /wear your oximeter with you daily when your not wearing your portable oxygen.  Wear portable oxygen when you have activity like walking or shopping , also wear it overnight if your bedtime oxygen level is <90%. Take your oxygen with you when you go play bridge with your friends.     Remember: your oxygen and your albuterol are your \"security blanket\" !    3. FYI--BP today excellent at 130/64mmhg-- stay on metoprolol twice daily, use your Furosemide 40mg. As needed to keep fluid off .      4. FYI--please use your daily pill boxes and use a reminder note or make sure you look in the pill box twice daily(b-fast and supper/bedtime) to make sure you have taken them . Fill your pill boxes once /week on fridays .      It was great to speak with you today.  I value your experience and would be very thankful for your time with providing feedback on our clinic survey. You may receive a survey via email or text message in the next few days.     Next MTM visit:  See Venu this summer , see me in 6 months - September 14th -2020 at 1pm.   Please bring your blood sugar meter to the visit.     To schedule another MTM appointment, please call the clinic directly or you may call the MTM scheduling line at [phone] or toll-free at [phone].     My Clinical Pharmacist's contact information:                                                      It was a pleasure talking with you today!  Please feel free to contact me with any questions or concerns you have.      Janey Romero Rph.  Medication Therapy Management " Provider  [phone]

## 2020-03-20 NOTE — TELEPHONE ENCOUNTER
ANTICOAGULATION MANAGEMENT     Patient Name:  Maryalice Rebecca Wachter  Date:  3/20/2020    ASSESSMENT /SUBJECTIVE:      Today's INR result of 2.7 is therapeutic. Goal INR of 2.0-3.0      Warfarin dose taken: Warfarin taken as previously instructed    Diet: No new diet changes affecting INR    Medication changes/ interactions: No new medications/supplements affecting INR    Previous INR: Therapeutic     S/S of bleeding or thromboembolism: No    New injury or illness:  No    Upcoming surgery, procedure or cardioversion:  No    Additional findings: No      PLAN:    Spoke with Sindi regarding INR result and instructed:     Warfarin Dosing Instructions: Continue your current warfarin dose    Instructed patient to follow up no later than: 8 weeks  Lab visit scheduled    Education provided: Yes: significance of INR result      Sindi verbalizes understanding and agrees to warfarin dosing plan.    Instructed to call the Anticoagulation Clinic for any changes, questions or concerns. (#[phone])        OBJECTIVE:  INR   Date Value Ref Range Status   2020 2.70 (H) 0.86 - 1.14 Final     Comment:     This test is intended for monitoring Coumadin therapy.  Results are not   accurate in patients with prolonged INR due to factor deficiency.               Anticoagulation Summary  As of 3/20/2020    INR goal:   2.0-3.0   TTR:   81.0 % (11.9 mo)   INR used for dosin.70 (3/20/2020)   Warfarin maintenance plan:   3 mg (3 mg x 1) every Mon, Wed, Fri; 1.5 mg (3 mg x 0.5) all other days   Full warfarin instructions:   3 mg every Mon, Wed, Fri; 1.5 mg all other days   Weekly warfarin total:   15 mg   Plan last modified:   Modesta Christensen RN (2019)   Next INR check:   5/15/2020   Priority:   High   Target end date:       Indications    Atrial fibrillation (H) [I48.91]  Long term current use of anticoagulants with INR goal of 2.0-3.0 [Z79.01]             Anticoagulation Episode Summary     INR check location:        Preferred lab:       Send INR reminders to:   YEN PANDYA    Comments:   3 mg tabs - amiodarone started 11/2019 (during hospitalization)      Anticoagulation Care Providers     Provider Role Specialty Phone number    Venu Maria PA-C Responsible Physician Assistant - Medical [phone]

## 2020-04-03 NOTE — TELEPHONE ENCOUNTER
Will need a TSH in July.  I will refill until then.  If we are not up and running by then we can tack it on to an INR draw.    Venu

## 2020-04-03 NOTE — TELEPHONE ENCOUNTER
04/03/2020      Medication Question or Refill  Who is calling: Gael Delong  What medication are you calling about (include dose and sig)?: glipiZIDE (GLUCOTROL XL) 10 MG 24 hr tablet  Controlled Substance Agreement on file: No  Who prescribed the medication?: Venu Maria  Do you need a refill? Yes: ASAP  When did you use the medication last? Today  Patient offered an appointment? No  Do you have any questions or concerns?  Yes: Pt is almost out and needs this Rx as soon as possible  Requested Pharmacy: [facility]  Okay to leave a detailed message?: Yes at Home number on file [phone] (home)    Jenniffer Cisse Patient Representative

## 2020-04-03 NOTE — TELEPHONE ENCOUNTER
Routing refill request to provider for review/approval because:  Labs out of range:  CR  Cayla Ozuna RN, BSN

## 2020-04-07 NOTE — TELEPHONE ENCOUNTER
Sent updated Rx to different pharmacy - [facility] for 90 day supply instead of 30 day.   Medication Refilled: metoprolol   Last office visit: 1/16/20  Next office visit: 7/2020  Pharmacy sent to: [facility]  Zohra BHATIA

## 2020-04-29 NOTE — TELEPHONE ENCOUNTER
Prescription approved per [facility] Refill Protocol.  Nga Del Cid RN, BSN  Message handled by Nurse Triage.

## 2020-05-07 NOTE — TELEPHONE ENCOUNTER
Medication Question or Refill  Who is calling: [facility]  What medication are you calling about (include dose and sig)?: levothyroxine (SYNTHROID/LEVOTHROID) 75 MCG tablet - : TAKE ONE TABLET BY MOUTH EVERY DAY  Controlled Substance Agreement on file: No  Who prescribed the medication?: Venu Maria  Do you need a refill? Yes:   When did you use the medication last? Today  Patient offered an appointment? No  Do you have any questions or concerns?  No  Requested Pharmacy: [email or web address] - [address]  [phone]  Okay to leave a detailed message?: No at Other phone number:  [phone]

## 2020-05-12 NOTE — TELEPHONE ENCOUNTER
Routing refill request to provider for review/approval because:  Patient's CR is NOT>1.4 OR Patient's EGFR is NOT<45 within past 12 mos.  Cayla Ozuna RN, BSN

## 2020-05-15 NOTE — TELEPHONE ENCOUNTER
ANTICOAGULATION MANAGEMENT     Patient Name:  Maryalice Rebecca Wachter  Date:  5/15/2020    ASSESSMENT /SUBJECTIVE:    Today's INR result of 2.60 is therapeutic. Goal INR of 2.0-3.0      Warfarin dose taken: Warfarin taken as previously instructed    Diet: No new diet changes affecting INR    Medication changes/ interactions: No new medications/supplements affecting INR    Previous INR: Therapeutic     S/S of bleeding or thromboembolism: No    New injury or illness: No    Upcoming surgery, procedure or cardioversion: No    Additional findings: None      PLAN:    Spoke with Sindi regarding INR result and instructed:     Warfarin Dosing Instructions: Continue your current warfarin dose 3 mg on MWF and 1.5 mg all other days    Instructed patient to follow up no later than: 8 weeks  Lab visit scheduled    Education provided: None required      Nga Lawson verbalizes understanding and agrees to warfarin dosing plan.    Instructed to call the Anticoagulation Clinic for any changes, questions or concerns. (#[phone])        OBJECTIVE:  INR   Date Value Ref Range Status   05/15/2020 2.60 (H) 0.86 - 1.14 Final     Comment:     This test is intended for monitoring Coumadin therapy.  Results are not   accurate in patients with prolonged INR due to factor deficiency.               Anticoagulation Summary  As of 5/15/2020    INR goal:   2.0-3.0   TTR:   83.2 % (11.9 mo)   INR used for dosin.60 (5/15/2020)   Warfarin maintenance plan:   3 mg (3 mg x 1) every Mon, Wed, Fri; 1.5 mg (3 mg x 0.5) all other days   Full warfarin instructions:   3 mg every Mon, Wed, Fri; 1.5 mg all other days   Weekly warfarin total:   15 mg   Plan last modified:   Modesta Christensen RN (2019)   Next INR check:   7/10/2020   Priority:   High   Target end date:       Indications    Atrial fibrillation (H) [I48.91]  Long term current use of anticoagulants with INR goal of 2.0-3.0 [Z79.01]             Anticoagulation Episode Summary      INR check location:       Preferred lab:       Send INR reminders to:   YEN PANDYA    Comments:   3 mg tabs - amiodarone started 11/2019 (during hospitalization)      Anticoagulation Care Providers     Provider Role Specialty Phone number    Venu Maria PA-C Responsible Physician Assistant - Medical [phone]

## 2020-06-29 NOTE — PLAN OF CARE
Hx: Pt was admitted on 10/30 for SOB. Pt was sent to ICU for SBP in 80's and was treated for cardiogenic shock 2/2 takotsubo cardiomyopathy, NSTEMI  type II in setting of tachycardia. Pt is also being treated for acute hypoxic respiratory failure and lactic acidosis as a result of cardiogenic shock. PMH includes CHLOÉ on CKD, T2DM, and paroxysmal A. Fib.  Orientation: A&OX4, Ambulation: 1X c/ GB and walker. Pt is currently up in the chair.  V/S: B/P soft. SBP in 100's, HR in 90's - 100's. Pt is able to maintain oxygen saturation above 92% on 2L O2 via NC. Pain: Denies. BGL: 151 & 179 AC/HS. Insulin sliding scale given per protocol.  Tele/CV: A. Fib, RVR, c/ BBB. HR in 100's. Apical pulse is irregular, regular. Scheduled beta-blockers administered per orders.  LS: Diminished throughout. SHUKLA and SOB  : Frequency d/t Lasix Rx in ICU.  GI: Active & audible X4. Pt denies constipation. Pt denies diarrhea. Pt denies N/V.  IV: SL, flushes well.  Plan: 4 - 7 IP days, Pt is improving. Since admission Pt is sent to gen med unit.   no

## 2020-07-10 NOTE — PROGRESS NOTES
ANTICOAGULATION FOLLOW-UP CLINIC VISIT    Patient Name:  Maryalice Rebecca Wachter  Date:  7/10/2020  Contact Type:  Telephone    SUBJECTIVE:  Patient Findings     Comments:   New standing INR order placed as result was dropping into 2 in-baskets  The patient was assessed for diet, medication, and activity level changes, missed or extra doses, bruising or bleeding, with no problem findings.          Clinical Outcomes     Negatives:   Major bleeding event, Thromboembolic event, Anticoagulation-related hospital admission, Anticoagulation-related ED visit, Anticoagulation-related fatality    Comments:   New standing INR order placed as result was dropping into 2 in-baskets  The patient was assessed for diet, medication, and activity level changes, missed or extra doses, bruising or bleeding, with no problem findings.             OBJECTIVE    Recent labs: (last 7 days)     07/10/20  0954   INR 2.50*       ASSESSMENT / PLAN  INR assessment THER    Recheck INR In: 8 WEEKS    INR Location Clinic      Anticoagulation Summary  As of 7/10/2020    INR goal:   2.0-3.0   TTR:   83.2 % (11.9 mo)   INR used for dosin.50 (7/10/2020)   Warfarin maintenance plan:   3 mg (3 mg x 1) every Mon, Wed, Fri; 1.5 mg (3 mg x 0.5) all other days   Full warfarin instructions:   3 mg every Mon, Wed, Fri; 1.5 mg all other days   Weekly warfarin total:   15 mg   No change documented:   Jacquelyn Hagen RN   Plan last modified:   Modesta Christensen RN (2019)   Next INR check:   2020   Priority:   High   Target end date:       Indications    Atrial fibrillation (H) [I48.91]  Long term current use of anticoagulants with INR goal of 2.0-3.0 [Z79.01]             Anticoagulation Episode Summary     INR check location:       Preferred lab:       Send INR reminders to:   YEN PANDYA    Comments:   3 mg tabs - amiodarone started 2019 (during hospitalization)      Anticoagulation Care Providers     Provider Role Specialty Phone number     Venu Maria PA-C Responsible Physician Assistant - Medical [phone]            See the Encounter Report to view Anticoagulation Flowsheet and Dosing Calendar (Go to Encounters tab in chart review, and find the Anticoagulation Therapy Visit)        Jacquelyn Hagen RN

## 2020-07-20 NOTE — TELEPHONE ENCOUNTER
Pt only needs 5 pills until Mail order is mailed   Medication Question or Refill  Who is calling: hadley     What medication are you calling about (include dose and sig)?:   Controlled Substance Agreement on file: Yes:   Who prescribed the medication?:   Do you need a refill? Yes:   When did you use the medication last?   Patient offered an appointment? Yes:   Do you have any questions or concerns?  Yes  Requested Pharmacy:   Okay to leave a detailed message?: Yes at Home number on file [phone] (home)                2

## 2020-07-20 NOTE — TELEPHONE ENCOUNTER
Routing refill request to provider for review/approval because:  Labs not current:  A1c, CR  Patient needs to be seen because:  Due for diabetic follow up    Ines Diaz RN on 7/20/2020 at 2:02 PM

## 2020-07-22 NOTE — TELEPHONE ENCOUNTER
Reason for call:  Other   Patient called regarding (reason for call): pt was triaged to see provider in 48hrs, she does not want any  other clinic, and there are no scheulable providers at this location into 8/10; please advise this patient  Additional comments:     Phone number to reach patient:  Home number on file [phone] (home)    Best Time:  any    Can we leave a detailed message on this number?  YES    Travel screening: Not Applicable

## 2020-07-22 NOTE — TELEPHONE ENCOUNTER
Had a shot in her eye yesterday. Today around 1500 she noticed a lump like a double chin - like the size of an egg. Right under her chin above her judy's apple. Is on oxygen during the day. Doesn't know if it could be a goiter. It doesn't hurt - she did have a sore throat earlier today. Seems like it's the same size it was at 1500 today. States it's the size of a medium egg.    Per protocol advised PCP evaluation - warm transferred to scheduling.    COVID 19 Nurse Triage Plan/Patient Instructions    Please be aware that novel coronavirus (COVID-19) may be circulating in the community. If you develop symptoms such as fever, cough, or SOB or if you have concerns about the presence of another infection including coronavirus (COVID-19), please contact your health care provider or visit [email or web address].     Disposition/Instructions    In-Person Visit with provider recommended. Reference Visit Selection Guide.    Thank you for taking steps to prevent the spread of this virus.  o Limit your contact with others.  o Wear a simple mask to cover your cough.  o Wash your hands well and often.    Resources    [facility]: About COVID-19: [email or web address]    CDC: What to Do If You're Sick: www.cdc.gov/coronavirus/2019-ncov/about/steps-when-sick.html    CDC: Ending Home Isolation: www.cdc.gov/coronavirus/2019-ncov/hcp/disposition-in-home-patients.html     CDC: Caring for Someone: www.cdc.gov/coronavirus/2019-ncov/if-you-are-sick/care-for-someone.html     [facility]: Interim Guidance for Hospital Discharge to Home: www.health.[facility].[place]/diseases/coronavirus/hcp/hospdischarge.pdf    [facility] clinical trials (COVID-19 research studies): [email or web address]/umn-[email or web address]-trials     Below are the COVID-19 hotlines at the [place] Department of [facility] ([facility]). Interpreters are available.   o For health questions: Call 161-311-4271 or 1-260.165.4033 (7 a.m. to 7 p.m.)  o For questions about schools and  childcare: Call 811-509-8528 or 1-238.868.4504 (7 a.m. to 7 p.m.)     Pennie Wagner RN on 7/21/2020 at 9:44 PM    Reason for Disposition    [1] Skin growth or mole AND[2] larger than a pencil eraser, or increasing in size    Additional Information    Negative: [1] Looks infected AND [2] large red area (> 2 in. or 5 cm)    Negative: [1] Fever AND [2] bump is tender to touch    Negative: [1] Fever AND [2] bright red area or streak    Negative: [1] Looks infected (spreading redness, pus) AND [2] no fever    Negative: Looks like a boil, infected sore, or deep ulcer    Negative: Caller can't describe it clearly    Negative: [1] Skin growth or mole AND [2] two sides do not look the same (i.e., asymmetric)    Negative: [1] Skin growth or mole AND [2] border is irregular or blurry    Negative: [1] Skin growth or mole AND [2] changes color, or it has more than one color    Protocols used: SKIN LESION - MOLES OR GROWTHS-A-

## 2020-07-22 NOTE — TELEPHONE ENCOUNTER
Called and spoke to patient.  Spoke to after hours nurse last evening.  She does not have any new symptoms.  Lump is unchanged from yesterday.  She is concerned as does not know why she has and is new.  Did schedule her for first available appt which is 7/29/20 1:40 pm.  Advised I would route to provider to advise if feels should be seen sooner.  Venu on STO.  Please advise.  Paulina Kunz, Pennie Knox RN           7/21/20 9:49 PM   Note      Had a shot in her eye yesterday. Today around 1500 she noticed a lump like a double chin - like the size of an egg. Right under her chin above her judy's apple. Is on oxygen during the day. Doesn't know if it could be a goiter. It doesn't hurt - she did have a sore throat earlier today. Seems like it's the same size it was at 1500 today. States it's the size of a medium egg.     Per protocol advised PCP evaluation - warm transferred to scheduling.     COVID 19 Nurse Triage Plan/Patient Instructions     Please be aware that novel coronavirus (COVID-19) may be circulating in the community. If you develop symptoms such as fever, cough, or SOB or if you have concerns about the presence of another infection including coronavirus (COVID-19), please contact your health care provider or visit [email or web address].      Disposition/Instructions     In-Person Visit with provider recommended. Reference Visit Selection Guide.     Thank you for taking steps to prevent the spread of this virus.    Limit your contact with others.    Wear a simple mask to cover your cough.    Wash your hands well and often.     Resources    [facility]: About COVID-19: [email or web address]    CDC: What to Do If You're Sick: www.cdc.gov/coronavirus/2019-ncov/about/steps-when-sick.html    CDC: Ending Home Isolation: www.cdc.gov/coronavirus/2019-ncov/hcp/disposition-in-home-patients.html     CDC: Caring for Someone: www.cdc.gov/coronavirus/2019-ncov/if-you-are-sick/care-for-someone.html      [facility]: Interim Guidance for Hospital Discharge to Home: www.health.[facility].mn.[place]/diseases/coronavirus/hcp/hospdischarge.pdf    [facility] clinical trials (COVID-19 research studies): clinicalaffairs.[facility]/umn-clinical-trials     Below are the COVID-19 hotlines at the [place] Department of [facility] ([facility]). Interpreters are available.     For health questions: Call 824-276-8714 or 1-118.190.4885 (7 a.m. to 7 p.m.)    For questions about schools and childcare: Call 160-344-0089 or 1-368.794.8658 (7 a.m. to 7 p.m.)      Pennie Wagner RN on 7/21/2020 at 9:44 PM     Reason for Disposition    [1] Skin growth or mole AND[2] larger than a pencil eraser, or increasing in size    Additional Information    Negative: [1] Looks infected AND [2] large red area (> 2 in. or 5 cm)    Negative: [1] Fever AND [2] bump is tender to touch    Negative: [1] Fever AND [2] bright red area or streak    Negative: [1] Looks infected (spreading redness, pus) AND [2] no fever    Negative: Looks like a boil, infected sore, or deep ulcer    Negative: Caller can't describe it clearly    Negative: [1] Skin growth or mole AND [2] two sides do not look the same (i.e., asymmetric)    Negative: [1] Skin growth or mole AND [2] border is irregular or blurry    Negative: [1] Skin growth or mole AND [2] changes color, or it has more than one color    Protocols used: SKIN LESION - MOLES OR GROWTHS-A-AH

## 2020-07-24 NOTE — TELEPHONE ENCOUNTER
Anticoagulation Monitoring Return Date Recheck   Latest Ref Rng & Units     7/10/2020 9/4/2020 8 WEEKS     Anticoagulation Monitoring Instructions   Latest Ref Rng & Units    7/10/2020 3 mg every Mon, Wed, Fri; 1.5 mg all other days   Prescription approved per [facility] Refill Protocol.  Naa Castrejon, RN  Anticoagulation Nurse - [facility]

## 2020-07-27 NOTE — PROGRESS NOTES
Pre-Visit Planning     Future Appointments   Date Time Provider Department Center   7/29/2020  2:00 PM Venu Maria PA-C CRFP CR   9/4/2020 10:00 AM CR LAB CRLAB CR   9/14/2020  1:00 PM Janey Romero, [facility] CRMTM CR     Arrival Time for this Appointment:  1:40 PM   Appointment Notes for this encounter:   AP OK- Lump under chin    Questionnaires Reviewed/Assigned  Additional questionnaires assigned - PHQ-9 and Fall Risk    Patient preferred phone number: [phone]    Visit summaries:   Last OV w/ A.P. 11/29/2019 for hospital follow up for emphysema and acute dyspnea  Also follows w/ INR clinic  Cardiology visit Coreen Cortez PA-C 12/10/2019 -- See consult letter dated 12/17/2019  Chest Xray 11/05/2019 IMPRESSION: Large hiatal hernia is again noted. Thoracic kyphosis.  Interstitial prominence in both perihilar regions is nonspecific, but  could be related to edema or infection. Please clinically correlate.  Heart size appears stable. Mild pulmonary venous congestion has  increased slightly since the previous exam.  ECHO 11/05/2019 Interpretation Summary      Left ventricular systolic function is low normal.   The visual ejection fraction is estimated at 50-55%.   Mild basal to mid anteroseptal hypokinesis.   The right ventricle is normal in structure, function and size.    Global LV function and wall motion abnormalities have improved when compared   to a TTE from 11/8/2019.   Cardiology visit Chino Arango MD 01/16/2020 -- See consult letter dated 01/16/2020   RECOMMENDATIONS:  1.  Resolved stress cardiomyopathy  -Discontinue spironolactone  - Increase metoprolol to 50 mg twice daily, this was her previous dose  -Decrease furosemide every other day, eventually can try weaning off and take as needed for any fluid weight gain  - Repeat echo in 6 months  2.  Paroxysmal atrial fibrillation-   - Discontinue amiodarone due to underlying lung disease  - Continue metoprolol and warfarin    Follow-up  in 6 months with echo.  See triage note 07/21/20 for reason for appointment on 07/29/20    PVP:   Spoke to patient via phone. Are there any additional questions or concerns you'd like to review with your provider during your visit? No    Patient does not have additional questions or concerns.        Visit is not preventive.    Meds  Is there anything on your medication list that needs to be updated? No    Current Outpatient Medications   Medication     albuterol (ALBUTEROL) 108 (90 BASE) MCG/ACT inhaler     albuterol (PROVENTIL) (2.5 MG/3ML) 0.083% neb solution     ascorbic acid (VITAMIN C) 500 MG tablet     ASPIRIN EC PO     blood glucose monitoring (ONE TOUCH ULTRA 2) meter device kit     blood glucose monitoring (ONE TOUCH ULTRASOFT) lancets     calcium carb 1250 mg, 500 mg [place],/vitamin D 200 units (OSCAL WITH D) 500-200 MG-UNIT per tablet     ferrous sulfate 325 (65 FE) MG tablet     furosemide (LASIX) 40 MG tablet     glipiZIDE (GLUCOTROL XL) 10 MG 24 hr tablet     levothyroxine (SYNTHROID/LEVOTHROID) 75 MCG tablet     LOPERAMIDE HCL PO     metFORMIN (GLUCOPHAGE) 500 MG tablet     metoprolol tartrate (LOPRESSOR) 50 MG tablet     ONETOUCH ULTRA test strip     polycarbophil (FIBERCON) 625 MG tablet     QUEtiapine (SEROQUEL) 25 MG tablet     rosuvastatin (CRESTOR) 5 MG tablet     TRELEGY ELLIPTA 100-62.5-25 MCG/INH oral inhaler     VITAMIN D, CHOLECALCIFEROL, PO     warfarin ANTICOAGULANT (COUMADIN) 3 MG tablet     No current facility-administered medications for this visit.      Which pharmacy do you prefer to use for medications during this visit if needed? [facility] unless there is [facility] immediate need, then [facility] in pharmacy     Do you need refills on any of your medications? No    Health Maintenance Due   Topic Date Due     ANNUAL REVIEW OF HM ORDERS  11/11/1933     COPD ACTION PLAN  11/11/1933     HEPATITIS B IMMUNIZATION (1 of 3 - Risk 3-dose series) 11/11/1952     ZOSTER IMMUNIZATION (2 of 3) 02/26/2012  "    ADVANCE CARE PLANNING  08/23/2017     PHQ-9  09/08/2019     EYE EXAM  12/31/2019     DTAP/TDAP/TD IMMUNIZATION (2 - Tdap) 01/01/2020     MICROALBUMIN  04/15/2020     LIPID  07/15/2020     DIABETIC FOOT EXAM  07/15/2020     FALL RISK ASSESSMENT  07/15/2020     MEDICARE ANNUAL WELLNESS VISIT  07/15/2020     Patient is due for:  eye exam -- last eye appointment 1 month ago approximately    MyChart  Patient is not active on [facility]. Issues with  and hers mychart -- same email address so she can only see her 's mychart, not utilize her own.     Questionnaire Review   Advised patient to arrive early in order to complete questionnaires.    Call Summary  \"Thank you for your time today.  If anything comes up before your appointment, please feel free to contact us at [phone].\"      Niraj RODRIGUEZ RN    "

## 2020-07-29 NOTE — PROGRESS NOTES
"Subjective     Maryalice Rebecca Wachter is a 86 year old female who presents to clinic today for the following health issues:    HPI       Concern - Lump/mass - see triage notes  Onset: a week ago    Description:   Lump in neck, throat, patient states this started after getting her recent injection in her eye. She states that she was feeling tightness in her throat. She states that she tried to get a hold of a nurse at Urgent care and could not.     Intensity: mild    Progression of Symptoms:  improving    Accompanying Signs & Symptoms:  Throat tightness, swelling, sore throat    Previous history of similar problem:   none    Precipitating factors:   Worsened by: ?    Alleviating factors:  Improved by: nothing    Therapies Tried and outcome: oxygen on    iSndi called the clinic on 7/21/20 with concerns about throat and neck swelling after having her injection in her eye.  \"Had a shot in her eye yesterday. Today around 1500 she noticed a lump like a double chin - like the size of an egg. Right under her chin above her judy's apple. Is on oxygen during the day. Doesn't know if it could be a goiter. It doesn't hurt - she did have a sore throat earlier today. Seems like it's the same size it was at 1500 today. States it's the size of a medium egg.\"     She had a sore throat at the time also and some swollen glands as well.  She tried putting on her 02 which seemed to help some.  Today she still feels like it is swollen still.  She was not sure if she was having an allergic reaction to the injection that she recently had in her eye.  It did almost feel like her throat was closing on her.    Diabetes Follow-up      How often are you checking your blood sugar? Not at all    What concerns do you have today about your diabetes? None     Do you have any of these symptoms? (Select all that apply)  No numbness or tingling in feet.  No redness, sores or blisters on feet.  No complaints of excessive thirst.  No reports of " blurry vision.  No significant changes to weight.    Have you had a diabetic eye exam in the last 12 months? Yes- Date of last eye exam: 7/20/20,  Location: [facility]      Hyperlipidemia Follow-Up      Are you regularly taking any medication or supplement to lower your cholesterol?   Yes- crestor    Are you having muscle aches or other side effects that you think could be caused by your cholesterol lowering medication?  No    Hypertension Follow-up      Do you check your blood pressure regularly outside of the clinic? No     Are you following a low salt diet? No    Are your blood pressures ever more than 140 on the top number (systolic) OR more   than 90 on the bottom number (diastolic), for example 140/90? No    BP Readings from Last 2 Encounters:   07/29/20 130/60   03/09/20 130/64     Hemoglobin A1C (%)   Date Value   07/29/2020 9.4 (H)   03/09/2020 8.5 (H)     LDL Cholesterol Calculated (mg/dL)   Date Value   07/15/2019 34   08/03/2018 27       Depression and Anxiety Follow-Up    How are you doing with your depression since your last visit? OK    How are you doing with your anxiety since your last visit?  OK    Are you having other symptoms that might be associated with depression or anxiety? Yes:  taking care of her     Have you had a significant life event? No     Do you have any concerns with your use of alcohol or other drugs? No    Social History     Tobacco Use     Smoking status: Never Smoker     Smokeless tobacco: Never Used   Substance Use Topics     Alcohol use: Yes     Comment: Extremely rare use in small amounts.     Drug use: No     PHQ 6/25/2018 3/8/2019 7/29/2020   PHQ-9 Total Score 3 6 1   Q9: Thoughts of better off dead/self-harm past 2 weeks Not at all Not at all Not at all     RAO-7 SCORE 6/25/2018 3/8/2019   Total Score 1 0     Mood- her  just in the last few weeks has been having trouble with daily activities.  Check book, driving etc.  Now she has to help him and be  watching him.  She is finding this exhausting and affecting her mood and daily life.      COPD Follow-Up    Overall, how are your COPD symptoms since your last clinic visit?  No change    How much fatigue or shortness of breath do you have when you are walking?  Same as usual    How much shortness of breath do you have when you are resting?  Same as usual    How often do you cough? Sometimes    Have you noticed any change in your sputum/phlegm?  No    Have you experienced a recent fever? No    Please describe how far you can walk without stopping to rest:  The length of 1-2 rooms    How many flights of stairs are you able to walk up without stopping?  None    Have you had any Emergency Room Visits, Urgent Care Visits, or Hospital Admissions because of your COPD since your last office visit?  No    History   Smoking Status     Never Smoker   Smokeless Tobacco     Never Used     No results found for: FEV1, RJC0MYS    Hypothyroidism Follow-up      Since last visit, patient describes the following symptoms: Weight stable, no hair loss, no skin changes, no constipation, no loose stools      Reviewed and updated as needed this visit by Provider         Review of Systems   Constitutional, HEENT, cardiovascular, pulmonary, gi and gu systems are negative, except as otherwise noted.      Objective    /60 (BP Location: Right arm, Patient Position: Chair, Cuff Size: Adult Large)   Pulse 58   Temp 98.3  F (36.8  C) (Oral)   Resp 16   Wt 80.3 kg (177 lb)   SpO2 94%   BMI 29.45 kg/m    Body mass index is 29.45 kg/m .  Physical Exam   GENERAL: healthy, alert and no distress  HENT: ear canals and TM's normal, nose and mouth without ulcers or lesions  NECK: no adenopathy, no asymmetry, masses, or scars, thyroid normal to palpation and there is a larger amount of loose skin under the neck in the area of her concern.  RESP: lungs clear to auscultation - no rales, rhonchi or wheezes  CV: regular rate and rhythm, normal S1  S2, no S3 or S4, no murmur, click or rub, no peripheral edema and peripheral pulses strong  MS: no gross musculoskeletal defects noted, no edema  SKIN: no suspicious lesions or rashes  PSYCH: mentation appears normal and affect flat    Diagnostic Test Results:  Labs reviewed in Epic  Results for orders placed or performed in visit on 07/29/20   Albumin Random Urine Quantitative with Creat Ratio     Status: None   Result Value Ref Range    Creatinine Urine 180 mg/dL    Albumin Urine mg/L 42 mg/L    Albumin Urine mg/g Cr 23.50 0 - 25 mg/g Cr   Basic metabolic panel  (Ca, Cl, CO2, Creat, Gluc, K, Na, BUN)     Status: Abnormal   Result Value Ref Range    Sodium 139 133 - 144 mmol/L    Potassium 4.4 3.4 - 5.3 mmol/L    Chloride 104 94 - 109 mmol/L    Carbon Dioxide 24 20 - 32 mmol/L    Anion Gap 11 3 - 14 mmol/L    Glucose 170 (H) 70 - 99 mg/dL    Urea Nitrogen 20 7 - 30 mg/dL    Creatinine 1.12 (H) 0.52 - 1.04 mg/dL    GFR Estimate 44 (L) >60 mL/min/[1.73_m2]    GFR Estimate If Black 51 (L) >60 mL/min/[1.73_m2]    Calcium 9.0 8.5 - 10.1 mg/dL   Hemoglobin A1c     Status: Abnormal   Result Value Ref Range    Hemoglobin A1C 9.4 (H) 0 - 5.6 %   TSH with free T4 reflex     Status: None   Result Value Ref Range    TSH 2.41 0.40 - 4.00 mU/L           Assessment & Plan     1. Type 2 diabetes mellitus with hyperglycemia, without long-term current use of insulin (H)  Check labs today while she is in clinic.  - FOOT EXAM  - Albumin Random Urine Quantitative with Creat Ratio  - Hemoglobin A1c    2. CKD (chronic kidney disease) stage 3, GFR 30-59 ml/min (H)    - Basic metabolic panel  (Ca, Cl, CO2, Creat, Gluc, K, Na, BUN)    3. Atherosclerosis of native coronary artery of native heart with angina pectoris (H)  Follows with cardiology.  Looks like she is due for an ECHO right about now.    4. Advanced care planning/counseling discussion  I think she does have this on file.  Will give info again to review.  - HONORING CHOICES  "REFERRAL    5. COPD exacerbation (H)  Refilled today per her request.  - albuterol (PROVENTIL) (2.5 MG/3ML) 0.083% neb solution; INHALE CONTENTS OF 1 VIAL (3 ML) VIA NEBULIZER EVERY 6 HOURS AS NEEDED FOR SHORTNESS OF BREATH / DYSPNEA OR WHEEZING  Dispense: 180 mL; Refill: 1    6. Hyperlipidemia LDL goal <100  Refilled.  - rosuvastatin (CRESTOR) 5 MG tablet; Take 1 tablet (5 mg) by mouth daily  Dispense: 90 tablet; Refill: 1    7. Neck swelling  I do not feel anything on exam today.  Consider a thyroid or neck US but she wanted to wait on this.  Check TSH today.  - TSH with free T4 reflex    8. Hypothyroidism, unspecified type  Will be due for refills in Sept.  Follow up pending results.  - TSH with free T4 reflex     BMI:   Estimated body mass index is 29.45 kg/m  as calculated from the following:    Height as of 1/16/20: 1.651 m (5' 5\").    Weight as of this encounter: 80.3 kg (177 lb).         Return in about 6 months (around 1/29/2021) for diabetes follow up, blood pressure follow up.    Venu Maria PA-C  [facility]"

## 2020-07-29 NOTE — PROGRESS NOTES
"SUBJECTIVE:   Maryalice Rebecca Wachter is a 86 year old female who presents for Preventive Visit.  {PVP to remind patient that this is not necessarily a physical exam; physical exam may or may not be done:823080::\"click delete button to remove this line now\"}  {PVP to inform patient that additional E&M charge may apply, if additional problems addressed:669687::\"click delete button to remove this line now\"}  Are you in the first 12 months of your Medicare coverage?  { :893234::\"No\"}    HPI  Do you feel safe in your environment? { :347058}    Have you ever done Advance Care Planning? (For example, a Health Directive, POLST, or a discussion with a medical provider or your loved ones about your wishes): { :889357}    {Hearing Test Done (Optional):280345}  Fall risk  { :531252}  {If any of the above assessments are answered yes, consider ordering appropriate referrals (Optional):745637::\"click delete button to remove this line now\"}  Cognitive Screening { :353342}    {Do you have sleep apnea, excessive snoring or daytime drowsiness? (Optional):414435}    Reviewed and updated as needed this visit by clinical staff  Tobacco  Allergies  Med Hx  Surg Hx  Fam Hx  Soc Hx        Reviewed and updated as needed this visit by Provider        Social History     Tobacco Use     Smoking status: Never Smoker     Smokeless tobacco: Never Used   Substance Use Topics     Alcohol use: Yes     Comment: Extremely rare use in small amounts.     {Rooming Staff- Complete this question if Prescreen response is not shown below for today's visit. If you drink alcohol do you typically have >3 drinks per day or >7 drinks per week? (Optional):127045}    Alcohol Use 6/19/2017   Prescreen: >3 drinks/day or >7 drinks/week? The patient does not drink >3 drinks per day nor >7 drinks per week.   {add AUDIT responses (Optional) (A score of 7 for adult men is an indication of hazardous drinking; a score of 8 or more is an indication of an alcohol use " disorder.  A score of 7 or more for adult women is an indication of hazardous drinking or an alchohol use disorder):212971}    {Outside tests to abstract? :102604}    {additional problems to add (Optional):697035}    Current providers sharing in care for this patient include: {Rooming staff:  Please update Care Team in Rooming Activity, refresh this note and then delete this statement}  Patient Care Team:  Venu Maria PA-C as PCP - General (Physician Assistant - Medical)  Janey Romero RPH as Pharmacist (Pharmacist)  Care, [facility] ([facility]  Venu Maria PA-C as Assigned PCP    The following health maintenance items are reviewed in Epic and correct as of today:  Health Maintenance   Topic Date Due     ANNUAL REVIEW OF HM ORDERS  11/11/1933     COPD ACTION PLAN  11/11/1933     HEPATITIS B IMMUNIZATION (1 of 3 - Risk 3-dose series) 11/11/1952     ZOSTER IMMUNIZATION (2 of 3) 02/26/2012     PHQ-9  09/08/2019     EYE EXAM  12/31/2019     DTAP/TDAP/TD IMMUNIZATION (2 - Tdap) 01/01/2020     MICROALBUMIN  04/15/2020     LIPID  07/15/2020     DIABETIC FOOT EXAM  07/15/2020     FALL RISK ASSESSMENT  07/15/2020     MEDICARE ANNUAL WELLNESS VISIT  07/15/2020     INFLUENZA VACCINE (1) 09/01/2020     A1C  09/09/2020     BMP  01/10/2021     ADVANCE CARE PLANNING  07/29/2025     SPIROMETRY  Completed     DEPRESSION ACTION PLAN  Completed     PNEUMOCOCCAL IMMUNIZATION 65+ LOW/MEDIUM RISK  Completed     IPV IMMUNIZATION  Aged Out     MENINGITIS IMMUNIZATION  Aged Out     {Chronicprobdata (optional):517420}  {Decision Support (Optional):125610}    Review of Systems  {ROS COMP (Optional):714477}    OBJECTIVE:   BP (!) 144/60 (BP Location: Right arm, Patient Position: Sitting, Cuff Size: Adult Regular)   Pulse 58   Temp 98.3  F (36.8  C) (Oral)   Resp 16   Wt 80.3 kg (177 lb)   SpO2 94%   BMI 29.45 kg/m   Estimated body mass index is 29.45 kg/m  as calculated from the  "following:    Height as of 20: 1.651 m (5' 5\").    Weight as of this encounter: 80.3 kg (177 lb).  Physical Exam  {Exam (Optional) :888406}    {Diagnostic Test Results (Optional):977774::\"Diagnostic Test Results:\",\"Labs reviewed in Epic\"}    ASSESSMENT / PLAN:   {Dia Picklist:138839}    COUNSELING:  {Medicare Counselin}    Estimated body mass index is 29.45 kg/m  as calculated from the following:    Height as of 20: 1.651 m (5' 5\").    Weight as of this encounter: 80.3 kg (177 lb).    {Weight Management Plan (ACO) Complete if BMI is abnormal-  Ages 18-64  BMI >24.9.  Age 65+ with BMI <23 or >30 (Optional):947623}     reports that she has never smoked. She has never used smokeless tobacco.  {Tobacco Cessation -- Complete if patient is a smoker (Optional):490774}    Appropriate preventive services were discussed with this patient, including applicable screening as appropriate for cardiovascular disease, diabetes, osteopenia/osteoporosis, and glaucoma.  As appropriate for age/gender, discussed screening for colorectal cancer, prostate cancer, breast cancer, and cervical cancer. Checklist reviewing preventive services available has been given to the patient.    Reviewed patients plan of care and provided an AVS. The {CarePlan:593607} for Sindi meets the Care Plan requirement. This Care Plan has been established and reviewed with the {PATIENT, FAMILY MEMBER, CAREGIVER:541303}.    Counseling Resources:  ATP IV Guidelines  Pooled Cohorts Equation Calculator  Breast Cancer Risk Calculator  FRAX Risk Assessment  ICSI Preventive Guidelines  Dietary Guidelines for Americans,   [employer]'s MyPlate  ASA Prophylaxis  Lung CA Screening    Venu Maria PA-C  [facility]    Identified Health Risks:  "

## 2020-08-04 NOTE — TELEPHONE ENCOUNTER
Routing refill request to provider for review/approval because:  Labs not current:  Lipid panel  Cayla Ozuna RN, BSN

## 2020-08-04 NOTE — TELEPHONE ENCOUNTER
Medication Question or Refill  Who is calling: patient  What medication are you calling about (include dose and sig)?: QUEtiapine (SEROQUEL) 25 MG tablet   Sig: TAKE ONE-HALF TABLET BY MOUTH AT BEDTIME TO SLEEP . OK TO INCREASE BY 1/2 TABLET EVERY 3 NIGHTS UP TO 2 TABLETS DAILY IF NEEDED  Controlled Substance Agreement on file:   Who prescribed the medication?: Venu Maria  Do you need a refill? Yes: [facility] without a response from pcp. Pt is almost out of this medication  When did you use the medication last?   Patient offered an appointment? No  Do you have any questions or concerns?  No  [facility]ed [facility]  Okay to leave a detailed message?: No at Cell number on file:    Telephone Information:   Mobile [phone]       Elena Mcclendon-Patient Rep

## 2020-08-10 NOTE — TELEPHONE ENCOUNTER
8-      Nga whitmore reports januvia rx I sent in for her is 114$/30 days --she will try it for 1 month and see how it goes.     Janey Romero [facility].  Medication Therapy Management Provider  [phone]

## 2020-08-10 NOTE — TELEPHONE ENCOUNTER
----- Message from Venu Maria PA-C sent at 8/10/2020  7:21 AM CDT -----  Great!  Welcome back.  And thank you.  Sindi is also have a bit of stress at home, it seems maybe her  is becoming a bit forgetful and now she has to help him out with some things at home she didn't before.  Just GEOVANNI Lea  ----- Message -----  From: Hannah Romero RPH  Sent: 8/8/2020   4:56 PM CDT  To: Venu Maria PA-C    Fyi-im back from [place]--will discuss with her this month -hannah  ----- Message -----  From: Venu Maria PA-C  Sent: 7/31/2020  11:59 AM CDT  To: Hannah Romero RPH, [facility] Pharmacist Consult    Collin Toth-- A1C has jumped up a bit this time.  Higher than even the last couple years.  Thoughts on what to do?    Thanks,  Venu

## 2020-08-10 NOTE — TELEPHONE ENCOUNTER
8-      MTM calls pt. To discuss a1c that is way up. actos stopped due to heart failure issues --since then a1c is up.  She tried cbd oil to try for bs control?  mtm stated to her most likely not working -she tried it not working.    Met 500mg 1 tab bid. Max dose w/out diarrhea. Takes glipizide 10mg bid.    Manuel- fatemeh in hospital --dementia issues? She is worried .  Also 59 year old son in hospital in [place] with brain aneurism. Very stressed out about this.     She notes if she stops eating her bs's are better . She skipped lunch it was better .          Lab Results   Component Value Date    A1C 9.4 07/29/2020    A1C 8.5 03/09/2020    A1C 7.5 08/12/2019    A1C 7.8 04/17/2019    A1C 7.2 10/15/2018         Plan:    1.  Lets ADD -Januvia 50mg. --1 tab daily . Recheck bs's 1-2 x day .       F/up via phone in 6 weeks. Monday sept. 14th -1pm.    Janey Romero [facility].  Medication Therapy Management Provider  [phone]

## 2020-09-04 NOTE — PROGRESS NOTES
Anticoagulation Management    Unable to reach [facility] today.    Today's INR result of 1.70 is subtherapeutic (goal INR of 2.0-3.0).  Result received from: Clinic Lab    Follow up required to confirm warfarin dose taken and assess for changes    Left message to continue maintenance  this weekend.      Anticoagulation clinic to follow up    Jennifer Gloria RN

## 2020-09-04 NOTE — PROGRESS NOTES
ANTICOAGULATION MANAGEMENT     Patient Name:  Maryalice Rebecca Wachter  Date:  2020    ASSESSMENT /SUBJECTIVE:    Today's INR result of 1.70 is subtherapeutic. Goal INR of 2.0-3.0      Warfarin dose taken: Warfarin taken as previously instructed    Diet: No new diet changes affecting INR    Medication changes/ interactions: No new medications/supplements affecting INR    Previous INR: Therapeutic     S/S of bleeding or thromboembolism: No    New injury or illness: No    Upcoming surgery, procedure or cardioversion: No    Additional findings: None      PLAN:    Spoke with Sindi regarding INR result and instructed:     Warfarin Dosing Instructions: 4.5 mg tonight then continue your current warfarin dose of 3 mg on MWF adn 1.5 mg all other days    Instructed patient to follow up no later than: 2 weeks  Lab visit scheduled    Education provided: None required      Sindi verbalizes understanding and agrees to warfarin dosing plan.    Instructed to call the Anticoagulation Clinic for any changes, questions or concerns. (#[phone])        Jennifer Gloria RN      OBJECTIVE:  Recent labs: (last 7 days)     20  0956   INR 1.70*         No question data found.  Anticoagulation Summary  As of 2020    INR goal:   2.0-3.0   TTR:   82.2 % (11.9 mo)   INR used for dosin.70! (2020)   Warfarin maintenance plan:   3 mg (3 mg x 1) every Mon, Wed, Fri; 1.5 mg (3 mg x 0.5) all other days   Full warfarin instructions:   3 mg every Mon, Wed, Fri; 1.5 mg all other days   Weekly warfarin total:   15 mg   Plan last modified:   Modesta Christensen RN (2019)   Next INR check:   2020   Priority:   High   Target end date:       Indications    Atrial fibrillation (H) [I48.91]  Long term current use of anticoagulants with INR goal of 2.0-3.0 [Z79.01]             Anticoagulation Episode Summary     INR check location:       Preferred lab:       Send INR reminders to:   YEN PANDYA    Comments:   3  mg tabs - amiodarone started 11/2019 (during hospitalization)      Anticoagulation Care Providers     Provider Role Specialty Phone number    Venu Maria PA-C Responsible Physician Assistant - Medical [phone]

## 2020-09-13 NOTE — PROGRESS NOTES
MTM ENCOUNTER  SUBJECTIVE/OBJECTIVE:                           Maryalice Rebecca Wachter is a 86 year old female called for a follow-up visit. She was referred to me from Venu Maria  .  Today's visit is a follow-up MTM visit from 3-9-2020.      Patient consented to a telehealth visit: yes  Telemedicine Visit Details  Type of service:  Telephone visit  Start Time: 1:06 PM  End Time: 1;36pm   Originating Location (pt. Location): Home  Distant Location (provider location):  [facility] MTM  Mode of Communication:  Telephone      Must bill in outcomes 2020.       Chief Complaint:    Cheli and jd are improving with health issues. Stress is better.   Had a flu shot already .  Needs trelegy refill, januvia $$ but she wants to try it x 3 months.         Personal Healthcare Goals:  Control DM, copd, stay ambulatory.    Allergies/ADRs: Reviewed in Epic  Tobacco: No tobacco use   Alcohol: not currently using--very rare   Caffeine: decaff in am 1-2 cups/day of coffee; pm -tea a little bit.   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 Cannot exercise due to diarrhea issues --would like to go on walks with her cheli but has to have BM within 1 block of leaving.   PMH: Reviewed in Epic; [facility] stated she has sarcoidosis?    Medication Adherence: good now --no issues  Despite drug costs for trelegy and januvia.     IBS-diarrhea: patient uses prn loperamide --works well.     GERD:  She has a hiatal hernia -surgery not recommended.      Diabetes:  Pt currently taking: januvia 50mg /day ,  Glipizide er 10mg. bid, off actos due to CHF exacerbation 11-5-19, Metformin-1 x500mg. bid due to ckd and chronic loose stools. Pt is experiencing the following side effects: mild loose stools from metformin --controlled with loperamide prn quite well.     No bs's > 200 since starting januvia .    7 days = 172  14 days =167  30 days = 162       Date FBG/ 2hours post Lunch/2hours post Dinner /2hours post     9- 157      9-13 182      9-12 159      9-11 169      9-10 176      9-9 163      9-8 196            Symptoms of low blood sugar? none. Frequency of hypoglycemia? never.  Recent symptoms of high blood sugar? fatigue  Eye exam: up to date  Foot exam: up to date  Microalbumin is < 30 mg/g. Pt is NO longer post hospital 11-5-19- taking an ACEi/ARB.  Aspirin: Taking 81mg daily and denies side effects  Diet/Exercise: diet is ok but likes carbs, rice , chinese , no exercise.   Now eating meals on wheels -5 days /week for lunch , she eats 1/2 at lunch and 1/2 at dinnertime.    She feels they eat more pasta then they should have.  No exercise due to copd.          Lab Results   Component Value Date    A1C 9.4 07/29/2020    A1C 8.5 03/09/2020    A1C 7.5 08/12/2019    A1C 7.8 04/17/2019    A1C 7.2 10/15/2018           Depression:  Current medications include: None--she stopped prozac . Pt reports that depression symptoms are non existent. Current depression symptoms include: lacks motivation or energy to do anything/otherwise she is fine.  Patient reports the following stressors: chronic health condition, lack of fun hubby to travel with . Therapies tried and response: Bupropion once daily-did nothing for her , prozac made her too antsy.  Notes cannot vacation due to hubby is a stick in the mud --no fun! He only wants to visit relatives.   She hasnt played Bridge with her friends --struggles with agoraphobia and copd issues.     PHQ 6/25/2018 3/8/2019 7/29/2020   PHQ-9 Total Score 3 6 1   Q9: Thoughts of better off dead/self-harm past 2 weeks Not at all Not at all Not at all         Vitamin D3: level was 21 on  --she takes otc daily dose of 2,000 iu's now.      Vitamin B12: level was 493 on 10-10-16. She takes daily otc dose now.     Hypothyroidism: Patient is taking levothyroxine 100 mcg daily. She knows to take it on an empty stomach with her omeprazole in the morning.    TSH   Date Value Ref Range Status    07/29/2020 2.41 0.40 - 4.00 mU/L Final         Hypertension: Current medications include : only takes lasix 40mg./qod if water weight up and using if at home and not going anywhere out of the house --lasix keeps her busy in the bathroom x 4 hours.   She weighs daily qam --178-180 is her weight range.    metoprolol 50mg bid-am and dinnertime.  Patient does  self-monitor BP.  Yes see below:  Patient side effects :  None now.   Range of home bp's = 115-143 --95% of readings <140/60-70.    Look good . She recognizes when in afib--bp erratic and pulse increases to 90+ .not occurring now.    BP Readings from Last 3 Encounters:   07/29/20 130/60   03/09/20 130/64   01/16/20 122/62     Iron deficiency anemia: now on daily 325mg feso4 tab post hospital stay .    Hemoglobin   Date Value Ref Range Status   11/08/2019 9.8 (L) 11.7 - 15.7 g/dL Final         COPD: Current medications: Short-Acting Bronchodilator: Albuterol MDI, Nebs and pt reports using 1-2 times per day --usually always before bedtime, rare albuterol nebs --but she gets nervous when she has to leave the house --its her safety space!    ICS/LAMA/LABA- Trelegy Ellipta--I puff daily.     Oxygen-has portable oxygen --uses it daily but only at night  - she doesn't know what to do with all this equipment? --she has a pulse oxygen wears on her --checks if nocturia. She doesn't like to carry the oxygen tank --its too heavy.   . Pt rinses their mouth after using steroid inhaler.    Pt is not experiencing side effects.   Pt reports the following symptoms: none.  Pt does have an COPD Action Plan on file.   Has spirometry been completed: Yes   Oxygen today 92%. When sitting , 85 if a lot of movement(grocery shopping) --she would wear O-2 then  (fyi--she wears/carries portable oximeter with her at all times -helps calm her to know where her oxygen level is at ).       Insomnia: Current medications include: quetiapine 25mg./night  --works well --she just fiddles with  "dose depending on how riled up trump gets her.         BP Readings from Last 1 Encounters:   07/29/20 130/60     Pulse Readings from Last 1 Encounters:   07/29/20 58     Wt Readings from Last 1 Encounters:   07/29/20 177 lb (80.3 kg)     Ht Readings from Last 1 Encounters:   01/16/20 5' 5\" (1.651 m)     Estimated body mass index is 29.45 kg/m  as calculated from the following:    Height as of 1/16/20: 5' 5\" (1.651 m).    Weight as of 7/29/20: 177 lb (80.3 kg).    Temp Readings from Last 1 Encounters:   07/29/20 98.3  F (36.8  C) (Oral)           ASSESSMENT:                                                       Current medications were reviewed with her today.     Medication Adherence: Fill med pill boxes weekly, check pill box am and pm to make sure she has taken all her pills as she has missed a few pm doses last month .     IBS-Diarrhea: Stable.     GERD: Stable.  Current treatment is effective.     Diabetes: Stable. Patient is meeting A1c goal of </= 8.5%. Self monitoring of blood glucose is at goal of fasting  mg/dL. Pt would benefit from minimum SMBG: Check blood sugars fasting, and occasionally 2 hours after starting a meal.  Metformin :  stay on the same dose.  SFU (Glipizide) :  stay on the same dose.  DPP4- (Januvia ): 50mg. Daily.   No actos due to chf exacerbation  .   Increased exercise- as tolerated.   Updated Hemoglobin A1c- 9.4%.   Weight loss recommended--keep low carb diet as she can tolerate --otherwise dont stress out about blood sugars.    Depression: Improved. Patient would benefit from no meds today --she will work on doing things that make her feel happy like restart playing bridge card game with friends .    Vitamin B12: is not at goal of 600-1000pg/mL. Pt would benefit from vitamin B12 lab recheck in 12 months.    Vitamin D3: is not at goal of 50-75ng/mL. Pt would benefit from vitamin D3 lab recheck in 12 months.    Hypothyroidism: TSH wnl.    Hypertension: Stable. Patient is " meeting BP goal of < 140/90mmHg.     Iron deficiency anemia:  Recheck HGb in 2020.   Hemoglobin   Date Value Ref Range Status   11/08/2019 9.8 (L) 11.7 - 15.7 g/dL Final   ]      COPD: Improved:  doing well with daily trelegy--we discussed carry albuterol and portable oxygen as her security blankets .      Insomnia: stable. Pt may benefit from no changes.      PLAN:                                                        1. VON Paredes is making your blood sugars much improved --thank you for staying on it ; do your best with diet /exercise , stay on all same medications as is ; I did re-order your trelegy inhaler 1 month with refills to your [facility] mail order pharmacy .        It was great to speak with you today.  I value your experience and would be very thankful for your time with providing feedback on our clinic survey. You may receive a survey via email or text message in the next few days.     Next MTM visit: November 9th -2020 at 1pm. for A1c lab recheck , please bring blood sugar meter.       It was great to speak with you today.  I value your experience and would be very thankful for your time with providing good feedback on clinic surveys.  Kindly let me know personally if your experience today was not exceptional so that we can continue to improve your care delivery experience.  I spent 30 minutes with this patient today . Patient was sent AVS via [employer] today .       To schedule another MTM appointment, please call the clinic directly or you may call the MTM scheduling line at [phone] or toll-free at [phone].     My Clinical Pharmacist's contact information:                                                      It was a pleasure seeing you today!  Please feel free to contact me with any questions or concerns you have.      Janey Romero Rph.  Medication Therapy Management Provider  [phone]

## 2020-09-14 NOTE — PATIENT INSTRUCTIONS
Recommendations from today's MTM visit:                                                      1. VON Paredes is making your blood sugars much improved --thank you for staying on it ; do your best with diet /exercise , stay on all same medications as is ; I did re-order your trelegy inhaler 1 month with refills to your [facility] mail order pharmacy .        It was great to speak with you today.  I value your experience and would be very thankful for your time with providing feedback on our clinic survey. You may receive a survey via email or text message in the next few days.     Next MTM visit: November 9th -2020 at 1pm. for A1c lab recheck , please bring blood sugar meter.     To schedule another MTM appointment, please call the clinic directly or you may call the MTM scheduling line at [phone] or toll-free at [phone].     My Clinical Pharmacist's contact information:                                                      It was a pleasure talking with you today!  Please feel free to contact me with any questions or concerns you have.      Janey Romero Rph.  Medication Therapy Management Provider  [phone]

## 2020-09-15 NOTE — TELEPHONE ENCOUNTER
Diagnostic mammo ordered  I called Central Scheduling to make sure that when the patient goes on 9/16/2020 that the diagnostic mammo can be done  As [address] [facility] is booked we rescheduled the patient for Friday, 09/18/2020 8 am     I called and informed patient of this and patient was very thankful  She apologized several times for being upset and rude earlier  She explained that the surgeon found nothing on the [address] [facility] [facility] and cancelled her next appointment  Then the CT was denied by her insurance company  And now when scheduling her mammo she was told it had to be a diagnositc  She is just very frustrated as it feels like she is carrying something under her arm all the time  Patient will go for the mammo on 9/18 then see what can be done for a CT  Routing refill request to provider for review/approval because:  Labs out of range:  Creat, A1c  Labs not current:  jose  Was seen yesterday for COPD only, has not had DM addressed since Janey 10.15.19    Amanda Bob RN, BS  Clinical Nurse Triage.

## 2020-09-18 NOTE — PROGRESS NOTES
Anticoagulation Management    Unable to reach [facility] today.    Today's INR result of 2.00 is therapeutic (goal INR of 2.0-3.0).  Result received from: Clinic Lab    Follow up required to confirm warfarin dose taken and assess for changes    LMTCB and gave dosing instruction to take maintenance over the weekend.      Anticoagulation clinic to follow up    Jennifer Gloria RN

## 2020-09-21 NOTE — PROGRESS NOTES
ANTICOAGULATION MANAGEMENT     Patient Name:  Maryalice Rebecca Wachter  Date:  2020    ASSESSMENT /SUBJECTIVE:    Today's INR result of 2.00 is therapeutic. Goal INR of 2.0-3.0      Warfarin dose taken: Warfarin taken as previously instructed    Diet: No new diet changes affecting INR    Medication changes/ interactions: No new medications/supplements affecting INR    Previous INR: Subtherapeutic     S/S of bleeding or thromboembolism: No    New injury or illness: No    Upcoming surgery, procedure or cardioversion: No    Additional findings: None      PLAN:    Spoke with Sindi regarding INR result and instructed:     Warfarin Dosing Instructions: Continue your current warfarin dose 3mg Mon/Wed/Fri & 1.5mg AOD    Instructed patient to follow up no later than: 2 weeks  Lab visit scheduled    Education provided: Importance of following up for INR monitoring at instructed interval      Sindi verbalizes understanding and agrees to warfarin dosing plan.    Instructed to call the Anticoagulation Clinic for any changes, questions or concerns. (#[phone])        Candy Hernandez RN      OBJECTIVE:  Recent labs: (last 7 days)     20  1358   INR 2.00*         No question data found.  Anticoagulation Summary  As of 2020    INR goal:   2.0-3.0   TTR:   80.6 % (11.9 mo)   INR used for dosin.00 (2020)   Warfarin maintenance plan:   3 mg (3 mg x 1) every Mon, Wed, Fri; 1.5 mg (3 mg x 0.5) all other days   Full warfarin instructions:   3 mg every Mon, Wed, Fri; 1.5 mg all other days   Weekly warfarin total:   15 mg   No change documented:   Candy Hernandez RN   Plan last modified:   Modesta Christensen RN (2019)   Next INR check:   10/2/2020   Priority:   High   Target end date:       Indications    Atrial fibrillation (H) [I48.91]  Long term current use of anticoagulants with INR goal of 2.0-3.0 [Z79.01]             Anticoagulation Episode Summary     INR check location:       Preferred lab:        Send INR reminders to:   YEN PANDYA    Comments:   3 mg tabs - amiodarone started 11/2019 (during hospitalization)      Anticoagulation Care Providers     Provider Role Specialty Phone number    Venu Maria PA-C Responsible Physician Assistant - Medical [phone]         Candy Gaytan, RN, BSN, PHN

## 2020-09-29 NOTE — TELEPHONE ENCOUNTER
Certificate of Medical Necessity received from [facility] for Venu to fill out and sign  Vale Duvall/

## 2020-10-02 NOTE — PROGRESS NOTES
ANTICOAGULATION MANAGEMENT     Patient Name:  Maryalice Rebecca Wachter  Date:  10/2/2020    ASSESSMENT /SUBJECTIVE:    Today's INR result of 2.3 is therapeutic. Goal INR of 2.0-3.0      Warfarin dose taken: Warfarin taken as instructed    Diet: No new diet changes affecting INR    Medication changes/ interactions: No new medications/supplements affecting INR    Previous INR: Therapeutic     S/S of bleeding or thromboembolism: No    New injury or illness: No    Upcoming surgery, procedure or cardioversion: No    Additional findings: None      PLAN:    Telephone call with Sindi regarding INR result and instructed:     Warfarin Dosing Instructions: Continue your current warfarin dose 3 mg MWF and 1.5 mg ROW    Instructed patient to follow up no later than: 3 weeks  Lab visit scheduled    Education provided: None required      Sindi verbalizes understanding and agrees to warfarin dosing plan.    Instructed to call the Anticoagulation Clinic for any changes, questions or concerns. (#[phone])        Margaux Ryan RN      OBJECTIVE:  Recent labs: (last 7 days)     10/02/20  0953   INR 2.30*         No question data found.  Anticoagulation Summary  As of 10/2/2020    INR goal:  2.0-3.0   TTR:  84.5 % (11.9 mo)   INR used for dosin.30 (10/2/2020)   Warfarin maintenance plan:  3 mg (3 mg x 1) every Mon, Wed, Fri; 1.5 mg (3 mg x 0.5) all other days   Full warfarin instructions:  3 mg every Mon, Wed, Fri; 1.5 mg all other days   Weekly warfarin total:  15 mg   No change documented:  Margaux Ryan RN   Plan last modified:  Modesta Christensen RN (2019)   Next INR check:  10/22/2020   Priority:  High   Target end date:      Indications    Atrial fibrillation (H) [I48.91]  Long term current use of anticoagulants with INR goal of 2.0-3.0 [Z79.01]             Anticoagulation Episode Summary     INR check location:      Preferred lab:      Send INR reminders to:  YEN PANDYA    Comments:  3 mg  tabs - amiodarone started 11/2019 (during hospitalization)      Anticoagulation Care Providers     Provider Role Specialty Phone number    Venu Maria PA-C Responsible Physician Assistant - Medical [phone]

## 2020-10-22 PROBLEM — J96.01 ACUTE RESPIRATORY FAILURE WITH HYPOXIA (H): Status: RESOLVED | Noted: 2019-11-05 | Resolved: 2020-01-01

## 2020-10-22 NOTE — PROGRESS NOTES
Tried calling patient again but she is still not back from clinic.  Naa Castrejon, RN  Anticoagulation Nurse - [facility], [place]

## 2020-10-22 NOTE — PROGRESS NOTES
Pre-Visit Planning :     Future Appointments   Date Time Provider Department Center   10/22/2020  3:00 PM CR LAB CRLAB CR   10/22/2020  3:40 PM Veun Maria PA-C CR[facility]    11/9/2020  1:00 PM Janey Romero [facility] CR      Arrival Time for this Appointment:  3:20 PM      Appointment Notes for this encounter:    oxygen assessment     Questionnaires Reviewed/Assigned:   N/A    Spoke to patient via phone. Are there any additional questions or concerns you'd like to review with your provider during your visit? N/A      Last OV with provider:  7/29/2020; Type 2 diabetes mellitus with hyperglycemia, without long-term current use of insulin (H), CKD (chronic kidney disease) stage 3, GFR 30-59 ml/min (H), Atherosclerosis of native coronary artery of native heart with angina pectoris (H), Advanced care planning/counseling discussion, COPD exacerbation (H), Hyperlipidemia LDL goal <100, Neck swelling, Hypothyroidism, unspecified type              Hospital ER Visits:  11/5/2019-11/19/2019;  [place]; Dr. Jv Jacob; Acute respiratory failure with hypoxia (H);  PRINCIPAL AND FINAL DIAGNOSES: 1.  Acute on chronic systolic heart failure exacerbation.             2.  New diagnosis of cardiomyopathy.  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 3.  Acute hypoxic respiratory failure secondary to acute on chronic systolic heart failure exacerbation.             4.  Cardiogenic shock secondary to acute cardiomyopathy.      Is the visit preventive? No                       Specialty Visits:  1/16/2020; Critical [facility] hospital [facility]- ;[place]ino Arango MD  Cardiomyopathy, unspecified type (H                       Imaging and Lab Review:11/05/2019; Limited Bedside Cardiac Ultrasound; Indication: Shortness of Breath and Hypotension/shock.; IMPRESSION: Grossly normal left ventricular function and chamber size.  No pericardial effusion.    Recent Procedures:   N/A    MEDS ;    Current Outpatient Medications   Medication     albuterol (ALBUTEROL) 108 (90 BASE) MCG/ACT inhaler     albuterol (PROVENTIL) (2.5 MG/3ML) 0.083% neb solution     ascorbic acid (VITAMIN C) 500 MG tablet     ASPIRIN EC PO     blood glucose monitoring (ONE TOUCH ULTRA 2) meter device kit     blood glucose monitoring (ONE TOUCH ULTRASOFT) lancets     calcium carb 1250 mg, 500 mg [place],/vitamin D 200 units (OSCAL WITH D) 500-200 MG-UNIT per tablet     ferrous sulfate 325 (65 FE) MG tablet     Fluticasone-Umeclidin-Vilanterol (TRELEGY ELLIPTA) 100-62.5-25 MCG/INH oral inhaler     furosemide (LASIX) 40 MG tablet     glipiZIDE (GLUCOTROL XL) 10 MG 24 hr tablet     levothyroxine (SYNTHROID/LEVOTHROID) 75 MCG tablet     LOPERAMIDE HCL PO     metFORMIN (GLUCOPHAGE) 500 MG tablet     metoprolol tartrate (LOPRESSOR) 50 MG tablet     ONETOUCH ULTRA test strip     polycarbophil (FIBERCON) 625 MG tablet     QUEtiapine (SEROQUEL) 25 MG tablet     rosuvastatin (CRESTOR) 5 MG tablet     sitagliptin (JANUVIA) 50 MG tablet     VITAMIN D, CHOLECALCIFEROL, PO     warfarin ANTICOAGULANT (COUMADIN) 3 MG tablet     No current facility-administered medications for this visit.       Is there anything on your medication list that needs to be updated?  Ask pt  @ check-in    Health Maintenance Due   Topic Date Due     COPD ACTION PLAN  11/11/1933     HEPATITIS B IMMUNIZATION (1 of 3 - Risk 3-dose series) 11/11/1952     ZOSTER IMMUNIZATION (2 of 3) 02/26/2012     DTAP/TDAP/TD IMMUNIZATION (2 - Tdap) 01/01/2020     MEDICARE ANNUAL WELLNESS VISIT  07/15/2020     LIPID  07/15/2020     FALL RISK ASSESSMENT  07/15/2020     INFLUENZA VACCINE (1) 09/01/2020     Preferred pharmacy: [id number] - [facility] [address]  ESTEPHANIA Yan: YES     Patient preferred phone number: [phone]    Lilia Puga, Registered Nurse, Patient [facility]   [phone]

## 2020-10-22 NOTE — NURSING NOTE
"Chief Complaint   Patient presents with     Eval/Assessment     Oxygen Assessment      Initial BP (!) 166/86 (BP Location: Right arm, Patient Position: Sitting, Cuff Size: Adult Regular)   Pulse 102   Temp 98.7  F (37.1  C) (Oral)   Resp 28   Wt 81.7 kg (180 lb 3.2 oz)   SpO2 95%   BMI 29.99 kg/m   Estimated body mass index is 29.99 kg/m  as calculated from the following:    Height as of 1/16/20: 1.651 m (5' 5\").    Weight as of this encounter: 81.7 kg (180 lb 3.2 oz).  BP completed using cuff size regular long right arm    Lisa Magill, CMA    "

## 2020-10-22 NOTE — Clinical Note
Collin Toth- just wanted to let you know that I guess Dr. Nava is not in agreement with use of Trelegy with Sindi due to her a fib.  I did not hear a fib on exam at this visit.  Just thought I'd give the FYI and see what you think.  I did an up to date search and didn't see any MAJOR warnings.  You can see Dr. Bell notes in Media tab.  Thanks!

## 2020-10-22 NOTE — PROGRESS NOTES
Patient not home from the clinic as of yet per .  Will call shortly.  Naa Castrejon RN  Anticoagulation Nurse - [facility]

## 2020-10-22 NOTE — PROGRESS NOTES
ANTICOAGULATION MANAGEMENT     Patient Name:  Maryalice Rebecca Wachter  Date:  10/22/2020    ASSESSMENT /SUBJECTIVE:    Today's INR result of 2.5 is therapeutic. Goal INR of 2.0-3.0      Warfarin dose taken: Warfarin taken as instructed    Diet: No new diet changes affecting INR    Medication changes/ interactions: No new medications/supplements affecting INR    Previous INR: Therapeutic     S/S of bleeding or thromboembolism: No    New injury or illness: No    Upcoming surgery, procedure or cardioversion: No    Additional findings: None       PLAN:    Telephone call with Sindi regarding INR result and instructed:     Warfarin Dosing Instructions: Continue your current warfarin dose 3 mg MWF; 1.5 mg all other days    Instructed patient to follow up no later than: 4 weeks  Lab visit scheduled    Education provided: Monitoring for bleeding signs and symptoms and Monitoring for clotting signs and symptoms      Sindi verbalizes understanding and agrees to warfarin dosing plan.    Instructed to call the Anticoagulation Clinic for any changes, questions or concerns. (#[phone])        Naa Castrejon RN      OBJECTIVE:  Recent labs: (last 7 days)     10/22/20  1505   INR 2.50*         No question data found.  Anticoagulation Summary  As of 10/22/2020    INR goal:  2.0-3.0   TTR:  88.2 % (11.9 mo)   INR used for dosin.50 (10/22/2020)   Warfarin maintenance plan:  3 mg (3 mg x 1) every Mon, Wed, Fri; 1.5 mg (3 mg x 0.5) all other days   Full warfarin instructions:  3 mg every Mon, Wed, Fri; 1.5 mg all other days   Weekly warfarin total:  15 mg   No change documented:  Naa Castrejon RN   Plan last modified:  Modesta Christensen RN (2019)   Next INR check:  2020   Priority:  Maintenance   Target end date:      Indications    Atrial fibrillation (H) [I48.91]  Long term current use of anticoagulants with INR goal of 2.0-3.0 [Z79.01]             Anticoagulation Episode Summary     INR check  location:  Anticoagulation Clinic    Preferred lab:      Send INR reminders to:  [facility]    Comments:  3 mg tabs - amiodarone started 11/2019 (during hospitalization) Patient no longer has home care. Transfer [facility] to [place]      Anticoagulation Care Providers     Provider Role Specialty Phone number    Venu Maria PA-C Responsible Physician Assistant - Medical [phone]

## 2020-10-22 NOTE — PROGRESS NOTES
Subjective     Maryalice Rebecca Wachter is a 86 year old female who presents to clinic today for the following health issues:    HPI         EVAL/ASSESSMENT O2:     Sindi is here for an oxygen assessment.  She has a diagnosis of COPD and is managed for this by [facility].  Lately has been getting worse particularly at night.  Also when exerting herself around the home for simple chores.    She was hospitalized in Nov 2019 which is when she became dependent on oxygen 24 hours per day.  When getting up at night to go to the bathroom o2 drops to 84-85%  Doing chores around the house she needs to have the o2 on these days for all activities.    Pulmonologist recently told her that she should probably be using the o2 at night and when napping.    Blood pressure- was elevated when she got here.  She gets swelling in her LE's still.  She will take her Lasix rx when she gets to 177lb. This is about once per month.  She monitors her weight daily.      Review of Systems   Constitutional, HEENT, cardiovascular, pulmonary, gi and gu systems are negative, except as otherwise noted.      Objective    BP (!) 166/86 (BP Location: Right arm, Patient Position: Sitting, Cuff Size: Adult Regular)   Pulse 102   Temp 98.7  F (37.1  C) (Oral)   Resp 28   Wt 81.7 kg (180 lb 3.2 oz)   SpO2 95%   BMI 29.99 kg/m    Body mass index is 29.99 kg/m .  Physical Exam   GENERAL: healthy, alert and no distress  RESP: no rales , no wheezes   CV: regular rates and rhythm, normal S1 S2, no S3 or S4, no murmur, click or rub and + right >> left lower extremity pitting edema to mid shin    SKIN: no suspicious lesions or rashes  PSYCH: mentation appears normal, affect normal/bright    02 resting= 94% w/o  02 walking= 87% w/o  02 walking= 96% w 2L 02          Assessment & Plan     Panlobular emphysema (H)  Face to face visit today for 02 assessment.  Patient is dependent on her 02 24 hours per day.  This is based on 02 testing today and per her  Pulmonologist.    Will also discuss the fact that Dr. Nava is recommending against use of Trelegy in this patient due to her hx of a fib.  She is currently tolerating this well but will discuss with MTM.  - COPD ACTION PLAN    Atherosclerosis of native coronary artery of native heart with angina pectoris (H)  Due for lab.  - Lipid panel reflex to direct LDL Fasting; Future    Essential hypertension  BP came down into normal range.  Discussed again when to use her Lasix rx.            Return for Physical Exam- Wellness.    Venu Maria PA-C  [facility]

## 2020-10-26 NOTE — TELEPHONE ENCOUNTER
Prescription approved per [facility] Refill Protocol.    Paulina Kunz RN

## 2020-11-05 NOTE — PROGRESS NOTES
MTM ENCOUNTER  SUBJECTIVE/OBJECTIVE:                           Maryalice Rebecca Wachter is a 86 year old female coming in for a follow-up visit. She was referred to me from Venu Maria  .  Today's visit is a follow-up MTM visit from 9-.      Must bill in outcomes 2020.       Chief Complaint:  A1c, lipids  lab rechecks. She has listened to some you tube - about weight loss pills called Proven -- she wants mtm opinion on it --though she states she doesn't want to lose more weight because she doesn't want to buy new clothes anyway.    She forgot bs meter today .     Still having anxiety attacks - occurs when she leaves her house . Loose stools when anxiety occurs.       Personal Healthcare Goals:  Control DM, copd, stay ambulatory.    Allergies/ADRs: Reviewed in Epic  Tobacco: No tobacco use   Alcohol: not currently using--very rare   Caffeine: decaff in am 1-2 cups/day of coffee; pm -tea a little bit.   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 Cannot exercise due to diarrhea issues --would like to go on walks with her hubby but has to have BM within 1 block of leaving.   PMH: Reviewed in [employer]; [facility] stated she has sarcoidosis?    Medication Adherence: good now --no issues  Despite drug costs for trelegy and januvia.     IBS-diarrhea: patient uses prn loperamide --works well but only using prn 1/2 tab of  Loperamide. She feels she gets constipated if takes whole pill.     GERD:  She has a hiatal hernia -surgery not recommended.      Diabetes:  Pt currently taking: januvia 50mg /day ,  Glipizide er 10mg. bid, off actos due to CHF exacerbation 11-5-19, Metformin-1 x500mg. bid due to ckd and chronic loose stools. Pt is experiencing the following side effects: mild loose stools from metformin --controlled with loperamide prn quite well.     11-9-2020: she forgot bs meter today but self reports :    Am fasting bs's -- 168 today --usually range is 138-=199.          No bs's > 200 since  starting alisonuvia .    7 days = 172  14 days =167  30 days = 162       Date FBG/ 2hours post Lunch/2hours post Dinner /2hours post    9- 157      9-13 182      9-12 159      9-11 169      9-10 176      9-9 163      9-8 196            Symptoms of low blood sugar? none. Frequency of hypoglycemia? never.  Recent symptoms of high blood sugar? fatigue  Eye exam: up to date  Foot exam: up to date  Microalbumin is < 30 mg/g. Pt is NO longer post hospital 11-5-19- taking an ACEi/ARB.  Aspirin: Taking 81mg daily and denies side effects  Diet/Exercise: diet is ok but likes carbs, rice , chinese , no exercise.   Now eating meals on wheels -5 days /week for lunch , she eats 1/2 at lunch and 1/2 at dinnertime.    She feels they eat more pasta then they should have. Get meals on wheels --today will be spaghetti /meatballs. She likes her cereal or waffles for bfast .   No exercise due to copd.        Lab Results   Component Value Date    A1C 8.8 11/09/2020    A1C 9.4 07/29/2020    A1C 8.5 03/09/2020    A1C 7.5 08/12/2019    A1C 7.8 04/17/2019           Depression:  Current medications include: None--she stopped prozac . Pt reports that depression symptoms are non existent. Current depression symptoms include: lacks motivation or energy to do anything/otherwise she is fine.  Patient reports the following stressors: chronic health condition, lack of fun hubby to travel with . Therapies tried and response: Bupropion once daily-did nothing for her , prozac made her too antsy.  Notes cannot vacation due to hubby is a stick in the mud --no fun! He only wants to visit relatives.   She hasnt played Bridge with her friends --struggles with agoraphobia and copd issues.     PHQ 6/25/2018 3/8/2019 7/29/2020   PHQ-9 Total Score 3 6 1   Q9: Thoughts of better off dead/self-harm past 2 weeks Not at all Not at all Not at all         Vitamin D3: level was 21 on  --she takes otc daily dose of 2,000 iu's now.      Vitamin B12: level was  493 on 10-10-16. She takes daily otc dose now.     Hypothyroidism: Patient is taking levothyroxine 100 mcg daily. She knows to take it on an empty stomach with her omeprazole in the morning.    TSH   Date Value Ref Range Status   07/29/2020 2.41 0.40 - 4.00 mU/L Final         Hypertension: Current medications include : only takes lasix 40mg./qod if water weight up and using if at home and not going anywhere out of the house --lasix keeps her busy in the bathroom x 4 hours.   She weighs daily qam --178-180 is her weight range.    metoprolol 50mg bid-am and dinnertime.  Patient does  self-monitor BP.  Yes see below:  Patient side effects :  None now.   Range of home bp's = 115-143 --95% of readings <140/60-70.    Look good . She recognizes when in afib--bp erratic and pulse increases to 90+ .not occurring now.    BP Readings from Last 3 Encounters:   11/09/20 (!) 140/76   10/22/20 138/70   07/29/20 130/60     Iron deficiency anemia: now on daily 325mg feso4 tab post hospital stay .    Hemoglobin   Date Value Ref Range Status   11/08/2019 9.8 (L) 11.7 - 15.7 g/dL Final         COPD: Current medications: Short-Acting Bronchodilator: Albuterol MDI, Nebs and pt reports using 1-2 times per day --usually always before bedtime, rare albuterol nebs --but she gets nervous when she has to leave the house --its her safety space!    ICS/LAMA/LABA- Trelegy Ellipta--I puff daily.   fyi--ezequiel ernandez prefers her not on LABA due to afib condition.  Patient prefers only 1 inhaler --trelegy rather than spiriva + amanex --those 2 were too expensive.    Oxygen-has portable oxygen --uses it daily if moving at 2L/minute --uses it overnight ever night now.   . Pt rinses their mouth after using steroid inhaler.    Pt is not experiencing side effects.   Pt reports the following symptoms: none.  Pt does have an COPD Action Plan on file.   Has spirometry been completed: Yes   Oxygen today 96 %. When sitting today with her oxygen takn on.  "      Insomnia/Anxiety: Current medications include: quetiapine 25mg./night  --works well --she just fiddles with dose depending on how riled up trump gets her.   Also she could take 1/4 to 1/2 tab prn during day if too anxious . She has long standing anxiety issues --triggered by leaving house and worried she cant find a bathroom.     Hyperlipidemia: Current therapy includes : Rosuvastatin 5mg daily.  Patient reports no significant myalgias or other side effects.  The ASCVD Risk score (Zoratabatha HERNANDEZ Jr., et al., 2013) failed to calculate for the following reasons:    The 2013 ASCVD risk score is only valid for ages 40 to 79    The patient has a prior MI or stroke diagnosis  Recent Labs   Lab Test 07/15/19  0948 08/03/18  0913 06/30/15  0952 06/30/15  0952   CHOL 109 105   < > 109   HDL 39* 39*   < > 36*   LDL 34 27   < > 25   TRIG 178* 195*   < > 240*   CHOLHDLRATIO  --   --   --  3.0    < > = values in this interval not displayed.     Non fasting lipids recheck today .         BP Readings from Last 1 Encounters:   11/09/20 (!) 140/76     Pulse Readings from Last 1 Encounters:   11/09/20 88     Wt Readings from Last 1 Encounters:   11/09/20 177 lb 1.6 oz (80.3 kg)     Ht Readings from Last 1 Encounters:   01/16/20 5' 5\" (1.651 m)     Estimated body mass index is 29.47 kg/m  as calculated from the following:    Height as of 1/16/20: 5' 5\" (1.651 m).    Weight as of this encounter: 177 lb 1.6 oz (80.3 kg).    Temp Readings from Last 1 Encounters:   10/22/20 98.7  F (37.1  C) (Oral)           ASSESSMENT:                                                       Current medications were reviewed with her today.     Medication Adherence: Fill med pill boxes weekly, check pill box am and pm to make sure she has taken all her pills as she has missed a few pm doses last month .     IBS-Diarrhea: ok --but can you use whole loperamide pill after each bm --mtm educated her donot need to have a BM every day to be normal .     GERD: " Stable.  Current treatment is effective.     Diabetes: Improved: Patient is NOT meeting A1c goal of </= 8.5%. Self monitoring of blood glucose is at goal of fasting  mg/dL. Pt would benefit from minimum SMBG: Check blood sugars fasting, and occasionally 2 hours after starting a meal.  Metformin :  stay on the same dose.  SFU (Glipizide) :  stay on the same dose.  DPP4- (Januvia ): 50mg. Daily.   No actos due to chf exacerbation  .   Increased exercise- as tolerated.   Updated Hemoglobin A1c- 8.8%   Weight loss recommended--keep low carb diet as she can tolerate --otherwise dont stress out about blood sugars.  She declines any injectable DM meds at this point , mtm discouraged buying otc - Proven --has stimulant meds in it --not good for her and besides she doesn't want to lose weight -then she would have to buy new clothes.  Encouraged her to try some eggs for bfast and not rely on toast , muffins, cereal , waffles every day .     Depression: stable    Vitamin B12: is not at goal of 600-1000pg/mL. Pt would benefit from vitamin B12 lab recheck in 12 months.    Vitamin D3: is not at goal of 50-75ng/mL. Pt would benefit from vitamin D3 lab recheck in 12 months.    Hypothyroidism: TSH wnl.    Hypertension: Stable. Patient is meeting BP goal of < 140/90mmHg.     Iron deficiency anemia:  Recheck HGb in 2020.   Hemoglobin   Date Value Ref Range Status   11/08/2019 9.8 (L) 11.7 - 15.7 g/dL Final   ]      COPD: Improved:  doing well with daily trelegy--Dr. coreas prefers her to be back on spiriva and asmanex but she states those 2 inhalers are too costly and like the trelegy so we will keep her on it as is for now.   Use portable oxygen as is.       Insomnia/Anxiety: Insomnia stable , anxiety --when it occurs she can fiddle with low dose quetiapine during day hours if she wants--see plan for details.       PLAN:                                                        1. FYI--A1c today = 8.8% --stay on current  diabetes medications as is, avoid proven pills for weight loss, if you desire better blood sugars --reduce carbs or snacks in the diet , move a little more if you can . Stop eating when you feel full.   I think your doing just fine !    2. FYI-- keep working on the anxious feeling you have when leaving home.  Ok to take 1 whole pill of Loperamide after each bowel movement if needed. Ok to only have a bm every 2-3 days unless its painful. Also ok to take 1/4 to 1/2 tab of your Quetiapine during the day if you get too anxious.         It was great to speak with you today.  I value your experience and would be very thankful for your time with providing feedback on our clinic survey. You may receive a survey via email or text message in the next few days.     Next MTM visit:  See Coreen Oh -Monday -May 10th -2021 at 10;00am for A1c lab recheck , please bring blood sugar meter to the visit.     It was great to speak with you today.  I value your experience and would be very thankful for your time with providing good feedback on clinic surveys.  Kindly let me know personally if your experience today was not exceptional so that we can continue to improve your care delivery experience.  I spent 40 minutes with this patient today . Patient was given an  AVS  today .       To schedule another MTM appointment, please call the clinic directly or you may call the MTM scheduling line at [phone] or toll-free at [phone].     My Clinical Pharmacist's contact information:                                                      It was a pleasure seeing you today!  Please feel free to contact me with any questions or concerns you have.      Janey Romero Rph.  Medication Therapy Management Provider  [phone]

## 2020-11-09 NOTE — Clinical Note
Raphael--a1c close to her age now --and <9% --she declines any injectables , cant give higher doses of current meds due to other health conditions--encouraged her to change bfast carbs a few days of week, delbert t exercise due to SOB copd issues.  Also--depite dr. Nava not willing to refill her trelegy --patient notes ease of use , cost and feels afib well controleld she doesn't want to go back to asmanex and spiriva --too$$ . I told her we woulkd refill her trelegy for her.     Lastly --I discouraged from buying otc stimulant herbal product proven .     She will f/up with rodney as I will transferring to new other f[facility] clinic now.    Thx.-let me know if any questions?    -hannah

## 2020-11-09 NOTE — PATIENT INSTRUCTIONS
Recommendations from today's MTM visit:                                                      1. FYI--A1c today = 8.8% --stay on current diabetes medications as is, avoid proven pills for weight loss, if you desire better blood sugars --reduce carbs or snacks in the diet , move a little more if you can . Stop eating when you feel full.   I think your doing just fine !    2. FYI-- keep working on the anxious feeling you have when leaving home.  Ok to take 1 whole pill of Loperamide after each bowel movement if needed. Ok to only have a bm every 2-3 days unless its painful. Also ok to take 1/4 to 1/2 tab of your Quetiapine during the day if you get too anxious.         It was great to speak with you today.  I value your experience and would be very thankful for your time with providing feedback on our clinic survey. You may receive a survey via email or text message in the next few days.     Next MTM visit:  See Coreen hO -Monday -May 10th -2021 at 10;00am for A1c lab recheck , please bring blood sugar meter to the visit.     To schedule another MTM appointment, please call the clinic directly or you may call the MTM scheduling line at [phone] or toll-free at [phone].     My Clinical Pharmacist's contact information:                                                      It was a pleasure talking with you today!  Please feel free to contact me with any questions or concerns you have.      Janey Romero Rph.  Medication Therapy Management Provider  [phone]

## 2020-11-19 NOTE — PROGRESS NOTES
ANTICOAGULATION MANAGEMENT     Patient Name:  Maryalice Rebecca Wachter  Date:  11/19/2020    ASSESSMENT /SUBJECTIVE:    Today's INR result of 3.00 is therapeutic. Goal INR of 2.0-3.0      Warfarin dose taken: Warfarin taken as instructed    Diet: No new diet changes affecting INR    Medication changes/ interactions: No new medications/supplements affecting INR    Previous INR: Therapeutic     S/S of bleeding or thromboembolism: No    New injury or illness: No    Upcoming surgery, procedure or cardioversion: No    Additional findings: None      PLAN:    Telephone call with Sindi regarding INR result and instructed:     Warfarin Dosing Instructions: Continue your current warfarin dose 3mg every Mon, Wed, Fri; 1.5mg all other days    Instructed patient to follow up no later than: 4 weeks  Lab visit scheduled    Education provided: Target INR goal and significance of current INR result and Contact the anticoagulation clinic with any changes, questions or concerns at #[phone]       Sindi verbalizes understanding and agrees to warfarin dosing plan.    Instructed to call the Anticoagulation Clinic for any changes, questions or concerns. (#[phone])        KAROLINE BRICE RN      OBJECTIVE:  Recent labs: (last 7 days)     11/19/20  1141   INR 3.00*         No question data found.  Anticoagulation Summary  As of 11/19/2020    INR goal:  2.0-3.0   TTR:  89.6 % (1 y)   INR used for dosing:  3.00 (11/19/2020)   Warfarin maintenance plan:  3 mg (3 mg x 1) every Mon, Wed, Fri; 1.5 mg (3 mg x 0.5) all other days   Full warfarin instructions:  3 mg every Mon, Wed, Fri; 1.5 mg all other days   Weekly warfarin total:  15 mg   No change documented:  Karoline Brice RN   Plan last modified:  Modesta Christensen, RN (11/25/2019)   Next INR check:  12/17/2020   Priority:  Maintenance   Target end date:      Indications    Atrial fibrillation (H) [I48.91]  Long term current use of anticoagulants with INR goal of 2.0-3.0  [Z79.01]             Anticoagulation Episode Summary     INR check location:  Anticoagulation Clinic    Preferred lab:      Send INR reminders to:  [facility]    Comments:  3 mg tabs - amiodarone started 11/2019 (during hospitalization) Patient no longer has home care. [facility]      Anticoagulation Care Providers     Provider Role Specialty Phone number    Venu Maria PA-C [facility] Medicine [phone]

## 2020-11-20 PROBLEM — I48.0 PAROXYSMAL ATRIAL FIBRILLATION (H): Status: ACTIVE | Noted: 2020-01-01

## 2020-11-20 NOTE — PROGRESS NOTES
ANTICOAGULATION MANAGEMENT      Maryalice Rebecca Wachter due for annual renewal of referral to anticoagulation monitoring. Order pended for your review and signature.      ANTICOAGULATION SUMMARY      Warfarin indication(s)     Atrial fibrillation    Heart valve present?  NO       Current goal range   INR: 2.0-3.0     Goal appropriate for indication? Yes, INR 2-3 appropriate for hx of DVT, PE, hypercoagulable state, Afib, LVAD, or bileaflet AVR without risk factors     Current duration of therapy Indefinite/long term therapy   Time in Therapeutic Range (TTR)  (Goal > 60%) 89.6 %       Office visit with referring provider's group within last year yes on 7/29/20       TIA BRICE RN

## 2020-12-03 NOTE — TELEPHONE ENCOUNTER
Last visit 1/16/20 w/ Dr. Arango for resolved stress CM and PAF. EF 50-55% on Echo 12/2019. Pt was euvolemic, no cardiac symptoms. No symptomatic recurrence of Afib. Amiodarone stopped d/t underlying lung disease; also stopped spironolactone. Increased metoprolol to 50 mg BID, decreased furosemide to every other day. Recommended repeat Echo and follow-up in 6 months.      Echo today showed EF 30-35%, /78, HR 63 bpm, sinus rhythm.      Follow-up scheduled 12/10/20 w/ Coreen Cortez PAC - however routing to Dr. Arango to review since significant change in EF and elevated BP.       ----- Message from Chino Arango MD sent at 12/3/2020  2:57 PM CST -----  She should check her blood pressure at home regularly if she has a home monitor.  Otherwise follow-up as planned on December 10.   Michael      Called pt, reviewed echo and Dr. Arango' recommendations. She does not check her BP at home but she will try to do this, 2 hrs after morning meds, sitting for 5 mins prior to taking BP. Pt says her BP was high because she was worn out and out of breath from coming into the visit and getting on the exam table. Had to put her oxygen on because it was very hard for her to breath with a mask on. Pt will take her BP daily and bring to her visit 12/10/20.   Zohra RN, BSN  12/03/20 3:26 PM

## 2020-12-10 NOTE — PATIENT INSTRUCTIONS
Thank you for your [facility] visit today. Your provider has recommended the following:  Medication Changes:  START lisinopril 10mg daily  Continue your other meds as is  Recommendations:  Lab work (non-fasting) in about a week  Zio Patch monitor  Follow-up:  See Coreen or Dr Arango for cardiology follow up in ~1 month.    Reminder:  Please bring in your current medication list or your medication, over the counter supplements and vitamin bottles as we will review these at each office visit.

## 2020-12-10 NOTE — PROGRESS NOTES
Service Date: 12/10/2020      REASON FOR VISIT:  Ms. Wachter is a pleasant 87-year-old female who presents to the cardiology office today for a routine followup.      HISTORY OF PRESENT ILLNESS:  Again, her cardiovascular history includes paroxysmal atrial fibrillation which appears to have been first diagnosed around 2012.  Her EF was mildly reduced at that time.  She underwent stress testing, which suggested a possible prior infarction; however, followup stress testing as well as followup echocardiography did not show similar findings.  Her echocardiogram in 07/2018 showed preserved LV systolic function.  In 11/2019, she developed sudden onset of dyspnea.  She was hospitalized at [facility] and echocardiography showed an LVEF of 25% with severe global hypokinesis of the mid and distal segment and preserved contractility of the base.  Her troponin was very minimally elevated at that time.  She did require BiPAP and pressors, but fortunately quickly improved.  During that stay, her predominant rhythm was sinus, but she did have frequent paroxysms of atrial tachycardia versus atrial flutter.  She was started on amiodarone, metoprolol, furosemide and spironolactone.  A repeat echocardiogram before discharge showed an improvement in her EF to the 40% range.  The prior wall motion abnormalities had improved, but not completely normalized.  She had a Lexiscan stress test which did not show any evidence of ischemia or infarction.  There was a fixed distal anterior wall defect which was felt to be consistent with attenuation artifact.  It was felt overall that her presenting picture was most consistent with a stress cardiomyopathy.      She had a followup echocardiogram in 01/2020.  Her EF was 50%-55%.  She had mild basal to mid anteroseptal wall hypokinesis.  She saw Dr. Arango shortly after that and she was doing quite well.  He discontinued her spironolactone and amiodarone.  Her furosemide was decreased to every  other day with instructions to wean off and take as needed.  Her metoprolol was increased slightly.  It was recommended that she have a followup and a repeat echocardiogram in approximately 6 months.  Due to the current pandemic, this was delayed.      Her repeat echocardiogram which was performed earlier this month shows reduced LV systolic function with an EF of 30%-35%.  There was moderate global hypokinesia of the left ventricle.  The LV was normal in size.  It was noted that the anterolateral wall was not completely seen and that no contrast was used.  She has mild to moderate mitral regurgitation.  RVSP was 17 mmHg plus right atrial pressure.  It was felt that her LV filling pressures were elevated.      The patient states that overall she has been doing okay.  She does have a history of COPD with intermittent oxygen use.  She does not feel that she has had any change in terms of dyspnea on exertion, no orthopnea or PND.  She has intermittent lower extremity edema, but overall this has been well controlled.  She does take furosemide occasionally, but nothing on a regular basis.  She has not noted any palpitations, lightheadedness, presyncope or syncope.  She has not had any chest discomfort.        It was noted that her blood pressure was elevated on the day of her echocardiogram.  She was instructed to follow her blood pressure at home and bring a list in to the office today.  Typically, her blood pressure at home has been in the 130s-150s with occasional readings into the 160s-170s/70s-80s.      CURRENT CARDIAC MEDICATIONS:   1.  Aspirin 81 mg daily.   2.  Furosemide 40 mg p.r.n.   3.  Metoprolol tartrate 50 mg b.i.d.   4.  Crestor 5 mg daily.   5.  Coumadin as directed.      The remainder of her medications, allergies and review of systems were reviewed and as are documented separately.      PHYSICAL EXAMINATION:   GENERAL:  The patient is an 87-year-old female who appears her stated age.  She is in no  apparent distress.   VITAL SIGNS:  Blood pressure is 144/83, pulse 92, weight is 176 pounds, which is stable.   PULMONARY:  Breathing is nonlabored.  Lungs are clear to auscultation.   CARDIAC:  Reveals a regular rate and rhythm.  She is borderline tachycardic.  I did not appreciate any murmur.   EXTREMITIES:  Lower extremities show trace edema bilaterally.   NEUROLOGIC:  Alert and oriented.      I did discuss her case with Dr. Arango.        IMPRESSION AND PLAN:  Ms. Wachter is a pleasant 87-year-old female with a history of paroxysmal atrial fibrillation for which she is on anticoagulation with Coumadin and recurrent cardiomyopathy.  I do not believe she has undergone angiography previously, but she has had stress testing which has not suggested any significant ischemia.  Since her EF is reduced again, I discussed potential for further workup with Sindi as well as her daughter, Celia, who accompanied us on the phone.  I offered options including a stress cardiac MRI versus invasive coronary angiography versus a more conservative approach with medical therapy and a Zio Patch monitor to assess for significant arrhythmia/tachycardia.  Both patient and daughter were in agreement that they would like to start with a more conservative approach with medical management and rhythm monitoring.     For the time being, I have asked Sindi to start lisinopril 10 mg daily for both her blood pressure and underlying cardiomyopathy.  She has been on lisinopril in the past and has not had difficulty with this.  We will get a repeat basic metabolic panel in about a week to ensure that her renal function and electrolytes stay stable.  She will continue the remainder of her current cardiac medications regimen unchanged for the time being.  I have asked her to have a 2-week Zio Patch monitor in the next couple of weeks.      She will follow up in the Cardiology office in approximately 1 month with myself or Dr. Arango.  Of  course, I encouraged her to contact us sooner with questions or concerns.         MARTHA ROLON PA-C             D: 12/10/2020   T: 12/10/2020   MT: NINOSKA      Name:     WACHTER, MARYALICE   MRN:      [id number]        Account:      [id number]   :      1933           Service Date: 12/10/2020      Document: [id number]

## 2020-12-10 NOTE — LETTER
12/10/2020    Venu Maria PA-C  [id number] [place] [address]    RE: Maryalice Rebecca Wachter       Dear Colleague,    I had the pleasure of seeing Maryalice Rebecca Wachter in the [facility].    Please see separate dictation for HPI, PHYSICAL EXAM AND IMPRESSION/PLAN.    CURRENT MEDICATIONS:  Current Outpatient Medications   Medication Sig Dispense Refill     albuterol (ALBUTEROL) 108 (90 BASE) MCG/ACT inhaler Inhale 2 puffs into the lungs every 6 hours as needed Reported on 3/2/2017       albuterol (PROVENTIL) (2.5 MG/3ML) 0.083% neb solution INHALE CONTENTS OF 1 VIAL (3 ML) VIA NEBULIZER EVERY 6 HOURS AS NEEDED FOR SHORTNESS OF BREATH / DYSPNEA OR WHEEZING 180 mL 1     ascorbic acid (VITAMIN C) 500 MG tablet Take 500 mg by mouth daily.       ASPIRIN EC PO Take 81 mg by mouth daily        blood glucose monitoring (ONE TOUCH ULTRA 2) meter device kit Use to test blood sugar 1 time daily or as directed.  Ok to substitute alternative if insurance prefers. 1 kit 0     blood glucose monitoring (ONE TOUCH ULTRASOFT) lancets USE TO TEST BLOOD SUGAR 2 TO 3 TIMES A DAY. 100 each 3     calcium carb 1250 mg, 500 mg [place],/vitamin D 200 units (OSCAL WITH D) 500-200 MG-UNIT per tablet Take 1 tablet by mouth daily.       ferrous sulfate 325 (65 FE) MG tablet Take 1 tablet by mouth daily (with breakfast).       Fluticasone-Umeclidin-Vilanterol (TRELEGY ELLIPTA) 100-62.5-25 MCG/INH oral inhaler USE 1 INHALATION DAILY 1 Inhaler 11     furosemide (LASIX) 40 MG tablet Take 1 tablet (40 mg) by mouth daily (Patient taking differently: Take 40 mg by mouth as needed ) 90 tablet 0     glipiZIDE (GLUCOTROL XL) 10 MG 24 hr tablet Take 1 tablet (10 mg) by mouth 2 times daily 180 tablet 1     levothyroxine (SYNTHROID/LEVOTHROID) 75 MCG tablet TAKE 1 TABLET DAILY 90 tablet 3     LOPERAMIDE HCL PO Take 2-4 mg by mouth daily as needed        metFORMIN (GLUCOPHAGE) 500 MG tablet TAKE 1 TABLET  TWICE A DAY WITH MEALS 180 tablet 3     metoprolol tartrate (LOPRESSOR) 50 MG tablet Take 1 tablet (50 mg) by mouth 2 times daily 180 tablet 3     ONETOUCH ULTRA test strip USE TO TEST BLOOD SUGAR TWICE A DAY OR AS DIRECTED 100 strip 3     polycarbophil (FIBERCON) 625 MG tablet Take 1 tablet by mouth 2 times daily.       QUEtiapine (SEROQUEL) 25 MG tablet TAKE ONE-HALF TABLET BY MOUTH AT BEDTIME TO SLEEP . OK TO INCREASE BY 1/2 TABLET EVERY 3 NIGHTS UP TO 2 TABLETS DAILY IF NEEDED (Patient taking differently: 25 mg TAKE ONE-HALF TABLET BY MOUTH AT BEDTIME TO SLEEP . OK TO INCREASE BY 1/2 TABLET EVERY 3 NIGHTS UP TO 2 TABLETS DAILY IF NEEDED  12/10/20 pt is .taking a full tablet) 60 tablet 2     rosuvastatin (CRESTOR) 5 MG tablet Take 1 tablet (5 mg) by mouth daily 90 tablet 1     sitagliptin (JANUVIA) 50 MG tablet Take 1 tablet (50 mg) by mouth daily 30 tablet 5     VITAMIN D, CHOLECALCIFEROL, PO Take 2,000 Units by mouth daily       warfarin ANTICOAGULANT (COUMADIN) 3 MG tablet TAKE ONE TABLET ON MONDAY, WEDNESDAY, FRIDAY AND ONE-HALF TABLET (1.5 MG) ALL OTHER DAYS OR AS DIRECTED BY ANTICOAGULATION CLINIC 75 tablet 1       ALLERGIES:     Allergies   Allergen Reactions     Penicillins Hives     Amlodipine Other (See Comments)     Too much ankle edema and red itchy spots.      Furosemide Other (See Comments)     Fainted with first dose. 11/8 pt states she took lasix years ago and fainted sometime after, has had since with no reaction       PAST MEDICAL HISTORY:  Past Medical History:   Diagnosis Date     Atrial fibrillation (H)     history of atrial fibrillation     CAD (coronary artery disease)      Congestive heart failure (CHF) (H)      COPD (chronic obstructive pulmonary disease) (H)      Diabetes mellitus (H)     Oral medication treated.     Hiatal hernia     Documented quite large by CT Fall 2011.     Hypercholesterolemia     Per mention in notes, but no recent chol panel noted.  She does take simvastatin.      Hypertension      Hypothyroidism      Postmenopausal bleeding 8/23/2012     Sarcoid        PAST SURGICAL HISTORY:  Past Surgical History:   Procedure Laterality Date     ANKLE SURGERY       DILATION AND CURETTAGE, HYSTEROSCOPY DIAGNOSTIC, COMBINED  8/23/2012    Procedure: COMBINED DILATION AND CURETTAGE, HYSTEROSCOPY DIAGNOSTIC;   DILATION AND CURETTAGE, Operative  HYSTEROSCOPY ;  Surgeon: Justa Francois MD;  Location: [facility] OR     ENT SURGERY       ESOPHAGOSCOPY, GASTROSCOPY, DUODENOSCOPY (EGD), COMBINED N/A 4/13/2017    Procedure: COMBINED ESOPHAGOSCOPY, GASTROSCOPY, DUODENOSCOPY (EGD);  Surgeon: Johnny Stratton MD;  Location:  GI     ESOPHAGOSCOPY, GASTROSCOPY, DUODENOSCOPY (EGD), COMBINED N/A 4/13/2017    Procedure: COMBINED ESOPHAGOSCOPY, GASTROSCOPY, DUODENOSCOPY (EGD), BIOPSY SINGLE OR MULTIPLE;  Surgeon: Johnny Stratton MD;  Location:  GI       SOCIAL HISTORY:  Social History     Socioeconomic History     Marital status:      Spouse name: None     Number of children: None     Years of education: None     Highest education level: None   Occupational History     Occupation: retired   Social Needs     Financial resource strain: None     Food insecurity     Worry: None     Inability: None     Transportation needs     Medical: None     Non-medical: None   Tobacco Use     Smoking status: Never Smoker     Smokeless tobacco: Never Used   Substance and Sexual Activity     Alcohol use: Yes     Comment: Extremely rare use in small amounts.     Drug use: No     Sexual activity: Not Currently     Partners: Male   Lifestyle     Physical activity     Days per week: None     Minutes per session: None     Stress: None   Relationships     Social connections     Talks on phone: None     Gets together: None     Attends Voodoo service: None     Active member of club or organization: None     Attends meetings of clubs or organizations: None     Relationship status: None     Intimate partner violence     Fear  of current or ex partner: None     Emotionally abused: None     Physically abused: None     Forced sexual activity: None   Other Topics Concern     Parent/sibling w/ CABG, MI or angioplasty before 65F 55M? No   Social History Narrative    , lives with her .  Had 5 children, one is .  2 children live locally, one lives in [place], one lives in [place].  Volunteers actively including [employer].       FAMILY HISTORY:  Family History   Problem Relation Age of Onset     Asthma Mother 42        smoker  age 42 of compications of asthma     Diabetes Father          in 70's     Other - See Comments Other         Denies cardiac.       Review of Systems:  Skin:  Negative       Eyes:  Positive for glasses gets shots in right eye for a leaky valve   ENT:  Positive for postnasal drainage    Respiratory:  Positive for shortness of breath Uses O2 at night at 2L, if she uses it during the day would ues it at 2L, she does not feel she needs it all the time.    Cardiovascular:    palpitations;Positive for;edema when she goes to bed she notices palpitations more,  Gastroenterology: Negative      Genitourinary:  Positive for urinary frequency;retention due to meds   Musculoskeletal:  Positive for joint pain right shoulder is bad  Neurologic:  Positive for headaches feels pressure in temples sometimes  Psychiatric:  Positive for anxiety    Heme/Lymph/Imm:  Negative      Endocrine:  Positive for thyroid disorder;diabetes       Reviewed. Remainder of the note dictated.    Coreen Cortez PA-C    Service Date: 12/10/2020      REASON FOR VISIT:  Ms. Wachter is a pleasant 87-year-old female who presents to the cardiology office today for a routine followup.      HISTORY OF PRESENT ILLNESS:  Again, her cardiovascular history includes paroxysmal atrial fibrillation which appears to have been first diagnosed around .  Her EF was mildly reduced at that time.  She underwent stress testing, which  suggested a possible prior infarction; however, followup stress testing as well as followup echocardiography did not show similar findings.  Her echocardiogram in 07/2018 showed preserved LV systolic function.  In 11/2019, she developed sudden onset of dyspnea.  She was hospitalized at [facility] and echocardiography showed an LVEF of 25% with severe global hypokinesis of the mid and distal segment and preserved contractility of the base.  Her troponin was very minimally elevated at that time.  She did require BiPAP and pressors, but fortunately quickly improved.  During that stay, her predominant rhythm was sinus, but she did have frequent paroxysms of atrial tachycardia versus atrial flutter.  She was started on amiodarone, metoprolol, furosemide and spironolactone.  A repeat echocardiogram before discharge showed an improvement in her EF to the 40% range.  The prior wall motion abnormalities had improved, but not completely normalized.  She had a Lexiscan stress test which did not show any evidence of ischemia or infarction.  There was a fixed distal anterior wall defect which was felt to be consistent with attenuation artifact.  It was felt overall that her presenting picture was most consistent with a stress cardiomyopathy.      She had a followup echocardiogram in 01/2020.  Her EF was 50%-55%.  She had mild basal to mid anteroseptal wall hypokinesis.  She saw Dr. Arango shortly after that and she was doing quite well.  He discontinued her spironolactone and amiodarone.  Her furosemide was decreased to every other day with instructions to wean off and take as needed.  Her metoprolol was increased slightly.  It was recommended that she have a followup and a repeat echocardiogram in approximately 6 months.  Due to the current pandemic, this was delayed.      Her repeat echocardiogram which was performed earlier this month shows reduced LV systolic function with an EF of 30%-35%.  There was moderate global  hypokinesia of the left ventricle.  The LV was normal in size.  It was noted that the anterolateral wall was not completely seen and that no contrast was used.  She has mild to moderate mitral regurgitation.  RVSP was 17 mmHg plus right atrial pressure.  It was felt that her LV filling pressures were elevated.      The patient states that overall she has been doing okay.  She does have a history of COPD with intermittent oxygen use.  She does not feel that she has had any change in terms of dyspnea on exertion, no orthopnea or PND.  She has intermittent lower extremity edema, but overall this has been well controlled.  She does take furosemide occasionally, but nothing on a regular basis.  She has not noted any palpitations, lightheadedness, presyncope or syncope.  She has not had any chest discomfort.        It was noted that her blood pressure was elevated on the day of her echocardiogram.  She was instructed to follow her blood pressure at home and bring a list in to the office today.  Typically, her blood pressure at home has been in the 130s-150s with occasional readings into the 160s-170s/70s-80s.      CURRENT CARDIAC MEDICATIONS:   1.  Aspirin 81 mg daily.   2.  Furosemide 40 mg p.r.n.   3.  Metoprolol tartrate 50 mg b.i.d.   4.  Crestor 5 mg daily.   5.  Coumadin as directed.      The remainder of her medications, allergies and review of systems were reviewed and as are documented separately.      PHYSICAL EXAMINATION:   GENERAL:  The patient is an 87-year-old female who appears her stated age.  She is in no apparent distress.   VITAL SIGNS:  Blood pressure is 144/83, pulse 92, weight is 176 pounds, which is stable.   PULMONARY:  Breathing is nonlabored.  Lungs are clear to auscultation.   CARDIAC:  Reveals a regular rate and rhythm.  She is borderline tachycardic.  I did not appreciate any murmur.   EXTREMITIES:  Lower extremities show trace edema bilaterally.   NEUROLOGIC:  Alert and oriented.      I did  discuss her case with Dr. Arango.        IMPRESSION AND PLAN:  Ms. Wachter is a pleasant 87-year-old female with a history of paroxysmal atrial fibrillation for which she is on anticoagulation with Coumadin and recurrent cardiomyopathy.  I do not believe she has undergone angiography previously, but she has had stress testing which has not suggested any significant ischemia.  Since her EF is reduced again, I discussed potential for further workup with Sindi as well as her daughter, Celia, who accompanied us on the phone.  I offered options including a stress cardiac MRI versus invasive coronary angiography versus a more conservative approach with medical therapy and a Zio Patch monitor to assess for significant arrhythmia/tachycardia.  Both patient and daughter were in agreement that they would like to start with a more conservative approach with medical management and rhythm monitoring.     For the time being, I have asked Sindi to start lisinopril 10 mg daily for both her blood pressure and underlying cardiomyopathy.  She has been on lisinopril in the past and has not had difficulty with this.  We will get a repeat basic metabolic panel in about a week to ensure that her renal function and electrolytes stay stable.  She will continue the remainder of her current cardiac medications regimen unchanged for the time being.  I have asked her to have a 2-week Zio Patch monitor in the next couple of weeks.      She will follow up in the Cardiology office in approximately 1 month with myself or Dr. Arango.  Of course, I encouraged her to contact us sooner with questions or concerns.         MARTHA ROLON PA-C             D: 12/10/2020   T: 12/10/2020   MT: NINOSKA      Name:     WACHTER, MARYALICE   MRN:      [id number]        Account:      [id number]   :      1933           Service Date: 12/10/2020      Document: [id number]          Thank you for allowing me to participate in the care of your  patient.      Sincerely,     Coreen Cortez PA-C     [facility]    cc:   Chino Arango MD  [facility]  [address] [place] [address]

## 2020-12-10 NOTE — LETTER
12/10/2020      Venu Maria PA-C  [id number]  Formerly [facility] [address]      RE: Sindi Beachchter       Dear Colleague,    I had the pleasure of seeing Maryalice Rebecca Wachter in the [facility].    Service Date: 12/10/2020      REASON FOR VISIT:  Ms. Wachter is a pleasant 87-year-old female who presents to the cardiology office today for a routine followup.      HISTORY OF PRESENT ILLNESS:  Again, her cardiovascular history includes paroxysmal atrial fibrillation which appears to have been first diagnosed around 2012.  Her EF was mildly reduced at that time.  She underwent stress testing, which suggested a possible prior infarction; however, followup stress testing as well as followup echocardiography did not show similar findings.  Her echocardiogram in 07/2018 showed preserved LV systolic function.  In 11/2019, she developed sudden onset of dyspnea.  She was hospitalized at [facility] and echocardiography showed an LVEF of 25% with severe global hypokinesis of the mid and distal segment and preserved contractility of the base.  Her troponin was very minimally elevated at that time.  She did require BiPAP and pressors, but fortunately quickly improved.  During that stay, her predominant rhythm was sinus, but she did have frequent paroxysms of atrial tachycardia versus atrial flutter.  She was started on amiodarone, metoprolol, furosemide and spironolactone.  A repeat echocardiogram before discharge showed an improvement in her EF to the 40% range.  The prior wall motion abnormalities had improved, but not completely normalized.  She had a Lexiscan stress test which did not show any evidence of ischemia or infarction.  There was a fixed distal anterior wall defect which was felt to be consistent with attenuation artifact.  It was felt overall that her presenting picture was most consistent with a stress cardiomyopathy.      She had a followup echocardiogram  in 01/2020.  Her EF was 50%-55%.  She had mild basal to mid anteroseptal wall hypokinesis.  She saw Dr. Arango shortly after that and she was doing quite well.  He discontinued her spironolactone and amiodarone.  Her furosemide was decreased to every other day with instructions to wean off and take as needed.  Her metoprolol was increased slightly.  It was recommended that she have a followup and a repeat echocardiogram in approximately 6 months.  Due to the current pandemic, this was delayed.      Her repeat echocardiogram which was performed earlier this month shows reduced LV systolic function with an EF of 30%-35%.  There was moderate global hypokinesia of the left ventricle.  The LV was normal in size.  It was noted that the anterolateral wall was not completely seen and that no contrast was used.  She has mild to moderate mitral regurgitation.  RVSP was 17 mmHg plus right atrial pressure.  It was felt that her LV filling pressures were elevated.      The patient states that overall she has been doing okay.  She does have a history of COPD with intermittent oxygen use.  She does not feel that she has had any change in terms of dyspnea on exertion, no orthopnea or PND.  She has intermittent lower extremity edema, but overall this has been well controlled.  She does take furosemide occasionally, but nothing on a regular basis.  She has not noted any palpitations, lightheadedness, presyncope or syncope.  She has not had any chest discomfort.        It was noted that her blood pressure was elevated on the day of her echocardiogram.  She was instructed to follow her blood pressure at home and bring a list in to the office today.  Typically, her blood pressure at home has been in the 130s-150s with occasional readings into the 160s-170s/70s-80s.      CURRENT CARDIAC MEDICATIONS:   1.  Aspirin 81 mg daily.   2.  Furosemide 40 mg p.r.n.   3.  Metoprolol tartrate 50 mg b.i.d.   4.  Crestor 5 mg daily.   5.  Coumadin  as directed.      The remainder of her medications, allergies and review of systems were reviewed and as are documented separately.      PHYSICAL EXAMINATION:   GENERAL:  The patient is an 87-year-old female who appears her stated age.  She is in no apparent distress.   VITAL SIGNS:  Blood pressure is 144/83, pulse 92, weight is 176 pounds, which is stable.   PULMONARY:  Breathing is nonlabored.  Lungs are clear to auscultation.   CARDIAC:  Reveals a regular rate and rhythm.  She is borderline tachycardic.  I did not appreciate any murmur.   EXTREMITIES:  Lower extremities show trace edema bilaterally.   NEUROLOGIC:  Alert and oriented.      I did discuss her case with Dr. Arango.        IMPRESSION AND PLAN:  Ms. Wachter is a pleasant 87-year-old female with a history of paroxysmal atrial fibrillation for which she is on anticoagulation with Coumadin and recurrent cardiomyopathy.  I do not believe she has undergone angiography previously, but she has had stress testing which has not suggested any significant ischemia.  Since her EF is reduced again, I discussed potential for further workup with Sindi as well as her daughter, Celia, who accompanied us on the phone.  I offered options including a stress cardiac MRI versus invasive coronary angiography versus a more conservative approach with medical therapy and a Zio Patch monitor to assess for significant arrhythmia/tachycardia.  Both patient and daughter were in agreement that they would like to start with a more conservative approach with medical management and rhythm monitoring.     For the time being, I have asked Sindi to start lisinopril 10 mg daily for both her blood pressure and underlying cardiomyopathy.  She has been on lisinopril in the past and has not had difficulty with this.  We will get a repeat basic metabolic panel in about a week to ensure that her renal function and electrolytes stay stable.  She will continue the remainder of her current  cardiac medications regimen unchanged for the time being.  I have asked her to have a 2-week Zio Patch monitor in the next couple of weeks.      She will follow up in the Cardiology office in approximately 1 month with myself or Dr. Arango.  Of course, I encouraged her to contact us sooner with questions or concerns.         MARTHA ROLON PA-C             D: 12/10/2020   T: 12/10/2020   MT:       Name:     WACHTER, MARYALICE   MRN:      [id number]        Account:      [id number]   :      1933           Service Date: 12/10/2020      Document: [id number]            Outpatient Encounter Medications as of 12/10/2020   Medication Sig Dispense Refill     albuterol (ALBUTEROL) 108 (90 BASE) MCG/ACT inhaler Inhale 2 puffs into the lungs every 6 hours as needed Reported on 3/2/2017       albuterol (PROVENTIL) (2.5 MG/3ML) 0.083% neb solution INHALE CONTENTS OF 1 VIAL (3 ML) VIA NEBULIZER EVERY 6 HOURS AS NEEDED FOR SHORTNESS OF BREATH / DYSPNEA OR WHEEZING 180 mL 1     ascorbic acid (VITAMIN C) 500 MG tablet Take 500 mg by mouth daily.       ASPIRIN EC PO Take 81 mg by mouth daily        blood glucose monitoring (ONE TOUCH ULTRA 2) meter device kit Use to test blood sugar 1 time daily or as directed.  Ok to substitute alternative if insurance prefers. 1 kit 0     blood glucose monitoring (ONE TOUCH ULTRASOFT) lancets USE TO TEST BLOOD SUGAR 2 TO 3 TIMES A DAY. 100 each 3     calcium carb 1250 mg, 500 mg [place],/vitamin D 200 units (OSCAL WITH D) 500-200 MG-UNIT per tablet Take 1 tablet by mouth daily.       ferrous sulfate 325 (65 FE) MG tablet Take 1 tablet by mouth daily (with breakfast).       Fluticasone-Umeclidin-Vilanterol (TRELEGY ELLIPTA) 100-62.5-25 MCG/INH oral inhaler USE 1 INHALATION DAILY 1 Inhaler 11     furosemide (LASIX) 40 MG tablet Take 1 tablet (40 mg) by mouth daily (Patient taking differently: Take 40 mg by mouth as needed ) 90 tablet 0     glipiZIDE (GLUCOTROL XL) 10 MG 24 hr  tablet Take 1 tablet (10 mg) by mouth 2 times daily 180 tablet 1     levothyroxine (SYNTHROID/LEVOTHROID) 75 MCG tablet TAKE 1 TABLET DAILY 90 tablet 3     lisinopril (ZESTRIL) 10 MG tablet Take 1 tablet (10 mg) by mouth daily 30 tablet 1     LOPERAMIDE HCL PO Take 2-4 mg by mouth daily as needed        metFORMIN (GLUCOPHAGE) 500 MG tablet TAKE 1 TABLET TWICE A DAY WITH MEALS 180 tablet 3     metoprolol tartrate (LOPRESSOR) 50 MG tablet Take 1 tablet (50 mg) by mouth 2 times daily 180 tablet 3     ONETOUCH ULTRA test strip USE TO TEST BLOOD SUGAR TWICE A DAY OR AS DIRECTED 100 strip 3     polycarbophil (FIBERCON) 625 MG tablet Take 1 tablet by mouth 2 times daily.       QUEtiapine (SEROQUEL) 25 MG tablet TAKE ONE-HALF TABLET BY MOUTH AT BEDTIME TO SLEEP . OK TO INCREASE BY 1/2 TABLET EVERY 3 NIGHTS UP TO 2 TABLETS DAILY IF NEEDED (Patient taking differently: 25 mg TAKE ONE-HALF TABLET BY MOUTH AT BEDTIME TO SLEEP . OK TO INCREASE BY 1/2 TABLET EVERY 3 NIGHTS UP TO 2 TABLETS DAILY IF NEEDED  12/10/20 pt is .taking a full tablet) 60 tablet 2     rosuvastatin (CRESTOR) 5 MG tablet Take 1 tablet (5 mg) by mouth daily 90 tablet 1     sitagliptin (JANUVIA) 50 MG tablet Take 1 tablet (50 mg) by mouth daily 30 tablet 5     VITAMIN D, CHOLECALCIFEROL, PO Take 2,000 Units by mouth daily       warfarin ANTICOAGULANT (COUMADIN) 3 MG tablet TAKE ONE TABLET ON MONDAY, WEDNESDAY, FRIDAY AND ONE-HALF TABLET (1.5 MG) ALL OTHER DAYS OR AS DIRECTED BY ANTICOAGULATION CLINIC 75 tablet 1     No facility-administered encounter medications on file as of 12/10/2020.        Again, thank you for allowing me to participate in the care of your patient.      Sincerely,    Coreen Cortez PA-C     [facility]

## 2020-12-10 NOTE — PROGRESS NOTES
Please see separate dictation for HPI, PHYSICAL EXAM AND IMPRESSION/PLAN.    CURRENT MEDICATIONS:  Current Outpatient Medications   Medication Sig Dispense Refill     albuterol (ALBUTEROL) 108 (90 BASE) MCG/ACT inhaler Inhale 2 puffs into the lungs every 6 hours as needed Reported on 3/2/2017       albuterol (PROVENTIL) (2.5 MG/3ML) 0.083% neb solution INHALE CONTENTS OF 1 VIAL (3 ML) VIA NEBULIZER EVERY 6 HOURS AS NEEDED FOR SHORTNESS OF BREATH / DYSPNEA OR WHEEZING 180 mL 1     ascorbic acid (VITAMIN C) 500 MG tablet Take 500 mg by mouth daily.       ASPIRIN EC PO Take 81 mg by mouth daily        blood glucose monitoring (ONE TOUCH ULTRA 2) meter device kit Use to test blood sugar 1 time daily or as directed.  Ok to substitute alternative if insurance prefers. 1 kit 0     blood glucose monitoring (ONE TOUCH ULTRASOFT) lancets USE TO TEST BLOOD SUGAR 2 TO 3 TIMES A DAY. 100 each 3     calcium carb 1250 mg, 500 mg [place],/vitamin D 200 units (OSCAL WITH D) 500-200 MG-UNIT per tablet Take 1 tablet by mouth daily.       ferrous sulfate 325 (65 FE) MG tablet Take 1 tablet by mouth daily (with breakfast).       Fluticasone-Umeclidin-Vilanterol (TRELEGY ELLIPTA) 100-62.5-25 MCG/INH oral inhaler USE 1 INHALATION DAILY 1 Inhaler 11     furosemide (LASIX) 40 MG tablet Take 1 tablet (40 mg) by mouth daily (Patient taking differently: Take 40 mg by mouth as needed ) 90 tablet 0     glipiZIDE (GLUCOTROL XL) 10 MG 24 hr tablet Take 1 tablet (10 mg) by mouth 2 times daily 180 tablet 1     levothyroxine (SYNTHROID/LEVOTHROID) 75 MCG tablet TAKE 1 TABLET DAILY 90 tablet 3     LOPERAMIDE HCL PO Take 2-4 mg by mouth daily as needed        metFORMIN (GLUCOPHAGE) 500 MG tablet TAKE 1 TABLET TWICE A DAY WITH MEALS 180 tablet 3     metoprolol tartrate (LOPRESSOR) 50 MG tablet Take 1 tablet (50 mg) by mouth 2 times daily 180 tablet 3     ONETOUCH ULTRA test strip USE TO TEST BLOOD SUGAR TWICE A DAY OR AS DIRECTED 100 strip 3      polycarbophil (FIBERCON) 625 MG tablet Take 1 tablet by mouth 2 times daily.       QUEtiapine (SEROQUEL) 25 MG tablet TAKE ONE-HALF TABLET BY MOUTH AT BEDTIME TO SLEEP . OK TO INCREASE BY 1/2 TABLET EVERY 3 NIGHTS UP TO 2 TABLETS DAILY IF NEEDED (Patient taking differently: 25 mg TAKE ONE-HALF TABLET BY MOUTH AT BEDTIME TO SLEEP . OK TO INCREASE BY 1/2 TABLET EVERY 3 NIGHTS UP TO 2 TABLETS DAILY IF NEEDED  12/10/20 pt is .taking a full tablet) 60 tablet 2     rosuvastatin (CRESTOR) 5 MG tablet Take 1 tablet (5 mg) by mouth daily 90 tablet 1     sitagliptin (JANUVIA) 50 MG tablet Take 1 tablet (50 mg) by mouth daily 30 tablet 5     VITAMIN D, CHOLECALCIFEROL, PO Take 2,000 Units by mouth daily       warfarin ANTICOAGULANT (COUMADIN) 3 MG tablet TAKE ONE TABLET ON MONDAY, WEDNESDAY, FRIDAY AND ONE-HALF TABLET (1.5 MG) ALL OTHER DAYS OR AS DIRECTED BY ANTICOAGULATION CLINIC 75 tablet 1       ALLERGIES:     Allergies   Allergen Reactions     Penicillins Hives     Amlodipine Other (See Comments)     Too much ankle edema and red itchy spots.      Furosemide Other (See Comments)     Fainted with first dose. 11/8 pt states she took lasix years ago and fainted sometime after, has had since with no reaction       PAST MEDICAL HISTORY:  Past Medical History:   Diagnosis Date     Atrial fibrillation (H)     history of atrial fibrillation     CAD (coronary artery disease)      Congestive heart failure (CHF) (H)      COPD (chronic obstructive pulmonary disease) (H)      Diabetes mellitus (H)     Oral medication treated.     Hiatal hernia     Documented quite large by CT Fall 2011.     Hypercholesterolemia     Per mention in notes, but no recent chol panel noted.  She does take simvastatin.     Hypertension      Hypothyroidism      Postmenopausal bleeding 8/23/2012     Sarcoid        PAST SURGICAL HISTORY:  Past Surgical History:   Procedure Laterality Date     ANKLE SURGERY       DILATION AND CURETTAGE, HYSTEROSCOPY DIAGNOSTIC,  COMBINED  8/23/2012    Procedure: COMBINED DILATION AND CURETTAGE, HYSTEROSCOPY DIAGNOSTIC;   DILATION AND CURETTAGE, Operative  HYSTEROSCOPY ;  Surgeon: Justa Francois MD;  Location: [facility] OR     ENT SURGERY       ESOPHAGOSCOPY, GASTROSCOPY, DUODENOSCOPY (EGD), COMBINED N/A 4/13/2017    Procedure: COMBINED ESOPHAGOSCOPY, GASTROSCOPY, DUODENOSCOPY (EGD);  Surgeon: Johnny Stratton MD;  Location: [facility]     ESOPHAGOSCOPY, GASTROSCOPY, DUODENOSCOPY (EGD), COMBINED N/A 4/13/2017    Procedure: COMBINED ESOPHAGOSCOPY, GASTROSCOPY, DUODENOSCOPY (EGD), BIOPSY SINGLE OR MULTIPLE;  Surgeon: Johnny Stratton MD;  Location: [facility] GI       SOCIAL HISTORY:  Social History     Socioeconomic History     Marital status:      Spouse name: None     Number of children: None     Years of education: None     Highest education level: None   Occupational History     Occupation: retired   Social Needs     Financial resource strain: None     Food insecurity     Worry: None     Inability: None     Transportation needs     Medical: None     Non-medical: None   Tobacco Use     Smoking status: Never Smoker     Smokeless tobacco: Never Used   Substance and Sexual Activity     Alcohol use: Yes     Comment: Extremely rare use in small amounts.     Drug use: No     Sexual activity: Not Currently     Partners: Male   Lifestyle     Physical activity     Days per week: None     Minutes per session: None     Stress: None   Relationships     Social connections     Talks on phone: None     Gets together: None     Attends [employer] service: None     Active member of club or organization: None     Attends meetings of clubs or organizations: None     Relationship status: None     Intimate partner violence     Fear of current or ex partner: None     Emotionally abused: None     Physically abused: None     Forced sexual activity: None   Other Topics Concern     Parent/sibling w/ CABG, MI or angioplasty before 65F 55M? No   Social History Narrative     , lives with her .  Had 5 children, one is .  2 children live locally, one lives in [place], one lives in [place].  Volunteers actively including [employer].       FAMILY HISTORY:  Family History   Problem Relation Age of Onset     Asthma Mother 42        smoker  age 42 of compications of asthma     Diabetes Father          in 70's     Other - See Comments Other         Denies cardiac.       Review of Systems:  Skin:  Negative       Eyes:  Positive for glasses gets shots in right eye for a leaky valve   ENT:  Positive for postnasal drainage    Respiratory:  Positive for shortness of breath Uses O2 at night at 2L, if she uses it during the day would ues it at 2L, she does not feel she needs it all the time.    Cardiovascular:    palpitations;Positive for;edema when she goes to bed she notices palpitations more,  Gastroenterology: Negative      Genitourinary:  Positive for urinary frequency;retention due to meds   Musculoskeletal:  Positive for joint pain right shoulder is bad  Neurologic:  Positive for headaches feels pressure in temples sometimes  Psychiatric:  Positive for anxiety    Heme/Lymph/Imm:  Negative      Endocrine:  Positive for thyroid disorder;diabetes       Reviewed. Remainder of the note dictated.    Coreen Cortez PA-C

## 2020-12-17 NOTE — PROGRESS NOTES
ANTICOAGULATION FOLLOW-UP CLINIC VISIT    Patient Name:  Maryalice Rebecca Wachter  Date:  2020  Contact Type:  Telephone    SUBJECTIVE:  Patient Findings     Positives:  Change in medications (Started Lisinopril 12/10/20--No DDI with warfarin)        Clinical Outcomes     Negatives:  Major bleeding event, Thromboembolic event, Anticoagulation-related hospital admission, Anticoagulation-related ED visit, Anticoagulation-related fatality           OBJECTIVE    Recent labs: (last 7 days)     20  1004   INR 2.40*       ASSESSMENT / PLAN  INR assessment THER    Recheck INR In: 6 WEEKS    INR Location Clinic      Anticoagulation Summary  As of 2020    INR goal:  2.0-3.0   TTR:  90.4 % (1 y)   INR used for dosin.40 (2020)   Warfarin maintenance plan:  3 mg (3 mg x 1) every Mon, Wed, Fri; 1.5 mg (3 mg x 0.5) all other days   Full warfarin instructions:  3 mg every Mon, Wed, Fri; 1.5 mg all other days   Weekly warfarin total:  15 mg   Plan last modified:  Jacquelyn Hagen RN (2020)   Next INR check:  2021   Priority:  Maintenance   Target end date:  Indefinite    Indications    Atrial fibrillation (H) [I48.91]  Long term current use of anticoagulants with INR goal of 2.0-3.0 [Z79.01]  Paroxysmal atrial fibrillation (H) [I48.0]             Anticoagulation Episode Summary     INR check location:  Anticoagulation Clinic    Preferred lab:      Send INR reminders to:  [facility]    Comments:  3 mg tabs       Anticoagulation Care Providers     Provider Role Specialty Phone number    Tim Elliott PA-C Referring Family Medicine [phone]    Venu Maria PA-C Responsible Family Medicine [phone]            See the Encounter Report to view Anticoagulation Flowsheet and Dosing Calendar (Go to Encounters tab in chart review, and find the Anticoagulation Therapy Visit)        Jacquelyn Hagen RN

## 2020-12-18 NOTE — TELEPHONE ENCOUNTER
glipiZIDE (GLUCOTROL XL) 10 MG 24 hr tablet  Last Written Prescription Date:  7/20/20  Last Fill Quantity: 180,   # refills: 1  Last Office Visit: 10/22/20  Future Office visit:       Routing refill request to provider for review/approval because:  Patient has a recent creatinine (normal) within the past 12 mos.    Jane Torres RN on 12/18/2020 at 4:20 PM

## 2020-12-21 NOTE — TELEPHONE ENCOUNTER
Routing refill request to provider for review/approval because:  Labs out of range:    Labs not current:   Blood pressure under 140/90 in past 12 months        BP Readings from Last 3 Encounters:   12/10/20 (!) 144/83   11/09/20 (!) 140/76   10/22/20 138/70               CBC on file in past 12 months        Recent Labs   Lab Test 11/08/19  0817   WBC 7.2   RBC 3.19*   HGB 9.8*   HCT 31.4*              Shira Graham RN Flex

## 2021-01-01 ENCOUNTER — VIRTUAL VISIT (OUTPATIENT)
Dept: CARDIOLOGY | Facility: CLINIC | Age: 86
End: 2021-01-01
Attending: PHYSICIAN ASSISTANT
Payer: COMMERCIAL

## 2021-01-01 ENCOUNTER — HEALTH MAINTENANCE LETTER (OUTPATIENT)
Age: 86
End: 2021-01-01

## 2021-01-01 DIAGNOSIS — Z79.899 ON AMIODARONE THERAPY: ICD-10-CM

## 2021-01-01 DIAGNOSIS — I48.0 PAROXYSMAL ATRIAL FIBRILLATION (H): ICD-10-CM

## 2021-01-01 DIAGNOSIS — I42.8 OTHER CARDIOMYOPATHY (H): ICD-10-CM

## 2021-01-01 DIAGNOSIS — I42.9 CARDIOMYOPATHY, UNSPECIFIED TYPE (H): ICD-10-CM

## 2021-01-01 DIAGNOSIS — I47.10 PAROXYSMAL SUPRAVENTRICULAR TACHYCARDIA (H): Primary | ICD-10-CM

## 2021-01-01 DIAGNOSIS — I50.22 CHRONIC SYSTOLIC HEART FAILURE (H): ICD-10-CM

## 2021-01-01 PROCEDURE — 99215 OFFICE O/P EST HI 40 MIN: CPT | Mod: 95 | Performed by: PHYSICIAN ASSISTANT

## 2021-01-01 PROCEDURE — G2212 PROLONG OUTPT/OFFICE VIS: HCPCS | Mod: 95 | Performed by: PHYSICIAN ASSISTANT

## 2021-01-01 RX ORDER — AMIODARONE HYDROCHLORIDE 200 MG/1
200 TABLET ORAL 2 TIMES DAILY
Qty: 60 TABLET | Refills: 0 | Status: SHIPPED | OUTPATIENT
Start: 2021-01-01

## 2021-01-01 RX ORDER — LISINOPRIL 10 MG/1
10 TABLET ORAL DAILY
Qty: 90 TABLET | Refills: 0 | Status: SHIPPED | OUTPATIENT
Start: 2021-01-01

## 2021-01-01 RX ORDER — AMIODARONE HYDROCHLORIDE 200 MG/1
200 TABLET ORAL DAILY
Qty: 90 TABLET | Refills: 3 | Status: SHIPPED | OUTPATIENT
Start: 2021-02-16

## 2021-01-18 NOTE — PROGRESS NOTES
DOS: 1/18/2021    Maryalice Rebecca Wachter is a 87 year old female who is being evaluated via a billable telephone visit.      How would you like to obtain your AVS? Mail a copy  If the video visit is dropped, the invitation should be resent by: Text to cell phone: [phone]  Will anyone else be joining your video visit? No    Review Of Systems  Skin: negative  Eyes: negative  Ears/Nose/Throat: hard of hearing  Respiratory: Dyspnea on exertion-Stable  Cardiovascular: negative  Gastrointestinal: negative  Genitourinary: negative  Musculoskeletal: negative  Neurologic: negative  Psychiatric: negative  Hematologic/Lymphatic/Immunologic: negative  Endocrine: thyroid disorder and diabetes    Jazmin Frank CMA 1/18/2021      Video-Visit Details    Type of service:  Video Visit--changed to a telephone visit due to technical difficulties    Start Time: 10:03 AM  End Time: 10:30 AM    Originating Location (pt. Location): Home    Distant Location (provider location):  [facility]     Platform used for Video Visit: [employer]   Maryalice Rebecca Wachter is a pleasant 87 year old female who is being evaluated via a billable telephone visit in order to minimize the possibility of exposure due to the current COVID-19 pandemic. This is a 1 month follow up.    Her cardiovascular history includes paroxysmal atrial fibrillation which appears to have been first diagnosed around 2012.  Her EF was mildly reduced at that time.  She underwent stress testing, which suggested a possible prior infarction; however, followup stress testing as well as followup echocardiography did not show similar findings.  Her echocardiogram in 07/2018 showed preserved LV systolic function.  In 11/2019, she developed sudden onset of dyspnea.  She was hospitalized at [facility] and echocardiography showed an LVEF of 25% with severe global hypokinesis of the mid and distal segment and preserved  contractility of the base.  Her troponin was very minimally elevated at that time.  She did require BiPAP and pressors, but fortunately quickly improved.  During that stay, her predominant rhythm was sinus, but she did have frequent paroxysms of atrial tachycardia versus atrial flutter.  She was started on amiodarone, metoprolol, furosemide and spironolactone.  A repeat echocardiogram before discharge showed an improvement in her EF to the 40% range.  The prior wall motion abnormalities had improved, but not completely normalized.  She had a Lexiscan stress test which did not show any evidence of ischemia or infarction.  There was a fixed distal anterior wall defect which was felt to be consistent with attenuation artifact.  It was felt overall that her presenting picture was most consistent with a stress cardiomyopathy.      She had a followup echocardiogram in 01/2020.  Her EF was 50%-55%.  She had mild basal to mid anteroseptal wall hypokinesis.  She saw Dr. Arango shortly after that and she was doing quite well.  He discontinued her spironolactone and amiodarone.  Her furosemide was decreased to every other day with instructions to wean off and take as needed.  Her metoprolol was increased slightly.  It was recommended that she have a followup and a repeat echocardiogram in approximately 6 months.  Due to the current pandemic, this was delayed.      Her repeat echocardiogram 12/2020 showed reduced LV systolic function with an EF of 30%-35%.  There was moderate global hypokinesia of the left ventricle.  The LV was normal in size.  It was noted that the anterolateral wall was not completely seen and that no contrast was used.  She has mild to moderate mitral regurgitation.  RVSP was 17 mmHg plus right atrial pressure.  It was felt that her LV filling pressures were elevated.     When I met with the patient last month she was not having any symptoms suggesting a HF exacerbation. She preferred a conservative  approach to her work up and I recommended a 2 week Zio monitor. This recently came back showing SR with ~14,000 runs of SVT, the longest was 37 min. She did have occ darya noted.     Today she denies worsening SHUKLA or LE edema. No CP. Not really aware of tachycardia unless she put her home pulse ox on.     Objective      Physical Exam    None due to telephone visit    DATA:  Reviewed her recent heart monitor as detailed above.    Lab Results   Component Value Date    TSH 2.41 07/29/2020     Lab Results   Component Value Date    AST 55 11/05/2019     Lab Results   Component Value Date    ALT 30 11/05/2019       Lab Results   Component Value Date    BILITOTAL 0.6 11/05/2019     Lab Results   Component Value Date    ALBUMIN 3.4 11/05/2019     Lab Results   Component Value Date    PROTTOTAL 6.5 11/05/2019      Lab Results   Component Value Date    ALKPHOS 72 11/05/2019     Last Comprehensive Metabolic Panel:  Sodium   Date Value Ref Range Status   12/18/2020 138 133 - 144 mmol/L Final     Potassium   Date Value Ref Range Status   12/18/2020 4.1 3.4 - 5.3 mmol/L Final     Chloride   Date Value Ref Range Status   12/18/2020 104 94 - 109 mmol/L Final     Carbon Dioxide   Date Value Ref Range Status   12/18/2020 31 20 - 32 mmol/L Final     Anion Gap   Date Value Ref Range Status   12/18/2020 3 3 - 14 mmol/L Final     Glucose   Date Value Ref Range Status   12/18/2020 180 (H) 70 - 99 mg/dL Final     Urea Nitrogen   Date Value Ref Range Status   12/18/2020 21 7 - 30 mg/dL Final     Creatinine   Date Value Ref Range Status   12/18/2020 1.15 (H) 0.52 - 1.04 mg/dL Final     GFR Estimate   Date Value Ref Range Status   12/18/2020 43 (L) >60 mL/min/[1.73_m2] Final     Comment:     Non  GFR Calc  Starting 12/18/2018, serum creatinine based estimated GFR (eGFR) will be   calculated using the Chronic Kidney Disease Epidemiology Collaboration   (CKD-EPI) equation.       Calcium   Date Value Ref Range Status    12/18/2020 9.2 8.5 - 10.1 mg/dL Final       Assessment/Plan:  Ms. Wachter is a pleasant 87-year-old female with a history of paroxysmal atrial fibrillation/SVT for which she is on anticoagulation with Coumadin and recurrent cardiomyopathy.  I do not believe she has undergone angiography previously, but she has had stress testing which has not suggested any significant ischemia. Her recent Zio Patch monitor shows a large burden of PSVT with episodes lasting up to 37 min.     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 I did review the monitor findings with Dr Arango. At this time I recommend she restart amiodarone. I would like her to take 200mg BID for 30 days, then plan to reduce the dose to 200mg daily. Pt educated amio may affect her INR and to let her AC clinic know when she has her follow up on 1/28.    Follow up 3 day Zio in about 2 months. If her arrhythmia is controlled we can repeat an echo later this year. Will get amiodarone monitoring labs and CXR at that time as well.     For now continue metoprolol, lisinopril and PRN furosemide for her chronic HFrEF.     FUTURE APPOINTMENTS:       - Follow-up visit in 2-3 months with a 3 day ZioPatch prior.     70 minutes spent on the date of the encounter doing chart review, review of test results, patient visit and documentation     An AVS will be provided to the patient.      Thank you for allowing me to participate in the care of this pleasant patient.  Please do not hesitate to contact us with further questions or concerns.     Coreen Cortez PA-C  [facility]

## 2021-01-18 NOTE — LETTER
1/18/2021    Venu Maria PA-C  [address]  [place] [address]    RE: Maryalice Rebecca Wachter       Dear Colleague,    I had the pleasure of seeing Maryalice Rebecca Wachter in the [facility].    DOS: 1/18/2021    Maryalice Rebecca Wachter is a 87 year old female who is being evaluated via a billable telephone visit.      How would you like to obtain your AVS? Mail a copy  If the video visit is dropped, the invitation should be resent by: Text to cell phone: [phone]  Will anyone else be joining your video visit? No    Review Of Systems  Skin: negative  Eyes: negative  Ears/Nose/Throat: hard of hearing  Respiratory: Dyspnea on exertion-Stable  Cardiovascular: negative  Gastrointestinal: negative  Genitourinary: negative  Musculoskeletal: negative  Neurologic: negative  Psychiatric: negative  Hematologic/Lymphatic/Immunologic: negative  Endocrine: thyroid disorder and diabetes    Jazmin Frank CMA 1/18/2021      Video-Visit Details    Type of service:  Video Visit--changed to a telephone visit due to technical difficulties    Start Time: 10:03 AM  End Time: 10:30 AM    Originating Location (pt. Location): Home    Distant Location (provider location):  [facility]     Platform used for Video Visit: [employer]   Maryalice Rebecca Wachter is a pleasant 87 year old female who is being evaluated via a billable telephone visit in order to minimize the possibility of exposure due to the current COVID-19 pandemic. This is a 1 month follow up.    Her cardiovascular history includes paroxysmal atrial fibrillation which appears to have been first diagnosed around 2012.  Her EF was mildly reduced at that time.  She underwent stress testing, which suggested a possible prior infarction; however, followup stress testing as well as followup echocardiography did not show similar findings.  Her echocardiogram in 07/2018 showed  preserved LV systolic function.  In 11/2019, she developed sudden onset of dyspnea.  She was hospitalized at [facility] and echocardiography showed an LVEF of 25% with severe global hypokinesis of the mid and distal segment and preserved contractility of the base.  Her troponin was very minimally elevated at that time.  She did require BiPAP and pressors, but fortunately quickly improved.  During that stay, her predominant rhythm was sinus, but she did have frequent paroxysms of atrial tachycardia versus atrial flutter.  She was started on amiodarone, metoprolol, furosemide and spironolactone.  A repeat echocardiogram before discharge showed an improvement in her EF to the 40% range.  The prior wall motion abnormalities had improved, but not completely normalized.  She had a Lexiscan stress test which did not show any evidence of ischemia or infarction.  There was a fixed distal anterior wall defect which was felt to be consistent with attenuation artifact.  It was felt overall that her presenting picture was most consistent with a stress cardiomyopathy.      She had a followup echocardiogram in 01/2020.  Her EF was 50%-55%.  She had mild basal to mid anteroseptal wall hypokinesis.  She saw Dr. Arango shortly after that and she was doing quite well.  He discontinued her spironolactone and amiodarone.  Her furosemide was decreased to every other day with instructions to wean off and take as needed.  Her metoprolol was increased slightly.  It was recommended that she have a followup and a repeat echocardiogram in approximately 6 months.  Due to the current pandemic, this was delayed.      Her repeat echocardiogram 12/2020 showed reduced LV systolic function with an EF of 30%-35%.  There was moderate global hypokinesia of the left ventricle.  The LV was normal in size.  It was noted that the anterolateral wall was not completely seen and that no contrast was used.  She has mild to moderate mitral regurgitation.   RVSP was 17 mmHg plus right atrial pressure.  It was felt that her LV filling pressures were elevated.     When I met with the patient last month she was not having any symptoms suggesting a HF exacerbation. She preferred a conservative approach to her work up and I recommended a 2 week Zio monitor. This recently came back showing SR with ~14,000 runs of SVT, the longest was 37 min. She did have occ wenekebach noted.     Today she denies worsening SHUKLA or LE edema. No CP. Not really aware of tachycardia unless she put her home pulse ox on.     Objective      Physical Exam    None due to telephone visit    DATA:  Reviewed her recent heart monitor as detailed above.    Lab Results   Component Value Date    TSH 2.41 07/29/2020     Lab Results   Component Value Date    AST 55 11/05/2019     Lab Results   Component Value Date    ALT 30 11/05/2019       Lab Results   Component Value Date    BILITOTAL 0.6 11/05/2019     Lab Results   Component Value Date    ALBUMIN 3.4 11/05/2019     Lab Results   Component Value Date    PROTTOTAL 6.5 11/05/2019      Lab Results   Component Value Date    ALKPHOS 72 11/05/2019     Last Comprehensive Metabolic Panel:  Sodium   Date Value Ref Range Status   12/18/2020 138 133 - 144 mmol/L Final     Potassium   Date Value Ref Range Status   12/18/2020 4.1 3.4 - 5.3 mmol/L Final     Chloride   Date Value Ref Range Status   12/18/2020 104 94 - 109 mmol/L Final     Carbon Dioxide   Date Value Ref Range Status   12/18/2020 31 20 - 32 mmol/L Final     Anion Gap   Date Value Ref Range Status   12/18/2020 3 3 - 14 mmol/L Final     Glucose   Date Value Ref Range Status   12/18/2020 180 (H) 70 - 99 mg/dL Final     Urea Nitrogen   Date Value Ref Range Status   12/18/2020 21 7 - 30 mg/dL Final     Creatinine   Date Value Ref Range Status   12/18/2020 1.15 (H) 0.52 - 1.04 mg/dL Final     GFR Estimate   Date Value Ref Range Status   12/18/2020 43 (L) >60 mL/min/[1.73_m2] Final     Comment:     Non   American GFR Calc  Starting 12/18/2018, serum creatinine based estimated GFR (eGFR) will be   calculated using the Chronic Kidney Disease Epidemiology Collaboration   (CKD-EPI) equation.       Calcium   Date Value Ref Range Status   12/18/2020 9.2 8.5 - 10.1 mg/dL Final       Assessment/Plan:  Ms. Wachter is a pleasant 87-year-old female with a history of paroxysmal atrial fibrillation/SVT for which she is on anticoagulation with Coumadin and recurrent cardiomyopathy.  I do not believe she has undergone angiography previously, but she has had stress testing which has not suggested any significant ischemia. Her recent Zio Patch monitor shows a large burden of PSVT with episodes lasting up to 37 min.     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 I did review the monitor findings with Dr Arango. At this time I recommend she restart amiodarone. I would like her to take 200mg BID for 30 days, then plan to reduce the dose to 200mg daily. Pt educated amio may affect her INR and to let her AC clinic know when she has her follow up on 1/28.    Follow up 3 day Zio in about 2 months. If her arrhythmia is controlled we can repeat an echo later this year. Will get amiodarone monitoring labs and CXR at that time as well.     For now continue metoprolol, lisinopril and PRN furosemide for her chronic HFrEF.     FUTURE APPOINTMENTS:       - Follow-up visit in 2-3 months with a 3 day Leonard prior.     70 minutes spent on the date of the encounter doing chart review, review of test results, patient visit and documentation     An AVS will be provided to the patient.      Thank you for allowing me to participate in the care of this pleasant patient.  Please do not hesitate to contact us with further questions or concerns.     JOSÉ Desai [facility]

## 2021-02-01 ENCOUNTER — TELEPHONE (OUTPATIENT)
Dept: FAMILY MEDICINE | Facility: CLINIC | Age: 86
End: 2021-02-01

## 2021-02-01 NOTE — TELEPHONE ENCOUNTER
PT passed away on 1/28/2021 [facility] 's office wants the Dr to sign the death certificate.     Please call them back as soon as you are able at [phone].

## 2021-02-02 NOTE — TELEPHONE ENCOUNTER
Spoke to [facility] 11:28am      Passed away 1/28/21  Spouse assisting her to her car to attend a medical appointment and became unresponsive.  911 called but unable to revive her.  Consistent with natural death.    Venu Maria PA-C

## 2021-02-04 ENCOUNTER — DOCUMENTATION ONLY (OUTPATIENT)
Dept: FAMILY MEDICINE | Facility: CLINIC | Age: 86
End: 2021-02-04

## 2021-02-04 NOTE — PROGRESS NOTES
ANTICOAGULATION  MANAGEMENT    Maryalice Rebecca Wachter is being discharged from the [facility] Anticoagulation Management Program ([facility]).    Reason for discharge:     Anticoagulation episode resolved, ACC referral closed and INR Standing order discontinued    pt

## 2023-08-05 NOTE — TELEPHONE ENCOUNTER
She sent the [employer] message to Dr Suazo.  Did he see it?  She's clearly over supplementing with the oral B12.  She needs to cut that back.      I'll defer to him for the rest of the management.  There are other labs also that are back.      Venu   Spontaneous, unlabored and symmetrical

## 2024-07-29 NOTE — TELEPHONE ENCOUNTER
Panel Management Review      Patient has the following on her problem list:     Diabetes    ASA: Passed    Last A1C  Lab Results   Component Value Date    A1C 9.4 03/02/2017    A1C 7.7 10/10/2016    A1C 8.3 07/01/2016    A1C 7.9 03/28/2016    A1C 7.1 11/19/2015     A1C tested: FAILED    Last LDL:    Lab Results   Component Value Date    CHOL 117 07/01/2016     Lab Results   Component Value Date    HDL 40 07/01/2016     Lab Results   Component Value Date    LDL 39 07/01/2016    LDL 60 12/12/2014     Lab Results   Component Value Date    TRIG 190 07/01/2016     Lab Results   Component Value Date    CHOLHDLRATIO 3.0 06/30/2015     Lab Results   Component Value Date    NHDL 77 07/01/2016       Is the patient on a Statin? YES             Is the patient on Aspirin? YES    Medications     HMG CoA Reductase Inhibitors    simvastatin (ZOCOR) 20 MG tablet    Salicylates    ASPIRIN EC PO          Last three blood pressure readings:  BP Readings from Last 3 Encounters:   03/02/17 138/70   01/19/17 124/64   01/05/17 154/70       Date of last diabetes office visit: 10/27/16     Tobacco History:     History   Smoking Status     Never Smoker   Smokeless Tobacco     Never Used          COPD  Diagnosis date: 10/21/16   Is this diagnosis new within the last year?   YES   Was spirometry completed?  YES      Composite cancer screening  Chart review shows that this patient is due/due soon for the following None  Summary:    Patient is due/failing the following:   SPIROMETRY    Action needed:   NONE, spirometry completed at [facility]    Type of outreach:    NONE, Spirometry result abstracted into chart. Completed at [facility].    Questions for provider review:    None                                                                                                                                    Laisha Hernández MA       Encounter Closed.        
Resident/Fellow

## 2024-08-30 NOTE — PATIENT INSTRUCTIONS
Recommendations from today's MTM visit:                                                        1. A1c today = 7.4% today --congratulations --excellent result !    2. We are also repeating your thyroid lab today as well. Watch my chart for result and note from Venu ferris I .     3. Please see tala in our pharmacy today for your yearly high dose flu shot.     4., FYI--please wean off Omeprazole as follows:  1 pill every other day x 2 weeks , then 1 every 3rd day x 2 weeks , then stop if ok and only use on as needed basis.             Next MTM visit:  March 19th -2018 at 1;30pm.   Bring blood sugar meter.     To schedule another MTM appointment, please call the clinic directly or you may call the MTM scheduling line at [phone] or toll-free at [phone].     My Clinical Pharmacist's contact information:                                                      It was a pleasure seeing you today!  Please feel free to contact me with any questions or concerns you have.      Janey Romero [facility].  Medication Therapy Management Provider  [phone]      You may receive a survey about the MTM services you received.  I would appreciate your feedback to help me serve you better in the future. Please fill it out and return it when you can. Your comments will be anonymous.      My healthcare goals:                                                              
2 = A lot of assistance

## 2024-09-10 NOTE — Clinical Note
Venu--erik--I spoke with hadley via phone today post hospital discharge 12 days ago.  Her inr was 4.80 from last Friday --she stated no one called her to tell her what to do so I gave her a plan this week with INR recheck fro Friday august10th. Also I recommended she try an otc sleep med called Nba --ill check in with her in 2 weeks to see how that goes.  Thanks , Janey Romero, [facility]. Medication Therapy Management Provider [phone]  Detail Level: Zone Render In Strict Bullet Format?: No Continue Regimen: doxycycline hyclate 100 mg capsule \\nQuantity: 90.0 Capsule  Days Supply: 90\\nSig: Take 1 capsule po Qd with food Plan: Condition has been present and stable for greater than 1 year Continue Regimen: Klonopin 0.5 mg tablet, Sig: Take 1/2 pill po QD - bid Plan: I attest that PDMP was queried for my patient via https://pdmp.[place].gov/ prior to prescribing a controlled substance.

## (undated) DEVICE — KIT ENDO TURNOVER/PROCEDURE W/CLEAN A SCOPE LINERS 103888

## (undated) DEVICE — ENDO FORCEP ENDOJAW BIOPSY 2.8MMX160CM FB-220K

## (undated) RX ORDER — FENTANYL CITRATE 50 UG/ML
INJECTION, SOLUTION INTRAMUSCULAR; INTRAVENOUS
Status: DISPENSED
Start: 2017-04-13